# Patient Record
Sex: MALE | Race: WHITE | NOT HISPANIC OR LATINO | URBAN - METROPOLITAN AREA
[De-identification: names, ages, dates, MRNs, and addresses within clinical notes are randomized per-mention and may not be internally consistent; named-entity substitution may affect disease eponyms.]

---

## 2019-02-21 ENCOUNTER — APPOINTMENT (RX ONLY)
Dept: URBAN - METROPOLITAN AREA CLINIC 22 | Facility: CLINIC | Age: 59
Setting detail: DERMATOLOGY
End: 2019-02-21

## 2019-02-21 DIAGNOSIS — Z71.89 OTHER SPECIFIED COUNSELING: ICD-10-CM

## 2019-02-21 DIAGNOSIS — B35.1 TINEA UNGUIUM: ICD-10-CM

## 2019-02-21 DIAGNOSIS — D18.0 HEMANGIOMA: ICD-10-CM

## 2019-02-21 DIAGNOSIS — L57.0 ACTINIC KERATOSIS: ICD-10-CM

## 2019-02-21 DIAGNOSIS — D22 MELANOCYTIC NEVI: ICD-10-CM

## 2019-02-21 DIAGNOSIS — L82.1 OTHER SEBORRHEIC KERATOSIS: ICD-10-CM

## 2019-02-21 DIAGNOSIS — B35.3 TINEA PEDIS: ICD-10-CM

## 2019-02-21 DIAGNOSIS — L81.4 OTHER MELANIN HYPERPIGMENTATION: ICD-10-CM

## 2019-02-21 PROBLEM — D48.5 NEOPLASM OF UNCERTAIN BEHAVIOR OF SKIN: Status: ACTIVE | Noted: 2019-02-21

## 2019-02-21 PROBLEM — D18.01 HEMANGIOMA OF SKIN AND SUBCUTANEOUS TISSUE: Status: ACTIVE | Noted: 2019-02-21

## 2019-02-21 PROBLEM — D22.5 MELANOCYTIC NEVI OF TRUNK: Status: ACTIVE | Noted: 2019-02-21

## 2019-02-21 PROBLEM — Z85.828 PERSONAL HISTORY OF OTHER MALIGNANT NEOPLASM OF SKIN: Status: ACTIVE | Noted: 2019-02-21

## 2019-02-21 PROCEDURE — 17000 DESTRUCT PREMALG LESION: CPT | Mod: 59

## 2019-02-21 PROCEDURE — 11102 TANGNTL BX SKIN SINGLE LES: CPT

## 2019-02-21 PROCEDURE — ? COUNSELING

## 2019-02-21 PROCEDURE — 11103 TANGNTL BX SKIN EA SEP/ADDL: CPT

## 2019-02-21 PROCEDURE — ? BIOPSY BY SHAVE METHOD

## 2019-02-21 PROCEDURE — 17003 DESTRUCT PREMALG LES 2-14: CPT

## 2019-02-21 PROCEDURE — 99203 OFFICE O/P NEW LOW 30 MIN: CPT | Mod: 25

## 2019-02-21 PROCEDURE — ? LIQUID NITROGEN

## 2019-02-21 ASSESSMENT — LOCATION DETAILED DESCRIPTION DERM
LOCATION DETAILED: RIGHT 2ND TOENAIL
LOCATION DETAILED: LEFT 4TH TOENAIL
LOCATION DETAILED: LEFT SUPERIOR MEDIAL UPPER BACK
LOCATION DETAILED: RIGHT DORSAL FOOT
LOCATION DETAILED: LEFT MEDIAL MALAR CHEEK
LOCATION DETAILED: LEFT 2ND TOENAIL
LOCATION DETAILED: RIGHT SUPERIOR MEDIAL MIDBACK
LOCATION DETAILED: RIGHT MEDIAL UPPER BACK
LOCATION DETAILED: RIGHT 3RD TOENAIL
LOCATION DETAILED: LEFT 5TH TOENAIL
LOCATION DETAILED: RIGHT 4TH TOENAIL
LOCATION DETAILED: RIGHT 5TH TOENAIL
LOCATION DETAILED: RIGHT GREAT TOENAIL
LOCATION DETAILED: LEFT DORSAL GREAT TOE
LOCATION DETAILED: RIGHT INFERIOR LATERAL FOREHEAD
LOCATION DETAILED: NASAL DORSUM
LOCATION DETAILED: LEFT 3RD TOENAIL
LOCATION DETAILED: EPIGASTRIC SKIN
LOCATION DETAILED: LEFT FOREHEAD
LOCATION DETAILED: LEFT DORSAL FOOT
LOCATION DETAILED: NASAL TIP

## 2019-02-21 ASSESSMENT — LOCATION SIMPLE DESCRIPTION DERM
LOCATION SIMPLE: RIGHT 3RD TOE
LOCATION SIMPLE: LEFT FOOT
LOCATION SIMPLE: LEFT UPPER BACK
LOCATION SIMPLE: LEFT GREAT TOE
LOCATION SIMPLE: RIGHT FOOT
LOCATION SIMPLE: RIGHT UPPER BACK
LOCATION SIMPLE: LEFT 5TH TOE
LOCATION SIMPLE: RIGHT GREAT TOE
LOCATION SIMPLE: LEFT CHEEK
LOCATION SIMPLE: RIGHT LOWER BACK
LOCATION SIMPLE: LEFT 2ND TOE
LOCATION SIMPLE: LEFT 3RD TOE
LOCATION SIMPLE: LEFT 4TH TOE
LOCATION SIMPLE: LEFT FOREHEAD
LOCATION SIMPLE: NOSE
LOCATION SIMPLE: RIGHT 5TH TOE
LOCATION SIMPLE: RIGHT 2ND TOE
LOCATION SIMPLE: ABDOMEN
LOCATION SIMPLE: RIGHT FOREHEAD
LOCATION SIMPLE: RIGHT 4TH TOE

## 2019-02-21 ASSESSMENT — LOCATION ZONE DERM
LOCATION ZONE: TOE
LOCATION ZONE: TRUNK
LOCATION ZONE: NOSE
LOCATION ZONE: TOENAIL
LOCATION ZONE: FACE
LOCATION ZONE: FEET

## 2019-02-21 NOTE — HPI: SKIN LESIONS
Is This A New Presentation, Or A Follow-Up?: Skin Lesion
How Severe Is Your Skin Lesion?: moderate
Have Your Skin Lesions Been Treated?: not been treated
Which Family Member (Optional)?: Father

## 2019-02-21 NOTE — PROCEDURE: BIOPSY BY SHAVE METHOD
Curettage Text: The wound bed was treated with curettage after the biopsy was performed.
Notification Instructions: Patient will be notified of biopsy results. However, patient instructed to call the office if not contacted within 2 weeks.
Detail Level: Detailed
Biopsy Method: Personna blade
Bill For Surgical Tray: no
Lab: [id number]
Consent: Written consent was obtained and risks were reviewed including but not limited to scarring, infection, bleeding, scabbing, incomplete removal, nerve damage and allergy to anesthesia.
Lab Facility: 
Cryotherapy Text: The wound bed was treated with cryotherapy after the biopsy was performed.
Additional Anesthesia Volume In Cc (Will Not Render If 0): 0
Anesthesia Type: 1% lidocaine with 1:100,000 epinephrine and a 1:3 solution of 8.4% sodium bicarbonate
Render Post-Care Instructions In Note?: yes
Wound Care: Vaseline
Depth Of Biopsy: dermis
Electrodesiccation Text: The wound bed was treated with electrodesiccation after the biopsy was performed.
Biopsy Type: H and E
Billing Type: Third-Party Bill
Electrodesiccation And Curettage Text: The wound bed was treated with electrodesiccation and curettage after the biopsy was performed.
Type Of Destruction Used: Curettage
Anesthesia Volume In Cc: 1
Dressing: bandage
Silver Nitrate Text: The wound bed was treated with silver nitrate after the biopsy was performed.
Post-Care Instructions: I reviewed with the patient in detail post-care instructions. Patient is to keep the biopsy site dry overnight, and then apply bacitracin twice daily until healed. Patient may apply hydrogen peroxide soaks to remove any crusting.
Hemostasis: Drysol

## 2019-03-14 ENCOUNTER — APPOINTMENT (RX ONLY)
Dept: URBAN - METROPOLITAN AREA CLINIC 36 | Facility: CLINIC | Age: 59
Setting detail: DERMATOLOGY
End: 2019-03-14

## 2019-03-14 PROBLEM — C44.629 SQUAMOUS CELL CARCINOMA OF SKIN OF LEFT UPPER LIMB, INCLUDING SHOULDER: Status: ACTIVE | Noted: 2019-03-14

## 2019-03-14 PROCEDURE — ? DIAGNOSIS COMMENT

## 2019-03-14 PROCEDURE — ? EXCISION

## 2019-03-14 PROCEDURE — 11623 EXC S/N/H/F/G MAL+MRG 2.1-3: CPT

## 2019-03-14 PROCEDURE — 12042 INTMD RPR N-HF/GENIT2.6-7.5: CPT

## 2019-03-14 NOTE — PROCEDURE: EXCISION
Melolabial Interpolation Flap Text: A decision was made to reconstruct the defect utilizing an interpolation axial flap and a staged reconstruction.  A telfa template was made of the defect.  This telfa template was then used to outline the melolabial interpolation flap.  The donor area for the pedicle flap was then injected with anesthesia.  The flap was excised through the skin and subcutaneous tissue down to the layer of the underlying musculature.  The pedicle flap was carefully excised within this deep plane to maintain its blood supply.  The edges of the donor site were undermined.   The donor site was closed in a primary fashion.  The pedicle was then rotated into position and sutured.  Once the tube was sutured into place, adequate blood supply was confirmed with blanching and refill.  The pedicle was then wrapped with xeroform gauze and dressed appropriately with a telfa and gauze bandage to ensure continued blood supply and protect the attached pedicle.
Graft Donor Site Will Heal By Secondary Intention: No
Trilobed Flap Text: The defect edges were debeveled with a #15 scalpel blade.  Given the location of the defect and the proximity to free margins a trilobed flap was deemed most appropriate.  Using a sterile surgical marker, an appropriate trilobed flap drawn around the defect.    The area thus outlined was incised deep to adipose tissue with a #15 scalpel blade.  The skin margins were undermined to an appropriate distance in all directions utilizing iris scissors.
Complex Repair And Dermal Autograft Text: The defect edges were debeveled with a #15 scalpel blade.  The primary defect was closed partially with a complex linear closure.  Given the location of the defect, shape of the defect and the proximity to free margins an dermal autograft was deemed most appropriate to repair the remaining defect.  The graft was trimmed to fit the size of the remaining defect.  The graft was then placed in the primary defect, oriented appropriately, and sutured into place.
Double M-Plasty Complex Repair Preamble Text (Leave Blank If You Do Not Want): Extensive wide undermining was performed.
Complex Repair And Double Advancement Flap Text: The defect edges were debeveled with a #15 scalpel blade.  The primary defect was closed partially with a complex linear closure.  Given the location of the remaining defect, shape of the defect and the proximity to free margins a double advancement flap was deemed most appropriate for complete closure of the defect.  Using a sterile surgical marker, an appropriate advancement flap was drawn incorporating the defect and placing the expected incisions within the relaxed skin tension lines where possible.    The area thus outlined was incised deep to adipose tissue with a #15 scalpel blade.  The skin margins were undermined to an appropriate distance in all directions utilizing iris scissors.
Mercedes Flap Text: The defect edges were debeveled with a #15 scalpel blade.  Given the location of the defect, shape of the defect and the proximity to free margins a Mercedes flap was deemed most appropriate.  Using a sterile surgical marker, an appropriate advancement flap was drawn incorporating the defect and placing the expected incisions within the relaxed skin tension lines where possible. The area thus outlined was incised deep to adipose tissue with a #15 scalpel blade.  The skin margins were undermined to an appropriate distance in all directions utilizing iris scissors.
Crescentic Intermediate Repair Preamble Text (Leave Blank If You Do Not Want): Undermining was performed with blunt dissection.
Anesthesia Type: 1% lidocaine with epinephrine and a 1:10 solution of 8.4% sodium bicarbonate
Information: Selecting Yes will display possible errors in your note based on the variables you have selected. This validation is only offered as a suggestion for you. PLEASE NOTE THAT THE VALIDATION TEXT WILL BE REMOVED WHEN YOU FINALIZE YOUR NOTE. IF YOU WANT TO FAX A PRELIMINARY NOTE YOU WILL NEED TO TOGGLE THIS TO 'NO' IF YOU DO NOT WANT IT IN YOUR FAXED NOTE.
Mastoid Interpolation Flap Text: A decision was made to reconstruct the defect utilizing an interpolation axial flap and a staged reconstruction.  A telfa template was made of the defect.  This telfa template was then used to outline the mastoid interpolation flap.  The donor area for the pedicle flap was then injected with anesthesia.  The flap was excised through the skin and subcutaneous tissue down to the layer of the underlying musculature.  The pedicle flap was carefully excised within this deep plane to maintain its blood supply.  The edges of the donor site were undermined.   The donor site was closed in a primary fashion.  The pedicle was then rotated into position and sutured.  Once the tube was sutured into place, adequate blood supply was confirmed with blanching and refill.  The pedicle was then wrapped with xeroform gauze and dressed appropriately with a telfa and gauze bandage to ensure continued blood supply and protect the attached pedicle.
Complex Repair And Tissue Cultured Epidermal Autograft Text: The defect edges were debeveled with a #15 scalpel blade.  The primary defect was closed partially with a complex linear closure.  Given the location of the defect, shape of the defect and the proximity to free margins an tissue cultured epidermal autograft was deemed most appropriate to repair the remaining defect.  The graft was trimmed to fit the size of the remaining defect.  The graft was then placed in the primary defect, oriented appropriately, and sutured into place.
Dorsal Nasal Flap Text: The defect edges were debeveled with a #15 scalpel blade.  Given the location of the defect and the proximity to free margins a dorsal nasal flap was deemed most appropriate.  Using a sterile surgical marker, an appropriate dorsal nasal flap was drawn around the defect.    The area thus outlined was incised deep to adipose tissue with a #15 scalpel blade.  The skin margins were undermined to an appropriate distance in all directions utilizing iris scissors.
Anesthesia Volume In Cc: 9
Complex Repair And Modified Advancement Flap Text: The defect edges were debeveled with a #15 scalpel blade.  The primary defect was closed partially with a complex linear closure.  Given the location of the remaining defect, shape of the defect and the proximity to free margins a modified advancement flap was deemed most appropriate for complete closure of the defect.  Using a sterile surgical marker, an appropriate advancement flap was drawn incorporating the defect and placing the expected incisions within the relaxed skin tension lines where possible.    The area thus outlined was incised deep to adipose tissue with a #15 scalpel blade.  The skin margins were undermined to an appropriate distance in all directions utilizing iris scissors.
Modified Advancement Flap Text: The defect edges were debeveled with a #15 scalpel blade.  Given the location of the defect, shape of the defect and the proximity to free margins a modified advancement flap was deemed most appropriate.  Using a sterile surgical marker, an appropriate advancement flap was drawn incorporating the defect and placing the expected incisions within the relaxed skin tension lines where possible.    The area thus outlined was incised deep to adipose tissue with a #15 scalpel blade.  The skin margins were undermined to an appropriate distance in all directions utilizing iris scissors.
Island Pedicle Flap Text: The defect edges were debeveled with a #15 scalpel blade.  Given the location of the defect, shape of the defect and the proximity to free margins an island pedicle advancement flap was deemed most appropriate.  Using a sterile surgical marker, an appropriate advancement flap was drawn incorporating the defect, outlining the appropriate donor tissue and placing the expected incisions within the relaxed skin tension lines where possible.    The area thus outlined was incised deep to adipose tissue with a #15 scalpel blade.  The skin margins were undermined to an appropriate distance in all directions around the primary defect and laterally outward around the island pedicle utilizing iris scissors.  There was minimal undermining beneath the pedicle flap.
Complex Repair And Xenograft Text: The defect edges were debeveled with a #15 scalpel blade.  The primary defect was closed partially with a complex linear closure.  Given the location of the defect, shape of the defect and the proximity to free margins a xenograft was deemed most appropriate to repair the remaining defect.  The graft was trimmed to fit the size of the remaining defect.  The graft was then placed in the primary defect, oriented appropriately, and sutured into place.
Show Primary And Secondary Defect Sizes Variable (Do Not Hide If You Perform Flaps Or Graft Closures): Yes
Epidermal Closure Graft Donor Site (Optional): simple interrupted
Complex Repair And A-T Advancement Flap Text: The defect edges were debeveled with a #15 scalpel blade.  The primary defect was closed partially with a complex linear closure.  Given the location of the remaining defect, shape of the defect and the proximity to free margins an A-T advancement flap was deemed most appropriate for complete closure of the defect.  Using a sterile surgical marker, an appropriate advancement flap was drawn incorporating the defect and placing the expected incisions within the relaxed skin tension lines where possible.    The area thus outlined was incised deep to adipose tissue with a #15 scalpel blade.  The skin margins were undermined to an appropriate distance in all directions utilizing iris scissors.
Repair Type: Purse String Closure (Intermediate)
Mucosal Advancement Flap Text: Given the location of the defect, shape of the defect and the proximity to free margins a mucosal advancement flap was deemed most appropriate. Incisions were made with a 15 blade scalpel in the appropriate fashion along the cutaneous vermilion border and the mucosal lip. The remaining actinically damaged mucosal tissue was excised.  The mucosal advancement flap was then elevated to the gingival sulcus with care taken to preserve the neurovascular structures and advanced into the primary defect. Care was taken to ensure that precise realignment of the vermilion border was achieved.
Posterior Auricular Interpolation Flap Text: A decision was made to reconstruct the defect utilizing an interpolation axial flap and a staged reconstruction.  A telfa template was made of the defect.  This telfa template was then used to outline the posterior auricular interpolation flap.  The donor area for the pedicle flap was then injected with anesthesia.  The flap was excised through the skin and subcutaneous tissue down to the layer of the underlying musculature.  The pedicle flap was carefully excised within this deep plane to maintain its blood supply.  The edges of the donor site were undermined.   The donor site was closed in a primary fashion.  The pedicle was then rotated into position and sutured.  Once the tube was sutured into place, adequate blood supply was confirmed with blanching and refill.  The pedicle was then wrapped with xeroform gauze and dressed appropriately with a telfa and gauze bandage to ensure continued blood supply and protect the attached pedicle.
Complex Repair And Skin Substitute Graft Text: The defect edges were debeveled with a #15 scalpel blade.  The primary defect was closed partially with a complex linear closure.  Given the location of the remaining defect, shape of the defect and the proximity to free margins a skin substitute graft was deemed most appropriate to repair the remaining defect.  The graft was trimmed to fit the size of the remaining defect.  The graft was then placed in the primary defect, oriented appropriately, and sutured into place.
Paramedian Forehead Flap Text: A decision was made to reconstruct the defect utilizing an interpolation axial flap and a staged reconstruction.  A telfa template was made of the defect.  This telfa template was then used to outline the paramedian forehead pedicle flap.  The donor area for the pedicle flap was then injected with anesthesia.  The flap was excised through the skin and subcutaneous tissue down to the layer of the underlying musculature.  The pedicle flap was carefully excised within this deep plane to maintain its blood supply.  The edges of the donor site were undermined.   The donor site was closed in a primary fashion.  The pedicle was then rotated into position and sutured.  Once the tube was sutured into place, adequate blood supply was confirmed with blanching and refill.  The pedicle was then wrapped with xeroform gauze and dressed appropriately with a telfa and gauze bandage to ensure continued blood supply and protect the attached pedicle.
Island Pedicle Flap With Canthal Suspension Text: The defect edges were debeveled with a #15 scalpel blade.  Given the location of the defect, shape of the defect and the proximity to free margins an island pedicle advancement flap was deemed most appropriate.  Using a sterile surgical marker, an appropriate advancement flap was drawn incorporating the defect, outlining the appropriate donor tissue and placing the expected incisions within the relaxed skin tension lines where possible. The area thus outlined was incised deep to adipose tissue with a #15 scalpel blade.  The skin margins were undermined to an appropriate distance in all directions around the primary defect and laterally outward around the island pedicle utilizing iris scissors.  There was minimal undermining beneath the pedicle flap. A suspension suture was placed in the canthal tendon to prevent tension and prevent ectropion.
Hatchet Flap Text: The defect edges were debeveled with a #15 scalpel blade.  Given the location of the defect, shape of the defect and the proximity to free margins a hatchet flap was deemed most appropriate.  Using a sterile surgical marker, an appropriate hatchet flap was drawn incorporating the defect and placing the expected incisions within the relaxed skin tension lines where possible.    The area thus outlined was incised deep to adipose tissue with a #15 scalpel blade.  The skin margins were undermined to an appropriate distance in all directions utilizing iris scissors.
Anesthesia Volume In Cc: 1.5
Complex Repair And O-T Advancement Flap Text: The defect edges were debeveled with a #15 scalpel blade.  The primary defect was closed partially with a complex linear closure.  Given the location of the remaining defect, shape of the defect and the proximity to free margins an O-T advancement flap was deemed most appropriate for complete closure of the defect.  Using a sterile surgical marker, an appropriate advancement flap was drawn incorporating the defect and placing the expected incisions within the relaxed skin tension lines where possible.    The area thus outlined was incised deep to adipose tissue with a #15 scalpel blade.  The skin margins were undermined to an appropriate distance in all directions utilizing iris scissors.
Undermining Location (Optional): in the fascial plane
Path Notes (To The Dermatopathologist): Please check margins. Suture marking the 12 o'clock margin.
Lip Wedge Excision Repair Text: Given the location of the defect and the proximity to free margins a full thickness wedge repair was deemed most appropriate.  Using a sterile surgical marker, the appropriate repair was drawn incorporating the defect and placing the expected incisions perpendicular to the vermilion border.  The vermilion border was also meticulously outlined to ensure appropriate reapproximation during the repair.  The area thus outlined was incised through and through with a #15 scalpel blade.  The muscularis and dermis were reaproximated with deep sutures following hemostasis. Care was taken to realign the vermilion border before proceeding with the superficial closure.  Once the vermilion was realigned the superfical and mucosal closure was finished.
Alar Island Pedicle Flap Text: The defect edges were debeveled with a #15 scalpel blade.  Given the location of the defect, shape of the defect and the proximity to the alar rim an island pedicle advancement flap was deemed most appropriate.  Using a sterile surgical marker, an appropriate advancement flap was drawn incorporating the defect, outlining the appropriate donor tissue and placing the expected incisions within the nasal ala running parallel to the alar rim. The area thus outlined was incised with a #15 scalpel blade.  The skin margins were undermined minimally to an appropriate distance in all directions around the primary defect and laterally outward around the island pedicle utilizing iris scissors.  There was minimal undermining beneath the pedicle flap.
Intermediate / Complex Repair - Final Wound Length In Cm: 3.1
Complex Repair And O-L Flap Text: The defect edges were debeveled with a #15 scalpel blade.  The primary defect was closed partially with a complex linear closure.  Given the location of the remaining defect, shape of the defect and the proximity to free margins an O-L flap was deemed most appropriate for complete closure of the defect.  Using a sterile surgical marker, an appropriate flap was drawn incorporating the defect and placing the expected incisions within the relaxed skin tension lines where possible.    The area thus outlined was incised deep to adipose tissue with a #15 scalpel blade.  The skin margins were undermined to an appropriate distance in all directions utilizing iris scissors.
Rotation Flap Text: The defect edges were debeveled with a #15 scalpel blade.  Given the location of the defect, shape of the defect and the proximity to free margins a rotation flap was deemed most appropriate.  Using a sterile surgical marker, an appropriate rotation flap was drawn incorporating the defect and placing the expected incisions within the relaxed skin tension lines where possible.    The area thus outlined was incised deep to adipose tissue with a #15 scalpel blade.  The skin margins were undermined to an appropriate distance in all directions utilizing iris scissors.
Body Location Override (Optional - Billing Will Still Be Based On Selected Body Map Location If Applicable): left radial dorsal hand
Wound Care: Petrolatum
Complex Repair And Bilobe Flap Text: The defect edges were debeveled with a #15 scalpel blade.  The primary defect was closed partially with a complex linear closure.  Given the location of the remaining defect, shape of the defect and the proximity to free margins a bilobe flap was deemed most appropriate for complete closure of the defect.  Using a sterile surgical marker, an appropriate advancement flap was drawn incorporating the defect and placing the expected incisions within the relaxed skin tension lines where possible.    The area thus outlined was incised deep to adipose tissue with a #15 scalpel blade.  The skin margins were undermined to an appropriate distance in all directions utilizing iris scissors.
Spiral Flap Text: The defect edges were debeveled with a #15 scalpel blade.  Given the location of the defect, shape of the defect and the proximity to free margins a spiral flap was deemed most appropriate.  Using a sterile surgical marker, an appropriate rotation flap was drawn incorporating the defect and placing the expected incisions within the relaxed skin tension lines where possible. The area thus outlined was incised deep to adipose tissue with a #15 scalpel blade.  The skin margins were undermined to an appropriate distance in all directions utilizing iris scissors.
Ftsg Text: The defect edges were debeveled with a #15 scalpel blade.  Given the location of the defect, shape of the defect and the proximity to free margins a full thickness skin graft was deemed most appropriate.  Using a sterile surgical marker, the primary defect shape was transferred to the donor site. The area thus outlined was incised deep to adipose tissue with a #15 scalpel blade.  The harvested graft was then trimmed of adipose tissue until only dermis and epidermis was left.  The skin margins of the secondary defect were undermined to an appropriate distance in all directions utilizing iris scissors.  The secondary defect was closed with interrupted buried subcutaneous sutures.  The skin edges were then re-apposed with running  sutures.  The skin graft was then placed in the primary defect and oriented appropriately.
Additional Anesthesia Volume In Cc: 6
Curvilinear Excision Additional Text (Leave Blank If You Do Not Want): The margin was drawn around the clinically apparent lesion.  A curvilinear shape was then drawn on the skin incorporating the lesion and margins.  Incisions were then made along these lines to the appropriate tissue plane and the lesion was extirpated.
Referring Physician (Optional): Demetrius Camacho, PAC
Double Island Pedicle Flap Text: The defect edges were debeveled with a #15 scalpel blade.  Given the location of the defect, shape of the defect and the proximity to free margins a double island pedicle advancement flap was deemed most appropriate.  Using a sterile surgical marker, an appropriate advancement flap was drawn incorporating the defect, outlining the appropriate donor tissue and placing the expected incisions within the relaxed skin tension lines where possible.    The area thus outlined was incised deep to adipose tissue with a #15 scalpel blade.  The skin margins were undermined to an appropriate distance in all directions around the primary defect and laterally outward around the island pedicle utilizing iris scissors.  There was minimal undermining beneath the pedicle flap.
Skin Substitute Units (Will Override Primary Defect Units If Greater Than 0): 0
Fusiform Excision Additional Text (Leave Blank If You Do Not Want): The margin was drawn around the clinically apparent lesion.  A fusiform shape was then drawn on the skin incorporating the lesion and margins.  Incisions were then made along these lines to the appropriate tissue plane and the lesion was extirpated.
Billing Type: Third-Party Bill
Complex Repair And Melolabial Flap Text: The defect edges were debeveled with a #15 scalpel blade.  The primary defect was closed partially with a complex linear closure.  Given the location of the remaining defect, shape of the defect and the proximity to free margins a melolabial flap was deemed most appropriate for complete closure of the defect.  Using a sterile surgical marker, an appropriate advancement flap was drawn incorporating the defect and placing the expected incisions within the relaxed skin tension lines where possible.    The area thus outlined was incised deep to adipose tissue with a #15 scalpel blade.  The skin margins were undermined to an appropriate distance in all directions utilizing iris scissors.
Split-Thickness Skin Graft Text: The defect edges were debeveled with a #15 scalpel blade.  Given the location of the defect, shape of the defect and the proximity to free margins a split thickness skin graft was deemed most appropriate.  Using a sterile surgical marker, the primary defect shape was transferred to the donor site. The split thickness graft was then harvested.  The skin graft was then placed in the primary defect and oriented appropriately.
Island Pedicle Flap-Requiring Vessel Identification Text: The defect edges were debeveled with a #15 scalpel blade.  Given the location of the defect, shape of the defect and the proximity to free margins an island pedicle advancement flap was deemed most appropriate.  Using a sterile surgical marker, an appropriate advancement flap was drawn, based on the axial vessel mentioned above, incorporating the defect, outlining the appropriate donor tissue and placing the expected incisions within the relaxed skin tension lines where possible.    The area thus outlined was incised deep to adipose tissue with a #15 scalpel blade.  The skin margins were undermined to an appropriate distance in all directions around the primary defect and laterally outward around the island pedicle utilizing iris scissors.  There was minimal undermining beneath the pedicle flap.
Star Wedge Flap Text: The defect edges were debeveled with a #15 scalpel blade.  Given the location of the defect, shape of the defect and the proximity to free margins a star wedge flap was deemed most appropriate.  Using a sterile surgical marker, an appropriate rotation flap was drawn incorporating the defect and placing the expected incisions within the relaxed skin tension lines where possible. The area thus outlined was incised deep to adipose tissue with a #15 scalpel blade.  The skin margins were undermined to an appropriate distance in all directions utilizing iris scissors.
Deep Sutures: 3-0 Maxon
Elliptical Excision Additional Text (Leave Blank If You Do Not Want): The margin was drawn around the clinically apparent lesion.  An elliptical shape was then drawn on the skin incorporating the lesion and margins.  Incisions were then made along these lines to the appropriate tissue plane and the lesion was extirpated.
Surgeon (Optional): Bentley Diehl M.D.
Consent was obtained from the patient. The risks and benefits to therapy were discussed in detail. Specifically, the risks of infection, scarring, bleeding, prolonged wound healing, incomplete removal, allergy to anesthesia, nerve injury and recurrence were addressed. Prior to the procedure, the treatment site was clearly identified and confirmed by the patient. All components of Universal Protocol/PAUSE Rule completed.
Cartilage Graft Text: The defect edges were debeveled with a #15 scalpel blade.  Given the location of the defect, shape of the defect, the fact the defect involved a full thickness cartilage defect a cartilage graft was deemed most appropriate.  An appropriate donor site was identified, cleansed, and anesthetized. The cartilage graft was then harvested and transferred to the recipient site, oriented appropriately and then sutured into place.  The secondary defect was then repaired using a primary closure.
Keystone Flap Text: The defect edges were debeveled with a #15 scalpel blade.  Given the location of the defect, shape of the defect a keystone flap was deemed most appropriate.  Using a sterile surgical marker, an appropriate keystone flap was drawn incorporating the defect, outlining the appropriate donor tissue and placing the expected incisions within the relaxed skin tension lines where possible. The area thus outlined was incised deep to adipose tissue with a #15 scalpel blade.  The skin margins were undermined to an appropriate distance in all directions around the primary defect and laterally outward around the flap utilizing iris scissors.
Complex Repair And Rotation Flap Text: The defect edges were debeveled with a #15 scalpel blade.  The primary defect was closed partially with a complex linear closure.  Given the location of the remaining defect, shape of the defect and the proximity to free margins a rotation flap was deemed most appropriate for complete closure of the defect.  Using a sterile surgical marker, an appropriate advancement flap was drawn incorporating the defect and placing the expected incisions within the relaxed skin tension lines where possible.    The area thus outlined was incised deep to adipose tissue with a #15 scalpel blade.  The skin margins were undermined to an appropriate distance in all directions utilizing iris scissors.
Transposition Flap Text: The defect edges were debeveled with a #15 scalpel blade.  Given the location of the defect and the proximity to free margins a transposition flap was deemed most appropriate.  Using a sterile surgical marker, an appropriate transposition flap was drawn incorporating the defect.    The area thus outlined was incised deep to adipose tissue with a #15 scalpel blade.  The skin margins were undermined to an appropriate distance in all directions utilizing iris scissors.
Dressing: pressure dressing with telfa
Muscle Hinge Flap Text: The defect edges were debeveled with a #15 scalpel blade.  Given the size, depth and location of the defect and the proximity to free margins a muscle hinge flap was deemed most appropriate.  Using a sterile surgical marker, an appropriate hinge flap was drawn incorporating the defect. The area thus outlined was incised with a #15 scalpel blade.  The skin margins were undermined to an appropriate distance in all directions utilizing iris scissors.
Complex Repair And Rhombic Flap Text: The defect edges were debeveled with a #15 scalpel blade.  The primary defect was closed partially with a complex linear closure.  Given the location of the remaining defect, shape of the defect and the proximity to free margins a rhombic flap was deemed most appropriate for complete closure of the defect.  Using a sterile surgical marker, an appropriate advancement flap was drawn incorporating the defect and placing the expected incisions within the relaxed skin tension lines where possible.    The area thus outlined was incised deep to adipose tissue with a #15 scalpel blade.  The skin margins were undermined to an appropriate distance in all directions utilizing iris scissors.
Composite Graft Text: The defect edges were debeveled with a #15 scalpel blade.  Given the location of the defect, shape of the defect, the proximity to free margins and the fact the defect was full thickness a composite graft was deemed most appropriate.  The defect was outline and then transferred to the donor site.  A full thickness graft was then excised from the donor site. The graft was then placed in the primary defect, oriented appropriately and then sutured into place.  The secondary defect was then repaired using a primary closure.
Hemostasis: Electricator
Saucerization Excision Additional Text (Leave Blank If You Do Not Want): The margin was drawn around the clinically apparent lesion.  Incisions were then made along these lines, in a tangential fashion, to the appropriate tissue plane and the lesion was extirpated.
O-T Plasty Text: The defect edges were debeveled with a #15 scalpel blade.  Given the location of the defect, shape of the defect and the proximity to free margins an O-T plasty was deemed most appropriate.  Using a sterile surgical marker, an appropriate O-T plasty was drawn incorporating the defect and placing the expected incisions within the relaxed skin tension lines where possible.    The area thus outlined was incised deep to adipose tissue with a #15 scalpel blade.  The skin margins were undermined to an appropriate distance in all directions utilizing iris scissors.
Slit Excision Additional Text (Leave Blank If You Do Not Want): A linear line was drawn on the skin overlying the lesion. An incision was made slowly until the lesion was visualized.  Once visualized, the lesion was removed with blunt dissection.
Complex Repair And Transposition Flap Text: The defect edges were debeveled with a #15 scalpel blade.  The primary defect was closed partially with a complex linear closure.  Given the location of the remaining defect, shape of the defect and the proximity to free margins a transposition flap was deemed most appropriate for complete closure of the defect.  Using a sterile surgical marker, an appropriate advancement flap was drawn incorporating the defect and placing the expected incisions within the relaxed skin tension lines where possible.    The area thus outlined was incised deep to adipose tissue with a #15 scalpel blade.  The skin margins were undermined to an appropriate distance in all directions utilizing iris scissors.
Melolabial Transposition Flap Text: The defect edges were debeveled with a #15 scalpel blade.  Given the location of the defect and the proximity to free margins a melolabial flap was deemed most appropriate.  Using a sterile surgical marker, an appropriate melolabial transposition flap was drawn incorporating the defect.    The area thus outlined was incised deep to adipose tissue with a #15 scalpel blade.  The skin margins were undermined to an appropriate distance in all directions utilizing iris scissors.
Epidermal Autograft Text: The defect edges were debeveled with a #15 scalpel blade.  Given the location of the defect, shape of the defect and the proximity to free margins an epidermal autograft was deemed most appropriate.  Using a sterile surgical marker, the primary defect shape was transferred to the donor site. The epidermal graft was then harvested.  The skin graft was then placed in the primary defect and oriented appropriately.
Wound Check: 28 days
O-Z Plasty Text: The defect edges were debeveled with a #15 scalpel blade.  Given the location of the defect, shape of the defect and the proximity to free margins an O-Z plasty (double transposition flap) was deemed most appropriate.  Using a sterile surgical marker, the appropriate transposition flaps were drawn incorporating the defect and placing the expected incisions within the relaxed skin tension lines where possible.    The area thus outlined was incised deep to adipose tissue with a #15 scalpel blade.  The skin margins were undermined to an appropriate distance in all directions utilizing iris scissors.  Hemostasis was achieved with electrocautery.  The flaps were then transposed into place, one clockwise and the other counterclockwise, and anchored with interrupted buried subcutaneous sutures.
Excisional Biopsy Additional Text (Leave Blank If You Do Not Want): The margin was drawn around the clinically apparent lesion. An elliptical shape was then drawn on the skin incorporating the lesion and margins.  Incisions were then made along these lines to the appropriate tissue plane and the lesion was extirpated.
Lab: [id number]
Dermal Autograft Text: The defect edges were debeveled with a #15 scalpel blade.  Given the location of the defect, shape of the defect and the proximity to free margins a dermal autograft was deemed most appropriate.  Using a sterile surgical marker, the primary defect shape was transferred to the donor site. The area thus outlined was incised deep to adipose tissue with a #15 scalpel blade.  The harvested graft was then trimmed of adipose and epidermal tissue until only dermis was left.  The skin graft was then placed in the primary defect and oriented appropriately.
Post-Care Instructions: I reviewed with the patient in detail post-care instructions. Patient is not to engage in any heavy lifting, exercise, or swimming for the next 14 days. Should the patient develop any fevers, chills, bleeding, severe pain patient will contact the office immediately.
Double O-Z Plasty Text: The defect edges were debeveled with a #15 scalpel blade.  Given the location of the defect, shape of the defect and the proximity to free margins a Double O-Z plasty (double transposition flap) was deemed most appropriate.  Using a sterile surgical marker, the appropriate transposition flaps were drawn incorporating the defect and placing the expected incisions within the relaxed skin tension lines where possible. The area thus outlined was incised deep to adipose tissue with a #15 scalpel blade.  The skin margins were undermined to an appropriate distance in all directions utilizing iris scissors.  Hemostasis was achieved with electrocautery.  The flaps were then transposed into place, one clockwise and the other counterclockwise, and anchored with interrupted buried subcutaneous sutures.
Rhombic Flap Text: The defect edges were debeveled with a #15 scalpel blade.  Given the location of the defect and the proximity to free margins a rhombic flap was deemed most appropriate.  Using a sterile surgical marker, an appropriate rhombic flap was drawn incorporating the defect.    The area thus outlined was incised deep to adipose tissue with a #15 scalpel blade.  The skin margins were undermined to an appropriate distance in all directions utilizing iris scissors.
Complex Repair And V-Y Plasty Text: The defect edges were debeveled with a #15 scalpel blade.  The primary defect was closed partially with a complex linear closure.  Given the location of the remaining defect, shape of the defect and the proximity to free margins a V-Y plasty was deemed most appropriate for complete closure of the defect.  Using a sterile surgical marker, an appropriate advancement flap was drawn incorporating the defect and placing the expected incisions within the relaxed skin tension lines where possible.    The area thus outlined was incised deep to adipose tissue with a #15 scalpel blade.  The skin margins were undermined to an appropriate distance in all directions utilizing iris scissors.
Estimated Blood Loss (Cc): minimal
Home Suture Removal Text: Patient was provided a home suture removal kit and will remove their sutures at home.  If they have any questions or difficulties they will call the office.
Lab Facility: 
Rhomboid Transposition Flap Text: The defect edges were debeveled with a #15 scalpel blade.  Given the location of the defect and the proximity to free margins a rhomboid transposition flap was deemed most appropriate.  Using a sterile surgical marker, an appropriate rhomboid flap was drawn incorporating the defect.    The area thus outlined was incised deep to adipose tissue with a #15 scalpel blade.  The skin margins were undermined to an appropriate distance in all directions utilizing iris scissors.
Complex Repair And M Plasty Text: The defect edges were debeveled with a #15 scalpel blade.  The primary defect was closed partially with a complex linear closure.  Given the location of the remaining defect, shape of the defect and the proximity to free margins an M plasty was deemed most appropriate for complete closure of the defect.  Using a sterile surgical marker, an appropriate advancement flap was drawn incorporating the defect and placing the expected incisions within the relaxed skin tension lines where possible.    The area thus outlined was incised deep to adipose tissue with a #15 scalpel blade.  The skin margins were undermined to an appropriate distance in all directions utilizing iris scissors.
Skin Substitute Text: The defect edges were debeveled with a #15 scalpel blade.  Given the location of the defect, shape of the defect and the proximity to free margins a skin substitute graft was deemed most appropriate.  The graft material was trimmed to fit the size of the defect. The graft was then placed in the primary defect and oriented appropriately.
Perilesional Excision Additional Text (Leave Blank If You Do Not Want): The margin was drawn around the clinically apparent lesion. Incisions were then made along these lines to the appropriate tissue plane and the lesion was extirpated.
Previous Accession (Optional): [id number]
S Plasty Text: Given the location and shape of the defect, and the orientation of relaxed skin tension lines, an S-plasty was deemed most appropriate for repair.  Using a sterile surgical marker, the appropriate outline of the S-plasty was drawn, incorporating the defect and placing the expected incisions within the relaxed skin tension lines where possible.  The area thus outlined was incised deep to adipose tissue with a #15 scalpel blade.  The skin margins were undermined to an appropriate distance in all directions utilizing iris scissors. The skin flaps were advanced over the defect.  The opposing margins were then approximated with interrupted buried subcutaneous sutures.
Complex Repair And Double M Plasty Text: The defect edges were debeveled with a #15 scalpel blade.  The primary defect was closed partially with a complex linear closure.  Given the location of the remaining defect, shape of the defect and the proximity to free margins a double M plasty was deemed most appropriate for complete closure of the defect.  Using a sterile surgical marker, an appropriate advancement flap was drawn incorporating the defect and placing the expected incisions within the relaxed skin tension lines where possible.    The area thus outlined was incised deep to adipose tissue with a #15 scalpel blade.  The skin margins were undermined to an appropriate distance in all directions utilizing iris scissors.
Bi-Rhombic Flap Text: The defect edges were debeveled with a #15 scalpel blade.  Given the location of the defect and the proximity to free margins a bi-rhombic flap was deemed most appropriate.  Using a sterile surgical marker, an appropriate rhombic flap was drawn incorporating the defect. The area thus outlined was incised deep to adipose tissue with a #15 scalpel blade.  The skin margins were undermined to an appropriate distance in all directions utilizing iris scissors.
Tissue Cultured Epidermal Autograft Text: The defect edges were debeveled with a #15 scalpel blade.  Given the location of the defect, shape of the defect and the proximity to free margins a tissue cultured epidermal autograft was deemed most appropriate.  The graft was then trimmed to fit the size of the defect.  The graft was then placed in the primary defect and oriented appropriately.
Dermal Closure: running subcuticular
Graft Donor Site Bandage (Optional-Leave Blank If You Don't Want In Note): Steri-strips and a pressure bandage were applied to the donor site.
V-Y Plasty Text: The defect edges were debeveled with a #15 scalpel blade.  Given the location of the defect, shape of the defect and the proximity to free margins an V-Y advancement flap was deemed most appropriate.  Using a sterile surgical marker, an appropriate advancement flap was drawn incorporating the defect and placing the expected incisions within the relaxed skin tension lines where possible.    The area thus outlined was incised deep to adipose tissue with a #15 scalpel blade.  The skin margins were undermined to an appropriate distance in all directions utilizing iris scissors.
Positioning (Leave Blank If You Do Not Want): The patient was placed in a comfortable position exposing the surgical site.
Excision Depth: adipose tissue
Repair Performed By Another Provider Text (Leave Blank If You Do Not Want): After the tissue was excised the defect was repaired by another provider.
Xenograft Text: The defect edges were debeveled with a #15 scalpel blade.  Given the location of the defect, shape of the defect and the proximity to free margins a xenograft was deemed most appropriate.  The graft was then trimmed to fit the size of the defect.  The graft was then placed in the primary defect and oriented appropriately.
Crescentic Advancement Flap Text: The defect edges were debeveled with a #15 scalpel blade.  Given the location of the defect and the proximity to free margins a crescentic advancement flap was deemed most appropriate.  Using a sterile surgical marker, the appropriate advancement flap was drawn incorporating the defect and placing the expected incisions within the relaxed skin tension lines where possible.    The area thus outlined was incised deep to adipose tissue with a #15 scalpel blade.  The skin margins were undermined to an appropriate distance in all directions utilizing iris scissors.
H Plasty Text: Given the location of the defect, shape of the defect and the proximity to free margins a H-plasty was deemed most appropriate for repair.  Using a sterile surgical marker, the appropriate advancement arms of the H-plasty were drawn incorporating the defect and placing the expected incisions within the relaxed skin tension lines where possible. The area thus outlined was incised deep to adipose tissue with a #15 scalpel blade. The skin margins were undermined to an appropriate distance in all directions utilizing iris scissors.  The opposing advancement arms were then advanced into place in opposite direction and anchored with interrupted buried subcutaneous sutures.
Helical Rim Advancement Flap Text: The defect edges were debeveled with a #15 blade scalpel.  Given the location of the defect and the proximity to free margins (helical rim) a double helical rim advancement flap was deemed most appropriate.  Using a sterile surgical marker, the appropriate advancement flaps were drawn incorporating the defect and placing the expected incisions between the helical rim and antihelix where possible.  The area thus outlined was incised through and through with a #15 scalpel blade.  With a skin hook and iris scissors, the flaps were gently and sharply undermined and freed up.
Complex Repair And W Plasty Text: The defect edges were debeveled with a #15 scalpel blade.  The primary defect was closed partially with a complex linear closure.  Given the location of the remaining defect, shape of the defect and the proximity to free margins a W plasty was deemed most appropriate for complete closure of the defect.  Using a sterile surgical marker, an appropriate advancement flap was drawn incorporating the defect and placing the expected incisions within the relaxed skin tension lines where possible.    The area thus outlined was incised deep to adipose tissue with a #15 scalpel blade.  The skin margins were undermined to an appropriate distance in all directions utilizing iris scissors.
Pre-Excision Curettage Text (Leave Blank If You Do Not Want): Prior to drawing the surgical margin the visible lesion was removed with electrodesiccation and curettage to clearly define the lesion size.
Purse String (Intermediate) Text: Given the location of the defect and the characteristics of the surrounding skin a purse string intermediate closure was deemed most appropriate.  Undermining was performed circumfirentially around the surgical defect.  A purse string suture was then placed and tightened.
Scalpel Size: 15 blade
Bilateral Helical Rim Advancement Flap Text: The defect edges were debeveled with a #15 blade scalpel.  Given the location of the defect and the proximity to free margins (helical rim) a bilateral helical rim advancement flap was deemed most appropriate.  Using a sterile surgical marker, the appropriate advancement flaps were drawn incorporating the defect and placing the expected incisions between the helical rim and antihelix where possible.  The area thus outlined was incised through and through with a #15 scalpel blade.  With a skin hook and iris scissors, the flaps were gently and sharply undermined and freed up.
No Repair - Repaired With Adjacent Surgical Defect Text (Leave Blank If You Do Not Want): After the excision the defect was repaired concurrently with another surgical defect which was in close approximation.
A-T Advancement Flap Text: The defect edges were debeveled with a #15 scalpel blade.  Given the location of the defect, shape of the defect and the proximity to free margins an A-T advancement flap was deemed most appropriate.  Using a sterile surgical marker, an appropriate advancement flap was drawn incorporating the defect and placing the expected incisions within the relaxed skin tension lines where possible.    The area thus outlined was incised deep to adipose tissue with a #15 scalpel blade.  The skin margins were undermined to an appropriate distance in all directions utilizing iris scissors.
Complex Repair And Z Plasty Text: The defect edges were debeveled with a #15 scalpel blade.  The primary defect was closed partially with a complex linear closure.  Given the location of the remaining defect, shape of the defect and the proximity to free margins a Z plasty was deemed most appropriate for complete closure of the defect.  Using a sterile surgical marker, an appropriate advancement flap was drawn incorporating the defect and placing the expected incisions within the relaxed skin tension lines where possible.    The area thus outlined was incised deep to adipose tissue with a #15 scalpel blade.  The skin margins were undermined to an appropriate distance in all directions utilizing iris scissors.
Advancement Flap (Single) Text: The defect edges were debeveled with a #15 scalpel blade.  Given the location of the defect and the proximity to free margins a single advancement flap was deemed most appropriate.  Using a sterile surgical marker, an appropriate advancement flap was drawn incorporating the defect and placing the expected incisions within the relaxed skin tension lines where possible.    The area thus outlined was incised deep to adipose tissue with a #15 scalpel blade.  The skin margins were undermined to an appropriate distance in all directions utilizing iris scissors.
W Plasty Text: The lesion was extirpated to the level of the fat with a #15 scalpel blade.  Given the location of the defect, shape of the defect and the proximity to free margins a W-plasty was deemed most appropriate for repair.  Using a sterile surgical marker, the appropriate transposition arms of the W-plasty were drawn incorporating the defect and placing the expected incisions within the relaxed skin tension lines where possible.    The area thus outlined was incised deep to adipose tissue with a #15 scalpel blade.  The skin margins were undermined to an appropriate distance in all directions utilizing iris scissors.  The opposing transposition arms were then transposed into place in opposite direction and anchored with interrupted buried subcutaneous sutures.
Detail Level: Detailed
Complex Repair And Dorsal Nasal Flap Text: The defect edges were debeveled with a #15 scalpel blade.  The primary defect was closed partially with a complex linear closure.  Given the location of the remaining defect, shape of the defect and the proximity to free margins a dorsal nasal flap was deemed most appropriate for complete closure of the defect.  Using a sterile surgical marker, an appropriate flap was drawn incorporating the defect and placing the expected incisions within the relaxed skin tension lines where possible.    The area thus outlined was incised deep to adipose tissue with a #15 scalpel blade.  The skin margins were undermined to an appropriate distance in all directions utilizing iris scissors.
Ear Star Wedge Flap Text: The defect edges were debeveled with a #15 blade scalpel.  Given the location of the defect and the proximity to free margins (helical rim) an ear star wedge flap was deemed most appropriate.  Using a sterile surgical marker, the appropriate flap was drawn incorporating the defect and placing the expected incisions between the helical rim and antihelix where possible.  The area thus outlined was incised through and through with a #15 scalpel blade.
Purse String (Simple) Text: Given the location of the defect and the characteristics of the surrounding skin a purse string simple closure was deemed most appropriate.  Undermining was performed circumferentially around the surgical defect.  A purse string suture was then placed and tightened.
O-T Advancement Flap Text: The defect edges were debeveled with a #15 scalpel blade.  Given the location of the defect, shape of the defect and the proximity to free margins an O-T advancement flap was deemed most appropriate.  Using a sterile surgical marker, an appropriate advancement flap was drawn incorporating the defect and placing the expected incisions within the relaxed skin tension lines where possible.    The area thus outlined was incised deep to adipose tissue with a #15 scalpel blade.  The skin margins were undermined to an appropriate distance in all directions utilizing iris scissors.
Z Plasty Text: The lesion was extirpated to the level of the fat with a #15 scalpel blade.  Given the location of the defect, shape of the defect and the proximity to free margins a Z-plasty was deemed most appropriate for repair.  Using a sterile surgical marker, the appropriate transposition arms of the Z-plasty were drawn incorporating the defect and placing the expected incisions within the relaxed skin tension lines where possible.    The area thus outlined was incised deep to adipose tissue with a #15 scalpel blade.  The skin margins were undermined to an appropriate distance in all directions utilizing iris scissors.  The opposing transposition arms were then transposed into place in opposite direction and anchored with interrupted buried subcutaneous sutures.
Advancement Flap (Double) Text: The defect edges were debeveled with a #15 scalpel blade.  Given the location of the defect and the proximity to free margins a double advancement flap was deemed most appropriate.  Using a sterile surgical marker, the appropriate advancement flaps were drawn incorporating the defect and placing the expected incisions within the relaxed skin tension lines where possible.    The area thus outlined was incised deep to adipose tissue with a #15 scalpel blade.  The skin margins were undermined to an appropriate distance in all directions utilizing iris scissors.
Size Of Lesion In Cm: 1.3
Cheek Interpolation Flap Text: A decision was made to reconstruct the defect utilizing an interpolation axial flap and a staged reconstruction.  A telfa template was made of the defect.  This telfa template was then used to outline the Cheek Interpolation flap.  The donor area for the pedicle flap was then injected with anesthesia.  The flap was excised through the skin and subcutaneous tissue down to the layer of the underlying musculature.  The interpolation flap was carefully excised within this deep plane to maintain its blood supply.  The edges of the donor site were undermined.   The donor site was closed in a primary fashion.  The pedicle was then rotated into position and sutured.  Once the tube was sutured into place, adequate blood supply was confirmed with blanching and refill.  The pedicle was then wrapped with xeroform gauze and dressed appropriately with a telfa and gauze bandage to ensure continued blood supply and protect the attached pedicle.
Banner Transposition Flap Text: The defect edges were debeveled with a #15 scalpel blade.  Given the location of the defect and the proximity to free margins a Banner transposition flap was deemed most appropriate.  Using a sterile surgical marker, an appropriate flap drawn around the defect. The area thus outlined was incised deep to adipose tissue with a #15 scalpel blade.  The skin margins were undermined to an appropriate distance in all directions utilizing iris scissors.
Complex Repair And Ftsg Text: The defect edges were debeveled with a #15 scalpel blade.  The primary defect was closed partially with a complex linear closure.  Given the location of the defect, shape of the defect and the proximity to free margins a full thickness skin graft was deemed most appropriate to repair the remaining defect.  The graft was trimmed to fit the size of the remaining defect.  The graft was then placed in the primary defect, oriented appropriately, and sutured into place.
X Size Of Lesion In Cm (Optional): 1.2
Partial Purse String (Intermediate) Text: Given the location of the defect and the characteristics of the surrounding skin an intermediate purse string closure was deemed most appropriate.  Undermining was performed circumferentially around the surgical defect.  A purse string suture was then placed and tightened. Wound tension of the circular defect prevented complete closure of the wound.
O-L Flap Text: The defect edges were debeveled with a #15 scalpel blade.  Given the location of the defect, shape of the defect and the proximity to free margins an O-L flap was deemed most appropriate.  Using a sterile surgical marker, an appropriate advancement flap was drawn incorporating the defect and placing the expected incisions within the relaxed skin tension lines where possible.    The area thus outlined was incised deep to adipose tissue with a #15 scalpel blade.  The skin margins were undermined to an appropriate distance in all directions utilizing iris scissors.
Burow's Advancement Flap Text: The defect edges were debeveled with a #15 scalpel blade.  Given the location of the defect and the proximity to free margins a Burow's advancement flap was deemed most appropriate.  Using a sterile surgical marker, the appropriate advancement flap was drawn incorporating the defect and placing the expected incisions within the relaxed skin tension lines where possible.    The area thus outlined was incised deep to adipose tissue with a #15 scalpel blade.  The skin margins were undermined to an appropriate distance in all directions utilizing iris scissors.
Cheek-To-Nose Interpolation Flap Text: A decision was made to reconstruct the defect utilizing an interpolation axial flap and a staged reconstruction.  A telfa template was made of the defect.  This telfa template was then used to outline the Cheek-To-Nose Interpolation flap.  The donor area for the pedicle flap was then injected with anesthesia.  The flap was excised through the skin and subcutaneous tissue down to the layer of the underlying musculature.  The interpolation flap was carefully excised within this deep plane to maintain its blood supply.  The edges of the donor site were undermined.   The donor site was closed in a primary fashion.  The pedicle was then rotated into position and sutured.  Once the tube was sutured into place, adequate blood supply was confirmed with blanching and refill.  The pedicle was then wrapped with xeroform gauze and dressed appropriately with a telfa and gauze bandage to ensure continued blood supply and protect the attached pedicle.
Complex Repair And Split-Thickness Skin Graft Text: The defect edges were debeveled with a #15 scalpel blade.  The primary defect was closed partially with a complex linear closure.  Given the location of the defect, shape of the defect and the proximity to free margins a split thickness skin graft was deemed most appropriate to repair the remaining defect.  The graft was trimmed to fit the size of the remaining defect.  The graft was then placed in the primary defect, oriented appropriately, and sutured into place.
Bilobed Flap Text: The defect edges were debeveled with a #15 scalpel blade.  Given the location of the defect and the proximity to free margins a bilobe flap was deemed most appropriate.  Using a sterile surgical marker, an appropriate bilobe flap drawn around the defect.    The area thus outlined was incised deep to adipose tissue with a #15 scalpel blade.  The skin margins were undermined to an appropriate distance in all directions utilizing iris scissors.
V-Y Flap Text: The defect edges were debeveled with a #15 scalpel blade.  Given the location of the defect, shape of the defect and the proximity to free margins a V-Y flap was deemed most appropriate.  Using a sterile surgical marker, an appropriate advancement flap was drawn incorporating the defect and placing the expected incisions within the relaxed skin tension lines where possible.    The area thus outlined was incised deep to adipose tissue with a #15 scalpel blade.  The skin margins were undermined to an appropriate distance in all directions utilizing iris scissors.
Size Of Margin In Cm: 0.4
Partial Purse String (Simple) Text: Given the location of the defect and the characteristics of the surrounding skin a simple purse string closure was deemed most appropriate.  Undermining was performed circumferentially around the surgical defect.  A purse string suture was then placed and tightened. Wound tension of the circular defect prevented complete closure of the wound.
Bilobed Transposition Flap Text: The defect edges were debeveled with a #15 scalpel blade.  Given the location of the defect and the proximity to free margins a bilobed transposition flap was deemed most appropriate.  Using a sterile surgical marker, an appropriate bilobe flap drawn around the defect.    The area thus outlined was incised deep to adipose tissue with a #15 scalpel blade.  The skin margins were undermined to an appropriate distance in all directions utilizing iris scissors.
Complex Repair And Epidermal Autograft Text: The defect edges were debeveled with a #15 scalpel blade.  The primary defect was closed partially with a complex linear closure.  Given the location of the defect, shape of the defect and the proximity to free margins an epidermal autograft was deemed most appropriate to repair the remaining defect.  The graft was trimmed to fit the size of the remaining defect.  The graft was then placed in the primary defect, oriented appropriately, and sutured into place.
Excision Method: Excisional Biopsy
Advancement-Rotation Flap Text: The defect edges were debeveled with a #15 scalpel blade.  Given the location of the defect, shape of the defect and the proximity to free margins an advancement-rotation flap was deemed most appropriate.  Using a sterile surgical marker, an appropriate flap was drawn incorporating the defect and placing the expected incisions within the relaxed skin tension lines where possible. The area thus outlined was incised deep to adipose tissue with a #15 scalpel blade.  The skin margins were undermined to an appropriate distance in all directions utilizing iris scissors.
Complex Repair And Single Advancement Flap Text: The defect edges were debeveled with a #15 scalpel blade.  The primary defect was closed partially with a complex linear closure.  Given the location of the remaining defect, shape of the defect and the proximity to free margins a single advancement flap was deemed most appropriate for complete closure of the defect.  Using a sterile surgical marker, an appropriate advancement flap was drawn incorporating the defect and placing the expected incisions within the relaxed skin tension lines where possible.    The area thus outlined was incised deep to adipose tissue with a #15 scalpel blade.  The skin margins were undermined to an appropriate distance in all directions utilizing iris scissors.
Interpolation Flap Text: A decision was made to reconstruct the defect utilizing an interpolation axial flap and a staged reconstruction.  A telfa template was made of the defect.  This telfa template was then used to outline the interpolation flap.  The donor area for the pedicle flap was then injected with anesthesia.  The flap was excised through the skin and subcutaneous tissue down to the layer of the underlying musculature.  The interpolation flap was carefully excised within this deep plane to maintain its blood supply.  The edges of the donor site were undermined.   The donor site was closed in a primary fashion.  The pedicle was then rotated into position and sutured.  Once the tube was sutured into place, adequate blood supply was confirmed with blanching and refill.  The pedicle was then wrapped with xeroform gauze and dressed appropriately with a telfa and gauze bandage to ensure continued blood supply and protect the attached pedicle.

## 2019-03-28 ENCOUNTER — APPOINTMENT (RX ONLY)
Dept: URBAN - METROPOLITAN AREA CLINIC 22 | Facility: CLINIC | Age: 59
Setting detail: DERMATOLOGY
End: 2019-03-28

## 2019-03-28 DIAGNOSIS — D22 MELANOCYTIC NEVI: ICD-10-CM

## 2019-03-28 PROBLEM — D22.5 MELANOCYTIC NEVI OF TRUNK: Status: ACTIVE | Noted: 2019-03-28

## 2019-03-28 PROCEDURE — ? EXCISION

## 2019-03-28 PROCEDURE — 11401 EXC TR-EXT B9+MARG 0.6-1 CM: CPT

## 2019-03-28 PROCEDURE — 12031 INTMD RPR S/A/T/EXT 2.5 CM/<: CPT

## 2019-03-28 ASSESSMENT — LOCATION ZONE DERM: LOCATION ZONE: TRUNK

## 2019-03-28 ASSESSMENT — LOCATION DETAILED DESCRIPTION DERM: LOCATION DETAILED: RIGHT MEDIAL UPPER BACK

## 2019-03-28 ASSESSMENT — LOCATION SIMPLE DESCRIPTION DERM: LOCATION SIMPLE: RIGHT UPPER BACK

## 2019-03-28 NOTE — PROCEDURE: EXCISION
Complex Repair And Epidermal Autograft Text: The defect edges were debeveled with a #15 scalpel blade.  The primary defect was closed partially with a complex linear closure.  Given the location of the defect, shape of the defect and the proximity to free margins an epidermal autograft was deemed most appropriate to repair the remaining defect.  The graft was trimmed to fit the size of the remaining defect.  The graft was then placed in the primary defect, oriented appropriately, and sutured into place.
Validate Note Data (See Information Below): Yes
Cheek Interpolation Flap Text: A decision was made to reconstruct the defect utilizing an interpolation axial flap and a staged reconstruction.  A telfa template was made of the defect.  This telfa template was then used to outline the Cheek Interpolation flap.  The donor area for the pedicle flap was then injected with anesthesia.  The flap was excised through the skin and subcutaneous tissue down to the layer of the underlying musculature.  The interpolation flap was carefully excised within this deep plane to maintain its blood supply.  The edges of the donor site were undermined.   The donor site was closed in a primary fashion.  The pedicle was then rotated into position and sutured.  Once the tube was sutured into place, adequate blood supply was confirmed with blanching and refill.  The pedicle was then wrapped with xeroform gauze and dressed appropriately with a telfa and gauze bandage to ensure continued blood supply and protect the attached pedicle.
Dermal Closure: simple interrupted
Complex Repair And Single Advancement Flap Text: The defect edges were debeveled with a #15 scalpel blade.  The primary defect was closed partially with a complex linear closure.  Given the location of the remaining defect, shape of the defect and the proximity to free margins a single advancement flap was deemed most appropriate for complete closure of the defect.  Using a sterile surgical marker, an appropriate advancement flap was drawn incorporating the defect and placing the expected incisions within the relaxed skin tension lines where possible.    The area thus outlined was incised deep to adipose tissue with a #15 scalpel blade.  The skin margins were undermined to an appropriate distance in all directions utilizing iris scissors.
Advancement Flap (Single) Text: The defect edges were debeveled with a #15 scalpel blade.  Given the location of the defect and the proximity to free margins a single advancement flap was deemed most appropriate.  Using a sterile surgical marker, an appropriate advancement flap was drawn incorporating the defect and placing the expected incisions within the relaxed skin tension lines where possible.    The area thus outlined was incised deep to adipose tissue with a #15 scalpel blade.  The skin margins were undermined to an appropriate distance in all directions utilizing iris scissors.
Banner Transposition Flap Text: The defect edges were debeveled with a #15 scalpel blade.  Given the location of the defect and the proximity to free margins a Banner transposition flap was deemed most appropriate.  Using a sterile surgical marker, an appropriate flap drawn around the defect. The area thus outlined was incised deep to adipose tissue with a #15 scalpel blade.  The skin margins were undermined to an appropriate distance in all directions utilizing iris scissors.
Complex Repair Preamble Text (Leave Blank If You Do Not Want): Extensive wide undermining was performed.
Bilobed Flap Text: The defect edges were debeveled with a #15 scalpel blade.  Given the location of the defect and the proximity to free margins a bilobe flap was deemed most appropriate.  Using a sterile surgical marker, an appropriate bilobe flap drawn around the defect.    The area thus outlined was incised deep to adipose tissue with a #15 scalpel blade.  The skin margins were undermined to an appropriate distance in all directions utilizing iris scissors.
Complex Repair And Dermal Autograft Text: The defect edges were debeveled with a #15 scalpel blade.  The primary defect was closed partially with a complex linear closure.  Given the location of the defect, shape of the defect and the proximity to free margins an dermal autograft was deemed most appropriate to repair the remaining defect.  The graft was trimmed to fit the size of the remaining defect.  The graft was then placed in the primary defect, oriented appropriately, and sutured into place.
Cheek-To-Nose Interpolation Flap Text: A decision was made to reconstruct the defect utilizing an interpolation axial flap and a staged reconstruction.  A telfa template was made of the defect.  This telfa template was then used to outline the Cheek-To-Nose Interpolation flap.  The donor area for the pedicle flap was then injected with anesthesia.  The flap was excised through the skin and subcutaneous tissue down to the layer of the underlying musculature.  The interpolation flap was carefully excised within this deep plane to maintain its blood supply.  The edges of the donor site were undermined.   The donor site was closed in a primary fashion.  The pedicle was then rotated into position and sutured.  Once the tube was sutured into place, adequate blood supply was confirmed with blanching and refill.  The pedicle was then wrapped with xeroform gauze and dressed appropriately with a telfa and gauze bandage to ensure continued blood supply and protect the attached pedicle.
Advancement Flap (Double) Text: The defect edges were debeveled with a #15 scalpel blade.  Given the location of the defect and the proximity to free margins a double advancement flap was deemed most appropriate.  Using a sterile surgical marker, the appropriate advancement flaps were drawn incorporating the defect and placing the expected incisions within the relaxed skin tension lines where possible.    The area thus outlined was incised deep to adipose tissue with a #15 scalpel blade.  The skin margins were undermined to an appropriate distance in all directions utilizing iris scissors.
Complex Repair And Double Advancement Flap Text: The defect edges were debeveled with a #15 scalpel blade.  The primary defect was closed partially with a complex linear closure.  Given the location of the remaining defect, shape of the defect and the proximity to free margins a double advancement flap was deemed most appropriate for complete closure of the defect.  Using a sterile surgical marker, an appropriate advancement flap was drawn incorporating the defect and placing the expected incisions within the relaxed skin tension lines where possible.    The area thus outlined was incised deep to adipose tissue with a #15 scalpel blade.  The skin margins were undermined to an appropriate distance in all directions utilizing iris scissors.
Intermediate Repair Preamble Text (Leave Blank If You Do Not Want): Undermining was performed with blunt dissection.
Excision Depth: adipose tissue
Scalpel Size: 15C blade
Validate Referring Provider (Can Hide Referring Provider In Settings Tab): No
Complex Repair And Modified Advancement Flap Text: The defect edges were debeveled with a #15 scalpel blade.  The primary defect was closed partially with a complex linear closure.  Given the location of the remaining defect, shape of the defect and the proximity to free margins a modified advancement flap was deemed most appropriate for complete closure of the defect.  Using a sterile surgical marker, an appropriate advancement flap was drawn incorporating the defect and placing the expected incisions within the relaxed skin tension lines where possible.    The area thus outlined was incised deep to adipose tissue with a #15 scalpel blade.  The skin margins were undermined to an appropriate distance in all directions utilizing iris scissors.
Complex Repair And Tissue Cultured Epidermal Autograft Text: The defect edges were debeveled with a #15 scalpel blade.  The primary defect was closed partially with a complex linear closure.  Given the location of the defect, shape of the defect and the proximity to free margins an tissue cultured epidermal autograft was deemed most appropriate to repair the remaining defect.  The graft was trimmed to fit the size of the remaining defect.  The graft was then placed in the primary defect, oriented appropriately, and sutured into place.
Epidermal Sutures: 4-0 Nylon
Burow's Advancement Flap Text: The defect edges were debeveled with a #15 scalpel blade.  Given the location of the defect and the proximity to free margins a Burow's advancement flap was deemed most appropriate.  Using a sterile surgical marker, the appropriate advancement flap was drawn incorporating the defect and placing the expected incisions within the relaxed skin tension lines where possible.    The area thus outlined was incised deep to adipose tissue with a #15 scalpel blade.  The skin margins were undermined to an appropriate distance in all directions utilizing iris scissors.
Bilobed Transposition Flap Text: The defect edges were debeveled with a #15 scalpel blade.  Given the location of the defect and the proximity to free margins a bilobed transposition flap was deemed most appropriate.  Using a sterile surgical marker, an appropriate bilobe flap drawn around the defect.    The area thus outlined was incised deep to adipose tissue with a #15 scalpel blade.  The skin margins were undermined to an appropriate distance in all directions utilizing iris scissors.
Interpolation Flap Text: A decision was made to reconstruct the defect utilizing an interpolation axial flap and a staged reconstruction.  A telfa template was made of the defect.  This telfa template was then used to outline the interpolation flap.  The donor area for the pedicle flap was then injected with anesthesia.  The flap was excised through the skin and subcutaneous tissue down to the layer of the underlying musculature.  The interpolation flap was carefully excised within this deep plane to maintain its blood supply.  The edges of the donor site were undermined.   The donor site was closed in a primary fashion.  The pedicle was then rotated into position and sutured.  Once the tube was sutured into place, adequate blood supply was confirmed with blanching and refill.  The pedicle was then wrapped with xeroform gauze and dressed appropriately with a telfa and gauze bandage to ensure continued blood supply and protect the attached pedicle.
Primary Defect Width (In Cm): 0
Complex Repair And O-T Advancement Flap Text: The defect edges were debeveled with a #15 scalpel blade.  The primary defect was closed partially with a complex linear closure.  Given the location of the remaining defect, shape of the defect and the proximity to free margins an O-T advancement flap was deemed most appropriate for complete closure of the defect.  Using a sterile surgical marker, an appropriate advancement flap was drawn incorporating the defect and placing the expected incisions within the relaxed skin tension lines where possible.    The area thus outlined was incised deep to adipose tissue with a #15 scalpel blade.  The skin margins were undermined to an appropriate distance in all directions utilizing iris scissors.
A-T Advancement Flap Text: The defect edges were debeveled with a #15 scalpel blade.  Given the location of the defect, shape of the defect and the proximity to free margins an A-T advancement flap was deemed most appropriate.  Using a sterile surgical marker, an appropriate advancement flap was drawn incorporating the defect and placing the expected incisions within the relaxed skin tension lines where possible.    The area thus outlined was incised deep to adipose tissue with a #15 scalpel blade.  The skin margins were undermined to an appropriate distance in all directions utilizing iris scissors.
Modified Advancement Flap Text: The defect edges were debeveled with a #15 scalpel blade.  Given the location of the defect, shape of the defect and the proximity to free margins a modified advancement flap was deemed most appropriate.  Using a sterile surgical marker, an appropriate advancement flap was drawn incorporating the defect and placing the expected incisions within the relaxed skin tension lines where possible.    The area thus outlined was incised deep to adipose tissue with a #15 scalpel blade.  The skin margins were undermined to an appropriate distance in all directions utilizing iris scissors.
Surgeon Performing Repair (Optional): Demetrius Camacho PA-C
Complex Repair And Xenograft Text: The defect edges were debeveled with a #15 scalpel blade.  The primary defect was closed partially with a complex linear closure.  Given the location of the defect, shape of the defect and the proximity to free margins a xenograft was deemed most appropriate to repair the remaining defect.  The graft was trimmed to fit the size of the remaining defect.  The graft was then placed in the primary defect, oriented appropriately, and sutured into place.
Mercedes Flap Text: The defect edges were debeveled with a #15 scalpel blade.  Given the location of the defect, shape of the defect and the proximity to free margins a Mercedes flap was deemed most appropriate.  Using a sterile surgical marker, an appropriate advancement flap was drawn incorporating the defect and placing the expected incisions within the relaxed skin tension lines where possible. The area thus outlined was incised deep to adipose tissue with a #15 scalpel blade.  The skin margins were undermined to an appropriate distance in all directions utilizing iris scissors.
Melolabial Interpolation Flap Text: A decision was made to reconstruct the defect utilizing an interpolation axial flap and a staged reconstruction.  A telfa template was made of the defect.  This telfa template was then used to outline the melolabial interpolation flap.  The donor area for the pedicle flap was then injected with anesthesia.  The flap was excised through the skin and subcutaneous tissue down to the layer of the underlying musculature.  The pedicle flap was carefully excised within this deep plane to maintain its blood supply.  The edges of the donor site were undermined.   The donor site was closed in a primary fashion.  The pedicle was then rotated into position and sutured.  Once the tube was sutured into place, adequate blood supply was confirmed with blanching and refill.  The pedicle was then wrapped with xeroform gauze and dressed appropriately with a telfa and gauze bandage to ensure continued blood supply and protect the attached pedicle.
Complex Repair And A-T Advancement Flap Text: The defect edges were debeveled with a #15 scalpel blade.  The primary defect was closed partially with a complex linear closure.  Given the location of the remaining defect, shape of the defect and the proximity to free margins an A-T advancement flap was deemed most appropriate for complete closure of the defect.  Using a sterile surgical marker, an appropriate advancement flap was drawn incorporating the defect and placing the expected incisions within the relaxed skin tension lines where possible.    The area thus outlined was incised deep to adipose tissue with a #15 scalpel blade.  The skin margins were undermined to an appropriate distance in all directions utilizing iris scissors.
Crescentic Advancement Flap Text: The defect edges were debeveled with a #15 scalpel blade.  Given the location of the defect and the proximity to free margins a crescentic advancement flap was deemed most appropriate.  Using a sterile surgical marker, the appropriate advancement flap was drawn incorporating the defect and placing the expected incisions within the relaxed skin tension lines where possible.    The area thus outlined was incised deep to adipose tissue with a #15 scalpel blade.  The skin margins were undermined to an appropriate distance in all directions utilizing iris scissors.
Medical Necessity Information: It is in your best interest to select a reason for this procedure from the list below. All of these items fulfill various CMS LCD requirements except lesion extends to a margin.
Trilobed Flap Text: The defect edges were debeveled with a #15 scalpel blade.  Given the location of the defect and the proximity to free margins a trilobed flap was deemed most appropriate.  Using a sterile surgical marker, an appropriate trilobed flap drawn around the defect.    The area thus outlined was incised deep to adipose tissue with a #15 scalpel blade.  The skin margins were undermined to an appropriate distance in all directions utilizing iris scissors.
Complex Repair And O-L Flap Text: The defect edges were debeveled with a #15 scalpel blade.  The primary defect was closed partially with a complex linear closure.  Given the location of the remaining defect, shape of the defect and the proximity to free margins an O-L flap was deemed most appropriate for complete closure of the defect.  Using a sterile surgical marker, an appropriate flap was drawn incorporating the defect and placing the expected incisions within the relaxed skin tension lines where possible.    The area thus outlined was incised deep to adipose tissue with a #15 scalpel blade.  The skin margins were undermined to an appropriate distance in all directions utilizing iris scissors.
O-T Advancement Flap Text: The defect edges were debeveled with a #15 scalpel blade.  Given the location of the defect, shape of the defect and the proximity to free margins an O-T advancement flap was deemed most appropriate.  Using a sterile surgical marker, an appropriate advancement flap was drawn incorporating the defect and placing the expected incisions within the relaxed skin tension lines where possible.    The area thus outlined was incised deep to adipose tissue with a #15 scalpel blade.  The skin margins were undermined to an appropriate distance in all directions utilizing iris scissors.
Island Pedicle Flap Text: The defect edges were debeveled with a #15 scalpel blade.  Given the location of the defect, shape of the defect and the proximity to free margins an island pedicle advancement flap was deemed most appropriate.  Using a sterile surgical marker, an appropriate advancement flap was drawn incorporating the defect, outlining the appropriate donor tissue and placing the expected incisions within the relaxed skin tension lines where possible.    The area thus outlined was incised deep to adipose tissue with a #15 scalpel blade.  The skin margins were undermined to an appropriate distance in all directions around the primary defect and laterally outward around the island pedicle utilizing iris scissors.  There was minimal undermining beneath the pedicle flap.
Mucosal Advancement Flap Text: Given the location of the defect, shape of the defect and the proximity to free margins a mucosal advancement flap was deemed most appropriate. Incisions were made with a 15 blade scalpel in the appropriate fashion along the cutaneous vermillion border and the mucosal lip. The remaining actinically damaged mucosal tissue was excised.  The mucosal advancement flap was then elevated to the gingival sulcus with care taken to preserve the neurovascular structures and advanced into the primary defect. Care was taken to ensure that precise realignment of the vermilion border was achieved.
Dorsal Nasal Flap Text: The defect edges were debeveled with a #15 scalpel blade.  Given the location of the defect and the proximity to free margins a dorsal nasal flap was deemed most appropriate.  Using a sterile surgical marker, an appropriate dorsal nasal flap was drawn around the defect.    The area thus outlined was incised deep to adipose tissue with a #15 scalpel blade.  The skin margins were undermined to an appropriate distance in all directions utilizing iris scissors.
Complex Repair And Skin Substitute Graft Text: The defect edges were debeveled with a #15 scalpel blade.  The primary defect was closed partially with a complex linear closure.  Given the location of the remaining defect, shape of the defect and the proximity to free margins a skin substitute graft was deemed most appropriate to repair the remaining defect.  The graft was trimmed to fit the size of the remaining defect.  The graft was then placed in the primary defect, oriented appropriately, and sutured into place.
Mastoid Interpolation Flap Text: A decision was made to reconstruct the defect utilizing an interpolation axial flap and a staged reconstruction.  A telfa template was made of the defect.  This telfa template was then used to outline the mastoid interpolation flap.  The donor area for the pedicle flap was then injected with anesthesia.  The flap was excised through the skin and subcutaneous tissue down to the layer of the underlying musculature.  The pedicle flap was carefully excised within this deep plane to maintain its blood supply.  The edges of the donor site were undermined.   The donor site was closed in a primary fashion.  The pedicle was then rotated into position and sutured.  Once the tube was sutured into place, adequate blood supply was confirmed with blanching and refill.  The pedicle was then wrapped with xeroform gauze and dressed appropriately with a telfa and gauze bandage to ensure continued blood supply and protect the attached pedicle.
Hatchet Flap Text: The defect edges were debeveled with a #15 scalpel blade.  Given the location of the defect, shape of the defect and the proximity to free margins a hatchet flap was deemed most appropriate.  Using a sterile surgical marker, an appropriate hatchet flap was drawn incorporating the defect and placing the expected incisions within the relaxed skin tension lines where possible.    The area thus outlined was incised deep to adipose tissue with a #15 scalpel blade.  The skin margins were undermined to an appropriate distance in all directions utilizing iris scissors.
Paramedian Forehead Flap Text: A decision was made to reconstruct the defect utilizing an interpolation axial flap and a staged reconstruction.  A telfa template was made of the defect.  This telfa template was then used to outline the paramedian forehead pedicle flap.  The donor area for the pedicle flap was then injected with anesthesia.  The flap was excised through the skin and subcutaneous tissue down to the layer of the underlying musculature.  The pedicle flap was carefully excised within this deep plane to maintain its blood supply.  The edges of the donor site were undermined.   The donor site was closed in a primary fashion.  The pedicle was then rotated into position and sutured.  Once the tube was sutured into place, adequate blood supply was confirmed with blanching and refill.  The pedicle was then wrapped with xeroform gauze and dressed appropriately with a telfa and gauze bandage to ensure continued blood supply and protect the attached pedicle.
Complex Repair And Bilobe Flap Text: The defect edges were debeveled with a #15 scalpel blade.  The primary defect was closed partially with a complex linear closure.  Given the location of the remaining defect, shape of the defect and the proximity to free margins a bilobe flap was deemed most appropriate for complete closure of the defect.  Using a sterile surgical marker, an appropriate advancement flap was drawn incorporating the defect and placing the expected incisions within the relaxed skin tension lines where possible.    The area thus outlined was incised deep to adipose tissue with a #15 scalpel blade.  The skin margins were undermined to an appropriate distance in all directions utilizing iris scissors.
Island Pedicle Flap With Canthal Suspension Text: The defect edges were debeveled with a #15 scalpel blade.  Given the location of the defect, shape of the defect and the proximity to free margins an island pedicle advancement flap was deemed most appropriate.  Using a sterile surgical marker, an appropriate advancement flap was drawn incorporating the defect, outlining the appropriate donor tissue and placing the expected incisions within the relaxed skin tension lines where possible. The area thus outlined was incised deep to adipose tissue with a #15 scalpel blade.  The skin margins were undermined to an appropriate distance in all directions around the primary defect and laterally outward around the island pedicle utilizing iris scissors.  There was minimal undermining beneath the pedicle flap. A suspension suture was placed in the canthal tendon to prevent tension and prevent ectropion.
O-L Flap Text: The defect edges were debeveled with a #15 scalpel blade.  Given the location of the defect, shape of the defect and the proximity to free margins an O-L flap was deemed most appropriate.  Using a sterile surgical marker, an appropriate advancement flap was drawn incorporating the defect and placing the expected incisions within the relaxed skin tension lines where possible.    The area thus outlined was incised deep to adipose tissue with a #15 scalpel blade.  The skin margins were undermined to an appropriate distance in all directions utilizing iris scissors.
Path Notes (To The Dermatopathologist): Please check margins.
Posterior Auricular Interpolation Flap Text: A decision was made to reconstruct the defect utilizing an interpolation axial flap and a staged reconstruction.  A telfa template was made of the defect.  This telfa template was then used to outline the posterior auricular interpolation flap.  The donor area for the pedicle flap was then injected with anesthesia.  The flap was excised through the skin and subcutaneous tissue down to the layer of the underlying musculature.  The pedicle flap was carefully excised within this deep plane to maintain its blood supply.  The edges of the donor site were undermined.   The donor site was closed in a primary fashion.  The pedicle was then rotated into position and sutured.  Once the tube was sutured into place, adequate blood supply was confirmed with blanching and refill.  The pedicle was then wrapped with xeroform gauze and dressed appropriately with a telfa and gauze bandage to ensure continued blood supply and protect the attached pedicle.
Alar Island Pedicle Flap Text: The defect edges were debeveled with a #15 scalpel blade.  Given the location of the defect, shape of the defect and the proximity to the alar rim an island pedicle advancement flap was deemed most appropriate.  Using a sterile surgical marker, an appropriate advancement flap was drawn incorporating the defect, outlining the appropriate donor tissue and placing the expected incisions within the nasal ala running parallel to the alar rim. The area thus outlined was incised with a #15 scalpel blade.  The skin margins were undermined minimally to an appropriate distance in all directions around the primary defect and laterally outward around the island pedicle utilizing iris scissors.  There was minimal undermining beneath the pedicle flap.
Lip Wedge Excision Repair Text: Given the location of the defect and the proximity to free margins a full thickness wedge repair was deemed most appropriate.  Using a sterile surgical marker, the appropriate repair was drawn incorporating the defect and placing the expected incisions perpendicular to the vermilion border.  The vermilion border was also meticulously outlined to ensure appropriate reapproximation during the repair.  The area thus outlined was incised through and through with a #15 scalpel blade.  The muscularis and dermis were reaproximated with deep sutures following hemostasis. Care was taken to realign the vermilion border before proceeding with the superficial closure.  Once the vermilion was realigned the superfical and mucosal closure was finished.
Complex Repair And Melolabial Flap Text: The defect edges were debeveled with a #15 scalpel blade.  The primary defect was closed partially with a complex linear closure.  Given the location of the remaining defect, shape of the defect and the proximity to free margins a melolabial flap was deemed most appropriate for complete closure of the defect.  Using a sterile surgical marker, an appropriate advancement flap was drawn incorporating the defect and placing the expected incisions within the relaxed skin tension lines where possible.    The area thus outlined was incised deep to adipose tissue with a #15 scalpel blade.  The skin margins were undermined to an appropriate distance in all directions utilizing iris scissors.
V-Y Flap Text: The defect edges were debeveled with a #15 scalpel blade.  Given the location of the defect, shape of the defect and the proximity to free margins a V-Y flap was deemed most appropriate.  Using a sterile surgical marker, an appropriate advancement flap was drawn incorporating the defect and placing the expected incisions within the relaxed skin tension lines where possible.    The area thus outlined was incised deep to adipose tissue with a #15 scalpel blade.  The skin margins were undermined to an appropriate distance in all directions utilizing iris scissors.
Rotation Flap Text: The defect edges were debeveled with a #15 scalpel blade.  Given the location of the defect, shape of the defect and the proximity to free margins a rotation flap was deemed most appropriate.  Using a sterile surgical marker, an appropriate rotation flap was drawn incorporating the defect and placing the expected incisions within the relaxed skin tension lines where possible.    The area thus outlined was incised deep to adipose tissue with a #15 scalpel blade.  The skin margins were undermined to an appropriate distance in all directions utilizing iris scissors.
Size Of Lesion In Cm: 0.4
Size Of Margin In Cm: 0.2
Ftsg Text: The defect edges were debeveled with a #15 scalpel blade.  Given the location of the defect, shape of the defect and the proximity to free margins a full thickness skin graft was deemed most appropriate.  Using a sterile surgical marker, the primary defect shape was transferred to the donor site. The area thus outlined was incised deep to adipose tissue with a #15 scalpel blade.  The harvested graft was then trimmed of adipose tissue until only dermis and epidermis was left.  The skin margins of the secondary defect were undermined to an appropriate distance in all directions utilizing iris scissors.  The secondary defect was closed with interrupted buried subcutaneous sutures.  The skin edges were then re-apposed with running  sutures.  The skin graft was then placed in the primary defect and oriented appropriately.
Consent was obtained from the patient. The risks and benefits to therapy were discussed in detail. Specifically, the risks of infection, scarring, bleeding, prolonged wound healing, incomplete removal, allergy to anesthesia, nerve injury and recurrence were addressed. Prior to the procedure, the treatment site was clearly identified and confirmed by the patient. All components of Universal Protocol/PAUSE Rule completed.
Complex Repair And Rotation Flap Text: The defect edges were debeveled with a #15 scalpel blade.  The primary defect was closed partially with a complex linear closure.  Given the location of the remaining defect, shape of the defect and the proximity to free margins a rotation flap was deemed most appropriate for complete closure of the defect.  Using a sterile surgical marker, an appropriate advancement flap was drawn incorporating the defect and placing the expected incisions within the relaxed skin tension lines where possible.    The area thus outlined was incised deep to adipose tissue with a #15 scalpel blade.  The skin margins were undermined to an appropriate distance in all directions utilizing iris scissors.
Double Island Pedicle Flap Text: The defect edges were debeveled with a #15 scalpel blade.  Given the location of the defect, shape of the defect and the proximity to free margins a double island pedicle advancement flap was deemed most appropriate.  Using a sterile surgical marker, an appropriate advancement flap was drawn incorporating the defect, outlining the appropriate donor tissue and placing the expected incisions within the relaxed skin tension lines where possible.    The area thus outlined was incised deep to adipose tissue with a #15 scalpel blade.  The skin margins were undermined to an appropriate distance in all directions around the primary defect and laterally outward around the island pedicle utilizing iris scissors.  There was minimal undermining beneath the pedicle flap.
Epidermal Closure: running and interrupted
Anesthesia Type: 1% lidocaine with epinephrine and a 1:10 solution of 8.4% sodium bicarbonate
Spiral Flap Text: The defect edges were debeveled with a #15 scalpel blade.  Given the location of the defect, shape of the defect and the proximity to free margins a spiral flap was deemed most appropriate.  Using a sterile surgical marker, an appropriate rotation flap was drawn incorporating the defect and placing the expected incisions within the relaxed skin tension lines where possible. The area thus outlined was incised deep to adipose tissue with a #15 scalpel blade.  The skin margins were undermined to an appropriate distance in all directions utilizing iris scissors.
Complex Repair And Transposition Flap Text: The defect edges were debeveled with a #15 scalpel blade.  The primary defect was closed partially with a complex linear closure.  Given the location of the remaining defect, shape of the defect and the proximity to free margins a transposition flap was deemed most appropriate for complete closure of the defect.  Using a sterile surgical marker, an appropriate advancement flap was drawn incorporating the defect and placing the expected incisions within the relaxed skin tension lines where possible.    The area thus outlined was incised deep to adipose tissue with a #15 scalpel blade.  The skin margins were undermined to an appropriate distance in all directions utilizing iris scissors.
Anesthesia Volume In Cc: 7.5
Transposition Flap Text: The defect edges were debeveled with a #15 scalpel blade.  Given the location of the defect and the proximity to free margins a transposition flap was deemed most appropriate.  Using a sterile surgical marker, an appropriate transposition flap was drawn incorporating the defect.    The area thus outlined was incised deep to adipose tissue with a #15 scalpel blade.  The skin margins were undermined to an appropriate distance in all directions utilizing iris scissors.
Split-Thickness Skin Graft Text: The defect edges were debeveled with a #15 scalpel blade.  Given the location of the defect, shape of the defect and the proximity to free margins a split thickness skin graft was deemed most appropriate.  Using a sterile surgical marker, the primary defect shape was transferred to the donor site. The split thickness graft was then harvested.  The skin graft was then placed in the primary defect and oriented appropriately.
Anesthesia Type: 1% lidocaine with 1:200,000 epinephrine and a 1:10 solution of 8.4% sodium bicarbonate
Star Wedge Flap Text: The defect edges were debeveled with a #15 scalpel blade.  Given the location of the defect, shape of the defect and the proximity to free margins a star wedge flap was deemed most appropriate.  Using a sterile surgical marker, an appropriate rotation flap was drawn incorporating the defect and placing the expected incisions within the relaxed skin tension lines where possible. The area thus outlined was incised deep to adipose tissue with a #15 scalpel blade.  The skin margins were undermined to an appropriate distance in all directions utilizing iris scissors.
Complex Repair And Rhombic Flap Text: The defect edges were debeveled with a #15 scalpel blade.  The primary defect was closed partially with a complex linear closure.  Given the location of the remaining defect, shape of the defect and the proximity to free margins a rhombic flap was deemed most appropriate for complete closure of the defect.  Using a sterile surgical marker, an appropriate advancement flap was drawn incorporating the defect and placing the expected incisions within the relaxed skin tension lines where possible.    The area thus outlined was incised deep to adipose tissue with a #15 scalpel blade.  The skin margins were undermined to an appropriate distance in all directions utilizing iris scissors.
Island Pedicle Flap-Requiring Vessel Identification Text: The defect edges were debeveled with a #15 scalpel blade.  Given the location of the defect, shape of the defect and the proximity to free margins an island pedicle advancement flap was deemed most appropriate.  Using a sterile surgical marker, an appropriate advancement flap was drawn, based on the axial vessel mentioned above, incorporating the defect, outlining the appropriate donor tissue and placing the expected incisions within the relaxed skin tension lines where possible.    The area thus outlined was incised deep to adipose tissue with a #15 scalpel blade.  The skin margins were undermined to an appropriate distance in all directions around the primary defect and laterally outward around the island pedicle utilizing iris scissors.  There was minimal undermining beneath the pedicle flap.
Complex Repair And V-Y Plasty Text: The defect edges were debeveled with a #15 scalpel blade.  The primary defect was closed partially with a complex linear closure.  Given the location of the remaining defect, shape of the defect and the proximity to free margins a V-Y plasty was deemed most appropriate for complete closure of the defect.  Using a sterile surgical marker, an appropriate advancement flap was drawn incorporating the defect and placing the expected incisions within the relaxed skin tension lines where possible.    The area thus outlined was incised deep to adipose tissue with a #15 scalpel blade.  The skin margins were undermined to an appropriate distance in all directions utilizing iris scissors.
Billing Type: Third-Party Bill
Eliptical Excision Additional Text (Leave Blank If You Do Not Want): The margin was drawn around the clinically apparent lesion.  An elliptical shape was then drawn on the skin incorporating the lesion and margins.  Incisions were then made along these lines to the appropriate tissue plane and the lesion was extirpated.
O-T Plasty Text: The defect edges were debeveled with a #15 scalpel blade.  Given the location of the defect, shape of the defect and the proximity to free margins an O-T plasty was deemed most appropriate.  Using a sterile surgical marker, an appropriate O-T plasty was drawn incorporating the defect and placing the expected incisions within the relaxed skin tension lines where possible.    The area thus outlined was incised deep to adipose tissue with a #15 scalpel blade.  The skin margins were undermined to an appropriate distance in all directions utilizing iris scissors.
Excision Method: Elliptical
Undermining Location (Optional): in the superficial subcutaneous fat
Keystone Flap Text: The defect edges were debeveled with a #15 scalpel blade.  Given the location of the defect, shape of the defect a keystone flap was deemed most appropriate.  Using a sterile surgical marker, an appropriate keystone flap was drawn incorporating the defect, outlining the appropriate donor tissue and placing the expected incisions within the relaxed skin tension lines where possible. The area thus outlined was incised deep to adipose tissue with a #15 scalpel blade.  The skin margins were undermined to an appropriate distance in all directions around the primary defect and laterally outward around the flap utilizing iris scissors.
Information: Selecting Yes will display possible errors in your note based on the variables you have selected. This validation is only offered as a suggestion for you. PLEASE NOTE THAT THE VALIDATION TEXT WILL BE REMOVED WHEN YOU FINALIZE YOUR NOTE. IF YOU WANT TO FAX A PRELIMINARY NOTE YOU WILL NEED TO TOGGLE THIS TO 'NO' IF YOU DO NOT WANT IT IN YOUR FAXED NOTE.
Cartilage Graft Text: The defect edges were debeveled with a #15 scalpel blade.  Given the location of the defect, shape of the defect, the fact the defect involved a full thickness cartilage defect a cartilage graft was deemed most appropriate.  An appropriate donor site was identified, cleansed, and anesthetized. The cartilage graft was then harvested and transferred to the recipient site, oriented appropriately and then sutured into place.  The secondary defect was then repaired using a primary closure.
Fusiform Excision Additional Text (Leave Blank If You Do Not Want): The margin was drawn around the clinically apparent lesion.  A fusiform shape was then drawn on the skin incorporating the lesion and margins.  Incisions were then made along these lines to the appropriate tissue plane and the lesion was extirpated.
Home Suture Removal Text: Patient was provided a home suture removal kit and will remove their sutures at home.  If they have any questions or difficulties they will call the office.
Epidermal Autograft Text: The defect edges were debeveled with a #15 scalpel blade.  Given the location of the defect, shape of the defect and the proximity to free margins an epidermal autograft was deemed most appropriate.  Using a sterile surgical marker, the primary defect shape was transferred to the donor site. The epidermal graft was then harvested.  The skin graft was then placed in the primary defect and oriented appropriately.
Melolabial Transposition Flap Text: The defect edges were debeveled with a #15 scalpel blade.  Given the location of the defect and the proximity to free margins a melolabial flap was deemed most appropriate.  Using a sterile surgical marker, an appropriate melolabial transposition flap was drawn incorporating the defect.    The area thus outlined was incised deep to adipose tissue with a #15 scalpel blade.  The skin margins were undermined to an appropriate distance in all directions utilizing iris scissors.
Saucerization Excision Additional Text (Leave Blank If You Do Not Want): The margin was drawn around the clinically apparent lesion.  Incisions were then made along these lines, in a tangential fashion, to the appropriate tissue plane and the lesion was extirpated.
Complex Repair And M Plasty Text: The defect edges were debeveled with a #15 scalpel blade.  The primary defect was closed partially with a complex linear closure.  Given the location of the remaining defect, shape of the defect and the proximity to free margins an M plasty was deemed most appropriate for complete closure of the defect.  Using a sterile surgical marker, an appropriate advancement flap was drawn incorporating the defect and placing the expected incisions within the relaxed skin tension lines where possible.    The area thus outlined was incised deep to adipose tissue with a #15 scalpel blade.  The skin margins were undermined to an appropriate distance in all directions utilizing iris scissors.
O-Z Plasty Text: The defect edges were debeveled with a #15 scalpel blade.  Given the location of the defect, shape of the defect and the proximity to free margins an O-Z plasty (double transposition flap) was deemed most appropriate.  Using a sterile surgical marker, the appropriate transposition flaps were drawn incorporating the defect and placing the expected incisions within the relaxed skin tension lines where possible.    The area thus outlined was incised deep to adipose tissue with a #15 scalpel blade.  The skin margins were undermined to an appropriate distance in all directions utilizing iris scissors.  Hemostasis was achieved with electrocautery.  The flaps were then transposed into place, one clockwise and the other counterclockwise, and anchored with interrupted buried subcutaneous sutures.
Wound Care: Vaseline
Muscle Hinge Flap Text: The defect edges were debeveled with a #15 scalpel blade.  Given the size, depth and location of the defect and the proximity to free margins a muscle hinge flap was deemed most appropriate.  Using a sterile surgical marker, an appropriate hinge flap was drawn incorporating the defect. The area thus outlined was incised with a #15 scalpel blade.  The skin margins were undermined to an appropriate distance in all directions utilizing iris scissors.
Composite Graft Text: The defect edges were debeveled with a #15 scalpel blade.  Given the location of the defect, shape of the defect, the proximity to free margins and the fact the defect was full thickness a composite graft was deemed most appropriate.  The defect was outline and then transferred to the donor site.  A full thickness graft was then excised from the donor site. The graft was then placed in the primary defect, oriented appropriately and then sutured into place.  The secondary defect was then repaired using a primary closure.
Post-Care Instructions: I reviewed with the patient in detail post-care instructions. Patient is not to engage in any heavy lifting, exercise, or swimming for the next 14 days. Should the patient develop any fevers, chills, bleeding, severe pain patient will contact the office immediately.
Double O-Z Plasty Text: The defect edges were debeveled with a #15 scalpel blade.  Given the location of the defect, shape of the defect and the proximity to free margins a Double O-Z plasty (double transposition flap) was deemed most appropriate.  Using a sterile surgical marker, the appropriate transposition flaps were drawn incorporating the defect and placing the expected incisions within the relaxed skin tension lines where possible. The area thus outlined was incised deep to adipose tissue with a #15 scalpel blade.  The skin margins were undermined to an appropriate distance in all directions utilizing iris scissors.  Hemostasis was achieved with electrocautery.  The flaps were then transposed into place, one clockwise and the other counterclockwise, and anchored with interrupted buried subcutaneous sutures.
Repair Type: Intermediate
Slit Excision Additional Text (Leave Blank If You Do Not Want): A linear line was drawn on the skin overlying the lesion. An incision was made slowly until the lesion was visualized.  Once visualized, the lesion was removed with blunt dissection.
Dermal Autograft Text: The defect edges were debeveled with a #15 scalpel blade.  Given the location of the defect, shape of the defect and the proximity to free margins a dermal autograft was deemed most appropriate.  Using a sterile surgical marker, the primary defect shape was transferred to the donor site. The area thus outlined was incised deep to adipose tissue with a #15 scalpel blade.  The harvested graft was then trimmed of adipose and epidermal tissue until only dermis was left.  The skin graft was then placed in the primary defect and oriented appropriately.
Rhombic Flap Text: The defect edges were debeveled with a #15 scalpel blade.  Given the location of the defect and the proximity to free margins a rhombic flap was deemed most appropriate.  Using a sterile surgical marker, an appropriate rhombic flap was drawn incorporating the defect.    The area thus outlined was incised deep to adipose tissue with a #15 scalpel blade.  The skin margins were undermined to an appropriate distance in all directions utilizing iris scissors.
Medical Necessity Clause: This procedure was medically necessary because the lesion that was treated was:
Complex Repair And Double M Plasty Text: The defect edges were debeveled with a #15 scalpel blade.  The primary defect was closed partially with a complex linear closure.  Given the location of the remaining defect, shape of the defect and the proximity to free margins a double M plasty was deemed most appropriate for complete closure of the defect.  Using a sterile surgical marker, an appropriate advancement flap was drawn incorporating the defect and placing the expected incisions within the relaxed skin tension lines where possible.    The area thus outlined was incised deep to adipose tissue with a #15 scalpel blade.  The skin margins were undermined to an appropriate distance in all directions utilizing iris scissors.
Deep Sutures: 3-0 Maxon
Rhomboid Transposition Flap Text: The defect edges were debeveled with a #15 scalpel blade.  Given the location of the defect and the proximity to free margins a rhomboid transposition flap was deemed most appropriate.  Using a sterile surgical marker, an appropriate rhomboid flap was drawn incorporating the defect.    The area thus outlined was incised deep to adipose tissue with a #15 scalpel blade.  The skin margins were undermined to an appropriate distance in all directions utilizing iris scissors.
Skin Substitute Text: The defect edges were debeveled with a #15 scalpel blade.  Given the location of the defect, shape of the defect and the proximity to free margins a skin substitute graft was deemed most appropriate.  The graft material was trimmed to fit the size of the defect. The graft was then placed in the primary defect and oriented appropriately.
Complex Repair And W Plasty Text: The defect edges were debeveled with a #15 scalpel blade.  The primary defect was closed partially with a complex linear closure.  Given the location of the remaining defect, shape of the defect and the proximity to free margins a W plasty was deemed most appropriate for complete closure of the defect.  Using a sterile surgical marker, an appropriate advancement flap was drawn incorporating the defect and placing the expected incisions within the relaxed skin tension lines where possible.    The area thus outlined was incised deep to adipose tissue with a #15 scalpel blade.  The skin margins were undermined to an appropriate distance in all directions utilizing iris scissors.
Excisional Biopsy Additional Text (Leave Blank If You Do Not Want): The margin was drawn around the clinically apparent lesion. An elliptical shape was then drawn on the skin incorporating the lesion and margins.  Incisions were then made along these lines to the appropriate tissue plane and the lesion was extirpated.
Lab: [id number]
S Plasty Text: Given the location and shape of the defect, and the orientation of relaxed skin tension lines, an S-plasty was deemed most appropriate for repair.  Using a sterile surgical marker, the appropriate outline of the S-plasty was drawn, incorporating the defect and placing the expected incisions within the relaxed skin tension lines where possible.  The area thus outlined was incised deep to adipose tissue with a #15 scalpel blade.  The skin margins were undermined to an appropriate distance in all directions utilizing iris scissors. The skin flaps were advanced over the defect.  The opposing margins were then approximated with interrupted buried subcutaneous sutures.
Dressing: pressure dressing
Wound Check: 14 days
Tissue Cultured Epidermal Autograft Text: The defect edges were debeveled with a #15 scalpel blade.  Given the location of the defect, shape of the defect and the proximity to free margins a tissue cultured epidermal autograft was deemed most appropriate.  The graft was then trimmed to fit the size of the defect.  The graft was then placed in the primary defect and oriented appropriately.
Complex Repair And Z Plasty Text: The defect edges were debeveled with a #15 scalpel blade.  The primary defect was closed partially with a complex linear closure.  Given the location of the remaining defect, shape of the defect and the proximity to free margins a Z plasty was deemed most appropriate for complete closure of the defect.  Using a sterile surgical marker, an appropriate advancement flap was drawn incorporating the defect and placing the expected incisions within the relaxed skin tension lines where possible.    The area thus outlined was incised deep to adipose tissue with a #15 scalpel blade.  The skin margins were undermined to an appropriate distance in all directions utilizing iris scissors.
Bi-Rhombic Flap Text: The defect edges were debeveled with a #15 scalpel blade.  Given the location of the defect and the proximity to free margins a bi-rhombic flap was deemed most appropriate.  Using a sterile surgical marker, an appropriate rhombic flap was drawn incorporating the defect. The area thus outlined was incised deep to adipose tissue with a #15 scalpel blade.  The skin margins were undermined to an appropriate distance in all directions utilizing iris scissors.
Lab Facility: 
Perilesional Excision Additional Text (Leave Blank If You Do Not Want): The margin was drawn around the clinically apparent lesion. Incisions were then made along these lines to the appropriate tissue plane and the lesion was extirpated.
Intermediate / Complex Repair - Final Wound Length In Cm: 2.5
V-Y Plasty Text: The defect edges were debeveled with a #15 scalpel blade.  Given the location of the defect, shape of the defect and the proximity to free margins an V-Y advancement flap was deemed most appropriate.  Using a sterile surgical marker, an appropriate advancement flap was drawn incorporating the defect and placing the expected incisions within the relaxed skin tension lines where possible.    The area thus outlined was incised deep to adipose tissue with a #15 scalpel blade.  The skin margins were undermined to an appropriate distance in all directions utilizing iris scissors.
Graft Donor Site Bandage (Optional-Leave Blank If You Don't Want In Note): Steri-strips and a pressure bandage were applied to the donor site.
W Plasty Text: The lesion was extirpated to the level of the fat with a #15 scalpel blade.  Given the location of the defect, shape of the defect and the proximity to free margins a W-plasty was deemed most appropriate for repair.  Using a sterile surgical marker, the appropriate transposition arms of the W-plasty were drawn incorporating the defect and placing the expected incisions within the relaxed skin tension lines where possible.    The area thus outlined was incised deep to adipose tissue with a #15 scalpel blade.  The skin margins were undermined to an appropriate distance in all directions utilizing iris scissors.  The opposing transposition arms were then transposed into place in opposite direction and anchored with interrupted buried subcutaneous sutures.
Repair Performed By Another Provider Text (Leave Blank If You Do Not Want): After the tissue was excised the defect was repaired by another provider.
Purse String (Intermediate) Text: Given the location of the defect and the characteristics of the surrounding skin a purse string intermediate closure was deemed most appropriate.  Undermining was performed circumferentially around the surgical defect.  A purse string suture was then placed and tightened.
Number Of Deep Sutures (Optional): 3
Complex Repair And Dorsal Nasal Flap Text: The defect edges were debeveled with a #15 scalpel blade.  The primary defect was closed partially with a complex linear closure.  Given the location of the remaining defect, shape of the defect and the proximity to free margins a dorsal nasal flap was deemed most appropriate for complete closure of the defect.  Using a sterile surgical marker, an appropriate flap was drawn incorporating the defect and placing the expected incisions within the relaxed skin tension lines where possible.    The area thus outlined was incised deep to adipose tissue with a #15 scalpel blade.  The skin margins were undermined to an appropriate distance in all directions utilizing iris scissors.
H Plasty Text: Given the location of the defect, shape of the defect and the proximity to free margins a H-plasty was deemed most appropriate for repair.  Using a sterile surgical marker, the appropriate advancement arms of the H-plasty were drawn incorporating the defect and placing the expected incisions within the relaxed skin tension lines where possible. The area thus outlined was incised deep to adipose tissue with a #15 scalpel blade. The skin margins were undermined to an appropriate distance in all directions utilizing iris scissors.  The opposing advancement arms were then advanced into place in opposite direction and anchored with interrupted buried subcutaneous sutures.
Hemostasis: Electrocautery
Xenograft Text: The defect edges were debeveled with a #15 scalpel blade.  Given the location of the defect, shape of the defect and the proximity to free margins a xenograft was deemed most appropriate.  The graft was then trimmed to fit the size of the defect.  The graft was then placed in the primary defect and oriented appropriately.
Helical Rim Advancement Flap Text: The defect edges were debeveled with a #15 blade scalpel.  Given the location of the defect and the proximity to free margins (helical rim) a double helical rim advancement flap was deemed most appropriate.  Using a sterile surgical marker, the appropriate advancement flaps were drawn incorporating the defect and placing the expected incisions between the helical rim and antihelix where possible.  The area thus outlined was incised through and through with a #15 scalpel blade.  With a skin hook and iris scissors, the flaps were gently and sharply undermined and freed up.
Purse String (Simple) Text: Given the location of the defect and the characteristics of the surrounding skin a purse string simple closure was deemed most appropriate.  Undermining was performed circumferentially around the surgical defect.  A purse string suture was then placed and tightened.
Complex Repair And Split-Thickness Skin Graft Text: The defect edges were debeveled with a #15 scalpel blade.  The primary defect was closed partially with a complex linear closure.  Given the location of the defect, shape of the defect and the proximity to free margins a split thickness skin graft was deemed most appropriate to repair the remaining defect.  The graft was trimmed to fit the size of the remaining defect.  The graft was then placed in the primary defect, oriented appropriately, and sutured into place.
Detail Level: Detailed
Ear Star Wedge Flap Text: The defect edges were debeveled with a #15 blade scalpel.  Given the location of the defect and the proximity to free margins (helical rim) an ear star wedge flap was deemed most appropriate.  Using a sterile surgical marker, the appropriate flap was drawn incorporating the defect and placing the expected incisions between the helical rim and antihelix where possible.  The area thus outlined was incised through and through with a #15 scalpel blade.
No Repair - Repaired With Adjacent Surgical Defect Text (Leave Blank If You Do Not Want): After the excision the defect was repaired concurrently with another surgical defect which was in close approximation.
Estimated Blood Loss (Cc): minimal
Z Plasty Text: The lesion was extirpated to the level of the fat with a #15 scalpel blade.  Given the location of the defect, shape of the defect and the proximity to free margins a Z-plasty was deemed most appropriate for repair.  Using a sterile surgical marker, the appropriate transposition arms of the Z-plasty were drawn incorporating the defect and placing the expected incisions within the relaxed skin tension lines where possible.    The area thus outlined was incised deep to adipose tissue with a #15 scalpel blade.  The skin margins were undermined to an appropriate distance in all directions utilizing iris scissors.  The opposing transposition arms were then transposed into place in opposite direction and anchored with interrupted buried subcutaneous sutures.
Bilateral Helical Rim Advancement Flap Text: The defect edges were debeveled with a #15 blade scalpel.  Given the location of the defect and the proximity to free margins (helical rim) a bilateral helical rim advancement flap was deemed most appropriate.  Using a sterile surgical marker, the appropriate advancement flaps were drawn incorporating the defect and placing the expected incisions between the helical rim and antihelix where possible.  The area thus outlined was incised through and through with a #15 scalpel blade.  With a skin hook and iris scissors, the flaps were gently and sharply undermined and freed up.
Complex Repair And Ftsg Text: The defect edges were debeveled with a #15 scalpel blade.  The primary defect was closed partially with a complex linear closure.  Given the location of the defect, shape of the defect and the proximity to free margins a full thickness skin graft was deemed most appropriate to repair the remaining defect.  The graft was trimmed to fit the size of the remaining defect.  The graft was then placed in the primary defect, oriented appropriately, and sutured into place.

## 2019-04-11 ENCOUNTER — APPOINTMENT (RX ONLY)
Dept: URBAN - METROPOLITAN AREA CLINIC 36 | Facility: CLINIC | Age: 59
Setting detail: DERMATOLOGY
End: 2019-04-11

## 2019-04-11 DIAGNOSIS — Z48.817 ENCOUNTER FOR SURGICAL AFTERCARE FOLLOWING SURGERY ON THE SKIN AND SUBCUTANEOUS TISSUE: ICD-10-CM

## 2019-04-11 PROCEDURE — ? POST-OP WOUND CHECK (NO GLOBAL PERIOD)

## 2019-04-11 ASSESSMENT — LOCATION DETAILED DESCRIPTION DERM: LOCATION DETAILED: RIGHT SUPERIOR MEDIAL UPPER BACK

## 2019-04-11 ASSESSMENT — LOCATION SIMPLE DESCRIPTION DERM: LOCATION SIMPLE: RIGHT UPPER BACK

## 2019-04-11 ASSESSMENT — LOCATION ZONE DERM: LOCATION ZONE: TRUNK

## 2022-01-07 ENCOUNTER — OFFICE VISIT (OUTPATIENT)
Dept: URGENT CARE | Facility: PHYSICIAN GROUP | Age: 62
End: 2022-01-07
Payer: COMMERCIAL

## 2022-01-07 VITALS
OXYGEN SATURATION: 97 % | HEART RATE: 119 BPM | DIASTOLIC BLOOD PRESSURE: 80 MMHG | TEMPERATURE: 97.9 F | SYSTOLIC BLOOD PRESSURE: 130 MMHG | HEIGHT: 72 IN | WEIGHT: 310 LBS | RESPIRATION RATE: 18 BRPM | BODY MASS INDEX: 41.99 KG/M2

## 2022-01-07 DIAGNOSIS — S61.412A LACERATION OF LEFT HAND WITHOUT FOREIGN BODY, INITIAL ENCOUNTER: ICD-10-CM

## 2022-01-07 DIAGNOSIS — Z23 NEED FOR TETANUS BOOSTER: ICD-10-CM

## 2022-01-07 PROBLEM — D50.9 IRON DEFICIENCY ANEMIA: Status: ACTIVE | Noted: 2018-09-21

## 2022-01-07 PROBLEM — R13.19 ESOPHAGEAL DYSPHAGIA: Status: ACTIVE | Noted: 2017-02-10

## 2022-01-07 PROBLEM — E11.9 UNCOMPLICATED TYPE 2 DIABETES MELLITUS (HCC): Status: ACTIVE | Noted: 2022-01-07

## 2022-01-07 PROBLEM — Z98.84 S/P LAPAROSCOPIC SLEEVE GASTRECTOMY: Status: ACTIVE | Noted: 2017-02-10

## 2022-01-07 PROBLEM — J45.20 MILD INTERMITTENT ASTHMA, UNCOMPLICATED: Status: ACTIVE | Noted: 2022-01-07

## 2022-01-07 PROBLEM — I10 BENIGN ESSENTIAL HYPERTENSION: Status: ACTIVE | Noted: 2022-01-07

## 2022-01-07 PROBLEM — G47.33 OBSTRUCTIVE SLEEP APNEA: Status: ACTIVE | Noted: 2022-01-07

## 2022-01-07 PROBLEM — I34.1 MITRAL VALVE PROLAPSE: Status: ACTIVE | Noted: 2022-01-07

## 2022-01-07 PROBLEM — K21.9 GERD WITHOUT ESOPHAGITIS: Status: ACTIVE | Noted: 2017-06-09

## 2022-01-07 PROBLEM — M17.0 DEGENERATIVE ARTHRITIS OF KNEE, BILATERAL: Status: ACTIVE | Noted: 2022-01-07

## 2022-01-07 PROBLEM — K91.2 POSTOPERATIVE INTESTINAL MALABSORPTION: Status: ACTIVE | Noted: 2017-02-10

## 2022-01-07 PROBLEM — M54.50 CHRONIC MIDLINE LOW BACK PAIN WITHOUT SCIATICA: Status: ACTIVE | Noted: 2022-01-07

## 2022-01-07 PROBLEM — G89.29 CHRONIC MIDLINE LOW BACK PAIN WITHOUT SCIATICA: Status: ACTIVE | Noted: 2022-01-07

## 2022-01-07 PROBLEM — K80.20 GALLSTONES: Status: ACTIVE | Noted: 2022-01-07

## 2022-01-07 PROBLEM — E66.01 MORBID OBESITY DUE TO EXCESS CALORIES (HCC): Status: ACTIVE | Noted: 2022-01-07

## 2022-01-07 PROCEDURE — 12002 RPR S/N/AX/GEN/TRNK2.6-7.5CM: CPT | Performed by: PHYSICIAN ASSISTANT

## 2022-01-07 PROCEDURE — 90471 IMMUNIZATION ADMIN: CPT | Performed by: PHYSICIAN ASSISTANT

## 2022-01-07 PROCEDURE — 90715 TDAP VACCINE 7 YRS/> IM: CPT | Performed by: PHYSICIAN ASSISTANT

## 2022-01-07 RX ORDER — PREGABALIN 50 MG/1
50 CAPSULE ORAL 2 TIMES DAILY
COMMUNITY
End: 2023-01-03

## 2022-01-07 RX ORDER — TRAMADOL HYDROCHLORIDE 50 MG/1
50 TABLET ORAL
Status: ON HOLD | COMMUNITY
End: 2022-12-04

## 2022-01-07 RX ORDER — LOSARTAN POTASSIUM 100 MG/1
100 TABLET ORAL DAILY
Status: ON HOLD | COMMUNITY
End: 2022-12-15

## 2022-01-07 RX ORDER — TAMSULOSIN HYDROCHLORIDE 0.4 MG/1
0.4 CAPSULE ORAL DAILY
COMMUNITY
End: 2023-03-15

## 2022-01-07 RX ORDER — TRAZODONE HYDROCHLORIDE 50 MG/1
100 TABLET ORAL
COMMUNITY
End: 2023-01-03

## 2022-01-07 RX ORDER — LEVOTHYROXINE SODIUM 175 UG/1
TABLET ORAL
Status: ON HOLD | COMMUNITY
End: 2022-12-04

## 2022-01-07 RX ORDER — METHYLPHENIDATE HYDROCHLORIDE 36 MG/1
36 TABLET, EXTENDED RELEASE ORAL
Status: ON HOLD | COMMUNITY
End: 2022-12-04

## 2022-01-07 RX ORDER — AMLODIPINE BESYLATE 10 MG/1
10 TABLET ORAL DAILY
COMMUNITY
End: 2023-03-15

## 2022-01-07 RX ORDER — OMEPRAZOLE 40 MG/1
40 CAPSULE, DELAYED RELEASE ORAL DAILY
COMMUNITY
End: 2023-03-15

## 2022-01-07 RX ORDER — LEVOTHYROXINE SODIUM 0.12 MG/1
112 TABLET ORAL
Status: ON HOLD | COMMUNITY
End: 2022-12-04

## 2022-01-08 NOTE — PROGRESS NOTES
This is a 61 y.o. male who presents for repair of skin laceration(s).    HPI: Shortly prior to arrival he had a crush injury which cut his left hand over the web spacing between the first and second digit on the dorsal surface.  He is not up-to-date on his tetanus shot.    History reviewed. No pertinent past medical history.    History reviewed. No pertinent surgical history.    Social History     Socioeconomic History   • Marital status:      Spouse name: Not on file   • Number of children: Not on file   • Years of education: Not on file   • Highest education level: Not on file   Occupational History   • Not on file   Tobacco Use   • Smoking status: Former Smoker   • Smokeless tobacco: Never Used   Vaping Use   • Vaping Use: Never used   Substance and Sexual Activity   • Alcohol use: Not Currently   • Drug use: Never   • Sexual activity: Not on file   Other Topics Concern   • Not on file   Social History Narrative   • Not on file     Social Determinants of Health     Financial Resource Strain:    • Difficulty of Paying Living Expenses: Not on file   Food Insecurity:    • Worried About Running Out of Food in the Last Year: Not on file   • Ran Out of Food in the Last Year: Not on file   Transportation Needs:    • Lack of Transportation (Medical): Not on file   • Lack of Transportation (Non-Medical): Not on file   Physical Activity:    • Days of Exercise per Week: Not on file   • Minutes of Exercise per Session: Not on file   Stress:    • Feeling of Stress : Not on file   Social Connections:    • Frequency of Communication with Friends and Family: Not on file   • Frequency of Social Gatherings with Friends and Family: Not on file   • Attends [employer] Services: Not on file   • Active Member of Clubs or Organizations: Not on file   • Attends Club or Organization Meetings: Not on file   • Marital Status: Not on file   Intimate Partner Violence:    • Fear of Current or Ex-Partner: Not on file   • Emotionally  Abused: Not on file   • Physically Abused: Not on file   • Sexually Abused: Not on file   Housing Stability:    • Unable to Pay for Housing in the Last Year: Not on file   • Number of Places Lived in the Last Year: Not on file   • Unstable Housing in the Last Year: Not on file       History reviewed. No pertinent family history.    Current Outpatient Medications   Medication Sig Dispense Refill   • traZODone (DESYREL) 50 MG Tab Take 100 mg by mouth.     • traMADol (ULTRAM) 50 MG Tab Take 50 mg by mouth.     • tamsulosin (FLOMAX) 0.4 MG capsule Take 0.4 mg by mouth every day.     • pregabalin (LYRICA) 50 MG capsule Take 50 mg by mouth.     • Methylphenidate HCl ER 36 MG TABLET SR 24 HR Take 36 mg by mouth.     • levothyroxine (SYNTHROID) 125 MCG Tab Take 112 mcg by mouth every morning on an empty stomach.     • ROSUVASTATIN CALCIUM PO Take 20 mg by mouth every day.     • amLODIPine (NORVASC) 10 MG Tab Take 10 mg by mouth every day.     • losartan (COZAAR) 100 MG Tab Take 100 mg by mouth every day.     • omeprazole (PRILOSEC) 40 MG delayed-release capsule Take 40 mg by mouth every day.     • MELOXICAM PO Take  by mouth.     • GuaiFENesin (MUCINEX MAXIMUM STRENGTH) 1200 MG TB12 Take 1 Tab by mouth 2 times a day. 15 Tab 1   • levothyroxine (SYNTHROID) 175 MCG Tab Take  by mouth. (Patient not taking: Reported on 1/7/2022)     • PRAVASTATIN SODIUM PO Take  by mouth. (Patient not taking: Reported on 1/7/2022)     • levothyroxine (SYNTHROID) 150 MCG TABS Take 150 mcg by mouth every day. (Patient not taking: Reported on 1/7/2022)     • Telmisartan (MICARDIS PO) Take  by mouth. (Patient not taking: Reported on 1/7/2022)     • Amphetamine-Dextroamphetamine (ADDERALL PO) Take  by mouth. (Patient not taking: Reported on 1/7/2022)     • METFORMIN HCL PO Take  by mouth. (Patient not taking: Reported on 1/7/2022)     • chlorpheniramine-hydrocodone (TUSSIONEX) 8-10 MG/5ML suspension Take 5 mL by mouth every 12 hours as needed.  (Patient not taking: Reported on 1/7/2022) 60 mL 0   • amoxicillin-clavulanate (AUGMENTIN) 875-125 MG TABS Take 1 Tab by mouth every 12 hours. Take with food. (Patient not taking: Reported on 1/7/2022) 20 Each 0   • VENTOLIN  (90 BASE) MCG/ACT AERS Inhale 2 Puffs by mouth every four hours as needed for Shortness of Breath. Please include metered dose inhaler spacer device and illustrate useage (Patient not taking: Reported on 1/7/2022) 1 Inhaler 0   • mometasone (NASONEX) 50 MCG/ACT nasal spray Spray  in nose every day. Two sprays each nostril once daily (Patient not taking: Reported on 1/7/2022) 1 Inhaler 0     No current facility-administered medications for this visit.       Apap-fd&c red #40 [place]-oxycodone, Codeine, and Erythromycin    ROS:    General: Denies fever, chills, acute unexpected weight changes  Skin:  As in HPI  Cardiac:  No recent CP, palpitations, or dyspnea on exertion  Respiratory:  No acute shortness of breath, hemoptysis    PHYSICAL EXAM:  Vital signs reviewed  General:  Well developed, well nourished, NAD  Skin: 4 cm L-shaped laceration over the web spacing between the first and second digit of the left hand on the dorsal surface.  Good distal range of motion, strength, circulation and sensation.    1. Laceration of left hand without foreign body, initial encounter     2. Need for tetanus booster  Tdap =>8yo IM       PROCEDURE:    Permit: Procedure, benefits, risks (including those of bleeding, infection, injury, anesthesia, and allergic reaction), and alternatives explained to the patient who voiced understanding of the information. Their questions were sought and answered. Patient agreed to proceed with the laceration repair.    Indication: Laceration    Provider: Raf Ball PA-C    Anesthesia: 2 cc 2% lidocaine without epinephrine local    Description: Area prepped and draped in a sterile fashion. The area was thoroughly irrigated and cleaned and inspected for foreign bodies.  Finding no foreign bodies, laceration repair commenced. Wound edges were easily approximated.  Patient tolerated well    Hemostasis / closure: #4, four-0 simple interrupted sutures    Complications: none    Estimated blood loss:  Less than 3 cc.     Disposition: Patient alert, and oriented.  Breathing nonlabored.  Procedure site has been cleaned, dressed, and wound care instructions have been given. Patient is to followup if there is any significant erythema, tenderness, or drainage from the procedure site.  Follow-up in 7 to 10 days for suture removal.

## 2022-12-04 ENCOUNTER — APPOINTMENT (OUTPATIENT)
Dept: RADIOLOGY | Facility: MEDICAL CENTER | Age: 62
DRG: 673 | End: 2022-12-04
Attending: HOSPITALIST
Payer: COMMERCIAL

## 2022-12-04 ENCOUNTER — HOSPITAL ENCOUNTER (INPATIENT)
Facility: MEDICAL CENTER | Age: 62
LOS: 11 days | DRG: 673 | End: 2022-12-15
Attending: HOSPITALIST | Admitting: HOSPITALIST
Payer: COMMERCIAL

## 2022-12-04 DIAGNOSIS — L03.116 CELLULITIS OF LEFT LOWER EXTREMITY: ICD-10-CM

## 2022-12-04 PROBLEM — A41.9 SEPSIS (HCC): Status: ACTIVE | Noted: 2022-12-04

## 2022-12-04 PROBLEM — N17.9 AKI (ACUTE KIDNEY INJURY) (HCC): Status: ACTIVE | Noted: 2022-12-04

## 2022-12-04 PROBLEM — E87.1 HYPONATREMIA: Status: ACTIVE | Noted: 2022-12-04

## 2022-12-04 PROBLEM — F10.10 ALCOHOL ABUSE: Status: ACTIVE | Noted: 2022-12-04

## 2022-12-04 LAB
ANION GAP SERPL CALC-SCNC: 16 MMOL/L (ref 7–16)
APPEARANCE UR: ABNORMAL
BACTERIA #/AREA URNS HPF: ABNORMAL /HPF
BILIRUB UR QL STRIP.AUTO: ABNORMAL
BUN SERPL-MCNC: 71 MG/DL (ref 8–22)
CALCIUM SERPL-MCNC: 7.6 MG/DL (ref 8.5–10.5)
CHLORIDE SERPL-SCNC: 95 MMOL/L (ref 96–112)
CO2 SERPL-SCNC: 13 MMOL/L (ref 20–33)
COLOR UR: YELLOW
CREAT SERPL-MCNC: 6.3 MG/DL (ref 0.5–1.4)
EPI CELLS #/AREA URNS HPF: ABNORMAL /HPF
EST. AVERAGE GLUCOSE BLD GHB EST-MCNC: 197 MG/DL
GFR SERPLBLD CREATININE-BSD FMLA CKD-EPI: 9 ML/MIN/1.73 M 2
GLUCOSE BLD STRIP.AUTO-MCNC: 154 MG/DL (ref 65–99)
GLUCOSE SERPL-MCNC: 151 MG/DL (ref 65–99)
GLUCOSE UR STRIP.AUTO-MCNC: 100 MG/DL
HAV IGM SERPL QL IA: NORMAL
HBA1C MFR BLD: 8.5 % (ref 4–5.6)
HBV CORE IGM SER QL: NORMAL
HBV SURFACE AG SER QL: NORMAL
HCV AB SER QL: NORMAL
INR PPP: 1.33 (ref 0.87–1.13)
KETONES UR STRIP.AUTO-MCNC: ABNORMAL MG/DL
LACTATE SERPL-SCNC: 2.1 MMOL/L (ref 0.5–2)
LACTATE SERPL-SCNC: 2.7 MMOL/L (ref 0.5–2)
LEUKOCYTE ESTERASE UR QL STRIP.AUTO: ABNORMAL
MICRO URNS: ABNORMAL
NITRITE UR QL STRIP.AUTO: NEGATIVE
PH UR STRIP.AUTO: 5.5 [PH] (ref 5–8)
POTASSIUM SERPL-SCNC: 3.8 MMOL/L (ref 3.6–5.5)
PROT UR QL STRIP: 100 MG/DL
PROTHROMBIN TIME: 16.3 SEC (ref 12–14.6)
RBC # URNS HPF: ABNORMAL /HPF
RBC UR QL AUTO: ABNORMAL
SODIUM SERPL-SCNC: 124 MMOL/L (ref 135–145)
SP GR UR STRIP.AUTO: 1.02
TRANS CELLS #/AREA URNS HPF: ABNORMAL /HPF
UROBILINOGEN UR STRIP.AUTO-MCNC: 0.2 MG/DL
WBC #/AREA URNS HPF: ABNORMAL /HPF

## 2022-12-04 PROCEDURE — 700111 HCHG RX REV CODE 636 W/ 250 OVERRIDE (IP): Performed by: HOSPITALIST

## 2022-12-04 PROCEDURE — 80074 ACUTE HEPATITIS PANEL: CPT

## 2022-12-04 PROCEDURE — 700105 HCHG RX REV CODE 258: Performed by: HOSPITALIST

## 2022-12-04 PROCEDURE — 770020 HCHG ROOM/CARE - TELE (206)

## 2022-12-04 PROCEDURE — 81001 URINALYSIS AUTO W/SCOPE: CPT

## 2022-12-04 PROCEDURE — 82962 GLUCOSE BLOOD TEST: CPT

## 2022-12-04 PROCEDURE — 99223 1ST HOSP IP/OBS HIGH 75: CPT | Performed by: HOSPITALIST

## 2022-12-04 PROCEDURE — 87040 BLOOD CULTURE FOR BACTERIA: CPT | Mod: 91

## 2022-12-04 PROCEDURE — 83605 ASSAY OF LACTIC ACID: CPT

## 2022-12-04 PROCEDURE — 83036 HEMOGLOBIN GLYCOSYLATED A1C: CPT

## 2022-12-04 PROCEDURE — 76775 US EXAM ABDO BACK WALL LIM: CPT

## 2022-12-04 PROCEDURE — 85610 PROTHROMBIN TIME: CPT

## 2022-12-04 PROCEDURE — A9270 NON-COVERED ITEM OR SERVICE: HCPCS | Performed by: HOSPITALIST

## 2022-12-04 PROCEDURE — 36415 COLL VENOUS BLD VENIPUNCTURE: CPT

## 2022-12-04 PROCEDURE — 80048 BASIC METABOLIC PNL TOTAL CA: CPT

## 2022-12-04 PROCEDURE — 700102 HCHG RX REV CODE 250 W/ 637 OVERRIDE(OP): Performed by: HOSPITALIST

## 2022-12-04 RX ORDER — ONDANSETRON 4 MG/1
4 TABLET, ORALLY DISINTEGRATING ORAL EVERY 4 HOURS PRN
Status: DISCONTINUED | OUTPATIENT
Start: 2022-12-04 | End: 2022-12-15 | Stop reason: HOSPADM

## 2022-12-04 RX ORDER — TRAZODONE HYDROCHLORIDE 100 MG/1
50-100 TABLET ORAL
Status: DISCONTINUED | OUTPATIENT
Start: 2022-12-04 | End: 2022-12-15 | Stop reason: HOSPADM

## 2022-12-04 RX ORDER — OXYCODONE HYDROCHLORIDE 5 MG/1
5-10 TABLET ORAL EVERY 4 HOURS PRN
Status: DISCONTINUED | OUTPATIENT
Start: 2022-12-04 | End: 2022-12-04

## 2022-12-04 RX ORDER — ROSUVASTATIN CALCIUM 20 MG/1
10 TABLET, COATED ORAL
Status: DISCONTINUED | OUTPATIENT
Start: 2022-12-04 | End: 2022-12-15 | Stop reason: HOSPADM

## 2022-12-04 RX ORDER — MOMETASONE FUROATE 50 UG/1
2 SPRAY, METERED NASAL DAILY
Status: DISCONTINUED | OUTPATIENT
Start: 2022-12-04 | End: 2022-12-04

## 2022-12-04 RX ORDER — PROMETHAZINE HYDROCHLORIDE 25 MG/1
12.5-25 SUPPOSITORY RECTAL EVERY 4 HOURS PRN
Status: DISCONTINUED | OUTPATIENT
Start: 2022-12-04 | End: 2022-12-15 | Stop reason: HOSPADM

## 2022-12-04 RX ORDER — OMEPRAZOLE 20 MG/1
40 CAPSULE, DELAYED RELEASE ORAL DAILY
Status: DISCONTINUED | OUTPATIENT
Start: 2022-12-05 | End: 2022-12-15 | Stop reason: HOSPADM

## 2022-12-04 RX ORDER — LABETALOL HYDROCHLORIDE 5 MG/ML
10 INJECTION, SOLUTION INTRAVENOUS EVERY 4 HOURS PRN
Status: DISCONTINUED | OUTPATIENT
Start: 2022-12-04 | End: 2022-12-15 | Stop reason: HOSPADM

## 2022-12-04 RX ORDER — PROCHLORPERAZINE EDISYLATE 5 MG/ML
5-10 INJECTION INTRAMUSCULAR; INTRAVENOUS EVERY 4 HOURS PRN
Status: DISCONTINUED | OUTPATIENT
Start: 2022-12-04 | End: 2022-12-15 | Stop reason: HOSPADM

## 2022-12-04 RX ORDER — ONDANSETRON 2 MG/ML
4 INJECTION INTRAMUSCULAR; INTRAVENOUS EVERY 4 HOURS PRN
Status: DISCONTINUED | OUTPATIENT
Start: 2022-12-04 | End: 2022-12-15 | Stop reason: HOSPADM

## 2022-12-04 RX ORDER — FINASTERIDE 5 MG/1
5 TABLET, FILM COATED ORAL DAILY
COMMUNITY
End: 2023-03-15

## 2022-12-04 RX ORDER — LINEZOLID 600 MG/1
600 TABLET, FILM COATED ORAL EVERY 12 HOURS
Status: DISCONTINUED | OUTPATIENT
Start: 2022-12-04 | End: 2022-12-06

## 2022-12-04 RX ORDER — PROMETHAZINE HYDROCHLORIDE 25 MG/1
12.5-25 TABLET ORAL EVERY 4 HOURS PRN
Status: DISCONTINUED | OUTPATIENT
Start: 2022-12-04 | End: 2022-12-15 | Stop reason: HOSPADM

## 2022-12-04 RX ORDER — TAMSULOSIN HYDROCHLORIDE 0.4 MG/1
0.4 CAPSULE ORAL
Status: DISCONTINUED | OUTPATIENT
Start: 2022-12-05 | End: 2022-12-15 | Stop reason: HOSPADM

## 2022-12-04 RX ORDER — HEPARIN SODIUM 5000 [USP'U]/ML
5000 INJECTION, SOLUTION INTRAVENOUS; SUBCUTANEOUS EVERY 8 HOURS
Status: DISCONTINUED | OUTPATIENT
Start: 2022-12-04 | End: 2022-12-15 | Stop reason: HOSPADM

## 2022-12-04 RX ORDER — SODIUM BICARBONATE IN D5W 150/1000ML
PLASTIC BAG, INJECTION (ML) INTRAVENOUS CONTINUOUS
Status: DISCONTINUED | OUTPATIENT
Start: 2022-12-04 | End: 2022-12-06

## 2022-12-04 RX ORDER — HYDROCODONE BITARTRATE AND ACETAMINOPHEN 5; 325 MG/1; MG/1
1 TABLET ORAL EVERY 6 HOURS PRN
Status: DISCONTINUED | OUTPATIENT
Start: 2022-12-04 | End: 2022-12-15 | Stop reason: HOSPADM

## 2022-12-04 RX ORDER — AMLODIPINE BESYLATE 10 MG/1
10 TABLET ORAL DAILY
Status: DISCONTINUED | OUTPATIENT
Start: 2022-12-04 | End: 2022-12-15 | Stop reason: HOSPADM

## 2022-12-04 RX ADMIN — PIPERACILLIN AND TAZOBACTAM 4.5 G: 4; .5 INJECTION, POWDER, LYOPHILIZED, FOR SOLUTION INTRAVENOUS; PARENTERAL at 21:24

## 2022-12-04 RX ADMIN — HYDROCODONE BITARTRATE AND ACETAMINOPHEN 1 TABLET: 5; 325 TABLET ORAL at 18:40

## 2022-12-04 RX ADMIN — ROSUVASTATIN CALCIUM 10 MG: 20 TABLET, FILM COATED ORAL at 21:26

## 2022-12-04 RX ADMIN — HEPARIN SODIUM 5000 UNITS: 5000 INJECTION, SOLUTION INTRAVENOUS; SUBCUTANEOUS at 21:26

## 2022-12-04 RX ADMIN — AMLODIPINE BESYLATE 10 MG: 10 TABLET ORAL at 18:40

## 2022-12-04 RX ADMIN — PROMETHAZINE HYDROCHLORIDE 12.5 MG: 25 TABLET ORAL at 18:40

## 2022-12-04 RX ADMIN — LINEZOLID 600 MG: 600 TABLET, FILM COATED ORAL at 18:40

## 2022-12-04 RX ADMIN — SODIUM BICARBONATE: 84 INJECTION, SOLUTION INTRAVENOUS at 18:41

## 2022-12-04 RX ADMIN — PIPERACILLIN AND TAZOBACTAM 4.5 G: 4; .5 INJECTION, POWDER, LYOPHILIZED, FOR SOLUTION INTRAVENOUS; PARENTERAL at 18:59

## 2022-12-04 ASSESSMENT — PAIN DESCRIPTION - PAIN TYPE
TYPE: ACUTE PAIN
TYPE: ACUTE PAIN

## 2022-12-04 ASSESSMENT — ENCOUNTER SYMPTOMS
ABDOMINAL PAIN: 0
VOMITING: 0
DIZZINESS: 0
HEADACHES: 0
LOSS OF CONSCIOUSNESS: 0
SHORTNESS OF BREATH: 0
DOUBLE VISION: 0
NAUSEA: 0
COUGH: 0
DIARRHEA: 1
BLURRED VISION: 0
CHILLS: 1
PALPITATIONS: 0
BACK PAIN: 0
FEVER: 1
SORE THROAT: 0

## 2022-12-04 ASSESSMENT — COGNITIVE AND FUNCTIONAL STATUS - GENERAL
DRESSING REGULAR LOWER BODY CLOTHING: A LITTLE
SUGGESTED CMS G CODE MODIFIER DAILY ACTIVITY: CJ
STANDING UP FROM CHAIR USING ARMS: A LITTLE
MOBILITY SCORE: 21
TOILETING: A LITTLE
SUGGESTED CMS G CODE MODIFIER MOBILITY: CJ
WALKING IN HOSPITAL ROOM: A LITTLE
HELP NEEDED FOR BATHING: A LITTLE
DRESSING REGULAR UPPER BODY CLOTHING: A LITTLE
CLIMB 3 TO 5 STEPS WITH RAILING: A LITTLE
DAILY ACTIVITIY SCORE: 20

## 2022-12-04 ASSESSMENT — LIFESTYLE VARIABLES
TOTAL SCORE: 0
HOW MANY TIMES IN THE PAST YEAR HAVE YOU HAD 5 OR MORE DRINKS IN A DAY: 0
ALCOHOL_USE: YES
TOTAL SCORE: 0
EVER FELT BAD OR GUILTY ABOUT YOUR DRINKING: NO
DOES PATIENT WANT TO STOP DRINKING: NO
HAVE YOU EVER FELT YOU SHOULD CUT DOWN ON YOUR DRINKING: NO
HAVE PEOPLE ANNOYED YOU BY CRITICIZING YOUR DRINKING: NO
EVER HAD A DRINK FIRST THING IN THE MORNING TO STEADY YOUR NERVES TO GET RID OF A HANGOVER: NO
AVERAGE NUMBER OF DAYS PER WEEK YOU HAVE A DRINK CONTAINING ALCOHOL: 3
ON A TYPICAL DAY WHEN YOU DRINK ALCOHOL HOW MANY DRINKS DO YOU HAVE: 2
TOTAL SCORE: 0
CONSUMPTION TOTAL: NEGATIVE
ALCOHOL_USE: YES

## 2022-12-04 ASSESSMENT — PATIENT HEALTH QUESTIONNAIRE - PHQ9
SUM OF ALL RESPONSES TO PHQ9 QUESTIONS 1 AND 2: 0
1. LITTLE INTEREST OR PLEASURE IN DOING THINGS: NOT AT ALL
1. LITTLE INTEREST OR PLEASURE IN DOING THINGS: NOT AT ALL
2. FEELING DOWN, DEPRESSED, IRRITABLE, OR HOPELESS: NOT AT ALL
SUM OF ALL RESPONSES TO PHQ9 QUESTIONS 1 AND 2: 0
2. FEELING DOWN, DEPRESSED, IRRITABLE, OR HOPELESS: NOT AT ALL

## 2022-12-04 NOTE — PROGRESS NOTES
Triage Officer note    Pt admitted to [facility] in [place] with sepsis from cellulitis.  -KENRICK: creat 1 in Oct, 4.3 on admission and now 5.17 despite 7 litres of crystaloid.  UOP 150ml/24hrs  -HypoNa 121  -Hx ETOH    Being sent for Nephrology consultation

## 2022-12-05 ENCOUNTER — APPOINTMENT (OUTPATIENT)
Dept: RADIOLOGY | Facility: MEDICAL CENTER | Age: 62
DRG: 673 | End: 2022-12-05
Attending: HOSPITALIST
Payer: COMMERCIAL

## 2022-12-05 ENCOUNTER — APPOINTMENT (OUTPATIENT)
Dept: CARDIOLOGY | Facility: MEDICAL CENTER | Age: 62
DRG: 673 | End: 2022-12-05
Attending: HOSPITALIST
Payer: COMMERCIAL

## 2022-12-05 LAB
ALBUMIN SERPL BCP-MCNC: 2 G/DL (ref 3.2–4.9)
ALBUMIN/GLOB SERPL: 0.6 G/DL
ALP SERPL-CCNC: 182 U/L (ref 30–99)
ALT SERPL-CCNC: 56 U/L (ref 2–50)
ANION GAP SERPL CALC-SCNC: 15 MMOL/L (ref 7–16)
ANION GAP SERPL CALC-SCNC: 16 MMOL/L (ref 7–16)
AST SERPL-CCNC: 63 U/L (ref 12–45)
BILIRUB SERPL-MCNC: 3 MG/DL (ref 0.1–1.5)
BUN SERPL-MCNC: 73 MG/DL (ref 8–22)
BUN SERPL-MCNC: 81 MG/DL (ref 8–22)
C DIFF DNA SPEC QL NAA+PROBE: NEGATIVE
C DIFF TOX GENS STL QL NAA+PROBE: NEGATIVE
CALCIUM SERPL-MCNC: 7.5 MG/DL (ref 8.5–10.5)
CALCIUM SERPL-MCNC: 7.6 MG/DL (ref 8.5–10.5)
CHLORIDE SERPL-SCNC: 88 MMOL/L (ref 96–112)
CHLORIDE SERPL-SCNC: 93 MMOL/L (ref 96–112)
CHLORIDE UR-SCNC: 26 MMOL/L
CO2 SERPL-SCNC: 16 MMOL/L (ref 20–33)
CO2 SERPL-SCNC: 16 MMOL/L (ref 20–33)
CREAT SERPL-MCNC: 6.83 MG/DL (ref 0.5–1.4)
CREAT SERPL-MCNC: 7.42 MG/DL (ref 0.5–1.4)
CREAT UR-MCNC: 131.74 MG/DL
CREAT UR-MCNC: 131.78 MG/DL
CREAT UR-MCNC: 135.79 MG/DL
CRP SERPL HS-MCNC: 22.47 MG/DL (ref 0–0.75)
ERYTHROCYTE [DISTWIDTH] IN BLOOD BY AUTOMATED COUNT: 44.5 FL (ref 35.9–50)
GFR SERPLBLD CREATININE-BSD FMLA CKD-EPI: 8 ML/MIN/1.73 M 2
GFR SERPLBLD CREATININE-BSD FMLA CKD-EPI: 8 ML/MIN/1.73 M 2
GLOBULIN SER CALC-MCNC: 3.1 G/DL (ref 1.9–3.5)
GLUCOSE BLD STRIP.AUTO-MCNC: 201 MG/DL (ref 65–99)
GLUCOSE BLD STRIP.AUTO-MCNC: 234 MG/DL (ref 65–99)
GLUCOSE BLD STRIP.AUTO-MCNC: 264 MG/DL (ref 65–99)
GLUCOSE SERPL-MCNC: 184 MG/DL (ref 65–99)
GLUCOSE SERPL-MCNC: 269 MG/DL (ref 65–99)
GRAM STN SPEC: NORMAL
HCT VFR BLD AUTO: 39 % (ref 42–52)
HGB BLD-MCNC: 14.1 G/DL (ref 14–18)
LACTATE SERPL-SCNC: 2.5 MMOL/L (ref 0.5–2)
LACTATE SERPL-SCNC: 2.9 MMOL/L (ref 0.5–2)
LV EJECT FRACT  99904: 65
MCH RBC QN AUTO: 33.4 PG (ref 27–33)
MCHC RBC AUTO-ENTMCNC: 36.2 G/DL (ref 33.7–35.3)
MCV RBC AUTO: 92.4 FL (ref 81.4–97.8)
MICROALBUMIN UR-MCNC: 40.6 MG/DL
MICROALBUMIN/CREAT UR: 299 MG/G (ref 0–30)
OSMOLALITY SERPL: 295 MOSM/KG H2O (ref 278–298)
OSMOLALITY UR: 296 MOSM/KG H2O (ref 300–900)
PLATELET # BLD AUTO: 89 K/UL (ref 164–446)
PMV BLD AUTO: 10.1 FL (ref 9–12.9)
POTASSIUM SERPL-SCNC: 3.6 MMOL/L (ref 3.6–5.5)
POTASSIUM SERPL-SCNC: 4 MMOL/L (ref 3.6–5.5)
POTASSIUM UR-SCNC: 54 MMOL/L
PROT SERPL-MCNC: 5.1 G/DL (ref 6–8.2)
PROT UR-MCNC: 144 MG/DL (ref 0–15)
PROT UR-MCNC: 145 MG/DL (ref 0–15)
PROT/CREAT UR: 1093 MG/G (ref 15–68)
PROT/CREAT UR: 1100 MG/G (ref 15–68)
RBC # BLD AUTO: 4.22 M/UL (ref 4.7–6.1)
SCCMEC + MECA PNL NOSE NAA+PROBE: NEGATIVE
SIGNIFICANT IND 70042: NORMAL
SITE SITE: NORMAL
SODIUM SERPL-SCNC: 120 MMOL/L (ref 135–145)
SODIUM SERPL-SCNC: 124 MMOL/L (ref 135–145)
SODIUM UR-SCNC: 30 MMOL/L
SOURCE SOURCE: NORMAL
WBC # BLD AUTO: 13.7 K/UL (ref 4.8–10.8)

## 2022-12-05 PROCEDURE — 84300 ASSAY OF URINE SODIUM: CPT

## 2022-12-05 PROCEDURE — 93306 TTE W/DOPPLER COMPLETE: CPT | Mod: 26 | Performed by: INTERNAL MEDICINE

## 2022-12-05 PROCEDURE — 93306 TTE W/DOPPLER COMPLETE: CPT

## 2022-12-05 PROCEDURE — 82436 ASSAY OF URINE CHLORIDE: CPT

## 2022-12-05 PROCEDURE — 85027 COMPLETE CBC AUTOMATED: CPT

## 2022-12-05 PROCEDURE — 36415 COLL VENOUS BLD VENIPUNCTURE: CPT

## 2022-12-05 PROCEDURE — 87077 CULTURE AEROBIC IDENTIFY: CPT | Mod: 91

## 2022-12-05 PROCEDURE — 700105 HCHG RX REV CODE 258: Performed by: HOSPITALIST

## 2022-12-05 PROCEDURE — 86140 C-REACTIVE PROTEIN: CPT

## 2022-12-05 PROCEDURE — 87186 SC STD MICRODIL/AGAR DIL: CPT

## 2022-12-05 PROCEDURE — 73590 X-RAY EXAM OF LOWER LEG: CPT | Mod: LT

## 2022-12-05 PROCEDURE — 80053 COMPREHEN METABOLIC PANEL: CPT

## 2022-12-05 PROCEDURE — 99223 1ST HOSP IP/OBS HIGH 75: CPT | Performed by: INTERNAL MEDICINE

## 2022-12-05 PROCEDURE — 99233 SBSQ HOSP IP/OBS HIGH 50: CPT | Performed by: HOSPITALIST

## 2022-12-05 PROCEDURE — 700101 HCHG RX REV CODE 250: Performed by: HOSPITALIST

## 2022-12-05 PROCEDURE — 87045 FECES CULTURE AEROBIC BACT: CPT

## 2022-12-05 PROCEDURE — 82043 UR ALBUMIN QUANTITATIVE: CPT

## 2022-12-05 PROCEDURE — 83930 ASSAY OF BLOOD OSMOLALITY: CPT

## 2022-12-05 PROCEDURE — 87899 AGENT NOS ASSAY W/OPTIC: CPT

## 2022-12-05 PROCEDURE — 87493 C DIFF AMPLIFIED PROBE: CPT

## 2022-12-05 PROCEDURE — 82962 GLUCOSE BLOOD TEST: CPT

## 2022-12-05 PROCEDURE — 700102 HCHG RX REV CODE 250 W/ 637 OVERRIDE(OP): Performed by: HOSPITALIST

## 2022-12-05 PROCEDURE — 84133 ASSAY OF URINE POTASSIUM: CPT

## 2022-12-05 PROCEDURE — 84156 ASSAY OF PROTEIN URINE: CPT

## 2022-12-05 PROCEDURE — 87070 CULTURE OTHR SPECIMN AEROBIC: CPT

## 2022-12-05 PROCEDURE — 80048 BASIC METABOLIC PNL TOTAL CA: CPT

## 2022-12-05 PROCEDURE — 700111 HCHG RX REV CODE 636 W/ 250 OVERRIDE (IP): Performed by: HOSPITALIST

## 2022-12-05 PROCEDURE — A9270 NON-COVERED ITEM OR SERVICE: HCPCS | Performed by: HOSPITALIST

## 2022-12-05 PROCEDURE — 82570 ASSAY OF URINE CREATININE: CPT | Mod: 91

## 2022-12-05 PROCEDURE — 83605 ASSAY OF LACTIC ACID: CPT

## 2022-12-05 PROCEDURE — 83935 ASSAY OF URINE OSMOLALITY: CPT

## 2022-12-05 PROCEDURE — 770020 HCHG ROOM/CARE - TELE (206)

## 2022-12-05 PROCEDURE — 87641 MR-STAPH DNA AMP PROBE: CPT

## 2022-12-05 PROCEDURE — 87205 SMEAR GRAM STAIN: CPT

## 2022-12-05 RX ADMIN — OMEPRAZOLE 40 MG: 20 CAPSULE, DELAYED RELEASE ORAL at 05:12

## 2022-12-05 RX ADMIN — INSULIN HUMAN 5 UNITS: 100 INJECTION, SOLUTION PARENTERAL at 21:50

## 2022-12-05 RX ADMIN — SODIUM BICARBONATE: 84 INJECTION, SOLUTION INTRAVENOUS at 11:51

## 2022-12-05 RX ADMIN — TAMSULOSIN HYDROCHLORIDE 0.4 MG: 0.4 CAPSULE ORAL at 09:13

## 2022-12-05 RX ADMIN — ROSUVASTATIN CALCIUM 10 MG: 20 TABLET, FILM COATED ORAL at 21:47

## 2022-12-05 RX ADMIN — HYDROCODONE BITARTRATE AND ACETAMINOPHEN 1 TABLET: 5; 325 TABLET ORAL at 21:46

## 2022-12-05 RX ADMIN — PROMETHAZINE HYDROCHLORIDE 25 MG: 25 TABLET ORAL at 06:16

## 2022-12-05 RX ADMIN — LINEZOLID 600 MG: 600 TABLET, FILM COATED ORAL at 05:12

## 2022-12-05 RX ADMIN — PIPERACILLIN AND TAZOBACTAM 4.5 G: 4; .5 INJECTION, POWDER, LYOPHILIZED, FOR SOLUTION INTRAVENOUS; PARENTERAL at 09:11

## 2022-12-05 RX ADMIN — PIPERACILLIN AND TAZOBACTAM 4.5 G: 4; .5 INJECTION, POWDER, LYOPHILIZED, FOR SOLUTION INTRAVENOUS; PARENTERAL at 22:00

## 2022-12-05 RX ADMIN — HEPARIN SODIUM 5000 UNITS: 5000 INJECTION, SOLUTION INTRAVENOUS; SUBCUTANEOUS at 21:46

## 2022-12-05 RX ADMIN — LINEZOLID 600 MG: 600 TABLET, FILM COATED ORAL at 18:00

## 2022-12-05 RX ADMIN — AMLODIPINE BESYLATE 10 MG: 10 TABLET ORAL at 18:00

## 2022-12-05 RX ADMIN — PROMETHAZINE HYDROCHLORIDE 25 MG: 25 TABLET ORAL at 21:47

## 2022-12-05 RX ADMIN — INSULIN HUMAN 3 UNITS: 100 INJECTION, SOLUTION PARENTERAL at 05:17

## 2022-12-05 RX ADMIN — INSULIN HUMAN 3 UNITS: 100 INJECTION, SOLUTION PARENTERAL at 13:12

## 2022-12-05 RX ADMIN — INSULIN HUMAN 5 UNITS: 100 INJECTION, SOLUTION PARENTERAL at 18:00

## 2022-12-05 RX ADMIN — HEPARIN SODIUM 5000 UNITS: 5000 INJECTION, SOLUTION INTRAVENOUS; SUBCUTANEOUS at 13:14

## 2022-12-05 RX ADMIN — HYDROCODONE BITARTRATE AND ACETAMINOPHEN 1 TABLET: 5; 325 TABLET ORAL at 06:16

## 2022-12-05 RX ADMIN — HEPARIN SODIUM 5000 UNITS: 5000 INJECTION, SOLUTION INTRAVENOUS; SUBCUTANEOUS at 05:12

## 2022-12-05 ASSESSMENT — ENCOUNTER SYMPTOMS
TINGLING: 0
EYE PAIN: 0
NAUSEA: 1
NECK PAIN: 0
DIAPHORESIS: 0
STRIDOR: 0
FLANK PAIN: 0
HEADACHES: 0
HEARTBURN: 0
ABDOMINAL PAIN: 0
PND: 0
CHILLS: 0
BACK PAIN: 0
SHORTNESS OF BREATH: 0
WEAKNESS: 0
TREMORS: 0
MYALGIAS: 0
PHOTOPHOBIA: 0
WHEEZING: 0
DEPRESSION: 0
SORE THROAT: 0
BLOOD IN STOOL: 0
PALPITATIONS: 0
HALLUCINATIONS: 0
SPUTUM PRODUCTION: 0
CLAUDICATION: 0
BRUISES/BLEEDS EASILY: 0
HEMOPTYSIS: 0
FALLS: 0
DIZZINESS: 0
VOMITING: 0
POLYDIPSIA: 0
DIARRHEA: 0
CONSTIPATION: 0
DOUBLE VISION: 0
ORTHOPNEA: 0
SINUS PAIN: 0
BLURRED VISION: 0
COUGH: 0
FEVER: 0

## 2022-12-05 ASSESSMENT — LIFESTYLE VARIABLES: SUBSTANCE_ABUSE: 0

## 2022-12-05 ASSESSMENT — PAIN DESCRIPTION - PAIN TYPE: TYPE: ACUTE PAIN

## 2022-12-05 NOTE — ASSESSMENT & PLAN NOTE
Start on insulin sliding scale with serial Accu-Checks  hemoglobin A1c 8.5  Hypoglycemic protocol in place

## 2022-12-05 NOTE — PROGRESS NOTES
Patient arrived as a direct admit at approx 1600. Patient ambulatory, AO x4, c/o pain in LLE, medicated per MAR. Vargas in place. LLE red and swollen. Sinus tach on the monitor. IVF and abx initiated per order. Urine collected and sent. Report given to night shift RN.

## 2022-12-05 NOTE — ASSESSMENT & PLAN NOTE
This is Sepsis Present on admission  SIRS criteria identified on my evaluation include: Fever, with temperature greater than 101 deg F and Tachycardia, with heart rate greater than 90 BPM  Source is skin  Sepsis protocol initiated  Fluid resuscitation ordered per protocol  Crystalloid Fluid Administration: Patient already received adequate fluid resuscitation at the transferring facility and given that he is an uric further aggressive IV fluids are not indicated  IV antibiotics as appropriate for source of sepsis  Reassessment: I have reassessed the patient's hemodynamic status

## 2022-12-05 NOTE — PROGRESS NOTES
4 Eyes Skin Assessment Completed by FLORIN Jauregui and FLORIN Villanueva.    Head WDL  Ears WDL  Nose WDL  Mouth WDL  Neck WDL  Breast/Chest WDL  Shoulder Blades WDL  Spine WDL  (R) Arm/Elbow/Hand WDL  (L) Arm/Elbow/Hand WDL  Abdomen Bruising  Groin Redness and Excoriation  Scrotum/Coccyx/Buttocks WDL  (R) Leg Redness  (L) Leg Redness, Swelling, Weeping, and Edema  (R) Heel/Foot/Toe WDL  (L) Heel/Foot/Toe Redness, Discoloration, Scab, and Edema          Devices In Places Tele Box, Pulse Ox, and Vargas      Interventions In Place Pillows, Barrier Cream, and Pressure Redistribution Mattress    Possible Skin Injury No    Pictures Uploaded Into Epic No, needs to be completed  Wound Consult Placed Yes  RN Wound Prevention Protocol Ordered Yes

## 2022-12-05 NOTE — ASSESSMENT & PLAN NOTE
Patient has stage IIIa chronic kidney disease with GFR normally around 40.  Acuity of onset of the patient's KENRICK is uncertain but would appear to have been in the last week or 2 based on clinical history.  He is effectively anuretic based on reports from the transferring facility where he was resuscitated with 7 L of crystalloid and only urinated 150 mL in 24 hours  Vargas catheter  Follow urine output and BMP  UA  Nephrology consultation  Renally dose medications as appropriate  Stat BMP to confirm no emergent electrolyte abnormalities  Renal function is recovering, expect to remove dialysis cath soon

## 2022-12-05 NOTE — ASSESSMENT & PLAN NOTE
In setting of diabetes  Ultrasound the transferring facility was negative for DVT or deep tissue infection  Wound culture grew strep pyogenes group A and MSSA  10 days of antibiotics, cefazolin ends 12/13  Wound care consult

## 2022-12-05 NOTE — DIETARY
NUTRITION SERVICES: BMI - Pt with BMI >40 (=Body mass index is 45.18 kg/m².), Class III obesity. Weight loss counseling not appropriate in acute care setting. RECOMMEND - If appropriate at DC please refer to outpatient nutrition services for weight management.

## 2022-12-05 NOTE — DISCHARGE PLANNING
Case Management Discharge Planning    Admission Date: 12/4/2022  GMLOS: 3.5  ALOS: 1    6-Clicks ADL Score: 20  6-Clicks Mobility Score: 21      Anticipated Discharge Dispo: Discharge Disposition: Discharged to home/self care (01)    Medically Clear: No    Next Steps: f/u with pt and medical team to discuss dc needs and barriers.    Barriers to Discharge: Medical clearance and Dialysis?

## 2022-12-05 NOTE — CARE PLAN
The patient is Watcher - Medium risk of patient condition declining or worsening    Shift Goals  Clinical Goals: improve infection  Patient Goals: rest  Family Goals: SANJUANA    Progress made toward(s) clinical / shift goals:    Problem: Fluid Volume  Goal: Fluid volume balance will be maintained  Outcome: Not Met     Problem: Knowledge Deficit - Standard  Goal: Patient and family/care givers will demonstrate understanding of plan of care, disease process/condition, diagnostic tests and medications  Outcome: Progressing     Problem: Pain - Standard  Goal: Alleviation of pain or a reduction in pain to the patient’s comfort goal  Outcome: Progressing     Problem: Hemodynamics  Goal: Patient's hemodynamics, fluid balance and neurologic status will be stable or improve  Outcome: Progressing       Patient is not progressing towards the following goals:      Problem: Fluid Volume  Goal: Fluid volume balance will be maintained  Outcome: Not Met

## 2022-12-05 NOTE — PROGRESS NOTES
[facility] Medicine Daily Progress Note    Date of Service  12/5/2022    Chief Complaint  Thomas Borja is a 62 y.o. male admitted 12/4/2022 with a previous history of type 2 diabetes, stage IIIa chronic kidney disease with a GFR that lives around 40, hypertension, alcohol use, recurrent cellulitis, hypothyroidism, mitral valve prolapse he presents to the hospital for left leg cellulitis and acute renal failure.  Patient was taking metformin and NSAIDs at home.    Hospital Course  Patient arrived to the hospital tachycardic, febrile and tachypneic.  He was found to have sodium of 124, chloride 95, bicarb 13, creatinine 6.3, calcium 7.6, lactic acid 2.7.  Patient was started on IV bicarbonate.    Interval Problem Update  12/5: There is no significant improvement in patient's creatinine.  Waiting for further recommendations from nephrology.  Wound cultures have been ordered on the left leg.  There is improvement in cellulitis.    I have discussed this patient's plan of care and discharge plan at IDT rounds today with Case Management, Nursing, Nursing leadership, and other members of the IDT team.    Consultants/Specialty  nephrology    Code Status  Full Code    Disposition  Patient is not medically cleared for discharge.   Anticipate discharge to to home with close outpatient follow-up.  I have placed the appropriate orders for post-discharge needs.    Review of Systems  Review of Systems   Constitutional:  Positive for malaise/fatigue. Negative for chills, diaphoresis and fever.   HENT:  Negative for congestion, ear discharge, ear pain, hearing loss, nosebleeds, sinus pain, sore throat and tinnitus.    Eyes:  Negative for blurred vision, double vision, photophobia and pain.   Respiratory:  Negative for cough, hemoptysis, sputum production, shortness of breath, wheezing and stridor.    Cardiovascular:  Positive for leg swelling. Negative for chest pain, palpitations, orthopnea, claudication and PND.   Gastrointestinal:   Positive for nausea. Negative for abdominal pain, blood in stool, constipation, diarrhea, heartburn, melena and vomiting.   Genitourinary:  Negative for dysuria, flank pain, frequency, hematuria and urgency.   Musculoskeletal:  Negative for back pain, falls, joint pain, myalgias and neck pain.   Skin:  Negative for itching and rash.   Neurological:  Negative for dizziness, tingling, tremors, weakness and headaches.   Endo/Heme/Allergies:  Negative for environmental allergies and polydipsia. Does not bruise/bleed easily.   Psychiatric/Behavioral:  Negative for depression, hallucinations, substance abuse and suicidal ideas.       Physical Exam  Temp:  [36.7 °C (98.1 °F)-38 °C (100.4 °F)] 36.7 °C (98.1 °F)  Pulse:  [] 96  Resp:  [15-20] 15  BP: (122-144)/(70-81) 122/76  SpO2:  [93 %-97 %] 96 %    Physical Exam  Vitals and nursing note reviewed.   Constitutional:       General: He is not in acute distress.     Appearance: Normal appearance. He is not ill-appearing, toxic-appearing or diaphoretic.   HENT:      Head: Normocephalic and atraumatic.      Nose: No congestion or rhinorrhea.      Mouth/Throat:      Pharynx: No posterior oropharyngeal erythema.   Eyes:      General: No scleral icterus.        Right eye: No discharge.   Neck:      Vascular: Hepatojugular reflux and JVD present.   Cardiovascular:      Rate and Rhythm: Normal rate and regular rhythm.      Pulses: Normal pulses.      Heart sounds: No murmur heard.    No friction rub. No gallop.   Pulmonary:      Effort: No respiratory distress.      Breath sounds: No stridor. Rales present. No wheezing or rhonchi.   Abdominal:      General: There is distension.      Tenderness: There is no abdominal tenderness.   Musculoskeletal:         General: No swelling, tenderness, deformity or signs of injury.      Cervical back: Normal range of motion.      Right lower leg: Edema present.      Left lower leg: Edema present.   Skin:     Capillary Refill: Capillary  refill takes more than 3 seconds.      Coloration: Skin is not jaundiced or pale.      Findings: No bruising, erythema, lesion or rash.   Neurological:      General: No focal deficit present.      Mental Status: He is alert and oriented to person, place, and time.       Fluids    Intake/Output Summary (Last 24 hours) at 12/5/2022 1321  Last data filed at 12/5/2022 0530  Gross per 24 hour   Intake 2161.02 ml   Output --   Net 2161.02 ml       Laboratory  Recent Labs     12/05/22  0614   WBC 13.7*   RBC 4.22*   HEMOGLOBIN 14.1   HEMATOCRIT 39.0*   MCV 92.4   MCH 33.4*   MCHC 36.2*   RDW 44.5   PLATELETCT 89*   MPV 10.1     Recent Labs     12/04/22  1649 12/05/22  0212   SODIUM 124* 124*   POTASSIUM 3.8 4.0   CHLORIDE 95* 93*   CO2 13* 16*   GLUCOSE 151* 184*   BUN 71* 73*   CREATININE 6.30* 6.83*   CALCIUM 7.6* 7.5*     Recent Labs     12/04/22 2127   INR 1.33*               Imaging  US-RENAL   Final Result      No evidence solid renal mass or hydronephrosis.      EC-ECHOCARDIOGRAM COMPLETE W/O CONT    (Results Pending)        Assessment/Plan  * KENRICK (acute kidney injury) (HCC)- (present on admission)  Assessment & Plan  Patient has stage IIIa chronic kidney disease with GFR normally around 40.  Acuity of onset of the patient's KENRICK is uncertain but would appear to have been in the last week or 2 based on clinical history.  He is effectively anuretic based on reports from the transferring facility where he was resuscitated with 7 L of crystalloid and only urinated 150 mL in 24 hours  Vargas catheter  Follow urine output and BMP  UA  Nephrology consultation  Renally dose medications as appropriate  Stat BMP to confirm no emergent electrolyte abnormalities  IV bicarb drip    Cellulitis of left lower extremity  Assessment & Plan  In setting of diabetes  Patient was treated with clindamycin and cefepime however remains afebrile  Repeating blood cultures  Changed to piperacillin tazobactam and linezolid  Check MRSA nares if  this is negative we can stop his linezolid  Deyvi with sharpie and monitor closely  Ultrasound the transferring facility was negative for DVT or deep tissue infection  Patient's distal neurovascular status is intact    Sepsis (HCC)  Assessment & Plan  This is Sepsis Present on admission  SIRS criteria identified on my evaluation include: Fever, with temperature greater than 101 deg F and Tachycardia, with heart rate greater than 90 BPM  Source is skin  Sepsis protocol initiated  Fluid resuscitation ordered per protocol  Crystalloid Fluid Administration: Patient already received adequate fluid resuscitation at the transferring facility and given that he is an uric further aggressive IV fluids are not indicated  IV antibiotics as appropriate for source of sepsis  Reassessment: I have reassessed the patient's hemodynamic status    Follow culture results both here and from the transferring facility  Empiric piperacillin tazobactam          Alcohol abuse  Assessment & Plan  Monitor for signs of withdrawal    Hyponatremia  Assessment & Plan  Likely hypovolemic in setting of chronic kidney disease  Monitor every 6  122 is the last reading from the transferring facility    Uncomplicated type 2 diabetes mellitus (HCC)- (present on admission)  Assessment & Plan  Start on insulin sliding scale with serial Accu-Checks  hemoglobin A1c 8.5  Hypoglycemic protocol in place        Benign essential hypertension- (present on admission)  Assessment & Plan  Holding home losartan    Mitral valve prolapse- (present on admission)  Assessment & Plan  Ordered cardiac echo       VTE prophylaxis: SCDs/TEDs    I have performed a physical exam and reviewed and updated ROS and Plan today (12/5/2022). In review of yesterday's note (12/4/2022), there are no changes except as documented above.

## 2022-12-05 NOTE — H&P
Hospital Medicine History & Physical Note    Date of Service  12/4/2022    Primary Care Physician  Pcp Pt States None    Consultants  nephrology    Specialist Names: Safde    Code Status  Full Code    Chief Complaint  No chief complaint on file.      History of Presenting Illness  Thomas Borja is a 62 y.o. male who presented 12/4/2022 with a previous history of type 2 diabetes, stage IIIa chronic kidney disease with a GFR that lives around 40, hypertension, alcohol use, recurrent cellulitis, hypothyroidism, mitral valve prolapse.    Patient sent to us from [facility] [place] where he presented for evaluation of congestion fever, and cellulitis in his left leg.  He reports symptoms began about a week ago primarily respiratory in nature to begin with with congestion, fever chills, cough and generally feeling poorly.  During this time he also developed diarrhea which he reports is about 3-4 times daily and was just plain water.  He denies any blood or melena in his stool.  He was trying to drink fluids to keep hydrated and states he was drinking about 64 ounces of mostly Sprite during this time.  Beginning 2 days ago he noticed redness in his left leg, and that is what ultimately caused him to go to the hospital.    And found the patient was admitted yesterday.  His initial creatinine was 4.3, and he was felt to be prerenal in the setting of diarrhea and sepsis.  He was given 7 L of crystalloid overnight.  His urine output by report from their facility was around 250 mL in the subsequent 24 hours.  His creatinine this morning was 5.17.  Potassium is in the low fours, bicarb 16.  As regards the cellulitis, an ultrasound was done which was negative for DVT or abscess.  He was treated with clindamycin and cefepime for his infection.  His sodium was also noted to be low around 120.  Patient was transferred to our facility due to concern that he will be needing dialysis and needs further  "evaluation by nephrology.    On my examination the patient states that he feels \"sick\".  He states his leg is still painful, and he feels that the redness is no better perhaps even a little bit worse.  The diarrhea has continued again about 3 times daily.  He has remained with fever, and indeed on my exam his temp of 100.4.  He denies any shortness of breath, no cough, no pain in the abdomen, no unusual back neck jaw or shoulder pain, no chest pain.  No hematuria.  He does note that he has been peeing less than usual, and this goes back at least 3 to 4 days.    DW family at the bedside    BEL Nephrology    I discussed the plan of care with patient, family, and transferring physician .    Review of Systems  Review of Systems   Constitutional:  Positive for chills, fever and malaise/fatigue.   HENT:  Negative for nosebleeds and sore throat.    Eyes:  Negative for blurred vision and double vision.   Respiratory:  Negative for cough and shortness of breath.    Cardiovascular:  Negative for chest pain, palpitations and leg swelling.   Gastrointestinal:  Positive for diarrhea. Negative for abdominal pain, nausea and vomiting.   Genitourinary:  Negative for dysuria and urgency.   Musculoskeletal:  Negative for back pain.   Skin:  Positive for rash.   Neurological:  Negative for dizziness, loss of consciousness and headaches.     Past Medical History   has no past medical history on file.    Surgical History   has no past surgical history on file.     Family History  family history is not on file.   Family history reviewed with patient. There is no family history that is pertinent to the chief complaint.     Social History   reports that he has quit smoking. He has never used smokeless tobacco. He reports that he does not currently use alcohol. He reports that he does not use drugs.    Allergies  Allergies   Allergen Reactions    Amoxicillin Hives    Apap-Fd&C Red #40 [place] Rosa-Oxycodone      Other reaction(s): Rash, " urticarial    Codeine Nausea    Erythromycin Nausea       Medications  Prior to Admission Medications   Prescriptions Last Dose Informant Patient Reported? Taking?   MELOXICAM PO   Yes No   Sig: Take  by mouth.   METFORMIN HCL PO   Yes No   Sig: Take  by mouth.   Patient not taking: Reported on 1/7/2022   Methylphenidate HCl ER 36 MG TABLET SR 24 HR   Yes No   Sig: Take 36 mg by mouth.   ROSUVASTATIN CALCIUM PO   Yes No   Sig: Take 20 mg by mouth every day.   VENTOLIN  (90 BASE) MCG/ACT AERS   No No   Sig: Inhale 2 Puffs by mouth every four hours as needed for Shortness of Breath. Please include metered dose inhaler spacer device and illustrate useage   Patient not taking: Reported on 1/7/2022   amLODIPine (NORVASC) 10 MG Tab   Yes No   Sig: Take 10 mg by mouth every day.   losartan (COZAAR) 100 MG Tab   Yes No   Sig: Take 100 mg by mouth every day.   mometasone (NASONEX) 50 MCG/ACT nasal spray   No No   Sig: Spray  in nose every day. Two sprays each nostril once daily   Patient not taking: Reported on 1/7/2022   omeprazole (PRILOSEC) 40 MG delayed-release capsule   Yes No   Sig: Take 40 mg by mouth every day.   pregabalin (LYRICA) 50 MG capsule   Yes No   Sig: Take 50 mg by mouth.   tamsulosin (FLOMAX) 0.4 MG capsule   Yes No   Sig: Take 0.4 mg by mouth every day.   traZODone (DESYREL) 50 MG Tab   Yes No   Sig: Take 100 mg by mouth.      Facility-Administered Medications: None       Physical Exam  Temp:  [38 °C (100.4 °F)] 38 °C (100.4 °F)  Pulse:  [103] 103  Resp:  [20] 20  BP: (135)/(70) 135/70  SpO2:  [95 %] 95 %  Blood Pressure: 135/70   Temperature: 38 °C (100.4 °F)   Pulse: (!) 103   Respiration: 20   Pulse Oximetry: 95 %       Physical Exam  Constitutional:       General: He is not in acute distress.     Appearance: He is well-developed. He is obese. He is not diaphoretic.   HENT:      Head: Normocephalic and atraumatic.   Eyes:      Conjunctiva/sclera: Conjunctivae normal.   Neck:      Vascular: No  JVD.   Cardiovascular:      Rate and Rhythm: Tachycardia present.      Heart sounds: Murmur heard.     No gallop.   Pulmonary:      Effort: Pulmonary effort is normal. No respiratory distress.      Breath sounds: No stridor. No wheezing or rales.   Abdominal:      Palpations: Abdomen is soft.      Tenderness: There is no abdominal tenderness. There is no guarding or rebound.   Musculoskeletal:         General: No tenderness.      Right lower leg: Edema present.      Left lower leg: Edema present.      Comments: Erythematous macular rash extending from the ankle to the proximal calf on the left leg.  This is blanching in nature.  No subcutaneous emphysema, no bullae, no palpable areas of fluctuance.  Distal neurovascular exam intact   Skin:     General: Skin is warm and dry.      Findings: No rash.   Neurological:      Mental Status: He is alert and oriented to person, place, and time.   Psychiatric:         Mood and Affect: Mood normal.         Behavior: Behavior normal.         Thought Content: Thought content normal.       Laboratory:          No results for input(s): ALTSGPT, ASTSGOT, ALKPHOSPHAT, TBILIRUBIN, DBILIRUBIN, GAMMAGT, AMYLASE, LIPASE, ALB, PREALBUMIN, GLUCOSE in the last 72 hours.      No results for input(s): NTPROBNP in the last 72 hours.      No results for input(s): TROPONINT in the last 72 hours.    Imaging:  No orders to display       Records reviewed from transferring facility.  Imaging reports reviewed however actual imaging not available to me.    Assessment/Plan:  Justification for Admission Status  I anticipate this patient will require at least two midnights for appropriate medical management, necessitating inpatient admission because patient presents with sepsis, and an uric acute kidney injury on chronic kidney disease.  He has metabolic acidosis and is quite ill.  He will require greater than 48 hours in the hospital.    Patient will need a Telemetry bed on MEDICAL service .  The need  is secondary to acute on chronic kidney injury, sepsis.    * KENRICK (acute kidney injury) (HCC)- (present on admission)  Assessment & Plan  Patient has stage IIIa chronic kidney disease with GFR normally around 40.  Acuity of onset of the patient's KENRICK is uncertain but would appear to have been in the last week or 2 based on clinical history.  He is effectively anuretic based on reports from the transferring facility where he was resuscitated with 7 L of crystalloid and only urinated 150 mL in 24 hours  Vargas catheter  Follow urine output and BMP  UA  Nephrology consultation  Renally dose medications as appropriate  Stat BMP to confirm no emergent electrolyte abnormalities  IV bicarb drip    Cellulitis of left lower extremity  Assessment & Plan  In setting of diabetes  Patient was treated with clindamycin and cefepime however remains afebrile  Repeating blood cultures  Changed to piperacillin tazobactam and linezolid  Check MRSA nares if this is negative we can stop his linezolid  Deyvi with sharpie and monitor closely  Ultrasound the transferring facility was negative for DVT or deep tissue infection  Patient's distal neurovascular status is intact    Sepsis (HCC)  Assessment & Plan  This is Sepsis Present on admission  SIRS criteria identified on my evaluation include: Fever, with temperature greater than 101 deg F and Tachycardia, with heart rate greater than 90 BPM  Source is skin  Sepsis protocol initiated  Fluid resuscitation ordered per protocol  Crystalloid Fluid Administration: Patient already received adequate fluid resuscitation at the transferring facility and given that he is an uric further aggressive IV fluids are not indicated  IV antibiotics as appropriate for source of sepsis  Reassessment: I have reassessed the patient's hemodynamic status    Follow culture results both here and from the transferring facility  Empiric piperacillin tazobactam          Alcohol abuse  Assessment & Plan  Monitor for signs  of withdrawal    Hyponatremia  Assessment & Plan  Likely hypovolemic in setting of chronic kidney disease  Monitor every 6  122 is the last reading from the transferring facility      VTE prophylaxis: heparin ppx

## 2022-12-06 LAB
ALBUMIN SERPL BCP-MCNC: 2 G/DL (ref 3.2–4.9)
ALBUMIN SERPL BCP-MCNC: 2.1 G/DL (ref 3.2–4.9)
ALBUMIN SERPL BCP-MCNC: 2.2 G/DL (ref 3.2–4.9)
ALBUMIN/GLOB SERPL: 0.6 G/DL
ALBUMIN/GLOB SERPL: 0.6 G/DL
ALBUMIN/GLOB SERPL: 0.7 G/DL
ALP SERPL-CCNC: 224 U/L (ref 30–99)
ALP SERPL-CCNC: 245 U/L (ref 30–99)
ALP SERPL-CCNC: 306 U/L (ref 30–99)
ALT SERPL-CCNC: 60 U/L (ref 2–50)
ALT SERPL-CCNC: 66 U/L (ref 2–50)
ALT SERPL-CCNC: 68 U/L (ref 2–50)
ANION GAP SERPL CALC-SCNC: 17 MMOL/L (ref 7–16)
ANION GAP SERPL CALC-SCNC: 18 MMOL/L (ref 7–16)
ANION GAP SERPL CALC-SCNC: 18 MMOL/L (ref 7–16)
ANION GAP SERPL CALC-SCNC: 21 MMOL/L (ref 7–16)
AST SERPL-CCNC: 71 U/L (ref 12–45)
AST SERPL-CCNC: 78 U/L (ref 12–45)
AST SERPL-CCNC: 92 U/L (ref 12–45)
BILIRUB SERPL-MCNC: 2.7 MG/DL (ref 0.1–1.5)
BILIRUB SERPL-MCNC: 2.8 MG/DL (ref 0.1–1.5)
BILIRUB SERPL-MCNC: 3.1 MG/DL (ref 0.1–1.5)
BUN SERPL-MCNC: 100 MG/DL (ref 8–22)
BUN SERPL-MCNC: 89 MG/DL (ref 8–22)
BUN SERPL-MCNC: 90 MG/DL (ref 8–22)
BUN SERPL-MCNC: 92 MG/DL (ref 8–22)
CALCIUM SERPL-MCNC: 7.7 MG/DL (ref 8.5–10.5)
CALCIUM SERPL-MCNC: 7.7 MG/DL (ref 8.5–10.5)
CALCIUM SERPL-MCNC: 7.9 MG/DL (ref 8.5–10.5)
CALCIUM SERPL-MCNC: 8 MG/DL (ref 8.5–10.5)
CHLORIDE SERPL-SCNC: 86 MMOL/L (ref 96–112)
CHLORIDE SERPL-SCNC: 87 MMOL/L (ref 96–112)
CHLORIDE SERPL-SCNC: 88 MMOL/L (ref 96–112)
CHLORIDE SERPL-SCNC: 88 MMOL/L (ref 96–112)
CO2 SERPL-SCNC: 15 MMOL/L (ref 20–33)
CO2 SERPL-SCNC: 17 MMOL/L (ref 20–33)
CO2 SERPL-SCNC: 18 MMOL/L (ref 20–33)
CO2 SERPL-SCNC: 18 MMOL/L (ref 20–33)
CREAT SERPL-MCNC: 7.37 MG/DL (ref 0.5–1.4)
CREAT SERPL-MCNC: 7.58 MG/DL (ref 0.5–1.4)
CREAT SERPL-MCNC: 7.8 MG/DL (ref 0.5–1.4)
CREAT SERPL-MCNC: 8.06 MG/DL (ref 0.5–1.4)
E COLI SXT1+2 STL IA: NORMAL
GFR SERPLBLD CREATININE-BSD FMLA CKD-EPI: 7 ML/MIN/1.73 M 2
GFR SERPLBLD CREATININE-BSD FMLA CKD-EPI: 8 ML/MIN/1.73 M 2
GLOBULIN SER CALC-MCNC: 3.3 G/DL (ref 1.9–3.5)
GLOBULIN SER CALC-MCNC: 3.4 G/DL (ref 1.9–3.5)
GLOBULIN SER CALC-MCNC: 3.4 G/DL (ref 1.9–3.5)
GLUCOSE BLD STRIP.AUTO-MCNC: 218 MG/DL (ref 65–99)
GLUCOSE BLD STRIP.AUTO-MCNC: 223 MG/DL (ref 65–99)
GLUCOSE BLD STRIP.AUTO-MCNC: 248 MG/DL (ref 65–99)
GLUCOSE BLD STRIP.AUTO-MCNC: 258 MG/DL (ref 65–99)
GLUCOSE BLD STRIP.AUTO-MCNC: 259 MG/DL (ref 65–99)
GLUCOSE SERPL-MCNC: 228 MG/DL (ref 65–99)
GLUCOSE SERPL-MCNC: 230 MG/DL (ref 65–99)
GLUCOSE SERPL-MCNC: 230 MG/DL (ref 65–99)
GLUCOSE SERPL-MCNC: 252 MG/DL (ref 65–99)
NT-PROBNP SERPL IA-MCNC: 1069 PG/ML (ref 0–125)
POTASSIUM SERPL-SCNC: 3.3 MMOL/L (ref 3.6–5.5)
POTASSIUM SERPL-SCNC: 3.9 MMOL/L (ref 3.6–5.5)
PROT SERPL-MCNC: 5.4 G/DL (ref 6–8.2)
PROT SERPL-MCNC: 5.5 G/DL (ref 6–8.2)
PROT SERPL-MCNC: 5.5 G/DL (ref 6–8.2)
SIGNIFICANT IND 70042: NORMAL
SITE SITE: NORMAL
SODIUM SERPL-SCNC: 121 MMOL/L (ref 135–145)
SODIUM SERPL-SCNC: 123 MMOL/L (ref 135–145)
SODIUM SERPL-SCNC: 123 MMOL/L (ref 135–145)
SODIUM SERPL-SCNC: 124 MMOL/L (ref 135–145)
SOURCE SOURCE: NORMAL

## 2022-12-06 PROCEDURE — 99233 SBSQ HOSP IP/OBS HIGH 50: CPT | Performed by: STUDENT IN AN ORGANIZED HEALTH CARE EDUCATION/TRAINING PROGRAM

## 2022-12-06 PROCEDURE — 700102 HCHG RX REV CODE 250 W/ 637 OVERRIDE(OP): Performed by: HOSPITALIST

## 2022-12-06 PROCEDURE — 99233 SBSQ HOSP IP/OBS HIGH 50: CPT | Performed by: INTERNAL MEDICINE

## 2022-12-06 PROCEDURE — 83880 ASSAY OF NATRIURETIC PEPTIDE: CPT

## 2022-12-06 PROCEDURE — 36415 COLL VENOUS BLD VENIPUNCTURE: CPT

## 2022-12-06 PROCEDURE — 700117 HCHG RX CONTRAST REV CODE 255: Performed by: HOSPITALIST

## 2022-12-06 PROCEDURE — 700111 HCHG RX REV CODE 636 W/ 250 OVERRIDE (IP): Performed by: HOSPITALIST

## 2022-12-06 PROCEDURE — 700101 HCHG RX REV CODE 250: Performed by: HOSPITALIST

## 2022-12-06 PROCEDURE — A9270 NON-COVERED ITEM OR SERVICE: HCPCS | Performed by: STUDENT IN AN ORGANIZED HEALTH CARE EDUCATION/TRAINING PROGRAM

## 2022-12-06 PROCEDURE — 80048 BASIC METABOLIC PNL TOTAL CA: CPT

## 2022-12-06 PROCEDURE — 700111 HCHG RX REV CODE 636 W/ 250 OVERRIDE (IP): Performed by: STUDENT IN AN ORGANIZED HEALTH CARE EDUCATION/TRAINING PROGRAM

## 2022-12-06 PROCEDURE — A9270 NON-COVERED ITEM OR SERVICE: HCPCS | Performed by: HOSPITALIST

## 2022-12-06 PROCEDURE — 700111 HCHG RX REV CODE 636 W/ 250 OVERRIDE (IP): Performed by: INTERNAL MEDICINE

## 2022-12-06 PROCEDURE — 700102 HCHG RX REV CODE 250 W/ 637 OVERRIDE(OP): Performed by: STUDENT IN AN ORGANIZED HEALTH CARE EDUCATION/TRAINING PROGRAM

## 2022-12-06 PROCEDURE — 82962 GLUCOSE BLOOD TEST: CPT | Mod: 91

## 2022-12-06 PROCEDURE — 770020 HCHG ROOM/CARE - TELE (206)

## 2022-12-06 PROCEDURE — 80053 COMPREHEN METABOLIC PANEL: CPT | Mod: 91

## 2022-12-06 PROCEDURE — 700105 HCHG RX REV CODE 258: Performed by: HOSPITALIST

## 2022-12-06 RX ORDER — FUROSEMIDE 10 MG/ML
40 INJECTION INTRAMUSCULAR; INTRAVENOUS ONCE
Status: COMPLETED | OUTPATIENT
Start: 2022-12-06 | End: 2022-12-06

## 2022-12-06 RX ORDER — ECHINACEA PURPUREA EXTRACT 125 MG
2 TABLET ORAL
Status: DISCONTINUED | OUTPATIENT
Start: 2022-12-06 | End: 2022-12-15 | Stop reason: HOSPADM

## 2022-12-06 RX ORDER — FUROSEMIDE 10 MG/ML
40 INJECTION INTRAMUSCULAR; INTRAVENOUS
Status: DISCONTINUED | OUTPATIENT
Start: 2022-12-06 | End: 2022-12-10

## 2022-12-06 RX ORDER — SODIUM BICARBONATE 650 MG/1
650 TABLET ORAL 2 TIMES DAILY
Status: DISCONTINUED | OUTPATIENT
Start: 2022-12-06 | End: 2022-12-10

## 2022-12-06 RX ORDER — CEFAZOLIN SODIUM 1 G/50ML
1 INJECTION, SOLUTION INTRAVENOUS EVERY 12 HOURS
Status: DISCONTINUED | OUTPATIENT
Start: 2022-12-06 | End: 2022-12-08

## 2022-12-06 RX ADMIN — HUMAN ALBUMIN MICROSPHERES AND PERFLUTREN 3 ML: 10; .22 INJECTION, SOLUTION INTRAVENOUS at 08:15

## 2022-12-06 RX ADMIN — SODIUM BICARBONATE: 84 INJECTION, SOLUTION INTRAVENOUS at 06:32

## 2022-12-06 RX ADMIN — SODIUM BICARBONATE 650 MG: 650 TABLET ORAL at 15:36

## 2022-12-06 RX ADMIN — INSULIN HUMAN 3 UNITS: 100 INJECTION, SOLUTION PARENTERAL at 17:57

## 2022-12-06 RX ADMIN — HEPARIN SODIUM 5000 UNITS: 5000 INJECTION, SOLUTION INTRAVENOUS; SUBCUTANEOUS at 05:26

## 2022-12-06 RX ADMIN — HEPARIN SODIUM 5000 UNITS: 5000 INJECTION, SOLUTION INTRAVENOUS; SUBCUTANEOUS at 21:16

## 2022-12-06 RX ADMIN — CEFAZOLIN SODIUM 1 G: 1 INJECTION, SOLUTION INTRAVENOUS at 18:07

## 2022-12-06 RX ADMIN — PROMETHAZINE HYDROCHLORIDE 25 MG: 25 TABLET ORAL at 17:56

## 2022-12-06 RX ADMIN — HEPARIN SODIUM 5000 UNITS: 5000 INJECTION, SOLUTION INTRAVENOUS; SUBCUTANEOUS at 13:46

## 2022-12-06 RX ADMIN — INSULIN HUMAN 5 UNITS: 100 INJECTION, SOLUTION PARENTERAL at 21:20

## 2022-12-06 RX ADMIN — PIPERACILLIN AND TAZOBACTAM 4.5 G: 4; .5 INJECTION, POWDER, LYOPHILIZED, FOR SOLUTION INTRAVENOUS; PARENTERAL at 10:01

## 2022-12-06 RX ADMIN — HYDROCODONE BITARTRATE AND ACETAMINOPHEN 1 TABLET: 5; 325 TABLET ORAL at 17:53

## 2022-12-06 RX ADMIN — OMEPRAZOLE 40 MG: 20 CAPSULE, DELAYED RELEASE ORAL at 05:26

## 2022-12-06 RX ADMIN — INSULIN HUMAN 3 UNITS: 100 INJECTION, SOLUTION PARENTERAL at 13:45

## 2022-12-06 RX ADMIN — FUROSEMIDE 40 MG: 10 INJECTION, SOLUTION INTRAMUSCULAR; INTRAVENOUS at 17:52

## 2022-12-06 RX ADMIN — AMLODIPINE BESYLATE 10 MG: 10 TABLET ORAL at 17:53

## 2022-12-06 RX ADMIN — ROSUVASTATIN CALCIUM 10 MG: 20 TABLET, FILM COATED ORAL at 21:15

## 2022-12-06 RX ADMIN — FUROSEMIDE 40 MG: 10 INJECTION, SOLUTION INTRAMUSCULAR; INTRAVENOUS at 07:33

## 2022-12-06 RX ADMIN — Medication 2 SPRAY: at 17:52

## 2022-12-06 RX ADMIN — LINEZOLID 600 MG: 600 TABLET, FILM COATED ORAL at 05:26

## 2022-12-06 ASSESSMENT — ENCOUNTER SYMPTOMS
SORE THROAT: 0
DOUBLE VISION: 0
CONSTIPATION: 0
DIAPHORESIS: 0
DIARRHEA: 0
BLURRED VISION: 0
NAUSEA: 1
CHILLS: 0
FALLS: 0
WEAKNESS: 0
BRUISES/BLEEDS EASILY: 0
FLANK PAIN: 0
PHOTOPHOBIA: 0
TINGLING: 0
NAUSEA: 0
COUGH: 0
EYES NEGATIVE: 1
HEADACHES: 0
SINUS PAIN: 0
MYALGIAS: 0
WHEEZING: 0
PALPITATIONS: 0
FEVER: 0
HEMOPTYSIS: 0
BACK PAIN: 0
HALLUCINATIONS: 0
ABDOMINAL PAIN: 0
ORTHOPNEA: 0
NECK PAIN: 0
CLAUDICATION: 0
PND: 0
VOMITING: 0
WEIGHT LOSS: 0
HEARTBURN: 0
STRIDOR: 0
DEPRESSION: 0
TREMORS: 0
BLOOD IN STOOL: 0
SHORTNESS OF BREATH: 0
DIZZINESS: 0
POLYDIPSIA: 0
EYE PAIN: 0
SPUTUM PRODUCTION: 0
ROS SKIN COMMENTS: LEFT LEG CELLULITIS

## 2022-12-06 ASSESSMENT — FIBROSIS 4 INDEX: FIB4 SCORE: 8.02

## 2022-12-06 ASSESSMENT — LIFESTYLE VARIABLES: SUBSTANCE_ABUSE: 0

## 2022-12-06 NOTE — PROGRESS NOTES
Monitor summary: SR/ST , MN 0.17, QRS 0.08, QT 0.39, with rare PVCs per strip from monitor room.

## 2022-12-06 NOTE — PROGRESS NOTES
Nephrology Daily Progress Note    Date of Service  12/6/2022    Chief Complaint  62 y.o. male with CKD IIIb, DM II, admitted 12/4/2022 with URTI, diarrhea, left leg cellulitis, in KENRICK    Interval Problem Update  12/06 -no acute events  Feels better  Given iv lasix with good response  Hyponatremia improving  Acidosis improving  Creat stable    Review of Systems  Review of Systems   Constitutional:  Positive for malaise/fatigue. Negative for chills, fever and weight loss.   HENT:  Negative for congestion, hearing loss and sinus pain.    Eyes: Negative.    Respiratory:  Negative for cough, hemoptysis, shortness of breath and wheezing.    Cardiovascular:  Positive for leg swelling. Negative for chest pain, palpitations and orthopnea.   Gastrointestinal:  Negative for abdominal pain, diarrhea, nausea and vomiting.   Genitourinary:  Negative for flank pain.        Vargas catheter   Skin: Negative.         Left leg cellulitis   All other systems reviewed and are negative.     Physical Exam  Temp:  [36.3 °C (97.3 °F)-36.7 °C (98.1 °F)] 36.3 °C (97.3 °F)  Pulse:  [69-98] 98  Resp:  [18-20] 18  BP: (133-150)/(66-75) 150/75  SpO2:  [92 %-94 %] 94 %    Physical Exam  Vitals reviewed.   Constitutional:       General: He is not in acute distress.     Appearance: Normal appearance. He is well-developed. He is obese. He is not diaphoretic.   HENT:      Head: Normocephalic and atraumatic.      Nose: Nose normal.      Mouth/Throat:      Mouth: Mucous membranes are moist.      Pharynx: Oropharynx is clear.   Eyes:      Extraocular Movements: Extraocular movements intact.      Conjunctiva/sclera: Conjunctivae normal.      Pupils: Pupils are equal, round, and reactive to light.   Cardiovascular:      Rate and Rhythm: Normal rate and regular rhythm.      Pulses: Normal pulses.      Heart sounds: Normal heart sounds.   Pulmonary:      Effort: Pulmonary effort is normal. No respiratory distress.      Breath sounds: Normal breath sounds. No  wheezing or rales.   Abdominal:      General: Bowel sounds are normal. There is no distension.      Palpations: Abdomen is soft. There is no mass.      Tenderness: There is no abdominal tenderness. There is no right CVA tenderness or left CVA tenderness.   Musculoskeletal:      Cervical back: Normal range of motion and neck supple.      Right lower leg: No edema.      Left lower leg: Edema present.   Skin:     General: Skin is warm.      Coloration: Skin is not pale.      Findings: Erythema present. No rash.   Neurological:      General: No focal deficit present.      Mental Status: He is alert and oriented to person, place, and time.      Cranial Nerves: No cranial nerve deficit.      Coordination: Coordination normal.   Psychiatric:         Mood and Affect: Mood normal.         Behavior: Behavior normal.         Thought Content: Thought content normal.         Judgment: Judgment normal.       Fluids    Intake/Output Summary (Last 24 hours) at 12/6/2022 1129  Last data filed at 12/6/2022 0337  Gross per 24 hour   Intake 120 ml   Output 600 ml   Net -480 ml       Laboratory  Recent Labs     12/05/22  0614   WBC 13.7*   RBC 4.22*   HEMOGLOBIN 14.1   HEMATOCRIT 39.0*   MCV 92.4   MCH 33.4*   MCHC 36.2*   RDW 44.5   PLATELETCT 89*   MPV 10.1     Recent Labs     12/05/22  2117 12/06/22  0136 12/06/22  0941   SODIUM 120* 121* 123*   POTASSIUM 3.6 3.9 3.3*   CHLORIDE 88* 88* 87*   CO2 16* 15* 18*   GLUCOSE 269* 230* 228*   BUN 81* 89* 92*   CREATININE 7.42* 7.37* 7.58*   CALCIUM 7.6* 7.7* 7.9*     Recent Labs     12/04/22 2127   INR 1.33*     Recent Labs     12/06/22  0941   NTPROBNP 1069*           Imaging  DX-TIBIA AND FIBULA LEFT   Final Result      No radiographic evidence of acute bony abnormality      Skin thickening and edema with some soft tissue calcifications most likely indicates venous stasis. Cellulitis is a possibility and there is no evidence of soft tissue gas. Dermatomyositis can be seen with soft tissue  calcifications but these are not as    extensive as that often entails      EC-ECHOCARDIOGRAM COMPLETE W/ CONT   Final Result      US-RENAL   Final Result      No evidence solid renal mass or hydronephrosis.           Assessment/Plan  1.KENRICK/CKD IIIb -non oliguric, creat stable -to monitor  2.HTN: low BP improved  3.Electrolytes: hyponatremia improving                          Hypokalemia -may replace gently with KCl 20 mEq  4.Anemia: Hb WNL  5.Metabolic acidosis improving  6.Volume: continue lasix  7.DM II:  8.Secondary hyperparathyroidism:  Recs:  No need for emergent dialysis  Will reevaluate for dialysis needs AM  Lasix 40 mg iv BID  KCl 20 mEq po x1  Na HCO3 650 mg po BID  Daily labs  Avoid nephrotoxins  Will follow  D/w

## 2022-12-06 NOTE — WOUND TEAM
[facility]  Inpatient Services  Wound and Skin Care Brief Evaluation    Admission Date: 12/4/2022     Last order of IP CONSULT TO WOUND CARE was found on 12/4/2022 from Hospital Encounter on 12/4/2022     HPI, PMH, SH: Reviewed    No chief complaint on file.    Diagnosis: KENRICK (acute kidney injury) (HCC) [N17.9]    Unit where seen by Wound Team: [id number]/02     Wound consult placed regarding LLE and plantar foot wounds. Chart and images reviewed. This discussed with bedside RN. This RN in to assess patient. Pt pleasant and agreeable.  LLE with apparent discoloration and cellulitis expanding outside of where sharpie was previously marked.  Dr. Davila updated per RN, Xray ordered, pending blood result.  L plantar foot with dry cracked fissure and left lateral heel with partial thickness wound related to friction and dry skin.  Both left SHALONDA.  No debridement preformed at this time as leg still has uncontrolled infection.  New photos taken of left leg, no advanced wound care needs at this time.  Medical management.  Wound consult completed. No further follow up unless indicated and consulted.                 RSKIN:   CURRENTLY IN PLACE (X), APPLIED THIS VISIT (A), ORDERED (O):   Q shift Glynn:  X  Q shift pressure point assessments:  X    Surface/Positioning   Pressure redistribution mattress          X  Low Airloss          Bariatric foam      Bariatric PANCHITO     Waffle cushion        Waffle Overlay          Reposition q 2 hours    ensure patient is turning   TAPs Turning system     Z Albin Pillow     Offloading/Redistribution   Sacral Mepilex (Silicone dressing)   X  Heel Mepilex (Silicone dressing)         Heel float boots (Prevalon boot)           A  Float Heels off Bed with Pillows           Respiratory   Silicone O2 tubing       X  Gray Foam Ear protectors     Cannula fixation Device (Tender )          High flow offloading Clip    Elastic head band offloading device      Anchorfast                                                          Trach with Optifoam split foam             Containment/Moisture Prevention     Rectal tube or BMS    Purwick/Condom Cath        Vargas Catheter    Barrier wipes           Barrier paste       Antifungal tx      Interdry        Mobilization       Up to chair        Ambulate      PT/OT      Nutrition       Dietician        Diabetes Education      PO   X  TF     TPN     NPO   # days     Other

## 2022-12-06 NOTE — DIETARY
Nutrition Services:    Per nursing, patient with decreased appetite and wanting supplements.  Patient is currently NPO for dialysis catheter placement.    Day 2 of admit.  Thomas Borja is a 62 y.o. male with admitting DX of KENRICK (acute kidney injury) (HCC) [N17.9]    Current Diet: NPO    Problem: Nutritional:  Goal: Achieve adequate nutritional intake  Description: Patient will consume 50% of meals once PO diet resumes  Outcome: Not met    Recommend/Plan:  Supplement order placed for when PO diet resumes  Boost glucose control appropriate for renal/consistent carbohydrate diet  Nutrition Representative will continue to see him for ongoing meal and snack preferences.      RD following per department policy.

## 2022-12-06 NOTE — CONSULTS
DATE OF SERVICE:  12/05/2022     NEPHROLOGY CONSULTATION     REQUESTING PHYSICIAN:  Mushtaq Mora DO     REASON FOR CONSULTATION:  Evaluate patient with acute kidney injury.     HISTORY OF PRESENT ILLNESS:  The patient is a 62-year-old male with past   medical history significant for diabetes mellitus type 2, hypertension,   chronic kidney disease stage III B with GFR of around 40, who presented   initially to the emergency room in [facility] with complaints   of several days of upper respiratory tract infection, fevers, and shortness of   breath.  Also, cellulitis of his left lower extremity.  Also, the patient   developed diarrhea over past 5-6 days.  Upon admission, found to be in acute   kidney injury with creatinine level of 4, worsened up to 6.3. At this point,   the patient was transferred to [facility] for further evaluation   and treatment.  Currently, creatinine level of 6.8, also hyponatremia with   sodium of 124, metabolic acidosis, which is improving with bicarbonate drip   from CO2 of 13 to 16 currently. The patient states doing better.  No fever or   chills, still mild shortness of breath, left lower extremity pain and   discomfort, swelling, placed Vargas catheter.     REVIEW OF SYSTEMS:  GENERAL:  Positive for fatigue, poor appetite.  Currently, no fever or chills.  HENT:  No sign of sore throat, no sinus pain, no nosebleeds.  EYES:  No double or blurry vision, no eye pain.  NECK:  No pain, no stiffness.  RESPIRATORY:  Positive for shortness of breath, no cough, no wheezes.  CARDIOVASCULAR:  No chest pain or palpitation.  Positive left lower extremity   edema.  GASTROINTESTINAL:  Positive for diarrhea.  No abdominal pain. No nausea or   vomiting.  GENITOURINARY:  Currently with Vargas catheter, no flank pain.  SKIN:  Positive for redness, erythema of the left leg.  NEUROLOGICAL:  No dizziness, no loss of consciousness.  No headache. No focal   weakness.     PAST  MEDICAL HISTORY:  Positive for hypertension, diabetes mellitus type 2,   chronic kidney disease stage III B, hypothyroidism, and mitral valve prolapse.     PAST SURGICAL HISTORY:  None.     FAMILY HISTORY:  No history of kidney disease.     SOCIAL HISTORY:  Used to smoke, quit.  No drugs. No alcohol.     ALLERGIES:  ALLERGIC TO AMOXICILLIN, OXYCODONE, CODEINE, AND ERYTHROMYCIN.     OUTPATIENT MEDICATIONS:  Reviewed.     PHYSICAL EXAMINATION:  VITAL SIGNS:  Blood pressure 122/76, pulse 96, and temperature 36.7 Celsius.  GENERAL:  Well-developed, well-nourished male in no acute distress.  HEENT:  Normocephalic, atraumatic.  Pupils equal, round, and reactive to   light.  Extraocular movement intact.  Nares patent.  Oropharynx clear, moist   mucosa, no erythema or exudate.  NECK:  Supple.  No lymphadenopathy. No thyromegaly appreciated.  LUNGS:  Clear to auscultation bilaterally.  No rales or wheezes. No rhonchi.  HEART:  Regular rhythm. No rub or gallop.  ABDOMEN:  Soft, slightly distended.  No tenderness to palpation.  No rebound   tenderness. No palpable mass.  No costovertebral area tenderness to palpation.  EXTREMITIES:  Right lower extremity, trace pedal edema.  Left lower extremity,   2+ edema.  SKIN:  Erythema over left lower extremity, pretibial area cellulitis.  NEUROLOGICAL:  Alert, oriented x3, no focal deficit.  Cranial nerves II-XII   grossly intact.     LABORATORY DATA:  Reviewed, revealed hemoglobin level 14.1, white blood count   13.7.  Sodium 124, potassium 4.0, CO2 of 16, BUN 73 and creatinine 6.8.     Renal ultrasound revealed no evidence of solid mass or hydronephrosis.     ASSESSMENT AND PLAN:  The patient is a very pleasant 62-year-old male with   multiple medical problems, admitted in acute kidney injury on chronic kidney   disease stage III B, metabolic acidosis, left lower extremity cellulitis,   urinary tract infection.  1.  Acute kidney injury, to monitor urine output closely.  If no  improvement   in next 24 hours, we will initiate renal replacement therapy, discussed with   the patient.  The patient is agreeable to this plan.  2.  Electrolytes.  Hyponatremia. Avoid hypotonic solutions to monitor.  3.  Metabolic acidosis.  Continue bicarbonate drip.  4.  Cellulitis.  Adjust antibiotics to renal doses.  5.  Hypertension.  Blood pressure remains well controlled.  6.  Anemia.  Hemoglobin level stable, within normal limits.     RECOMMENDATIONS:  To continue current treatment.  Continue bicarbonate drip,   n.p.o. after midnight for possible dialysis catheter placement.     If no improvement in renal function and urine output, we will initiate renal   replacement therapy.  Discussed with the patient.     Avoid hypotonic solutions.     Avoid nephrotoxins.     We will follow the patient closely.     Thank you for the consult.        ______________________________  MD NABOR WHITLEY/[facility] [place] [facility]    DD:  12/05/2022 14:45  DT:  12/05/2022 18:51    Job#:  [id number]

## 2022-12-06 NOTE — CARE PLAN
The patient is Watcher - Medium risk of patient condition declining or worsening    Shift Goals  Clinical Goals: pain management  Patient Goals: rest and eat  Family Goals: SANJUANA    Progress made toward(s) clinical / shift goals:    Problem: Knowledge Deficit - Standard  Goal: Patient and family/care givers will demonstrate understanding of plan of care, disease process/condition, diagnostic tests and medications  Outcome: Progressing     Problem: Pain - Standard  Goal: Alleviation of pain or a reduction in pain to the patient’s comfort goal  Outcome: Progressing     Problem: Fluid Volume  Goal: Fluid volume balance will be maintained  Outcome: Progressing     Problem: Urinary - Renal Perfusion  Goal: Ability to achieve and maintain adequate renal perfusion and functioning will improve  Outcome: Progressing     Problem: Fall Risk  Goal: Patient will remain free from falls  Outcome: Progressing       Patient is not progressing towards the following goals:

## 2022-12-07 ENCOUNTER — APPOINTMENT (OUTPATIENT)
Dept: RADIOLOGY | Facility: MEDICAL CENTER | Age: 62
DRG: 673 | End: 2022-12-07
Attending: INTERNAL MEDICINE
Payer: COMMERCIAL

## 2022-12-07 PROBLEM — Z99.2 ACUTE HEMODIALYSIS PATIENT (HCC): Status: ACTIVE | Noted: 2022-12-07

## 2022-12-07 PROBLEM — Z99.2 ESRD (END STAGE RENAL DISEASE) ON DIALYSIS (HCC): Status: ACTIVE | Noted: 2022-12-07

## 2022-12-07 PROBLEM — N18.6 ESRD (END STAGE RENAL DISEASE) ON DIALYSIS (HCC): Status: ACTIVE | Noted: 2022-12-07

## 2022-12-07 LAB
ALBUMIN SERPL BCP-MCNC: 2 G/DL (ref 3.2–4.9)
ALBUMIN SERPL BCP-MCNC: 2.2 G/DL (ref 3.2–4.9)
ALBUMIN/GLOB SERPL: 0.6 G/DL
ALBUMIN/GLOB SERPL: 0.6 G/DL
ALP SERPL-CCNC: 356 U/L (ref 30–99)
ALP SERPL-CCNC: 362 U/L (ref 30–99)
ALT SERPL-CCNC: 80 U/L (ref 2–50)
ALT SERPL-CCNC: 82 U/L (ref 2–50)
ANION GAP SERPL CALC-SCNC: 16 MMOL/L (ref 7–16)
ANION GAP SERPL CALC-SCNC: 18 MMOL/L (ref 7–16)
AST SERPL-CCNC: 126 U/L (ref 12–45)
AST SERPL-CCNC: 98 U/L (ref 12–45)
BACTERIA STL CULT: ABNORMAL
BACTERIA WND AEROBE CULT: ABNORMAL
BILIRUB SERPL-MCNC: 2 MG/DL (ref 0.1–1.5)
BILIRUB SERPL-MCNC: 2.3 MG/DL (ref 0.1–1.5)
BUN SERPL-MCNC: 102 MG/DL (ref 8–22)
BUN SERPL-MCNC: 102 MG/DL (ref 8–22)
C JEJUNI+C COLI AG STL QL: ABNORMAL
CALCIUM SERPL-MCNC: 7.8 MG/DL (ref 8.5–10.5)
CALCIUM SERPL-MCNC: 7.9 MG/DL (ref 8.5–10.5)
CHLORIDE SERPL-SCNC: 89 MMOL/L (ref 96–112)
CHLORIDE SERPL-SCNC: 89 MMOL/L (ref 96–112)
CO2 SERPL-SCNC: 18 MMOL/L (ref 20–33)
CO2 SERPL-SCNC: 19 MMOL/L (ref 20–33)
CREAT SERPL-MCNC: 7.99 MG/DL (ref 0.5–1.4)
CREAT SERPL-MCNC: 8.36 MG/DL (ref 0.5–1.4)
CREAT UR-MCNC: 45.28 MG/DL
CREAT UR-MCNC: 46.18 MG/DL
E COLI SXT1+2 STL IA: ABNORMAL
GFR SERPLBLD CREATININE-BSD FMLA CKD-EPI: 7 ML/MIN/1.73 M 2
GFR SERPLBLD CREATININE-BSD FMLA CKD-EPI: 7 ML/MIN/1.73 M 2
GLOBULIN SER CALC-MCNC: 3.4 G/DL (ref 1.9–3.5)
GLOBULIN SER CALC-MCNC: 3.4 G/DL (ref 1.9–3.5)
GLUCOSE BLD STRIP.AUTO-MCNC: 173 MG/DL (ref 65–99)
GLUCOSE BLD STRIP.AUTO-MCNC: 180 MG/DL (ref 65–99)
GLUCOSE BLD STRIP.AUTO-MCNC: 211 MG/DL (ref 65–99)
GLUCOSE BLD STRIP.AUTO-MCNC: 211 MG/DL (ref 65–99)
GLUCOSE SERPL-MCNC: 199 MG/DL (ref 65–99)
GLUCOSE SERPL-MCNC: 239 MG/DL (ref 65–99)
GRAM STN SPEC: ABNORMAL
HBV SURFACE AB SERPL IA-ACNC: <3.5 MIU/ML (ref 0–10)
MICROALBUMIN UR-MCNC: 3.3 MG/DL
MICROALBUMIN/CREAT UR: 73 MG/G (ref 0–30)
POTASSIUM SERPL-SCNC: 3.1 MMOL/L (ref 3.6–5.5)
POTASSIUM SERPL-SCNC: 3.2 MMOL/L (ref 3.6–5.5)
PROT SERPL-MCNC: 5.4 G/DL (ref 6–8.2)
PROT SERPL-MCNC: 5.6 G/DL (ref 6–8.2)
PROT UR-MCNC: 26 MG/DL (ref 0–15)
PROT/CREAT UR: 563 MG/G (ref 15–68)
SIGNIFICANT IND 70042: ABNORMAL
SIGNIFICANT IND 70042: ABNORMAL
SITE SITE: ABNORMAL
SITE SITE: ABNORMAL
SODIUM SERPL-SCNC: 124 MMOL/L (ref 135–145)
SODIUM SERPL-SCNC: 125 MMOL/L (ref 135–145)
SOURCE SOURCE: ABNORMAL
SOURCE SOURCE: ABNORMAL

## 2022-12-07 PROCEDURE — 02HV33Z INSERTION OF INFUSION DEVICE INTO SUPERIOR VENA CAVA, PERCUTANEOUS APPROACH: ICD-10-PCS | Performed by: RADIOLOGY

## 2022-12-07 PROCEDURE — 99233 SBSQ HOSP IP/OBS HIGH 50: CPT | Performed by: INTERNAL MEDICINE

## 2022-12-07 PROCEDURE — 84156 ASSAY OF PROTEIN URINE: CPT

## 2022-12-07 PROCEDURE — 700111 HCHG RX REV CODE 636 W/ 250 OVERRIDE (IP): Performed by: INTERNAL MEDICINE

## 2022-12-07 PROCEDURE — 82962 GLUCOSE BLOOD TEST: CPT

## 2022-12-07 PROCEDURE — A9270 NON-COVERED ITEM OR SERVICE: HCPCS | Performed by: STUDENT IN AN ORGANIZED HEALTH CARE EDUCATION/TRAINING PROGRAM

## 2022-12-07 PROCEDURE — 770020 HCHG ROOM/CARE - TELE (206)

## 2022-12-07 PROCEDURE — 99233 SBSQ HOSP IP/OBS HIGH 50: CPT | Performed by: STUDENT IN AN ORGANIZED HEALTH CARE EDUCATION/TRAINING PROGRAM

## 2022-12-07 PROCEDURE — 90935 HEMODIALYSIS ONE EVALUATION: CPT

## 2022-12-07 PROCEDURE — 80053 COMPREHEN METABOLIC PANEL: CPT

## 2022-12-07 PROCEDURE — 700101 HCHG RX REV CODE 250

## 2022-12-07 PROCEDURE — 700111 HCHG RX REV CODE 636 W/ 250 OVERRIDE (IP): Performed by: RADIOLOGY

## 2022-12-07 PROCEDURE — 700111 HCHG RX REV CODE 636 W/ 250 OVERRIDE (IP): Performed by: HOSPITALIST

## 2022-12-07 PROCEDURE — 82570 ASSAY OF URINE CREATININE: CPT

## 2022-12-07 PROCEDURE — 86706 HEP B SURFACE ANTIBODY: CPT

## 2022-12-07 PROCEDURE — 82043 UR ALBUMIN QUANTITATIVE: CPT

## 2022-12-07 PROCEDURE — 700111 HCHG RX REV CODE 636 W/ 250 OVERRIDE (IP)

## 2022-12-07 PROCEDURE — 700102 HCHG RX REV CODE 250 W/ 637 OVERRIDE(OP): Performed by: STUDENT IN AN ORGANIZED HEALTH CARE EDUCATION/TRAINING PROGRAM

## 2022-12-07 PROCEDURE — 700102 HCHG RX REV CODE 250 W/ 637 OVERRIDE(OP): Performed by: INTERNAL MEDICINE

## 2022-12-07 PROCEDURE — A9270 NON-COVERED ITEM OR SERVICE: HCPCS | Performed by: HOSPITALIST

## 2022-12-07 PROCEDURE — 36415 COLL VENOUS BLD VENIPUNCTURE: CPT

## 2022-12-07 PROCEDURE — 36558 INSERT TUNNELED CV CATH: CPT

## 2022-12-07 PROCEDURE — 0JH60XZ INSERTION OF TUNNELED VASCULAR ACCESS DEVICE INTO CHEST SUBCUTANEOUS TISSUE AND FASCIA, OPEN APPROACH: ICD-10-PCS | Performed by: RADIOLOGY

## 2022-12-07 PROCEDURE — A9270 NON-COVERED ITEM OR SERVICE: HCPCS | Performed by: INTERNAL MEDICINE

## 2022-12-07 PROCEDURE — 700102 HCHG RX REV CODE 250 W/ 637 OVERRIDE(OP): Performed by: HOSPITALIST

## 2022-12-07 PROCEDURE — 700111 HCHG RX REV CODE 636 W/ 250 OVERRIDE (IP): Performed by: STUDENT IN AN ORGANIZED HEALTH CARE EDUCATION/TRAINING PROGRAM

## 2022-12-07 RX ORDER — HEPARIN SODIUM 1000 [USP'U]/ML
INJECTION, SOLUTION INTRAVENOUS; SUBCUTANEOUS
Status: COMPLETED
Start: 2022-12-07 | End: 2022-12-07

## 2022-12-07 RX ORDER — POTASSIUM CHLORIDE 20 MEQ/1
40 TABLET, EXTENDED RELEASE ORAL ONCE
Status: COMPLETED | OUTPATIENT
Start: 2022-12-07 | End: 2022-12-07

## 2022-12-07 RX ORDER — ONDANSETRON 2 MG/ML
4 INJECTION INTRAMUSCULAR; INTRAVENOUS EVERY 6 HOURS PRN
Status: DISCONTINUED | OUTPATIENT
Start: 2022-12-07 | End: 2022-12-07 | Stop reason: HOSPADM

## 2022-12-07 RX ORDER — HEPARIN SODIUM (PORCINE) LOCK FLUSH IV SOLN 100 UNIT/ML 100 UNIT/ML
SOLUTION INTRAVENOUS
Status: COMPLETED
Start: 2022-12-07 | End: 2022-12-07

## 2022-12-07 RX ORDER — LIDOCAINE HYDROCHLORIDE AND EPINEPHRINE 10; 10 MG/ML; UG/ML
INJECTION, SOLUTION INFILTRATION; PERINEURAL
Status: COMPLETED
Start: 2022-12-07 | End: 2022-12-07

## 2022-12-07 RX ORDER — SODIUM CHLORIDE 9 MG/ML
500 INJECTION, SOLUTION INTRAVENOUS
Status: DISCONTINUED | OUTPATIENT
Start: 2022-12-07 | End: 2022-12-07 | Stop reason: HOSPADM

## 2022-12-07 RX ORDER — MIDAZOLAM HYDROCHLORIDE 1 MG/ML
INJECTION INTRAMUSCULAR; INTRAVENOUS
Status: COMPLETED
Start: 2022-12-07 | End: 2022-12-07

## 2022-12-07 RX ORDER — HEPARIN SODIUM 1000 [USP'U]/ML
3700 INJECTION, SOLUTION INTRAVENOUS; SUBCUTANEOUS
Status: DISCONTINUED | OUTPATIENT
Start: 2022-12-07 | End: 2022-12-15 | Stop reason: HOSPADM

## 2022-12-07 RX ORDER — MIDAZOLAM HYDROCHLORIDE 1 MG/ML
.5-2 INJECTION INTRAMUSCULAR; INTRAVENOUS PRN
Status: DISCONTINUED | OUTPATIENT
Start: 2022-12-07 | End: 2022-12-07 | Stop reason: HOSPADM

## 2022-12-07 RX ADMIN — FUROSEMIDE 40 MG: 10 INJECTION, SOLUTION INTRAMUSCULAR; INTRAVENOUS at 05:22

## 2022-12-07 RX ADMIN — CEFAZOLIN SODIUM 1 G: 1 INJECTION, SOLUTION INTRAVENOUS at 20:12

## 2022-12-07 RX ADMIN — HYDROCODONE BITARTRATE AND ACETAMINOPHEN 1 TABLET: 5; 325 TABLET ORAL at 21:59

## 2022-12-07 RX ADMIN — LIDOCAINE HYDROCHLORIDE,EPINEPHRINE BITARTRATE: 10; .01 INJECTION, SOLUTION INFILTRATION; PERINEURAL at 15:15

## 2022-12-07 RX ADMIN — INSULIN HUMAN 3 UNITS: 100 INJECTION, SOLUTION PARENTERAL at 22:06

## 2022-12-07 RX ADMIN — FENTANYL CITRATE 25 MCG: 50 INJECTION, SOLUTION INTRAMUSCULAR; INTRAVENOUS at 15:33

## 2022-12-07 RX ADMIN — PROMETHAZINE HYDROCHLORIDE 25 MG: 25 TABLET ORAL at 21:59

## 2022-12-07 RX ADMIN — MIDAZOLAM HYDROCHLORIDE 1 MG: 1 INJECTION, SOLUTION INTRAMUSCULAR; INTRAVENOUS at 15:33

## 2022-12-07 RX ADMIN — MIDAZOLAM HYDROCHLORIDE 1 MG: 1 INJECTION, SOLUTION INTRAMUSCULAR; INTRAVENOUS at 15:36

## 2022-12-07 RX ADMIN — CEFAZOLIN SODIUM 1 G: 1 INJECTION, SOLUTION INTRAVENOUS at 05:29

## 2022-12-07 RX ADMIN — SODIUM BICARBONATE 650 MG: 650 TABLET ORAL at 20:07

## 2022-12-07 RX ADMIN — HEPARIN SODIUM 5000 UNITS: 5000 INJECTION, SOLUTION INTRAVENOUS; SUBCUTANEOUS at 05:22

## 2022-12-07 RX ADMIN — INSULIN HUMAN 3 UNITS: 100 INJECTION, SOLUTION PARENTERAL at 11:34

## 2022-12-07 RX ADMIN — HEPARIN 5 ML: 100 SYRINGE at 14:00

## 2022-12-07 RX ADMIN — TAMSULOSIN HYDROCHLORIDE 0.4 MG: 0.4 CAPSULE ORAL at 08:38

## 2022-12-07 RX ADMIN — FENTANYL CITRATE 25 MCG: 50 INJECTION, SOLUTION INTRAMUSCULAR; INTRAVENOUS at 15:43

## 2022-12-07 RX ADMIN — HEPARIN SODIUM 3700 UNITS: 1000 INJECTION, SOLUTION INTRAVENOUS; SUBCUTANEOUS at 19:05

## 2022-12-07 RX ADMIN — SODIUM BICARBONATE 650 MG: 650 TABLET ORAL at 05:22

## 2022-12-07 RX ADMIN — POTASSIUM CHLORIDE 40 MEQ: 1500 TABLET, EXTENDED RELEASE ORAL at 20:06

## 2022-12-07 RX ADMIN — HEPARIN SODIUM 5000 UNITS: 5000 INJECTION, SOLUTION INTRAVENOUS; SUBCUTANEOUS at 21:59

## 2022-12-07 RX ADMIN — ROSUVASTATIN CALCIUM 10 MG: 20 TABLET, FILM COATED ORAL at 21:59

## 2022-12-07 RX ADMIN — AMLODIPINE BESYLATE 10 MG: 10 TABLET ORAL at 20:06

## 2022-12-07 RX ADMIN — Medication 2 SPRAY: at 21:59

## 2022-12-07 RX ADMIN — OMEPRAZOLE 40 MG: 20 CAPSULE, DELAYED RELEASE ORAL at 05:22

## 2022-12-07 ASSESSMENT — ENCOUNTER SYMPTOMS
PALPITATIONS: 0
CLAUDICATION: 0
DOUBLE VISION: 0
DIAPHORESIS: 0
SINUS PAIN: 0
BRUISES/BLEEDS EASILY: 0
FEVER: 0
STRIDOR: 0
HEMOPTYSIS: 0
PHOTOPHOBIA: 0
FLANK PAIN: 0
POLYDIPSIA: 0
EYE PAIN: 0
HALLUCINATIONS: 0
BLURRED VISION: 0
BACK PAIN: 0
COUGH: 0
EYES NEGATIVE: 1
ROS SKIN COMMENTS: LLE CELLULITIS
TINGLING: 0
DEPRESSION: 0
SORE THROAT: 0
NAUSEA: 0
VOMITING: 0
BLOOD IN STOOL: 0
PND: 0
CONSTIPATION: 0
ABDOMINAL PAIN: 0
HEARTBURN: 0
FALLS: 0
SHORTNESS OF BREATH: 0
HEADACHES: 0
NAUSEA: 1
WHEEZING: 0
WEIGHT LOSS: 0
WEAKNESS: 0
MYALGIAS: 0
NECK PAIN: 0
SPUTUM PRODUCTION: 0
TREMORS: 0
CHILLS: 0
DIARRHEA: 0
ORTHOPNEA: 0
DIZZINESS: 0

## 2022-12-07 ASSESSMENT — PAIN DESCRIPTION - PAIN TYPE
TYPE: ACUTE PAIN

## 2022-12-07 ASSESSMENT — LIFESTYLE VARIABLES: SUBSTANCE_ABUSE: 0

## 2022-12-07 NOTE — PROGRESS NOTES
Monitor summary: SR 83-90, NV 0.17, QRS 0.06, QT 0.38, with rare PVCs and one episode of PSVT (up to 180) per strip from monitor room.

## 2022-12-07 NOTE — CARE PLAN
The patient is Stable - Low risk of patient condition declining or worsening    Shift Goals  Clinical Goals: Pain control, monitor LLE, dialysis cath placement  Patient Goals: Dialysis cath placement  Family Goals: SANJUANA    Progress made toward(s) clinical / shift goals:      Problem: Knowledge Deficit - Standard  Goal: Patient and family/care givers will demonstrate understanding of plan of care, disease process/condition, diagnostic tests and medications  Description: Target End Date:  1-3 days or as soon as patient condition allows    Document in Patient Education    1.  Patient and family/caregiver oriented to unit, equipment, visitation policy and means for communicating concern  2.  Complete/review Learning Assessment  3.  Assess knowledge level of disease process/condition, treatment plan, diagnostic tests and medications  4.  Explain disease process/condition, treatment plan, diagnostic tests and medications  Outcome: Progressing     Problem: Pain - Standard  Goal: Alleviation of pain or a reduction in pain to the patient’s comfort goal  Description: Target End Date:  Prior to discharge or change in level of care    Document on Vitals flowsheet    1.  Document pain using the appropriate pain scale per order or unit policy  2.  Educate and implement non-pharmacologic comfort measures (i.e. relaxation, distraction, massage, cold/heat therapy, etc.)  3.  Pain management medications as ordered  4.  Reassess pain after pain med administration per policy  5.  If opiods administered assess patient's response to pain medication is appropriate per POSS sedation scale  6.  Follow pain management plan developed in collaboration with patient and interdisciplinary team (including palliative care or pain specialists if applicable)  Outcome: Progressing     Problem: Hemodynamics  Goal: Patient's hemodynamics, fluid balance and neurologic status will be stable or improve  Description: Target End Date:  Prior to discharge or  change in level of care    Document on Assessment and I/O flowsheet templates    1.  Monitor vital signs, pulse oximetry and cardiac monitor per provider order and/or policy  2.  Maintain blood pressure per provider order  3.  Hemodynamic monitoring per provider order  4.  Manage IV fluids and IV infusions  5.  Monitor intake and output  6.  Daily weights per unit policy or provider order  7.  Assess peripheral pulses and capillary refill  8.  Assess color and body temperature  9.  Position patient for maximum circulation/cardiac output  10. Monitor for signs/symptoms of excessive bleeding  11. Assess mental status, restlessness and changes in level of consciousness  12. Monitor temperature and report fever or hypothermia to provider immediately. Consideration of targeted temperature management.  Outcome: Progressing     Problem: Fluid Volume  Goal: Fluid volume balance will be maintained  Description: Target End Date:  Prior to discharge or change in level of care    Document on I/O flowsheet    1.  Monitor intake and output as ordered  2.  Promote oral intake as appropriate  3.  Report inadequate intake or output to physician  4.  Administer IV therapy as ordered  5.  Weights per provider order  6.  Assess for signs and symptoms of bleeding  7.  Monitor for signs of fluid overload (respiratory changes, edema, weight gain, increased abdominal girth)  8.  Monitor of signs for inadequate fluid volume (poor skin turgor, dry mucous membranes)  9.  Instruct patient on adherence to fluid restrictions  Outcome: Progressing     Problem: Urinary - Renal Perfusion  Goal: Ability to achieve and maintain adequate renal perfusion and functioning will improve  Description: Target End Date:  Prior to discharge or change in level of care    Document on I/O and Assessment flowsheet    1.  Urine output will remain greater than 0.5ml/Kg/HR  2.  Monitor amount and/or characteristics of urine per order/policy. Specific gravity per  order/policy  3.  Assess signs and symptoms of renal dysfunction  Outcome: Progressing     Problem: Respiratory  Goal: Patient will achieve/maintain optimum respiratory ventilation and gas exchange  Description: Target End Date:  Prior to discharge or change in level of care    Document on Assessment flowsheet    1.  Assess and monitor rate, rhythm, depth and effort of respiration  2.  Breath sounds assessed qshift and/or as needed  3.  Assess O2 saturation, administer/titrate oxygen as ordered  4.  Position patient for maximum ventilatory efficiency  5.  Turn, cough, and deep breath with splinting to improve effectiveness  6.  Collaborate with RT to administer medication/treatments per order  7.  Encourage use of incentive spirometer and encourage patient to cough after use and utilize splinting techniques if applicable  8.  Airway suctioning  9.  Monitor sputum production for changes in color, consistency and frequency  10. Perform frequent oral hygiene  11. Alternate physical activity with rest periods  Outcome: Progressing     Problem: Mechanical Ventilation  Goal: Safe management of artificial airway and ventilation  Description: Target End Date:  when vent discontinued    Document on VAP flowsheet and Airway LDA    1.  Daily awakening trials per provider order/policy  2.  Suctioning and care of ET/Trach tube (document on LDA)  3.  Collaborate with RT to administer medications/treatments  4.  Ambu bag at bedside and available for transport  5.  Trach patient - replacement trach at bedside  6.  Provide communication tools if applicable  Outcome: Progressing  Goal: Successful weaning off mechanical ventilator, spontaneously maintains adequate gas exchange  Description: Target End Date:  when vent discontinued    1.  Follow universal weaning protocol for patients on mechanical ventilation per order  2.  Review contraindication list.  Obtain provider order to wean in presence of contraindication.  3.  Obtain Nicole  Sedation-Agitation Score  4.  Nicole Score 1-2:  sedation vacation  5.  Nicole Score 3-4:  Collaborate with provider and/or RT to determine readiness for trial  6.  Begin 2 hour trial of weaning following protocol  7.  Evaluate for fatigue parameters per protocol  8.  Fatigue parameters triggered:  Stop wean and return to previous ASV% setting or increase % minute volume to offset work or breathing  Outcome: Progressing  Goal: Patient will be able to express needs and understand communication  Description: Target End Date:  when vent discontinued    1.  Assess ability to communicate and understand  2.  Provide communication tools  3.  Collaborate with Speech Therapy for [facility]  Outcome: Progressing     Problem: Physical Regulation  Goal: Diagnostic test results will improve  Description: Target End Date:  Prior to discharge or change in level of care    1.  Monitor lactic acid levels  2.  Monitor ABG's  3.  Monitor diagnostic test results  Outcome: Progressing  Goal: Signs and symptoms of infection will decrease  Description: Target End Date:  Prior to discharge or change in level of care    1.  Remove potential routes of infection, such as central lines and urinary catheter  2.  Follow facility protocol for changing IV tubing and sites  3.  Collaborate with Infectious Disease  4.  Antibiotic therapy per provider order  5.  Note drug effects and monitor for antibiotic toxicity  Outcome: Progressing     Problem: Fall Risk  Goal: Patient will remain free from falls  Description: Target End Date:  Prior to discharge or change in level of care    Document interventions on the Johnson Avi Fall Risk Assessment    1.  Assess for fall risk factors  2.  Implement fall precautions  Outcome: Progressing     Problem: Skin Integrity  Goal: Skin integrity is maintained or improved  Description: Target End Date:  Prior to discharge or change in level of care    Document interventions on Skin Risk/Glynn flowsheet groups and  corresponding LDA    1.  Assess and monitor skin integrity, appearance and/or temperature  2.  Assess risk factors for impaired skin integrity and/or pressures ulcers  3.  Implement precautions to protect skin integrity in collaboration with interdisciplinary team  4.  Implement pressure ulcer prevention protocol if at risk for skin breakdown  5.  Confirm wound care consult if at risk for skin breakdown  6.  Ensure patient use of pressure relieving devices  (Low air loss bed, waffle overlay, heel protectors, ROHO cushion, etc)  Outcome: Progressing

## 2022-12-07 NOTE — PROGRESS NOTES
Hospital Medicine Daily Progress Note    Date of Service  12/6/2022    Chief Complaint  Thomas Borja is a 62 y.o. male admitted 12/4/2022 with a previous history of type 2 diabetes, stage IIIa chronic kidney disease with a GFR that lives around 40, hypertension, alcohol use, recurrent cellulitis, hypothyroidism, mitral valve prolapse he presents to the hospital for left leg cellulitis and acute renal failure.  Patient was taking metformin and NSAIDs at home.    Hospital Course  Patient arrived to the hospital tachycardic, febrile and tachypneic.  He was found to have sodium of 124, chloride 95, bicarb 13, creatinine 6.3, calcium 7.6, lactic acid 2.7.  Patient was started on IV bicarbonate.    Interval Problem Update  12/5: There is no significant improvement in patient's creatinine.  Waiting for further recommendations from nephrology.  Wound cultures have been ordered on the left leg.  There is improvement in cellulitis.  12/6/2022:  Continue present medical management appreciate nephrology recommendations with regards to hyponatremia.  Continue with daily labs.  No acute events overnight.  Additionally, patient also had positive culture growth of strep pyogenes group A.  Antimicrobial spectrum has been narrowed.  Patient also had growth of staph aureus non-MRSA.  Patient has been transitioned broad-spectrum antimicrobials to cefazolin at present.  Pharmacy recommendations appreciated.  I have discussed this patient's plan of care and discharge plan at IDT rounds today with Case Management, Nursing, Nursing leadership, and other members of the IDT team.    Consultants/Specialty  nephrology    Code Status  Full Code    Disposition  Patient is not medically cleared for discharge.   Anticipate discharge to to home with close outpatient follow-up.  I have placed the appropriate orders for post-discharge needs.    Review of Systems  Review of Systems   Constitutional:  Positive for malaise/fatigue. Negative for  chills, diaphoresis and fever.   HENT:  Negative for congestion, ear discharge, ear pain, hearing loss, nosebleeds, sinus pain, sore throat and tinnitus.    Eyes:  Negative for blurred vision, double vision, photophobia and pain.   Respiratory:  Negative for cough, hemoptysis, sputum production, shortness of breath, wheezing and stridor.    Cardiovascular:  Positive for leg swelling. Negative for chest pain, palpitations, orthopnea, claudication and PND.   Gastrointestinal:  Positive for nausea. Negative for abdominal pain, blood in stool, constipation, diarrhea, heartburn, melena and vomiting.   Genitourinary:  Negative for dysuria, flank pain, frequency, hematuria and urgency.   Musculoskeletal:  Negative for back pain, falls, joint pain, myalgias and neck pain.   Skin:  Negative for itching and rash.   Neurological:  Negative for dizziness, tingling, tremors, weakness and headaches.   Endo/Heme/Allergies:  Negative for environmental allergies and polydipsia. Does not bruise/bleed easily.   Psychiatric/Behavioral:  Negative for depression, hallucinations, substance abuse and suicidal ideas.       Physical Exam  Temp:  [36.3 °C (97.3 °F)-36.6 °C (97.9 °F)] 36.5 °C (97.7 °F)  Pulse:  [69-98] 83  Resp:  [18-20] 18  BP: (120-150)/(66-77) 120/70  SpO2:  [92 %-95 %] 94 %    Physical Exam  Vitals and nursing note reviewed.   Constitutional:       General: He is not in acute distress.     Appearance: Normal appearance. He is not ill-appearing, toxic-appearing or diaphoretic.   HENT:      Head: Normocephalic and atraumatic.      Nose: No congestion or rhinorrhea.      Mouth/Throat:      Pharynx: No posterior oropharyngeal erythema.   Eyes:      General: No scleral icterus.        Right eye: No discharge.   Neck:      Vascular: Hepatojugular reflux and JVD present.   Cardiovascular:      Rate and Rhythm: Normal rate and regular rhythm.      Pulses: Normal pulses.      Heart sounds: No murmur heard.    No friction rub. No  gallop.   Pulmonary:      Effort: No respiratory distress.      Breath sounds: No stridor. Rales present. No wheezing or rhonchi.   Abdominal:      General: There is distension.      Tenderness: There is no abdominal tenderness.   Musculoskeletal:         General: No swelling, tenderness, deformity or signs of injury.      Cervical back: Normal range of motion.      Right lower leg: Edema present.      Left lower leg: Edema present.   Skin:     Capillary Refill: Capillary refill takes more than 3 seconds.      Coloration: Skin is not jaundiced or pale.      Findings: No bruising, erythema, lesion or rash.   Neurological:      General: No focal deficit present.      Mental Status: He is alert and oriented to person, place, and time.       Fluids    Intake/Output Summary (Last 24 hours) at 12/6/2022 1833  Last data filed at 12/6/2022 1500  Gross per 24 hour   Intake 240 ml   Output 1000 ml   Net -760 ml         Laboratory  Recent Labs     12/05/22  0614   WBC 13.7*   RBC 4.22*   HEMOGLOBIN 14.1   HEMATOCRIT 39.0*   MCV 92.4   MCH 33.4*   MCHC 36.2*   RDW 44.5   PLATELETCT 89*   MPV 10.1       Recent Labs     12/06/22  0941 12/06/22  1126 12/06/22  1717   SODIUM 123* 123* 124*   POTASSIUM 3.3* 3.3* 3.3*   CHLORIDE 87* 88* 86*   CO2 18* 18* 17*   GLUCOSE 228* 230* 252*   BUN 92* 90* 100*   CREATININE 7.58* 7.80* 8.06*   CALCIUM 7.9* 7.7* 8.0*       Recent Labs     12/04/22  2127   INR 1.33*                 Imaging  DX-TIBIA AND FIBULA LEFT   Final Result      No radiographic evidence of acute bony abnormality      Skin thickening and edema with some soft tissue calcifications most likely indicates venous stasis. Cellulitis is a possibility and there is no evidence of soft tissue gas. Dermatomyositis can be seen with soft tissue calcifications but these are not as    extensive as that often entails      EC-ECHOCARDIOGRAM COMPLETE W/ CONT   Final Result      US-RENAL   Final Result      No evidence solid renal mass or  hydronephrosis.             Assessment/Plan  * KENRICK (acute kidney injury) (HCC)- (present on admission)  Assessment & Plan  Patient has stage IIIa chronic kidney disease with GFR normally around 40.  Acuity of onset of the patient's KENRICK is uncertain but would appear to have been in the last week or 2 based on clinical history.  He is effectively anuretic based on reports from the transferring facility where he was resuscitated with 7 L of crystalloid and only urinated 150 mL in 24 hours  Vargas catheter  Follow urine output and BMP  UA  Nephrology consultation  Renally dose medications as appropriate  Stat BMP to confirm no emergent electrolyte abnormalities  IV bicarb drip    Cellulitis of left lower extremity  Assessment & Plan  In setting of diabetes  Patient was treated with clindamycin and cefepime however remains afebrile  Repeating blood cultures  Changed to piperacillin tazobactam and linezolid  Check MRSA nares if this is negative we can stop his linezolid  Deyvi with sharpie and monitor closely  Ultrasound the transferring facility was negative for DVT or deep tissue infection  Patient's distal neurovascular status is intact    Sepsis (HCC)  Assessment & Plan  This is Sepsis Present on admission  SIRS criteria identified on my evaluation include: Fever, with temperature greater than 101 deg F and Tachycardia, with heart rate greater than 90 BPM  Source is skin  Sepsis protocol initiated  Fluid resuscitation ordered per protocol  Crystalloid Fluid Administration: Patient already received adequate fluid resuscitation at the transferring facility and given that he is an uric further aggressive IV fluids are not indicated  IV antibiotics as appropriate for source of sepsis  Reassessment: I have reassessed the patient's hemodynamic status    Follow culture results both here and from the transferring facility  Empiric piperacillin tazobactam          Alcohol abuse  Assessment & Plan  Monitor for signs of  withdrawal    Hyponatremia  Assessment & Plan  Likely hypovolemic in setting of chronic kidney disease  Monitor every 6  122 is the last reading from the transferring facility    Uncomplicated type 2 diabetes mellitus (HCC)- (present on admission)  Assessment & Plan  Start on insulin sliding scale with serial Accu-Checks  hemoglobin A1c 8.5  Hypoglycemic protocol in place        Benign essential hypertension- (present on admission)  Assessment & Plan  Holding home losartan    Mitral valve prolapse- (present on admission)  Assessment & Plan  Ordered cardiac echo         VTE prophylaxis: SCDs/TEDs    I have performed a physical exam and reviewed and updated ROS and Plan today (12/6/2022). In review of yesterday's note (12/5/2022), there are no changes except as documented above.

## 2022-12-07 NOTE — CARE PLAN
The patient is Stable - Low risk of patient condition declining or worsening    Shift Goals  Clinical Goals: pain control, monitor leg infection  Patient Goals: rest  Family Goals: SANJUANA    Progress made toward(s) clinical / shift goals:    Problem: Knowledge Deficit - Standard  Goal: Patient and family/care givers will demonstrate understanding of plan of care, disease process/condition, diagnostic tests and medications  Outcome: Progressing     Problem: Pain - Standard  Goal: Alleviation of pain or a reduction in pain to the patient’s comfort goal  Outcome: Progressing     Problem: Hemodynamics  Goal: Patient's hemodynamics, fluid balance and neurologic status will be stable or improve  Outcome: Progressing     Problem: Fluid Volume  Goal: Fluid volume balance will be maintained  Outcome: Progressing     Problem: Urinary - Renal Perfusion  Goal: Ability to achieve and maintain adequate renal perfusion and functioning will improve  Outcome: Progressing     Problem: Fall Risk  Goal: Patient will remain free from falls  Outcome: Progressing       Patient is not progressing towards the following goals:

## 2022-12-07 NOTE — OR SURGEON
Immediate Post- Operative Note        Findings: KENRICK      Procedure(s): TDC      Estimated Blood Loss: Less than 5 ml        Complications: None            12/7/2022     3:52 PM     Scott Kasper M.D.

## 2022-12-07 NOTE — PROGRESS NOTES
IR Nursing Note    HD Catheter Placement by MD Kasper assisted by RT Murillo, right IJ access site.  Patient was consented by MD and confirmed by this RN. Procedure site was marked by MD and verified using imaging guidance.  Patient was placed in a sipune position.  Patient was prepped and draped in a sterile fashion. Vitals were taken every 5 minutes and remained stable during procedure (see doc flow sheet for results).  CO2 waveform capnography was monitored and remained 34-38 throughout procedure.  Cathter sutured secured and heparin locked. A biopatch, guaze and tegaderm dressing was placed over surgical site.    Report given to FLORIN Alvarado.  Patient transported to [facility] via IR RN monitored then transferred care to report RN.    ChunRaeedna, Long Term Hemodialysis Catheter, 14.5F/23cm:  REF: [id number]  LOT: [id number]  EXP: 05/31/2024

## 2022-12-07 NOTE — DOCUMENTATION QUERY
[facility]                                                                       Query Response Note      PATIENT:               STEPHANE JAMISON  ACCT #:                  [id number]  MRN:                     [id number]  :                      1960  ADMIT DATE:       2022 3:42 PM  DISCH DATE:          RESPONDING  PROVIDER #:        [id number]           QUERY TEXT:    Based on the clinical indicators documented below, please clarify the status of sepsis after study:    NOTE:  If an appropriate response is not listed below, please respond with a new note.      Sepsis - real or suspected infection plus 2 or more SIRS criteria  Temp <96.8 or >101  HR >90  RR >20  WBC <4,000 or > 12,000  >10% bandemia         The patient's Clinical Indicators include:  Admit vitals: , T 100.4  Admit labs: WBC 13.7  Wound culture positive for Streptococcus pyogenes (Group A), MSSA    H&P - Dx Sepsis - source skin; not diaphoretic, no respiratory distress; onset of KENRICK is uncertain but would appear to have been in the last week or 2 based on clinical history.   PN - He is not ill-appearing, toxic-appearing or diaphoretic    Treatment - IV Zosyn, Cefazolin; PO Linzolid    Risk factors - L leg cellulitis, CKD, KENRICK, DMT2    Thank you,  Concetta Cosby BSN  Clinical   Connect via [email or web address]  Options provided:   -- Patient with SIRS and acute organ dysfunction (KENRICK), Sepsis ruled out   -- Patient with SIRS and Cellulitis, Sepsis ruled out   -- Patient with SIRS and Cellulitis, Sepsis confirmed   -- Other explanation, (please specify the other explanation)   -- Unable to Determine      Query created by: Concetta Cosby on 2022 12:25 PM    RESPONSE TEXT:    Patient with SIRS and acute organ dysfunction (KENRICK), Sepsis ruled out       QUERY TEXT:    BMI >40 is documented in the  Dietary PN.  Can a diagnosis be provided  to support this finding?    NOTE:  If an appropriate response is not listed below, please respond with a new note.    The patient's Clinical Indicators include:  12/5 Dietary PN - Body mass index is 45.18 kg/m².  Class III obesity    Treatment - Dietary consult; Weight loss counseling not appropriate in acute care setting    Risk factors - BMI 45.18    Thank you,  Concetta YEN  Clinical   Connect via [employer]  Options provided:   -- Class III obesity (morbidly obese)   -- Findings of no clinical significance   -- Other explanation, (please specify the other explanation)   -- Unable to determine      Query created by: Concetta Cosby on 12/7/2022 12:26 PM    RESPONSE TEXT:    Class III obesity (morbidly obese)          Electronically signed by:  MILTON IGNACIO MD 12/7/2022 1:52 PM

## 2022-12-08 LAB
ALBUMIN SERPL BCP-MCNC: 2.3 G/DL (ref 3.2–4.9)
ALBUMIN/GLOB SERPL: 0.7 G/DL
ALP SERPL-CCNC: 357 U/L (ref 30–99)
ALT SERPL-CCNC: 54 U/L (ref 2–50)
ANION GAP SERPL CALC-SCNC: 16 MMOL/L (ref 7–16)
AST SERPL-CCNC: 55 U/L (ref 12–45)
BASOPHILS # BLD AUTO: 0 % (ref 0–1.8)
BASOPHILS # BLD: 0 K/UL (ref 0–0.12)
BILIRUB SERPL-MCNC: 1.4 MG/DL (ref 0.1–1.5)
BUN SERPL-MCNC: 87 MG/DL (ref 8–22)
CALCIUM SERPL-MCNC: 7.9 MG/DL (ref 8.5–10.5)
CHLORIDE SERPL-SCNC: 92 MMOL/L (ref 96–112)
CO2 SERPL-SCNC: 22 MMOL/L (ref 20–33)
CREAT SERPL-MCNC: 7.79 MG/DL (ref 0.5–1.4)
EOSINOPHIL # BLD AUTO: 0.58 K/UL (ref 0–0.51)
EOSINOPHIL NFR BLD: 3.4 % (ref 0–6.9)
ERYTHROCYTE [DISTWIDTH] IN BLOOD BY AUTOMATED COUNT: 43.3 FL (ref 35.9–50)
GFR SERPLBLD CREATININE-BSD FMLA CKD-EPI: 7 ML/MIN/1.73 M 2
GLOBULIN SER CALC-MCNC: 3.5 G/DL (ref 1.9–3.5)
GLUCOSE BLD STRIP.AUTO-MCNC: 170 MG/DL (ref 65–99)
GLUCOSE BLD STRIP.AUTO-MCNC: 207 MG/DL (ref 65–99)
GLUCOSE BLD STRIP.AUTO-MCNC: 263 MG/DL (ref 65–99)
GLUCOSE SERPL-MCNC: 209 MG/DL (ref 65–99)
HBV SURFACE AB SERPL IA-ACNC: <3.5 MIU/ML (ref 0–10)
HCT VFR BLD AUTO: 37 % (ref 42–52)
HGB BLD-MCNC: 13.2 G/DL (ref 14–18)
LYMPHOCYTES # BLD AUTO: 0.87 K/UL (ref 1–4.8)
LYMPHOCYTES NFR BLD: 5.1 % (ref 22–41)
MANUAL DIFF BLD: NORMAL
MCH RBC QN AUTO: 32.6 PG (ref 27–33)
MCHC RBC AUTO-ENTMCNC: 35.7 G/DL (ref 33.7–35.3)
MCV RBC AUTO: 91.4 FL (ref 81.4–97.8)
MONOCYTES # BLD AUTO: 1.16 K/UL (ref 0–0.85)
MONOCYTES NFR BLD AUTO: 6.8 % (ref 0–13.4)
MORPHOLOGY BLD-IMP: NORMAL
NEUTROPHILS # BLD AUTO: 14.48 K/UL (ref 1.82–7.42)
NEUTROPHILS NFR BLD: 83.8 % (ref 44–72)
NEUTS BAND NFR BLD MANUAL: 0.9 % (ref 0–10)
NRBC # BLD AUTO: 0 K/UL
NRBC BLD-RTO: 0 /100 WBC
PHOSPHATE SERPL-MCNC: 4.1 MG/DL (ref 2.5–4.5)
PLATELET # BLD AUTO: 170 K/UL (ref 164–446)
PLATELET BLD QL SMEAR: NORMAL
PMV BLD AUTO: 10.2 FL (ref 9–12.9)
POTASSIUM SERPL-SCNC: 3.3 MMOL/L (ref 3.6–5.5)
PROT SERPL-MCNC: 5.8 G/DL (ref 6–8.2)
RBC # BLD AUTO: 4.05 M/UL (ref 4.7–6.1)
RBC BLD AUTO: NORMAL
SODIUM SERPL-SCNC: 130 MMOL/L (ref 135–145)
WBC # BLD AUTO: 17.1 K/UL (ref 4.8–10.8)

## 2022-12-08 PROCEDURE — 90935 HEMODIALYSIS ONE EVALUATION: CPT

## 2022-12-08 PROCEDURE — 700111 HCHG RX REV CODE 636 W/ 250 OVERRIDE (IP): Performed by: STUDENT IN AN ORGANIZED HEALTH CARE EDUCATION/TRAINING PROGRAM

## 2022-12-08 PROCEDURE — A9270 NON-COVERED ITEM OR SERVICE: HCPCS | Performed by: STUDENT IN AN ORGANIZED HEALTH CARE EDUCATION/TRAINING PROGRAM

## 2022-12-08 PROCEDURE — 36415 COLL VENOUS BLD VENIPUNCTURE: CPT

## 2022-12-08 PROCEDURE — 82962 GLUCOSE BLOOD TEST: CPT

## 2022-12-08 PROCEDURE — 700102 HCHG RX REV CODE 250 W/ 637 OVERRIDE(OP): Performed by: INTERNAL MEDICINE

## 2022-12-08 PROCEDURE — 770020 HCHG ROOM/CARE - TELE (206)

## 2022-12-08 PROCEDURE — 80053 COMPREHEN METABOLIC PANEL: CPT

## 2022-12-08 PROCEDURE — 700111 HCHG RX REV CODE 636 W/ 250 OVERRIDE (IP): Performed by: HOSPITALIST

## 2022-12-08 PROCEDURE — 86480 TB TEST CELL IMMUN MEASURE: CPT

## 2022-12-08 PROCEDURE — 5A1D70Z PERFORMANCE OF URINARY FILTRATION, INTERMITTENT, LESS THAN 6 HOURS PER DAY: ICD-10-PCS | Performed by: INTERNAL MEDICINE

## 2022-12-08 PROCEDURE — 84100 ASSAY OF PHOSPHORUS: CPT

## 2022-12-08 PROCEDURE — 90935 HEMODIALYSIS ONE EVALUATION: CPT | Performed by: INTERNAL MEDICINE

## 2022-12-08 PROCEDURE — A9270 NON-COVERED ITEM OR SERVICE: HCPCS | Performed by: HOSPITALIST

## 2022-12-08 PROCEDURE — 86706 HEP B SURFACE ANTIBODY: CPT

## 2022-12-08 PROCEDURE — 85007 BL SMEAR W/DIFF WBC COUNT: CPT

## 2022-12-08 PROCEDURE — 700111 HCHG RX REV CODE 636 W/ 250 OVERRIDE (IP)

## 2022-12-08 PROCEDURE — 700111 HCHG RX REV CODE 636 W/ 250 OVERRIDE (IP): Performed by: INTERNAL MEDICINE

## 2022-12-08 PROCEDURE — 700102 HCHG RX REV CODE 250 W/ 637 OVERRIDE(OP): Performed by: STUDENT IN AN ORGANIZED HEALTH CARE EDUCATION/TRAINING PROGRAM

## 2022-12-08 PROCEDURE — 99233 SBSQ HOSP IP/OBS HIGH 50: CPT | Performed by: STUDENT IN AN ORGANIZED HEALTH CARE EDUCATION/TRAINING PROGRAM

## 2022-12-08 PROCEDURE — 94660 CPAP INITIATION&MGMT: CPT

## 2022-12-08 PROCEDURE — A9270 NON-COVERED ITEM OR SERVICE: HCPCS | Performed by: INTERNAL MEDICINE

## 2022-12-08 PROCEDURE — 85025 COMPLETE CBC W/AUTO DIFF WBC: CPT

## 2022-12-08 PROCEDURE — 700102 HCHG RX REV CODE 250 W/ 637 OVERRIDE(OP): Performed by: HOSPITALIST

## 2022-12-08 RX ORDER — CEFAZOLIN SODIUM 1 G/50ML
1 INJECTION, SOLUTION INTRAVENOUS EVERY 24 HOURS
Status: COMPLETED | OUTPATIENT
Start: 2022-12-09 | End: 2022-12-13

## 2022-12-08 RX ORDER — POTASSIUM CHLORIDE 20 MEQ/1
40 TABLET, EXTENDED RELEASE ORAL ONCE
Status: COMPLETED | OUTPATIENT
Start: 2022-12-08 | End: 2022-12-08

## 2022-12-08 RX ORDER — HEPARIN SODIUM 1000 [USP'U]/ML
INJECTION, SOLUTION INTRAVENOUS; SUBCUTANEOUS
Status: COMPLETED
Start: 2022-12-08 | End: 2022-12-08

## 2022-12-08 RX ADMIN — HYDROCODONE BITARTRATE AND ACETAMINOPHEN 1 TABLET: 5; 325 TABLET ORAL at 20:59

## 2022-12-08 RX ADMIN — ROSUVASTATIN CALCIUM 10 MG: 20 TABLET, FILM COATED ORAL at 21:00

## 2022-12-08 RX ADMIN — AMLODIPINE BESYLATE 10 MG: 10 TABLET ORAL at 17:48

## 2022-12-08 RX ADMIN — OMEPRAZOLE 40 MG: 20 CAPSULE, DELAYED RELEASE ORAL at 05:58

## 2022-12-08 RX ADMIN — POTASSIUM CHLORIDE 40 MEQ: 1500 TABLET, EXTENDED RELEASE ORAL at 14:19

## 2022-12-08 RX ADMIN — INSULIN HUMAN 2 UNITS: 100 INJECTION, SOLUTION PARENTERAL at 17:51

## 2022-12-08 RX ADMIN — INSULIN HUMAN 5 UNITS: 100 INJECTION, SOLUTION PARENTERAL at 20:54

## 2022-12-08 RX ADMIN — HEPARIN SODIUM 5000 UNITS: 5000 INJECTION, SOLUTION INTRAVENOUS; SUBCUTANEOUS at 21:01

## 2022-12-08 RX ADMIN — PROMETHAZINE HYDROCHLORIDE 25 MG: 25 TABLET ORAL at 20:59

## 2022-12-08 RX ADMIN — SODIUM BICARBONATE 650 MG: 650 TABLET ORAL at 17:56

## 2022-12-08 RX ADMIN — HEPARIN SODIUM 5000 UNITS: 5000 INJECTION, SOLUTION INTRAVENOUS; SUBCUTANEOUS at 14:19

## 2022-12-08 RX ADMIN — HYDROCODONE BITARTRATE AND ACETAMINOPHEN 1 TABLET: 5; 325 TABLET ORAL at 14:19

## 2022-12-08 RX ADMIN — SODIUM BICARBONATE 650 MG: 650 TABLET ORAL at 05:58

## 2022-12-08 RX ADMIN — HEPARIN SODIUM 3700 UNITS: 1000 INJECTION, SOLUTION INTRAVENOUS; SUBCUTANEOUS at 13:46

## 2022-12-08 RX ADMIN — FUROSEMIDE 40 MG: 10 INJECTION, SOLUTION INTRAMUSCULAR; INTRAVENOUS at 05:57

## 2022-12-08 RX ADMIN — HEPARIN SODIUM 5000 UNITS: 5000 INJECTION, SOLUTION INTRAVENOUS; SUBCUTANEOUS at 05:57

## 2022-12-08 RX ADMIN — PROMETHAZINE HYDROCHLORIDE 25 MG: 25 TABLET ORAL at 14:21

## 2022-12-08 RX ADMIN — INSULIN HUMAN 3 UNITS: 100 INJECTION, SOLUTION PARENTERAL at 08:23

## 2022-12-08 RX ADMIN — CEFAZOLIN SODIUM 1 G: 1 INJECTION, SOLUTION INTRAVENOUS at 06:08

## 2022-12-08 RX ADMIN — TAMSULOSIN HYDROCHLORIDE 0.4 MG: 0.4 CAPSULE ORAL at 08:20

## 2022-12-08 ASSESSMENT — ENCOUNTER SYMPTOMS
SHORTNESS OF BREATH: 0
HEARTBURN: 0
SINUS PAIN: 0
ORTHOPNEA: 0
TREMORS: 0
DIAPHORESIS: 0
SPUTUM PRODUCTION: 0
MYALGIAS: 0
NAUSEA: 1
STRIDOR: 0
FALLS: 0
TINGLING: 0
PND: 0
HALLUCINATIONS: 0
DIZZINESS: 0
VOMITING: 0
HEADACHES: 0
DOUBLE VISION: 0
BACK PAIN: 0
BRUISES/BLEEDS EASILY: 0
BLOOD IN STOOL: 0
CLAUDICATION: 0
SORE THROAT: 0
PHOTOPHOBIA: 0
BLURRED VISION: 0
COUGH: 0
ABDOMINAL PAIN: 0
FLANK PAIN: 0
DIARRHEA: 0
DEPRESSION: 0
CHILLS: 0
POLYDIPSIA: 0
WEAKNESS: 0
NECK PAIN: 0
FEVER: 0
WHEEZING: 0
PALPITATIONS: 0
HEMOPTYSIS: 0
EYE PAIN: 0
CONSTIPATION: 0

## 2022-12-08 ASSESSMENT — PAIN DESCRIPTION - PAIN TYPE
TYPE: ACUTE PAIN

## 2022-12-08 ASSESSMENT — LIFESTYLE VARIABLES: SUBSTANCE_ABUSE: 0

## 2022-12-08 ASSESSMENT — FIBROSIS 4 INDEX: FIB4 SCORE: 7.88

## 2022-12-08 NOTE — PROCEDURES
Nephrology/Hemodialysis note    Patient admitted with KENRICK, CKD IIIb, cellulitis, sepsis, started RRT  Seen and examined during dialysis treatment  Doing better, no SOB, edema improving  Tolerates dialysis well  VS stable  UF 2-3 L  Lab results reviewed  Please see dialysis flow sheet for details

## 2022-12-08 NOTE — CARE PLAN
The patient is Stable - Low risk of patient condition declining or worsening    Shift Goals  Clinical Goals: pain control, IV antibiotics  Patient Goals: comfort, rest  Family Goals: SANJUANA    Progress made toward(s) clinical / shift goals:       Problem: Knowledge Deficit - Standard  Goal: Patient and family/care givers will demonstrate understanding of plan of care, disease process/condition, diagnostic tests and medications  Outcome: Progressing     Problem: Pain - Standard  Goal: Alleviation of pain or a reduction in pain to the patient’s comfort goal  Outcome: Progressing     Problem: Skin Integrity  Goal: Skin integrity is maintained or improved  Outcome: Progressing       Patient is not progressing towards the following goals:n/a

## 2022-12-08 NOTE — PROGRESS NOTES
Nephrology Daily Progress Note    Date of Service  12/7/2022    Chief Complaint  62 y.o. male with CKD IIIb, DM II, admitted 12/4/2022 with URTI, diarrhea, left leg cellulitis, in KENRICK    Interval Problem Update  12/06 -no acute events  Feels better  Given iv lasix with good response  Hyponatremia improving  Acidosis improving  Creat stable  12/7 -no acute events, no new complaints  With worsening renal function with schedule TDC placement to start RRT  Review of Systems  Review of Systems   Constitutional:  Positive for malaise/fatigue. Negative for chills, fever and weight loss.   HENT:  Negative for congestion, hearing loss and sinus pain.    Eyes: Negative.    Respiratory:  Negative for cough, hemoptysis, shortness of breath and wheezing.    Cardiovascular:  Positive for leg swelling. Negative for chest pain, palpitations and orthopnea.   Gastrointestinal:  Negative for abdominal pain, nausea and vomiting.   Genitourinary:         Vargas catheter   Skin: Negative.         LLE cellulitis   All other systems reviewed and are negative.     Physical Exam  Temp:  [36.4 °C (97.5 °F)-36.8 °C (98.2 °F)] 36.6 °C (97.8 °F)  Pulse:  [] 100  Resp:  [14-31] 20  BP: (114-141)/(66-79) 114/66  SpO2:  [92 %-98 %] 92 %    Physical Exam  Vitals reviewed.   Constitutional:       General: He is not in acute distress.     Appearance: Normal appearance. He is well-developed. He is not diaphoretic.   HENT:      Head: Normocephalic and atraumatic.      Nose: Nose normal.      Mouth/Throat:      Mouth: Mucous membranes are moist.      Pharynx: Oropharynx is clear.   Eyes:      Extraocular Movements: Extraocular movements intact.      Conjunctiva/sclera: Conjunctivae normal.      Pupils: Pupils are equal, round, and reactive to light.   Cardiovascular:      Rate and Rhythm: Normal rate and regular rhythm.      Pulses: Normal pulses.      Heart sounds: Normal heart sounds.   Pulmonary:      Effort: Pulmonary effort is normal. No  respiratory distress.      Breath sounds: Normal breath sounds. No wheezing or rales.   Abdominal:      General: Bowel sounds are normal. There is no distension.      Palpations: Abdomen is soft. There is no mass.      Tenderness: There is no abdominal tenderness. There is no right CVA tenderness or left CVA tenderness.   Musculoskeletal:      Cervical back: Normal range of motion and neck supple.      Right lower leg: No edema.      Left lower leg: Edema present.   Skin:     General: Skin is warm.      Coloration: Skin is not pale.      Findings: Erythema present. No rash.   Neurological:      General: No focal deficit present.      Mental Status: He is alert and oriented to person, place, and time.      Cranial Nerves: No cranial nerve deficit.      Coordination: Coordination normal.   Psychiatric:         Mood and Affect: Mood normal.         Behavior: Behavior normal.         Thought Content: Thought content normal.         Judgment: Judgment normal.       Fluids    Intake/Output Summary (Last 24 hours) at 12/7/2022 1616  Last data filed at 12/7/2022 1140  Gross per 24 hour   Intake 480 ml   Output 1000 ml   Net -520 ml         Laboratory  Recent Labs     12/05/22  0614   WBC 13.7*   RBC 4.22*   HEMOGLOBIN 14.1   HEMATOCRIT 39.0*   MCV 92.4   MCH 33.4*   MCHC 36.2*   RDW 44.5   PLATELETCT 89*   MPV 10.1       Recent Labs     12/06/22  1717 12/07/22  0058 12/07/22  0901   SODIUM 124* 124* 125*   POTASSIUM 3.3* 3.1* 3.2*   CHLORIDE 86* 89* 89*   CO2 17* 19* 18*   GLUCOSE 252* 239* 199*   * 102* 102*   CREATININE 8.06* 8.36* 7.99*   CALCIUM 8.0* 7.8* 7.9*       Recent Labs     12/04/22  2127   INR 1.33*       Recent Labs     12/06/22  0941   NTPROBNP 1069*             Imaging  IR-YOVANY MANTILLA PLACEMENT >5   Final Result      Successful image guided tunneled dialysis catheter placement.      Plan: The catheter is available for immediate use.            DX-TIBIA AND FIBULA LEFT   Final Result      No  radiographic evidence of acute bony abnormality      Skin thickening and edema with some soft tissue calcifications most likely indicates venous stasis. Cellulitis is a possibility and there is no evidence of soft tissue gas. Dermatomyositis can be seen with soft tissue calcifications but these are not as    extensive as that often entails      EC-ECHOCARDIOGRAM COMPLETE W/ CONT   Final Result      US-RENAL   Final Result      No evidence solid renal mass or hydronephrosis.           Assessment/Plan  1.KENRICK/CKD IIIb -non oliguric, creat worse -to start RRT  2.HTN: BP controlled  3.Electrolytes: hyponatremia improving                          Hypokalemia - will increase K bath with dialysis  4.Anemia: Hb WNL  5.Metabolic acidosis: will be correcting with dialysis  6.Volume: UF with HD as BP tolerates    Recs:  TDC placement,   Daily dialysis  Daily labs  Avoid nephrotoxins  Will follow

## 2022-12-08 NOTE — CARE PLAN
The patient is Stable - Low risk of patient condition declining or worsening    Shift Goals  Clinical Goals: pain control, IV ABX  Patient Goals: comfort, rest  Family Goals: SANJUANA    Progress made toward(s) clinical / shift goals:  Pt started HD today. Continuing IV ABX for treatment of cellulitis. Pain managed with PRN medications.    Problem: Knowledge Deficit - Standard  Goal: Patient and family/care givers will demonstrate understanding of plan of care, disease process/condition, diagnostic tests and medications  Outcome: Progressing     Problem: Pain - Standard  Goal: Alleviation of pain or a reduction in pain to the patient’s comfort goal  Outcome: Progressing     Problem: Respiratory  Goal: Patient will achieve/maintain optimum respiratory ventilation and gas exchange  Outcome: Progressing     Problem: Skin Integrity  Goal: Skin integrity is maintained or improved  Outcome: Progressing       Patient is not progressing towards the following goals:

## 2022-12-08 NOTE — PROGRESS NOTES
Report received from Rn. Assumed pt care. Pt is resting in bed on RA, no complaints of pain. Pt A&O x 4. Fall precautions in place, call light and belongings within reach, bed in lowest position. No signs of distress.

## 2022-12-08 NOTE — ASSESSMENT & PLAN NOTE
Patient with KENRICK on CKD with rapid deterioration needing dialysis. Being held by nephrology, expect renal recovery

## 2022-12-08 NOTE — DISCHARGE PLANNING
Case Management Discharge Planning    Admission Date: 12/4/2022  GMLOS: 4.3  ALOS: 4    6-Clicks ADL Score: 20  6-Clicks Mobility Score: 21      Anticipated Discharge Dispo: Discharge Disposition: Discharged to home/self care (01)     Medically Clear: No     Next Steps: f/u with pt and medical team to discuss dc needs and barriers.     Barriers to Discharge: Medical clearance and Dialysis

## 2022-12-08 NOTE — PROGRESS NOTES
Hospital Medicine Daily Progress Note    Date of Service  12/7/2022    Chief Complaint  Thomas Borja is a 62 y.o. male admitted 12/4/2022 with a previous history of type 2 diabetes, stage IIIa chronic kidney disease with a GFR that lives around 40, hypertension, alcohol use, recurrent cellulitis, hypothyroidism, mitral valve prolapse he presents to the hospital for left leg cellulitis and acute renal failure.  Patient was taking metformin and NSAIDs at home.    Hospital Course  Patient arrived to the hospital tachycardic, febrile and tachypneic.  He was found to have sodium of 124, chloride 95, bicarb 13, creatinine 6.3, calcium 7.6, lactic acid 2.7.  Patient was started on IV bicarbonate.    Interval Problem Update  12/5: There is no significant improvement in patient's creatinine.  Waiting for further recommendations from nephrology.  Wound cultures have been ordered on the left leg.  There is improvement in cellulitis.  12/6/2022:  Continue present medical management appreciate nephrology recommendations with regards to hyponatremia.  Continue with daily labs.  No acute events overnight.  Additionally, patient also had positive culture growth of strep pyogenes group A.  Antimicrobial spectrum has been narrowed.  Patient also had growth of staph aureus non-MRSA.  Patient has been transitioned broad-spectrum antimicrobials to cefazolin at present.  Pharmacy recommendations appreciated.  12/7/2022:  Patient underwent tunneled dialysis catheter placement today with interventional radiology tolerated procedure well without issue.  Patient subsequently went hemodialysis without issue.  Continue to follow closely with nephrology recommendations are appreciated.  Patient will require coordination for outpatient hemodialysis chair.  Anticipate 2 to 3 days prior to discharge.  I have discussed this patient's plan of care and discharge plan at IDT rounds today with Case Management, Nursing, Nursing leadership, and other  members of the IDT team.    Consultants/Specialty  nephrology    Code Status  Full Code    Disposition  Patient is not medically cleared for discharge.   Anticipate discharge to to home with close outpatient follow-up.  I have placed the appropriate orders for post-discharge needs.    Review of Systems  Review of Systems   Constitutional:  Positive for malaise/fatigue. Negative for chills, diaphoresis and fever.   HENT:  Negative for congestion, ear discharge, ear pain, hearing loss, nosebleeds, sinus pain, sore throat and tinnitus.    Eyes:  Negative for blurred vision, double vision, photophobia and pain.   Respiratory:  Negative for cough, hemoptysis, sputum production, shortness of breath, wheezing and stridor.    Cardiovascular:  Positive for leg swelling. Negative for chest pain, palpitations, orthopnea, claudication and PND.   Gastrointestinal:  Positive for nausea. Negative for abdominal pain, blood in stool, constipation, diarrhea, heartburn, melena and vomiting.   Genitourinary:  Negative for dysuria, flank pain, frequency, hematuria and urgency.   Musculoskeletal:  Negative for back pain, falls, joint pain, myalgias and neck pain.   Skin:  Negative for itching and rash.   Neurological:  Negative for dizziness, tingling, tremors, weakness and headaches.   Endo/Heme/Allergies:  Negative for environmental allergies and polydipsia. Does not bruise/bleed easily.   Psychiatric/Behavioral:  Negative for depression, hallucinations, substance abuse and suicidal ideas.       Physical Exam  Temp:  [36.4 °C (97.5 °F)-36.8 °C (98.2 °F)] 36.4 °C (97.5 °F)  Pulse:  [] 84  Resp:  [14-31] 20  BP: (114-141)/(66-79) 114/66  SpO2:  [92 %-98 %] 94 %    Physical Exam  Vitals and nursing note reviewed.   Constitutional:       General: He is not in acute distress.     Appearance: Normal appearance. He is not ill-appearing, toxic-appearing or diaphoretic.   HENT:      Head: Normocephalic and atraumatic.      Nose: No  congestion or rhinorrhea.      Mouth/Throat:      Pharynx: No posterior oropharyngeal erythema.   Eyes:      General: No scleral icterus.        Right eye: No discharge.   Neck:      Vascular: Hepatojugular reflux and JVD present.   Cardiovascular:      Rate and Rhythm: Normal rate and regular rhythm.      Pulses: Normal pulses.      Heart sounds: No murmur heard.    No friction rub. No gallop.   Pulmonary:      Effort: No respiratory distress.      Breath sounds: No stridor. Rales present. No wheezing or rhonchi.   Abdominal:      General: There is distension.      Tenderness: There is no abdominal tenderness.   Musculoskeletal:         General: No swelling, tenderness, deformity or signs of injury.      Cervical back: Normal range of motion.      Right lower leg: Edema present.      Left lower leg: Edema present.   Skin:     Capillary Refill: Capillary refill takes more than 3 seconds.      Coloration: Skin is not jaundiced or pale.      Findings: No bruising, erythema, lesion or rash.   Neurological:      General: No focal deficit present.      Mental Status: He is alert and oriented to person, place, and time.       Fluids    Intake/Output Summary (Last 24 hours) at 12/7/2022 1744  Last data filed at 12/7/2022 1140  Gross per 24 hour   Intake 480 ml   Output 1000 ml   Net -520 ml       Laboratory  Recent Labs     12/05/22  0614   WBC 13.7*   RBC 4.22*   HEMOGLOBIN 14.1   HEMATOCRIT 39.0*   MCV 92.4   MCH 33.4*   MCHC 36.2*   RDW 44.5   PLATELETCT 89*   MPV 10.1     Recent Labs     12/06/22  1717 12/07/22  0058 12/07/22  0901   SODIUM 124* 124* 125*   POTASSIUM 3.3* 3.1* 3.2*   CHLORIDE 86* 89* 89*   CO2 17* 19* 18*   GLUCOSE 252* 239* 199*   * 102* 102*   CREATININE 8.06* 8.36* 7.99*   CALCIUM 8.0* 7.8* 7.9*     Recent Labs     12/04/22  2127   INR 1.33*               Imaging  IR-YOVANY MANTILLA PLACEMENT >5   Final Result      Successful image guided tunneled dialysis catheter placement.      Plan: The  catheter is available for immediate use.            DX-TIBIA AND FIBULA LEFT   Final Result      No radiographic evidence of acute bony abnormality      Skin thickening and edema with some soft tissue calcifications most likely indicates venous stasis. Cellulitis is a possibility and there is no evidence of soft tissue gas. Dermatomyositis can be seen with soft tissue calcifications but these are not as    extensive as that often entails      EC-ECHOCARDIOGRAM COMPLETE W/ CONT   Final Result      US-RENAL   Final Result      No evidence solid renal mass or hydronephrosis.             Assessment/Plan  * KENRICK (acute kidney injury) ([facility])- (present on admission)  Assessment & Plan  Patient has stage IIIa chronic kidney disease with GFR normally around 40.  Acuity of onset of the patient's KENRICK is uncertain but would appear to have been in the last week or 2 based on clinical history.  He is effectively anuretic based on reports from the transferring facility where he was resuscitated with 7 L of crystalloid and only urinated 150 mL in 24 hours  Vargas catheter  Follow urine output and BMP  UA  Nephrology consultation  Renally dose medications as appropriate  Stat BMP to confirm no emergent electrolyte abnormalities  12/7/2022:  Patient has not had renal recovery appreciate nephrology recommendations.  Patient had tunneled dialysis catheter placed underwent prescribed hemodialysis today.    Acute hemodialysis patient ([facility])  Assessment & Plan  Patient with KENRICK on CKD with rapid deterioration without renal recovery requiring acute hemodialysis during hospitalization.  Appreciate nephrology recommendations patient had tunneled dialysis catheter placed.  Patient will require outpatient hemodialysis chair to be scheduled and set up.  Patient tolerated first round hemodialysis without issue.    ESRD (end stage renal disease) on dialysis ([facility])  Assessment & Plan  Patient received tunneled dialysis catheter today tolerated  procedure well without issue.  Patient is subsequently undergone hemodialysis.  Nephrology recommendations appreciated.  We will continue to follow along closely.  Patient will require coordination of outpatient hemodialysis chair.  Elects to continue to stay in hospital and undergo several rounds of hemodialysis.  Continue with present antimicrobial dosing.  Appreciate pharmacy recommendations regards to antimicrobial doses.    Cellulitis of left lower extremity  Assessment & Plan  In setting of diabetes  Patient was treated with clindamycin and cefepime however remains afebrile  Repeating blood cultures  Changed to piperacillin tazobactam and linezolid  Check MRSA nares if this is negative we can stop his linezolid  Deyvi with sharpie and monitor closely  Ultrasound the transferring facility was negative for DVT or deep tissue infection  Patient's distal neurovascular status is intact    Sepsis (HCC)  Assessment & Plan  This is Sepsis Present on admission  SIRS criteria identified on my evaluation include: Fever, with temperature greater than 101 deg F and Tachycardia, with heart rate greater than 90 BPM  Source is skin  Sepsis protocol initiated  Fluid resuscitation ordered per protocol  Crystalloid Fluid Administration: Patient already received adequate fluid resuscitation at the transferring facility and given that he is an uric further aggressive IV fluids are not indicated  IV antibiotics as appropriate for source of sepsis  Reassessment: I have reassessed the patient's hemodynamic status    Follow culture results both here and from the transferring facility  Empiric piperacillin tazobactam          Alcohol abuse  Assessment & Plan  Monitor for signs of withdrawal    Hyponatremia  Assessment & Plan  Likely hypovolemic in setting of chronic kidney disease  Monitor every 6  122 is the last reading from the transferring facility    Uncomplicated type 2 diabetes mellitus (HCC)- (present on admission)  Assessment &  Plan  Start on insulin sliding scale with serial Accu-Checks  hemoglobin A1c 8.5  Hypoglycemic protocol in place        Benign essential hypertension- (present on admission)  Assessment & Plan  Holding home losartan    Mitral valve prolapse- (present on admission)  Assessment & Plan  Ordered cardiac echo     Please note that this dictation was created using voice recognition software. I have made every reasonable attempt to correct obvious errors, but I expect that there are errors of grammar and possibly context that I did not discover before finalizing the note.    VTE prophylaxis: SCDs/TEDs    I have performed a physical exam and reviewed and updated ROS and Plan today (12/7/2022). In review of yesterday's note (12/6/2022), there are no changes except as documented above.

## 2022-12-08 NOTE — PROGRESS NOTES
Report received and assumed patient care. Pt A&O x 4 and on 1L, saturating 96 %. No signs of distress noted at this time. Pt has tele box in place. Pt updated on plan of care for the day and has call light within reach. Fall precautions are in place.

## 2022-12-08 NOTE — PROGRESS NOTES
[facility] Progress Note      HD #1 today x 2 hours per Dr. Gutierrez.   Initiated at 1703 and ended at 1903.     Assessment:    Patient alert and oriented x4. Not in distress, no SOB, no chest pain. Education provided regarding dialysis. Pt understood. R chest catheter newly placed in IR. Placement verified by Dr. Kasper. CDI.     Access used: Right Permacath      Net Fluid Removed: 1,000 mL      Procedure Events/ Complications:   Tolerated treatment. -120's systolic.       Post Access Care:   Blood returned. Flushed with NS.  CVC locked with Heparin 1000 units/ mL   Arterial port:  1.8 mL   Venous port: 1.9 mL     Dressing change: initial dressing         Report given to Primary ELIDIA Vivar RN.

## 2022-12-09 LAB
ALBUMIN SERPL BCP-MCNC: 2.4 G/DL (ref 3.2–4.9)
ALBUMIN/GLOB SERPL: 0.6 G/DL
ALP SERPL-CCNC: 422 U/L (ref 30–99)
ALT SERPL-CCNC: 33 U/L (ref 2–50)
ANION GAP SERPL CALC-SCNC: 14 MMOL/L (ref 7–16)
AST SERPL-CCNC: 40 U/L (ref 12–45)
BACTERIA BLD CULT: NORMAL
BACTERIA BLD CULT: NORMAL
BILIRUB SERPL-MCNC: 1.2 MG/DL (ref 0.1–1.5)
BUN SERPL-MCNC: 63 MG/DL (ref 8–22)
CALCIUM SERPL-MCNC: 8.1 MG/DL (ref 8.5–10.5)
CHLORIDE SERPL-SCNC: 93 MMOL/L (ref 96–112)
CO2 SERPL-SCNC: 22 MMOL/L (ref 20–33)
CREAT SERPL-MCNC: 6.27 MG/DL (ref 0.5–1.4)
GAMMA INTERFERON BACKGROUND BLD IA-ACNC: 0.09 IU/ML
GFR SERPLBLD CREATININE-BSD FMLA CKD-EPI: 9 ML/MIN/1.73 M 2
GLOBULIN SER CALC-MCNC: 3.9 G/DL (ref 1.9–3.5)
GLUCOSE BLD STRIP.AUTO-MCNC: 209 MG/DL (ref 65–99)
GLUCOSE BLD STRIP.AUTO-MCNC: 252 MG/DL (ref 65–99)
GLUCOSE BLD STRIP.AUTO-MCNC: 256 MG/DL (ref 65–99)
GLUCOSE BLD STRIP.AUTO-MCNC: 262 MG/DL (ref 65–99)
GLUCOSE SERPL-MCNC: 224 MG/DL (ref 65–99)
M TB IFN-G BLD-IMP: NEGATIVE
M TB IFN-G CD4+ BCKGRND COR BLD-ACNC: -0.01 IU/ML
MITOGEN IGNF BCKGRD COR BLD-ACNC: 0.5 IU/ML
POTASSIUM SERPL-SCNC: 3.4 MMOL/L (ref 3.6–5.5)
PROT SERPL-MCNC: 6.3 G/DL (ref 6–8.2)
QFT TB2 - NIL TBQ2: 0.01 IU/ML
SIGNIFICANT IND 70042: NORMAL
SIGNIFICANT IND 70042: NORMAL
SITE SITE: NORMAL
SITE SITE: NORMAL
SODIUM SERPL-SCNC: 129 MMOL/L (ref 135–145)
SOURCE SOURCE: NORMAL
SOURCE SOURCE: NORMAL

## 2022-12-09 PROCEDURE — 94664 DEMO&/EVAL PT USE INHALER: CPT

## 2022-12-09 PROCEDURE — 700111 HCHG RX REV CODE 636 W/ 250 OVERRIDE (IP): Performed by: HOSPITALIST

## 2022-12-09 PROCEDURE — 770020 HCHG ROOM/CARE - TELE (206)

## 2022-12-09 PROCEDURE — 99233 SBSQ HOSP IP/OBS HIGH 50: CPT | Performed by: STUDENT IN AN ORGANIZED HEALTH CARE EDUCATION/TRAINING PROGRAM

## 2022-12-09 PROCEDURE — 700102 HCHG RX REV CODE 250 W/ 637 OVERRIDE(OP): Performed by: HOSPITALIST

## 2022-12-09 PROCEDURE — 36415 COLL VENOUS BLD VENIPUNCTURE: CPT

## 2022-12-09 PROCEDURE — 700102 HCHG RX REV CODE 250 W/ 637 OVERRIDE(OP): Performed by: STUDENT IN AN ORGANIZED HEALTH CARE EDUCATION/TRAINING PROGRAM

## 2022-12-09 PROCEDURE — 700111 HCHG RX REV CODE 636 W/ 250 OVERRIDE (IP): Performed by: INTERNAL MEDICINE

## 2022-12-09 PROCEDURE — 99233 SBSQ HOSP IP/OBS HIGH 50: CPT | Performed by: INTERNAL MEDICINE

## 2022-12-09 PROCEDURE — A9270 NON-COVERED ITEM OR SERVICE: HCPCS | Performed by: STUDENT IN AN ORGANIZED HEALTH CARE EDUCATION/TRAINING PROGRAM

## 2022-12-09 PROCEDURE — 80053 COMPREHEN METABOLIC PANEL: CPT

## 2022-12-09 PROCEDURE — A9270 NON-COVERED ITEM OR SERVICE: HCPCS | Performed by: HOSPITALIST

## 2022-12-09 PROCEDURE — 700111 HCHG RX REV CODE 636 W/ 250 OVERRIDE (IP): Performed by: STUDENT IN AN ORGANIZED HEALTH CARE EDUCATION/TRAINING PROGRAM

## 2022-12-09 PROCEDURE — 82962 GLUCOSE BLOOD TEST: CPT

## 2022-12-09 PROCEDURE — 86480 TB TEST CELL IMMUN MEASURE: CPT

## 2022-12-09 RX ORDER — POTASSIUM CHLORIDE 20 MEQ/1
20 TABLET, EXTENDED RELEASE ORAL ONCE
Status: COMPLETED | OUTPATIENT
Start: 2022-12-09 | End: 2022-12-09

## 2022-12-09 RX ADMIN — POTASSIUM CHLORIDE 20 MEQ: 1500 TABLET, EXTENDED RELEASE ORAL at 09:56

## 2022-12-09 RX ADMIN — HEPARIN SODIUM 5000 UNITS: 5000 INJECTION, SOLUTION INTRAVENOUS; SUBCUTANEOUS at 22:38

## 2022-12-09 RX ADMIN — INSULIN HUMAN 5 UNITS: 100 INJECTION, SOLUTION PARENTERAL at 22:12

## 2022-12-09 RX ADMIN — HYDROCODONE BITARTRATE AND ACETAMINOPHEN 1 TABLET: 5; 325 TABLET ORAL at 20:39

## 2022-12-09 RX ADMIN — PROMETHAZINE HYDROCHLORIDE 25 MG: 25 TABLET ORAL at 20:39

## 2022-12-09 RX ADMIN — HEPARIN SODIUM 5000 UNITS: 5000 INJECTION, SOLUTION INTRAVENOUS; SUBCUTANEOUS at 13:43

## 2022-12-09 RX ADMIN — SODIUM BICARBONATE 650 MG: 650 TABLET ORAL at 06:22

## 2022-12-09 RX ADMIN — FUROSEMIDE 40 MG: 10 INJECTION, SOLUTION INTRAMUSCULAR; INTRAVENOUS at 06:22

## 2022-12-09 RX ADMIN — INSULIN HUMAN 5 UNITS: 100 INJECTION, SOLUTION PARENTERAL at 13:43

## 2022-12-09 RX ADMIN — AMLODIPINE BESYLATE 10 MG: 10 TABLET ORAL at 17:30

## 2022-12-09 RX ADMIN — INSULIN HUMAN 3 UNITS: 100 INJECTION, SOLUTION PARENTERAL at 08:16

## 2022-12-09 RX ADMIN — TAMSULOSIN HYDROCHLORIDE 0.4 MG: 0.4 CAPSULE ORAL at 08:12

## 2022-12-09 RX ADMIN — INSULIN HUMAN 5 UNITS: 100 INJECTION, SOLUTION PARENTERAL at 17:30

## 2022-12-09 RX ADMIN — SODIUM BICARBONATE 650 MG: 650 TABLET ORAL at 17:30

## 2022-12-09 RX ADMIN — OMEPRAZOLE 40 MG: 20 CAPSULE, DELAYED RELEASE ORAL at 06:22

## 2022-12-09 RX ADMIN — HEPARIN SODIUM 5000 UNITS: 5000 INJECTION, SOLUTION INTRAVENOUS; SUBCUTANEOUS at 06:22

## 2022-12-09 RX ADMIN — ROSUVASTATIN CALCIUM 10 MG: 20 TABLET, FILM COATED ORAL at 23:10

## 2022-12-09 RX ADMIN — CEFAZOLIN SODIUM 1 G: 1 INJECTION, SOLUTION INTRAVENOUS at 08:14

## 2022-12-09 ASSESSMENT — ENCOUNTER SYMPTOMS
MYALGIAS: 0
ABDOMINAL PAIN: 0
EYE PAIN: 0
NECK PAIN: 0
PND: 0
PHOTOPHOBIA: 0
WHEEZING: 0
CLAUDICATION: 0
DOUBLE VISION: 0
HEARTBURN: 0
PALPITATIONS: 0
FLANK PAIN: 0
ROS SKIN COMMENTS: LLE CELLULITIS
NAUSEA: 1
DEPRESSION: 0
HALLUCINATIONS: 0
EYES NEGATIVE: 1
BRUISES/BLEEDS EASILY: 0
VOMITING: 0
CHILLS: 0
TREMORS: 0
DIZZINESS: 0
CONSTIPATION: 0
DIARRHEA: 0
SHORTNESS OF BREATH: 0
WEIGHT LOSS: 0
SPUTUM PRODUCTION: 0
WEAKNESS: 0
HEMOPTYSIS: 0
NAUSEA: 0
COUGH: 0
BACK PAIN: 0
POLYDIPSIA: 0
BLOOD IN STOOL: 0
TINGLING: 0
ORTHOPNEA: 0
FEVER: 0
STRIDOR: 0
BLURRED VISION: 0
SORE THROAT: 0
DIAPHORESIS: 0
SINUS PAIN: 0
FALLS: 0
HEADACHES: 0

## 2022-12-09 ASSESSMENT — PAIN DESCRIPTION - PAIN TYPE
TYPE: ACUTE PAIN
TYPE: ACUTE PAIN

## 2022-12-09 ASSESSMENT — LIFESTYLE VARIABLES: SUBSTANCE_ABUSE: 0

## 2022-12-09 ASSESSMENT — FIBROSIS 4 INDEX: FIB4 SCORE: 2.62

## 2022-12-09 NOTE — CARE PLAN
The patient is Stable - Low risk of patient condition declining or worsening    Shift Goals  Clinical Goals: Pain control, ABX  Patient Goals: pain control and shower  Family Goals: SANJUANA    Progress made toward(s) clinical / shift goals:    Problem: Knowledge Deficit - Standard  Goal: Patient and family/care givers will demonstrate understanding of plan of care, disease process/condition, diagnostic tests and medications  Outcome: Progressing     Problem: Pain - Standard  Goal: Alleviation of pain or a reduction in pain to the patient’s comfort goal  Outcome: Progressing     Problem: Hemodynamics  Goal: Patient's hemodynamics, fluid balance and neurologic status will be stable or improve  Outcome: Progressing     Problem: Fluid Volume  Goal: Fluid volume balance will be maintained  Outcome: Progressing     Problem: Urinary - Renal Perfusion  Goal: Ability to achieve and maintain adequate renal perfusion and functioning will improve  Outcome: Progressing     Problem: Respiratory  Goal: Patient will achieve/maintain optimum respiratory ventilation and gas exchange  Outcome: Progressing     Problem: Mechanical Ventilation  Goal: Safe management of artificial airway and ventilation  Outcome: Progressing  Goal: Successful weaning off mechanical ventilator, spontaneously maintains adequate gas exchange  Outcome: Progressing  Goal: Patient will be able to express needs and understand communication  Outcome: Progressing     Problem: Physical Regulation  Goal: Diagnostic test results will improve  Outcome: Progressing  Goal: Signs and symptoms of infection will decrease  Outcome: Progressing     Problem: Fall Risk  Goal: Patient will remain free from falls  Outcome: Progressing     Problem: Skin Integrity  Goal: Skin integrity is maintained or improved  Outcome: Progressing       Patient is not progressing towards the following goals:

## 2022-12-09 NOTE — PROGRESS NOTES
Hospital Medicine Daily Progress Note    Date of Service  12/8/2022    Chief Complaint  Thomas Borja is a 62 y.o. male admitted 12/4/2022 with a previous history of type 2 diabetes, stage IIIa chronic kidney disease with a GFR that lives around 40, hypertension, alcohol use, recurrent cellulitis, hypothyroidism, mitral valve prolapse he presents to the hospital for left leg cellulitis and acute renal failure.  Patient was taking metformin and NSAIDs at home.    Hospital Course  Patient arrived to the hospital tachycardic, febrile and tachypneic.  He was found to have sodium of 124, chloride 95, bicarb 13, creatinine 6.3, calcium 7.6, lactic acid 2.7.  Patient was started on IV bicarbonate.    Interval Problem Update  12/5: There is no significant improvement in patient's creatinine.  Waiting for further recommendations from nephrology.  Wound cultures have been ordered on the left leg.  There is improvement in cellulitis.  12/6/2022:  Continue present medical management appreciate nephrology recommendations with regards to hyponatremia.  Continue with daily labs.  No acute events overnight.  Additionally, patient also had positive culture growth of strep pyogenes group A.  Antimicrobial spectrum has been narrowed.  Patient also had growth of staph aureus non-MRSA.  Patient has been transitioned broad-spectrum antimicrobials to cefazolin at present.  Pharmacy recommendations appreciated.  12/7/2022:  Patient underwent tunneled dialysis catheter placement today with interventional radiology tolerated procedure well without issue.  Patient subsequently went hemodialysis without issue.  Continue to follow closely with nephrology recommendations are appreciated.  Patient will require coordination for outpatient hemodialysis chair.  Anticipate 2 to 3 days prior to discharge.  12/8/2022:  Continue present medical management patient underwent hemodialysis tolerated procedure well without issue.  Continue present  antimicrobials.  Patient would benefit from further volume removal.  I have discussed this patient's plan of care and discharge plan at IDT rounds today with Case Management, Nursing, Nursing leadership, and other members of the IDT team.    Consultants/Specialty  nephrology    Code Status  Full Code    Disposition  Patient is not medically cleared for discharge.   Anticipate discharge to to home with close outpatient follow-up.  I have placed the appropriate orders for post-discharge needs.    Review of Systems  Review of Systems   Constitutional:  Positive for malaise/fatigue. Negative for chills, diaphoresis and fever.   HENT:  Negative for congestion, ear discharge, ear pain, hearing loss, nosebleeds, sinus pain, sore throat and tinnitus.    Eyes:  Negative for blurred vision, double vision, photophobia and pain.   Respiratory:  Negative for cough, hemoptysis, sputum production, shortness of breath, wheezing and stridor.    Cardiovascular:  Positive for leg swelling. Negative for chest pain, palpitations, orthopnea, claudication and PND.   Gastrointestinal:  Positive for nausea. Negative for abdominal pain, blood in stool, constipation, diarrhea, heartburn, melena and vomiting.   Genitourinary:  Negative for dysuria, flank pain, frequency, hematuria and urgency.   Musculoskeletal:  Negative for back pain, falls, joint pain, myalgias and neck pain.   Skin:  Negative for itching and rash.   Neurological:  Negative for dizziness, tingling, tremors, weakness and headaches.   Endo/Heme/Allergies:  Negative for environmental allergies and polydipsia. Does not bruise/bleed easily.   Psychiatric/Behavioral:  Negative for depression, hallucinations, substance abuse and suicidal ideas.       Physical Exam  Temp:  [36.3 °C (97.3 °F)-36.7 °C (98.1 °F)] 36.7 °C (98.1 °F)  Pulse:  [75-89] 83  Resp:  [16-19] 18  BP: (112-147)/(56-72) 120/62  SpO2:  [94 %-97 %] 94 %    Physical Exam  Vitals and nursing note reviewed.    Constitutional:       General: He is not in acute distress.     Appearance: Normal appearance. He is not ill-appearing, toxic-appearing or diaphoretic.   HENT:      Head: Normocephalic and atraumatic.      Nose: No congestion or rhinorrhea.      Mouth/Throat:      Pharynx: No posterior oropharyngeal erythema.   Eyes:      General: No scleral icterus.        Right eye: No discharge.   Neck:      Vascular: Hepatojugular reflux and JVD present.   Cardiovascular:      Rate and Rhythm: Normal rate and regular rhythm.      Pulses: Normal pulses.      Heart sounds: No murmur heard.    No friction rub. No gallop.   Pulmonary:      Effort: No respiratory distress.      Breath sounds: No stridor. Rales present. No wheezing or rhonchi.   Abdominal:      General: There is distension.      Tenderness: There is no abdominal tenderness.   Musculoskeletal:         General: No swelling, tenderness, deformity or signs of injury.      Cervical back: Normal range of motion.      Right lower leg: Edema present.      Left lower leg: Edema present.   Skin:     Capillary Refill: Capillary refill takes more than 3 seconds.      Coloration: Skin is not jaundiced or pale.      Findings: No bruising, erythema, lesion or rash.   Neurological:      General: No focal deficit present.      Mental Status: He is alert and oriented to person, place, and time.       Fluids    Intake/Output Summary (Last 24 hours) at 12/8/2022 1718  Last data filed at 12/8/2022 1400  Gross per 24 hour   Intake 3273.65 ml   Output 6600 ml   Net -3326.35 ml         Laboratory  Recent Labs     12/08/22  0722   WBC 17.1*   RBC 4.05*   HEMOGLOBIN 13.2*   HEMATOCRIT 37.0*   MCV 91.4   MCH 32.6   MCHC 35.7*   RDW 43.3   PLATELETCT 170   MPV 10.2       Recent Labs     12/07/22  0058 12/07/22  0901 12/08/22  0722   SODIUM 124* 125* 130*   POTASSIUM 3.1* 3.2* 3.3*   CHLORIDE 89* 89* 92*   CO2 19* 18* 22   GLUCOSE 239* 199* 209*   * 102* 87*   CREATININE 8.36* 7.99*  7.79*   CALCIUM 7.8* 7.9* 7.9*                       Imaging  IR-MANTILLA,ODALYSSHONG PLACEMENT >5   Final Result      Successful image guided tunneled dialysis catheter placement.      Plan: The catheter is available for immediate use.            DX-TIBIA AND FIBULA LEFT   Final Result      No radiographic evidence of acute bony abnormality      Skin thickening and edema with some soft tissue calcifications most likely indicates venous stasis. Cellulitis is a possibility and there is no evidence of soft tissue gas. Dermatomyositis can be seen with soft tissue calcifications but these are not as    extensive as that often entails      EC-ECHOCARDIOGRAM COMPLETE W/ CONT   Final Result      US-RENAL   Final Result      No evidence solid renal mass or hydronephrosis.             Assessment/Plan  * KENRICK (acute kidney injury) (HCC)- (present on admission)  Assessment & Plan  Patient has stage IIIa chronic kidney disease with GFR normally around 40.  Acuity of onset of the patient's KENRICK is uncertain but would appear to have been in the last week or 2 based on clinical history.  He is effectively anuretic based on reports from the transferring facility where he was resuscitated with 7 L of crystalloid and only urinated 150 mL in 24 hours  Vargas catheter  Follow urine output and BMP  UA  Nephrology consultation  Renally dose medications as appropriate  Stat BMP to confirm no emergent electrolyte abnormalities  12/7/2022:  Patient has not had renal recovery appreciate nephrology recommendations.  Patient had tunneled dialysis catheter placed underwent prescribed hemodialysis today.    Acute hemodialysis patient (HCC)  Assessment & Plan  Patient with KENRICK on CKD with rapid deterioration without renal recovery requiring acute hemodialysis during hospitalization.  Appreciate nephrology recommendations patient had tunneled dialysis catheter placed.  Patient will require outpatient hemodialysis chair to be scheduled and set up.   Patient tolerated first round hemodialysis without issue.    ESRD (end stage renal disease) on dialysis (HCC)  Assessment & Plan  Patient received tunneled dialysis catheter today tolerated procedure well without issue.  Patient is subsequently undergone hemodialysis.  Nephrology recommendations appreciated.  We will continue to follow along closely.  Patient will require coordination of outpatient hemodialysis chair.  Elects to continue to stay in hospital and undergo several rounds of hemodialysis.  Continue with present antimicrobial dosing.  Appreciate pharmacy recommendations regards to antimicrobial doses.    Cellulitis of left lower extremity  Assessment & Plan  In setting of diabetes  Patient was treated with clindamycin and cefepime however remains afebrile  Repeating blood cultures  Changed to piperacillin tazobactam and linezolid  Check MRSA nares if this is negative we can stop his linezolid  Deyvi with sharpie and monitor closely  Ultrasound the transferring facility was negative for DVT or deep tissue infection  Patient's distal neurovascular status is intact    Sepsis ([facility])  Assessment & Plan  This is Sepsis Present on admission  SIRS criteria identified on my evaluation include: Fever, with temperature greater than 101 deg F and Tachycardia, with heart rate greater than 90 BPM  Source is skin  Sepsis protocol initiated  Fluid resuscitation ordered per protocol  Crystalloid Fluid Administration: Patient already received adequate fluid resuscitation at the transferring facility and given that he is an uric further aggressive IV fluids are not indicated  IV antibiotics as appropriate for source of sepsis  Reassessment: I have reassessed the patient's hemodynamic status    Follow culture results both here and from the transferring facility  Empiric piperacillin tazobactam          Alcohol abuse  Assessment & Plan  Monitor for signs of withdrawal    Hyponatremia  Assessment & Plan  Likely hypovolemic in  setting of chronic kidney disease  Monitor every 6  122 is the last reading from the transferring facility    Uncomplicated type 2 diabetes mellitus (HCC)- (present on admission)  Assessment & Plan  Start on insulin sliding scale with serial Accu-Checks  hemoglobin A1c 8.5  Hypoglycemic protocol in place        Benign essential hypertension- (present on admission)  Assessment & Plan  Holding home losartan    Mitral valve prolapse- (present on admission)  Assessment & Plan  Ordered cardiac echo       Please note that this dictation was created using voice recognition software. I have made every reasonable attempt to correct obvious errors, but I expect that there are errors of grammar and possibly context that I did not discover before finalizing the note.    VTE prophylaxis: SCDs/TEDs    I have performed a physical exam and reviewed and updated ROS and Plan today (12/8/2022). In review of yesterday's note (12/7/2022), there are no changes except as documented above.

## 2022-12-09 NOTE — RESPIRATORY CARE
COPD EDUCATION by COPD CLINICAL EDUCATOR  12/9/2022 at 10:41 AM by Sulema Denis RRT     Patient reviewed and interviewed by COPD education team. Patient states he has a history of ZHANNA and Exercise Induced Asthma. Patient does not currently see a Pulmonary Sleep Medicine doctor, provided list of local Pulmonary clinics. Patient also states he does not have a current prescription for Albuterol inhaler, so we will request from attending MD. Patient provided with spacer with instruction. Patient does not have a formal diagnosis history of COPD, therefore the patient does not qualify for the COPD program.

## 2022-12-09 NOTE — DIETARY
Nutrition Services Brief Update:    Day 5 of admit.  Thomas Borja is a 62 y.o. male with admitting DX of KENRICK.     Current Diet: Renal, Consistent CHO. Per ADL, PO intake variable and some meal records missing but took % of last meal (breakfast yesterday)     Problem: Nutritional:  Goal: Achieve adequate nutritional intake  Description: Patient will consume >50% of meals  Outcome: Progressing, will monitor for consistently adequate intake. Please document all PO intake in ADL to properly assess intake.      Pt aware of referral and pt was giving the Dr number

## 2022-12-10 LAB
ALBUMIN SERPL BCP-MCNC: 2.5 G/DL (ref 3.2–4.9)
ALBUMIN/GLOB SERPL: 0.6 G/DL
ALP SERPL-CCNC: 478 U/L (ref 30–99)
ALT SERPL-CCNC: 22 U/L (ref 2–50)
ANION GAP SERPL CALC-SCNC: 12 MMOL/L (ref 7–16)
AST SERPL-CCNC: 30 U/L (ref 12–45)
BILIRUB SERPL-MCNC: 1 MG/DL (ref 0.1–1.5)
BUN SERPL-MCNC: 68 MG/DL (ref 8–22)
CALCIUM SERPL-MCNC: 8 MG/DL (ref 8.5–10.5)
CHLORIDE SERPL-SCNC: 93 MMOL/L (ref 96–112)
CO2 SERPL-SCNC: 24 MMOL/L (ref 20–33)
CREAT SERPL-MCNC: 6.61 MG/DL (ref 0.5–1.4)
GFR SERPLBLD CREATININE-BSD FMLA CKD-EPI: 9 ML/MIN/1.73 M 2
GLOBULIN SER CALC-MCNC: 3.9 G/DL (ref 1.9–3.5)
GLUCOSE BLD STRIP.AUTO-MCNC: 197 MG/DL (ref 65–99)
GLUCOSE BLD STRIP.AUTO-MCNC: 198 MG/DL (ref 65–99)
GLUCOSE BLD STRIP.AUTO-MCNC: 222 MG/DL (ref 65–99)
GLUCOSE BLD STRIP.AUTO-MCNC: 310 MG/DL (ref 65–99)
GLUCOSE SERPL-MCNC: 161 MG/DL (ref 65–99)
POTASSIUM SERPL-SCNC: 3.4 MMOL/L (ref 3.6–5.5)
PROT SERPL-MCNC: 6.4 G/DL (ref 6–8.2)
SODIUM SERPL-SCNC: 129 MMOL/L (ref 135–145)

## 2022-12-10 PROCEDURE — 99233 SBSQ HOSP IP/OBS HIGH 50: CPT | Performed by: INTERNAL MEDICINE

## 2022-12-10 PROCEDURE — 700111 HCHG RX REV CODE 636 W/ 250 OVERRIDE (IP): Performed by: STUDENT IN AN ORGANIZED HEALTH CARE EDUCATION/TRAINING PROGRAM

## 2022-12-10 PROCEDURE — 700111 HCHG RX REV CODE 636 W/ 250 OVERRIDE (IP): Performed by: HOSPITALIST

## 2022-12-10 PROCEDURE — 770020 HCHG ROOM/CARE - TELE (206)

## 2022-12-10 PROCEDURE — 99233 SBSQ HOSP IP/OBS HIGH 50: CPT | Performed by: STUDENT IN AN ORGANIZED HEALTH CARE EDUCATION/TRAINING PROGRAM

## 2022-12-10 PROCEDURE — 700102 HCHG RX REV CODE 250 W/ 637 OVERRIDE(OP): Performed by: HOSPITALIST

## 2022-12-10 PROCEDURE — 36415 COLL VENOUS BLD VENIPUNCTURE: CPT

## 2022-12-10 PROCEDURE — A9270 NON-COVERED ITEM OR SERVICE: HCPCS | Performed by: HOSPITALIST

## 2022-12-10 PROCEDURE — 700102 HCHG RX REV CODE 250 W/ 637 OVERRIDE(OP): Performed by: STUDENT IN AN ORGANIZED HEALTH CARE EDUCATION/TRAINING PROGRAM

## 2022-12-10 PROCEDURE — 82962 GLUCOSE BLOOD TEST: CPT

## 2022-12-10 PROCEDURE — A9270 NON-COVERED ITEM OR SERVICE: HCPCS | Performed by: STUDENT IN AN ORGANIZED HEALTH CARE EDUCATION/TRAINING PROGRAM

## 2022-12-10 PROCEDURE — 80053 COMPREHEN METABOLIC PANEL: CPT

## 2022-12-10 PROCEDURE — 700111 HCHG RX REV CODE 636 W/ 250 OVERRIDE (IP): Performed by: INTERNAL MEDICINE

## 2022-12-10 RX ORDER — POTASSIUM CHLORIDE 20 MEQ/1
40 TABLET, EXTENDED RELEASE ORAL ONCE
Status: COMPLETED | OUTPATIENT
Start: 2022-12-10 | End: 2022-12-10

## 2022-12-10 RX ADMIN — HYDROCODONE BITARTRATE AND ACETAMINOPHEN 1 TABLET: 5; 325 TABLET ORAL at 20:18

## 2022-12-10 RX ADMIN — PROMETHAZINE HYDROCHLORIDE 12.5 MG: 25 TABLET ORAL at 05:41

## 2022-12-10 RX ADMIN — INSULIN HUMAN 6 UNITS: 100 INJECTION, SOLUTION PARENTERAL at 21:23

## 2022-12-10 RX ADMIN — PROMETHAZINE HYDROCHLORIDE 12.5 MG: 25 TABLET ORAL at 20:18

## 2022-12-10 RX ADMIN — INSULIN HUMAN 3 UNITS: 100 INJECTION, SOLUTION PARENTERAL at 17:25

## 2022-12-10 RX ADMIN — OMEPRAZOLE 40 MG: 20 CAPSULE, DELAYED RELEASE ORAL at 05:32

## 2022-12-10 RX ADMIN — HEPARIN SODIUM 5000 UNITS: 5000 INJECTION, SOLUTION INTRAVENOUS; SUBCUTANEOUS at 21:23

## 2022-12-10 RX ADMIN — SODIUM BICARBONATE 650 MG: 650 TABLET ORAL at 05:32

## 2022-12-10 RX ADMIN — HEPARIN SODIUM 5000 UNITS: 5000 INJECTION, SOLUTION INTRAVENOUS; SUBCUTANEOUS at 05:30

## 2022-12-10 RX ADMIN — CEFAZOLIN SODIUM 1 G: 1 INJECTION, SOLUTION INTRAVENOUS at 05:42

## 2022-12-10 RX ADMIN — POTASSIUM CHLORIDE 40 MEQ: 1500 TABLET, EXTENDED RELEASE ORAL at 12:17

## 2022-12-10 RX ADMIN — INSULIN HUMAN 2 UNITS: 100 INJECTION, SOLUTION PARENTERAL at 08:29

## 2022-12-10 RX ADMIN — TAMSULOSIN HYDROCHLORIDE 0.4 MG: 0.4 CAPSULE ORAL at 08:49

## 2022-12-10 RX ADMIN — ROSUVASTATIN CALCIUM 10 MG: 20 TABLET, FILM COATED ORAL at 21:22

## 2022-12-10 RX ADMIN — INSULIN HUMAN 2 UNITS: 100 INJECTION, SOLUTION PARENTERAL at 13:22

## 2022-12-10 RX ADMIN — FUROSEMIDE 40 MG: 10 INJECTION, SOLUTION INTRAMUSCULAR; INTRAVENOUS at 05:26

## 2022-12-10 RX ADMIN — HEPARIN SODIUM 5000 UNITS: 5000 INJECTION, SOLUTION INTRAVENOUS; SUBCUTANEOUS at 13:22

## 2022-12-10 RX ADMIN — AMLODIPINE BESYLATE 10 MG: 10 TABLET ORAL at 17:25

## 2022-12-10 RX ADMIN — HYDROCODONE BITARTRATE AND ACETAMINOPHEN 1 TABLET: 5; 325 TABLET ORAL at 05:40

## 2022-12-10 ASSESSMENT — ENCOUNTER SYMPTOMS
SHORTNESS OF BREATH: 0
DEPRESSION: 0
POLYDIPSIA: 0
WHEEZING: 0
HEADACHES: 0
HALLUCINATIONS: 0
SPUTUM PRODUCTION: 0
ORTHOPNEA: 0
MYALGIAS: 0
NECK PAIN: 0
DIZZINESS: 0
VOMITING: 0
SINUS PAIN: 0
BRUISES/BLEEDS EASILY: 0
CHILLS: 0
PHOTOPHOBIA: 0
FEVER: 0
EYES NEGATIVE: 1
STRIDOR: 0
FLANK PAIN: 0
FALLS: 0
BLURRED VISION: 0
SORE THROAT: 0
COUGH: 0
ROS SKIN COMMENTS: LLE CELLULITIS
HEARTBURN: 0
PALPITATIONS: 0
DIAPHORESIS: 0
ABDOMINAL PAIN: 0
BLOOD IN STOOL: 0
NAUSEA: 1
HEMOPTYSIS: 0
WEAKNESS: 0
TINGLING: 0
DIARRHEA: 0
DOUBLE VISION: 0
NAUSEA: 0
PND: 0
CLAUDICATION: 0
EYE PAIN: 0
WEIGHT LOSS: 0
TREMORS: 0
CONSTIPATION: 0
BACK PAIN: 0

## 2022-12-10 ASSESSMENT — FIBROSIS 4 INDEX: FIB4 SCORE: 2.41

## 2022-12-10 ASSESSMENT — PAIN DESCRIPTION - PAIN TYPE
TYPE: ACUTE PAIN

## 2022-12-10 ASSESSMENT — LIFESTYLE VARIABLES: SUBSTANCE_ABUSE: 0

## 2022-12-10 NOTE — PROGRESS NOTES
Hospital Medicine Daily Progress Note    Date of Service  12/10/2022    Chief Complaint  Thomas Borja is a 62 y.o. male admitted 12/4/2022 with a previous history of type 2 diabetes, stage IIIa chronic kidney disease with a GFR that lives around 40, hypertension, alcohol use, recurrent cellulitis, hypothyroidism, mitral valve prolapse he presents to the hospital for left leg cellulitis and acute renal failure.  Patient was taking metformin and NSAIDs at home.    Hospital Course  Patient arrived to the hospital tachycardic, febrile and tachypneic.  He was found to have sodium of 124, chloride 95, bicarb 13, creatinine 6.3, calcium 7.6, lactic acid 2.7.  Patient was started on IV bicarbonate.    Interval Problem Update  12/5: There is no significant improvement in patient's creatinine.  Waiting for further recommendations from nephrology.  Wound cultures have been ordered on the left leg.  There is improvement in cellulitis.  12/6/2022:  Continue present medical management appreciate nephrology recommendations with regards to hyponatremia.  Continue with daily labs.  No acute events overnight.  Additionally, patient also had positive culture growth of strep pyogenes group A.  Antimicrobial spectrum has been narrowed.  Patient also had growth of staph aureus non-MRSA.  Patient has been transitioned broad-spectrum antimicrobials to cefazolin at present.  Pharmacy recommendations appreciated.  12/7/2022:  Patient underwent tunneled dialysis catheter placement today with interventional radiology tolerated procedure well without issue.  Patient subsequently went hemodialysis without issue.  Continue to follow closely with nephrology recommendations are appreciated.  Patient will require coordination for outpatient hemodialysis chair.  Anticipate 2 to 3 days prior to discharge.  12/8/2022:  Continue present medical management patient underwent hemodialysis tolerated procedure well without issue.  Continue present  antimicrobials.  Patient would benefit from further volume removal.  12/9/2022:  Continue present medical management continue with dialysis schedule by nephrology recommendations are appreciated.  No acute events overnight continue with present antimicrobials.  12/10/2022:  Continue present medical management nephrology recommendations noted and appreciated.  Cautious optimism with regards to her renal recovery.  Patient did have good urinary output 2700 and last 24 hours.  Vargas catheter has been removed.  Patient will continue on Ancef for lower extremity cellulitis of left leg.  Is improving.  I have discussed this patient's plan of care and discharge plan at IDT rounds today with Case Management, Nursing, Nursing leadership, and other members of the IDT team.    Consultants/Specialty  nephrology    Code Status  Full Code    Disposition  Patient is not medically cleared for discharge.   Anticipate discharge to to home with close outpatient follow-up.  I have placed the appropriate orders for post-discharge needs.    Review of Systems  Review of Systems   Constitutional:  Positive for malaise/fatigue. Negative for chills, diaphoresis and fever.   HENT:  Negative for congestion, ear discharge, ear pain, hearing loss, nosebleeds, sinus pain, sore throat and tinnitus.    Eyes:  Negative for blurred vision, double vision, photophobia and pain.   Respiratory:  Negative for cough, hemoptysis, sputum production, shortness of breath, wheezing and stridor.    Cardiovascular:  Positive for leg swelling. Negative for chest pain, palpitations, orthopnea, claudication and PND.   Gastrointestinal:  Positive for nausea. Negative for abdominal pain, blood in stool, constipation, diarrhea, heartburn, melena and vomiting.   Genitourinary:  Negative for dysuria, flank pain, frequency, hematuria and urgency.   Musculoskeletal:  Negative for back pain, falls, joint pain, myalgias and neck pain.   Skin:  Negative for itching and rash.    Neurological:  Negative for dizziness, tingling, tremors, weakness and headaches.   Endo/Heme/Allergies:  Negative for environmental allergies and polydipsia. Does not bruise/bleed easily.   Psychiatric/Behavioral:  Negative for depression, hallucinations, substance abuse and suicidal ideas.       Physical Exam  Temp:  [36.6 °C (97.9 °F)-36.7 °C (98.1 °F)] 36.6 °C (97.9 °F)  Pulse:  [78-93] 83  Resp:  [16-18] 16  BP: (131-148)/(71-76) 148/72  SpO2:  [91 %-95 %] 94 %    Physical Exam  Vitals and nursing note reviewed.   Constitutional:       General: He is not in acute distress.     Appearance: Normal appearance. He is not ill-appearing, toxic-appearing or diaphoretic.   HENT:      Head: Normocephalic and atraumatic.      Nose: No congestion or rhinorrhea.      Mouth/Throat:      Pharynx: No posterior oropharyngeal erythema.   Eyes:      General: No scleral icterus.        Right eye: No discharge.   Neck:      Vascular: Hepatojugular reflux and JVD present.   Cardiovascular:      Rate and Rhythm: Normal rate and regular rhythm.      Pulses: Normal pulses.      Heart sounds: No murmur heard.    No friction rub. No gallop.   Pulmonary:      Effort: No respiratory distress.      Breath sounds: No stridor. Rales present. No wheezing or rhonchi.   Abdominal:      General: There is distension.      Tenderness: There is no abdominal tenderness.   Musculoskeletal:         General: No swelling, tenderness, deformity or signs of injury.      Cervical back: Normal range of motion.      Right lower leg: Edema present.      Left lower leg: Edema present.   Skin:     Capillary Refill: Capillary refill takes more than 3 seconds.      Coloration: Skin is not jaundiced or pale.      Findings: No bruising, erythema, lesion or rash.   Neurological:      General: No focal deficit present.      Mental Status: He is alert and oriented to person, place, and time.       Fluids    Intake/Output Summary (Last 24 hours) at 12/10/2022  1257  Last data filed at 12/10/2022 0800  Gross per 24 hour   Intake --   Output 3200 ml   Net -3200 ml         Laboratory  Recent Labs     12/08/22  0722   WBC 17.1*   RBC 4.05*   HEMOGLOBIN 13.2*   HEMATOCRIT 37.0*   MCV 91.4   MCH 32.6   MCHC 35.7*   RDW 43.3   PLATELETCT 170   MPV 10.2       Recent Labs     12/08/22  0722 12/09/22  0710 12/10/22  0246   SODIUM 130* 129* 129*   POTASSIUM 3.3* 3.4* 3.4*   CHLORIDE 92* 93* 93*   CO2 22 22 24   GLUCOSE 209* 224* 161*   BUN 87* 63* 68*   CREATININE 7.79* 6.27* 6.61*   CALCIUM 7.9* 8.1* 8.0*                       Imaging  IR-YOVANY MANTILLA PLACEMENT >5   Final Result      Successful image guided tunneled dialysis catheter placement.      Plan: The catheter is available for immediate use.            DX-TIBIA AND FIBULA LEFT   Final Result      No radiographic evidence of acute bony abnormality      Skin thickening and edema with some soft tissue calcifications most likely indicates venous stasis. Cellulitis is a possibility and there is no evidence of soft tissue gas. Dermatomyositis can be seen with soft tissue calcifications but these are not as    extensive as that often entails      EC-ECHOCARDIOGRAM COMPLETE W/ CONT   Final Result      US-RENAL   Final Result      No evidence solid renal mass or hydronephrosis.             Assessment/Plan  * KENRICK (acute kidney injury) (HCC)- (present on admission)  Assessment & Plan  Patient has stage IIIa chronic kidney disease with GFR normally around 40.  Acuity of onset of the patient's KENRICK is uncertain but would appear to have been in the last week or 2 based on clinical history.  He is effectively anuretic based on reports from the transferring facility where he was resuscitated with 7 L of crystalloid and only urinated 150 mL in 24 hours  Vargas catheter  Follow urine output and BMP  UA  Nephrology consultation  Renally dose medications as appropriate  Stat BMP to confirm no emergent electrolyte  abnormalities  12/7/2022:  Patient has not had renal recovery appreciate nephrology recommendations.  Patient had tunneled dialysis catheter placed underwent prescribed hemodialysis today.    Acute hemodialysis patient ([facility])  Assessment & Plan  Patient with KENRICK on CKD with rapid deterioration without renal recovery requiring acute hemodialysis during hospitalization.  Appreciate nephrology recommendations patient had tunneled dialysis catheter placed.  Patient will require outpatient hemodialysis chair to be scheduled and set up.  Patient tolerated first round hemodialysis without issue.    ESRD (end stage renal disease) on dialysis ([facility])  Assessment & Plan  Patient received tunneled dialysis catheter today tolerated procedure well without issue.  Patient is subsequently undergone hemodialysis.  Nephrology recommendations appreciated.  We will continue to follow along closely.  Patient will require coordination of outpatient hemodialysis chair.  Elects to continue to stay in hospital and undergo several rounds of hemodialysis.  Continue with present antimicrobial dosing.  Appreciate pharmacy recommendations regards to antimicrobial doses.    Cellulitis of left lower extremity  Assessment & Plan  In setting of diabetes  Patient was treated with clindamycin and cefepime however remains afebrile  Repeating blood cultures  Changed to piperacillin tazobactam and linezolid  Check MRSA nares if this is negative we can stop his linezolid  Deyvi with giovanny and monitor closely  Ultrasound the transferring facility was negative for DVT or deep tissue infection  Patient's distal neurovascular status is intact    Sepsis ([facility])  Assessment & Plan  This is Sepsis Present on admission  SIRS criteria identified on my evaluation include: Fever, with temperature greater than 101 deg F and Tachycardia, with heart rate greater than 90 BPM  Source is skin  Sepsis protocol initiated  Fluid resuscitation ordered per protocol  Crystalloid  Fluid Administration: Patient already received adequate fluid resuscitation at the transferring facility and given that he is an uric further aggressive IV fluids are not indicated  IV antibiotics as appropriate for source of sepsis  Reassessment: I have reassessed the patient's hemodynamic status    Follow culture results both here and from the transferring facility  Empiric piperacillin tazobactam          Alcohol abuse  Assessment & Plan  Monitor for signs of withdrawal    Hyponatremia  Assessment & Plan  Likely hypovolemic in setting of chronic kidney disease  Monitor every 6  122 is the last reading from the transferring facility    Uncomplicated type 2 diabetes mellitus (HCC)- (present on admission)  Assessment & Plan  Start on insulin sliding scale with serial Accu-Checks  hemoglobin A1c 8.5  Hypoglycemic protocol in place        Benign essential hypertension- (present on admission)  Assessment & Plan  Holding home losartan    Mitral valve prolapse- (present on admission)  Assessment & Plan  Ordered cardiac echo       Please note that this dictation was created using voice recognition software. I have made every reasonable attempt to correct obvious errors, but I expect that there are errors of grammar and possibly context that I did not discover before finalizing the note.    VTE prophylaxis: SCDs/TEDs    I have performed a physical exam and reviewed and updated ROS and Plan today (12/10/2022). In review of yesterday's note (12/9/2022), there are no changes except as documented above.

## 2022-12-10 NOTE — CARE PLAN
The patient is Stable - Low risk of patient condition declining or worsening    Shift Goals  Clinical Goals: Rest, pain control  Patient Goals: comfort , decreased pain  Family Goals: SANJUANA    Progress made toward(s) clinical / shift goals:  Resting quietly with eyes closed    Patient is not progressing towards the following goals:

## 2022-12-10 NOTE — PROGRESS NOTES
Nephrology Daily Progress Note    Date of Service  12/9/2022    Chief Complaint  62 y.o. male with CKD IIIb, DM II, admitted 12/4/2022 with URTI, diarrhea, left leg cellulitis, in KENRICK    Interval Problem Update  12/06 -no acute events  Feels better  Given iv lasix with good response  Hyponatremia improving  Acidosis improving  Creat stable  12/7 -no acute events, no new complaints  With worsening renal function with schedule TDC placement to start RRT  12/9 -doing better after started RRT  Tolerates dialysis well  Noticed improvement in UOP  Holding dialysis today -hoping for recovery    Review of Systems  Review of Systems   Constitutional:  Negative for chills, fever, malaise/fatigue and weight loss.   HENT:  Negative for congestion, hearing loss and sinus pain.    Eyes: Negative.    Respiratory:  Negative for cough, hemoptysis, shortness of breath and wheezing.    Cardiovascular:  Positive for leg swelling. Negative for chest pain, palpitations and orthopnea.   Gastrointestinal:  Negative for abdominal pain, nausea and vomiting.   Genitourinary:  Negative for dysuria and flank pain.        Vargsa catheter   Skin: Negative.         LLE cellulitis   All other systems reviewed and are negative.     Physical Exam  Temp:  [36.6 °C (97.9 °F)-36.8 °C (98.2 °F)] 36.7 °C (98.1 °F)  Pulse:  [80-92] 87  Resp:  [16-18] 18  BP: (118-147)/(62-71) 138/71  SpO2:  [90 %-97 %] 93 %    Physical Exam  Vitals reviewed.   Constitutional:       General: He is not in acute distress.     Appearance: Normal appearance. He is well-developed. He is not diaphoretic.   HENT:      Head: Normocephalic and atraumatic.      Nose: Nose normal.      Mouth/Throat:      Mouth: Mucous membranes are moist.      Pharynx: Oropharynx is clear.   Eyes:      Extraocular Movements: Extraocular movements intact.      Conjunctiva/sclera: Conjunctivae normal.      Pupils: Pupils are equal, round, and reactive to light.   Cardiovascular:      Rate and Rhythm:  Normal rate and regular rhythm.      Pulses: Normal pulses.      Heart sounds: Normal heart sounds.   Pulmonary:      Effort: Pulmonary effort is normal. No respiratory distress.      Breath sounds: Normal breath sounds. No wheezing or rales.   Abdominal:      General: Bowel sounds are normal. There is no distension.      Palpations: Abdomen is soft. There is no mass.      Tenderness: There is no abdominal tenderness. There is no right CVA tenderness or left CVA tenderness.   Musculoskeletal:      Cervical back: Normal range of motion and neck supple.      Right lower leg: Edema present.      Left lower leg: Edema present.   Skin:     General: Skin is warm and dry.      Coloration: Skin is not pale.      Findings: Erythema present. No rash.   Neurological:      General: No focal deficit present.      Mental Status: He is alert and oriented to person, place, and time.      Cranial Nerves: No cranial nerve deficit.      Coordination: Coordination normal.   Psychiatric:         Mood and Affect: Mood normal.         Behavior: Behavior normal.         Thought Content: Thought content normal.         Judgment: Judgment normal.       Fluids    Intake/Output Summary (Last 24 hours) at 12/9/2022 1717  Last data filed at 12/9/2022 1553  Gross per 24 hour   Intake --   Output 2000 ml   Net -2000 ml         Laboratory  Recent Labs     12/08/22  0722   WBC 17.1*   RBC 4.05*   HEMOGLOBIN 13.2*   HEMATOCRIT 37.0*   MCV 91.4   MCH 32.6   MCHC 35.7*   RDW 43.3   PLATELETCT 170   MPV 10.2       Recent Labs     12/07/22  0901 12/08/22  0722 12/09/22  0710   SODIUM 125* 130* 129*   POTASSIUM 3.2* 3.3* 3.4*   CHLORIDE 89* 92* 93*   CO2 18* 22 22   GLUCOSE 199* 209* 224*   * 87* 63*   CREATININE 7.99* 7.79* 6.27*   CALCIUM 7.9* 7.9* 8.1*             No results for input(s): NTPROBNP in the last 72 hours.          Imaging  AISHA-YOVANY MANTILLA PLACEMENT >5   Final Result      Successful image guided tunneled dialysis catheter  placement.      Plan: The catheter is available for immediate use.            DX-TIBIA AND FIBULA LEFT   Final Result      No radiographic evidence of acute bony abnormality      Skin thickening and edema with some soft tissue calcifications most likely indicates venous stasis. Cellulitis is a possibility and there is no evidence of soft tissue gas. Dermatomyositis can be seen with soft tissue calcifications but these are not as    extensive as that often entails      EC-ECHOCARDIOGRAM COMPLETE W/ CONT   Final Result      US-RENAL   Final Result      No evidence solid renal mass or hydronephrosis.           Assessment/Plan  1.KENRICK/CKD IIIb - on RRT -holding dialysis today as improving UOP     Possible recovery  2.HTN: BP controlled  3.Electrolytes: hyponatremia mild                          Hypokalemia - to replace with KCl  4.Anemia: Hb to monitor  5.Metabolic acidosis: will be corrected with dialysis  6.Volume: continue lasix    Recs:  Hold dialysis  Low salt diet  Daily labs  Avoid nephrotoxins  Will follow

## 2022-12-10 NOTE — PROGRESS NOTES
Nephrology Daily Progress Note    Date of Service  12/10/2022    Chief Complaint  62 y.o. male with CKD IIIb, DM II, admitted 12/4/2022 with URTI, diarrhea, left leg cellulitis, in KENRICK    Interval Problem Update  12/06 -no acute events  Feels better  Given iv lasix with good response  Hyponatremia improving  Acidosis improving  Creat stable  12/7 -no acute events, no new complaints  With worsening renal function with schedule TDC placement to start RRT  12/9 -doing better after started RRT  Tolerates dialysis well  Noticed improvement in UOP  Holding dialysis today -hoping for recovery  12/10  Doing well, no complaints  Removed george catheter  UOP 2700  Holding dialysis for possible recovery  Review of Systems  Review of Systems   Constitutional:  Negative for chills, fever, malaise/fatigue and weight loss.   HENT:  Negative for congestion, hearing loss and sinus pain.    Eyes: Negative.    Respiratory:  Negative for cough, hemoptysis, shortness of breath and wheezing.    Cardiovascular:  Positive for leg swelling. Negative for chest pain, palpitations and orthopnea.   Gastrointestinal:  Negative for abdominal pain, nausea and vomiting.   Genitourinary:  Negative for dysuria, flank pain, frequency, hematuria and urgency.   Skin:         LLE cellulitis   All other systems reviewed and are negative.     Physical Exam  Temp:  [36.6 °C (97.9 °F)-36.7 °C (98.1 °F)] 36.7 °C (98.1 °F)  Pulse:  [78-93] 78  Resp:  [16-18] 16  BP: (131-147)/(66-76) 132/73  SpO2:  [91 %-95 %] 94 %    Physical Exam  Vitals reviewed.   Constitutional:       General: He is not in acute distress.     Appearance: Normal appearance. He is well-developed. He is not diaphoretic.   HENT:      Head: Normocephalic and atraumatic.      Nose: Nose normal.      Mouth/Throat:      Mouth: Mucous membranes are moist.      Pharynx: Oropharynx is clear.   Eyes:      Extraocular Movements: Extraocular movements intact.      Conjunctiva/sclera: Conjunctivae  normal.      Pupils: Pupils are equal, round, and reactive to light.   Cardiovascular:      Rate and Rhythm: Normal rate and regular rhythm.      Pulses: Normal pulses.      Heart sounds: Normal heart sounds.   Pulmonary:      Effort: Pulmonary effort is normal. No respiratory distress.      Breath sounds: Normal breath sounds. No wheezing or rales.   Abdominal:      General: Bowel sounds are normal. There is no distension.      Palpations: Abdomen is soft. There is no mass.      Tenderness: There is no abdominal tenderness. There is no right CVA tenderness or left CVA tenderness.   Musculoskeletal:      Cervical back: Normal range of motion and neck supple.      Right lower leg: Edema present.      Left lower leg: No edema.   Skin:     General: Skin is warm.      Coloration: Skin is not pale.      Findings: Erythema present. No rash.   Neurological:      General: No focal deficit present.      Mental Status: He is alert and oriented to person, place, and time.      Cranial Nerves: No cranial nerve deficit.      Coordination: Coordination normal.   Psychiatric:         Mood and Affect: Mood normal.         Behavior: Behavior normal.         Thought Content: Thought content normal.         Judgment: Judgment normal.       Fluids    Intake/Output Summary (Last 24 hours) at 12/10/2022 1022  Last data filed at 12/10/2022 0800  Gross per 24 hour   Intake --   Output 3200 ml   Net -3200 ml         Laboratory  Recent Labs     12/08/22  0722   WBC 17.1*   RBC 4.05*   HEMOGLOBIN 13.2*   HEMATOCRIT 37.0*   MCV 91.4   MCH 32.6   MCHC 35.7*   RDW 43.3   PLATELETCT 170   MPV 10.2       Recent Labs     12/08/22  0722 12/09/22  0710 12/10/22  0246   SODIUM 130* 129* 129*   POTASSIUM 3.3* 3.4* 3.4*   CHLORIDE 92* 93* 93*   CO2 22 22 24   GLUCOSE 209* 224* 161*   BUN 87* 63* 68*   CREATININE 7.79* 6.27* 6.61*   CALCIUM 7.9* 8.1* 8.0*             No results for input(s): NTPROBNP in the last 72 hours.           Imaging  IR-MANTILLA,GROSHONG PLACEMENT >5   Final Result      Successful image guided tunneled dialysis catheter placement.      Plan: The catheter is available for immediate use.            DX-TIBIA AND FIBULA LEFT   Final Result      No radiographic evidence of acute bony abnormality      Skin thickening and edema with some soft tissue calcifications most likely indicates venous stasis. Cellulitis is a possibility and there is no evidence of soft tissue gas. Dermatomyositis can be seen with soft tissue calcifications but these are not as    extensive as that often entails      EC-ECHOCARDIOGRAM COMPLETE W/ CONT   Final Result      US-RENAL   Final Result      No evidence solid renal mass or hydronephrosis.           Assessment/Plan  1.KENRICK/CKD IIIb - on RRT -holding dialysis today as improving UOP     Possible recovery  2.HTN: BP controlled  3.Electrolytes: hyponatremia mild                          Hypokalemia - to replace with KCl  4.Anemia: Hb to monitor  5.Metabolic acidosis: corrected with dialysis  6.Volume: well controlled    Recs:  Hold dialysis  D/c Na HCO3  Hold lasix  Low salt diet  Daily labs  Avoid nephrotoxins  Will follow

## 2022-12-10 NOTE — PROGRESS NOTES
Hospital Medicine Daily Progress Note    Date of Service  12/9/2022    Chief Complaint  Thomas Borja is a 62 y.o. male admitted 12/4/2022 with a previous history of type 2 diabetes, stage IIIa chronic kidney disease with a GFR that lives around 40, hypertension, alcohol use, recurrent cellulitis, hypothyroidism, mitral valve prolapse he presents to the hospital for left leg cellulitis and acute renal failure.  Patient was taking metformin and NSAIDs at home.    Hospital Course  Patient arrived to the hospital tachycardic, febrile and tachypneic.  He was found to have sodium of 124, chloride 95, bicarb 13, creatinine 6.3, calcium 7.6, lactic acid 2.7.  Patient was started on IV bicarbonate.    Interval Problem Update  12/5: There is no significant improvement in patient's creatinine.  Waiting for further recommendations from nephrology.  Wound cultures have been ordered on the left leg.  There is improvement in cellulitis.  12/6/2022:  Continue present medical management appreciate nephrology recommendations with regards to hyponatremia.  Continue with daily labs.  No acute events overnight.  Additionally, patient also had positive culture growth of strep pyogenes group A.  Antimicrobial spectrum has been narrowed.  Patient also had growth of staph aureus non-MRSA.  Patient has been transitioned broad-spectrum antimicrobials to cefazolin at present.  Pharmacy recommendations appreciated.  12/7/2022:  Patient underwent tunneled dialysis catheter placement today with interventional radiology tolerated procedure well without issue.  Patient subsequently went hemodialysis without issue.  Continue to follow closely with nephrology recommendations are appreciated.  Patient will require coordination for outpatient hemodialysis chair.  Anticipate 2 to 3 days prior to discharge.  12/8/2022:  Continue present medical management patient underwent hemodialysis tolerated procedure well without issue.  Continue present  antimicrobials.  Patient would benefit from further volume removal.  12/9/2022:  Continue present medical management continue with dialysis schedule by nephrology recommendations are appreciated.  No acute events overnight continue with present antimicrobials.  I have discussed this patient's plan of care and discharge plan at IDT rounds today with Case Management, Nursing, Nursing leadership, and other members of the IDT team.    Consultants/Specialty  nephrology    Code Status  Full Code    Disposition  Patient is not medically cleared for discharge.   Anticipate discharge to to home with close outpatient follow-up.  I have placed the appropriate orders for post-discharge needs.    Review of Systems  Review of Systems   Constitutional:  Positive for malaise/fatigue. Negative for chills, diaphoresis and fever.   HENT:  Negative for congestion, ear discharge, ear pain, hearing loss, nosebleeds, sinus pain, sore throat and tinnitus.    Eyes:  Negative for blurred vision, double vision, photophobia and pain.   Respiratory:  Negative for cough, hemoptysis, sputum production, shortness of breath, wheezing and stridor.    Cardiovascular:  Positive for leg swelling. Negative for chest pain, palpitations, orthopnea, claudication and PND.   Gastrointestinal:  Positive for nausea. Negative for abdominal pain, blood in stool, constipation, diarrhea, heartburn, melena and vomiting.   Genitourinary:  Negative for dysuria, flank pain, frequency, hematuria and urgency.   Musculoskeletal:  Negative for back pain, falls, joint pain, myalgias and neck pain.   Skin:  Negative for itching and rash.   Neurological:  Negative for dizziness, tingling, tremors, weakness and headaches.   Endo/Heme/Allergies:  Negative for environmental allergies and polydipsia. Does not bruise/bleed easily.   Psychiatric/Behavioral:  Negative for depression, hallucinations, substance abuse and suicidal ideas.       Physical Exam  Temp:  [36.6 °C (97.9  °F)-36.8 °C (98.2 °F)] 36.7 °C (98.1 °F)  Pulse:  [80-92] 87  Resp:  [16-18] 18  BP: (118-147)/(62-71) 138/71  SpO2:  [90 %-97 %] 93 %    Physical Exam  Vitals and nursing note reviewed.   Constitutional:       General: He is not in acute distress.     Appearance: Normal appearance. He is not ill-appearing, toxic-appearing or diaphoretic.   HENT:      Head: Normocephalic and atraumatic.      Nose: No congestion or rhinorrhea.      Mouth/Throat:      Pharynx: No posterior oropharyngeal erythema.   Eyes:      General: No scleral icterus.        Right eye: No discharge.   Neck:      Vascular: Hepatojugular reflux and JVD present.   Cardiovascular:      Rate and Rhythm: Normal rate and regular rhythm.      Pulses: Normal pulses.      Heart sounds: No murmur heard.    No friction rub. No gallop.   Pulmonary:      Effort: No respiratory distress.      Breath sounds: No stridor. Rales present. No wheezing or rhonchi.   Abdominal:      General: There is distension.      Tenderness: There is no abdominal tenderness.   Musculoskeletal:         General: No swelling, tenderness, deformity or signs of injury.      Cervical back: Normal range of motion.      Right lower leg: Edema present.      Left lower leg: Edema present.   Skin:     Capillary Refill: Capillary refill takes more than 3 seconds.      Coloration: Skin is not jaundiced or pale.      Findings: No bruising, erythema, lesion or rash.   Neurological:      General: No focal deficit present.      Mental Status: He is alert and oriented to person, place, and time.       Fluids    Intake/Output Summary (Last 24 hours) at 12/9/2022 1713  Last data filed at 12/9/2022 1553  Gross per 24 hour   Intake --   Output 2000 ml   Net -2000 ml         Laboratory  Recent Labs     12/08/22 0722   WBC 17.1*   RBC 4.05*   HEMOGLOBIN 13.2*   HEMATOCRIT 37.0*   MCV 91.4   MCH 32.6   MCHC 35.7*   RDW 43.3   PLATELETCT 170   MPV 10.2       Recent Labs     12/07/22  0901 12/08/22 0722  12/09/22  0710   SODIUM 125* 130* 129*   POTASSIUM 3.2* 3.3* 3.4*   CHLORIDE 89* 92* 93*   CO2 18* 22 22   GLUCOSE 199* 209* 224*   * 87* 63*   CREATININE 7.99* 7.79* 6.27*   CALCIUM 7.9* 7.9* 8.1*                       Imaging  IR-MANTILLA,GROSHONG PLACEMENT >5   Final Result      Successful image guided tunneled dialysis catheter placement.      Plan: The catheter is available for immediate use.            DX-TIBIA AND FIBULA LEFT   Final Result      No radiographic evidence of acute bony abnormality      Skin thickening and edema with some soft tissue calcifications most likely indicates venous stasis. Cellulitis is a possibility and there is no evidence of soft tissue gas. Dermatomyositis can be seen with soft tissue calcifications but these are not as    extensive as that often entails      EC-ECHOCARDIOGRAM COMPLETE W/ CONT   Final Result      US-RENAL   Final Result      No evidence solid renal mass or hydronephrosis.             Assessment/Plan  * KENRICK (acute kidney injury) (HCC)- (present on admission)  Assessment & Plan  Patient has stage IIIa chronic kidney disease with GFR normally around 40.  Acuity of onset of the patient's KENRICK is uncertain but would appear to have been in the last week or 2 based on clinical history.  He is effectively anuretic based on reports from the transferring facility where he was resuscitated with 7 L of crystalloid and only urinated 150 mL in 24 hours  Vargas catheter  Follow urine output and BMP  UA  Nephrology consultation  Renally dose medications as appropriate  Stat BMP to confirm no emergent electrolyte abnormalities  12/7/2022:  Patient has not had renal recovery appreciate nephrology recommendations.  Patient had tunneled dialysis catheter placed underwent prescribed hemodialysis today.    Acute hemodialysis patient (HCC)  Assessment & Plan  Patient with KENRICK on CKD with rapid deterioration without renal recovery requiring acute hemodialysis during hospitalization.   Appreciate nephrology recommendations patient had tunneled dialysis catheter placed.  Patient will require outpatient hemodialysis chair to be scheduled and set up.  Patient tolerated first round hemodialysis without issue.    ESRD (end stage renal disease) on dialysis ([facility])  Assessment & Plan  Patient received tunneled dialysis catheter today tolerated procedure well without issue.  Patient is subsequently undergone hemodialysis.  Nephrology recommendations appreciated.  We will continue to follow along closely.  Patient will require coordination of outpatient hemodialysis chair.  Elects to continue to stay in hospital and undergo several rounds of hemodialysis.  Continue with present antimicrobial dosing.  Appreciate pharmacy recommendations regards to antimicrobial doses.    Cellulitis of left lower extremity  Assessment & Plan  In setting of diabetes  Patient was treated with clindamycin and cefepime however remains afebrile  Repeating blood cultures  Changed to piperacillin tazobactam and linezolid  Check MRSA nares if this is negative we can stop his linezolid  Deyvi with sharpie and monitor closely  Ultrasound the transferring facility was negative for DVT or deep tissue infection  Patient's distal neurovascular status is intact    Sepsis ([facility])  Assessment & Plan  This is Sepsis Present on admission  SIRS criteria identified on my evaluation include: Fever, with temperature greater than 101 deg F and Tachycardia, with heart rate greater than 90 BPM  Source is skin  Sepsis protocol initiated  Fluid resuscitation ordered per protocol  Crystalloid Fluid Administration: Patient already received adequate fluid resuscitation at the transferring facility and given that he is an uric further aggressive IV fluids are not indicated  IV antibiotics as appropriate for source of sepsis  Reassessment: I have reassessed the patient's hemodynamic status    Follow culture results both here and from the transferring  facility  Empiric piperacillin tazobactam          Alcohol abuse  Assessment & Plan  Monitor for signs of withdrawal    Hyponatremia  Assessment & Plan  Likely hypovolemic in setting of chronic kidney disease  Monitor every 6  122 is the last reading from the transferring facility    Uncomplicated type 2 diabetes mellitus (HCC)- (present on admission)  Assessment & Plan  Start on insulin sliding scale with serial Accu-Checks  hemoglobin A1c 8.5  Hypoglycemic protocol in place        Benign essential hypertension- (present on admission)  Assessment & Plan  Holding home losartan    Mitral valve prolapse- (present on admission)  Assessment & Plan  Ordered cardiac echo       Please note that this dictation was created using voice recognition software. I have made every reasonable attempt to correct obvious errors, but I expect that there are errors of grammar and possibly context that I did not discover before finalizing the note.    VTE prophylaxis: SCDs/TEDs    I have performed a physical exam and reviewed and updated ROS and Plan today (12/9/2022). In review of yesterday's note (12/8/2022), there are no changes except as documented above.

## 2022-12-11 LAB
ANION GAP SERPL CALC-SCNC: 14 MMOL/L (ref 7–16)
BUN SERPL-MCNC: 69 MG/DL (ref 8–22)
CALCIUM SERPL-MCNC: 8.1 MG/DL (ref 8.5–10.5)
CHLORIDE SERPL-SCNC: 94 MMOL/L (ref 96–112)
CO2 SERPL-SCNC: 22 MMOL/L (ref 20–33)
CREAT SERPL-MCNC: 6.28 MG/DL (ref 0.5–1.4)
GFR SERPLBLD CREATININE-BSD FMLA CKD-EPI: 9 ML/MIN/1.73 M 2
GLUCOSE BLD STRIP.AUTO-MCNC: 184 MG/DL (ref 65–99)
GLUCOSE BLD STRIP.AUTO-MCNC: 199 MG/DL (ref 65–99)
GLUCOSE BLD STRIP.AUTO-MCNC: 204 MG/DL (ref 65–99)
GLUCOSE SERPL-MCNC: 184 MG/DL (ref 65–99)
NT-PROBNP SERPL IA-MCNC: 678 PG/ML (ref 0–125)
PHOSPHATE SERPL-MCNC: 5.1 MG/DL (ref 2.5–4.5)
POTASSIUM SERPL-SCNC: 3.7 MMOL/L (ref 3.6–5.5)
SODIUM SERPL-SCNC: 130 MMOL/L (ref 135–145)

## 2022-12-11 PROCEDURE — 770020 HCHG ROOM/CARE - TELE (206)

## 2022-12-11 PROCEDURE — 83880 ASSAY OF NATRIURETIC PEPTIDE: CPT

## 2022-12-11 PROCEDURE — 99232 SBSQ HOSP IP/OBS MODERATE 35: CPT | Performed by: INTERNAL MEDICINE

## 2022-12-11 PROCEDURE — 700102 HCHG RX REV CODE 250 W/ 637 OVERRIDE(OP): Performed by: HOSPITALIST

## 2022-12-11 PROCEDURE — A9270 NON-COVERED ITEM OR SERVICE: HCPCS | Performed by: HOSPITALIST

## 2022-12-11 PROCEDURE — 700111 HCHG RX REV CODE 636 W/ 250 OVERRIDE (IP): Performed by: HOSPITALIST

## 2022-12-11 PROCEDURE — 84100 ASSAY OF PHOSPHORUS: CPT

## 2022-12-11 PROCEDURE — 99233 SBSQ HOSP IP/OBS HIGH 50: CPT | Performed by: STUDENT IN AN ORGANIZED HEALTH CARE EDUCATION/TRAINING PROGRAM

## 2022-12-11 PROCEDURE — 700111 HCHG RX REV CODE 636 W/ 250 OVERRIDE (IP): Performed by: STUDENT IN AN ORGANIZED HEALTH CARE EDUCATION/TRAINING PROGRAM

## 2022-12-11 PROCEDURE — 80048 BASIC METABOLIC PNL TOTAL CA: CPT

## 2022-12-11 PROCEDURE — 82962 GLUCOSE BLOOD TEST: CPT

## 2022-12-11 RX ADMIN — INSULIN HUMAN 3 UNITS: 100 INJECTION, SOLUTION PARENTERAL at 13:27

## 2022-12-11 RX ADMIN — INSULIN HUMAN 3 UNITS: 100 INJECTION, SOLUTION PARENTERAL at 21:39

## 2022-12-11 RX ADMIN — HYDROCODONE BITARTRATE AND ACETAMINOPHEN 1 TABLET: 5; 325 TABLET ORAL at 12:37

## 2022-12-11 RX ADMIN — INSULIN HUMAN 2 UNITS: 100 INJECTION, SOLUTION PARENTERAL at 08:16

## 2022-12-11 RX ADMIN — HEPARIN SODIUM 5000 UNITS: 5000 INJECTION, SOLUTION INTRAVENOUS; SUBCUTANEOUS at 13:27

## 2022-12-11 RX ADMIN — PROMETHAZINE HYDROCHLORIDE 25 MG: 25 TABLET ORAL at 21:39

## 2022-12-11 RX ADMIN — INSULIN HUMAN 2 UNITS: 100 INJECTION, SOLUTION PARENTERAL at 17:43

## 2022-12-11 RX ADMIN — ROSUVASTATIN CALCIUM 10 MG: 20 TABLET, FILM COATED ORAL at 21:39

## 2022-12-11 RX ADMIN — TAMSULOSIN HYDROCHLORIDE 0.4 MG: 0.4 CAPSULE ORAL at 08:17

## 2022-12-11 RX ADMIN — OMEPRAZOLE 40 MG: 20 CAPSULE, DELAYED RELEASE ORAL at 05:29

## 2022-12-11 RX ADMIN — CEFAZOLIN SODIUM 1 G: 1 INJECTION, SOLUTION INTRAVENOUS at 05:30

## 2022-12-11 RX ADMIN — HYDROCODONE BITARTRATE AND ACETAMINOPHEN 1 TABLET: 5; 325 TABLET ORAL at 21:39

## 2022-12-11 RX ADMIN — HEPARIN SODIUM 5000 UNITS: 5000 INJECTION, SOLUTION INTRAVENOUS; SUBCUTANEOUS at 21:39

## 2022-12-11 RX ADMIN — HEPARIN SODIUM 5000 UNITS: 5000 INJECTION, SOLUTION INTRAVENOUS; SUBCUTANEOUS at 05:29

## 2022-12-11 RX ADMIN — AMLODIPINE BESYLATE 10 MG: 10 TABLET ORAL at 17:45

## 2022-12-11 RX ADMIN — PROMETHAZINE HYDROCHLORIDE 25 MG: 25 TABLET ORAL at 12:37

## 2022-12-11 ASSESSMENT — ENCOUNTER SYMPTOMS
CHILLS: 0
SORE THROAT: 0
ABDOMINAL PAIN: 0
CONSTIPATION: 0
WEIGHT LOSS: 0
COUGH: 0
PALPITATIONS: 0
SHORTNESS OF BREATH: 0
NAUSEA: 0
FEVER: 0
VOMITING: 0
STRIDOR: 0
TINGLING: 0
WHEEZING: 0
HEMOPTYSIS: 0
NECK PAIN: 0
EYE PAIN: 0
DOUBLE VISION: 0
DIZZINESS: 0
SPUTUM PRODUCTION: 0
POLYDIPSIA: 0
SINUS PAIN: 0
MYALGIAS: 0
DIAPHORESIS: 0
BLOOD IN STOOL: 0
FLANK PAIN: 0
PND: 0
HALLUCINATIONS: 0
NAUSEA: 1
WEAKNESS: 0
DEPRESSION: 0
PHOTOPHOBIA: 0
ROS SKIN COMMENTS: LEFT LEG CELLULITIS
HEADACHES: 0
TREMORS: 0
ORTHOPNEA: 0
HEARTBURN: 0
EYES NEGATIVE: 1
BACK PAIN: 0
BLURRED VISION: 0
FALLS: 0
CLAUDICATION: 0
DIARRHEA: 0
BRUISES/BLEEDS EASILY: 0

## 2022-12-11 ASSESSMENT — PAIN SCALES - PAIN ASSESSMENT IN ADVANCED DEMENTIA (PAINAD): BREATHING: NORMAL

## 2022-12-11 ASSESSMENT — PATIENT HEALTH QUESTIONNAIRE - PHQ9
2. FEELING DOWN, DEPRESSED, IRRITABLE, OR HOPELESS: NOT AT ALL
1. LITTLE INTEREST OR PLEASURE IN DOING THINGS: NOT AT ALL
SUM OF ALL RESPONSES TO PHQ9 QUESTIONS 1 AND 2: 0

## 2022-12-11 ASSESSMENT — PAIN SCALES - WONG BAKER: WONGBAKER_NUMERICALRESPONSE: HURTS JUST A LITTLE BIT

## 2022-12-11 ASSESSMENT — PAIN DESCRIPTION - PAIN TYPE: TYPE: ACUTE PAIN

## 2022-12-11 ASSESSMENT — LIFESTYLE VARIABLES: SUBSTANCE_ABUSE: 0

## 2022-12-11 ASSESSMENT — FIBROSIS 4 INDEX: FIB4 SCORE: 2.41

## 2022-12-11 NOTE — CARE PLAN
The patient is Stable - Low risk of patient condition declining or worsening    Shift Goals  Clinical Goals: Pain control, ABX  Patient Goals: Home  Family Goals: SANJUANA    Progress made toward(s) clinical / shift goals:    Problem: Knowledge Deficit - Standard  Goal: Patient and family/care givers will demonstrate understanding of plan of care, disease process/condition, diagnostic tests and medications  Outcome: Progressing     Problem: Pain - Standard  Goal: Alleviation of pain or a reduction in pain to the patient’s comfort goal  Outcome: Progressing     Problem: Hemodynamics  Goal: Patient's hemodynamics, fluid balance and neurologic status will be stable or improve  Outcome: Progressing     Problem: Fluid Volume  Goal: Fluid volume balance will be maintained  Outcome: Progressing     Problem: Urinary - Renal Perfusion  Goal: Ability to achieve and maintain adequate renal perfusion and functioning will improve  Outcome: Progressing     Problem: Respiratory  Goal: Patient will achieve/maintain optimum respiratory ventilation and gas exchange  Outcome: Progressing     Problem: Physical Regulation  Goal: Diagnostic test results will improve  Outcome: Progressing     Problem: Fall Risk  Goal: Patient will remain free from falls  Outcome: Progressing     Problem: Skin Integrity  Goal: Skin integrity is maintained or improved  Outcome: Progressing       Patient is not progressing towards the following goals:

## 2022-12-11 NOTE — PROGRESS NOTES
Hospital Medicine Daily Progress Note    Date of Service  12/11/2022    Chief Complaint  Thomas Borja is a 62 y.o. male admitted 12/4/2022 with a previous history of type 2 diabetes, stage IIIa chronic kidney disease with a GFR that lives around 40, hypertension, alcohol use, recurrent cellulitis, hypothyroidism, mitral valve prolapse he presents to the hospital for left leg cellulitis and acute renal failure.  Patient was taking metformin and NSAIDs at home.    Hospital Course  Patient arrived to the hospital tachycardic, febrile and tachypneic.  He was found to have sodium of 124, chloride 95, bicarb 13, creatinine 6.3, calcium 7.6, lactic acid 2.7.  Patient was started on IV bicarbonate.    Interval Problem Update  12/5: There is no significant improvement in patient's creatinine.  Waiting for further recommendations from nephrology.  Wound cultures have been ordered on the left leg.  There is improvement in cellulitis.  12/6/2022:  Continue present medical management appreciate nephrology recommendations with regards to hyponatremia.  Continue with daily labs.  No acute events overnight.  Additionally, patient also had positive culture growth of strep pyogenes group A.  Antimicrobial spectrum has been narrowed.  Patient also had growth of staph aureus non-MRSA.  Patient has been transitioned broad-spectrum antimicrobials to cefazolin at present.  Pharmacy recommendations appreciated.  12/7/2022:  Patient underwent tunneled dialysis catheter placement today with interventional radiology tolerated procedure well without issue.  Patient subsequently went hemodialysis without issue.  Continue to follow closely with nephrology recommendations are appreciated.  Patient will require coordination for outpatient hemodialysis chair.  Anticipate 2 to 3 days prior to discharge.  12/8/2022:  Continue present medical management patient underwent hemodialysis tolerated procedure well without issue.  Continue present  antimicrobials.  Patient would benefit from further volume removal.  12/9/2022:  Continue present medical management continue with dialysis schedule by nephrology recommendations are appreciated.  No acute events overnight continue with present antimicrobials.  12/10/2022:  Continue present medical management nephrology recommendations noted and appreciated.  Cautious optimism with regards to her renal recovery.  Patient did have good urinary output 2700 and last 24 hours.  Vargas catheter has been removed.  Patient will continue on Ancef for lower extremity cellulitis of left leg.  Is improving.  12/11/2022:  Continue present medical management appreciate nephrology recommendations.  Wound care recommendations appreciated with regards to the leg.  Otherwise no acute events overnight continue present medical management.  Continue to carefully monitor urinary output.  Patient had approximately 2.7 L out previous 24 hours.  Cautiously optimistic there may be some renal recovery.      I have discussed this patient's plan of care and discharge plan at IDT rounds today with Case Management, Nursing, Nursing leadership, and other members of the IDT team.    Consultants/Specialty  nephrology    Code Status  Full Code    Disposition  Patient is not medically cleared for discharge.   Anticipate discharge to to home with close outpatient follow-up.  I have placed the appropriate orders for post-discharge needs.    Review of Systems  Review of Systems   Constitutional:  Positive for malaise/fatigue. Negative for chills, diaphoresis and fever.   HENT:  Negative for congestion, ear discharge, ear pain, hearing loss, nosebleeds, sinus pain, sore throat and tinnitus.    Eyes:  Negative for blurred vision, double vision, photophobia and pain.   Respiratory:  Negative for cough, hemoptysis, sputum production, shortness of breath, wheezing and stridor.    Cardiovascular:  Positive for leg swelling. Negative for chest pain,  palpitations, orthopnea, claudication and PND.   Gastrointestinal:  Positive for nausea. Negative for abdominal pain, blood in stool, constipation, diarrhea, heartburn, melena and vomiting.   Genitourinary:  Negative for dysuria, flank pain, frequency, hematuria and urgency.   Musculoskeletal:  Negative for back pain, falls, joint pain, myalgias and neck pain.   Skin:  Negative for itching and rash.   Neurological:  Negative for dizziness, tingling, tremors, weakness and headaches.   Endo/Heme/Allergies:  Negative for environmental allergies and polydipsia. Does not bruise/bleed easily.   Psychiatric/Behavioral:  Negative for depression, hallucinations, substance abuse and suicidal ideas.       Physical Exam  Temp:  [36.5 °C (97.7 °F)-37.1 °C (98.8 °F)] 36.6 °C (97.9 °F)  Pulse:  [] 86  Resp:  [15-20] 15  BP: (130-154)/(70-92) 154/77  SpO2:  [91 %-97 %] 92 %    Physical Exam  Vitals and nursing note reviewed.   Constitutional:       General: He is not in acute distress.     Appearance: Normal appearance. He is not ill-appearing, toxic-appearing or diaphoretic.   HENT:      Head: Normocephalic and atraumatic.      Nose: No congestion or rhinorrhea.      Mouth/Throat:      Pharynx: No posterior oropharyngeal erythema.   Eyes:      General: No scleral icterus.        Right eye: No discharge.   Neck:      Vascular: Hepatojugular reflux and JVD present.   Cardiovascular:      Rate and Rhythm: Normal rate and regular rhythm.      Pulses: Normal pulses.      Heart sounds: No murmur heard.    No friction rub. No gallop.   Pulmonary:      Effort: No respiratory distress.      Breath sounds: No stridor. Rales present. No wheezing or rhonchi.   Abdominal:      General: There is distension.      Tenderness: There is no abdominal tenderness.   Musculoskeletal:         General: No swelling, tenderness, deformity or signs of injury.      Cervical back: Normal range of motion.      Right lower leg: Edema present.      Left  lower leg: Edema present.   Skin:     Capillary Refill: Capillary refill takes more than 3 seconds.      Coloration: Skin is not jaundiced or pale.      Findings: No bruising, erythema, lesion or rash.   Neurological:      General: No focal deficit present.      Mental Status: He is alert and oriented to person, place, and time.       Fluids    Intake/Output Summary (Last 24 hours) at 12/11/2022 1554  Last data filed at 12/11/2022 1503  Gross per 24 hour   Intake --   Output 1975 ml   Net -1975 ml         Laboratory        Recent Labs     12/09/22  0710 12/10/22  0246 12/11/22  0411   SODIUM 129* 129* 130*   POTASSIUM 3.4* 3.4* 3.7   CHLORIDE 93* 93* 94*   CO2 22 24 22   GLUCOSE 224* 161* 184*   BUN 63* 68* 69*   CREATININE 6.27* 6.61* 6.28*   CALCIUM 8.1* 8.0* 8.1*                       Imaging  IR-MANTILLA,GROSHONG PLACEMENT >5   Final Result      Successful image guided tunneled dialysis catheter placement.      Plan: The catheter is available for immediate use.            DX-TIBIA AND FIBULA LEFT   Final Result      No radiographic evidence of acute bony abnormality      Skin thickening and edema with some soft tissue calcifications most likely indicates venous stasis. Cellulitis is a possibility and there is no evidence of soft tissue gas. Dermatomyositis can be seen with soft tissue calcifications but these are not as    extensive as that often entails      EC-ECHOCARDIOGRAM COMPLETE W/ CONT   Final Result      US-RENAL   Final Result      No evidence solid renal mass or hydronephrosis.             Assessment/Plan  * KENRICK (acute kidney injury) (HCC)- (present on admission)  Assessment & Plan  Patient has stage IIIa chronic kidney disease with GFR normally around 40.  Acuity of onset of the patient's KENRICK is uncertain but would appear to have been in the last week or 2 based on clinical history.  He is effectively anuretic based on reports from the transferring facility where he was resuscitated with 7 L of  crystalloid and only urinated 150 mL in 24 hours  Vargas catheter  Follow urine output and BMP  UA  Nephrology consultation  Renally dose medications as appropriate  Stat BMP to confirm no emergent electrolyte abnormalities  12/7/2022:  Patient has not had renal recovery appreciate nephrology recommendations.  Patient had tunneled dialysis catheter placed underwent prescribed hemodialysis today.    Acute hemodialysis patient ([facility] [place])  Assessment & Plan  Patient with KENRICK on CKD with rapid deterioration without renal recovery requiring acute hemodialysis during hospitalization.  Appreciate nephrology recommendations patient had tunneled dialysis catheter placed.  Patient will require outpatient hemodialysis chair to be scheduled and set up.  Patient tolerated first round hemodialysis without issue.    ESRD (end stage renal disease) on dialysis ([facility] [place])  Assessment & Plan  Patient received tunneled dialysis catheter today tolerated procedure well without issue.  Patient is subsequently undergone hemodialysis.  Nephrology recommendations appreciated.  We will continue to follow along closely.  Patient will require coordination of outpatient hemodialysis chair.  Elects to continue to stay in hospital and undergo several rounds of hemodialysis.  Continue with present antimicrobial dosing.  Appreciate pharmacy recommendations regards to antimicrobial doses.    Cellulitis of left lower extremity  Assessment & Plan  In setting of diabetes  Patient was treated with clindamycin and cefepime however remains afebrile  Repeating blood cultures  Changed to piperacillin tazobactam and linezolid  Check MRSA nares if this is negative we can stop his linezolid  Deyvi with giovanny and monitor closely  Ultrasound the transferring facility was negative for DVT or deep tissue infection  Patient's distal neurovascular status is intact    Sepsis ([facility])  Assessment & Plan  This is Sepsis Present on admission  SIRS criteria identified on my evaluation  include: Fever, with temperature greater than 101 deg F and Tachycardia, with heart rate greater than 90 BPM  Source is skin  Sepsis protocol initiated  Fluid resuscitation ordered per protocol  Crystalloid Fluid Administration: Patient already received adequate fluid resuscitation at the transferring facility and given that he is an uric further aggressive IV fluids are not indicated  IV antibiotics as appropriate for source of sepsis  Reassessment: I have reassessed the patient's hemodynamic status    Follow culture results both here and from the transferring facility  Empiric piperacillin tazobactam          Alcohol abuse  Assessment & Plan  Monitor for signs of withdrawal    Hyponatremia  Assessment & Plan  Likely hypovolemic in setting of chronic kidney disease  Monitor every 6  122 is the last reading from the transferring facility    Uncomplicated type 2 diabetes mellitus (HCC)- (present on admission)  Assessment & Plan  Start on insulin sliding scale with serial Accu-Checks  hemoglobin A1c 8.5  Hypoglycemic protocol in place        Benign essential hypertension- (present on admission)  Assessment & Plan  Holding home losartan    Mitral valve prolapse- (present on admission)  Assessment & Plan  Ordered cardiac echo       Please note that this dictation was created using voice recognition software. I have made every reasonable attempt to correct obvious errors, but I expect that there are errors of grammar and possibly context that I did not discover before finalizing the note.    VTE prophylaxis: SCDs/TEDs    I have performed a physical exam and reviewed and updated ROS and Plan today (12/11/2022). In review of yesterday's note (12/10/2022), there are no changes except as documented above.

## 2022-12-11 NOTE — PROGRESS NOTES
Nephrology Daily Progress Note    Date of Service  12/11/2022    Chief Complaint  62 y.o. male with CKD IIIb, DM II, admitted 12/4/2022 with URTI, diarrhea, left leg cellulitis, in KENRICK    Interval Problem Update  12/06 -no acute events  Feels better  Given iv lasix with good response  Hyponatremia improving  Acidosis improving  Creat stable  12/7 -no acute events, no new complaints  With worsening renal function with schedule TDC placement to start RRT  12/9 -doing better after started RRT  Tolerates dialysis well  Noticed improvement in UOP  Holding dialysis today -hoping for recovery  12/10  Doing well, no complaints  Removed george catheter  UOP 2700  Holding dialysis for possible recovery  Review of Systems  Review of Systems   Constitutional:  Negative for chills, fever, malaise/fatigue and weight loss.   HENT:  Negative for congestion, hearing loss and sinus pain.    Eyes: Negative.    Respiratory:  Negative for cough, hemoptysis, shortness of breath and wheezing.    Cardiovascular:  Positive for leg swelling. Negative for chest pain, palpitations and orthopnea.   Gastrointestinal:  Negative for abdominal pain, nausea and vomiting.   Genitourinary:  Negative for dysuria, flank pain, frequency, hematuria and urgency.   Skin:         Left leg cellulitis   All other systems reviewed and are negative.     Physical Exam  Temp:  [36.5 °C (97.7 °F)-37.1 °C (98.8 °F)] 37.1 °C (98.8 °F)  Pulse:  [] 93  Resp:  [16-20] 16  BP: (130-149)/(70-92) 141/70  SpO2:  [91 %-97 %] 91 %    Physical Exam  Vitals reviewed.   Constitutional:       General: He is not in acute distress.     Appearance: Normal appearance. He is well-developed. He is not diaphoretic.   HENT:      Head: Normocephalic and atraumatic.      Nose: Nose normal.      Mouth/Throat:      Mouth: Mucous membranes are moist.      Pharynx: Oropharynx is clear.   Eyes:      Extraocular Movements: Extraocular movements intact.      Conjunctiva/sclera: Conjunctivae  normal.      Pupils: Pupils are equal, round, and reactive to light.   Cardiovascular:      Rate and Rhythm: Normal rate and regular rhythm.      Pulses: Normal pulses.      Heart sounds: Normal heart sounds.   Pulmonary:      Effort: Pulmonary effort is normal. No respiratory distress.      Breath sounds: Normal breath sounds. No wheezing or rales.   Abdominal:      General: Bowel sounds are normal. There is distension.      Palpations: Abdomen is soft. There is no mass.      Tenderness: There is no abdominal tenderness. There is no right CVA tenderness or left CVA tenderness.   Musculoskeletal:      Cervical back: Normal range of motion and neck supple.      Right lower leg: No edema.      Left lower leg: Edema present.   Skin:     General: Skin is warm and dry.      Coloration: Skin is not pale.      Findings: Erythema present. No rash.   Neurological:      General: No focal deficit present.      Mental Status: He is alert and oriented to person, place, and time.      Cranial Nerves: No cranial nerve deficit.      Coordination: Coordination normal.   Psychiatric:         Mood and Affect: Mood normal.         Behavior: Behavior normal.         Thought Content: Thought content normal.         Judgment: Judgment normal.       Fluids    Intake/Output Summary (Last 24 hours) at 12/11/2022 1500  Last data filed at 12/11/2022 1233  Gross per 24 hour   Intake --   Output 1975 ml   Net -1975 ml         Laboratory        Recent Labs     12/09/22  0710 12/10/22  0246 12/11/22  0411   SODIUM 129* 129* 130*   POTASSIUM 3.4* 3.4* 3.7   CHLORIDE 93* 93* 94*   CO2 22 24 22   GLUCOSE 224* 161* 184*   BUN 63* 68* 69*   CREATININE 6.27* 6.61* 6.28*   CALCIUM 8.1* 8.0* 8.1*             Recent Labs     12/11/22  0411   NTPROBNP 678*             Imaging  AISHA-YOVANY MANTILLA PLACEMENT >5   Final Result      Successful image guided tunneled dialysis catheter placement.      Plan: The catheter is available for immediate use.             DX-TIBIA AND FIBULA LEFT   Final Result      No radiographic evidence of acute bony abnormality      Skin thickening and edema with some soft tissue calcifications most likely indicates venous stasis. Cellulitis is a possibility and there is no evidence of soft tissue gas. Dermatomyositis can be seen with soft tissue calcifications but these are not as    extensive as that often entails      EC-ECHOCARDIOGRAM COMPLETE W/ CONT   Final Result      US-RENAL   Final Result      No evidence solid renal mass or hydronephrosis.           Assessment/Plan  1.KENRICK/CKD IIIb - on RRT -holding dialysis today as with good UOP, creat slightly better     Possible recovery  2.HTN: BP well controlled  3.Electrolytes: hyponatremia mild -improving                          Hypokalemia - corrected  4.Anemia: Hb to monitor  5.Metabolic acidosis: corrected with dialysis  6.Volume: well controlled    Recs:  Hold dialysis  Hold lasix  Low salt diet  Daily labs  Avoid nephrotoxins  Will follow

## 2022-12-11 NOTE — CARE PLAN
The patient is Stable - Low risk of patient condition declining or worsening    Shift Goals  Clinical Goals: dc george , abx , pain control , home ??  Patient Goals: home , void on his own , decrease pain  Family Goals: SANJUANA    Progress made toward(s) clinical / shift goals:  voiding , dc george , pain control , abx    Patient is not progressing towards the following goals:

## 2022-12-12 LAB
ANION GAP SERPL CALC-SCNC: 11 MMOL/L (ref 7–16)
BUN SERPL-MCNC: 62 MG/DL (ref 8–22)
CALCIUM SERPL-MCNC: 8.1 MG/DL (ref 8.5–10.5)
CHLORIDE SERPL-SCNC: 96 MMOL/L (ref 96–112)
CO2 SERPL-SCNC: 24 MMOL/L (ref 20–33)
CREAT SERPL-MCNC: 4.69 MG/DL (ref 0.5–1.4)
GAMMA INTERFERON BACKGROUND BLD IA-ACNC: 0.07 IU/ML
GFR SERPLBLD CREATININE-BSD FMLA CKD-EPI: 13 ML/MIN/1.73 M 2
GLUCOSE BLD STRIP.AUTO-MCNC: 156 MG/DL (ref 65–99)
GLUCOSE BLD STRIP.AUTO-MCNC: 179 MG/DL (ref 65–99)
GLUCOSE BLD STRIP.AUTO-MCNC: 188 MG/DL (ref 65–99)
GLUCOSE BLD STRIP.AUTO-MCNC: 207 MG/DL (ref 65–99)
GLUCOSE BLD STRIP.AUTO-MCNC: 211 MG/DL (ref 65–99)
GLUCOSE SERPL-MCNC: 203 MG/DL (ref 65–99)
M TB IFN-G BLD-IMP: POSITIVE
M TB IFN-G CD4+ BCKGRND COR BLD-ACNC: 0 IU/ML
MITOGEN IGNF BCKGRD COR BLD-ACNC: 0.08 IU/ML
POTASSIUM SERPL-SCNC: 3.7 MMOL/L (ref 3.6–5.5)
QFT TB2 - NIL TBQ2: 1.37 IU/ML
SODIUM SERPL-SCNC: 131 MMOL/L (ref 135–145)

## 2022-12-12 PROCEDURE — 99232 SBSQ HOSP IP/OBS MODERATE 35: CPT | Performed by: INTERNAL MEDICINE

## 2022-12-12 PROCEDURE — 770020 HCHG ROOM/CARE - TELE (206)

## 2022-12-12 PROCEDURE — 36415 COLL VENOUS BLD VENIPUNCTURE: CPT

## 2022-12-12 PROCEDURE — 82962 GLUCOSE BLOOD TEST: CPT | Mod: 91

## 2022-12-12 PROCEDURE — 700102 HCHG RX REV CODE 250 W/ 637 OVERRIDE(OP): Performed by: HOSPITALIST

## 2022-12-12 PROCEDURE — 700111 HCHG RX REV CODE 636 W/ 250 OVERRIDE (IP): Performed by: HOSPITALIST

## 2022-12-12 PROCEDURE — 80048 BASIC METABOLIC PNL TOTAL CA: CPT

## 2022-12-12 PROCEDURE — 99233 SBSQ HOSP IP/OBS HIGH 50: CPT | Performed by: STUDENT IN AN ORGANIZED HEALTH CARE EDUCATION/TRAINING PROGRAM

## 2022-12-12 PROCEDURE — A9270 NON-COVERED ITEM OR SERVICE: HCPCS | Performed by: HOSPITALIST

## 2022-12-12 PROCEDURE — 700111 HCHG RX REV CODE 636 W/ 250 OVERRIDE (IP): Performed by: STUDENT IN AN ORGANIZED HEALTH CARE EDUCATION/TRAINING PROGRAM

## 2022-12-12 RX ADMIN — TAMSULOSIN HYDROCHLORIDE 0.4 MG: 0.4 CAPSULE ORAL at 08:07

## 2022-12-12 RX ADMIN — HYDROCODONE BITARTRATE AND ACETAMINOPHEN 1 TABLET: 5; 325 TABLET ORAL at 21:33

## 2022-12-12 RX ADMIN — HEPARIN SODIUM 5000 UNITS: 5000 INJECTION, SOLUTION INTRAVENOUS; SUBCUTANEOUS at 13:20

## 2022-12-12 RX ADMIN — INSULIN HUMAN 2 UNITS: 100 INJECTION, SOLUTION PARENTERAL at 14:00

## 2022-12-12 RX ADMIN — HEPARIN SODIUM 5000 UNITS: 5000 INJECTION, SOLUTION INTRAVENOUS; SUBCUTANEOUS at 21:32

## 2022-12-12 RX ADMIN — CEFAZOLIN SODIUM 1 G: 1 INJECTION, SOLUTION INTRAVENOUS at 06:00

## 2022-12-12 RX ADMIN — HEPARIN SODIUM 5000 UNITS: 5000 INJECTION, SOLUTION INTRAVENOUS; SUBCUTANEOUS at 05:57

## 2022-12-12 RX ADMIN — AMLODIPINE BESYLATE 10 MG: 10 TABLET ORAL at 17:25

## 2022-12-12 RX ADMIN — INSULIN HUMAN 3 UNITS: 100 INJECTION, SOLUTION PARENTERAL at 20:31

## 2022-12-12 RX ADMIN — PROMETHAZINE HYDROCHLORIDE 25 MG: 25 TABLET ORAL at 21:33

## 2022-12-12 RX ADMIN — OMEPRAZOLE 40 MG: 20 CAPSULE, DELAYED RELEASE ORAL at 05:57

## 2022-12-12 RX ADMIN — INSULIN HUMAN 2 UNITS: 100 INJECTION, SOLUTION PARENTERAL at 07:00

## 2022-12-12 RX ADMIN — INSULIN HUMAN 2 UNITS: 100 INJECTION, SOLUTION PARENTERAL at 17:00

## 2022-12-12 RX ADMIN — ROSUVASTATIN CALCIUM 10 MG: 20 TABLET, FILM COATED ORAL at 21:33

## 2022-12-12 RX ADMIN — PROMETHAZINE HYDROCHLORIDE 25 MG: 25 TABLET ORAL at 15:23

## 2022-12-12 RX ADMIN — HYDROCODONE BITARTRATE AND ACETAMINOPHEN 1 TABLET: 5; 325 TABLET ORAL at 15:23

## 2022-12-12 ASSESSMENT — ENCOUNTER SYMPTOMS
SORE THROAT: 0
FEVER: 0
DIZZINESS: 0
TREMORS: 0
ORTHOPNEA: 0
DIARRHEA: 0
DEPRESSION: 0
BLOOD IN STOOL: 0
PALPITATIONS: 0
PND: 0
VOMITING: 0
MYALGIAS: 1
NECK PAIN: 0
HALLUCINATIONS: 0
SINUS PAIN: 0
HEARTBURN: 0
HEADACHES: 0
NAUSEA: 1
SPUTUM PRODUCTION: 0
SHORTNESS OF BREATH: 0
BRUISES/BLEEDS EASILY: 0
CONSTIPATION: 0
POLYDIPSIA: 0
CHILLS: 0
STRIDOR: 0
FALLS: 0
COUGH: 0
WHEEZING: 0
MYALGIAS: 0
CLAUDICATION: 0
BLURRED VISION: 0
ABDOMINAL PAIN: 0
EYE PAIN: 0
PHOTOPHOBIA: 0
TINGLING: 0
BACK PAIN: 0
HEMOPTYSIS: 0
DIAPHORESIS: 0
WEAKNESS: 0
DOUBLE VISION: 0
FLANK PAIN: 0
NAUSEA: 0

## 2022-12-12 ASSESSMENT — FIBROSIS 4 INDEX: FIB4 SCORE: 2.41

## 2022-12-12 ASSESSMENT — LIFESTYLE VARIABLES: SUBSTANCE_ABUSE: 0

## 2022-12-12 NOTE — PROGRESS NOTES
Nephrology Daily Progress Note    Date of Service  12/12/2022    Chief Complaint  62 y.o. male with CKD IIIb, DM II, admitted 12/4/2022 with URTI, diarrhea, left leg cellulitis, in KENRICK    Interval Problem Update  12/06 -no acute events  Feels better  Given iv lasix with good response  Hyponatremia improving  Acidosis improving  Creat stable  12/7 -no acute events, no new complaints  With worsening renal function with schedule TDC placement to start RRT  12/9 -doing better after started RRT  Tolerates dialysis well  Noticed improvement in UOP  Holding dialysis today -hoping for recovery  12/10  Doing well, no complaints  Removed george catheter  UOP 2700  Holding dialysis for possible recovery  12/12 patient still complaining of leg pain  Good urine output , no labs from today  Review of Systems  Review of Systems   Constitutional:  Negative for chills, fever and malaise/fatigue.   Respiratory:  Negative for cough and shortness of breath.    Cardiovascular:  Negative for chest pain and leg swelling.   Gastrointestinal:  Negative for nausea and vomiting.   Genitourinary:  Negative for dysuria, frequency and urgency.   Musculoskeletal:  Positive for myalgias.   All other systems reviewed and are negative.     Physical Exam  Temp:  [36.6 °C (97.9 °F)-36.7 °C (98.1 °F)] 36.7 °C (98.1 °F)  Pulse:  [] 101  Resp:  [12-20] 16  BP: (127-154)/(69-83) 149/83  SpO2:  [92 %-96 %] 96 %    Physical Exam  Vitals and nursing note reviewed.   Constitutional:       General: He is awake.      Appearance: He is not ill-appearing.   HENT:      Head: Normocephalic and atraumatic.      Right Ear: External ear normal.      Left Ear: External ear normal.      Nose: Nose normal.      Mouth/Throat:      Pharynx: No oropharyngeal exudate or posterior oropharyngeal erythema.   Eyes:      General:         Right eye: No discharge.         Left eye: No discharge.      Conjunctiva/sclera: Conjunctivae normal.   Cardiovascular:      Rate and  Rhythm: Normal rate and regular rhythm.   Pulmonary:      Effort: Pulmonary effort is normal. No respiratory distress.      Breath sounds: Normal breath sounds. No wheezing.   Abdominal:      General: Abdomen is flat. Bowel sounds are normal.   Musculoskeletal:         General: No tenderness.      Cervical back: No rigidity. No muscular tenderness.      Right lower leg: No edema.      Left lower leg: No edema.   Skin:     General: Skin is warm and dry.      Coloration: Skin is not jaundiced.      Findings: Erythema, lesion and rash present.   Neurological:      General: No focal deficit present.      Mental Status: He is alert and oriented to person, place, and time. Mental status is at baseline.   Psychiatric:         Mood and Affect: Mood normal.         Behavior: Behavior normal.         Thought Content: Thought content normal.       Fluids    Intake/Output Summary (Last 24 hours) at 12/12/2022 1214  Last data filed at 12/12/2022 0920  Gross per 24 hour   Intake --   Output 3050 ml   Net -3050 ml         Laboratory        Recent Labs     12/10/22  0246 12/11/22  0411   SODIUM 129* 130*   POTASSIUM 3.4* 3.7   CHLORIDE 93* 94*   CO2 24 22   GLUCOSE 161* 184*   BUN 68* 69*   CREATININE 6.61* 6.28*   CALCIUM 8.0* 8.1*             Recent Labs     12/11/22  0411   NTPROBNP 678*             Imaging  IR-YOVANY MANTILLA PLACEMENT >5   Final Result      Successful image guided tunneled dialysis catheter placement.      Plan: The catheter is available for immediate use.            DX-TIBIA AND FIBULA LEFT   Final Result      No radiographic evidence of acute bony abnormality      Skin thickening and edema with some soft tissue calcifications most likely indicates venous stasis. Cellulitis is a possibility and there is no evidence of soft tissue gas. Dermatomyositis can be seen with soft tissue calcifications but these are not as    extensive as that often entails      EC-ECHOCARDIOGRAM COMPLETE W/ CONT   Final Result       US-RENAL   Final Result      No evidence solid renal mass or hydronephrosis.           Assessment/Plan  1.KENRICK/CKD IIIb : Nonoliguric, no labs from today, no uremic symptoms  2.HTN: BP well controlled  3.hyponatremia  4.Anemia: Hb to monitor  5.Metabolic acidosis: corrected with dialysis  6.Volume: well controlled    Recs:  Continue to evaluate daily for the need of HD, awaiting labs from today  Renal diet  Daily labs  Renal dose all meds  Avoid nephrotoxins  Prognosis guarded.  Plan discussed in detail with Dr. Ross

## 2022-12-12 NOTE — DIETARY
Nutrition Services Brief Update:    Day 8 of admit.  Thomas Borja is a 62 y.o. male with admitting DX of KENRICK.     Current Diet: Renal, Consistent CHO. Per ADL, PO intake variable but at two recent meals, he ate <50%.  Patient reported he likes the Boost glucose control.      Increased frequency of Boost to TID and added other preferred items.      Problem: Nutritional:  Goal: Achieve adequate nutritional intake  Description: Patient will consume >50% of meals  Outcome: Progressing slower than expected.    Nutrition Representative will continue to see him. for ongoing meal and snack preferences.  RD following.

## 2022-12-12 NOTE — CARE PLAN
The patient is Watcher - Medium risk of patient condition declining or worsening    Shift Goals  Clinical Goals: pain control , abx , HD ???  Patient Goals: comfort , rest , pain control  Family Goals: SANJUANA    Progress made toward(s) clinical / shift goals:    Problem: Knowledge Deficit - Standard  Goal: Patient and family/care givers will demonstrate understanding of plan of care, disease process/condition, diagnostic tests and medications  Outcome: Progressing     Problem: Pain - Standard  Goal: Alleviation of pain or a reduction in pain to the patient’s comfort goal  Outcome: Progressing     Problem: Hemodynamics  Goal: Patient's hemodynamics, fluid balance and neurologic status will be stable or improve  Outcome: Progressing     Problem: Fluid Volume  Goal: Fluid volume balance will be maintained  Outcome: Progressing     Problem: Urinary - Renal Perfusion  Goal: Ability to achieve and maintain adequate renal perfusion and functioning will improve  Outcome: Progressing     Problem: Respiratory  Goal: Patient will achieve/maintain optimum respiratory ventilation and gas exchange  Outcome: Progressing     Problem: Mechanical Ventilation  Goal: Safe management of artificial airway and ventilation  Outcome: Progressing  Goal: Successful weaning off mechanical ventilator, spontaneously maintains adequate gas exchange  Outcome: Progressing  Goal: Patient will be able to express needs and understand communication  Outcome: Progressing     Problem: Physical Regulation  Goal: Diagnostic test results will improve  Outcome: Progressing  Goal: Signs and symptoms of infection will decrease  Outcome: Progressing     Problem: Fall Risk  Goal: Patient will remain free from falls  Outcome: Progressing     Problem: Skin Integrity  Goal: Skin integrity is maintained or improved  Outcome: Progressing       Patient is not progressing towards the following goals:

## 2022-12-12 NOTE — CARE PLAN
The patient is Stable - Low risk of patient condition declining or worsening    Shift Goals  Clinical Goals: pain control , abx , HD ???  Patient Goals: comfort , rest , pain control  Family Goals: SANJUANA    Progress made toward(s) clinical / shift goals:  pain managed , increased UO , abx , afebrile , wound care     Patient is not progressing towards the following goals:

## 2022-12-13 LAB
ANION GAP SERPL CALC-SCNC: 10 MMOL/L (ref 7–16)
BASOPHILS # BLD AUTO: 0.6 % (ref 0–1.8)
BASOPHILS # BLD: 0.07 K/UL (ref 0–0.12)
BUN SERPL-MCNC: 61 MG/DL (ref 8–22)
CALCIUM SERPL-MCNC: 8.1 MG/DL (ref 8.5–10.5)
CHLORIDE SERPL-SCNC: 98 MMOL/L (ref 96–112)
CO2 SERPL-SCNC: 24 MMOL/L (ref 20–33)
CREAT SERPL-MCNC: 4.43 MG/DL (ref 0.5–1.4)
EOSINOPHIL # BLD AUTO: 0.16 K/UL (ref 0–0.51)
EOSINOPHIL NFR BLD: 1.3 % (ref 0–6.9)
ERYTHROCYTE [DISTWIDTH] IN BLOOD BY AUTOMATED COUNT: 45.3 FL (ref 35.9–50)
GFR SERPLBLD CREATININE-BSD FMLA CKD-EPI: 14 ML/MIN/1.73 M 2
GLUCOSE BLD STRIP.AUTO-MCNC: 140 MG/DL (ref 65–99)
GLUCOSE BLD STRIP.AUTO-MCNC: 167 MG/DL (ref 65–99)
GLUCOSE BLD STRIP.AUTO-MCNC: 204 MG/DL (ref 65–99)
GLUCOSE BLD STRIP.AUTO-MCNC: 207 MG/DL (ref 65–99)
GLUCOSE SERPL-MCNC: 157 MG/DL (ref 65–99)
HCT VFR BLD AUTO: 38.4 % (ref 42–52)
HGB BLD-MCNC: 13.2 G/DL (ref 14–18)
IMM GRANULOCYTES # BLD AUTO: 0.6 K/UL (ref 0–0.11)
IMM GRANULOCYTES NFR BLD AUTO: 4.7 % (ref 0–0.9)
LYMPHOCYTES # BLD AUTO: 1.51 K/UL (ref 1–4.8)
LYMPHOCYTES NFR BLD: 11.9 % (ref 22–41)
MCH RBC QN AUTO: 32.8 PG (ref 27–33)
MCHC RBC AUTO-ENTMCNC: 34.4 G/DL (ref 33.7–35.3)
MCV RBC AUTO: 95.5 FL (ref 81.4–97.8)
MONOCYTES # BLD AUTO: 1.22 K/UL (ref 0–0.85)
MONOCYTES NFR BLD AUTO: 9.6 % (ref 0–13.4)
NEUTROPHILS # BLD AUTO: 9.13 K/UL (ref 1.82–7.42)
NEUTROPHILS NFR BLD: 71.9 % (ref 44–72)
NRBC # BLD AUTO: 0 K/UL
NRBC BLD-RTO: 0 /100 WBC
PLATELET # BLD AUTO: 350 K/UL (ref 164–446)
PMV BLD AUTO: 9.2 FL (ref 9–12.9)
POTASSIUM SERPL-SCNC: 3.6 MMOL/L (ref 3.6–5.5)
RBC # BLD AUTO: 4.02 M/UL (ref 4.7–6.1)
SODIUM SERPL-SCNC: 132 MMOL/L (ref 135–145)
WBC # BLD AUTO: 12.7 K/UL (ref 4.8–10.8)

## 2022-12-13 PROCEDURE — 700102 HCHG RX REV CODE 250 W/ 637 OVERRIDE(OP): Performed by: HOSPITALIST

## 2022-12-13 PROCEDURE — 99232 SBSQ HOSP IP/OBS MODERATE 35: CPT | Performed by: INTERNAL MEDICINE

## 2022-12-13 PROCEDURE — A9270 NON-COVERED ITEM OR SERVICE: HCPCS | Performed by: HOSPITALIST

## 2022-12-13 PROCEDURE — 80048 BASIC METABOLIC PNL TOTAL CA: CPT

## 2022-12-13 PROCEDURE — 700111 HCHG RX REV CODE 636 W/ 250 OVERRIDE (IP): Performed by: HOSPITALIST

## 2022-12-13 PROCEDURE — 700102 HCHG RX REV CODE 250 W/ 637 OVERRIDE(OP): Performed by: INTERNAL MEDICINE

## 2022-12-13 PROCEDURE — 11056 PARNG/CUTG B9 HYPRKR LES 2-4: CPT

## 2022-12-13 PROCEDURE — 770001 HCHG ROOM/CARE - MED/SURG/GYN PRIV*

## 2022-12-13 PROCEDURE — 36415 COLL VENOUS BLD VENIPUNCTURE: CPT

## 2022-12-13 PROCEDURE — 700111 HCHG RX REV CODE 636 W/ 250 OVERRIDE (IP): Performed by: STUDENT IN AN ORGANIZED HEALTH CARE EDUCATION/TRAINING PROGRAM

## 2022-12-13 PROCEDURE — 82962 GLUCOSE BLOOD TEST: CPT

## 2022-12-13 PROCEDURE — 97602 WOUND(S) CARE NON-SELECTIVE: CPT

## 2022-12-13 PROCEDURE — 85025 COMPLETE CBC W/AUTO DIFF WBC: CPT

## 2022-12-13 PROCEDURE — A9270 NON-COVERED ITEM OR SERVICE: HCPCS | Performed by: INTERNAL MEDICINE

## 2022-12-13 RX ADMIN — CEFAZOLIN SODIUM 1 G: 1 INJECTION, SOLUTION INTRAVENOUS at 05:21

## 2022-12-13 RX ADMIN — HYDROCODONE BITARTRATE AND ACETAMINOPHEN 1 TABLET: 5; 325 TABLET ORAL at 04:12

## 2022-12-13 RX ADMIN — PROMETHAZINE HYDROCHLORIDE 25 MG: 25 TABLET ORAL at 04:14

## 2022-12-13 RX ADMIN — INSULIN HUMAN 3 UNITS: 100 INJECTION, SOLUTION PARENTERAL at 16:31

## 2022-12-13 RX ADMIN — SILVER SULFADIAZINE 1 G: 10 CREAM TOPICAL at 16:28

## 2022-12-13 RX ADMIN — TAMSULOSIN HYDROCHLORIDE 0.4 MG: 0.4 CAPSULE ORAL at 08:38

## 2022-12-13 RX ADMIN — HEPARIN SODIUM 5000 UNITS: 5000 INJECTION, SOLUTION INTRAVENOUS; SUBCUTANEOUS at 13:12

## 2022-12-13 RX ADMIN — HYDROCODONE BITARTRATE AND ACETAMINOPHEN 1 TABLET: 5; 325 TABLET ORAL at 20:36

## 2022-12-13 RX ADMIN — SILVER SULFADIAZINE 3 G: 10 CREAM TOPICAL at 13:12

## 2022-12-13 RX ADMIN — INSULIN HUMAN 2 UNITS: 100 INJECTION, SOLUTION PARENTERAL at 20:42

## 2022-12-13 RX ADMIN — HEPARIN SODIUM 5000 UNITS: 5000 INJECTION, SOLUTION INTRAVENOUS; SUBCUTANEOUS at 20:36

## 2022-12-13 RX ADMIN — OMEPRAZOLE 40 MG: 20 CAPSULE, DELAYED RELEASE ORAL at 05:19

## 2022-12-13 RX ADMIN — INSULIN HUMAN 3 UNITS: 100 INJECTION, SOLUTION PARENTERAL at 13:10

## 2022-12-13 RX ADMIN — AMLODIPINE BESYLATE 10 MG: 10 TABLET ORAL at 16:28

## 2022-12-13 RX ADMIN — PROMETHAZINE HYDROCHLORIDE 25 MG: 25 TABLET ORAL at 20:36

## 2022-12-13 RX ADMIN — PROMETHAZINE HYDROCHLORIDE 25 MG: 25 TABLET ORAL at 10:11

## 2022-12-13 RX ADMIN — HEPARIN SODIUM 5000 UNITS: 5000 INJECTION, SOLUTION INTRAVENOUS; SUBCUTANEOUS at 05:19

## 2022-12-13 RX ADMIN — HYDROCODONE BITARTRATE AND ACETAMINOPHEN 1 TABLET: 5; 325 TABLET ORAL at 10:11

## 2022-12-13 RX ADMIN — ROSUVASTATIN CALCIUM 10 MG: 20 TABLET, FILM COATED ORAL at 20:36

## 2022-12-13 ASSESSMENT — ENCOUNTER SYMPTOMS
SHORTNESS OF BREATH: 0
DIARRHEA: 1
NAUSEA: 0
MYALGIAS: 1
COUGH: 0
VOMITING: 0
ABDOMINAL PAIN: 0
CHILLS: 0
FEVER: 0
WEAKNESS: 1

## 2022-12-13 ASSESSMENT — PAIN DESCRIPTION - PAIN TYPE
TYPE: ACUTE PAIN

## 2022-12-13 ASSESSMENT — FIBROSIS 4 INDEX: FIB4 SCORE: 1.17

## 2022-12-13 NOTE — PROGRESS NOTES
Nephrology Daily Progress Note    Date of Service  12/13/2022    Chief Complaint  62 y.o. male with CKD IIIb, DM II, admitted 12/4/2022 with URTI, diarrhea, left leg cellulitis, in KENRICK    Interval Problem Update  12/06 -no acute events  Feels better  Given iv lasix with good response  Hyponatremia improving  Acidosis improving  Creat stable  12/7 -no acute events, no new complaints  With worsening renal function with schedule TDC placement to start RRT  12/9 -doing better after started RRT  Tolerates dialysis well  Noticed improvement in UOP  Holding dialysis today -hoping for recovery  12/10  Doing well, no complaints  Removed george catheter  UOP 2700  Holding dialysis for possible recovery  12/12 patient still complaining of leg pain  Good urine output , no labs from today  12/13 patient is doing better,good urine output   Review of Systems  Review of Systems   Constitutional:  Negative for chills, fever and malaise/fatigue.   Respiratory:  Negative for cough and shortness of breath.    Cardiovascular:  Negative for chest pain and leg swelling.   Gastrointestinal:  Negative for nausea and vomiting.   Genitourinary:  Negative for dysuria, frequency and urgency.   Musculoskeletal:  Positive for myalgias.   All other systems reviewed and are negative.     Physical Exam  Temp:  [36.6 °C (97.9 °F)-36.8 °C (98.2 °F)] 36.8 °C (98.2 °F)  Pulse:  [82-91] 84  Resp:  [15-18] 16  BP: (135-150)/(73-79) 135/73  SpO2:  [91 %-94 %] 91 %    Physical Exam  Vitals and nursing note reviewed.   Constitutional:       General: He is awake.      Appearance: He is ill-appearing.   HENT:      Head: Normocephalic and atraumatic.      Right Ear: External ear normal.      Left Ear: External ear normal.      Nose: Nose normal.      Mouth/Throat:      Pharynx: No oropharyngeal exudate or posterior oropharyngeal erythema.   Eyes:      General:         Right eye: No discharge.         Left eye: No discharge.      Conjunctiva/sclera: Conjunctivae  normal.   Cardiovascular:      Rate and Rhythm: Normal rate and regular rhythm.   Pulmonary:      Effort: Pulmonary effort is normal. No respiratory distress.      Breath sounds: Normal breath sounds. No wheezing.   Abdominal:      General: Abdomen is flat. Bowel sounds are normal.   Musculoskeletal:         General: No tenderness.      Cervical back: No rigidity. No muscular tenderness.      Right lower leg: No edema.      Left lower leg: No edema.      Comments: Surgical dressing   Skin:     General: Skin is warm and dry.      Coloration: Skin is not jaundiced.      Findings: No lesion or rash.   Neurological:      General: No focal deficit present.      Mental Status: He is alert and oriented to person, place, and time. Mental status is at baseline.   Psychiatric:         Mood and Affect: Mood normal.         Behavior: Behavior normal.         Thought Content: Thought content normal.       Fluids    Intake/Output Summary (Last 24 hours) at 12/13/2022 1407  Last data filed at 12/13/2022 0600  Gross per 24 hour   Intake 250 ml   Output 2100 ml   Net -1850 ml         Laboratory  Recent Labs     12/13/22  0045   WBC 12.7*   RBC 4.02*   HEMOGLOBIN 13.2*   HEMATOCRIT 38.4*   MCV 95.5   MCH 32.8   MCHC 34.4   RDW 45.3   PLATELETCT 350   MPV 9.2       Recent Labs     12/11/22  0411 12/12/22 2059 12/13/22  0045   SODIUM 130* 131* 132*   POTASSIUM 3.7 3.7 3.6   CHLORIDE 94* 96 98   CO2 22 24 24   GLUCOSE 184* 203* 157*   BUN 69* 62* 61*   CREATININE 6.28* 4.69* 4.43*   CALCIUM 8.1* 8.1* 8.1*             Recent Labs     12/11/22  0411   NTPROBNP 678*             Imaging  IR-YOVANY MANTILLA PLACEMENT >5   Final Result      Successful image guided tunneled dialysis catheter placement.      Plan: The catheter is available for immediate use.            DX-TIBIA AND FIBULA LEFT   Final Result      No radiographic evidence of acute bony abnormality      Skin thickening and edema with some soft tissue calcifications most likely  indicates venous stasis. Cellulitis is a possibility and there is no evidence of soft tissue gas. Dermatomyositis can be seen with soft tissue calcifications but these are not as    extensive as that often entails      EC-ECHOCARDIOGRAM COMPLETE W/ CONT   Final Result      US-RENAL   Final Result      No evidence solid renal mass or hydronephrosis.           Assessment/Plan  1.KENRICK/CKD IIIb : Secondary to ATN , good urine output   2.HTN: BP well controlled  3.hyponatremia  4.Anemia: Hb to monitor  5.Metabolic acidosis: corrected with dialysis  6.Volume: well controlled    Recs:  no acute need for HD today, continue to evaluate daily, if kidney function is better tomorrow we will remove hemodialysis catheter  Renal diet  Daily labs  Renal dose all meds  Avoid nephrotoxins  Prognosis guarded.

## 2022-12-13 NOTE — WOUND TEAM
Assisted FLORIN Whitmore with wound care, non-selective debridement performed using wound cleanser/NS and gauze. Please see Haim's wound note for further wound care details.

## 2022-12-13 NOTE — PROGRESS NOTES
Hospital Medicine Daily Progress Note    Date of Service  12/13/2022    Chief Complaint  Thomas Borja is a 62 y.o. male admitted 12/4/2022 with cellulitis.    Hospital Course  61 yo man with DM, CKD3a, HTN, MVR, recurrent cellulitis who presented with 1 week of fever and malaise with left leg redness. He was admitted to [facility] with KENRICK and sepsis but had worsening renal function and transferred. Wound culture grew strep pyogenes group A and MSSA, he was treated with cefazolin. Nephrology was consulted and he was started on dialysis.     Interval Problem Update  12/13 - UOP 2800cc. Renal function seems to be improving. Extensive left leg wound, I consulted LPS, he will need close follow up as well. He has occasional leg swelling and pain when getting up    I have discussed this patient's plan of care and discharge plan at IDT rounds today with Case Management, Nursing, Nursing leadership, and other members of the IDT team.    Consultants/Specialty  nephrology    Code Status  Full Code    Disposition  Patient is not medically cleared for discharge.   Anticipate discharge to to home with close outpatient follow-up.  I have placed the appropriate orders for post-discharge needs.    Review of Systems  Review of Systems   Constitutional:  Positive for malaise/fatigue.   Respiratory:  Negative for shortness of breath.    Cardiovascular:  Positive for leg swelling.   Gastrointestinal:  Positive for diarrhea. Negative for abdominal pain.   Genitourinary:  Negative for dysuria.   Musculoskeletal:  Positive for myalgias.   Skin:  Positive for rash.   Neurological:  Positive for weakness.      Physical Exam  Temp:  [36.6 °C (97.9 °F)-36.8 °C (98.2 °F)] 36.8 °C (98.2 °F)  Pulse:  [82-91] 84  Resp:  [15-18] 16  BP: (135-150)/(73-79) 135/73  SpO2:  [91 %-94 %] 91 %    Physical Exam  Vitals and nursing note reviewed.   Constitutional:       General: He is not in acute distress.     Appearance: He is not toxic-appearing  or diaphoretic.   HENT:      Head: Normocephalic.      Mouth/Throat:      Mouth: Mucous membranes are moist.   Eyes:      General:         Right eye: No discharge.         Left eye: No discharge.   Cardiovascular:      Rate and Rhythm: Normal rate and regular rhythm.   Pulmonary:      Effort: Pulmonary effort is normal. No respiratory distress.      Breath sounds: No wheezing or rales.   Abdominal:      General: There is distension.      Palpations: Abdomen is soft.      Tenderness: There is no abdominal tenderness. There is no guarding or rebound.   Musculoskeletal:         General: No swelling.      Cervical back: Neck supple.      Right lower leg: Edema present.      Left lower leg: Edema present.   Skin:     Comments: Left leg with extensive edema, erythema and blistering to upper and lower leg, erythema extends towards groin without scotal involvement   Neurological:      Mental Status: He is alert and oriented to person, place, and time.       Fluids    Intake/Output Summary (Last 24 hours) at 12/13/2022 1436  Last data filed at 12/13/2022 0600  Gross per 24 hour   Intake 250 ml   Output 2100 ml   Net -1850 ml       Laboratory  Recent Labs     12/13/22  0045   WBC 12.7*   RBC 4.02*   HEMOGLOBIN 13.2*   HEMATOCRIT 38.4*   MCV 95.5   MCH 32.8   MCHC 34.4   RDW 45.3   PLATELETCT 350   MPV 9.2     Recent Labs     12/11/22  0411 12/12/22  2059 12/13/22  0045   SODIUM 130* 131* 132*   POTASSIUM 3.7 3.7 3.6   CHLORIDE 94* 96 98   CO2 22 24 24   GLUCOSE 184* 203* 157*   BUN 69* 62* 61*   CREATININE 6.28* 4.69* 4.43*   CALCIUM 8.1* 8.1* 8.1*                   Imaging  IR-YOVANY MANTILLA PLACEMENT >5   Final Result      Successful image guided tunneled dialysis catheter placement.      Plan: The catheter is available for immediate use.            DX-TIBIA AND FIBULA LEFT   Final Result      No radiographic evidence of acute bony abnormality      Skin thickening and edema with some soft tissue calcifications most likely  indicates venous stasis. Cellulitis is a possibility and there is no evidence of soft tissue gas. Dermatomyositis can be seen with soft tissue calcifications but these are not as    extensive as that often entails      EC-ECHOCARDIOGRAM COMPLETE W/ CONT   Final Result      US-RENAL   Final Result      No evidence solid renal mass or hydronephrosis.           Assessment/Plan  * KENRICK (acute kidney injury) ([facility])- (present on admission)  Assessment & Plan  Patient has stage IIIa chronic kidney disease with GFR normally around 40.  Acuity of onset of the patient's KENRICK is uncertain but would appear to have been in the last week or 2 based on clinical history.  He is effectively anuretic based on reports from the transferring facility where he was resuscitated with 7 L of crystalloid and only urinated 150 mL in 24 hours  Vargas catheter  Follow urine output and BMP  UA  Nephrology consultation  Renally dose medications as appropriate  Stat BMP to confirm no emergent electrolyte abnormalities  Renal function is recovering, expect to remove dialysis cath soon    Acute hemodialysis patient ([facility])  Assessment & Plan  Patient with KENRICK on CKD with rapid deterioration needing dialysis. Being held by nephrology, expect renal recovery    ESRD (end stage renal disease) on dialysis ([facility])  Assessment & Plan  Nephrology consulted, patient is having renal recovery    Cellulitis of left lower extremity  Assessment & Plan  In setting of diabetes  Ultrasound the transferring facility was negative for DVT or deep tissue infection  Wound culture grew strep pyogenes group A and MSSA  10 days of antibiotics, cefazolin ends 12/13  Wound care consult    Sepsis ([facility])  Assessment & Plan  This is Sepsis Present on admission  SIRS criteria identified on my evaluation include: Fever, with temperature greater than 101 deg F and Tachycardia, with heart rate greater than 90 BPM  Source is skin  Sepsis protocol initiated  Fluid resuscitation ordered per  protocol  Crystalloid Fluid Administration: Patient already received adequate fluid resuscitation at the transferring facility and given that he is an uric further aggressive IV fluids are not indicated  IV antibiotics as appropriate for source of sepsis  Reassessment: I have reassessed the patient's hemodynamic status          Alcohol abuse  Assessment & Plan  Monitor for signs of withdrawal    Hyponatremia  Assessment & Plan  Likely hypovolemic in setting of chronic kidney disease  Improving    Uncomplicated type 2 diabetes mellitus (HCC)- (present on admission)  Assessment & Plan  Start on insulin sliding scale with serial Accu-Checks  hemoglobin A1c 8.5  Hypoglycemic protocol in place        Benign essential hypertension- (present on admission)  Assessment & Plan  Holding home losartan  Mild HTN  Cont norvasc    Iron deficiency anemia- (present on admission)  Assessment & Plan  mild    Mitral valve prolapse- (present on admission)  Assessment & Plan  Intact function on TTE       VTE prophylaxis: heparin ppx    I have performed a physical exam and reviewed and updated ROS and Plan today (12/13/2022). In review of yesterday's note (12/12/2022), there are no changes except as documented above.

## 2022-12-13 NOTE — CARE PLAN
The patient is Stable - Low risk of patient condition declining or worsening    Shift Goals  Clinical Goals: Pain management  Patient Goals: Pain management  Family Goals: SANJUANA    Progress made toward(s) clinical / shift goals:  yes    Patient is not progressing towards the following goals:      Problem: Knowledge Deficit - Standard  Goal: Patient and family/care givers will demonstrate understanding of plan of care, disease process/condition, diagnostic tests and medications  Outcome: Progressing     Problem: Pain - Standard  Goal: Alleviation of pain or a reduction in pain to the patient’s comfort goal  Outcome: Progressing     Problem: Hemodynamics  Goal: Patient's hemodynamics, fluid balance and neurologic status will be stable or improve  Outcome: Progressing     Problem: Fluid Volume  Goal: Fluid volume balance will be maintained  Outcome: Progressing     Problem: Urinary - Renal Perfusion  Goal: Ability to achieve and maintain adequate renal perfusion and functioning will improve  Outcome: Progressing     Problem: Respiratory  Goal: Patient will achieve/maintain optimum respiratory ventilation and gas exchange  Outcome: Progressing     Problem: Mechanical Ventilation  Goal: Safe management of artificial airway and ventilation  Outcome: Progressing  Goal: Successful weaning off mechanical ventilator, spontaneously maintains adequate gas exchange  Outcome: Progressing  Goal: Patient will be able to express needs and understand communication  Outcome: Progressing     Problem: Physical Regulation  Goal: Diagnostic test results will improve  Outcome: Progressing  Goal: Signs and symptoms of infection will decrease  Outcome: Progressing     Problem: Fall Risk  Goal: Patient will remain free from falls  Outcome: Progressing     Problem: Skin Integrity  Goal: Skin integrity is maintained or improved  Outcome: Progressing

## 2022-12-13 NOTE — PROGRESS NOTES
Hospital Medicine Daily Progress Note    Date of Service  12/12/2022    Chief Complaint  Thomas Borja is a 62 y.o. male admitted 12/4/2022 with a previous history of type 2 diabetes, stage IIIa chronic kidney disease with a GFR that lives around 40, hypertension, alcohol use, recurrent cellulitis, hypothyroidism, mitral valve prolapse he presents to the hospital for left leg cellulitis and acute renal failure.  Patient was taking metformin and NSAIDs at home.    Hospital Course  Patient arrived to the hospital tachycardic, febrile and tachypneic.  He was found to have sodium of 124, chloride 95, bicarb 13, creatinine 6.3, calcium 7.6, lactic acid 2.7.  Patient was started on IV bicarbonate.    Interval Problem Update  12/5: There is no significant improvement in patient's creatinine.  Waiting for further recommendations from nephrology.  Wound cultures have been ordered on the left leg.  There is improvement in cellulitis.  12/6/2022:  Continue present medical management appreciate nephrology recommendations with regards to hyponatremia.  Continue with daily labs.  No acute events overnight.  Additionally, patient also had positive culture growth of strep pyogenes group A.  Antimicrobial spectrum has been narrowed.  Patient also had growth of staph aureus non-MRSA.  Patient has been transitioned broad-spectrum antimicrobials to cefazolin at present.  Pharmacy recommendations appreciated.  12/7/2022:  Patient underwent tunneled dialysis catheter placement today with interventional radiology tolerated procedure well without issue.  Patient subsequently went hemodialysis without issue.  Continue to follow closely with nephrology recommendations are appreciated.  Patient will require coordination for outpatient hemodialysis chair.  Anticipate 2 to 3 days prior to discharge.  12/8/2022:  Continue present medical management patient underwent hemodialysis tolerated procedure well without issue.  Continue present  antimicrobials.  Patient would benefit from further volume removal.  12/9/2022:  Continue present medical management continue with dialysis schedule by nephrology recommendations are appreciated.  No acute events overnight continue with present antimicrobials.  12/10/2022:  Continue present medical management nephrology recommendations noted and appreciated.  Cautious optimism with regards to her renal recovery.  Patient did have good urinary output 2700 and last 24 hours.  Vargas catheter has been removed.  Patient will continue on Ancef for lower extremity cellulitis of left leg.  Is improving.  12/11/2022:  Continue present medical management appreciate nephrology recommendations.  Wound care recommendations appreciated with regards to the leg.  Otherwise no acute events overnight continue present medical management.  Continue to carefully monitor urinary output.  Patient had approximately 2.7 L out previous 24 hours.  Cautiously optimistic there may be some renal recovery.  12/12/2022:  Continue present medical management patient showing evidence of renal recovery may not require dialysis in the future.  Continue to follow along closely with nephrology.  Continue to monitor urinary output closely.  Acute events overnight.    I have discussed this patient's plan of care and discharge plan at IDT rounds today with Case Management, Nursing, Nursing leadership, and other members of the IDT team.    Consultants/Specialty  nephrology    Code Status  Full Code    Disposition  Patient is not medically cleared for discharge.   Anticipate discharge to to home with close outpatient follow-up.  I have placed the appropriate orders for post-discharge needs.    Review of Systems  Review of Systems   Constitutional:  Positive for malaise/fatigue. Negative for chills, diaphoresis and fever.   HENT:  Negative for congestion, ear discharge, ear pain, hearing loss, nosebleeds, sinus pain, sore throat and tinnitus.    Eyes:   Negative for blurred vision, double vision, photophobia and pain.   Respiratory:  Negative for cough, hemoptysis, sputum production, shortness of breath, wheezing and stridor.    Cardiovascular:  Positive for leg swelling. Negative for chest pain, palpitations, orthopnea, claudication and PND.   Gastrointestinal:  Positive for nausea. Negative for abdominal pain, blood in stool, constipation, diarrhea, heartburn, melena and vomiting.   Genitourinary:  Negative for dysuria, flank pain, frequency, hematuria and urgency.   Musculoskeletal:  Negative for back pain, falls, joint pain, myalgias and neck pain.   Skin:  Negative for itching and rash.   Neurological:  Negative for dizziness, tingling, tremors, weakness and headaches.   Endo/Heme/Allergies:  Negative for environmental allergies and polydipsia. Does not bruise/bleed easily.   Psychiatric/Behavioral:  Negative for depression, hallucinations, substance abuse and suicidal ideas.       Physical Exam  Temp:  [36.6 °C (97.9 °F)-36.7 °C (98.1 °F)] 36.6 °C (97.9 °F)  Pulse:  [] 86  Resp:  [12-20] 17  BP: (127-149)/(69-83) 148/74  SpO2:  [93 %-96 %] 94 %    Physical Exam  Vitals and nursing note reviewed.   Constitutional:       General: He is not in acute distress.     Appearance: Normal appearance. He is not ill-appearing, toxic-appearing or diaphoretic.   HENT:      Head: Normocephalic and atraumatic.      Nose: No congestion or rhinorrhea.      Mouth/Throat:      Pharynx: No posterior oropharyngeal erythema.   Eyes:      General: No scleral icterus.        Right eye: No discharge.   Neck:      Vascular: Hepatojugular reflux and JVD present.   Cardiovascular:      Rate and Rhythm: Normal rate and regular rhythm.      Pulses: Normal pulses.      Heart sounds: No murmur heard.    No friction rub. No gallop.   Pulmonary:      Effort: No respiratory distress.      Breath sounds: No stridor. Rales present. No wheezing or rhonchi.   Abdominal:      General: There is  distension.      Tenderness: There is no abdominal tenderness.   Musculoskeletal:         General: No swelling, tenderness, deformity or signs of injury.      Cervical back: Normal range of motion.      Right lower leg: Edema present.      Left lower leg: Edema present.   Skin:     Capillary Refill: Capillary refill takes more than 3 seconds.      Coloration: Skin is not jaundiced or pale.      Findings: No bruising, erythema, lesion or rash.   Neurological:      General: No focal deficit present.      Mental Status: He is alert and oriented to person, place, and time.       Fluids    Intake/Output Summary (Last 24 hours) at 12/12/2022 1800  Last data filed at 12/12/2022 0920  Gross per 24 hour   Intake --   Output 2250 ml   Net -2250 ml         Laboratory        Recent Labs     12/10/22  0246 12/11/22  0411   SODIUM 129* 130*   POTASSIUM 3.4* 3.7   CHLORIDE 93* 94*   CO2 24 22   GLUCOSE 161* 184*   BUN 68* 69*   CREATININE 6.61* 6.28*   CALCIUM 8.0* 8.1*                       Imaging  IR-MANTILLAYOVANY [facility] PLACEMENT >5   Final Result      Successful image guided tunneled dialysis catheter placement.      Plan: The catheter is available for immediate use.            DX-TIBIA AND FIBULA LEFT   Final Result      No radiographic evidence of acute bony abnormality      Skin thickening and edema with some soft tissue calcifications most likely indicates venous stasis. Cellulitis is a possibility and there is no evidence of soft tissue gas. Dermatomyositis can be seen with soft tissue calcifications but these are not as    extensive as that often entails      EC-ECHOCARDIOGRAM COMPLETE W/ CONT   Final Result      US-RENAL   Final Result      No evidence solid renal mass or hydronephrosis.             Assessment/Plan  * KENRICK (acute kidney injury) (HCC)- (present on admission)  Assessment & Plan  Patient has stage IIIa chronic kidney disease with GFR normally around 40.  Acuity of onset of the patient's KENRICK is uncertain but  would appear to have been in the last week or 2 based on clinical history.  He is effectively anuretic based on reports from the transferring facility where he was resuscitated with 7 L of crystalloid and only urinated 150 mL in 24 hours  Vargas catheter  Follow urine output and BMP  UA  Nephrology consultation  Renally dose medications as appropriate  Stat BMP to confirm no emergent electrolyte abnormalities  12/7/2022:  Patient has not had renal recovery appreciate nephrology recommendations.  Patient had tunneled dialysis catheter placed underwent prescribed hemodialysis today.    Acute hemodialysis patient ([facility])  Assessment & Plan  Patient with KENRICK on CKD with rapid deterioration without renal recovery requiring acute hemodialysis during hospitalization.  Appreciate nephrology recommendations patient had tunneled dialysis catheter placed.  Patient will require outpatient hemodialysis chair to be scheduled and set up.  Patient tolerated first round hemodialysis without issue.    ESRD (end stage renal disease) on dialysis ([facility])  Assessment & Plan  Patient received tunneled dialysis catheter today tolerated procedure well without issue.  Patient is subsequently undergone hemodialysis.  Nephrology recommendations appreciated.  We will continue to follow along closely.  Patient will require coordination of outpatient hemodialysis chair.  Elects to continue to stay in hospital and undergo several rounds of hemodialysis.  Continue with present antimicrobial dosing.  Appreciate pharmacy recommendations regards to antimicrobial doses.    Cellulitis of left lower extremity  Assessment & Plan  In setting of diabetes  Patient was treated with clindamycin and cefepime however remains afebrile  Repeating blood cultures  Changed to piperacillin tazobactam and linezolid  Check MRSA nares if this is negative we can stop his linezolid  Deyvi with giovanny and monitor closely  Ultrasound the transferring facility was negative for DVT  or deep tissue infection  Patient's distal neurovascular status is intact    Sepsis (HCC)  Assessment & Plan  This is Sepsis Present on admission  SIRS criteria identified on my evaluation include: Fever, with temperature greater than 101 deg F and Tachycardia, with heart rate greater than 90 BPM  Source is skin  Sepsis protocol initiated  Fluid resuscitation ordered per protocol  Crystalloid Fluid Administration: Patient already received adequate fluid resuscitation at the transferring facility and given that he is an uric further aggressive IV fluids are not indicated  IV antibiotics as appropriate for source of sepsis  Reassessment: I have reassessed the patient's hemodynamic status    Follow culture results both here and from the transferring facility  Empiric piperacillin tazobactam          Alcohol abuse  Assessment & Plan  Monitor for signs of withdrawal    Hyponatremia  Assessment & Plan  Likely hypovolemic in setting of chronic kidney disease  Monitor every 6  122 is the last reading from the transferring facility    Uncomplicated type 2 diabetes mellitus (HCC)- (present on admission)  Assessment & Plan  Start on insulin sliding scale with serial Accu-Checks  hemoglobin A1c 8.5  Hypoglycemic protocol in place        Benign essential hypertension- (present on admission)  Assessment & Plan  Holding home losartan    Mitral valve prolapse- (present on admission)  Assessment & Plan  Ordered cardiac echo       Please note that this dictation was created using voice recognition software. I have made every reasonable attempt to correct obvious errors, but I expect that there are errors of grammar and possibly context that I did not discover before finalizing the note.    VTE prophylaxis: SCDs/TEDs    I have performed a physical exam and reviewed and updated ROS and Plan today (12/12/2022). In review of yesterday's note (12/11/2022), there are no changes except as documented above.

## 2022-12-13 NOTE — WOUND TEAM
[facility]  Inpatient Services  Initial Wound and Skin Care Evaluation    Admission Date: 12/4/2022     Last order of IP CONSULT TO WOUND CARE was found on 12/13/2022 from Hospital Encounter on 12/4/2022     HPI, PMH, SH: Reviewed    No past surgical history on file.  Social History     Tobacco Use    Smoking status: Former    Smokeless tobacco: Never   Substance Use Topics    Alcohol use: Not Currently     No chief complaint on file.    Diagnosis: KENRICK (acute kidney injury) (HCC) [N17.9]    Unit where seen by Wound Team: [id number]/02     WOUND CONSULT/FOLLOW UP RELATED TO:  LLE and L Foot     WOUND HISTORY:  Pt is a 62yr old male admitted with history of DM type 2, Stage 3 Kidney disease, HTN, ETOH abuse, recurrent cellulitis and mitral valve prolapse. Pt was transferred from [facility] related to fever and cellulitis of the left leg. Pt has been inpatient since early November. Unfortunately pt leg continued to deteriorate until recently. Pt now with scattered blistering to the leg.    WOUND ASSESSMENT/LDA     Wound 12/04/22 Partial Thickness Wound Leg Circumferential Left (Active)   Wound Image      12/13/22 1100   Site Assessment Edema;Fragile;Other (Comment);Painful    Periwound Assessment Warm    Margins Attached edges;Defined edges    Closure Adhesive bandage    Drainage Amount Small    Drainage Description Serosanguineous;Yellow    Treatments Cleansed;Site care;Offloading;CSWD - Conservative Sharp Wound Debridement;Compression    Wound Cleansing Approved Wound Cleanser    Dressing Cleansing/Solutions Not Applicable    Dressing Options Silver Sulfadiazine (Silvadene) ;Telfa;Absorbent Abdominal Pad;Dry Roll Gauze;Tape;Tubigrip    Dressing Changed Changed    Dressing Status Clean;Dry;Intact    Dressing Change/Treatment Frequency Every Shift, and As Needed    NEXT Dressing Change/Treatment Date 12/13/22    NEXT Weekly Photo (Inpatient Only) 12/20/22    Non-staged Wound Description Partial  thickness    Shape Large Cicumferential    Wound Odor None    Pulses Left;2+;DP    Exposed Structures None        Wound 12/04/22 Partial Thickness Wound Foot;Heel Plantar Left dry cracked fissures (Active)   Wound Image     12/13/22 0900   Site Assessment Red;Bleeding;Clean;Dry;Intact    Periwound Assessment Clean;Dry;Intact;Callused    Margins Attached edges;Defined edges    Closure Adhesive bandage    Drainage Amount Scant    Drainage Description Serosanguineous    Treatments Cleansed;Site care;Offloading;Autolytic Debridement    Wound Cleansing Approved Wound Cleanser    Periwound Protectant Skin Protectant Wipes to Periwound    Dressing Cleansing/Solutions Not Applicable    Dressing Options Hydrocolloid Thin;Mepilex Heel    Dressing Changed New    Dressing Status Dry;Clean;Intact    Dressing Change/Treatment Frequency Every 72 hrs, and As Needed    NEXT Dressing Change/Treatment Date 12/16/22    NEXT Weekly Photo (Inpatient Only) 12/20/22    Non-staged Wound Description Partial thickness    Shape Irregular x2    Wound Odor None    Pulses Left;2+;DP    Exposed Structures None    WOUND NURSE ONLY - Time Spent with Patient (mins) 60      Vascular:    SHEELA:   No results found.    Lab Values:    Lab Results   Component Value Date/Time    WBC 17.1 (H) 12/08/2022 07:22 AM    RBC 4.05 (L) 12/08/2022 07:22 AM    HEMOGLOBIN 13.2 (L) 12/08/2022 07:22 AM    HEMATOCRIT 37.0 (L) 12/08/2022 07:22 AM    CREACTPROT 22.47 (H) 12/05/2022 07:35 PM    HBA1C 8.5 (H) 12/04/2022 09:27 PM      Culture Results show:  Recent Results (from the past 720 hour(s))   CULTURE WOUND W/ GRAM STAIN    Collection Time: 12/05/22  9:00 AM    Specimen: Left Leg; Wound   Result Value Ref Range    Significant Indicator POS (POS)     Source WND     Site LEFT LEG     Culture Result - (A)     Gram Stain Result No organisms seen.     Culture Result Streptococcus pyogenes (Group A)  Light growth   (A)     Culture Result Staphylococcus aureus  Light growth    (A)        Susceptibility    Staphylococcus aureus - PEGGY     Azithromycin <=2 Sensitive mcg/mL     Clindamycin <=0.25 Sensitive mcg/mL     Cefazolin <=8 Sensitive mcg/mL     Cefepime <=4 Sensitive mcg/mL     Ceftaroline <=0.5 Sensitive mcg/mL     Daptomycin <=0.5 Sensitive mcg/mL     Ampicillin/sulbactam <=8/4 Sensitive mcg/mL     Erythromycin <=0.25 Sensitive mcg/mL     Vancomycin 1 Sensitive mcg/mL     Oxacillin <=0.25 Sensitive mcg/mL     Pip/Tazobactam <=8 Sensitive mcg/mL     Trimeth/Sulfa <=0.5/9.5 Sensitive mcg/mL     Tetracycline <=4 Sensitive mcg/mL       Pain Level/Medicated:  Pt states he is neuropathic       INTERVENTIONS BY WOUND TEAM:  Chart and images reviewed. Discussed with bedside RN. All areas of concern (based on picture review, LDA review and discussion with bedside RN) have been thoroughly assessed. Documentation of areas based on significant findings. This RN in to assess patient. Performed standard wound care which includes appropriate positioning, dressing removal and non-selective debridement. Pictures and measurements obtained weekly if/when required.       LLE:  Preparation for Dressing removal:  SHALONDA  Non-selectively Debrided with:  Wound cleanser and gauze.  Sharp debridement(Performed by ASHLEY Bowen.: non-viable superficial epidermal layer (ie top layer of bullae) and non viable tissue debrided away using forceps and scissors. <20 cm2 debrided. no bleeding noted.  Inez wound: Cleansed with Wound cleanser and gauze, Prepped with no sting  Primary Dressing: Silvadene secured with telfa.  Secondary (Outer) Dressing: Followed by ABD pads, roll gauze and tape with tubigrip F applied         LEFT HEEL & PLANTAR FOOT:  Preparation for Dressing removal: SHALONDA  Non-selectively Debrided with:  Wound cleanser and gauze.  Sharp debridement: Thick callus and non viable tissue debrided away using Myron forceps and scissors. <20 cm2 debrided. scant bleeding noted, controlled with manual pressure.  Inez  wound: Cleansed with wound cleanser and gauze, Prepped with no sting  Primary Dressing: hydrocolloid thin  Secondary (Outer) Dressing: mepilex and tubigrip F    Interdisciplinary consultation: Patient, Bedside RN (Wes), Jessi BRENNER (Wound RN)    EVALUATION / RATIONALE FOR TREATMENT:  Most Recent Date:  12/13/22: Pt with LLE cellulitis. Leg appears to be progressing and decreasing in redness. Removed roof of bullae to reveal red raw tissue. Silvadene applied for its antimicrobial properties. Heel and plantar foot callused wounds were debrided to reveal red tissue. Hydrocolloid thin applied to autolytically debride and soften surrounding callus .     Goals: Steady decrease in wound area and depth weekly.    WOUND TEAM PLAN OF CARE ([X] for frequency of wound follow up,):   Nursing to follow dressing orders written for wound care. Contact wound team if area fails to progress, deteriorates or with any questions/concerns if something comes up before next scheduled follow up (See below as to whether wound is following and frequency of wound follow up)  Dressing changes by wound team:                   Follow up 3 times weekly:                NPWT change 3 times weekly:     Follow up 1-2 times weekly:    X  Follow up Bi-Monthly:           Follow up Monthly (High Risk):                        Follow up as needed:     Other (explain):     NURSING PLAN OF CARE ORDERS (X):  Dressing changes: See Dressing Care orders: X  Skin care: See Skin Care orders:   RN Prevention Protocol: X  Rectal tube care: See Rectal Tube Care orders:   Other orders:    RSKIN:   CURRENTLY IN PLACE (X), APPLIED THIS VISIT (A), ORDERED (O):   Q shift Glynn:  X  Q shift pressure point assessments:  X    Surface/Positioning   Pressure redistribution mattress      X      Low Airloss          ICU Low Airloss   Bariatric PANCHITO     Waffle cushion        Waffle Overlay          Reposition q 2 hours      TAPs Turning system     Z Albin Pillow      Offloading/Redistribution   Sacral Mepilex (Silicone dressing)     Heel Mepilex (Silicone dressing)     A    Heel float boots (Prevalon boot)             Float Heels off Bed with Pillows   A        Respiratory   Silicone O2 tubing         Gray Foam Ear protectors     Cannula fixation Device (Tender )          High flow offloading Clip    Elastic head band offloading device      Anchorfast                                                         Trach with Optifoam split foam             Containment/Moisture Prevention Continent    Rectal tube or BMS    Purwick/Condom Cath        Vargas Catheter    Barrier wipes           Barrier paste       Antifungal tx      Interdry        Mobilization       Up to chair      X  Ambulate    X  PT/OT      Nutrition       Dietician        Diabetes Education      PO  X   TF     TPN     NPO   # days     Other        Anticipated discharge plans:   LTACH:        SNF/Rehab:                  Home Health Care:       X    Outpatient Wound Center:   X         Self/Family Care:        Other:                  Vac Discharge Needs:   Not Applicable Pt not on a wound vac:     X  Regular Vac while inpatient, alternative dressing at DC:        Regular Vac in use and continued at DC:            Reg. Vac w/ Skin Sub/Biologic in use. Will need to be changed 2x wkly:      Veraflo Vac while inpatient, ok to transition to Regular Vac on Discharge:           Veraflo Vac while inpatient, will need to remain on Veraflo Vac upon discharge:

## 2022-12-13 NOTE — CARE PLAN
The patient is Stable - Low risk of patient condition declining or worsening    Shift Goals  Clinical Goals: Pain management, wound care  Patient Goals: Pain management  Family Goals: SANJUANA        Problem: Pain - Standard  Goal: Alleviation of pain or a reduction in pain to the patient’s comfort goal  Outcome: Progressing     Problem: Hemodynamics  Goal: Patient's hemodynamics, fluid balance and neurologic status will be stable or improve  Outcome: Progressing     Problem: Urinary - Renal Perfusion  Goal: Ability to achieve and maintain adequate renal perfusion and functioning will improve  Outcome: Progressing     Problem: Skin Integrity  Goal: Skin integrity is maintained or improved  Outcome: Progressing     Progress made toward(s) clinical / shift goals:  Pain managed with prns, currently asleep in bed. Output >1000ml for 12 hrs. Wound care completed, CL dressing changed.    Patient is not progressing towards the following goals: N/A

## 2022-12-14 LAB
ANION GAP SERPL CALC-SCNC: 11 MMOL/L (ref 7–16)
BASOPHILS # BLD AUTO: 0.5 % (ref 0–1.8)
BASOPHILS # BLD: 0.06 K/UL (ref 0–0.12)
BUN SERPL-MCNC: 56 MG/DL (ref 8–22)
CALCIUM SERPL-MCNC: 8 MG/DL (ref 8.5–10.5)
CHLORIDE SERPL-SCNC: 98 MMOL/L (ref 96–112)
CO2 SERPL-SCNC: 21 MMOL/L (ref 20–33)
CREAT SERPL-MCNC: 3.42 MG/DL (ref 0.5–1.4)
EOSINOPHIL # BLD AUTO: 0.12 K/UL (ref 0–0.51)
EOSINOPHIL NFR BLD: 1 % (ref 0–6.9)
ERYTHROCYTE [DISTWIDTH] IN BLOOD BY AUTOMATED COUNT: 45.3 FL (ref 35.9–50)
GFR SERPLBLD CREATININE-BSD FMLA CKD-EPI: 19 ML/MIN/1.73 M 2
GLUCOSE BLD STRIP.AUTO-MCNC: 123 MG/DL (ref 65–99)
GLUCOSE BLD STRIP.AUTO-MCNC: 138 MG/DL (ref 65–99)
GLUCOSE BLD STRIP.AUTO-MCNC: 181 MG/DL (ref 65–99)
GLUCOSE BLD STRIP.AUTO-MCNC: 186 MG/DL (ref 65–99)
GLUCOSE SERPL-MCNC: 169 MG/DL (ref 65–99)
HCT VFR BLD AUTO: 37.7 % (ref 42–52)
HGB BLD-MCNC: 12.8 G/DL (ref 14–18)
IMM GRANULOCYTES # BLD AUTO: 0.29 K/UL (ref 0–0.11)
IMM GRANULOCYTES NFR BLD AUTO: 2.5 % (ref 0–0.9)
LYMPHOCYTES # BLD AUTO: 1.31 K/UL (ref 1–4.8)
LYMPHOCYTES NFR BLD: 11.2 % (ref 22–41)
MCH RBC QN AUTO: 32.6 PG (ref 27–33)
MCHC RBC AUTO-ENTMCNC: 34 G/DL (ref 33.7–35.3)
MCV RBC AUTO: 95.9 FL (ref 81.4–97.8)
MONOCYTES # BLD AUTO: 1.21 K/UL (ref 0–0.85)
MONOCYTES NFR BLD AUTO: 10.3 % (ref 0–13.4)
NEUTROPHILS # BLD AUTO: 8.71 K/UL (ref 1.82–7.42)
NEUTROPHILS NFR BLD: 74.5 % (ref 44–72)
NRBC # BLD AUTO: 0 K/UL
NRBC BLD-RTO: 0 /100 WBC
PLATELET # BLD AUTO: 303 K/UL (ref 164–446)
PMV BLD AUTO: 8.6 FL (ref 9–12.9)
POTASSIUM SERPL-SCNC: 3.7 MMOL/L (ref 3.6–5.5)
RBC # BLD AUTO: 3.93 M/UL (ref 4.7–6.1)
SODIUM SERPL-SCNC: 130 MMOL/L (ref 135–145)
WBC # BLD AUTO: 11.7 K/UL (ref 4.8–10.8)

## 2022-12-14 PROCEDURE — 80048 BASIC METABOLIC PNL TOTAL CA: CPT

## 2022-12-14 PROCEDURE — 99232 SBSQ HOSP IP/OBS MODERATE 35: CPT | Performed by: INTERNAL MEDICINE

## 2022-12-14 PROCEDURE — 82962 GLUCOSE BLOOD TEST: CPT | Mod: 91

## 2022-12-14 PROCEDURE — A9270 NON-COVERED ITEM OR SERVICE: HCPCS | Performed by: HOSPITALIST

## 2022-12-14 PROCEDURE — 700102 HCHG RX REV CODE 250 W/ 637 OVERRIDE(OP): Performed by: HOSPITALIST

## 2022-12-14 PROCEDURE — 700111 HCHG RX REV CODE 636 W/ 250 OVERRIDE (IP): Performed by: HOSPITALIST

## 2022-12-14 PROCEDURE — 97602 WOUND(S) CARE NON-SELECTIVE: CPT

## 2022-12-14 PROCEDURE — 770001 HCHG ROOM/CARE - MED/SURG/GYN PRIV*

## 2022-12-14 PROCEDURE — 700102 HCHG RX REV CODE 250 W/ 637 OVERRIDE(OP): Performed by: INTERNAL MEDICINE

## 2022-12-14 PROCEDURE — 85025 COMPLETE CBC W/AUTO DIFF WBC: CPT

## 2022-12-14 PROCEDURE — A9270 NON-COVERED ITEM OR SERVICE: HCPCS | Performed by: INTERNAL MEDICINE

## 2022-12-14 RX ORDER — AMMONIUM LACTATE 12 G/100G
LOTION TOPICAL 2 TIMES DAILY
Status: DISCONTINUED | OUTPATIENT
Start: 2022-12-14 | End: 2022-12-15 | Stop reason: HOSPADM

## 2022-12-14 RX ADMIN — ROSUVASTATIN CALCIUM 10 MG: 20 TABLET, FILM COATED ORAL at 21:05

## 2022-12-14 RX ADMIN — INSULIN HUMAN 2 UNITS: 100 INJECTION, SOLUTION PARENTERAL at 14:05

## 2022-12-14 RX ADMIN — SILVER SULFADIAZINE 1 G: 10 CREAM TOPICAL at 10:58

## 2022-12-14 RX ADMIN — INSULIN HUMAN 2 UNITS: 100 INJECTION, SOLUTION PARENTERAL at 21:00

## 2022-12-14 RX ADMIN — AMLODIPINE BESYLATE 10 MG: 10 TABLET ORAL at 17:42

## 2022-12-14 RX ADMIN — PROMETHAZINE HYDROCHLORIDE 25 MG: 25 TABLET ORAL at 11:00

## 2022-12-14 RX ADMIN — HEPARIN SODIUM 5000 UNITS: 5000 INJECTION, SOLUTION INTRAVENOUS; SUBCUTANEOUS at 21:06

## 2022-12-14 RX ADMIN — TAMSULOSIN HYDROCHLORIDE 0.4 MG: 0.4 CAPSULE ORAL at 09:05

## 2022-12-14 RX ADMIN — HYDROCODONE BITARTRATE AND ACETAMINOPHEN 1 TABLET: 5; 325 TABLET ORAL at 21:05

## 2022-12-14 RX ADMIN — HYDROCODONE BITARTRATE AND ACETAMINOPHEN 1 TABLET: 5; 325 TABLET ORAL at 10:56

## 2022-12-14 RX ADMIN — PROMETHAZINE HYDROCHLORIDE 25 MG: 25 TABLET ORAL at 04:37

## 2022-12-14 RX ADMIN — HEPARIN SODIUM 5000 UNITS: 5000 INJECTION, SOLUTION INTRAVENOUS; SUBCUTANEOUS at 04:37

## 2022-12-14 RX ADMIN — Medication: at 17:49

## 2022-12-14 RX ADMIN — HEPARIN SODIUM 5000 UNITS: 5000 INJECTION, SOLUTION INTRAVENOUS; SUBCUTANEOUS at 14:05

## 2022-12-14 RX ADMIN — SILVER SULFADIAZINE 1 G: 10 CREAM TOPICAL at 22:34

## 2022-12-14 RX ADMIN — PROMETHAZINE HYDROCHLORIDE 25 MG: 25 TABLET ORAL at 21:05

## 2022-12-14 RX ADMIN — HYDROCODONE BITARTRATE AND ACETAMINOPHEN 1 TABLET: 5; 325 TABLET ORAL at 04:36

## 2022-12-14 RX ADMIN — OMEPRAZOLE 40 MG: 20 CAPSULE, DELAYED RELEASE ORAL at 04:36

## 2022-12-14 ASSESSMENT — ENCOUNTER SYMPTOMS
DIARRHEA: 0
CHILLS: 0
FEVER: 0
SHORTNESS OF BREATH: 0
VOMITING: 0
COUGH: 0
NAUSEA: 0
WEAKNESS: 1
ABDOMINAL PAIN: 0
MYALGIAS: 1

## 2022-12-14 ASSESSMENT — FIBROSIS 4 INDEX
FIB4 SCORE: 1.35
FIB4 SCORE: 1.35

## 2022-12-14 ASSESSMENT — PAIN DESCRIPTION - PAIN TYPE
TYPE: ACUTE PAIN
TYPE: ACUTE PAIN

## 2022-12-14 NOTE — CARE PLAN
The patient is Stable - Low risk of patient condition declining or worsening    Shift Goals  Clinical Goals: Pain management, wound care  Patient Goals: Pain management  Family Goals: SANJUANA      Problem: Pain - Standard  Goal: Alleviation of pain or a reduction in pain to the patient’s comfort goal  Outcome: Progressing     Problem: Skin Integrity  Goal: Skin integrity is maintained or improved  Outcome: Progressing       Progress made toward(s) clinical / shift goals:  Wound care completed, pain addressed with prns    Patient is not progressing towards the following goals:

## 2022-12-14 NOTE — CONSULTS
[facility]  Inpatient Services  Initial Wound and LPS RN Evaluation    Admission Date: 12/4/2022     Last order of IP CONSULT TO WOUND CARE was found on 12/13/2022 from Hospital Encounter on 12/4/2022     HPI, PMH, SH: Reviewed    No past surgical history on file.  Social History     Tobacco Use    Smoking status: Former    Smokeless tobacco: Never   Substance Use Topics    Alcohol use: Not Currently     No chief complaint on file.    Diagnosis: KENRICK (acute kidney injury) (HCC) [N17.9]    Unit where seen by Wound Team: T728/02     WOUND CONSULT/FOLLOW UP RELATED TO:  left LE and left foot plantar     WOUND HISTORY:  Thomas Borja is a 62 y.o.  with a past medical history that includes type 2 diabetes, , admitted 12/4/2022 for KENRICK (acute kidney injury) ([facility]) [N17.9].   LPS has been consulted for left LE and left plantar foot.  The plantar wound started likely around thanksgiving, patient does not check his feet routinely.  Patient has light hemosiderin staining to his distal leg.  Redness and cellulitis occurred 11/30 after thanksgiving.        IV antibiotics were started on this admission.  Infectious diseases has not been consulted.    Ortho has not been consulted yet.    Diagnosed with diabetes 10 years ago, patient had gastric sleeve surgery 5 years ago and his A1c normalized, patient then proceed to contract COVID and spiraled his blood sugars,  Patient is only on metaformin an ozempic.  Checks blood sugars randomly and reports that his blood sugars ranged from 90-200s.  Has had previous diabetes education.  Does have numbness to feet.  Does not check his feet routinely, educated patient.  Patient does not have diabetic shoes and inserts.        WOUND ASSESSMENT/LDA  Wound 12/04/22 Partial Thickness Wound Leg Circumferential Left (Active)   Wound Image      12/13/22 1100   Site Assessment Edema;Fragile;Drainage 12/14/22 0030   Periwound Assessment Hot 12/14/22 0030   Margins  Undefined edges 12/14/22 0030   Closure Adhesive bandage 12/13/22 1100   Drainage Amount Moderate 12/14/22 0030   Drainage Description Yellow 12/14/22 0030   Treatments Cleansed;Site care 12/14/22 0030   Wound Cleansing Approved Wound Cleanser 12/14/22 0030   Dressing Cleansing/Solutions Not Applicable 12/14/22 0030   Dressing Options Silver Sulfadiazine (Silvadene) ;Absorbent Abdominal Pad;Dry Roll Gauze 12/14/22 0030   Dressing Changed Changed 12/14/22 0030   Dressing Status Clean;Dry;Intact 12/14/22 0030   Dressing Change/Treatment Frequency Every Shift, and As Needed 12/14/22 0030   NEXT Dressing Change/Treatment Date 12/13/22 12/13/22 0900   NEXT Weekly Photo (Inpatient Only) 12/20/22 12/13/22 0900   Non-staged Wound Description Partial thickness 12/13/22 0900   Shape Large Cicumferential 12/13/22 0900   Wound Odor None 12/13/22 0900   Pulses Left;2+;DP 12/13/22 0900   Exposed Structures None 12/13/22 0900   Number of days: 10       Wound 12/04/22 Partial Thickness Wound Foot;Heel Plantar Left dry cracked fissures (Active)   Wound Image     12/13/22 0900   Site Assessment SANJUANA 12/14/22 0030   Periwound Assessment Clean;Dry;Intact;Callused 12/13/22 0900   Margins Attached edges;Defined edges 12/13/22 0900   Closure Adhesive bandage 12/13/22 0900   Drainage Amount Scant 12/13/22 0900   Drainage Description Serosanguineous 12/13/22 0900   Treatments Cleansed;Site care;Offloading;Autolytic Debridement 12/13/22 0900   Wound Cleansing Approved Wound Cleanser 12/13/22 0900   Periwound Protectant Skin Protectant Wipes to Periwound 12/13/22 0900   Dressing Cleansing/Solutions Not Applicable 12/13/22 0900   Dressing Options Hydrocolloid Thin;Mepilex Heel 12/13/22 0900   Dressing Changed New 12/13/22 0900   Dressing Status Dry;Clean;Intact 12/13/22 0900   Dressing Change/Treatment Frequency Every 72 hrs, and As Needed 12/13/22 0900   NEXT Dressing Change/Treatment Date 12/16/22 12/13/22 0900   NEXT Weekly Photo (Inpatient  Only) 12/20/22 12/13/22 0900   Non-staged Wound Description Partial thickness 12/13/22 0900   Shape Irregular x2 12/13/22 0900   Wound Odor None 12/13/22 0900   Pulses Left;2+;DP 12/13/22 0900   Exposed Structures None 12/13/22 0900   WOUND NURSE ONLY - Time Spent with Patient (mins) 60 12/13/22 0900   Number of days: 10        Vascular:    SHEELA:   No results found.    Pulses: Present via palpation    Lab Values:    Lab Results   Component Value Date/Time    WBC 11.7 (H) 12/14/2022 02:19 AM    RBC 3.93 (L) 12/14/2022 02:19 AM    HEMOGLOBIN 12.8 (L) 12/14/2022 02:19 AM    HEMATOCRIT 37.7 (L) 12/14/2022 02:19 AM    CREACTPROT 22.47 (H) 12/05/2022 07:35 PM    HBA1C 8.5 (H) 12/04/2022 09:27 PM        Culture Results show:  Recent Results (from the past 720 hour(s))   CULTURE WOUND W/ GRAM STAIN    Collection Time: 12/05/22  9:00 AM    Specimen: Left Leg; Wound   Result Value Ref Range    Significant Indicator POS (POS)     Source WND     Site LEFT LEG     Culture Result - (A)     Gram Stain Result No organisms seen.     Culture Result Streptococcus pyogenes (Group A)  Light growth   (A)     Culture Result Staphylococcus aureus  Light growth   (A)        Susceptibility    Staphylococcus aureus - PEGGY     Azithromycin <=2 Sensitive mcg/mL     Clindamycin <=0.25 Sensitive mcg/mL     Cefazolin <=8 Sensitive mcg/mL     Cefepime <=4 Sensitive mcg/mL     Ceftaroline <=0.5 Sensitive mcg/mL     Daptomycin <=0.5 Sensitive mcg/mL     Ampicillin/sulbactam <=8/4 Sensitive mcg/mL     Erythromycin <=0.25 Sensitive mcg/mL     Vancomycin 1 Sensitive mcg/mL     Oxacillin <=0.25 Sensitive mcg/mL     Pip/Tazobactam <=8 Sensitive mcg/mL     Trimeth/Sulfa <=0.5/9.5 Sensitive mcg/mL     Tetracycline <=4 Sensitive mcg/mL       LPS Labs and Studies  Sedimentation Rate Resulted, See above  C-Reactive Protein Resulted, See above  X-ray foot 3 views N/A  SHEELA N/A  HgA1C Resulted, See above    Pain Level/Medicated:  0/10 severe neuropathy        INTERVENTIONS BY WOUND TEAM:  Chart and images reviewed. Discussed with bedside RN. All areas of concern (based on picture review, LDA review and discussion with bedside RN) have been thoroughly assessed. Documentation of areas based on significant findings. This RN in to assess patient. Performed standard wound care which includes appropriate positioning, dressing removal and non-selective debridement. Pictures and measurements obtained weekly if/when required.       LLE:  Preparation for Dressing removal:  SHALONDA  Non-selectively Debrided with:  Wound cleanser and gauze.  Sharp debridement: NA  Inez wound: Cleansed with Wound cleanser and gauze, Prepped with no sting  Primary Dressing: Silvadene secured with telfa.  Secondary (Outer) Dressing: Followed by ABD pads, roll gauze and tape with tubigrip F applied          LEFT HEEL & PLANTAR FOOT:  Preparation for Dressing removal: SHALONDA  Non-selectively Debrided with:  Wound cleanser and gauze.  Sharp debridement: NA  Inez wound: Cleansed with wound cleanser and gauze, Prepped with no sting  Primary Dressing: hydrocolloid thin  Secondary (Outer) Dressing: mepilex and tubigrip F    Interdisciplinary consultation: Patient, Bedside RN, Dr. Paige    EVALUATION / RATIONALE FOR TREATMENT: LPS consult has been acknowledged, Dressing orders placed for nursing, hospitalist, notified/updated  Most Recent Date:  12/14/22: Venous and cellulitis to the left LE and plantar foot fissure from dryness.  Amlactin ordered for bilateral feet and Rx for orthotics when patient wound heals.      FOOTWEAR:   CURRENTLY IN PLACE (X), APPLIED THIS VISIT (A), ORDERED (O): NA  Diabetic boot   Off Loading Shoe with hexagon insert  PRAFO    CONSULTS: NA  Diabetic Education (A1c > 7 OR Newly diabetic OR Missing supplies)           Vascular Consult (Absent pulses or SHEELA/TBI insufficient)                             LPS surgical consult (Foot/ankle specialist)                                            Ortho consult                                                                                           Goals: Steady decrease in wound area and depth weekly.    LIMB PRESERVATION PLAN OF CARE ([X] for frequency of wound follow up,):   Nursing to follow dressing orders written for wound care. Contact wound team if area fails to progress, deteriorates or with any questions/concerns if something comes up before next scheduled follow up (See below as to whether wound is following and frequency of wound follow up)  Dressing changes by wound team:                   Follow up 3 times weekly:                NPWT change 3 times weekly:     Follow up 1-2 times weekly:    X, weekly by wound team   Follow up Bi-Monthly:                   Follow up as needed:     Other (explain):     NURSING PLAN OF CARE ORDERS (X):  Dressing changes: See Dressing Care orders: x  Skin care: See Skin Care orders:   RN Prevention Protocol:   Rectal tube care: See Rectal Tube Care orders:   Other orders:    RSKIN:   CURRENTLY IN PLACE (X), APPLIED THIS VISIT (A), ORDERED (O):   Q shift Glynn:  X  Q shift pressure point assessments:  X    Surface/Positioning   Pressure redistribution mattress x           Low Airloss          Bariatric foam      Bariatric PANCHITO     Waffle cushion        Waffle Overlay          Reposition q 2 hours      TAPs Turning system     Z Albin Pillow     Offloading/Redistribution   Sacral Mepilex (Silicone dressing)     Heel Mepilex (Silicone dressing)         Heel float boots (Prevalon boot)             Float Heels off Bed with Pillows    x       Respiratory NA  Silicone O2 tubing         Gray Foam Ear protectors     Cannula fixation Device (Tender )          High flow offloading Clip    Elastic head band offloading device      Anchorfast                                                         Trach with Optifoam split foam             Containment/Moisture Prevention NA    Rectal tube or BMS    Purwick/Condom Cath         Vargas Catheter    Barrier wipes           Barrier paste       Antifungal tx      Interdry        Mobilization NA      Up to chair    x    Ambulate   x   PT/OT      Nutrition       Dietician        Diabetes Education  (See Above)    PO   x  TF     TPN     NPO   # days     Other        Anticipated discharge plans:   LTACH:        SNF/Rehab:                  Home Health Care:           Outpatient Wound Center: x, referral placed for outpatient wound care  LPS rounds at wound clinic:             Self/Family Care:        Other:                Vac Discharge Needs: NA  Not Applicable Pt not on a wound vac:    x   Regular Vac while inpatient, alternative dressing at DC:        Regular Vac in use and continued at DC:            Reg. Vac w/ Skin Sub/Biologic in use. Will need to be changed 2x wkly:      Veraflo Vac while inpatient, ok to transition to Regular Vac on Discharge:           Veraflo Vac while inpatient, will need to remain on Veraflo Vac upon discharge:

## 2022-12-14 NOTE — FACE TO FACE
Face to Face Supporting Documentation - Home Health    The encounter with this patient was in whole or in part the primary reason for home health admission.    Date of encounter:   Patient:                    MRN:                       YOB: 2024  Thomas Borja  [id number]  1960     Home health to see patient for:  Skilled Nursing care for assessment, interventions & education, Wound Care, and Physical Therapy evaluation and treatment    Skilled need for:  Comment: left leg cellulitis    Skilled nursing interventions to include:  Wound Care and Comment: physical therapy    Homebound status evidenced by:  Needs the assistance of another person in order to leave the home. Leaving home requires a considerable and taxing effort. There is a normal inability to leave the home.    Community Physician to provide follow up care: Pcp Pt States None     Optional Interventions? No      I certify the face to face encounter for this home health care referral meets the CMS requirements and the encounter/clinical assessment with the patient was, in whole, or in part, for the medical condition(s) listed above, which is the primary reason for home health care. Based on my clinical findings: the service(s) are medically necessary, support the need for home health care, and the homebound criteria are met.  I certify that this patient has had a face to face encounter by myself.  Charissa Paige M.D. - NPI: [id number]

## 2022-12-14 NOTE — PROCEDURE: DIAGNOSIS COMMENT
Detail Level: Detailed
Comment: [id number] A, SCC well differentiated Invasive
cranial nerves intact
negative

## 2022-12-14 NOTE — DISCHARGE PLANNING
Case Management Discharge Planning    Admission Date: 12/4/2022  GMLOS: 4.3  ALOS: 10    6-Clicks ADL Score: 20  6-Clicks Mobility Score: 21      Anticipated Discharge Dispo: Discharge Disposition: D/T to home under HHA care in anticipation of covered skilled care (06)  Discharge Address: [address] [address]  Discharge Contact Phone Number: [phone]    DME Needed: No    Action(s) Taken: Case discussed with Dr. Paige.  A consult to [facility] has been ordered.  Outpatient wound care appointments would likely be difficult for this patient to manage since he lives in [place].  Patient would benefit from HH with wound care.    RN CM met with the patient at bedside to discuss.  Patient confirmed that the above home address and phone number are correct.  Patient's PCP is Benji Grey M.D., patient last saw his PCP approximately one month ago.  RN CM went over HH choice with the patient, he provided choice for [facility].  HH choice form faxed to ALEXANDRA Pan.    Escalations Completed: HH referral    Medically Clear: No    Next Steps: Care coordination to follow up with [facility].    Barriers to Discharge: Medical clearance, HH arrangements    Is the patient up for discharge tomorrow: Potentially

## 2022-12-14 NOTE — PROGRESS NOTES
Hospital Medicine Daily Progress Note    Date of Service  12/14/2022    Chief Complaint  Thomas Borja is a 62 y.o. male admitted 12/4/2022 with cellulitis.    Hospital Course  63 yo man with DM, CKD3a, HTN, MVR, recurrent cellulitis who presented with 1 week of fever and malaise with left leg redness. He was admitted to [facility] with KENRICK and sepsis but had worsening renal function and transferred. Wound culture grew strep pyogenes group A and MSSA, he was treated with cefazolin. Nephrology was consulted and he was started on dialysis.     Interval Problem Update  12/13 - UOP 2800cc. Renal function seems to be improving. Extensive left leg wound, I consulted LPS, he will need close follow up as well. He has occasional leg swelling and pain when getting up    12/14 - Seen by LPS and continue wound care. Patient feels like home health would be better, wife has poor eyesight and transportation into [place] would be difficult. CM to try to arrange HH vs outpatient wound care. Nephro recs regarding dialysis pending.    I have discussed this patient's plan of care and discharge plan at IDT rounds today with Case Management, Nursing, Nursing leadership, and other members of the IDT team.    Consultants/Specialty  nephrology    Code Status  Full Code    Disposition  Patient is not medically cleared for discharge.   Anticipate discharge to to home with close outpatient follow-up.  I have placed the appropriate orders for post-discharge needs.    Review of Systems  Review of Systems   Constitutional:  Positive for malaise/fatigue.   Respiratory:  Negative for shortness of breath.    Cardiovascular:  Positive for leg swelling.   Gastrointestinal:  Negative for abdominal pain and diarrhea.   Genitourinary:  Negative for dysuria.   Musculoskeletal:  Positive for myalgias.   Skin:  Positive for rash.   Neurological:  Positive for weakness.      Physical Exam  Temp:  [36.5 °C (97.7 °F)-36.8 °C (98.2 °F)] 36.8 °C (98.2  °F)  Pulse:  [80-92] 80  Resp:  [16-18] 16  BP: (107-160)/(67-75) 107/67  SpO2:  [92 %-93 %] 92 %    Physical Exam  Vitals and nursing note reviewed.   Constitutional:       General: He is not in acute distress.     Appearance: He is not toxic-appearing or diaphoretic.   HENT:      Head: Normocephalic.      Mouth/Throat:      Mouth: Mucous membranes are moist.   Eyes:      General:         Right eye: No discharge.         Left eye: No discharge.   Cardiovascular:      Rate and Rhythm: Normal rate and regular rhythm.   Pulmonary:      Effort: Pulmonary effort is normal. No respiratory distress.      Breath sounds: No wheezing or rales.   Abdominal:      General: There is distension.      Palpations: Abdomen is soft.      Tenderness: There is no abdominal tenderness. There is no guarding or rebound.   Musculoskeletal:         General: No swelling.      Cervical back: Neck supple.      Right lower leg: Edema present.      Left lower leg: Edema present.   Skin:     Comments: Left leg with wound dressings in place, photos reviewed   Neurological:      Mental Status: He is alert and oriented to person, place, and time.       Fluids    Intake/Output Summary (Last 24 hours) at 12/14/2022 1439  Last data filed at 12/14/2022 0200  Gross per 24 hour   Intake 450 ml   Output 800 ml   Net -350 ml       Laboratory  Recent Labs     12/13/22  0045 12/14/22  0219   WBC 12.7* 11.7*   RBC 4.02* 3.93*   HEMOGLOBIN 13.2* 12.8*   HEMATOCRIT 38.4* 37.7*   MCV 95.5 95.9   MCH 32.8 32.6   MCHC 34.4 34.0   RDW 45.3 45.3   PLATELETCT 350 303   MPV 9.2 8.6*     Recent Labs     12/12/22 2059 12/13/22  0045 12/14/22  0219   SODIUM 131* 132* 130*   POTASSIUM 3.7 3.6 3.7   CHLORIDE 96 98 98   CO2 24 24 21   GLUCOSE 203* 157* 169*   BUN 62* 61* 56*   CREATININE 4.69* 4.43* 3.42*   CALCIUM 8.1* 8.1* 8.0*                   Imaging  IR-YOVANY MANTILLA PLACEMENT >5   Final Result      Successful image guided tunneled dialysis catheter placement.       Plan: The catheter is available for immediate use.            DX-TIBIA AND FIBULA LEFT   Final Result      No radiographic evidence of acute bony abnormality      Skin thickening and edema with some soft tissue calcifications most likely indicates venous stasis. Cellulitis is a possibility and there is no evidence of soft tissue gas. Dermatomyositis can be seen with soft tissue calcifications but these are not as    extensive as that often entails      EC-ECHOCARDIOGRAM COMPLETE W/ CONT   Final Result      US-RENAL   Final Result      No evidence solid renal mass or hydronephrosis.           Assessment/Plan  * KENRICK (acute kidney injury) ([facility])- (present on admission)  Assessment & Plan  Patient has stage IIIa chronic kidney disease with GFR normally around 40.  Acuity of onset of the patient's KENRICK is uncertain but would appear to have been in the last week or 2 based on clinical history.  He is effectively anuretic based on reports from the transferring facility where he was resuscitated with 7 L of crystalloid and only urinated 150 mL in 24 hours  Vargas catheter  Follow urine output and BMP  UA  Nephrology consultation  Renally dose medications as appropriate  Stat BMP to confirm no emergent electrolyte abnormalities  Renal function is recovering, expect to remove dialysis cath soon    Acute hemodialysis patient ([facility])  Assessment & Plan  Patient with KENRICK on CKD with rapid deterioration needing dialysis. Being held by nephrology, expect renal recovery    ESRD (end stage renal disease) on dialysis ([facility])  Assessment & Plan  Nephrology consulted, patient is having renal recovery    Cellulitis of left lower extremity  Assessment & Plan  In setting of diabetes  Ultrasound the transferring facility was negative for DVT or deep tissue infection  Wound culture grew strep pyogenes group A and MSSA  10 days of antibiotics, cefazolin ends 12/13  Wound care consult    Sepsis ([facility])  Assessment & Plan  This is Sepsis Present on  admission  SIRS criteria identified on my evaluation include: Fever, with temperature greater than 101 deg F and Tachycardia, with heart rate greater than 90 BPM  Source is skin  Sepsis protocol initiated  Fluid resuscitation ordered per protocol  Crystalloid Fluid Administration: Patient already received adequate fluid resuscitation at the transferring facility and given that he is an uric further aggressive IV fluids are not indicated  IV antibiotics as appropriate for source of sepsis  Reassessment: I have reassessed the patient's hemodynamic status          Alcohol abuse  Assessment & Plan  Monitor for signs of withdrawal    Hyponatremia  Assessment & Plan  Likely hypovolemic in setting of chronic kidney disease  Improving    Uncomplicated type 2 diabetes mellitus (HCC)- (present on admission)  Assessment & Plan  Start on insulin sliding scale with serial Accu-Checks  hemoglobin A1c 8.5  Hypoglycemic protocol in place        Benign essential hypertension- (present on admission)  Assessment & Plan  Holding home losartan  HTN improved  Cont norvasc    Iron deficiency anemia- (present on admission)  Assessment & Plan  mild    Mitral valve prolapse- (present on admission)  Assessment & Plan  Intact function on TTE       VTE prophylaxis: heparin ppx    I have performed a physical exam and reviewed and updated ROS and Plan today (12/14/2022). In review of yesterday's note (12/13/2022), there are no changes except as documented above.

## 2022-12-14 NOTE — DISCHARGE PLANNING
Received Choice form at 1205  Agency/Facility Name: [facility]  RN CM sent referral per Choice form @ [phone]  Agency/Facility Name: [facility]  Spoke To: Carri  Outcome: Referral is under review, because of Pts insurance it will need to be reviewed further before decision can be made. DPA requested call back with updates

## 2022-12-15 ENCOUNTER — APPOINTMENT (OUTPATIENT)
Dept: RADIOLOGY | Facility: MEDICAL CENTER | Age: 62
DRG: 673 | End: 2022-12-15
Attending: INTERNAL MEDICINE
Payer: COMMERCIAL

## 2022-12-15 VITALS
HEART RATE: 83 BPM | HEIGHT: 72 IN | DIASTOLIC BLOOD PRESSURE: 76 MMHG | SYSTOLIC BLOOD PRESSURE: 130 MMHG | TEMPERATURE: 97.9 F | BODY MASS INDEX: 41.51 KG/M2 | OXYGEN SATURATION: 92 % | RESPIRATION RATE: 18 BRPM | WEIGHT: 306.44 LBS

## 2022-12-15 LAB
ANION GAP SERPL CALC-SCNC: 11 MMOL/L (ref 7–16)
BASOPHILS # BLD AUTO: 0.5 % (ref 0–1.8)
BASOPHILS # BLD: 0.06 K/UL (ref 0–0.12)
BUN SERPL-MCNC: 43 MG/DL (ref 8–22)
CALCIUM SERPL-MCNC: 8.4 MG/DL (ref 8.5–10.5)
CHLORIDE SERPL-SCNC: 100 MMOL/L (ref 96–112)
CO2 SERPL-SCNC: 22 MMOL/L (ref 20–33)
CREAT SERPL-MCNC: 2.56 MG/DL (ref 0.5–1.4)
EOSINOPHIL # BLD AUTO: 0.09 K/UL (ref 0–0.51)
EOSINOPHIL NFR BLD: 0.8 % (ref 0–6.9)
ERYTHROCYTE [DISTWIDTH] IN BLOOD BY AUTOMATED COUNT: 44.4 FL (ref 35.9–50)
GFR SERPLBLD CREATININE-BSD FMLA CKD-EPI: 27 ML/MIN/1.73 M 2
GLUCOSE BLD STRIP.AUTO-MCNC: 140 MG/DL (ref 65–99)
GLUCOSE BLD STRIP.AUTO-MCNC: 158 MG/DL (ref 65–99)
GLUCOSE SERPL-MCNC: 134 MG/DL (ref 65–99)
HCT VFR BLD AUTO: 36.7 % (ref 42–52)
HGB BLD-MCNC: 12.5 G/DL (ref 14–18)
IMM GRANULOCYTES # BLD AUTO: 0.17 K/UL (ref 0–0.11)
IMM GRANULOCYTES NFR BLD AUTO: 1.5 % (ref 0–0.9)
LYMPHOCYTES # BLD AUTO: 1.34 K/UL (ref 1–4.8)
LYMPHOCYTES NFR BLD: 11.5 % (ref 22–41)
MCH RBC QN AUTO: 32.6 PG (ref 27–33)
MCHC RBC AUTO-ENTMCNC: 34.1 G/DL (ref 33.7–35.3)
MCV RBC AUTO: 95.6 FL (ref 81.4–97.8)
MONOCYTES # BLD AUTO: 0.97 K/UL (ref 0–0.85)
MONOCYTES NFR BLD AUTO: 8.3 % (ref 0–13.4)
NEUTROPHILS # BLD AUTO: 9.04 K/UL (ref 1.82–7.42)
NEUTROPHILS NFR BLD: 77.4 % (ref 44–72)
NRBC # BLD AUTO: 0 K/UL
NRBC BLD-RTO: 0 /100 WBC
PLATELET # BLD AUTO: 304 K/UL (ref 164–446)
PMV BLD AUTO: 8.8 FL (ref 9–12.9)
POTASSIUM SERPL-SCNC: 4 MMOL/L (ref 3.6–5.5)
RBC # BLD AUTO: 3.84 M/UL (ref 4.7–6.1)
SODIUM SERPL-SCNC: 133 MMOL/L (ref 135–145)
WBC # BLD AUTO: 11.7 K/UL (ref 4.8–10.8)

## 2022-12-15 PROCEDURE — 82962 GLUCOSE BLOOD TEST: CPT

## 2022-12-15 PROCEDURE — 3E02340 INTRODUCTION OF INFLUENZA VACCINE INTO MUSCLE, PERCUTANEOUS APPROACH: ICD-10-PCS | Performed by: STUDENT IN AN ORGANIZED HEALTH CARE EDUCATION/TRAINING PROGRAM

## 2022-12-15 PROCEDURE — 700102 HCHG RX REV CODE 250 W/ 637 OVERRIDE(OP): Performed by: INTERNAL MEDICINE

## 2022-12-15 PROCEDURE — 700111 HCHG RX REV CODE 636 W/ 250 OVERRIDE (IP): Performed by: INTERNAL MEDICINE

## 2022-12-15 PROCEDURE — 80048 BASIC METABOLIC PNL TOTAL CA: CPT

## 2022-12-15 PROCEDURE — 90471 IMMUNIZATION ADMIN: CPT

## 2022-12-15 PROCEDURE — A9270 NON-COVERED ITEM OR SERVICE: HCPCS | Performed by: INTERNAL MEDICINE

## 2022-12-15 PROCEDURE — 36589 REMOVAL TUNNELED CV CATH: CPT

## 2022-12-15 PROCEDURE — 99232 SBSQ HOSP IP/OBS MODERATE 35: CPT | Performed by: INTERNAL MEDICINE

## 2022-12-15 PROCEDURE — 700102 HCHG RX REV CODE 250 W/ 637 OVERRIDE(OP): Performed by: HOSPITALIST

## 2022-12-15 PROCEDURE — 85025 COMPLETE CBC W/AUTO DIFF WBC: CPT

## 2022-12-15 PROCEDURE — 700111 HCHG RX REV CODE 636 W/ 250 OVERRIDE (IP): Performed by: HOSPITALIST

## 2022-12-15 PROCEDURE — 0JPT0XZ REMOVAL OF TUNNELED VASCULAR ACCESS DEVICE FROM TRUNK SUBCUTANEOUS TISSUE AND FASCIA, OPEN APPROACH: ICD-10-PCS | Performed by: RADIOLOGY

## 2022-12-15 PROCEDURE — A9270 NON-COVERED ITEM OR SERVICE: HCPCS | Performed by: HOSPITALIST

## 2022-12-15 PROCEDURE — 02PY33Z REMOVAL OF INFUSION DEVICE FROM GREAT VESSEL, PERCUTANEOUS APPROACH: ICD-10-PCS | Performed by: RADIOLOGY

## 2022-12-15 PROCEDURE — 90686 IIV4 VACC NO PRSV 0.5 ML IM: CPT | Performed by: INTERNAL MEDICINE

## 2022-12-15 PROCEDURE — 99239 HOSP IP/OBS DSCHRG MGMT >30: CPT | Performed by: INTERNAL MEDICINE

## 2022-12-15 PROCEDURE — 306000 HYDROGEL 1.0 OZ: Performed by: INTERNAL MEDICINE

## 2022-12-15 RX ORDER — GLUCOSAMINE HCL/CHONDROITIN SU 500-400 MG
CAPSULE ORAL
Qty: 100 EACH | Refills: 0 | Status: SHIPPED | OUTPATIENT
Start: 2022-12-15 | End: 2023-01-03

## 2022-12-15 RX ORDER — GLIPIZIDE 5 MG/1
2.5 TABLET ORAL DAILY
Qty: 30 TABLET | Refills: 0 | Status: SHIPPED | OUTPATIENT
Start: 2022-12-15 | End: 2023-02-06

## 2022-12-15 RX ORDER — LANCETS 30 GAUGE
EACH MISCELLANEOUS
Qty: 100 EACH | Refills: 0 | Status: SHIPPED | OUTPATIENT
Start: 2022-12-15 | End: 2023-01-03

## 2022-12-15 RX ORDER — PROMETHAZINE HYDROCHLORIDE 25 MG/1
25 TABLET ORAL EVERY 6 HOURS PRN
Qty: 20 TABLET | Refills: 0 | Status: SHIPPED | OUTPATIENT
Start: 2022-12-15 | End: 2023-01-03

## 2022-12-15 RX ORDER — HYDROCODONE BITARTRATE AND ACETAMINOPHEN 5; 325 MG/1; MG/1
1 TABLET ORAL EVERY 6 HOURS PRN
Qty: 20 TABLET | Refills: 0 | Status: SHIPPED | OUTPATIENT
Start: 2022-12-15 | End: 2022-12-22 | Stop reason: SDUPTHER

## 2022-12-15 RX ADMIN — SILVER SULFADIAZINE 25 G: 10 CREAM TOPICAL at 10:00

## 2022-12-15 RX ADMIN — HEPARIN SODIUM 5000 UNITS: 5000 INJECTION, SOLUTION INTRAVENOUS; SUBCUTANEOUS at 06:04

## 2022-12-15 RX ADMIN — HYDROCODONE BITARTRATE AND ACETAMINOPHEN 1 TABLET: 5; 325 TABLET ORAL at 04:40

## 2022-12-15 RX ADMIN — INFLUENZA A VIRUS A/VICTORIA/2570/2019 IVR-215 (H1N1) ANTIGEN (FORMALDEHYDE INACTIVATED), INFLUENZA A VIRUS A/DARWIN/9/2021 SAN-010 (H3N2) ANTIGEN (FORMALDEHYDE INACTIVATED), INFLUENZA B VIRUS B/PHUKET/3073/2013 ANTIGEN (FORMALDEHYDE INACTIVATED), AND INFLUENZA B VIRUS B/MICHIGAN/01/2021 ANTIGEN (FORMALDEHYDE INACTIVATED) 0.5 ML: 15; 15; 15; 15 INJECTION, SUSPENSION INTRAMUSCULAR at 15:40

## 2022-12-15 RX ADMIN — INSULIN HUMAN 2 UNITS: 100 INJECTION, SOLUTION PARENTERAL at 11:35

## 2022-12-15 RX ADMIN — HYDROCODONE BITARTRATE AND ACETAMINOPHEN 1 TABLET: 5; 325 TABLET ORAL at 11:34

## 2022-12-15 RX ADMIN — Medication: at 06:07

## 2022-12-15 RX ADMIN — PROMETHAZINE HYDROCHLORIDE 25 MG: 25 TABLET ORAL at 04:39

## 2022-12-15 RX ADMIN — OMEPRAZOLE 40 MG: 20 CAPSULE, DELAYED RELEASE ORAL at 06:04

## 2022-12-15 RX ADMIN — TAMSULOSIN HYDROCHLORIDE 0.4 MG: 0.4 CAPSULE ORAL at 09:10

## 2022-12-15 RX ADMIN — PROMETHAZINE HYDROCHLORIDE 25 MG: 25 TABLET ORAL at 11:34

## 2022-12-15 ASSESSMENT — ENCOUNTER SYMPTOMS
FEVER: 0
NAUSEA: 0
SHORTNESS OF BREATH: 0
COUGH: 0
VOMITING: 0
CHILLS: 0

## 2022-12-15 NOTE — DISCHARGE PLANNING
Agency/[facility]ility Name: [facility]  Spoke To: Carri  Outcome: Referral is under review pending executive direction decision. DPA requested call back with confirmation       0904  Agency/Facility Name: [facility]  Spoke To: Carri  Outcome: Pt is accepted with agency. No further follow up needs       0959  Agency/Facility Name: [facility]   Spoke To: Carri  Outcome: Upon further research Pt cannot be accepted due to insurance being out of network.    RN DIALLO notified

## 2022-12-15 NOTE — CARE PLAN
The patient is Stable - Low risk of patient condition declining or worsening    Shift Goals  Clinical Goals: Wound care, pain management  Patient Goals: get sleep  Family Goals: n/a    Progress made toward(s) clinical / shift goals:  Wound care completed, monitoring urine output    Patient is not progressing towards the following goals:      Problem: Knowledge Deficit - Standard  Goal: Patient and family/care givers will demonstrate understanding of plan of care, disease process/condition, diagnostic tests and medications  Outcome: Progressing     Problem: Pain - Standard  Goal: Alleviation of pain or a reduction in pain to the patient’s comfort goal  Outcome: Progressing     Problem: Hemodynamics  Goal: Patient's hemodynamics, fluid balance and neurologic status will be stable or improve  Outcome: Progressing     Problem: Fluid Volume  Goal: Fluid volume balance will be maintained  Outcome: Progressing     Problem: Urinary - Renal Perfusion  Goal: Ability to achieve and maintain adequate renal perfusion and functioning will improve  Outcome: Progressing

## 2022-12-15 NOTE — CARE PLAN
The patient is Stable - Low risk of patient condition declining or worsening    Shift Goals  Clinical Goals: Wound care & pain management  Patient Goals: No pain  Family Goals: n/a    Progress made toward(s) clinical / shift goals:    Problem: Pain - Standard  Goal: Alleviation of pain or a reduction in pain to the patient’s comfort goal  Outcome: Progressing     Patient reports pain is tolerable with current regimen    Problem: Hemodynamics  Goal: Patient's hemodynamics, fluid balance and neurologic status will be stable or improve  Outcome: Progressing     Patient is hemodynamically stable and has a stable neurological status    Patient is not progressing towards the following goals:

## 2022-12-15 NOTE — DIETARY
Brief Nutrition Services Update: DM diet education consult received. RN educator left message with RD office that pt requesting Renal diet education. Clarified with Nephrologist, Renal diet no longer needed. Reviewed with pt that Renal diet restrictions no longer requested per nephrology. Per pt request, provided written and verbal information on CKD stage 3-5 medical nutrition therapy. Pt declined DM diet education stating he has already had education on this. Pt currently getting ready to discharge. RD available PRN.

## 2022-12-15 NOTE — PROGRESS NOTES
Nephrology Daily Progress Note    Date of Service  12/14/2022    Chief Complaint  62 y.o. male with CKD IIIb, DM II, admitted 12/4/2022 with URTI, diarrhea, left leg cellulitis, in [place]    Interval Problem Update  12/06 -no acute events  Feels better  Given iv lasix with good response  Hyponatremia improving  Acidosis improving  Creat stable  12/7 -no acute events, no new complaints  With worsening renal function with schedule TDC placement to start RRT  12/9 -doing better after started RRT  Tolerates dialysis well  Noticed improvement in UOP  Holding dialysis today -hoping for recovery  12/10  Doing well, no complaints  Removed george catheter  UOP 2700  Holding dialysis for possible recovery  12/12 patient still complaining of leg pain  Good urine output , no labs from today  12/13 patient is doing better,good urine output   12/14 patient is doing better still, no new complaints  Review of Systems  Review of Systems   Constitutional:  Negative for chills, fever and malaise/fatigue.   Respiratory:  Negative for cough and shortness of breath.    Cardiovascular:  Negative for chest pain and leg swelling.   Gastrointestinal:  Negative for nausea and vomiting.   Genitourinary:  Negative for dysuria, frequency and urgency.   Musculoskeletal:  Positive for myalgias.      Physical Exam  Temp:  [36.5 °C (97.7 °F)-36.8 °C (98.2 °F)] 36.8 °C (98.2 °F)  Pulse:  [80-91] 87  Resp:  [16-18] 16  BP: (107-160)/(67-74) 145/71  SpO2:  [92 %] 92 %    Physical Exam  Vitals and nursing note reviewed.   Constitutional:       General: He is not in acute distress.     Appearance: He is not ill-appearing.   HENT:      Head: Normocephalic and atraumatic.      Right Ear: External ear normal.      Left Ear: External ear normal.      Nose: Nose normal.   Eyes:      General:         Right eye: No discharge.         Left eye: No discharge.      Conjunctiva/sclera: Conjunctivae normal.   Cardiovascular:      Rate and Rhythm: Normal rate and regular  rhythm.      Heart sounds: No murmur heard.  Pulmonary:      Effort: Pulmonary effort is normal. No respiratory distress.      Breath sounds: Normal breath sounds. No wheezing.   Musculoskeletal:         General: No tenderness or deformity.      Right lower leg: No edema.      Left lower leg: No edema.      Comments: Surgical dressing   Skin:     General: Skin is warm.   Neurological:      General: No focal deficit present.      Mental Status: He is alert and oriented to person, place, and time.   Psychiatric:         Mood and Affect: Mood normal.         Behavior: Behavior normal.       Fluids    Intake/Output Summary (Last 24 hours) at 12/14/2022 1729  Last data filed at 12/14/2022 0200  Gross per 24 hour   Intake 450 ml   Output 800 ml   Net -350 ml         Laboratory  Recent Labs     12/13/22  0045 12/14/22  0219   WBC 12.7* 11.7*   RBC 4.02* 3.93*   HEMOGLOBIN 13.2* 12.8*   HEMATOCRIT 38.4* 37.7*   MCV 95.5 95.9   MCH 32.8 32.6   MCHC 34.4 34.0   RDW 45.3 45.3   PLATELETCT 350 303   MPV 9.2 8.6*       Recent Labs     12/12/22 2059 12/13/22  0045 12/14/22  0219   SODIUM 131* 132* 130*   POTASSIUM 3.7 3.6 3.7   CHLORIDE 96 98 98   CO2 24 24 21   GLUCOSE 203* 157* 169*   BUN 62* 61* 56*   CREATININE 4.69* 4.43* 3.42*   CALCIUM 8.1* 8.1* 8.0*             No results for input(s): NTPROBNP in the last 72 hours.          Imaging  IR-YOVANY MANTILLA PLACEMENT >5   Final Result      Successful image guided tunneled dialysis catheter placement.      Plan: The catheter is available for immediate use.            DX-TIBIA AND FIBULA LEFT   Final Result      No radiographic evidence of acute bony abnormality      Skin thickening and edema with some soft tissue calcifications most likely indicates venous stasis. Cellulitis is a possibility and there is no evidence of soft tissue gas. Dermatomyositis can be seen with soft tissue calcifications but these are not as    extensive as that often entails      EC-ECHOCARDIOGRAM  COMPLETE W/ CONT   Final Result      US-RENAL   Final Result      No evidence solid renal mass or hydronephrosis.           Assessment/Plan  1.KENRICK/CKD IIIb : Improving   2.HTN.  3.hyponatremia  4.Anemia: Hb to monitor  5.Metabolic acidosis: corrected with dialysis  6.Volume: well controlled    Recs:  no acute need for HD  Renal diet  Daily labs  Renal dose all meds  Avoid nephrotoxins  Prognosis guarded.

## 2022-12-15 NOTE — DISCHARGE PLANNING
Case Management Discharge Planning    Admission Date: 12/4/2022  GMLOS: 4.3  ALOS: 11    6-Clicks ADL Score: 20  6-Clicks Mobility Score: 21      Anticipated Discharge Dispo: Discharge Disposition: Discharged to home/self care (01)  Discharge Address: [facility] [address] [place] [address]  Discharge Contact Phone Number: [phone]    DME Needed: No    Action(s) Taken: Patient was declined by [facility] , they are not contracted with the patient's specific [facility] plan.  Only other HH agency that goes to [place] is [facility], they are not contracted with commercial [facility] plans.      RN CM called the [facility] at [phone], patient was scheduled for Monday 12/19 at 1330.  RN CM updated the patient at bedside, patient verbalized understanding.    Escalations Completed: Wound clinic referral    Medically Clear: Yes    Next Steps: Care coordination available to discuss any further discharge barriers.    Barriers to Discharge: Removal of tunneled HD catheter    Is the patient up for discharge tomorrow: Discharging later today

## 2022-12-15 NOTE — PROGRESS NOTES
Diabetes education: Met with pt this pm. Please see consult note.  Plan If going home on insulin please order One touch verio flex meter, strips , lancets, insulin pens, if sliding scale make ac and write out, pen needles as well, all to [facility]. Pt would like to meet with RD to review kidney/diabetes diet/meal choices. CDE left message for RD.

## 2022-12-15 NOTE — CONSULTS
"Diabetes education: Pt has a hx of diabetes, lost weight ( bariatric surgery?), then \"developed diabetes again\", and was started on Metformin. Pt had used insulin via pens before as well as testing his  blood sugars.   Pt was admitted with blood sugar of 151, and hga1c of 8.5%. Pt is currently on Regular insulin per sliding scale coverage ac and hs with blood sugars of 123,186 ( 2 units), and 138.  Met with pt who states he is comfortable giving/using insulin via pens, was using Freestyle alfredo 2 sensors ( CDE to leave sample tomorrow), but will need a new one touch verio meter and supplies at discharge.   Plan If going home on insulin please order One touch verio flex meter, strips , lancets, insulin pens, if sliding scale make ac and write out, pen needles as well, all to [facility]. Pt would like to meet with RD to review kidney/diabetes diet/meal choices. CDE left message for RD.  "

## 2022-12-15 NOTE — DISCHARGE INSTRUCTIONS
Discharge Instructions per Charissa Paige M.D.    DIAGNOSIS:   Left cellulitis - Your wound culture grew staph and strep. You received 10 days of antibiotic for this. Please continue with wound care as outpatient.  Kidney injury - Your kidney function improved and dialysis is stopped. Do not take losartan for now until your kidneys have fully recovered and avoid NSAID medications (such as ibuprofen, aspirin, meloxicam) which can further injure your kidneys.  Diabetes - Hold off on metformin until you follow up to ensure your kidney function has improved. For now I have prescribed Glipizide for glucose control as well as a glucometer. Please follow up with your primary care doctor

## 2022-12-15 NOTE — PROGRESS NOTES
Nephrology Daily Progress Note    Date of Service  12/15/2022    Chief Complaint  62 y.o. male with CKD IIIb, DM II, admitted 12/4/2022 with URTI, diarrhea, left leg cellulitis, in [place]    Interval Problem Update  12/06 -no acute events  Feels better  Given iv lasix with good response  Hyponatremia improving  Acidosis improving  Creat stable  12/7 -no acute events, no new complaints  With worsening renal function with schedule TDC placement to start RRT  12/9 -doing better after started RRT  Tolerates dialysis well  Noticed improvement in UOP  Holding dialysis today -hoping for recovery  12/10  Doing well, no complaints  Removed george catheter  UOP 2700  Holding dialysis for possible recovery  12/12 patient still complaining of leg pain  Good urine output , no labs from today  12/13 patient is doing better,good urine output   12/14 patient is doing better still, no new complaints  12/15 patient has no new complaints  Review of Systems  Review of Systems   Constitutional:  Negative for chills, fever and malaise/fatigue.   Respiratory:  Negative for cough and shortness of breath.    Cardiovascular:  Negative for chest pain and leg swelling.   Gastrointestinal:  Negative for nausea and vomiting.   Genitourinary:  Negative for dysuria, frequency and urgency.      Physical Exam  Temp:  [36.6 °C (97.9 °F)-36.9 °C (98.4 °F)] 36.6 °C (97.9 °F)  Pulse:  [83-93] 83  Resp:  [16-18] 18  BP: (130-157)/(70-82) 130/76  SpO2:  [92 %-94 %] 92 %    Physical Exam  Vitals and nursing note reviewed.   Constitutional:       General: He is not in acute distress.     Appearance: He is not ill-appearing.   HENT:      Head: Normocephalic and atraumatic.      Right Ear: External ear normal.      Left Ear: External ear normal.      Nose: Nose normal.   Eyes:      General:         Right eye: No discharge.         Left eye: No discharge.      Conjunctiva/sclera: Conjunctivae normal.   Cardiovascular:      Rate and Rhythm: Normal rate and regular  rhythm.      Heart sounds: No murmur heard.  Pulmonary:      Effort: Pulmonary effort is normal. No respiratory distress.      Breath sounds: Normal breath sounds. No wheezing.   Musculoskeletal:         General: No tenderness or deformity.      Right lower leg: No edema.      Left lower leg: No edema.   Skin:     General: Skin is warm.   Neurological:      General: No focal deficit present.      Mental Status: He is alert and oriented to person, place, and time.   Psychiatric:         Mood and Affect: Mood normal.         Behavior: Behavior normal.       Fluids    Intake/Output Summary (Last 24 hours) at 12/15/2022 1223  Last data filed at 12/15/2022 0340  Gross per 24 hour   Intake 2500 ml   Output 3000 ml   Net -500 ml         Laboratory  Recent Labs     12/13/22  0045 12/14/22  0219 12/15/22  0557   WBC 12.7* 11.7* 11.7*   RBC 4.02* 3.93* 3.84*   HEMOGLOBIN 13.2* 12.8* 12.5*   HEMATOCRIT 38.4* 37.7* 36.7*   MCV 95.5 95.9 95.6   MCH 32.8 32.6 32.6   MCHC 34.4 34.0 34.1   RDW 45.3 45.3 44.4   PLATELETCT 350 303 304   MPV 9.2 8.6* 8.8*       Recent Labs     12/13/22  0045 12/14/22  0219 12/15/22  0557   SODIUM 132* 130* 133*   POTASSIUM 3.6 3.7 4.0   CHLORIDE 98 98 100   CO2 24 21 22   GLUCOSE 157* 169* 134*   BUN 61* 56* 43*   CREATININE 4.43* 3.42* 2.56*   CALCIUM 8.1* 8.0* 8.4*             No results for input(s): NTPROBNP in the last 72 hours.          Imaging  IR-YOVANY MANTILLA PLACEMENT >5   Final Result      Successful image guided tunneled dialysis catheter placement.      Plan: The catheter is available for immediate use.            DX-TIBIA AND FIBULA LEFT   Final Result      No radiographic evidence of acute bony abnormality      Skin thickening and edema with some soft tissue calcifications most likely indicates venous stasis. Cellulitis is a possibility and there is no evidence of soft tissue gas. Dermatomyositis can be seen with soft tissue calcifications but these are not as    extensive as that  often entails      EC-ECHOCARDIOGRAM COMPLETE W/ CONT   Final Result      US-RENAL   Final Result      No evidence solid renal mass or hydronephrosis.      IR-CONSULT AND TREAT    (Results Pending)        Assessment/Plan  1.KENRICK/CKD IIIb : Improving  2.HTN.  3.hyponatremia  4.Anemia: Hb to monitor  5.Metabolic acidosis: corrected with dialysis  6.Volume: well controlled    Recs:  no acute need for HD  Daily labs  Renal dose all meds  Avoid nephrotoxins  Remove hemodialysis catheter  Nephrology will sign off please call if needed  Plan discussed with Dr. Paige

## 2022-12-16 NOTE — DISCHARGE SUMMARY
Discharge Summary    CHIEF COMPLAINT ON ADMISSION  No chief complaint on file.      Reason for Admission  sepsis, KENRICK     Admission Date  12/4/2022    CODE STATUS  Full Code    HPI & HOSPITAL COURSE  63 yo man with DM, CKD3a, HTN, MVR, recurrent cellulitis who presented with 1 week of fever and malaise with left leg redness. He was admitted to [facility] with KENRICK and sepsis but had worsening renal function and transferred. Wound culture grew strep pyogenes group A and MSSA, he was treated with cefazolin. He had 10 days of antibiotics. He was seen by our wound nurses who made recommendations for wound care and wound care clinic follow up was scheduled for him. Home health was not available.  Nephrology was consulted for KENRICK and he was started on dialysis. His renal function did improve and dialysis was stopped. As his creatinine continues to recover to his baseline, I discussed with him to continue holding metformin and losartan until his renal function has stabilized. In the mean time I started him on glipizide for his diabetes.    Therefore, he is discharged in good and stable condition to home with close outpatient follow-up.    The patient met 2-midnight criteria for an inpatient stay at the time of discharge.    Discharge Date  12/15/2022    FOLLOW UP ITEMS POST DISCHARGE  F/u renal function, restart losartan and metformin when appropriate  Follow wound clinic for leg wound, monitor for reinfection as he is high risk     DISCHARGE DIAGNOSES  Principal Problem:    KENRICK (acute kidney injury) (HCC) POA: Yes  Active Problems:    S/P laparoscopic sleeve gastrectomy POA: Yes    Obstructive sleep apnea POA: Yes    Mitral valve prolapse POA: Yes    Iron deficiency anemia POA: Yes    Benign essential hypertension POA: Yes    Uncomplicated type 2 diabetes mellitus (HCC) POA: Yes    Hyponatremia POA: Unknown    Alcohol abuse POA: Unknown    Sepsis (HCC) POA: Unknown    Cellulitis of left lower extremity POA: Unknown     ESRD (end stage renal disease) on dialysis (HCC) POA: Unknown    Acute hemodialysis patient (HCC) POA: Unknown  Resolved Problems:    * No resolved hospital problems. *      FOLLOW UP  Future Appointments   Date Time Provider Department Center   12/19/2022  1:30 PM [facility]  [address]  [place] [address]  [phone]            MEDICATIONS ON DISCHARGE     Medication List        START taking these medications        Instructions   Alcohol Swabs   Doctor's comments: Per formulary preference. ICD-10 code: E11.65 Uncontrolled type 2 Diabetes Mellitus  Wipe site with prep pad prior to injection.     * Blood Glucose Meter Kit   Doctor's comments: Or per formulary preference. ICD-10 code: E11.65 Uncontrolled type 2 Diabetes Mellitus  Test blood sugar as recommended by provider. One Touch Verio blood glucose monitoring kit.     * Blood Glucose Test Strips   Doctor's comments: Or per formulary preference. ICD-10 code: E11.65 Uncontrolled type 2 Diabetes Mellitus  Use one One Touch Verio strip to test blood sugar once daily early morning before first meal.     glipiZIDE 5 MG Tabs  Commonly known as: GLUCOTROL   Take 0.5 Tablets by mouth every day.  Dose: 2.5 mg     HYDROcodone-acetaminophen 5-325 MG Tabs per tablet  Commonly known as: NORCO   Take 1 Tablet by mouth every 6 hours as needed (severe pain) for up to 7 days.  Dose: 1 Tablet     Lancets   Doctor's comments: Or per formulary preference. ICD-10 code: E11.65 Uncontrolled type 2 Diabetes Mellitus  Use one One Touch Verio lancet to test blood sugar once daily early morning before first meal.     promethazine 25 MG Tabs  Commonly known as: PHENERGAN   Take 1 Tablet by mouth every 6 hours as needed for Nausea/Vomiting.  Dose: 25 mg           * This list has 2 medication(s) that are the same as other medications prescribed for you. Read the directions carefully, and ask your doctor or other care provider to  review them with you.                CONTINUE taking these medications        Instructions   amLODIPine 10 MG Tabs  Commonly known as: NORVASC   Take 10 mg by mouth every day.  Dose: 10 mg     finasteride 5 MG Tabs  Commonly known as: PROSCAR   Take 5 mg by mouth every day.  Dose: 5 mg     omeprazole 40 MG delayed-release capsule  Commonly known as: PRILOSEC   Take 40 mg by mouth every day.  Dose: 40 mg     pregabalin 50 MG capsule  Commonly known as: LYRICA   Take 50 mg by mouth.  Dose: 50 mg     ROSUVASTATIN CALCIUM PO   Take 20 mg by mouth every day.  Dose: 20 mg     tamsulosin 0.4 MG capsule  Commonly known as: FLOMAX   Take 0.4 mg by mouth every day.  Dose: 0.4 mg     traZODone 50 MG Tabs  Commonly known as: DESYREL   Take 100 mg by mouth.  Dose: 100 mg            STOP taking these medications      losartan 100 MG Tabs  Commonly known as: COZAAR     MELOXICAM PO     METFORMIN HCL PO     mometasone 50 MCG/ACT nasal spray  Commonly known as: Nasonex     Ventolin  (90 Base) MCG/ACT Aers inhalation aerosol  Generic drug: albuterol              Allergies  Allergies   Allergen Reactions    Amoxicillin Hives    Apap-Fd&C Red #40 Al Rosa-Oxycodone      Other reaction(s): Rash, urticarial    Codeine Nausea    Erythromycin Nausea       DIET  Orders Placed This Encounter   Procedures    Diet Order Diet: Consistent CHO (Diabetic)     Standing Status:   Standing     Number of Occurrences:   1     Order Specific Question:   Diet:     Answer:   Consistent CHO (Diabetic) [4]       ACTIVITY  As tolerated.    CONSULTATIONS  Dr. Najjar    PROCEDURES  AISHA-YVOANY MANTILLA PLACEMENT >5   Final Result      Successful image guided tunneled dialysis catheter placement.      Plan: The catheter is available for immediate use.            DX-TIBIA AND FIBULA LEFT   Final Result      No radiographic evidence of acute bony abnormality      Skin thickening and edema with some soft tissue calcifications most likely indicates venous  stasis. Cellulitis is a possibility and there is no evidence of soft tissue gas. Dermatomyositis can be seen with soft tissue calcifications but these are not as    extensive as that often entails      EC-ECHOCARDIOGRAM COMPLETE W/ CONT   Final Result      US-RENAL   Final Result      No evidence solid renal mass or hydronephrosis.      IR-CVC TUNNEL W/O PORT REMOVAL    (Results Pending)         LABORATORY  Lab Results   Component Value Date    SODIUM 133 (L) 12/15/2022    POTASSIUM 4.0 12/15/2022    CHLORIDE 100 12/15/2022    CO2 22 12/15/2022    GLUCOSE 134 (H) 12/15/2022    BUN 43 (H) 12/15/2022    CREATININE 2.56 (H) 12/15/2022        Lab Results   Component Value Date    WBC 11.7 (H) 12/15/2022    HEMOGLOBIN 12.5 (L) 12/15/2022    HEMATOCRIT 36.7 (L) 12/15/2022    PLATELETCT 304 12/15/2022        Total time of the discharge process exceeds 35 minutes.

## 2022-12-16 NOTE — PROGRESS NOTES
Diabetes education: Met with pt this am before discharge. 2 Freestyle alfredo sensors left for pt. CDE called [employer] to confirm meter and supplies. Only issue was Hydrocodone as pt has oxycodone listed as allergy causing nausea. CDE states pt using in hospital.  CDE questions pt who said he used it here with phenergan ( not ordered at dc). CDE text Dr. Paige asking her to add to discharge medication. This was added to discharge medications. Questions answered.

## 2022-12-19 ENCOUNTER — NON-PROVIDER VISIT (OUTPATIENT)
Dept: WOUND CARE | Facility: MEDICAL CENTER | Age: 62
End: 2022-12-19
Attending: INTERNAL MEDICINE
Payer: COMMERCIAL

## 2022-12-19 DIAGNOSIS — S81.802D OPEN WOUND OF LEFT LOWER EXTREMITY, SUBSEQUENT ENCOUNTER: ICD-10-CM

## 2022-12-19 PROCEDURE — 99212 OFFICE O/P EST SF 10 MIN: CPT | Performed by: STUDENT IN AN ORGANIZED HEALTH CARE EDUCATION/TRAINING PROGRAM

## 2022-12-19 PROCEDURE — 99211 OFF/OP EST MAY X REQ PHY/QHP: CPT

## 2022-12-19 PROCEDURE — 97597 DBRDMT OPN WND 1ST 20 CM/<: CPT

## 2022-12-19 RX ORDER — ROSUVASTATIN CALCIUM 20 MG/1
20 TABLET, COATED ORAL DAILY
COMMUNITY
End: 2023-03-15

## 2022-12-19 RX ORDER — LEVOTHYROXINE SODIUM 112 UG/1
112 TABLET ORAL
COMMUNITY
End: 2023-03-15

## 2022-12-19 NOTE — PROGRESS NOTES
Brief Wound Care Provider Note    Patient being seen by nursing for initial evaluation. He was recently 12/4-12/15/2022 for lower extremity cellulitis and KENRICK treated with IV Abx. Patient had completed all Abx prior to discharge.    Nursing evaluated patient today and concerned about continued erythema. He has erythema, reports has improved since hospitalization. His wounds are covered with crusting. Will need to loosen crusting with money product. Few small areas of serous fluid collection anterior tibia / fibula do not appear grossly fluctuant or concerning for abscess. His erythema was demarcated in clinic today and was non tender to palpation, less concerning for acute infection.    My total time spent caring for the patient on the day of the encounter was 10 minutes, reviewing history, assessment, counseling and education, and coordination of care with nursing staff.  This does not include time spent on separately billable procedures/tests.

## 2022-12-19 NOTE — CERTIFICATION
Non Provider Encounter- Diabetic Foot Ulcer      HISTORY OF PRESENT ILLNESS  Wound History:    START OF CARE IN CLINIC: 12/19/22    REFERRING PROVIDER: Charissa Paige M.D.   WOUND- Diabetic foot ulcer   LOCATION: Medial and Lateral Left Lower Extremities   HISTORY: The patient is a 62 year old male with wounds to his LLE and right anterior Knee. The patient states that the cellulitis started back in late November and that he received the bilateral knee wounds when he fell out of bed. He then states that he finally went to the ED on 12/03/22 and was admitted for sepsis and KENRICK. Per the patient his Left lower leg wounds weren't open at admission, but opened during his stay at [facility]. The patient was placed on dialysis during his admission, his renal function improved enough and he was taken of dialysis by the time he was discharged. During his admission inpatient wound care was using silvadene, ABD pads and dry roll gauze. The patient is referred to the [facility] clinic for further wound care evaluation.      Pertinent Medical History: DM, CKD3a, HTN, MVR, recurrent cellulitis      History reviewed. No pertinent surgical history.    Current Outpatient Medications on File Prior to Visit   Medication Sig Dispense Refill    levothyroxine (SYNTHROID) 112 MCG Tab Take 112 mcg by mouth every morning on an empty stomach.      rosuvastatin (CRESTOR) 20 MG Tab Take 20 mg by mouth at bedtime.      HYDROcodone-acetaminophen (NORCO) 5-325 MG Tab per tablet Take 1 Tablet by mouth every 6 hours as needed (severe pain) for up to 7 days. 20 Tablet 0    glipiZIDE (GLUCOTROL) 5 MG Tab Take 0.5 Tablets by mouth every day. 30 Tablet 0    Blood Glucose Meter Kit Test blood sugar as recommended by provider. One Touch Verio blood glucose monitoring kit. 1 Kit 0    Blood Glucose Test Strips Use one One Touch Verio strip to test blood sugar once daily early morning before first meal. 100 Strip 0    Lancets Use one One Touch Verio lancet to test blood  sugar once daily early morning before first meal. 100 Each 0    Alcohol Swabs Wipe site with prep pad prior to injection. 100 Each 0    promethazine (PHENERGAN) 25 MG Tab Take 1 Tablet by mouth every 6 hours as needed for Nausea/Vomiting. 20 Tablet 0    finasteride (PROSCAR) 5 MG Tab Take 5 mg by mouth every day.      traZODone (DESYREL) 50 MG Tab Take 100 mg by mouth.      tamsulosin (FLOMAX) 0.4 MG capsule Take 0.4 mg by mouth every day.      pregabalin (LYRICA) 50 MG capsule Take 50 mg by mouth.      ROSUVASTATIN CALCIUM PO Take 20 mg by mouth every day.      amLODIPine (NORVASC) 10 MG Tab Take 10 mg by mouth every day.      omeprazole (PRILOSEC) 40 MG delayed-release capsule Take 40 mg by mouth every day.       No current facility-administered medications on file prior to visit.     DIABETES HX: Diagnosed with type 2 diabetes 10 years ago, and was managing with metformin (not currently taking because of kidneys) and is now using glipizide. Checks blood sugars regularly and reports that these typically run around 120-170's. Has had previous diabetes education. Does have numbness in feet. Usually wears non-diabetic shoes. Does not check feet routinely. Has not had previous foot ulcers or foot surgery.  Current occupation full time computer programer. Offloading none.      TOBACCO USE: Patient quit smoking 5 years ago.     OFFLOADING: None    Pertinent Labs and Diagnostics:    Labs:     A1c:   Lab Results   Component Value Date/Time    HBA1C 8.5 (H) 12/04/2022 09:27 PM          IMAGING:   DX-TIBIA AND FIBULA LEFT  Order: [id number]  Status: Final result     Visible to patient: Yes (not seen)     Next appt: 12/22/2022 at 10:00 AM in Wound Care (MADDISON Samuel.P.R.N.)     0 Result Notes  Details    Reading Physician Reading Date Result Priority   Alex Charles M.D.  [phone] 12/5/2022 Routine     Narrative & Impression     12/5/2022 5:57 PM     HISTORY/REASON FOR EXAM:  Atraumatic  Pain/Swelling/Deformity.     TECHNIQUE/EXAM DESCRIPTION AND NUMBER OF VIEWS:  2 views of the LEFT tibia and fibula.     COMPARISON: None     FINDINGS:  No acute fracture or subluxation is seen.     No bony destruction or worrisome periosteal reaction     There are some soft tissue calcifications which are not severe. There is diffuse subcutaneous fat edema, skin thickening.     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 Cellulitis is a possibility and there is no evidence of soft tissue gas. Dermatomyositis can be seen with soft tissue calcifications but these are not as   extensive as that often entails           Exam Ended: 12/05/22  7:08 PM Last Resulted: 12/05/22  7:22 PM           VASCULAR STUDIES: None in Epic.     LAST  WOUND CULTURE:  DATE :   CULTURE WOUND W/ GRAM STAIN  Order: [id number]  Status: Final result     Visible to patient: Yes (not seen)     Next appt: 12/22/2022 at 10:00 AM in Wound Care (Charles Camilo A.P.R.N.)     Specimen Information: Left Leg; Wound   0 Result Notes  Component 2 wk ago    Significant Indicator POS Positive (POS)    Source WND    Site LEFT LEG    Culture Result - Abnormal     Gram Stain Result No organisms seen.    Culture Result  Abnormal   Streptococcus pyogenes (Group A)   Light growth     Culture Result  Abnormal   Staphylococcus aureus   Light growth     Resulting Agency M        Susceptibility     Staphylococcus aureus     PEGGY     Ampicillin/sulbactam <=8/4 mcg/mL Sensitive     Azithromycin <=2 mcg/mL Sensitive     Cefazolin <=8 mcg/mL Sensitive     Cefepime <=4 mcg/mL Sensitive     Ceftaroline <=0.5 mcg/mL Sensitive     Clindamycin <=0.25 mcg/mL Sensitive     Daptomycin <=0.5 mcg/mL Sensitive     Erythromycin <=0.25 mcg/mL Sensitive     Oxacillin <=0.25 mcg/mL Sensitive      Pip/Tazobactam <=8 mcg/mL Sensitive     Tetracycline <=4 mcg/mL Sensitive     Trimeth/Sulfa <=0.5/9.5 m... Sensitive     Vancomycin 1 mcg/mL Sensitive               Narrative  Performed by: XIOMARA  CALL  Sharma  171 tel. [phone],   CALLED  171 tel. [phone] 12/07/2022, 08:27, RB PERF. RESULTS CALLED   TO:Jenny [id number] Ann   Special Contact Isolation   Collected By: [id number] JESS WASHBURN   Special Contact Isolation      Specimen Collected: 12/05/22  9:00 AM Last Resulted: 12/07/22  8:28 AM                 FALL RISK ASSESSMENT-    65 years or older     X Fall within the last 2 years   Uses ambulatory devices  X Loss of protective sensation in feet   Use of prostethic/orthotic    Presence of lower extremity/foot/toe amputation   X Taking medication that increases risk (per facility policy)    Interventions Recommended (if any of the above are selected):   Use of Assistive Device: None   Supervision with ambulation: Caregiver   Assistance with ambulation: Caregiver   Home safety education: Educational material provided    Patient allergies and medications reviewed via Epic.     WOUND ASSESSMENT-     Wound 12/19/22 Knee Right Anterior Knee (Active)   Wound Image   12/19/22 1600   Site Assessment Dry;Brown 12/19/22 1600   Periwound Assessment Pink;Dry;Scar tissue 12/19/22 1600   Margins SANJUANA 12/19/22 1600   Drainage Amount None 12/19/22 1600   Treatments Cleansed;Site care 12/19/22 1600   Wound Cleansing [employer] 12/19/22 1600   Periwound Protectant Skin Protectant Wipes to Periwound;Barrier Paste 12/19/22 1600   Dressing Cleansing/Solutions Not Applicable 12/19/22 1600   Dressing Options Silicone Adhesive Foam 12/19/22 1600   Dressing Changed New 12/19/22 1600   Dressing Status Clean;Dry;Intact 12/19/22 1600   Wound Odor None 12/19/22 1600   Pulses DP;PT;Doppler 12/19/22 1600   Exposed Structures SANJUANA 12/19/22 1600       Wound 12/19/22 Knee Left Anterior Knee (Active)   Wound Image   12/19/22 1600   Site Assessment  Dry;Brown 12/19/22 1600   Periwound Assessment Pink;Dry;Scar tissue 12/19/22 1600   Margins SANJUANA 12/19/22 1600   Drainage Amount None 12/19/22 1600   Treatments Cleansed;Site care 12/19/22 1600   Wound Cleansing Puracyn [employer] 12/19/22 1600   Periwound Protectant Skin Protectant Wipes to Periwound;Barrier Paste 12/19/22 1600   Dressing Cleansing/Solutions Not Applicable 12/19/22 1600   Dressing Options Hydrofiber Silver 12/19/22 1600   Dressing Changed New 12/19/22 1600   Dressing Status Clean;Dry;Intact 12/19/22 1600   Non-staged Wound Description Full thickness 12/19/22 1600   Wound Odor None 12/19/22 1600   Exposed Structures SANJUANA 12/19/22 1600       Wound 12/19/22 Pretibial Left Medial LE (Active)   Wound Image   12/19/22 1600   Site Assessment Brown;Pink;Red;Yellow;Eschar 12/19/22 1600   Periwound Assessment Red;Blanchable erythema;Edema;Fragile 12/19/22 1600   Margins Attached edges 12/19/22 1600   Drainage Amount Large 12/19/22 1600   Drainage Description Serous 12/19/22 1600   Treatments Cleansed;Topical Lidocaine;CSWD - Conservative Sharp Wound Debridement;Site care 12/19/22 1600   Wound Cleansing Foam Cleanser/Washcloth 12/19/22 1600   Periwound Protectant Barrier Paste 12/19/22 1600   Dressing Cleansing/Solutions Not Applicable 12/19/22 1600   Dressing Options Hydrofiber Silver;Hydrofiber;Absorbent Abdominal Pad;Dry Roll Gauze;Hypafix Tape;Tubigrip;Honey Colloid 12/19/22 1600   Dressing Changed New 12/19/22 1600   Dressing Status Clean;Dry;Intact 12/19/22 1600   Non-staged Wound Description Full thickness 12/19/22 1600   Wound Length (cm) 31 cm 12/19/22 1600   Wound Width (cm) 14.5 cm 12/19/22 1600   Wound Surface Area (cm^2) 449.5 cm^2 12/19/22 1600   Post-Procedure Length (cm) 31 cm 12/19/22 1600   Post-Procedure Width (cm) 14.5 cm 12/19/22 1600   Post-Procedure Depth (cm) 0.1 cm 12/19/22 1600   Post-Procedure Surface Area (cm^2) 449.5 cm^2 12/19/22 1600   Post-Procedure Volume (cm^3) 44.95 cm^3 12/19/22  1600   Tunneling (cm) 0 cm 12/19/22 1600   Undermining (cm) 0 cm 12/19/22 1600   Wound Odor None 12/19/22 1600   Pulses DP;PT;Doppler 12/19/22 1600   Exposed Structures SANJUANA 12/19/22 1600       Wound 12/19/22 Pretibial left lateral LE (Active)   Wound Image   12/19/22 1600   Site Assessment Dry;Brown;Eschar 12/19/22 1600   Periwound Assessment Red;Blanchable erythema;Edema;Fragile 12/19/22 1600   Margins Attached edges 12/19/22 1600   Drainage Amount Moderate 12/19/22 1600   Drainage Description Serous 12/19/22 1600   Treatments Cleansed;Topical Lidocaine;CSWD - Conservative Sharp Wound Debridement;Site care 12/19/22 1600   Wound Cleansing Puracyn [place] 12/19/22 1600   Periwound Protectant Barrier Paste 12/19/22 1600   Dressing Cleansing/Solutions Not Applicable 12/19/22 1600   Dressing Options Honey Colloid;Absorbent Abdominal Pad;Dry Roll Gauze;Hypafix Tape;Tubigrip 12/19/22 1600   Dressing Changed New 12/19/22 1600   Dressing Status Clean;Dry;Intact 12/19/22 1600   Non-staged Wound Description Full thickness 12/19/22 1600   Wound Length (cm) 21 cm 12/19/22 1600   Wound Width (cm) 9.6 cm 12/19/22 1600   Wound Surface Area (cm^2) 201.6 cm^2 12/19/22 1600   Post-Procedure Length (cm) 21 cm 12/19/22 1600   Post-Procedure Width (cm) 9.6 cm 12/19/22 1600   Post-Procedure Surface Area (cm^2) 201.6 cm^2 12/19/22 1600   Wound Odor None 12/19/22 1600   Pulses DP;PT;Doppler 12/19/22 1600   Exposed Structures SANJUANA 12/19/22 1600     Procedures:       2% viscous lidocaine applied topically to wound bed for approximately 5-10 minutes prior to debridement. CSWD with curette to debride wound bed and periwound of non-viable tissue. Entire surface of wound, > 20 cm2 debrided. Refer to flow sheet for wound care details. Patient tolerated the procedure well.                    PATIENT EDUCATION  - Importance of tight glucose control for wound healing   - Implications of loss of protective sensation (LOPS) discussed with patient-  including increased risk for amputation.  - Advised to check feet at least daily, moisturize feet, and to always wear protective foot wear.   -  Importance of offloading foot to assist with wound healing  - Advised pt not to trim nails or calluses, seek foot/nail care from podiatrist or certified foot/nail nurse  - Importance of adequate nutrition for wound healing

## 2022-12-20 NOTE — PATIENT INSTRUCTIONS
-Keep your wound dressing clean, dry, and intact.    -Change your dressing if it becomes soiled, soaked, or falls off.    -Should you experience any significant changes in your wound(s), such as infection (redness, swelling, localized heat, increased pain, fever > 101 F, chills) or have any questions regarding your home care instructions, please contact the wound center at [phone]. If after hours, contact your primary care physician or go to the hospital emergency room.

## 2022-12-22 ENCOUNTER — OFFICE VISIT (OUTPATIENT)
Dept: WOUND CARE | Facility: MEDICAL CENTER | Age: 62
End: 2022-12-22
Attending: INTERNAL MEDICINE
Payer: COMMERCIAL

## 2022-12-22 VITALS
OXYGEN SATURATION: 95 % | RESPIRATION RATE: 18 BRPM | SYSTOLIC BLOOD PRESSURE: 130 MMHG | HEART RATE: 74 BPM | TEMPERATURE: 97.2 F | DIASTOLIC BLOOD PRESSURE: 85 MMHG

## 2022-12-22 DIAGNOSIS — L08.9 WOUND INFECTION: ICD-10-CM

## 2022-12-22 DIAGNOSIS — E11.9 TYPE 2 DIABETES MELLITUS WITHOUT COMPLICATION, WITHOUT LONG-TERM CURRENT USE OF INSULIN (HCC): ICD-10-CM

## 2022-12-22 DIAGNOSIS — S81.802D OPEN WOUND OF LEFT LOWER EXTREMITY, SUBSEQUENT ENCOUNTER: Primary | ICD-10-CM

## 2022-12-22 DIAGNOSIS — T14.8XXA WOUND INFECTION: ICD-10-CM

## 2022-12-22 LAB — PATHOLOGY CONSULT NOTE: NORMAL

## 2022-12-22 PROCEDURE — 99214 OFFICE O/P EST MOD 30 MIN: CPT

## 2022-12-22 PROCEDURE — 17000 DESTRUCT PREMALG LESION: CPT | Performed by: NURSE PRACTITIONER

## 2022-12-22 PROCEDURE — 11104 PUNCH BX SKIN SINGLE LESION: CPT

## 2022-12-22 PROCEDURE — 88305 TISSUE EXAM BY PATHOLOGIST: CPT

## 2022-12-22 PROCEDURE — 99214 OFFICE O/P EST MOD 30 MIN: CPT | Mod: 25 | Performed by: NURSE PRACTITIONER

## 2022-12-22 RX ORDER — LINEZOLID 600 MG/1
600 TABLET, FILM COATED ORAL 2 TIMES DAILY
Qty: 14 TABLET | Refills: 0 | Status: SHIPPED | OUTPATIENT
Start: 2022-12-22 | End: 2022-12-29

## 2022-12-22 RX ORDER — NALOXONE HYDROCHLORIDE 4 MG/.1ML
SPRAY NASAL
Qty: 1 EACH | Refills: 0 | Status: SHIPPED | OUTPATIENT
Start: 2022-12-22 | End: 2023-01-03

## 2022-12-22 RX ORDER — HYDROCODONE BITARTRATE AND ACETAMINOPHEN 5; 325 MG/1; MG/1
1 TABLET ORAL EVERY 6 HOURS PRN
Qty: 12 TABLET | Refills: 0 | Status: SHIPPED | OUTPATIENT
Start: 2022-12-22 | End: 2022-12-25

## 2022-12-22 ASSESSMENT — ENCOUNTER SYMPTOMS
PALPITATIONS: 0
CHILLS: 0
FEVER: 0
NAUSEA: 0
VOMITING: 0
SHORTNESS OF BREATH: 0
HEADACHES: 0
COUGH: 0
DIZZINESS: 0

## 2022-12-22 NOTE — PATIENT INSTRUCTIONS
Should you experience any significant changes in your wound(s) such as infection (redness, swelling, localized heat, increased pain, fever >101 F, chills) or have any questions regarding your home care instructions, please contact the wound center [phone]. If after hours, contact your primary care physician or go the hospital emergency room.  Keep dressing clean and dry and cover while bathing. Only change dressing if over saturated, soiled or its falling off.

## 2022-12-22 NOTE — LETTER
December 22, 2022    To Whom It May Concern:         This is confirmation that Thomas Borja attended his scheduled appointment with GIOVANNY Samuel on 12/22/22 and he may return to work on 12/22/22.         If you have any questions please do not hesitate to call me at the phone number listed below.    Sincerely,      Charles Camilo APRN  [phone]

## 2022-12-22 NOTE — PROGRESS NOTES
Provider Encounter- Full Thickness wound    HISTORY OF PRESENT ILLNESS  Wound History:   START OF CARE IN CLINIC: 12/19/2022   REFERRING PROVIDER:   Dr. Paige  WOUND- Full Thickness Wound   LOCATION: Left lower extremity, right anterior knee, left anterior knee   HISTORY: Patient presented to [facility] on 12/4/2022 with left lower extremity, fever, malaise, erythema x1 week.  He was originally admitted to [facility] for acute kidney injury and sepsis but with worsening renal function was transferred to [facility].  Wound cultures grew Streptococcus pyogenes group A and MSSA.  Patient was initially treated with cefazolin.  He is seen by the wound care team who recommended follow-up as an outpatient in wound care clinic.  While inpatient nephrology saw the patient and started him on dialysis for acute kidney injury.  His renal function improved and dialysis was stopped.  Patient was discharged with follow-up in the wound care clinic.    Pertinent Medical History: Hypertension, alcohol abuse, kidney disease, GERD, mitral valve prolapse, morbid obesity, obstructive sleep apnea, type 2 diabetes    TOBACCO USE: Former smoker denies use of smokeless tobacco    Patient's problem list, allergies, and current medications reviewed and updated in Epic    Interval History:  12/22/2022: Clinic visit with PANCHO Teixeira.  Patient reports he is doing okay states that he is going to run out of his pain medication today.  Patient reports that his PCP has gone on vacation and he has no way of contacting him.  Patient reports that his PCP has his own practice and does not have additional providers in the office to see the patient.        REVIEW OF SYSTEMS:   Review of Systems   Constitutional:  Negative for chills and fever.   Respiratory:  Negative for cough and shortness of breath.    Cardiovascular:  Positive for leg swelling. Negative for chest pain and palpitations.   Gastrointestinal:  Negative for  nausea and vomiting.   Skin:         Red swollen left lower extremity with growth of tissue above the skin surface to the left lower extremity.  Areas of crusting.   Neurological:  Negative for dizziness and headaches.     PHYSICAL EXAMINATION:   /85   Pulse 74   Temp 36.2 °C (97.2 °F) (Temporal)   Resp 18   SpO2 95%     Physical Exam  Constitutional:       Appearance: He is obese.   Cardiovascular:      Rate and Rhythm: Normal rate.      Pulses: Normal pulses.   Pulmonary:      Effort: Pulmonary effort is normal. No respiratory distress.      Breath sounds: No wheezing.   Skin:     Comments: Full-thickness wounds multiple areas of crusting one area of suspicious raised tissue.  Overall quality left lower extremity tissue is poor.   Neurological:      Mental Status: He is alert.       WOUND ASSESSMENT  Wound 12/04/22 Partial Thickness Wound Leg Circumferential Left (Active)   Number of days: 18       Wound 12/04/22 Partial Thickness Wound Foot;Heel Plantar Left dry cracked fissures (Active)   Number of days: 18       Wound 12/19/22 Pretibial Left Medial LE (Active)   Wound Image   12/22/22 1000   Site Assessment Pink;Red;Yellow 12/22/22 1000   Periwound Assessment Red;Blanchable erythema;Edema;Fragile 12/22/22 1000   Margins Attached edges 12/19/22 1600   Drainage Amount Moderate 12/22/22 1000   Drainage Description Serosanguineous 12/22/22 1000   Treatments Cleansed;Topical Lidocaine;Injectible Lidocaine;Other (Comment);Silver nitrate;Site care 12/22/22 1000   Wound Cleansing Normal Saline Irrigation 12/22/22 1000   Periwound Protectant Skin Protectant Wipes to Periwound 12/22/22 1000   Dressing Cleansing/Solutions Not Applicable 12/22/22 1000   Dressing Options Hydrofiber Silver;Absorbent Abdominal Pad;Dry Roll Gauze;Hypafix Tape;Tubigrip 12/22/22 1000   Dressing Changed Changed 12/22/22 1000   Dressing Status Clean;Dry;Intact 12/19/22 1600   Dressing Change/Treatment Frequency Every 72 hrs, and As  Needed 12/22/22 1000   Non-staged Wound Description Full thickness 12/22/22 1000   Wound Length (cm) 31 cm 12/19/22 1600   Wound Width (cm) 14.5 cm 12/19/22 1600   Wound Surface Area (cm^2) 449.5 cm^2 12/19/22 1600   Post-Procedure Length (cm) 11 cm 12/22/22 1000   Post-Procedure Width (cm) 13 cm 12/22/22 1000   Post-Procedure Depth (cm) 0.3 cm 12/22/22 1000   Post-Procedure Surface Area (cm^2) 143 cm^2 12/22/22 1000   Post-Procedure Volume (cm^3) 42.9 cm^3 12/22/22 1000   Tunneling (cm) 0 cm 12/22/22 1000   Undermining (cm) 0 cm 12/22/22 1000   Wound Odor None 12/22/22 1000   Pulses DP;PT;Doppler 12/19/22 1600   Exposed Structures None 12/22/22 1000   Number of days: 3       Wound 12/19/22 Pretibial left lateral LE (Active)   Wound Image   12/22/22 1000   Site Assessment Other (Comment) 12/22/22 1000   Periwound Assessment Red;Blanchable erythema;Edema;Fragile 12/22/22 1000   Margins Attached edges 12/22/22 1000   Drainage Amount None 12/22/22 1000   Drainage Description Serous 12/19/22 1600   Treatments Cleansed;Site care 12/22/22 1000   Wound Cleansing Foam Cleanser/Washcloth 12/22/22 1000   Periwound Protectant Not Applicable 12/22/22 1000   Dressing Cleansing/Solutions Not Applicable 12/22/22 1000   Dressing Options Dry Roll Gauze;Hypafix Tape;Tubigrip 12/22/22 1000   Dressing Changed New 12/19/22 1600   Dressing Status Clean;Dry;Intact 12/19/22 1600   Non-staged Wound Description Full thickness 12/22/22 1000   Wound Length (cm) 21 cm 12/19/22 1600   Wound Width (cm) 9.6 cm 12/19/22 1600   Wound Surface Area (cm^2) 201.6 cm^2 12/19/22 1600   Post-Procedure Length (cm) 21 cm 12/19/22 1600   Post-Procedure Width (cm) 9.6 cm 12/19/22 1600   Post-Procedure Surface Area (cm^2) 201.6 cm^2 12/19/22 1600   Wound Odor None 12/22/22 1000   Pulses DP;PT;Doppler 12/19/22 1600   Exposed Structures None 12/22/22 1000   Number of days: 3       PROCEDURE:   - Patient has a history significant for squamous cell carcinoma.   There was some concern regarding causality to his left lower extremity wounds due to the irregular odd appearance of the tissue and growth of raised tissue to the left anterior lower extremity.  I discussed with the patient taking a punch biopsy and sending for pathology to help identify any potential cancerous origin contributing to nonhealing wounds.  Patient was in agreement with this plan.  Following signed consent I injected approximately 3 mL of subcutaneous lidocaine with epinephrine to the left anterior biopsy site.  Once the tissue was anesthetized I proceeded to take a 3 mm punch biopsy and obtain a core sample.  The core sample was placed in formaldehyde and sent for histology pathology.  Homeostasis was obtained using a silver nitrate stick.      Pertinent Labs and Diagnostics:    Labs:     A1c:   Lab Results   Component Value Date/Time    HBA1C 8.5 (H) 12/04/2022 09:27 PM          IMAGING: Dx to tibia and fibula dated 12/5/2022  IMPRESSION:     No radiographic evidence of acute bony abnormality     Skin thickening and edema with some soft tissue calcifications most likely indicates venous stasis. Cellulitis is a possibility and there is no evidence of soft tissue gas. Dermatomyositis can be seen with soft tissue calcifications but these are not as   extensive as that often entails      LAST  WOUND CULTURE:  DATE :   Lab Results   Component Value Date/Time    CULTRSULT  12/05/2022 02:07 PM     See Campy antigen results  NOTE:  Stool cultures are screened for Shiga Toxins 1 and 2,  Salmonella, Shigella, Campylobacter, Aeromonas,  Plesiomonas, and Vibrio.           ASSESSMENT AND PLAN:   1. Open wound of left lower extremity, subsequent encounter  -A punch biopsy was obtained in clinic today due to suspicious looking lesion to the left anterior lower extremity patient does have a significant history for squamous cell carcinoma.  -Lifting of dried crust with forceps.  Patient has large areas of crusting  this I suspect will lift over time.  Under the majority of the crusting that I removed today there is no open wounds underneath.  Patient does have an area to the anterior aspect of the lower extremity that is somewhat fluctuant however, I will wait until the pathology report returns.  If the pathology report is negative for cancer will debride this area.  -Patient to return to clinic weekly for assessment and debridement as needed  -Patient to change dressing 1-2 times per week or as needed for saturation.  -Given the 3-day course of Norco 5/3/2025 as needed every 6 hours for moderate to severe pain due to biopsy procedure.  Patient will have to follow-up with his PCP or urgent care if he needs further pain medication.  A told the patient I would not provide any additional pain medication however I would cover for the pain he may have from the biopsy procedure.  Patient states that he has been taking Norco's 5/3/2025's as previously prescribed with no allergic reaction no complications.     Wound care: Hydrofiber silver, abdominal pad, dry roll gauze, Hypafix tape, Tubigrip F x2    2. Type 2 diabetes mellitus without complication, without long-term current use of insulin (HCC)  Comments: Patient is currently on glipizide  Patient instructed to keep tight control over FBS, <140 for optimal wound healing; Implications of loss of protective sensation (LOPS) discussed with patient, including increased risk for amputation.  Advised to check feet at least daily, moisturize feet, and to always wear protective foot wear, arrange meticulous regular foot care by podiatrist or [facility]. Pt with good understanding.      3. Wound infection  Comment: Patient does have some erythema and swelling still present to the left lower extremity.  Patient was positive for strep group A will cover with an additional week of Zyvox as the patient has an allergy to amoxicillin and his kidney function is poor for use of Bactrim.        PATIENT  EDUCATION  - Importance of adequate nutrition for wound healing  -Advised to go to ER for any increased redness, swelling, drainage, or odor, or if patient develops fever, chills, nausea or vomiting.     My total time spent caring for the patient on the day of the encounter was 30 minutes.   This does not include time spent on separately billable procedures/tests.       Please note that this note may have been created using voice recognition software. I have worked with technical experts from [facility] to optimize the interface.  I have made every reasonable attempt to correct obvious errors, but there may be errors of grammar and possibly content that I did not discover before finalizing the note.    N

## 2022-12-27 ENCOUNTER — NON-PROVIDER VISIT (OUTPATIENT)
Dept: WOUND CARE | Facility: MEDICAL CENTER | Age: 62
End: 2022-12-27
Attending: INTERNAL MEDICINE
Payer: COMMERCIAL

## 2022-12-27 PROCEDURE — 97602 WOUND(S) CARE NON-SELECTIVE: CPT

## 2022-12-27 NOTE — PROCEDURES
Non-selective debridement to LLE wound and nithin-wound using tweezers and then foam cleanser and wash cloth to remove biofilm and non-viable tissue.

## 2022-12-27 NOTE — PATIENT INSTRUCTIONS
-Keep your wound dressing clean, dry, and intact.    -Change your dressing if it becomes soiled, soaked, or falls off.    -Remove your compression stocking if you have severe pain, severe swelling, numbness, color change, or temperature change in your toes. If you need to remove your compression wrap, do so by unrolling it. Do not cut the compression wrap off to prevent cutting yourself on accident.    -Should you experience any significant changes in your wound(s), such as infection (redness, swelling, localized heat, increased pain, fever > 101 F, chills) or have any questions regarding your home care instructions, please contact the wound center at [phone]. If after hours, contact your primary care physician or go to the hospital emergency room.

## 2022-12-29 ENCOUNTER — NON-PROVIDER VISIT (OUTPATIENT)
Dept: WOUND CARE | Facility: MEDICAL CENTER | Age: 62
End: 2022-12-29
Attending: INTERNAL MEDICINE
Payer: COMMERCIAL

## 2023-01-03 ENCOUNTER — APPOINTMENT (OUTPATIENT)
Dept: RADIOLOGY | Facility: MEDICAL CENTER | Age: 63
DRG: 854 | End: 2023-01-03
Attending: EMERGENCY MEDICINE
Payer: COMMERCIAL

## 2023-01-03 ENCOUNTER — NON-PROVIDER VISIT (OUTPATIENT)
Dept: WOUND CARE | Facility: MEDICAL CENTER | Age: 63
End: 2023-01-03
Attending: INTERNAL MEDICINE
Payer: COMMERCIAL

## 2023-01-03 ENCOUNTER — APPOINTMENT (OUTPATIENT)
Dept: RADIOLOGY | Facility: MEDICAL CENTER | Age: 63
DRG: 854 | End: 2023-01-03
Payer: COMMERCIAL

## 2023-01-03 ENCOUNTER — HOSPITAL ENCOUNTER (INPATIENT)
Facility: MEDICAL CENTER | Age: 63
LOS: 10 days | DRG: 854 | End: 2023-01-13
Attending: EMERGENCY MEDICINE | Admitting: STUDENT IN AN ORGANIZED HEALTH CARE EDUCATION/TRAINING PROGRAM
Payer: COMMERCIAL

## 2023-01-03 VITALS
RESPIRATION RATE: 18 BRPM | TEMPERATURE: 98 F | HEART RATE: 110 BPM | SYSTOLIC BLOOD PRESSURE: 138 MMHG | OXYGEN SATURATION: 97 % | DIASTOLIC BLOOD PRESSURE: 81 MMHG

## 2023-01-03 DIAGNOSIS — E11.69 TYPE 2 DIABETES MELLITUS WITH OTHER SPECIFIED COMPLICATION, WITHOUT LONG-TERM CURRENT USE OF INSULIN (HCC): ICD-10-CM

## 2023-01-03 DIAGNOSIS — L03.116 CELLULITIS OF LEFT LOWER EXTREMITY: Primary | ICD-10-CM

## 2023-01-03 DIAGNOSIS — L02.419 LEG ABSCESS: ICD-10-CM

## 2023-01-03 PROBLEM — L03.90 SEPSIS DUE TO CELLULITIS (HCC): Status: ACTIVE | Noted: 2023-01-03

## 2023-01-03 PROBLEM — L03.90 CELLULITIS: Status: ACTIVE | Noted: 2023-01-03

## 2023-01-03 PROBLEM — A41.9 SEPSIS DUE TO CELLULITIS (HCC): Status: ACTIVE | Noted: 2023-01-03

## 2023-01-03 LAB
ALBUMIN SERPL BCP-MCNC: 3.4 G/DL (ref 3.2–4.9)
ALBUMIN/GLOB SERPL: 0.8 G/DL
ALP SERPL-CCNC: 116 U/L (ref 30–99)
ALT SERPL-CCNC: 14 U/L (ref 2–50)
ANION GAP SERPL CALC-SCNC: 12 MMOL/L (ref 7–16)
APPEARANCE UR: CLEAR
AST SERPL-CCNC: 12 U/L (ref 12–45)
BACTERIA #/AREA URNS HPF: NEGATIVE /HPF
BASOPHILS # BLD AUTO: 0.8 % (ref 0–1.8)
BASOPHILS # BLD: 0.09 K/UL (ref 0–0.12)
BILIRUB SERPL-MCNC: 0.5 MG/DL (ref 0.1–1.5)
BILIRUB UR QL STRIP.AUTO: NEGATIVE
BUN SERPL-MCNC: 19 MG/DL (ref 8–22)
CALCIUM ALBUM COR SERPL-MCNC: 9.6 MG/DL (ref 8.5–10.5)
CALCIUM SERPL-MCNC: 9.1 MG/DL (ref 8.5–10.5)
CHLORIDE SERPL-SCNC: 99 MMOL/L (ref 96–112)
CO2 SERPL-SCNC: 21 MMOL/L (ref 20–33)
COLOR UR: ABNORMAL
CREAT SERPL-MCNC: 1.1 MG/DL (ref 0.5–1.4)
CRP SERPL HS-MCNC: 0.95 MG/DL (ref 0–0.75)
EOSINOPHIL # BLD AUTO: 0.1 K/UL (ref 0–0.51)
EOSINOPHIL NFR BLD: 0.9 % (ref 0–6.9)
EPI CELLS #/AREA URNS HPF: ABNORMAL /HPF
ERYTHROCYTE [DISTWIDTH] IN BLOOD BY AUTOMATED COUNT: 42.9 FL (ref 35.9–50)
ERYTHROCYTE [SEDIMENTATION RATE] IN BLOOD BY WESTERGREN METHOD: 51 MM/HOUR (ref 0–20)
GFR SERPLBLD CREATININE-BSD FMLA CKD-EPI: 75 ML/MIN/1.73 M 2
GLOBULIN SER CALC-MCNC: 4.4 G/DL (ref 1.9–3.5)
GLUCOSE SERPL-MCNC: 230 MG/DL (ref 65–99)
GLUCOSE UR STRIP.AUTO-MCNC: 100 MG/DL
HCT VFR BLD AUTO: 37.6 % (ref 42–52)
HGB BLD-MCNC: 13.3 G/DL (ref 14–18)
HYALINE CASTS #/AREA URNS LPF: ABNORMAL /LPF
IMM GRANULOCYTES # BLD AUTO: 0.11 K/UL (ref 0–0.11)
IMM GRANULOCYTES NFR BLD AUTO: 1 % (ref 0–0.9)
INR PPP: 1.03 (ref 0.87–1.13)
KETONES UR STRIP.AUTO-MCNC: ABNORMAL MG/DL
LACTATE SERPL-SCNC: 1.8 MMOL/L (ref 0.5–2)
LACTATE SERPL-SCNC: 2.7 MMOL/L (ref 0.5–2)
LEUKOCYTE ESTERASE UR QL STRIP.AUTO: NEGATIVE
LYMPHOCYTES # BLD AUTO: 1.51 K/UL (ref 1–4.8)
LYMPHOCYTES NFR BLD: 13.4 % (ref 22–41)
MCH RBC QN AUTO: 33 PG (ref 27–33)
MCHC RBC AUTO-ENTMCNC: 35.4 G/DL (ref 33.7–35.3)
MCV RBC AUTO: 93.3 FL (ref 81.4–97.8)
MICRO URNS: ABNORMAL
MONOCYTES # BLD AUTO: 0.81 K/UL (ref 0–0.85)
MONOCYTES NFR BLD AUTO: 7.2 % (ref 0–13.4)
NEUTROPHILS # BLD AUTO: 8.66 K/UL (ref 1.82–7.42)
NEUTROPHILS NFR BLD: 76.7 % (ref 44–72)
NITRITE UR QL STRIP.AUTO: NEGATIVE
NRBC # BLD AUTO: 0 K/UL
NRBC BLD-RTO: 0 /100 WBC
PH UR STRIP.AUTO: 5 [PH] (ref 5–8)
PLATELET # BLD AUTO: 436 K/UL (ref 164–446)
PMV BLD AUTO: 8.1 FL (ref 9–12.9)
POTASSIUM SERPL-SCNC: 3.9 MMOL/L (ref 3.6–5.5)
PROCALCITONIN SERPL-MCNC: 0.08 NG/ML
PROT SERPL-MCNC: 7.8 G/DL (ref 6–8.2)
PROT UR QL STRIP: 30 MG/DL
PROTHROMBIN TIME: 13.4 SEC (ref 12–14.6)
RBC # BLD AUTO: 4.03 M/UL (ref 4.7–6.1)
RBC # URNS HPF: ABNORMAL /HPF
RBC UR QL AUTO: NEGATIVE
SODIUM SERPL-SCNC: 132 MMOL/L (ref 135–145)
SP GR UR STRIP.AUTO: 1.03
UROBILINOGEN UR STRIP.AUTO-MCNC: 0.2 MG/DL
WBC # BLD AUTO: 11.3 K/UL (ref 4.8–10.8)
WBC #/AREA URNS HPF: ABNORMAL /HPF

## 2023-01-03 PROCEDURE — 85652 RBC SED RATE AUTOMATED: CPT

## 2023-01-03 PROCEDURE — 36415 COLL VENOUS BLD VENIPUNCTURE: CPT

## 2023-01-03 PROCEDURE — 87040 BLOOD CULTURE FOR BACTERIA: CPT | Mod: 91

## 2023-01-03 PROCEDURE — 97602 WOUND(S) CARE NON-SELECTIVE: CPT

## 2023-01-03 PROCEDURE — 99211 OFF/OP EST MAY X REQ PHY/QHP: CPT

## 2023-01-03 PROCEDURE — A9270 NON-COVERED ITEM OR SERVICE: HCPCS | Performed by: EMERGENCY MEDICINE

## 2023-01-03 PROCEDURE — 85610 PROTHROMBIN TIME: CPT

## 2023-01-03 PROCEDURE — 99285 EMERGENCY DEPT VISIT HI MDM: CPT

## 2023-01-03 PROCEDURE — 99223 1ST HOSP IP/OBS HIGH 75: CPT | Mod: AI,GC | Performed by: STUDENT IN AN ORGANIZED HEALTH CARE EDUCATION/TRAINING PROGRAM

## 2023-01-03 PROCEDURE — 81001 URINALYSIS AUTO W/SCOPE: CPT

## 2023-01-03 PROCEDURE — 80053 COMPREHEN METABOLIC PANEL: CPT

## 2023-01-03 PROCEDURE — 83605 ASSAY OF LACTIC ACID: CPT

## 2023-01-03 PROCEDURE — 93971 EXTREMITY STUDY: CPT | Mod: LT

## 2023-01-03 PROCEDURE — 96375 TX/PRO/DX INJ NEW DRUG ADDON: CPT

## 2023-01-03 PROCEDURE — 84145 PROCALCITONIN (PCT): CPT

## 2023-01-03 PROCEDURE — 86140 C-REACTIVE PROTEIN: CPT

## 2023-01-03 PROCEDURE — 87086 URINE CULTURE/COLONY COUNT: CPT

## 2023-01-03 PROCEDURE — 700105 HCHG RX REV CODE 258: Performed by: EMERGENCY MEDICINE

## 2023-01-03 PROCEDURE — 770006 HCHG ROOM/CARE - MED/SURG/GYN SEMI*

## 2023-01-03 PROCEDURE — 71045 X-RAY EXAM CHEST 1 VIEW: CPT

## 2023-01-03 PROCEDURE — 700111 HCHG RX REV CODE 636 W/ 250 OVERRIDE (IP): Performed by: EMERGENCY MEDICINE

## 2023-01-03 PROCEDURE — 96365 THER/PROPH/DIAG IV INF INIT: CPT

## 2023-01-03 PROCEDURE — 700102 HCHG RX REV CODE 250 W/ 637 OVERRIDE(OP): Performed by: EMERGENCY MEDICINE

## 2023-01-03 PROCEDURE — 87641 MR-STAPH DNA AMP PROBE: CPT

## 2023-01-03 PROCEDURE — 85025 COMPLETE CBC W/AUTO DIFF WBC: CPT

## 2023-01-03 RX ORDER — TRAZODONE HYDROCHLORIDE 50 MG/1
50-100 TABLET ORAL
COMMUNITY
End: 2023-03-15

## 2023-01-03 RX ORDER — AMOXICILLIN 250 MG
2 CAPSULE ORAL 2 TIMES DAILY
Status: DISCONTINUED | OUTPATIENT
Start: 2023-01-03 | End: 2023-01-13 | Stop reason: HOSPADM

## 2023-01-03 RX ORDER — TAMSULOSIN HYDROCHLORIDE 0.4 MG/1
0.4 CAPSULE ORAL DAILY
Status: DISCONTINUED | OUTPATIENT
Start: 2023-01-04 | End: 2023-01-13 | Stop reason: HOSPADM

## 2023-01-03 RX ORDER — HYDROCODONE BITARTRATE AND ACETAMINOPHEN 5; 325 MG/1; MG/1
1 TABLET ORAL EVERY 6 HOURS PRN
Status: DISCONTINUED | OUTPATIENT
Start: 2023-01-03 | End: 2023-01-10

## 2023-01-03 RX ORDER — BISACODYL 10 MG
10 SUPPOSITORY, RECTAL RECTAL
Status: DISCONTINUED | OUTPATIENT
Start: 2023-01-03 | End: 2023-01-13 | Stop reason: HOSPADM

## 2023-01-03 RX ORDER — HYDROCODONE BITARTRATE AND ACETAMINOPHEN 5; 325 MG/1; MG/1
1 TABLET ORAL ONCE
Status: COMPLETED | OUTPATIENT
Start: 2023-01-03 | End: 2023-01-03

## 2023-01-03 RX ORDER — AMLODIPINE BESYLATE 10 MG/1
10 TABLET ORAL DAILY
Status: DISCONTINUED | OUTPATIENT
Start: 2023-01-04 | End: 2023-01-13 | Stop reason: HOSPADM

## 2023-01-03 RX ORDER — LINEZOLID 2 MG/ML
600 INJECTION, SOLUTION INTRAVENOUS ONCE
Status: COMPLETED | OUTPATIENT
Start: 2023-01-03 | End: 2023-01-03

## 2023-01-03 RX ORDER — HEPARIN SODIUM 5000 [USP'U]/ML
5000 INJECTION, SOLUTION INTRAVENOUS; SUBCUTANEOUS EVERY 8 HOURS
Status: DISCONTINUED | OUTPATIENT
Start: 2023-01-03 | End: 2023-01-03

## 2023-01-03 RX ORDER — LINEZOLID 600 MG/1
600 TABLET, FILM COATED ORAL 2 TIMES DAILY
Status: ON HOLD | COMMUNITY
Start: 2022-12-23 | End: 2023-01-10

## 2023-01-03 RX ORDER — SODIUM CHLORIDE 9 MG/ML
1000 INJECTION, SOLUTION INTRAVENOUS ONCE
Status: COMPLETED | OUTPATIENT
Start: 2023-01-03 | End: 2023-01-03

## 2023-01-03 RX ORDER — ROSUVASTATIN CALCIUM 20 MG/1
20 TABLET, COATED ORAL DAILY
Status: DISCONTINUED | OUTPATIENT
Start: 2023-01-04 | End: 2023-01-13 | Stop reason: HOSPADM

## 2023-01-03 RX ORDER — LINEZOLID 2 MG/ML
600 INJECTION, SOLUTION INTRAVENOUS EVERY 12 HOURS
Status: DISCONTINUED | OUTPATIENT
Start: 2023-01-04 | End: 2023-01-05

## 2023-01-03 RX ORDER — POLYETHYLENE GLYCOL 3350 17 G/17G
1 POWDER, FOR SOLUTION ORAL
Status: DISCONTINUED | OUTPATIENT
Start: 2023-01-03 | End: 2023-01-13 | Stop reason: HOSPADM

## 2023-01-03 RX ORDER — FINASTERIDE 5 MG/1
5 TABLET, FILM COATED ORAL DAILY
Status: DISCONTINUED | OUTPATIENT
Start: 2023-01-04 | End: 2023-01-13 | Stop reason: HOSPADM

## 2023-01-03 RX ORDER — ACETAMINOPHEN 500 MG
500-1000 TABLET ORAL 2 TIMES DAILY PRN
COMMUNITY
End: 2023-06-22

## 2023-01-03 RX ORDER — LEVOTHYROXINE SODIUM 112 UG/1
112 TABLET ORAL
Status: DISCONTINUED | OUTPATIENT
Start: 2023-01-04 | End: 2023-01-13 | Stop reason: HOSPADM

## 2023-01-03 RX ORDER — ACETAMINOPHEN 325 MG/1
650 TABLET ORAL EVERY 6 HOURS PRN
Status: DISCONTINUED | OUTPATIENT
Start: 2023-01-03 | End: 2023-01-13 | Stop reason: HOSPADM

## 2023-01-03 RX ORDER — ONDANSETRON 2 MG/ML
4 INJECTION INTRAMUSCULAR; INTRAVENOUS ONCE
Status: COMPLETED | OUTPATIENT
Start: 2023-01-03 | End: 2023-01-03

## 2023-01-03 RX ORDER — PREGABALIN 25 MG/1
50 CAPSULE ORAL 2 TIMES DAILY
Status: DISCONTINUED | OUTPATIENT
Start: 2023-01-03 | End: 2023-01-13 | Stop reason: HOSPADM

## 2023-01-03 RX ORDER — OMEPRAZOLE 20 MG/1
40 CAPSULE, DELAYED RELEASE ORAL DAILY
Status: DISCONTINUED | OUTPATIENT
Start: 2023-01-04 | End: 2023-01-13 | Stop reason: HOSPADM

## 2023-01-03 RX ORDER — PREGABALIN 50 MG/1
50 CAPSULE ORAL 2 TIMES DAILY
COMMUNITY
End: 2023-06-22 | Stop reason: SDUPTHER

## 2023-01-03 RX ADMIN — ONDANSETRON 4 MG: 2 INJECTION INTRAMUSCULAR; INTRAVENOUS at 19:27

## 2023-01-03 RX ADMIN — HYDROCODONE BITARTRATE AND ACETAMINOPHEN 1 TABLET: 5; 325 TABLET ORAL at 19:36

## 2023-01-03 RX ADMIN — LINEZOLID 600 MG: 600 INJECTION, SOLUTION INTRAVENOUS at 19:26

## 2023-01-03 RX ADMIN — SODIUM CHLORIDE 1000 ML: 9 INJECTION, SOLUTION INTRAVENOUS at 19:27

## 2023-01-03 ASSESSMENT — ENCOUNTER SYMPTOMS
VOMITING: 0
CHILLS: 0
NAUSEA: 0
FOCAL WEAKNESS: 0
EYES NEGATIVE: 1
ABDOMINAL PAIN: 0
SENSORY CHANGE: 0
DIARRHEA: 0
CONSTIPATION: 0
COUGH: 0
FEVER: 0
PALPITATIONS: 0
MYALGIAS: 0
WEAKNESS: 0
SHORTNESS OF BREATH: 0

## 2023-01-03 ASSESSMENT — FIBROSIS 4 INDEX: FIB4 SCORE: 1.35

## 2023-01-03 NOTE — PROCEDURES
Non-selective debridement to LLE wounds and periwounds using puracyn spray and gauze to remove nonviable tissue and biofilm.

## 2023-01-03 NOTE — ED TRIAGE NOTES
Chief Complaint   Patient presents with    Wound Check     Hx of cellulitis in the LLE, sent by wound care for IV ABX. Hx of diabetes.      BP (!) 141/78   Pulse 77   Temp 36.1 °C (97 °F) (Temporal)   Resp 16   Ht 1.829 m (6')   Wt (!) 134 kg (295 lb 6.7 oz)   SpO2 90%   BMI 40.07 kg/m²     Pt ambulatory to triage for above.

## 2023-01-03 NOTE — PATIENT INSTRUCTIONS
-Keep your wound dressing clean, dry, and intact.    -Change your dressing if it becomes soiled, soaked, or falls off.    -Should you experience any significant changes in your wound(s), such as infection (redness, swelling, localized heat, increased pain, fever > 101 F, chills) or have any questions regarding your home care instructions, please contact the wound center at [phone]. If after hours, contact your primary care physician or go to the hospital emergency room.

## 2023-01-03 NOTE — PROGRESS NOTES
Patient presented today with new wounds to LLE that are painful and draining purulent drainage and fluctuant to palpation. Patient stated he completed his zyvox a couple days ago and did not see much improvement. This RN asked PANCHO Teixeira to evaluate patient. Patient instructed to go to ER for failed outpatient abx therapy.

## 2023-01-04 LAB
ALBUMIN SERPL BCP-MCNC: 3.4 G/DL (ref 3.2–4.9)
ALBUMIN/GLOB SERPL: 0.8 G/DL
ALP SERPL-CCNC: 108 U/L (ref 30–99)
ALT SERPL-CCNC: 14 U/L (ref 2–50)
ANION GAP SERPL CALC-SCNC: 9 MMOL/L (ref 7–16)
AST SERPL-CCNC: 12 U/L (ref 12–45)
BASOPHILS # BLD AUTO: 0.8 % (ref 0–1.8)
BASOPHILS # BLD: 0.07 K/UL (ref 0–0.12)
BILIRUB SERPL-MCNC: 0.5 MG/DL (ref 0.1–1.5)
BUN SERPL-MCNC: 18 MG/DL (ref 8–22)
CALCIUM ALBUM COR SERPL-MCNC: 9.2 MG/DL (ref 8.5–10.5)
CALCIUM SERPL-MCNC: 8.7 MG/DL (ref 8.5–10.5)
CHLORIDE SERPL-SCNC: 101 MMOL/L (ref 96–112)
CO2 SERPL-SCNC: 26 MMOL/L (ref 20–33)
CREAT SERPL-MCNC: 1.27 MG/DL (ref 0.5–1.4)
EOSINOPHIL # BLD AUTO: 0.17 K/UL (ref 0–0.51)
EOSINOPHIL NFR BLD: 1.9 % (ref 0–6.9)
ERYTHROCYTE [DISTWIDTH] IN BLOOD BY AUTOMATED COUNT: 44 FL (ref 35.9–50)
GFR SERPLBLD CREATININE-BSD FMLA CKD-EPI: 63 ML/MIN/1.73 M 2
GLOBULIN SER CALC-MCNC: 4.1 G/DL (ref 1.9–3.5)
GLUCOSE BLD STRIP.AUTO-MCNC: 149 MG/DL (ref 65–99)
GLUCOSE BLD STRIP.AUTO-MCNC: 157 MG/DL (ref 65–99)
GLUCOSE BLD STRIP.AUTO-MCNC: 159 MG/DL (ref 65–99)
GLUCOSE BLD STRIP.AUTO-MCNC: 163 MG/DL (ref 65–99)
GLUCOSE SERPL-MCNC: 144 MG/DL (ref 65–99)
GRAM STN SPEC: NORMAL
HCT VFR BLD AUTO: 37.1 % (ref 42–52)
HGB BLD-MCNC: 12.7 G/DL (ref 14–18)
IMM GRANULOCYTES # BLD AUTO: 0.06 K/UL (ref 0–0.11)
IMM GRANULOCYTES NFR BLD AUTO: 0.7 % (ref 0–0.9)
LYMPHOCYTES # BLD AUTO: 2.21 K/UL (ref 1–4.8)
LYMPHOCYTES NFR BLD: 25.1 % (ref 22–41)
MAGNESIUM SERPL-MCNC: 1.7 MG/DL (ref 1.5–2.5)
MCH RBC QN AUTO: 32.6 PG (ref 27–33)
MCHC RBC AUTO-ENTMCNC: 34.2 G/DL (ref 33.7–35.3)
MCV RBC AUTO: 95.1 FL (ref 81.4–97.8)
MONOCYTES # BLD AUTO: 0.81 K/UL (ref 0–0.85)
MONOCYTES NFR BLD AUTO: 9.2 % (ref 0–13.4)
NEUTROPHILS # BLD AUTO: 5.5 K/UL (ref 1.82–7.42)
NEUTROPHILS NFR BLD: 62.3 % (ref 44–72)
NRBC # BLD AUTO: 0 K/UL
NRBC BLD-RTO: 0 /100 WBC
PLATELET # BLD AUTO: 400 K/UL (ref 164–446)
PMV BLD AUTO: 8.1 FL (ref 9–12.9)
POTASSIUM SERPL-SCNC: 3.9 MMOL/L (ref 3.6–5.5)
PROT SERPL-MCNC: 7.5 G/DL (ref 6–8.2)
RBC # BLD AUTO: 3.9 M/UL (ref 4.7–6.1)
SCCMEC + MECA PNL NOSE NAA+PROBE: NEGATIVE
SIGNIFICANT IND 70042: NORMAL
SITE SITE: NORMAL
SODIUM SERPL-SCNC: 136 MMOL/L (ref 135–145)
SOURCE SOURCE: NORMAL
WBC # BLD AUTO: 8.8 K/UL (ref 4.8–10.8)

## 2023-01-04 PROCEDURE — 96366 THER/PROPH/DIAG IV INF ADDON: CPT

## 2023-01-04 PROCEDURE — 700102 HCHG RX REV CODE 250 W/ 637 OVERRIDE(OP): Performed by: STUDENT IN AN ORGANIZED HEALTH CARE EDUCATION/TRAINING PROGRAM

## 2023-01-04 PROCEDURE — 87186 SC STD MICRODIL/AGAR DIL: CPT

## 2023-01-04 PROCEDURE — 99233 SBSQ HOSP IP/OBS HIGH 50: CPT | Performed by: STUDENT IN AN ORGANIZED HEALTH CARE EDUCATION/TRAINING PROGRAM

## 2023-01-04 PROCEDURE — 36415 COLL VENOUS BLD VENIPUNCTURE: CPT

## 2023-01-04 PROCEDURE — 700111 HCHG RX REV CODE 636 W/ 250 OVERRIDE (IP)

## 2023-01-04 PROCEDURE — 97602 WOUND(S) CARE NON-SELECTIVE: CPT

## 2023-01-04 PROCEDURE — 700111 HCHG RX REV CODE 636 W/ 250 OVERRIDE (IP): Performed by: STUDENT IN AN ORGANIZED HEALTH CARE EDUCATION/TRAINING PROGRAM

## 2023-01-04 PROCEDURE — 87205 SMEAR GRAM STAIN: CPT

## 2023-01-04 PROCEDURE — 770006 HCHG ROOM/CARE - MED/SURG/GYN SEMI*

## 2023-01-04 PROCEDURE — 700105 HCHG RX REV CODE 258: Performed by: STUDENT IN AN ORGANIZED HEALTH CARE EDUCATION/TRAINING PROGRAM

## 2023-01-04 PROCEDURE — 82962 GLUCOSE BLOOD TEST: CPT

## 2023-01-04 PROCEDURE — 700102 HCHG RX REV CODE 250 W/ 637 OVERRIDE(OP)

## 2023-01-04 PROCEDURE — 85025 COMPLETE CBC W/AUTO DIFF WBC: CPT

## 2023-01-04 PROCEDURE — 99222 1ST HOSP IP/OBS MODERATE 55: CPT | Mod: 25 | Performed by: SURGERY

## 2023-01-04 PROCEDURE — 87070 CULTURE OTHR SPECIMN AEROBIC: CPT

## 2023-01-04 PROCEDURE — 96372 THER/PROPH/DIAG INJ SC/IM: CPT

## 2023-01-04 PROCEDURE — A9270 NON-COVERED ITEM OR SERVICE: HCPCS | Performed by: STUDENT IN AN ORGANIZED HEALTH CARE EDUCATION/TRAINING PROGRAM

## 2023-01-04 PROCEDURE — 87077 CULTURE AEROBIC IDENTIFY: CPT

## 2023-01-04 PROCEDURE — A9270 NON-COVERED ITEM OR SERVICE: HCPCS

## 2023-01-04 PROCEDURE — 83735 ASSAY OF MAGNESIUM: CPT

## 2023-01-04 PROCEDURE — 80053 COMPREHEN METABOLIC PANEL: CPT

## 2023-01-04 RX ORDER — PROMETHAZINE HYDROCHLORIDE 25 MG/1
25 TABLET ORAL EVERY 6 HOURS PRN
Status: DISCONTINUED | OUTPATIENT
Start: 2023-01-04 | End: 2023-01-13 | Stop reason: HOSPADM

## 2023-01-04 RX ADMIN — HYDROCODONE BITARTRATE AND ACETAMINOPHEN 1 TABLET: 5; 325 TABLET ORAL at 22:02

## 2023-01-04 RX ADMIN — HYDROCODONE BITARTRATE AND ACETAMINOPHEN 1 TABLET: 5; 325 TABLET ORAL at 15:08

## 2023-01-04 RX ADMIN — PREGABALIN 50 MG: 25 CAPSULE ORAL at 17:15

## 2023-01-04 RX ADMIN — INSULIN HUMAN 1 UNITS: 100 INJECTION, SOLUTION PARENTERAL at 13:51

## 2023-01-04 RX ADMIN — INSULIN HUMAN 1 UNITS: 100 INJECTION, SOLUTION PARENTERAL at 16:54

## 2023-01-04 RX ADMIN — PREGABALIN 50 MG: 25 CAPSULE ORAL at 09:53

## 2023-01-04 RX ADMIN — PROMETHAZINE HYDROCHLORIDE 25 MG: 25 TABLET ORAL at 15:24

## 2023-01-04 RX ADMIN — ROSUVASTATIN CALCIUM 20 MG: 20 TABLET, FILM COATED ORAL at 06:55

## 2023-01-04 RX ADMIN — CEFAZOLIN 2 G: 2 INJECTION, POWDER, FOR SOLUTION INTRAMUSCULAR; INTRAVENOUS at 15:06

## 2023-01-04 RX ADMIN — OMEPRAZOLE 40 MG: 20 CAPSULE, DELAYED RELEASE ORAL at 06:54

## 2023-01-04 RX ADMIN — FINASTERIDE 5 MG: 5 TABLET, FILM COATED ORAL at 06:55

## 2023-01-04 RX ADMIN — LEVOTHYROXINE SODIUM 112 MCG: 0.11 TABLET ORAL at 06:55

## 2023-01-04 RX ADMIN — AMLODIPINE BESYLATE 10 MG: 10 TABLET ORAL at 06:54

## 2023-01-04 RX ADMIN — INSULIN HUMAN 1 UNITS: 100 INJECTION, SOLUTION PARENTERAL at 19:48

## 2023-01-04 RX ADMIN — CEFAZOLIN 2 G: 2 INJECTION, POWDER, FOR SOLUTION INTRAMUSCULAR; INTRAVENOUS at 22:04

## 2023-01-04 RX ADMIN — HYDROCODONE BITARTRATE AND ACETAMINOPHEN 1 TABLET: 5; 325 TABLET ORAL at 06:53

## 2023-01-04 RX ADMIN — TAMSULOSIN HYDROCHLORIDE 0.4 MG: 0.4 CAPSULE ORAL at 06:54

## 2023-01-04 RX ADMIN — LINEZOLID 600 MG: 600 INJECTION, SOLUTION INTRAVENOUS at 06:57

## 2023-01-04 RX ADMIN — LINEZOLID 600 MG: 600 INJECTION, SOLUTION INTRAVENOUS at 17:17

## 2023-01-04 RX ADMIN — PROMETHAZINE HYDROCHLORIDE 25 MG: 25 TABLET ORAL at 22:02

## 2023-01-04 ASSESSMENT — LIFESTYLE VARIABLES
DO YOU DRINK ALCOHOL: YES
CONSUMPTION TOTAL: NEGATIVE
DOES PATIENT WANT TO STOP DRINKING: NO
EVER HAD A DRINK FIRST THING IN THE MORNING TO STEADY YOUR NERVES TO GET RID OF A HANGOVER: NO
AVERAGE NUMBER OF DAYS PER WEEK YOU HAVE A DRINK CONTAINING ALCOHOL: 2
TOTAL SCORE: 0
HOW MANY TIMES IN THE PAST YEAR HAVE YOU HAD 5 OR MORE DRINKS IN A DAY: 0
HAVE PEOPLE ANNOYED YOU BY CRITICIZING YOUR DRINKING: NO
TOTAL SCORE: 0
SUBSTANCE_ABUSE: 0
EVER FELT BAD OR GUILTY ABOUT YOUR DRINKING: NO
TOTAL SCORE: 0
ON A TYPICAL DAY WHEN YOU DRINK ALCOHOL HOW MANY DRINKS DO YOU HAVE: 2
HAVE YOU EVER FELT YOU SHOULD CUT DOWN ON YOUR DRINKING: NO

## 2023-01-04 ASSESSMENT — COGNITIVE AND FUNCTIONAL STATUS - GENERAL
SUGGESTED CMS G CODE MODIFIER DAILY ACTIVITY: CI
SUGGESTED CMS G CODE MODIFIER MOBILITY: CH
DAILY ACTIVITIY SCORE: 23
MOBILITY SCORE: 24
DRESSING REGULAR LOWER BODY CLOTHING: A LITTLE

## 2023-01-04 ASSESSMENT — PATIENT HEALTH QUESTIONNAIRE - PHQ9
SUM OF ALL RESPONSES TO PHQ9 QUESTIONS 1 AND 2: 0
2. FEELING DOWN, DEPRESSED, IRRITABLE, OR HOPELESS: NOT AT ALL
1. LITTLE INTEREST OR PLEASURE IN DOING THINGS: NOT AT ALL

## 2023-01-04 ASSESSMENT — ENCOUNTER SYMPTOMS
DIZZINESS: 0
INSOMNIA: 0
FOCAL WEAKNESS: 0
CHILLS: 0
ABDOMINAL PAIN: 0
VOMITING: 0
SENSORY CHANGE: 0
NAUSEA: 0
COUGH: 0
EYE PAIN: 0
LOSS OF CONSCIOUSNESS: 0
FEVER: 0
BLURRED VISION: 0
FALLS: 0
SHORTNESS OF BREATH: 0
BACK PAIN: 0
HEADACHES: 0
PALPITATIONS: 0

## 2023-01-04 ASSESSMENT — FIBROSIS 4 INDEX
FIB4 SCORE: 0.51
FIB4 SCORE: 0.51

## 2023-01-04 ASSESSMENT — PAIN DESCRIPTION - PAIN TYPE
TYPE: ACUTE PAIN

## 2023-01-04 NOTE — H&P
[facility] History & Physical Note    Date of Service  1/3/2023    [facility] Team: [facility]   Attending: Ebenezer Gibson M.d.  Senior Resident: Dr. Myrtle Trinidad  Contact Number: [phone]    Primary Care Physician  Benji Grey M.D.    Code Status  Full Code    Chief Complaint  Chief Complaint   Patient presents with    Wound Check     Hx of cellulitis in the LLE, sent by wound care for IV ABX. Hx of diabetes.        History of Presenting Illness (HPI):   Thomas Borja is a 62 y.o. male with past medical history of hypertension, diabetes, MVR, recurrent LLE cellulitis  who presented 1/3/2023 with LLE cellulitis failing outpatient antibiotic therapy.     Patient was recently discharged on 12/4/2022 for sepsis secondary to left lower extremity cellulitis, during hospital stay, wound cultures grew strep pyogenes group A and MSSA and he was treated with cefazolin, completing a 10-day course of antibiotics, discharged to follow-up with wound care.  Hospital course was complicated for KENRICK, requiring need for dialysis with later improvement of renal function.  Patient states since discharge, patient followed up with wound care and because of residual erythema and swelling and purulent lesion to anterior shin, patient was prescribed another 7-day course of linezolid that he finished on 12/30/2022.  Patient states over the course of last week, he noticed new development of draining lesions to his left lower extremity localized to the posterior calf and anterior shin, outpatient antibiotics did not help.  Cellulitis associated with pain and norco helping, denies any sensory/motor deficits and does have neuropathy at baseline.  Denies any fever, chills, chest pain, shortness of breath, abdominal pain, nausea, vomiting.    I discussed the plan of care with patient.    Review of Systems  Review of Systems   Constitutional:  Negative for chills and fever.   HENT: Negative.     Eyes: Negative.    Respiratory:   Negative for cough and shortness of breath.    Cardiovascular:  Positive for leg swelling. Negative for chest pain and palpitations.   Gastrointestinal:  Negative for abdominal pain, constipation, diarrhea, nausea and vomiting.   Genitourinary:  Negative for dysuria.   Musculoskeletal:  Negative for myalgias.   Skin:  Negative for itching and rash.   Neurological:  Negative for sensory change, focal weakness and weakness.     Past Medical History   has no past medical history on file.    Surgical History   has no past surgical history on file.     Family History  family history is not on file.   Family history reviewed with patient.     Social History  Tobacco: former smoker  Alcohol: 2 drinks/sitting, 2-3 times a week  Recreational drugs (illegal or prescription): denies  Primary Care Provider: Reviewed      Allergies  Allergies   Allergen Reactions    Amoxicillin Hives    Codeine Nausea    Erythromycin Nausea    Oxycodone Rash             Medications  Prior to Admission Medications   Prescriptions Last Dose Informant Patient Reported? Taking?   acetaminophen (TYLENOL) 325 MG Tab 1/3/2023 at 1100 Patient Yes Yes   Sig: Take 650 mg by mouth every four hours as needed.   amLODIPine (NORVASC) 10 MG Tab 1/3/2023 at 0800 Patient Yes No   Sig: Take 10 mg by mouth every day.   finasteride (PROSCAR) 5 MG Tab 1/3/2023 at 0800 Patient Yes No   Sig: Take 5 mg by mouth every day.   glipiZIDE (GLUCOTROL) 5 MG Tab 1/3/2023 at 0800 Patient No No   Sig: Take 0.5 Tablets by mouth every day.   levothyroxine (SYNTHROID) 112 MCG Tab 1/3/2023 at 0700 Patient Yes No   Sig: Take 112 mcg by mouth every morning on an empty stomach.   linezolid (ZYVOX) 600 MG Tab 1/1/2023 at finished Patient Yes Yes   Sig: Take 600 mg by mouth 2 times a day.   omeprazole (PRILOSEC) 40 MG delayed-release capsule 1/3/2023 at 0700 Patient Yes No   Sig: Take 40 mg by mouth every day.   pregabalin (LYRICA) 50 MG capsule 1/3/2023 at 0800 Patient Yes Yes   Sig:  Take 50 mg by mouth 2 times a day.   rosuvastatin (CRESTOR) 20 MG Tab 1/3/2023 at 0800 Patient Yes No   Sig: Take 20 mg by mouth every day.   tamsulosin (FLOMAX) 0.4 MG capsule 1/3/2023 at 0800 Patient Yes No   Sig: Take 0.4 mg by mouth every day.   traZODone (DESYREL) 50 MG Tab 1/2/2023 at 2200 Patient Yes Yes   Sig: Take 100 mg by mouth every evening.      Facility-Administered Medications: None       Physical Exam  Temp:  [36.1 °C (97 °F)-36.7 °C (98 °F)] 36.1 °C (97 °F)  Pulse:  [] 102  Resp:  [16-22] 22  BP: (133-148)/(78-87) 148/79  SpO2:  [90 %-97 %] 96 %  Blood Pressure: (!) 148/79   Temperature: 36.1 °C (97 °F)   Pulse: (!) 102   Respiration: (!) 22   Pulse Oximetry: 96 %       Physical Exam  Constitutional:       General: He is not in acute distress.     Appearance: Normal appearance.   HENT:      Head: Normocephalic and atraumatic.      Right Ear: External ear normal.      Left Ear: External ear normal.      Nose: Nose normal.   Eyes:      General: No scleral icterus.     Extraocular Movements: Extraocular movements intact.      Conjunctiva/sclera: Conjunctivae normal.   Cardiovascular:      Rate and Rhythm: Normal rate. Rhythm irregular.      Heart sounds: Normal heart sounds. No murmur heard.     Comments: Sinus arrhythmia  Pulmonary:      Effort: No respiratory distress.      Breath sounds: Normal breath sounds. No wheezing, rhonchi or rales.   Abdominal:      Palpations: Abdomen is soft.      Tenderness: There is no abdominal tenderness. There is no guarding or rebound.   Musculoskeletal:         General: Swelling and tenderness present.      Left lower leg: Edema (1+) present.   Skin:     General: Skin is warm.      Findings: Erythema and lesion present.      Comments: Warm to touch and Erythema to LLE calf/shin, with purulent draining lesion to mid anterior shin, three other lesion to anterior shin with area of fluctuance, area of fluctuance also noted to posterior calf. Distal pulses present    Neurological:      General: No focal deficit present.      Mental Status: He is alert and oriented to person, place, and time.      Sensory: No sensory deficit.      Motor: No weakness.   Psychiatric:         Mood and Affect: Mood normal.         Behavior: Behavior normal.       Laboratory:  Recent Labs     01/03/23  1549   WBC 11.3*   RBC 4.03*   HEMOGLOBIN 13.3*   HEMATOCRIT 37.6*   MCV 93.3   MCH 33.0   MCHC 35.4*   RDW 42.9   PLATELETCT 436   MPV 8.1*     Recent Labs     01/03/23  1549   SODIUM 132*   POTASSIUM 3.9   CHLORIDE 99   CO2 21   GLUCOSE 230*   BUN 19   CREATININE 1.10   CALCIUM 9.1     Recent Labs     01/03/23  1549   ALTSGPT 14   ASTSGOT 12   ALKPHOSPHAT 116*   TBILIRUBIN 0.5   GLUCOSE 230*         No results for input(s): NTPROBNP in the last 72 hours.      No results for input(s): TROPONINT in the last 72 hours.    Imaging:  US-EXTREMITY VENOUS LOWER UNILAT LEFT   Final Result      DX-CHEST-PORTABLE (1 VIEW)   Final Result      No acute cardiac or pulmonary abnormalities are identified.          X-Ray:  I have personally reviewed the images and compared with prior images.    Assessment/Plan:  62 year old male presenting with LLE cellulitis with recent admission for sepsis secondary to LLE cellulitis, failing outpatient antibiotic therapy after discharge, now admitted for IV antibiotics.  I anticipate this patient will require at least two midnights for appropriate medical management, necessitating inpatient admission because LLE cellulitis with recent admission and failed outpatient antibiotic therapy, now requiring IV antibiotics and wound care.     Patient will need a Med/Surg bed on MEDICAL service. The need is secondary to IV antibiotics.    * Sepsis due to cellulitis (HCC)- (present on admission)  Assessment & Plan  This is Sepsis Present on admission  SIRS criteria identified on my evaluation include: Tachycardia, with heart rate greater than 90 BPM, Tachypnea, with respirations greater  than 20 per minute and Leukocytosis, with WBC greater than 12,000  Source is LLE cellulitis  Sepsis protocol initiated  Fluid resuscitation ordered per protocol  Crystalloid Fluid Administration: given fluid in ED  IV antibiotics as appropriate for source of sepsis  Reassessment: I have reassessed the patient's hemodynamic status    Lactic acidosis present on admission, downtrended.    LLE Cellulitis with purulent drainage  Assessment & Plan  Recently discharged on 12/4/2022 for sepsis secondary to left lower extremity cellulitis, during hospital stay, wound cultures grew strep pyogenes group A and MSSA and he was treated with cefazolin, completing a 10-day course of antibiotics, discharged to follow-up with wound care. Was prescribed outpt linezolid for persistent erythema, no response, and pt noted development of new draining lesions and areas of fluctuance.   - US LLE neg for DVT  - IV linezolid  - blood cultures and wound cultures pending.   - MRSA nares pending.   - Norco PRN for pain control  - wound care consult  - Gen surg consult in AM for possible I/D    Uncomplicated type 2 diabetes mellitus (HCC)- (present on admission)  Assessment & Plan  Hold home glipizide. Metformin was held on recent hospital discharge due to need for short term dialysis need for KENRICK with subsequent improvement of renal function.   - sliding scale insulin   - hypoglycemia protocol    Benign essential hypertension- (present on admission)  Assessment & Plan  Continue home amlodipine      VTE prophylaxis: SCDs/TEDs chemical dvt ppx held in setting of potential I/D

## 2023-01-04 NOTE — ASSESSMENT & PLAN NOTE
This is Sepsis Present on admission  SIRS criteria identified on my evaluation include: Tachycardia, with heart rate greater than 90 BPM, Tachypnea, with respirations greater than 20 per minute and Leukocytosis, with WBC greater than 12,000  Source is LLE cellulitis  Sepsis protocol initiated  Fluid resuscitation ordered per protocol  Crystalloid Fluid Administration: given fluid in ED  IV antibiotics as appropriate for source of sepsis  Reassessment: I have reassessed the patient's hemodynamic status

## 2023-01-04 NOTE — ED NOTES
Med rec updated and complete. Allergies reviewed. Confirmed name and date of birth. Pt   Recently finished a 7 day course of LINEZOLID   On 01/01/2023.      [facility] [phone]

## 2023-01-04 NOTE — ASSESSMENT & PLAN NOTE
Recently discharged on 12/4/2022 for sepsis secondary to left lower extremity cellulitis, during hospital stay, wound cultures grew strep pyogenes group A and MSSA and he was treated with cefazolin, completing a 10-day course of antibiotics, discharged to follow-up with wound care. Was prescribed outpt linezolid for persistent erythema, no response, and pt noted development of new draining lesions and areas of fluctuance.   On this admission, surgery was consulted, patient underwent I&D of left leg abscess with wound VAC placement on 01/5/2023.  Cultures are growing Enterobacter cloacae which is sensitive to ciprofloxacin.  Patient was switched to a ciprofloxacin oral antibiotic regimen which he is to conclude on 1/21/2023.

## 2023-01-04 NOTE — ASSESSMENT & PLAN NOTE
Hold home glipizide. Metformin was held on recent hospital discharge due to need for short term dialysis need for KENRICK with subsequent improvement of renal function.   - sliding scale insulin   - hypoglycemia protocol  A1C 8.5

## 2023-01-04 NOTE — WOUND TEAM
[facility]  Inpatient Services  Initial Wound and Skin Care Evaluation    Admission Date: 1/3/2023     Last order of IP CONSULT TO WOUND CARE was found on 1/3/2023 from Hospital Encounter on 1/3/2023     HPI, PMH, SH: Reviewed    History reviewed. No pertinent surgical history.  Social History     Tobacco Use    Smoking status: Former    Smokeless tobacco: Never   Substance Use Topics    Alcohol use: Yes     Comment: occ     Chief Complaint   Patient presents with    Wound Check     Hx of cellulitis in the LLE, sent by wound care for IV ABX. Hx of diabetes.      Diagnosis: Sepsis due to cellulitis (HCC) [L03.90, A41.9]    Unit where seen by Wound Team: [facility]     WOUND CONSULT/FOLLOW UP RELATED TO:  LLE wounds     WOUND HISTORY:  pt was at outpt wound clinic and wounds worsened. Sent to ED.     WOUND ASSESSMENT/LDA  Wound 12/19/22 Pretibial Left Medial LE (Active)            Close up 01/04/23 1200     Site Assessment Red;Yellow;Drainage    Periwound Assessment Red;Edema;Warm    Margins Defined edges    Closure Secondary intention    Drainage Amount Moderate    Drainage Description Purulent;Serous    Treatments Cleansed    Wound Cleansing Normal Saline Irrigation    Periwound Protectant Skin Protectant Wipes to Periwound    Dressing Cleansing/Solutions Not Applicable    Dressing Options Hydrofiber Silver;Absorbent Abdominal Pad;Dry Gauze    Dressing Changed Changed    Dressing Status Intact    Dressing Change/Treatment Frequency Every 48 hrs, and As Needed    NEXT Dressing Change/Treatment Date 01/06/23    NEXT Weekly Photo (Inpatient Only) 01/11/23    Non-staged Wound Description Full thickness 01/04/23 1200   Wound Length (cm) 5 cm 01/04/23 1200   Wound Width (cm) 4.5 cm 01/04/23 1200   Wound Surface Area (cm^2) 22.5 cm^2 01/04/23 1200   Shape cluster of round wounds    Wound Odor None    Pulses 1+;DP    Exposed Structures SANJUANA    Number of days: 16       Wound 12/19/22 Pretibial left lateral  LE (Active)        Close up 01/04/23 1200   Site Assessment Yellow;Red;Drainage    Periwound Assessment Red;Warm;Edema    Margins Defined edges    Closure Secondary intention    Drainage Amount Moderate    Drainage Description Purulent;Serous    Treatments Cleansed    Wound Cleansing Normal Saline Irrigation    Periwound Protectant Skin Protectant Wipes to Periwound    Dressing Cleansing/Solutions Not Applicable    Dressing Options Hydrofiber Silver;Absorbent Abdominal Pad;Dry Roll Gauze;Tubigrip    Dressing Changed Changed    Dressing Status Intact    Dressing Change/Treatment Frequency Every 48 hrs, and As Needed    NEXT Dressing Change/Treatment Date 01/06/23    Non-staged Wound Description Full thickness 01/04/23 1200   Wound Length (cm) 1 cm 01/04/23 1200   Wound Width (cm) 1 cm 01/04/23 1200   Wound Depth (cm) 1.2 cm 01/04/23 1200   Wound Surface Area (cm^2) 1 cm^2 01/04/23 1200   Wound Volume (cm^3) 1.2 cm^3 01/04/23 1200   Wound Healing % 93 01/04/23 1200   Shape circular    Wound Odor None    Pulses 1+;DP    Exposed Structures SANJUANA    Number of days: 16        Vascular:    SHEELA:   No results found.    Lab Values:    Lab Results   Component Value Date/Time    WBC 8.8 01/04/2023 03:05 AM    RBC 3.90 (L) 01/04/2023 03:05 AM    HEMOGLOBIN 12.7 (L) 01/04/2023 03:05 AM    HEMATOCRIT 37.1 (L) 01/04/2023 03:05 AM    CREACTPROT 0.95 (H) 01/03/2023 07:06 PM    SEDRATEWES 51 (H) 01/03/2023 03:49 PM    HBA1C 8.5 (H) 12/04/2022 09:27 PM        Culture Results show:  No results found for this or any previous visit (from the past 720 hour(s)).    Pain Level/Medicated:  pain when trying to probe RLE wounds with swab.        INTERVENTIONS BY WOUND TEAM:  Chart and images reviewed. Discussed with bedside RN. All areas of concern (based on picture review, LDA review and discussion with bedside RN) have been thoroughly assessed. Documentation of areas based on significant findings. This RN in to assess patient. Performed  standard wound care which includes appropriate positioning, dressing removal and non-selective debridement. Pictures and measurements obtained weekly if/when required.  Preparation for Dressing removal: Dressing soaked with Open to air  Non-selectively Debrided with:  Normal Saline and Gauze   Sharp debridement: NA  Nithin wound: Cleansed with Normal Saline and Gauze, Prepped with No Sting  Primary Dressing: Aquacel ag (Hydrofiber Silver)  Secondary (Outer) Dressing: ABD Pad and roll gauze    Interdisciplinary consultation: Patient, Bedside RN ,     EVALUATION / RATIONALE FOR TREATMENT:  Most Recent Date:  1/4: Pt has cluster of pustules to medial LLE proximal to ankle. He has two full thickness wounds to lateral LLE. Anterior shin rough texture, indurated and raised above surrounding skin. Culture taken from lateral wounds since able to probe depth. Medial wounds unable to probe. Aquacel Ag Hydrofiber applied to manage bioburden, absorb exudate, and maintain a moist wound environment without laterally wicking exudate therefore reducing nithin-wound maceration. ABD pads for drainage and Tubi  F for light compression.      Goals: Steady decrease in wound area and depth weekly.    WOUND TEAM PLAN OF CARE ([X] for frequency of wound follow up,):   Nursing to follow dressing orders written for wound care. Contact wound team if area fails to progress, deteriorates or with any questions/concerns if something comes up before next scheduled follow up (See below as to whether wound is following and frequency of wound follow up)  Dressing changes by wound team:                   Follow up 3 times weekly:                NPWT change 3 times weekly:     Follow up 1-2 times weekly:    x  Follow up Bi-Monthly:           Follow up Monthly (High Risk):                        Follow up as needed:     Other (explain):     NURSING PLAN OF CARE ORDERS (X):  Dressing changes: See Dressing Care orders: x  Skin care: See Skin Care orders:    RN Prevention Protocol: seen in ED  Rectal tube care: See Rectal Tube Care orders:   Other orders:    RSKIN:   CURRENTLY IN PLACE (X), APPLIED THIS VISIT (A), ORDERED (O):   Q shift Glynn:  X  Q shift pressure point assessments:  X    Surface/Positioning ED mattress  Pressure redistribution mattress            Low Airloss          ICU Low Airloss   Bariatric PANCHITO     Waffle cushion        Waffle Overlay          Reposition q 2 hours      TAPs Turning system     Z Albin Pillow     Offloading/Redistribution not assessed  Sacral Mepilex (Silicone dressing)     Heel Mepilex (Silicone dressing)         Heel float boots (Prevalon boot)             Float Heels off Bed with Pillows           Respiratory RA  Silicone O2 tubing         Gray Foam Ear protectors     Cannula fixation Device (Tender )          High flow offloading Clip    Elastic head band offloading device      Anchorfast                                                         Trach with Optifoam split foam             Containment/Moisture Prevention using BSC    Rectal tube or BMS    Purwick/Condom Cath        Vargas Catheter    Barrier wipes           Barrier paste       Antifungal tx      Interdry        Mobilization       Up to chair        Ambulate   reports with pain to LLE   PT/OT      Nutrition       Dietician        Diabetes Education      PO   x  TF     TPN     NPO   # days     Other        Anticipated discharge plans:   LTACH:        SNF/Rehab:                  Home Health Care:           Outpatient Wound Center:   needs to resume, needs new referral         Self/Family Care:        Other:                  Vac Discharge Needs:   Not Applicable Pt not on a wound vac:   x    Regular Vac while inpatient, alternative dressing at DC:        Regular Vac in use and continued at DC:            Reg. Vac w/ Skin Sub/Biologic in use. Will need to be changed 2x wkly:      Veraflo Vac while inpatient, ok to transition to Regular Vac on Discharge:            Veraflo Vac while inpatient, will need to remain on Veraflo Vac upon discharge:

## 2023-01-04 NOTE — ED PROVIDER NOTES
ED Provider Note    CHIEF COMPLAINT  Chief Complaint   Patient presents with    Wound Check     Hx of cellulitis in the LLE, sent by wound care for IV ABX. Hx of diabetes.        LIMITATION TO HISTORY   Select: None sent in by wound care for escalation of care for cellulitis.  The patient's had redness.    HPI    Thomas Borja is a 62 y.o. male who presents to the Emergency Department and swelling with pustules on the left leg since 1 December.  He was admitted here in transfer on the fourth and treated with IV and oral antibiotics.  He was discharged and followed up with wound care.  At wound care he was placed on another 10-day course of linezolid which he is finished.  He has developed new pustules on the posterior calf.  Pain is mild to moderate.  He does have neuropathy of the feet.    OUTSIDE HISTORIAN(S):  Select: None    EXTERNAL RECORDS REVIEWED  Select: Other discharge summary from admission here December 24 reviewed.  The patient was initially admitted with sepsis and renal failure at [facility] and transferred here.  He did receive dialysis twice and then nephrology consultation but his renal function then improved.  He was treated first with cefazolin.  Then he was given Augmentin.  Later as an outpatient he was given linezolid.  He grew group A strep and MSSA from his wounds.    REVIEW OF SYSTEMS  Pertinent positives include: Left leg redness, pain, new pustule development, edema.  Pertinent negatives include: Fever, cough, flulike symptoms, nausea, body aches, other rash.  Glycemic control has been in the 150 range.      PAST MEDICAL HISTORY  Diabetes, hypertension, stage III renal disease,  recurrent cellulitis    SOCIAL HISTORY  Social History     Tobacco Use    Smoking status: Former    Smokeless tobacco: Never   Vaping Use    Vaping Use: Never used   Substance Use Topics    Alcohol use: Yes     Comment: occ    Drug use: Never     Social History     Substance and Sexual Activity   Drug  Use Never       CURRENT MEDICATIONS    Current Tachycardic, afebrile, tachypneicOutpatient Medications:     Naloxone (NARCAN) 4 MG/0.1ML Liquid, One spray in one nostril for overdose and call 911., Disp: 1 Each, Rfl: 0    rosuvastatin (CRESTOR) 20 MG Tab, Take 20 mg by mouth at bedtime., Disp: , Rfl:     levothyroxine (SYNTHROID) 112 MCG Tab, Take 112 mcg by mouth every morning on an empty stomach., Disp: , Rfl:     glipiZIDE (GLUCOTROL) 5 MG Tab, Take 0.5 Tablets by mouth every day., Disp: 30 Tablet, Rfl: 0    Blood Glucose Meter Kit, Test blood sugar as recommended by provider. One Touch Verio blood glucose monitoring kit., Disp: 1 Kit, Rfl: 0    Blood Glucose Test Strips, Use one One Touch Verio strip to test blood sugar once daily early morning before first meal., Disp: 100 Strip, Rfl: 0    Lancets, Use one One Touch Verio lancet to test blood sugar once daily early morning before first meal., Disp: 100 Each, Rfl: 0    Alcohol Swabs, Wipe site with prep pad prior to injection., Disp: 100 Each, Rfl: 0    promethazine (PHENERGAN) 25 MG Tab, Take 1 Tablet by mouth every 6 hours as needed for Nausea/Vomiting., Disp: 20 Tablet, Rfl: 0    finasteride (PROSCAR) 5 MG Tab, Take 5 mg by mouth every day., Disp: , Rfl:     traZODone (DESYREL) 50 MG Tab, Take 100 mg by mouth., Disp: , Rfl:     tamsulosin (FLOMAX) 0.4 MG capsule, Take 0.4 mg by mouth every day., Disp: , Rfl:     pregabalin (LYRICA) 50 MG capsule, Take 50 mg by mouth., Disp: , Rfl:     ROSUVASTATIN CALCIUM PO, Take 20 mg by mouth every day., Disp: , Rfl:     amLODIPine (NORVASC) 10 MG Tab, Take 10 mg by mouth every day., Disp: , Rfl:     omeprazole (PRILOSEC) 40 MG delayed-release capsule, Take 40 mg by mouth every day., Disp: , Rfl:     ALLERGIES  Allergies   Allergen Reactions    Amoxicillin Hives    Apap-Fd&C Red #40 Al Rosa-Oxycodone      Other reaction(s): Rash, urticarial    Codeine Nausea    Erythromycin Nausea       PHYSICAL EXAM  VITAL SIGNS: BP  133/87   Pulse (!) 102   Temp 36.1 °C (97 °F) (Temporal)   Resp 20   Ht 1.829 m (6')   Wt (!) 134 kg (295 lb 6.7 oz)   SpO2 97%   BMI 40.07 kg/m²   Reviewed and tachycardic, tachypneic, afebrile  Constitutional: Well developed, Well nourished, well-appearing, elevated BMI.  HENT: Normocephalic, atraumatic, bilateral external ears normal, No intraoral erythema, edema, exudate  Eyes: PERRLA, conjunctiva pink, no scleral icterus.   Cardiovascular: Tachycardic regular rate and rhythm. No murmurs, rubs or gallops.  2+ dependent edema but no calf tenderness  Respiratory: Lungs clear to auscultation bilaterally. No wheezes, rales, or rhonchi.  Abdominal:  Abdomen soft, non-tender, non distended. No rebound, or guarding.    Skin: Erythema and edema to the left leg between the knee and the ankle nearly circumferential with slight sparing over the calf.  Small pustules anterior and a cluster of pustules on the medial distal calf.  No significant tenderness.    Genitourinary: No costovertebral angle tenderness.   Musculoskeletal: no deformities.   Neurologic: Alert & oriented x 3, cranial nerves 2-12 intact by passive exam.  Toe flexion and extension preserved.  Sensation intact to light touch in the foot although he reports neuropathy.  No focal deficit noted.  Psychiatric: Affect normal, Judgment normal, Mood normal.     LABS Ordered and Reviewed by Me:  Results for orders placed or performed during the hospital encounter of 01/03/23   Lactic acid (lactate)   Result Value Ref Range    Lactic Acid 2.7 (H) 0.5 - 2.0 mmol/L   CBC With Differential   Result Value Ref Range    WBC 11.3 (H) 4.8 - 10.8 K/uL    RBC 4.03 (L) 4.70 - 6.10 M/uL    Hemoglobin 13.3 (L) 14.0 - 18.0 g/dL    Hematocrit 37.6 (L) 42.0 - 52.0 %    MCV 93.3 81.4 - 97.8 fL    MCH 33.0 27.0 - 33.0 pg    MCHC 35.4 (H) 33.7 - 35.3 g/dL    RDW 42.9 35.9 - 50.0 fL    Platelet Count 436 164 - 446 K/uL    MPV 8.1 (L) 9.0 - 12.9 fL    Neutrophils-Polys 76.70 (H)  44.00 - 72.00 %    Lymphocytes 13.40 (L) 22.00 - 41.00 %    Monocytes 7.20 0.00 - 13.40 %    Eosinophils 0.90 0.00 - 6.90 %    Basophils 0.80 0.00 - 1.80 %    Immature Granulocytes 1.00 (H) 0.00 - 0.90 %    Nucleated RBC 0.00 /100 WBC    Neutrophils (Absolute) 8.66 (H) 1.82 - 7.42 K/uL    Lymphs (Absolute) 1.51 1.00 - 4.80 K/uL    Monos (Absolute) 0.81 0.00 - 0.85 K/uL    Eos (Absolute) 0.10 0.00 - 0.51 K/uL    Baso (Absolute) 0.09 0.00 - 0.12 K/uL    Immature Granulocytes (abs) 0.11 0.00 - 0.11 K/uL    NRBC (Absolute) 0.00 K/uL   Comp Metabolic Panel   Result Value Ref Range    Sodium 132 (L) 135 - 145 mmol/L    Potassium 3.9 3.6 - 5.5 mmol/L    Chloride 99 96 - 112 mmol/L    Co2 21 20 - 33 mmol/L    Anion Gap 12.0 7.0 - 16.0    Glucose 230 (H) 65 - 99 mg/dL    Bun 19 8 - 22 mg/dL    Creatinine 1.10 0.50 - 1.40 mg/dL    Calcium 9.1 8.5 - 10.5 mg/dL    AST(SGOT) 12 12 - 45 U/L    ALT(SGPT) 14 2 - 50 U/L    Alkaline Phosphatase 116 (H) 30 - 99 U/L    Total Bilirubin 0.5 0.1 - 1.5 mg/dL    Albumin 3.4 3.2 - 4.9 g/dL    Total Protein 7.8 6.0 - 8.2 g/dL    Globulin 4.4 (H) 1.9 - 3.5 g/dL    A-G Ratio 0.8 g/dL   Urinalysis    Specimen: Urine, Clean Catch   Result Value Ref Range    Color DK Yellow     Character Clear     Specific Gravity 1.026 <1.035    Ph 5.0 5.0 - 8.0    Glucose 100 (A) Negative mg/dL    Ketones Trace (A) Negative mg/dL    Protein 30 (A) Negative mg/dL    Bilirubin Negative Negative    Urobilinogen, Urine 0.2 Negative    Nitrite Negative Negative    Leukocyte Esterase Negative Negative    Occult Blood Negative Negative    Micro Urine Req Microscopic    ESTIMATED GFR   Result Value Ref Range    GFR (CKD-EPI) 75 >60 mL/min/1.73 m 2   CORRECTED CALCIUM   Result Value Ref Range    Correct Calcium 9.6 8.5 - 10.5 mg/dL   URINE MICROSCOPIC (W/UA)   Result Value Ref Range    WBC 2-5 (A) /hpf    RBC 2-5 (A) /hpf    Bacteria Negative None /hpf    Epithelial Cells Few /hpf    Hyaline Cast 11-20 (A) /lpf      :.  Rhythm Strip: Interpretation by me normal sinus rhythm      RADIOLOGY    Final Radiology Report  US-EXTREMITY VENOUS LOWER UNILAT LEFT   Final Result      DX-CHEST-PORTABLE (1 VIEW)   Final Result      No acute cardiac or pulmonary abnormalities are identified.        Radiologist interpretation have been reviewed by me.     ED COURSE:    INTERVENTIONS BY ME:  Medications   HYDROcodone-acetaminophen (NORCO) 5-325 MG per tablet 1 Tablet (has no administration in time range)   NS infusion 1,000 mL (has no administration in time range)   Linezolid (ZYVOX) premix 600 mg (has no administration in time range)   ondansetron (ZOFRAN) syringe/vial injection 4 mg (has no administration in time range)     Response on recheck: Improved pain and nausea.    I have discussed management of the patient with the following physicians and sources:    Dr. Ortiz she [facility] who will admit the patient for IV antibiotics and wound care and glycemic control.    MEDICAL DECISION MAKING:  PROBLEMS EVALUATED THIS VISIT:    This patient presents with pain edema and new crops of pustules on his left leg cellulitis.  He initially met criteria for sepsis and had sepsis last visit.  He is high risk given prior cellulitis, likely vein damage in the left leg, dependent edema and his diabetes.  There is no evidence of necrotizing fasciitis.  DVT will be ruled out.    His second problem is his diabetes that demonstrates fair control at this time.  He will be continued on his regular medications and as needed insulin may be necessary.    3.  He also has a history of renal insufficiency and acute renal failure last presentation.  That is much better this admission.      RISK:  High as stated above given edema vein damage diabetes and renal insufficiency       PLAN:  Admission for IV fluids, IV antibiotics, leg elevation, wound care, possible ID consultation    CONDITION: Guarded.     FINAL IMPRESSION  1. Cellulitis of left lower  extremity    2. Type 2 diabetes mellitus with other specified complication, without long-term current use of insulin (HCC)         Electronically signed by: Deyvi Armijo M.D., 1/3/2023 7:10 PM

## 2023-01-04 NOTE — ED NOTES
Pt ambulated back to [address] from triage. Pt able to transfer self to bed, call light in reach. Chart up for ERP.

## 2023-01-04 NOTE — PROGRESS NOTES
[facility] Medicine Daily Progress Note    Date of Service  1/4/2023    Chief Complaint  Thomas Borja is a 62 y.o. male admitted 1/3/2023 with leg pain and swelling.    Hospital Course  Thomas Borja is a 62 y.o. male with past medical history of hypertension, diabetes, MVR, recurrent LLE cellulitis  who presented 1/3/2023 with LLE cellulitis failing outpatient antibiotic therapy.      Patient was recently discharged on 12/4/2022 for sepsis secondary to left lower extremity cellulitis, during hospital stay, wound cultures grew strep pyogenes group A and MSSA and he was treated with cefazolin, completing a 10-day course of antibiotics, discharged to follow-up with wound care.  Hospital course was complicated for KENRICK, requiring need for dialysis with later improvement of renal function.  Patient states since discharge, patient followed up with wound care and because of residual erythema and swelling and purulent lesion to anterior shin, patient was prescribed another 7-day course of linezolid that he finished on 12/30/2022.  Patient states over the course of last week, he noticed new development of draining lesions to his left lower extremity localized to the posterior calf and anterior shin, outpatient antibiotics did not help.  Cellulitis associated with pain and norco helping, denies any sensory/motor deficits and does have neuropathy at baseline.  Denies any fever, chills, chest pain, shortness of breath, abdominal pain, nausea, vomiting.    Interval Problem Update  Patient admitted overnight for worsening left leg cellulitis. He denies fever, chills, but reports pustular drainage from left leg now. He completed his course of outpatient antibiotics, most recently linezolid.  Patient likely with underlying abscesses given level of Q tip penetration at bedside by wound nurse and swelling of cellulitis area.  Start ancef, continue linezolid, although prior cultures grew group A strep and MSSA. Repeat  wound and blood cultures.  Surgery consulted for possible I+D.    I have discussed this patient's plan of care and discharge plan at IDT rounds today with Case Management, Nursing, Nursing leadership, and other members of the IDT team.    Consultants/Specialty  general surgery    Code Status  Full Code    Disposition  Patient is not medically cleared for discharge.   Anticipate discharge to to home with close outpatient follow-up.  I have placed the appropriate orders for post-discharge needs.    Review of Systems  Review of Systems   Constitutional:  Negative for chills, fever and malaise/fatigue.   Eyes:  Negative for blurred vision and pain.   Respiratory:  Negative for cough and shortness of breath.    Cardiovascular:  Negative for chest pain, palpitations and leg swelling.   Gastrointestinal:  Negative for abdominal pain, nausea and vomiting.   Genitourinary:  Negative for dysuria and urgency.   Musculoskeletal:  Negative for back pain and falls.   Skin:  Negative for itching and rash.   Neurological:  Negative for dizziness, sensory change, focal weakness, loss of consciousness and headaches.   Psychiatric/Behavioral:  Negative for substance abuse. The patient does not have insomnia.       Physical Exam  Temp:  [36.1 °C (97 °F)-36.7 °C (98 °F)] 36.1 °C (97 °F)  Pulse:  [] 96  Resp:  [16-35] 16  BP: (115-148)/(66-91) 127/87  SpO2:  [90 %-97 %] 96 %    Physical Exam  Constitutional:       General: He is not in acute distress.     Appearance: He is not ill-appearing.   HENT:      Head: Normocephalic and atraumatic.      Right Ear: External ear normal.      Left Ear: External ear normal.      Mouth/Throat:      Pharynx: No oropharyngeal exudate or posterior oropharyngeal erythema.   Eyes:      Extraocular Movements: Extraocular movements intact.      Pupils: Pupils are equal, round, and reactive to light.   Cardiovascular:      Rate and Rhythm: Normal rate and regular rhythm.      Pulses: Normal pulses.       Heart sounds: Normal heart sounds.   Pulmonary:      Effort: Pulmonary effort is normal. No respiratory distress.      Breath sounds: Normal breath sounds. No wheezing.   Abdominal:      General: Bowel sounds are normal. There is no distension.      Palpations: Abdomen is soft.      Tenderness: There is no abdominal tenderness. There is no guarding.   Musculoskeletal:         General: Swelling present. No tenderness.      Cervical back: Normal range of motion and neck supple.      Right lower leg: No edema.      Left lower leg: Edema present.   Skin:     General: Skin is warm and dry.      Findings: Erythema, lesion and rash present.      Comments: Left anterior and medial shin with erythema, pustular drainage, inflammation   Neurological:      General: No focal deficit present.      Mental Status: He is oriented to person, place, and time.      Cranial Nerves: No cranial nerve deficit.      Sensory: No sensory deficit.      Motor: No weakness.   Psychiatric:         Mood and Affect: Mood normal.         Behavior: Behavior normal.       Fluids    Intake/Output Summary (Last 24 hours) at 1/4/2023 1416  Last data filed at 1/4/2023 0757  Gross per 24 hour   Intake 1294.19 ml   Output --   Net 1294.19 ml       Laboratory  Recent Labs     01/03/23  1549 01/04/23  0305   WBC 11.3* 8.8   RBC 4.03* 3.90*   HEMOGLOBIN 13.3* 12.7*   HEMATOCRIT 37.6* 37.1*   MCV 93.3 95.1   MCH 33.0 32.6   MCHC 35.4* 34.2   RDW 42.9 44.0   PLATELETCT 436 400   MPV 8.1* 8.1*     Recent Labs     01/03/23  1549 01/04/23  0305   SODIUM 132* 136   POTASSIUM 3.9 3.9   CHLORIDE 99 101   CO2 21 26   GLUCOSE 230* 144*   BUN 19 18   CREATININE 1.10 1.27   CALCIUM 9.1 8.7     Recent Labs     01/03/23  1549   INR 1.03               Imaging  US-EXTREMITY VENOUS LOWER UNILAT LEFT   Final Result      DX-CHEST-PORTABLE (1 VIEW)   Final Result      No acute cardiac or pulmonary abnormalities are identified.           Assessment/Plan  * Sepsis due to cellulitis  (HCC)- (present on admission)  Assessment & Plan  This is Sepsis Present on admission  SIRS criteria identified on my evaluation include: Tachycardia, with heart rate greater than 90 BPM, Tachypnea, with respirations greater than 20 per minute and Leukocytosis, with WBC greater than 12,000  Source is LLE cellulitis  Sepsis protocol initiated  Fluid resuscitation ordered per protocol  Crystalloid Fluid Administration: given fluid in ED  IV antibiotics as appropriate for source of sepsis  Reassessment: I have reassessed the patient's hemodynamic status    Lactic acidosis present on admission, downtrended.    LLE Cellulitis with purulent drainage  Assessment & Plan  Recently discharged on 12/4/2022 for sepsis secondary to left lower extremity cellulitis, during hospital stay, wound cultures grew strep pyogenes group A and MSSA and he was treated with cefazolin, completing a 10-day course of antibiotics, discharged to follow-up with wound care. Was prescribed outpt linezolid for persistent erythema, no response, and pt noted development of new draining lesions and areas of fluctuance.   - US LLE neg for DVT  Continue ancef and linezolid; although prior culture grew group A strep and MSSA  Repeat blood and wound cultures  - MRSA nares pending.   - Norco PRN for pain control  - wound care consult  Surgery consulted for possible I+D    Uncomplicated type 2 diabetes mellitus (HCC)- (present on admission)  Assessment & Plan  Hold home glipizide. Metformin was held on recent hospital discharge due to need for short term dialysis need for KENRICK with subsequent improvement of renal function.   - sliding scale insulin   - hypoglycemia protocol    Benign essential hypertension- (present on admission)  Assessment & Plan  Continue home amlodipine         VTE prophylaxis: SCDs/TEDs    I have performed a physical exam and reviewed and updated ROS and Plan today (1/4/2023). In review of yesterday's note (1/3/2023), there are no changes  except as documented above.

## 2023-01-05 ENCOUNTER — APPOINTMENT (OUTPATIENT)
Dept: WOUND CARE | Facility: MEDICAL CENTER | Age: 63
End: 2023-01-05
Attending: FAMILY MEDICINE
Payer: COMMERCIAL

## 2023-01-05 ENCOUNTER — ANESTHESIA EVENT (OUTPATIENT)
Dept: SURGERY | Facility: MEDICAL CENTER | Age: 63
DRG: 854 | End: 2023-01-05
Payer: COMMERCIAL

## 2023-01-05 ENCOUNTER — ANESTHESIA (OUTPATIENT)
Dept: SURGERY | Facility: MEDICAL CENTER | Age: 63
DRG: 854 | End: 2023-01-05
Payer: COMMERCIAL

## 2023-01-05 LAB
BACTERIA UR CULT: NORMAL
EKG IMPRESSION: NORMAL
GLUCOSE BLD STRIP.AUTO-MCNC: 131 MG/DL (ref 65–99)
GLUCOSE BLD STRIP.AUTO-MCNC: 137 MG/DL (ref 65–99)
GLUCOSE BLD STRIP.AUTO-MCNC: 144 MG/DL (ref 65–99)
GLUCOSE BLD STRIP.AUTO-MCNC: 229 MG/DL (ref 65–99)
GLUCOSE BLD STRIP.AUTO-MCNC: 230 MG/DL (ref 65–99)
SIGNIFICANT IND 70042: NORMAL
SITE SITE: NORMAL
SOURCE SOURCE: NORMAL

## 2023-01-05 PROCEDURE — 93010 ELECTROCARDIOGRAM REPORT: CPT | Performed by: INTERNAL MEDICINE

## 2023-01-05 PROCEDURE — 87075 CULTR BACTERIA EXCEPT BLOOD: CPT

## 2023-01-05 PROCEDURE — 700102 HCHG RX REV CODE 250 W/ 637 OVERRIDE(OP): Performed by: STUDENT IN AN ORGANIZED HEALTH CARE EDUCATION/TRAINING PROGRAM

## 2023-01-05 PROCEDURE — 0Y9J0ZZ DRAINAGE OF LEFT LOWER LEG, OPEN APPROACH: ICD-10-PCS | Performed by: SURGERY

## 2023-01-05 PROCEDURE — A9270 NON-COVERED ITEM OR SERVICE: HCPCS | Performed by: STUDENT IN AN ORGANIZED HEALTH CARE EDUCATION/TRAINING PROGRAM

## 2023-01-05 PROCEDURE — A9270 NON-COVERED ITEM OR SERVICE: HCPCS

## 2023-01-05 PROCEDURE — 700111 HCHG RX REV CODE 636 W/ 250 OVERRIDE (IP): Performed by: ANESTHESIOLOGY

## 2023-01-05 PROCEDURE — 700105 HCHG RX REV CODE 258: Performed by: STUDENT IN AN ORGANIZED HEALTH CARE EDUCATION/TRAINING PROGRAM

## 2023-01-05 PROCEDURE — 01470 ANES PX NRV MSC LW L/A/F NOS: CPT | Performed by: ANESTHESIOLOGY

## 2023-01-05 PROCEDURE — 160002 HCHG RECOVERY MINUTES (STAT): Performed by: SURGERY

## 2023-01-05 PROCEDURE — 160027 HCHG SURGERY MINUTES - 1ST 30 MINS LEVEL 2: Performed by: SURGERY

## 2023-01-05 PROCEDURE — 700111 HCHG RX REV CODE 636 W/ 250 OVERRIDE (IP): Performed by: STUDENT IN AN ORGANIZED HEALTH CARE EDUCATION/TRAINING PROGRAM

## 2023-01-05 PROCEDURE — 160009 HCHG ANES TIME/MIN: Performed by: SURGERY

## 2023-01-05 PROCEDURE — 770006 HCHG ROOM/CARE - MED/SURG/GYN SEMI*

## 2023-01-05 PROCEDURE — 0JBP0ZZ EXCISION OF LEFT LOWER LEG SUBCUTANEOUS TISSUE AND FASCIA, OPEN APPROACH: ICD-10-PCS | Performed by: SURGERY

## 2023-01-05 PROCEDURE — 93005 ELECTROCARDIOGRAM TRACING: CPT | Performed by: SURGERY

## 2023-01-05 PROCEDURE — 160048 HCHG OR STATISTICAL LEVEL 1-5: Performed by: SURGERY

## 2023-01-05 PROCEDURE — 82962 GLUCOSE BLOOD TEST: CPT | Mod: 91

## 2023-01-05 PROCEDURE — 160038 HCHG SURGERY MINUTES - EA ADDL 1 MIN LEVEL 2: Performed by: SURGERY

## 2023-01-05 PROCEDURE — 87070 CULTURE OTHR SPECIMN AEROBIC: CPT

## 2023-01-05 PROCEDURE — 87102 FUNGUS ISOLATION CULTURE: CPT

## 2023-01-05 PROCEDURE — 700102 HCHG RX REV CODE 250 W/ 637 OVERRIDE(OP)

## 2023-01-05 PROCEDURE — 700111 HCHG RX REV CODE 636 W/ 250 OVERRIDE (IP)

## 2023-01-05 PROCEDURE — 87205 SMEAR GRAM STAIN: CPT | Mod: 91

## 2023-01-05 PROCEDURE — 700101 HCHG RX REV CODE 250: Performed by: ANESTHESIOLOGY

## 2023-01-05 PROCEDURE — 99233 SBSQ HOSP IP/OBS HIGH 50: CPT | Performed by: STUDENT IN AN ORGANIZED HEALTH CARE EDUCATION/TRAINING PROGRAM

## 2023-01-05 PROCEDURE — 160035 HCHG PACU - 1ST 60 MINS PHASE I: Performed by: SURGERY

## 2023-01-05 PROCEDURE — 10061 I&D ABSCESS COMP/MULTIPLE: CPT | Performed by: SURGERY

## 2023-01-05 PROCEDURE — 700105 HCHG RX REV CODE 258: Performed by: ANESTHESIOLOGY

## 2023-01-05 RX ORDER — MEPERIDINE HYDROCHLORIDE 25 MG/ML
12.5 INJECTION INTRAMUSCULAR; INTRAVENOUS; SUBCUTANEOUS
Status: DISCONTINUED | OUTPATIENT
Start: 2023-01-05 | End: 2023-01-05 | Stop reason: HOSPADM

## 2023-01-05 RX ORDER — CEFAZOLIN SODIUM 1 G/3ML
INJECTION, POWDER, FOR SOLUTION INTRAMUSCULAR; INTRAVENOUS PRN
Status: DISCONTINUED | OUTPATIENT
Start: 2023-01-05 | End: 2023-01-05 | Stop reason: SURG

## 2023-01-05 RX ORDER — DEXAMETHASONE SODIUM PHOSPHATE 4 MG/ML
INJECTION, SOLUTION INTRA-ARTICULAR; INTRALESIONAL; INTRAMUSCULAR; INTRAVENOUS; SOFT TISSUE PRN
Status: DISCONTINUED | OUTPATIENT
Start: 2023-01-05 | End: 2023-01-05 | Stop reason: SURG

## 2023-01-05 RX ORDER — HYDROMORPHONE HYDROCHLORIDE 1 MG/ML
0.4 INJECTION, SOLUTION INTRAMUSCULAR; INTRAVENOUS; SUBCUTANEOUS
Status: DISCONTINUED | OUTPATIENT
Start: 2023-01-05 | End: 2023-01-05 | Stop reason: HOSPADM

## 2023-01-05 RX ORDER — HYDROMORPHONE HYDROCHLORIDE 1 MG/ML
0.1 INJECTION, SOLUTION INTRAMUSCULAR; INTRAVENOUS; SUBCUTANEOUS
Status: DISCONTINUED | OUTPATIENT
Start: 2023-01-05 | End: 2023-01-05 | Stop reason: HOSPADM

## 2023-01-05 RX ORDER — SODIUM CHLORIDE, SODIUM LACTATE, POTASSIUM CHLORIDE, CALCIUM CHLORIDE 600; 310; 30; 20 MG/100ML; MG/100ML; MG/100ML; MG/100ML
INJECTION, SOLUTION INTRAVENOUS CONTINUOUS
Status: DISCONTINUED | OUTPATIENT
Start: 2023-01-05 | End: 2023-01-05 | Stop reason: HOSPADM

## 2023-01-05 RX ORDER — HYDROMORPHONE HYDROCHLORIDE 1 MG/ML
0.2 INJECTION, SOLUTION INTRAMUSCULAR; INTRAVENOUS; SUBCUTANEOUS
Status: DISCONTINUED | OUTPATIENT
Start: 2023-01-05 | End: 2023-01-05 | Stop reason: HOSPADM

## 2023-01-05 RX ORDER — ONDANSETRON 2 MG/ML
4 INJECTION INTRAMUSCULAR; INTRAVENOUS ONCE
Status: DISCONTINUED | OUTPATIENT
Start: 2023-01-05 | End: 2023-01-05 | Stop reason: HOSPADM

## 2023-01-05 RX ORDER — ONDANSETRON 2 MG/ML
INJECTION INTRAMUSCULAR; INTRAVENOUS PRN
Status: DISCONTINUED | OUTPATIENT
Start: 2023-01-05 | End: 2023-01-05 | Stop reason: SURG

## 2023-01-05 RX ORDER — SODIUM CHLORIDE, SODIUM LACTATE, POTASSIUM CHLORIDE, CALCIUM CHLORIDE 600; 310; 30; 20 MG/100ML; MG/100ML; MG/100ML; MG/100ML
INJECTION, SOLUTION INTRAVENOUS
Status: DISCONTINUED | OUTPATIENT
Start: 2023-01-05 | End: 2023-01-05 | Stop reason: SURG

## 2023-01-05 RX ADMIN — LEVOTHYROXINE SODIUM 112 MCG: 0.11 TABLET ORAL at 05:37

## 2023-01-05 RX ADMIN — PROMETHAZINE HYDROCHLORIDE 25 MG: 25 TABLET ORAL at 17:01

## 2023-01-05 RX ADMIN — FENTANYL CITRATE 100 MCG: 50 INJECTION, SOLUTION INTRAMUSCULAR; INTRAVENOUS at 11:03

## 2023-01-05 RX ADMIN — HYDROMORPHONE HYDROCHLORIDE 0.4 MG: 1 INJECTION, SOLUTION INTRAMUSCULAR; INTRAVENOUS; SUBCUTANEOUS at 11:47

## 2023-01-05 RX ADMIN — ROSUVASTATIN CALCIUM 20 MG: 20 TABLET, FILM COATED ORAL at 05:38

## 2023-01-05 RX ADMIN — FENTANYL CITRATE 100 MCG: 50 INJECTION, SOLUTION INTRAMUSCULAR; INTRAVENOUS at 10:55

## 2023-01-05 RX ADMIN — HYDROMORPHONE HYDROCHLORIDE 0.4 MG: 1 INJECTION, SOLUTION INTRAMUSCULAR; INTRAVENOUS; SUBCUTANEOUS at 11:37

## 2023-01-05 RX ADMIN — CEFAZOLIN 2 G: 330 INJECTION, POWDER, FOR SOLUTION INTRAMUSCULAR; INTRAVENOUS at 10:45

## 2023-01-05 RX ADMIN — HYDROCODONE BITARTRATE AND ACETAMINOPHEN 1 TABLET: 5; 325 TABLET ORAL at 05:41

## 2023-01-05 RX ADMIN — ROCURONIUM BROMIDE 30 MG: 10 INJECTION, SOLUTION INTRAVENOUS at 10:44

## 2023-01-05 RX ADMIN — FINASTERIDE 5 MG: 5 TABLET, FILM COATED ORAL at 05:38

## 2023-01-05 RX ADMIN — ONDANSETRON 4 MG: 2 INJECTION INTRAMUSCULAR; INTRAVENOUS at 10:50

## 2023-01-05 RX ADMIN — SUGAMMADEX 200 MG: 100 INJECTION, SOLUTION INTRAVENOUS at 11:05

## 2023-01-05 RX ADMIN — OMEPRAZOLE 40 MG: 20 CAPSULE, DELAYED RELEASE ORAL at 05:37

## 2023-01-05 RX ADMIN — HYDROCODONE BITARTRATE AND ACETAMINOPHEN 1 TABLET: 5; 325 TABLET ORAL at 17:01

## 2023-01-05 RX ADMIN — AMLODIPINE BESYLATE 10 MG: 10 TABLET ORAL at 05:37

## 2023-01-05 RX ADMIN — INSULIN HUMAN 2 UNITS: 100 INJECTION, SOLUTION PARENTERAL at 21:14

## 2023-01-05 RX ADMIN — CEFAZOLIN 2 G: 2 INJECTION, POWDER, FOR SOLUTION INTRAMUSCULAR; INTRAVENOUS at 18:40

## 2023-01-05 RX ADMIN — DEXAMETHASONE SODIUM PHOSPHATE 4 MG: 4 INJECTION, SOLUTION INTRA-ARTICULAR; INTRALESIONAL; INTRAMUSCULAR; INTRAVENOUS; SOFT TISSUE at 10:50

## 2023-01-05 RX ADMIN — CEFAZOLIN 2 G: 2 INJECTION, POWDER, FOR SOLUTION INTRAMUSCULAR; INTRAVENOUS at 05:45

## 2023-01-05 RX ADMIN — TAMSULOSIN HYDROCHLORIDE 0.4 MG: 0.4 CAPSULE ORAL at 05:38

## 2023-01-05 RX ADMIN — PROPOFOL 200 MG: 10 INJECTION, EMULSION INTRAVENOUS at 10:42

## 2023-01-05 RX ADMIN — FENTANYL CITRATE 50 MCG: 50 INJECTION, SOLUTION INTRAMUSCULAR; INTRAVENOUS at 10:48

## 2023-01-05 RX ADMIN — PROMETHAZINE HYDROCHLORIDE 25 MG: 25 TABLET ORAL at 05:41

## 2023-01-05 RX ADMIN — PREGABALIN 50 MG: 25 CAPSULE ORAL at 05:38

## 2023-01-05 RX ADMIN — INSULIN HUMAN 2 UNITS: 100 INJECTION, SOLUTION PARENTERAL at 16:57

## 2023-01-05 RX ADMIN — LINEZOLID 600 MG: 600 INJECTION, SOLUTION INTRAVENOUS at 06:45

## 2023-01-05 RX ADMIN — HYDROMORPHONE HYDROCHLORIDE 0.2 MG: 1 INJECTION, SOLUTION INTRAMUSCULAR; INTRAVENOUS; SUBCUTANEOUS at 11:55

## 2023-01-05 RX ADMIN — PREGABALIN 50 MG: 25 CAPSULE ORAL at 17:01

## 2023-01-05 RX ADMIN — SODIUM CHLORIDE, POTASSIUM CHLORIDE, SODIUM LACTATE AND CALCIUM CHLORIDE: 600; 310; 30; 20 INJECTION, SOLUTION INTRAVENOUS at 10:38

## 2023-01-05 ASSESSMENT — ENCOUNTER SYMPTOMS
PALPITATIONS: 0
EYE PAIN: 0
BACK PAIN: 0
BLURRED VISION: 0
DIZZINESS: 0
NAUSEA: 0
VOMITING: 0
SHORTNESS OF BREATH: 0
SENSORY CHANGE: 0
ABDOMINAL PAIN: 0
FALLS: 0
HEADACHES: 0
LOSS OF CONSCIOUSNESS: 0
FEVER: 0
FOCAL WEAKNESS: 0
CHILLS: 0
INSOMNIA: 0
COUGH: 0

## 2023-01-05 ASSESSMENT — PAIN DESCRIPTION - PAIN TYPE
TYPE: ACUTE PAIN
TYPE: SURGICAL PAIN
TYPE: ACUTE PAIN;CHRONIC PAIN
TYPE: ACUTE PAIN;CHRONIC PAIN;SURGICAL PAIN
TYPE: ACUTE PAIN
TYPE: ACUTE PAIN
TYPE: SURGICAL PAIN

## 2023-01-05 ASSESSMENT — PAIN SCALES - GENERAL: PAIN_LEVEL: 1

## 2023-01-05 ASSESSMENT — LIFESTYLE VARIABLES: SUBSTANCE_ABUSE: 0

## 2023-01-05 NOTE — ANESTHESIA PROCEDURE NOTES
Airway    Date/Time: 1/5/2023 10:43 AM  Performed by: Blaine Black M.D.  Authorized by: Blaine Black M.D.     Location:  OR  Urgency:  Elective  Indications for Airway Management:  Anesthesia      Spontaneous Ventilation: absent    Sedation Level:  Deep  Preoxygenated: Yes    Final Airway Type:  Supraglottic airway  Final Supraglottic Airway:  Standard LMA    SGA Size:  4  Number of Attempts at Approach:  1

## 2023-01-05 NOTE — OP REPORT
Preoperative diagnosis; complex cellulitis left leg  Postoperative diagnosis; same  Operation; incision and drainage of leg abscesses  Surgeon;lindsay  Anesthesia; Dr. Black  Wound classification;  Specimen; culture estimated blood loss  150 cc  Operative note  This patient presents with progressive cellulitis despite antibiotic therapy is developed areas of spontaneous drainage.  He was felt to be a candidate for surgical drainage patient accepted the risk and potential complications of the procedure and agreed to proceed he was taken to the operating room and placed under anesthesia.  His leg was prepped with Betadine solution and sterile drapes were applied.  A linear incision was made down the anterior medial aspect of the leg around an area of draining sinuses the draining sinuses had been probed with a hemostat.  Incision was made about 10 cm in length.  This was carried down to the level of fascia.  There was some necrotic subcutaneous tissues which were debrided with a curette scissors and abrasive gauze.  Cultures were obtained from this wound.  This was made hemostatic using electrocautery then packed with Nu Gauze.  A 7 cm incision was then made down the posterior medial aspect of the leg over similar area of fluctuance.  This wound was made hemostatic with electrocautery and similarly debrided and the subcutaneous tissues.  This was packed with Nu Gauze a sterile dressing was applied patient was awakened extubated taken recovery room in stable satisfactory condition estimated blood loss was 150 cc sponge and needle counts reported correct for all phases of surgery

## 2023-01-05 NOTE — PROGRESS NOTES
4 Eyes Skin Assessment Completed by FLORIN Carvajal and FLORIN Robins.    Head WDL  Ears WDL  Nose WDL  Mouth WDL  Neck WDL  Breast/Chest WDL  Shoulder Blades WDL  Spine WDL  (R) Arm/Elbow/Hand WDL  (L) Arm/Elbow/Hand WDL  Abdomen WDL  Groin Redness and Excoriation  Scrotum/Coccyx/Buttocks WDL  (R) Leg edema, scab  (L) Leg Redness, Scab, Swelling, Shiny, Weeping, and Edema - cellulitis with open wounds to calf  (R) Heel/Foot/Toe Swelling, Scab, and Edema lateral plantar open ulcer near 5th toe- silicone dressing applied  (L) Heel/Foot/Toe Swelling, Scab, and Edema - small scabbed areas to heel and plantar foot.          Devices In Places Compression Dressing      Interventions In Place Pillows and Pressure Redistribution Mattress    Possible Skin Injury Yes    Pictures Uploaded Into Epic Yes  Wound Consult Placed Yes  RN Wound Prevention Protocol Ordered Yes

## 2023-01-05 NOTE — PROGRESS NOTES
[facility] Medicine Daily Progress Note    Date of Service  1/5/2023    Chief Complaint  Thomas Borja is a 62 y.o. male admitted 1/3/2023 with leg pain and swelling.    Hospital Course  Thomas Borja is a 62 y.o. male with past medical history of hypertension, diabetes, MVR, recurrent LLE cellulitis  who presented 1/3/2023 with LLE cellulitis failing outpatient antibiotic therapy.      Patient was recently discharged on 12/4/2022 for sepsis secondary to left lower extremity cellulitis, during hospital stay, wound cultures grew strep pyogenes group A and MSSA and he was treated with cefazolin, completing a 10-day course of antibiotics, discharged to follow-up with wound care.  Hospital course was complicated for KENRICK, requiring need for dialysis with later improvement of renal function.  Patient states since discharge, patient followed up with wound care and because of residual erythema and swelling and purulent lesion to anterior shin, patient was prescribed another 7-day course of linezolid that he finished on 12/30/2022.  Patient states over the course of last week, he noticed new development of draining lesions to his left lower extremity localized to the posterior calf and anterior shin, outpatient antibiotics did not help.  Cellulitis associated with pain and norco helping, denies any sensory/motor deficits and does have neuropathy at baseline.  Denies any fever, chills, chest pain, shortness of breath, abdominal pain, nausea, vomiting.    Patient admitted overnight for worsening left leg cellulitis. He denies fever, chills, but reports pustular drainage from left leg now. He completed his course of outpatient antibiotics, most recently linezolid.  Patient likely with underlying abscesses given level of Q tip penetration at bedside by wound nurse and swelling of cellulitis area.  Start ancef, continue linezolid, although prior cultures grew group A strep and MSSA. Repeat wound and blood  cultures.  Surgery consulted for possible I+D.    Interval Problem Update  Patient was evaluated bedside  Patient no acute distress this morning, sitting in bed working on laptop computer  Stable vitals and labs  1/3 blood cultures with no growth to date  1/3 urine culture no growth to date  1/4 left leg wound cultures with no growth to date  1/3 MRSA nares negative, DC linezolid  Continue Ancef antibiotic regimen  General surgery following, aim for OR on  Labs on AM    I have discussed this patient's plan of care and discharge plan at IDT rounds today with Case Management, Nursing, Nursing leadership, and other members of the IDT team.    Consultants/Specialty  general surgery    Code Status  Full Code    Disposition  Patient is not medically cleared for discharge.   Anticipate discharge to to home with close outpatient follow-up.  I have placed the appropriate orders for post-discharge needs.    Review of Systems  Review of Systems   Constitutional:  Negative for chills, fever and malaise/fatigue.   Eyes:  Negative for blurred vision and pain.   Respiratory:  Negative for cough and shortness of breath.    Cardiovascular:  Negative for chest pain, palpitations and leg swelling.   Gastrointestinal:  Negative for abdominal pain, nausea and vomiting.   Genitourinary:  Negative for dysuria and urgency.   Musculoskeletal:  Negative for back pain and falls.   Skin:  Negative for itching and rash.   Neurological:  Negative for dizziness, sensory change, focal weakness, loss of consciousness and headaches.   Psychiatric/Behavioral:  Negative for substance abuse. The patient does not have insomnia.       Physical Exam  Temp:  [36.2 °C (97.1 °F)-36.9 °C (98.4 °F)] 36.9 °C (98.4 °F)  Pulse:  [89-98] 93  Resp:  [16-20] 18  BP: (127-148)/(41-87) 134/79  SpO2:  [91 %-96 %] 91 %    Physical Exam  Constitutional:       General: He is not in acute distress.     Appearance: He is not ill-appearing.   HENT:      Head: Normocephalic  and atraumatic.      Right Ear: External ear normal.      Left Ear: External ear normal.      Mouth/Throat:      Pharynx: No oropharyngeal exudate or posterior oropharyngeal erythema.   Eyes:      Extraocular Movements: Extraocular movements intact.      Pupils: Pupils are equal, round, and reactive to light.   Cardiovascular:      Rate and Rhythm: Normal rate and regular rhythm.      Pulses: Normal pulses.      Heart sounds: Normal heart sounds.   Pulmonary:      Effort: Pulmonary effort is normal. No respiratory distress.      Breath sounds: Normal breath sounds. No wheezing.   Abdominal:      General: Bowel sounds are normal. There is no distension.      Palpations: Abdomen is soft.      Tenderness: There is no abdominal tenderness. There is no guarding.   Musculoskeletal:         General: Swelling present. No tenderness.      Cervical back: Normal range of motion and neck supple.      Right lower leg: No edema.      Left lower leg: Edema present.   Skin:     General: Skin is warm and dry.      Findings: Erythema, lesion and rash present.      Comments: Left anterior and medial shin with erythema, pustular drainage, inflammation   Neurological:      General: No focal deficit present.      Mental Status: He is oriented to person, place, and time.      Cranial Nerves: No cranial nerve deficit.      Sensory: No sensory deficit.      Motor: No weakness.   Psychiatric:         Mood and Affect: Mood normal.         Behavior: Behavior normal.       Fluids    Intake/Output Summary (Last 24 hours) at 1/5/2023 0927  Last data filed at 1/4/2023 1854  Gross per 24 hour   Intake 700 ml   Output --   Net 700 ml       Laboratory  Recent Labs     01/03/23  1549 01/04/23  0305   WBC 11.3* 8.8   RBC 4.03* 3.90*   HEMOGLOBIN 13.3* 12.7*   HEMATOCRIT 37.6* 37.1*   MCV 93.3 95.1   MCH 33.0 32.6   MCHC 35.4* 34.2   RDW 42.9 44.0   PLATELETCT 436 400   MPV 8.1* 8.1*     Recent Labs     01/03/23  1549 01/04/23  0305   SODIUM 132* 136    POTASSIUM 3.9 3.9   CHLORIDE 99 101   CO2 21 26   GLUCOSE 230* 144*   BUN 19 18   CREATININE 1.10 1.27   CALCIUM 9.1 8.7     Recent Labs     01/03/23  1549   INR 1.03               Imaging  US-EXTREMITY VENOUS LOWER UNILAT LEFT   Final Result      DX-CHEST-PORTABLE (1 VIEW)   Final Result      No acute cardiac or pulmonary abnormalities are identified.           Assessment/Plan  * Sepsis due to cellulitis (HCC)- (present on admission)  Assessment & Plan  This is Sepsis Present on admission  SIRS criteria identified on my evaluation include: Tachycardia, with heart rate greater than 90 BPM, Tachypnea, with respirations greater than 20 per minute and Leukocytosis, with WBC greater than 12,000  Source is LLE cellulitis  Sepsis protocol initiated  Fluid resuscitation ordered per protocol  Crystalloid Fluid Administration: given fluid in ED  IV antibiotics as appropriate for source of sepsis  Reassessment: I have reassessed the patient's hemodynamic status    Lactic acidosis present on admission, downtrended.    LLE Cellulitis with purulent drainage  Assessment & Plan  Recently discharged on 12/4/2022 for sepsis secondary to left lower extremity cellulitis, during hospital stay, wound cultures grew strep pyogenes group A and MSSA and he was treated with cefazolin, completing a 10-day course of antibiotics, discharged to follow-up with wound care. Was prescribed outpt linezolid for persistent erythema, no response, and pt noted development of new draining lesions and areas of fluctuance.   - US LLE neg for DVT  Continue ancef and linezolid; although prior culture grew group A strep and MSSA  Repeat blood and wound cultures  - MRSA nares pending.   - Norco PRN for pain control  - wound care consult  Surgery consulted for possible I+D    Uncomplicated type 2 diabetes mellitus (HCC)- (present on admission)  Assessment & Plan  Hold home glipizide. Metformin was held on recent hospital discharge due to need for short term  dialysis need for KENRICK with subsequent improvement of renal function.   - sliding scale insulin   - hypoglycemia protocol    Benign essential hypertension- (present on admission)  Assessment & Plan  Continue home amlodipine         VTE prophylaxis: SCDs/TEDs    I have performed a physical exam and reviewed and updated ROS and Plan today (1/5/2023). In review of yesterday's note (1/4/2023), there are no changes except as documented above.

## 2023-01-05 NOTE — ANESTHESIA POSTPROCEDURE EVALUATION
Patient: Thomas Borja    Procedure Summary     Date: 01/05/23 Room / Location: [place] [address] / SURGERY [facility]    Anesthesia Start: 1038 Anesthesia Stop: 1119    Procedure: LEFT LEG INCISION AND DRAINAGE ABSCESS (Left: Leg Lower) Diagnosis: (left leg abscess)    Surgeons: Cesar Odom M.D. Responsible Provider: Blaine Black M.D.    Anesthesia Type: general ASA Status: 4          Final Anesthesia Type: general  Last vitals  BP   Blood Pressure: 131/75    Temp   36.3 °C (97.4 °F)    Pulse   (!) 110   Resp   18    SpO2   94 %      Anesthesia Post Evaluation    Patient location during evaluation: PACU  Pain score: 1                No notable events documented.     Nurse Pain Score: 5 (NPRS)

## 2023-01-05 NOTE — CONSULTS
Cesar Odom M.D.    Date & Time note created:    1/4/2023   4:06 PM     Referring MD / APRN:  Benji Grey M.D., Coleman Harden M.D.    Patient ID:  Name:             Thomas Borja   YOB: 1960  Age:                 62 y.o.  male   MRN:               [id number]    Chief Complaint/Reason for Visit:     Wound Check (Hx of cellulitis in the LLE, sent by wound care for IV ABX. Hx of diabetes. )      History of Present Illness:    Kwesi Borja is a 62 y.o. male   HPI  This diabetic male presents with persistent cellulitis of the left leg despite antibiotic therapy.  There is been some progression and now is developed some spontaneous drainage he is in on IV antibiotics pending cultures but has progressed despite antibiotic therapy I was asked to see him regarding the potential benefit for surgical drainage    Review of Systems:  ROS left leg is actually not very painful the patient lacks sensation possibly due to neuropathy    Past Medical History:   History reviewed. No pertinent past medical history.  Diabetes    Past Surgical History:  History reviewed. No pertinent surgical history.    Current Outpatient Medications:  Current Facility-Administered Medications   Medication Dose Route Frequency Provider Last Rate Last Admin    ceFAZolin (Ancef) 2 g in  mL IVPB  2 g Intravenous Q8HRS Coleman Harden M.D. 200 mL/hr at 01/04/23 1506 2 g at 01/04/23 1506    promethazine (PHENERGAN) tablet 25 mg  25 mg Oral Q6HRS PRN Coleman Harden M.D.   25 mg at 01/04/23 1524    senna-docusate (PERICOLACE or SENOKOT S) 8.6-50 MG per tablet 2 Tablet  2 Tablet Oral BID Myrtle Vivi, D.O.        And    polyethylene glycol/lytes (MIRALAX) PACKET 1 Packet  1 Packet Oral QDAY PRN Myrtle Vivi, D.O.        And    magnesium hydroxide (MILK OF MAGNESIA) suspension 30 mL  30 mL Oral QDAY PRN Myrtle Vivi, D.O.        And    bisacodyl (DULCOLAX) suppository 10 mg  10 mg Rectal QDAY PRN Myrtle Vivi, D.O.         acetaminophen (Tylenol) tablet 650 mg  650 mg Oral Q6HRS PRN Myrtle Vivi, D.O.        insulin regular (HumuLIN R,NovoLIN R) injection  1-6 Units Subcutaneous 4X/DAY ACHS Myrtle Vivi, D.O.   1 Units at 01/04/23 1351    And    dextrose 10 % BOLUS 25 g  25 g Intravenous Q15 MIN PRN Myrtle Vivi, D.O.        HYDROcodone-acetaminophen (NORCO) 5-325 MG per tablet 1 Tablet  1 Tablet Oral Q6HRS PRN Myrtle Vivi, D.O.   1 Tablet at 01/04/23 1508    amLODIPine (NORVASC) tablet 10 mg  10 mg Oral DAILY Myrtle Vivi, D.O.   10 mg at 01/04/23 0654    finasteride (PROSCAR) tablet 5 mg  5 mg Oral DAILY Myrtle Vivi, D.O.   5 mg at 01/04/23 0655    levothyroxine (SYNTHROID) tablet 112 mcg  112 mcg Oral AM ES Myrtle Vivi, D.O.   112 mcg at 01/04/23 0655    omeprazole (PRILOSEC) capsule 40 mg  40 mg Oral DAILY Myrtle Vivi, D.O.   40 mg at 01/04/23 0654    pregabalin (LYRICA) capsule 50 mg  50 mg Oral BID Myrtle Vivi, D.O.   50 mg at 01/04/23 0953    rosuvastatin (CRESTOR) tablet 20 mg  20 mg Oral DAILY Myrtle Vivi, D.O.   20 mg at 01/04/23 0655    tamsulosin (FLOMAX) capsule 0.4 mg  0.4 mg Oral DAILY Myrtle Vivi, D.O.   0.4 mg at 01/04/23 0654    Linezolid (ZYVOX) premix 600 mg  600 mg Intravenous Q12HRS Myrtle Vivi, D.O.   Stopped at 01/04/23 0757       Allergies:  Allergies   Allergen Reactions    Amoxicillin Hives    Codeine Nausea    Erythromycin Nausea    Oxycodone Rash             Family History:  History reviewed. No pertinent family history.    Social History:  Social History     Socioeconomic History    Marital status:      Spouse name: Not on file    Number of children: Not on file    Years of education: Not on file    Highest education level: Some college, no degree   Occupational History    Not on file   Tobacco Use    Smoking status: Former    Smokeless tobacco: Never   Vaping Use    Vaping Use: Never used   Substance and Sexual Activity    Alcohol use: Yes     Comment: occ    Drug use: Never    Sexual activity: Not on file   Other Topics  Concern    Not on file   Social History Narrative    Not on file     Social Determinants of Health     Financial Resource Strain: Low Risk     Difficulty of Paying Living Expenses: Not very hard   Food Insecurity: No Food Insecurity    Worried About Running Out of Food in the Last Year: Never true    Ran Out of Food in the Last Year: Never true   Transportation Needs: No Transportation Needs    Lack of Transportation (Medical): No    Lack of Transportation (Non-Medical): No   Physical Activity: Insufficiently Active    Days of Exercise per Week: 1 day    Minutes of Exercise per Session: 120 min   Stress: Stress Concern Present    Feeling of Stress : To some extent   Social Connections: Socially Isolated    Frequency of Communication with Friends and Family: Once a week    Frequency of Social Gatherings with Friends and Family: Once a week    Attends [employer] Services: Never    Active Member of Clubs or Organizations: No    Attends Club or Organization Meetings: Not on file    Marital Status:    Intimate Partner Violence: Not on file   Housing Stability: High Risk    Unable to Pay for Housing in the Last Year: Yes    Number of Places Lived in the Last Year: 1    Unstable Housing in the Last Year: No          Physical Exam:  Physical Exam  Patient does not appear to be in any acute distress HEENT examination is unremarkable neck is supple thyroid is normal lungs are clear card examination is normal abdomen is normal.  Neurologically patient has decreased sensation of the lower extremities.  The right leg looks normal the left leg is cellulitic.  There are several areas 1 medially and 1 anteriorly which have developed some spontaneous drainage 1 of these is purulent.  The whole leg is fairly erythematous from the ankle to the knee.  It is quite indurated.  It is not exactly fluctuant but there may be suprafascial fluid collections.  And cognitively is intact he moves all extremities well  Oriented x  3  Weight/BMI: Body mass index is 40.04 kg/m².  /73   Pulse 89   Temp 36.3 °C (97.3 °F) (Temporal)   Resp 18   Ht 1.829 m (6')   Wt (!) 134 kg (295 lb 3.1 oz)   SpO2 95%     Not recorded         Pertinent Lab/Test/Imaging Review:  Patient has cellulitis  Operatory's were reviewed no imaging    Assessment and Plan:    She has left leg cellulitis.  I think he probably should undergo incision and drainage of the anterior leg and possibly the medial posterior leg.  There could be abscess formation or suprafascial extension of infection but open drainage would probably be beneficial risk possible complications were discussed with the patient he feels things are going the wrong direction and consents to surgical intervention      Cesar Odom M.D.

## 2023-01-05 NOTE — DIETARY
NUTRITION SERVICES: BMI - Pt with BMI >40 (=Body mass index is 40.04 kg/m².), Class III obesity. Weight loss counseling not appropriate in acute care setting. RECOMMEND - If appropriate at DC please refer to outpatient nutrition services for weight management.

## 2023-01-05 NOTE — CARE PLAN
The patient is Stable - Low risk of patient condition declining or worsening    Shift Goals  Clinical Goals: Pain control and preparation for tomorrow procedure  Patient Goals: Pain control and rest  Family Goals: N/A    Progress made toward(s) clinical / shift goals: due med given as ordered and reassessed within 2 hours per protocol.  Problem: Knowledge Deficit - Standard  Goal: Patient and family/care givers will demonstrate understanding of plan of care, disease process/condition, diagnostic tests and medications  Outcome: Progressing     Problem: Hemodynamics  Goal: Patient's hemodynamics, fluid balance and neurologic status will be stable or improve  Outcome: Progressing     Problem: Fluid Volume  Goal: Fluid volume balance will be maintained  Outcome: Progressing     Problem: Urinary - Renal Perfusion  Goal: Ability to achieve and maintain adequate renal perfusion and functioning will improve  Outcome: Progressing     Problem: Respiratory  Goal: Patient will achieve/maintain optimum respiratory ventilation and gas exchange  Outcome: Progressing     Problem: Physical Regulation  Goal: Diagnostic test results will improve  Outcome: Progressing  Goal: Signs and symptoms of infection will decrease  Outcome: Progressing     Problem: Pain - Standard  Goal: Alleviation of pain or a reduction in pain to the patient’s comfort goal  Outcome: Progressing       Patient is not progressing towards the following goals:

## 2023-01-05 NOTE — ANESTHESIA PREPROCEDURE EVALUATION
Case: [id number] Date/Time: 01/05/23 0945    Procedure: LEFT LEG INCISION AND DRAINAGE ABSCESS    Location: [facility] OR [facility] / SURGERY [facility]    Surgeons: Cesar Odom M.D.          Relevant Problems   ANESTHESIA   (positive) Obstructive sleep apnea      PULMONARY   (positive) Mild intermittent asthma, uncomplicated      CARDIAC   (positive) Benign essential hypertension   (positive) Mitral valve prolapse      GI   (positive) GERD without esophagitis         (positive) KENRICK (acute kidney injury) (HCC)   (positive) Acute hemodialysis patient (HCC)   (positive) ESRD (end stage renal disease) on dialysis (HCC)      ENDO   (positive) Uncomplicated type 2 diabetes mellitus (HCC)       Physical Exam    Airway   Mallampati: II  TM distance: >3 FB  Neck ROM: full       Cardiovascular - normal exam  Rhythm: regular  Rate: normal  (-) murmur     Dental - normal exam           Pulmonary - normal exam  Breath sounds clear to auscultation     Abdominal    Neurological - normal exam                 Anesthesia Plan    ASA 4       Plan - general       Airway plan will be LMA          Induction: intravenous    Postoperative Plan: Postoperative administration of opioids is intended.    Pertinent diagnostic labs and testing reviewed    Informed Consent:    Anesthetic plan and risks discussed with patient.    Use of blood products discussed with: patient whom consented to blood products.

## 2023-01-05 NOTE — ANESTHESIA TIME REPORT
Anesthesia Start and Stop Event Times     Date Time Event    1/5/2023 0957 Ready for Procedure     1038 Anesthesia Start     1119 Anesthesia Stop        Responsible Staff  01/05/23    Name Role Begin End    Blaine Black M.D. Anesth [id number]        Overtime Reason:  no overtime (within assigned shift)    Comments:

## 2023-01-05 NOTE — CARE PLAN
The patient is Stable - Low risk of patient condition declining or worsening    Shift Goals  Clinical Goals: wound care, abx  Patient Goals: pain control, plan  Family Goals: N/A    Progress made toward(s) clinical / shift goals:      Problem: Knowledge Deficit - Standard  Goal: Patient and family/care givers will demonstrate understanding of plan of care, disease process/condition, diagnostic tests and medications  Outcome: Progressing     Problem: Hemodynamics  Goal: Patient's hemodynamics, fluid balance and neurologic status will be stable or improve  Outcome: Progressing     Problem: Fluid Volume  Goal: Fluid volume balance will be maintained  Outcome: Progressing       Patient is not progressing towards the following goals:

## 2023-01-05 NOTE — PROGRESS NOTES
Received alert and oriented x 4. Check vitals sign and recorded accordingly and due med given per MAR. Monitor sign and symptoms of respiratory distress and treatment given accordingly per MAR.Medicated per MAR and reassessed every 2 hours per protocol. Call light within reach. Bed alarm refused despite health teaching given. Calls appropriately. Needs attended. Will continue to monitor.BP (!) 141/74   Pulse 98   Temp 36.3 °C (97.4 °F) (Temporal)   Resp 18   Ht 1.829 m (6')   Wt (!) 134 kg (295 lb 3.1 oz)   SpO2 95%   BMI 40.04 kg/m² .

## 2023-01-06 PROBLEM — L02.419 THIGH ABSCESS: Status: ACTIVE | Noted: 2023-01-06

## 2023-01-06 LAB
ANION GAP SERPL CALC-SCNC: 10 MMOL/L (ref 7–16)
BACTERIA WND AEROBE CULT: ABNORMAL
BACTERIA WND AEROBE CULT: ABNORMAL
BASOPHILS # BLD AUTO: 0.4 % (ref 0–1.8)
BASOPHILS # BLD: 0.04 K/UL (ref 0–0.12)
BUN SERPL-MCNC: 16 MG/DL (ref 8–22)
CALCIUM SERPL-MCNC: 9.1 MG/DL (ref 8.5–10.5)
CHLORIDE SERPL-SCNC: 99 MMOL/L (ref 96–112)
CO2 SERPL-SCNC: 26 MMOL/L (ref 20–33)
CREAT SERPL-MCNC: 1.2 MG/DL (ref 0.5–1.4)
EOSINOPHIL # BLD AUTO: 0.02 K/UL (ref 0–0.51)
EOSINOPHIL NFR BLD: 0.2 % (ref 0–6.9)
ERYTHROCYTE [DISTWIDTH] IN BLOOD BY AUTOMATED COUNT: 43.4 FL (ref 35.9–50)
FUNGUS SPEC FUNGUS STN: NORMAL
GFR SERPLBLD CREATININE-BSD FMLA CKD-EPI: 67 ML/MIN/1.73 M 2
GLUCOSE BLD STRIP.AUTO-MCNC: 159 MG/DL (ref 65–99)
GLUCOSE BLD STRIP.AUTO-MCNC: 159 MG/DL (ref 65–99)
GLUCOSE BLD STRIP.AUTO-MCNC: 168 MG/DL (ref 65–99)
GLUCOSE BLD STRIP.AUTO-MCNC: 181 MG/DL (ref 65–99)
GLUCOSE SERPL-MCNC: 168 MG/DL (ref 65–99)
GRAM STN SPEC: ABNORMAL
GRAM STN SPEC: NORMAL
HCT VFR BLD AUTO: 37.1 % (ref 42–52)
HGB BLD-MCNC: 13 G/DL (ref 14–18)
IMM GRANULOCYTES # BLD AUTO: 0.05 K/UL (ref 0–0.11)
IMM GRANULOCYTES NFR BLD AUTO: 0.5 % (ref 0–0.9)
LYMPHOCYTES # BLD AUTO: 1.62 K/UL (ref 1–4.8)
LYMPHOCYTES NFR BLD: 15.5 % (ref 22–41)
MCH RBC QN AUTO: 32.9 PG (ref 27–33)
MCHC RBC AUTO-ENTMCNC: 35 G/DL (ref 33.7–35.3)
MCV RBC AUTO: 93.9 FL (ref 81.4–97.8)
MONOCYTES # BLD AUTO: 0.75 K/UL (ref 0–0.85)
MONOCYTES NFR BLD AUTO: 7.2 % (ref 0–13.4)
NEUTROPHILS # BLD AUTO: 7.98 K/UL (ref 1.82–7.42)
NEUTROPHILS NFR BLD: 76.2 % (ref 44–72)
NRBC # BLD AUTO: 0 K/UL
NRBC BLD-RTO: 0 /100 WBC
PLATELET # BLD AUTO: 389 K/UL (ref 164–446)
PMV BLD AUTO: 8.3 FL (ref 9–12.9)
POTASSIUM SERPL-SCNC: 4.6 MMOL/L (ref 3.6–5.5)
RBC # BLD AUTO: 3.95 M/UL (ref 4.7–6.1)
SIGNIFICANT IND 70042: ABNORMAL
SIGNIFICANT IND 70042: NORMAL
SIGNIFICANT IND 70042: NORMAL
SITE SITE: ABNORMAL
SITE SITE: NORMAL
SITE SITE: NORMAL
SODIUM SERPL-SCNC: 135 MMOL/L (ref 135–145)
SOURCE SOURCE: ABNORMAL
SOURCE SOURCE: NORMAL
SOURCE SOURCE: NORMAL
WBC # BLD AUTO: 10.5 K/UL (ref 4.8–10.8)

## 2023-01-06 PROCEDURE — 302098 PASTE RING (FLAT): Performed by: STUDENT IN AN ORGANIZED HEALTH CARE EDUCATION/TRAINING PROGRAM

## 2023-01-06 PROCEDURE — 36415 COLL VENOUS BLD VENIPUNCTURE: CPT

## 2023-01-06 PROCEDURE — 700111 HCHG RX REV CODE 636 W/ 250 OVERRIDE (IP): Performed by: STUDENT IN AN ORGANIZED HEALTH CARE EDUCATION/TRAINING PROGRAM

## 2023-01-06 PROCEDURE — 99024 POSTOP FOLLOW-UP VISIT: CPT | Performed by: NURSE PRACTITIONER

## 2023-01-06 PROCEDURE — A9270 NON-COVERED ITEM OR SERVICE: HCPCS

## 2023-01-06 PROCEDURE — 700105 HCHG RX REV CODE 258: Performed by: STUDENT IN AN ORGANIZED HEALTH CARE EDUCATION/TRAINING PROGRAM

## 2023-01-06 PROCEDURE — A9270 NON-COVERED ITEM OR SERVICE: HCPCS | Performed by: STUDENT IN AN ORGANIZED HEALTH CARE EDUCATION/TRAINING PROGRAM

## 2023-01-06 PROCEDURE — 306591 TRAY SUTURE REMOVAL DISP: Performed by: STUDENT IN AN ORGANIZED HEALTH CARE EDUCATION/TRAINING PROGRAM

## 2023-01-06 PROCEDURE — 85025 COMPLETE CBC W/AUTO DIFF WBC: CPT

## 2023-01-06 PROCEDURE — 700102 HCHG RX REV CODE 250 W/ 637 OVERRIDE(OP)

## 2023-01-06 PROCEDURE — 700102 HCHG RX REV CODE 250 W/ 637 OVERRIDE(OP): Performed by: STUDENT IN AN ORGANIZED HEALTH CARE EDUCATION/TRAINING PROGRAM

## 2023-01-06 PROCEDURE — 99233 SBSQ HOSP IP/OBS HIGH 50: CPT | Performed by: STUDENT IN AN ORGANIZED HEALTH CARE EDUCATION/TRAINING PROGRAM

## 2023-01-06 PROCEDURE — 80048 BASIC METABOLIC PNL TOTAL CA: CPT

## 2023-01-06 PROCEDURE — 770006 HCHG ROOM/CARE - MED/SURG/GYN SEMI*

## 2023-01-06 PROCEDURE — 97602 WOUND(S) CARE NON-SELECTIVE: CPT

## 2023-01-06 PROCEDURE — 82962 GLUCOSE BLOOD TEST: CPT | Mod: 91

## 2023-01-06 RX ORDER — CIPROFLOXACIN 500 MG/1
500 TABLET, FILM COATED ORAL EVERY 12 HOURS
Status: DISCONTINUED | OUTPATIENT
Start: 2023-01-06 | End: 2023-01-07

## 2023-01-06 RX ORDER — SODIUM HYPOCHLORITE 1.25 MG/ML
SOLUTION TOPICAL PRN
Status: DISCONTINUED | OUTPATIENT
Start: 2023-01-06 | End: 2023-01-13 | Stop reason: HOSPADM

## 2023-01-06 RX ORDER — HYDROMORPHONE HYDROCHLORIDE 1 MG/ML
1 INJECTION, SOLUTION INTRAMUSCULAR; INTRAVENOUS; SUBCUTANEOUS
Status: DISCONTINUED | OUTPATIENT
Start: 2023-01-06 | End: 2023-01-07

## 2023-01-06 RX ADMIN — INSULIN HUMAN 1 UNITS: 100 INJECTION, SOLUTION PARENTERAL at 11:28

## 2023-01-06 RX ADMIN — LEVOTHYROXINE SODIUM 112 MCG: 0.11 TABLET ORAL at 04:29

## 2023-01-06 RX ADMIN — PROMETHAZINE HYDROCHLORIDE 25 MG: 25 TABLET ORAL at 19:00

## 2023-01-06 RX ADMIN — ROSUVASTATIN CALCIUM 20 MG: 20 TABLET, FILM COATED ORAL at 04:28

## 2023-01-06 RX ADMIN — CEFAZOLIN 2 G: 2 INJECTION, POWDER, FOR SOLUTION INTRAMUSCULAR; INTRAVENOUS at 03:26

## 2023-01-06 RX ADMIN — CIPROFLOXACIN 500 MG: 500 TABLET, FILM COATED ORAL at 13:19

## 2023-01-06 RX ADMIN — AMLODIPINE BESYLATE 10 MG: 10 TABLET ORAL at 04:28

## 2023-01-06 RX ADMIN — INSULIN HUMAN 1 UNITS: 100 INJECTION, SOLUTION PARENTERAL at 06:10

## 2023-01-06 RX ADMIN — INSULIN HUMAN 1 UNITS: 100 INJECTION, SOLUTION PARENTERAL at 17:06

## 2023-01-06 RX ADMIN — OMEPRAZOLE 40 MG: 20 CAPSULE, DELAYED RELEASE ORAL at 04:28

## 2023-01-06 RX ADMIN — HYDROMORPHONE HYDROCHLORIDE 1 MG: 1 INJECTION, SOLUTION INTRAMUSCULAR; INTRAVENOUS; SUBCUTANEOUS at 13:19

## 2023-01-06 RX ADMIN — PREGABALIN 50 MG: 25 CAPSULE ORAL at 17:04

## 2023-01-06 RX ADMIN — INSULIN HUMAN 1 UNITS: 100 INJECTION, SOLUTION PARENTERAL at 20:33

## 2023-01-06 RX ADMIN — TAMSULOSIN HYDROCHLORIDE 0.4 MG: 0.4 CAPSULE ORAL at 04:29

## 2023-01-06 RX ADMIN — FINASTERIDE 5 MG: 5 TABLET, FILM COATED ORAL at 04:29

## 2023-01-06 RX ADMIN — PREGABALIN 50 MG: 25 CAPSULE ORAL at 04:28

## 2023-01-06 RX ADMIN — CEFAZOLIN 2 G: 2 INJECTION, POWDER, FOR SOLUTION INTRAMUSCULAR; INTRAVENOUS at 11:32

## 2023-01-06 RX ADMIN — HYDROCODONE BITARTRATE AND ACETAMINOPHEN 1 TABLET: 5; 325 TABLET ORAL at 19:00

## 2023-01-06 ASSESSMENT — COGNITIVE AND FUNCTIONAL STATUS - GENERAL
MOBILITY SCORE: 24
DAILY ACTIVITIY SCORE: 23
SUGGESTED CMS G CODE MODIFIER MOBILITY: CH
DRESSING REGULAR LOWER BODY CLOTHING: A LITTLE
SUGGESTED CMS G CODE MODIFIER DAILY ACTIVITY: CI

## 2023-01-06 ASSESSMENT — ENCOUNTER SYMPTOMS
HEADACHES: 0
BLURRED VISION: 0
EYE PAIN: 0
COUGH: 0
ABDOMINAL PAIN: 0
NAUSEA: 0
BACK PAIN: 0
FALLS: 0
FOCAL WEAKNESS: 0
VOMITING: 0
FEVER: 0
INSOMNIA: 0
SENSORY CHANGE: 0
CHILLS: 0
DIZZINESS: 0
LOSS OF CONSCIOUSNESS: 0
SHORTNESS OF BREATH: 0
PALPITATIONS: 0

## 2023-01-06 ASSESSMENT — PAIN DESCRIPTION - PAIN TYPE
TYPE: ACUTE PAIN;SURGICAL PAIN
TYPE: ACUTE PAIN
TYPE: ACUTE PAIN;SURGICAL PAIN;CHRONIC PAIN
TYPE: ACUTE PAIN

## 2023-01-06 ASSESSMENT — PAIN SCALES - WONG BAKER: WONGBAKER_NUMERICALRESPONSE: HURTS A LITTLE MORE

## 2023-01-06 ASSESSMENT — LIFESTYLE VARIABLES: SUBSTANCE_ABUSE: 0

## 2023-01-06 NOTE — WOUND TEAM
[facility]  Inpatient Services  Wound and Skin Care Evaluation    Admission Date: 1/3/2023     Last order of IP CONSULT TO WOUND CARE was found on 1/3/2023 from Hospital Encounter on 1/3/2023     HPI, PMH, SH: Reviewed    Past Surgical History:   Procedure Laterality Date    SKIN ABSCESS INCISION AND DRAINAGE Left 1/5/2023    Procedure: LEFT LEG INCISION AND DRAINAGE ABSCESS;  Surgeon: Cesar Odom M.D.;  Location: SURGERY [facility];  Service: General     Social History     Tobacco Use    Smoking status: Former    Smokeless tobacco: Never   Substance Use Topics    Alcohol use: Yes     Comment: occ     Chief Complaint   Patient presents with    Wound Check     Hx of cellulitis in the LLE, sent by wound care for IV ABX. Hx of diabetes.      Diagnosis: Sepsis due to cellulitis (HCC) [L03.90, A41.9]    Unit where seen by Wound Team: [facility] 64/64 [facility]     WOUND CONSULT/FOLLOW UP RELATED TO:  LLE VAC placement    WOUND HISTORY:  pt was at outpt wound clinic and wounds worsened. Sent to ED.   Underwent I&D with MD Odom 1/5/23. Vac placement today.     WOUND ASSESSMENT/LDA    Negative Pressure Wound Therapy 01/06/23 Surgical Pretibial Left (Active)   NPWT Pump Mode / Pressure Setting 125 mmHg;Ulta 01/06/23 1300   Dressing Type Medium;Black Foam (Veraflo) 01/06/23 1300   Number of Foam Pieces Used 4 01/06/23 1300   Canister Changed Yes 01/06/23 1300   NEXT Dressing Change/Treatment Date 01/09/23 01/06/23 1300   VAC VeraFlo Irrigant Normal Saline 01/06/23 1300   VAC VeraFlo Soak Time (mins) 10 01/06/23 1300   VAC VeraFlo Instill Volume (ml) 32 01/06/23 1300   VAC VeraFlo - Therapy Time (hrs) 2.5 01/06/23 1300   VAC VeraFlo Pressure (mm/Hg) 125 mmHg 01/06/23 1300   WOUND NURSE ONLY - Time Spent with Patient (mins) 60 01/06/23 1300        Wound 12/19/22 Full Thickness Wound Pretibial Anterior Left Surgical (Active)   Wound Image   01/06/23 1300   Site Assessment Slough;Red;Yellow;Fragile 01/06/23 1300    Periwound Assessment Red;Warm;Edema;Induration 01/06/23 1300   Margins Unattached edges;Defined edges 01/06/23 1300   Closure Secondary intention 01/06/23 1300   Drainage Amount Small 01/06/23 1300   Drainage Description Serosanguineous 01/06/23 1300   Treatments Cleansed;Site care;Offloading;Tape change 01/06/23 1300   Wound Cleansing Approved Wound Cleanser 01/06/23 1300   Periwound Protectant Skin Protectant Wipes to Periwound;Paste Ring;Hydrocolloid 01/06/23 1300   Dressing Cleansing/Solutions Normal Saline 01/06/23 1300   Dressing Options Wound Vac 01/06/23 1300   Dressing Changed New 01/06/23 1300   Dressing Status Clean;Dry;Intact 01/06/23 1300   Dressing Change/Treatment Frequency Monday, Wednesday, Friday, and As Needed 01/06/23 1300   NEXT Dressing Change/Treatment Date 01/09/23 01/06/23 1300   NEXT Weekly Photo (Inpatient Only) 01/13/23 01/06/23 1300   Non-staged Wound Description Full thickness 01/06/23 1300   Wound Length (cm) 11 cm 01/06/23 1300   Wound Width (cm) 2.3 cm 01/06/23 1300   Wound Depth (cm) 2 cm 01/06/23 1300   Wound Surface Area (cm^2) 25.3 cm^2 01/06/23 1300   Wound Volume (cm^3) 50.6 cm^3 01/06/23 1300   Wound Healing % -8 01/06/23 1300   Undermining (cm) 3 cm 01/06/23 1300   Undermining of Wound, 1st Location From 2 o'clock;To 4 o'clock 01/06/23 1300   Shape cluster of round wounds 01/04/23 1200   Wound Odor None 01/04/23 1200   Pulses 1+;DP 01/04/23 1200   Exposed Structures SANJUANA 01/04/23 1200       Wound 12/19/22 Full Thickness Wound Pretibial Medial;Posterior Left Surgical (Active)   Wound Image   01/06/23 1300   Site Assessment Yellow;Red;Fragile;Slough 01/06/23 1300   Periwound Assessment Red;Warm;Edema 01/06/23 1300   Margins Unattached edges;Defined edges 01/06/23 1300   Closure Secondary intention 01/06/23 1300   Drainage Amount Small 01/06/23 1300   Drainage Description Serosanguineous 01/06/23 1300   Treatments Cleansed;Site care;Tape change 01/06/23 1300   Wound Cleansing  Approved Wound Cleanser 01/06/23 1300   Periwound Protectant Skin Protectant Wipes to Periwound;Paste Ring;Drape;Hydrocolloid 01/06/23 1300   Dressing Cleansing/Solutions Normal Saline 01/06/23 1300   Dressing Options Wound Vac 01/06/23 1300   Dressing Changed New 01/06/23 1300   Dressing Status Clean;Dry;Intact 01/06/23 1300   Dressing Change/Treatment Frequency Monday, Wednesday, Friday, and As Needed 01/06/23 1300   NEXT Dressing Change/Treatment Date 01/09/23 01/06/23 1300   NEXT Weekly Photo (Inpatient Only) 01/13/23 01/06/23 1300   Non-staged Wound Description Full thickness 01/06/23 1300   Wound Length (cm) 7.5 cm 01/06/23 1300   Wound Width (cm) 2 cm 01/06/23 1300   Wound Depth (cm) 1.7 cm 01/06/23 1300   Wound Surface Area (cm^2) 15 cm^2 01/06/23 1300   Wound Volume (cm^3) 25.5 cm^3 01/06/23 1300   Wound Healing % -43 01/06/23 1300   Undermining (cm) 2.3 cm 01/06/23 1300   Undermining of Wound, 1st Location From 2 o'clock;To 4 o'clock 01/06/23 1300   Shape circular 01/04/23 1200   Wound Odor None 01/04/23 1200   Pulses 1+;DP 01/04/23 1200   Exposed Structures SANJUANA 01/04/23 1200         Vascular:    SHEELA:   No results found.    Lab Values:    Lab Results   Component Value Date/Time    WBC 10.5 01/06/2023 07:07 AM    RBC 3.95 (L) 01/06/2023 07:07 AM    HEMOGLOBIN 13.0 (L) 01/06/2023 07:07 AM    HEMATOCRIT 37.1 (L) 01/06/2023 07:07 AM    CREACTPROT 0.95 (H) 01/03/2023 07:06 PM    SEDRATEWES 51 (H) 01/03/2023 03:49 PM    HBA1C 8.5 (H) 12/04/2022 09:27 PM        Culture Results show:  Recent Results (from the past 720 hour(s))   Culture Wound w/Gram Stain    Collection Time: 01/04/23 11:26 AM    Specimen: Left Leg; Wound   Result Value Ref Range    Significant Indicator POS (POS)     Source WND     Site LEFT LEG     Culture Result - (A)     Gram Stain Result Many WBCs.  Rare Gram positive cocci.       Culture Result Enterobacter cloacae complex  Rare growth   (A)        Susceptibility    Enterobacter cloacae  complex - PEGGY     Ampicillin >16 Resistant mcg/mL     Ceftriaxone <=1 Sensitive mcg/mL     Cefazolin >16 Resistant mcg/mL     Ciprofloxacin <=0.25 Sensitive mcg/mL     Cefepime <=2 Sensitive mcg/mL     Cefuroxime >16 Resistant mcg/mL     Ertapenem <=0.5 Sensitive mcg/mL     Ampicillin/sulbactam >16/8 Resistant mcg/mL     Gentamicin <=2 Sensitive mcg/mL     Tobramycin <=2 Sensitive mcg/mL     Minocycline <=4 Sensitive mcg/mL     Moxifloxacin <=2 Sensitive mcg/mL     Pip/Tazobactam <=8 Sensitive mcg/mL     Trimeth/Sulfa <=0.5/9.5 Sensitive mcg/mL     Tigecycline <=2 Sensitive mcg/mL       Pain Level/Medicated:  premed with IV pain meds, tolerated well. More pain posterior wound.       INTERVENTIONS BY WOUND TEAM:  Chart and images reviewed. Discussed with bedside RN. All areas of concern (based on picture review, LDA review and discussion with bedside RN) have been thoroughly assessed. Documentation of areas based on significant findings. This RN in to assess patient. Performed standard wound care which includes appropriate positioning, dressing removal and non-selective debridement. Pictures and measurements obtained weekly if/when required.  LLE anterior & posterior  Preparation for Dressing removal: Dressing soaked with wound cleanser, easily removed.   Non-selectively Debrided with:  Wound cleanser and Gauze   Sharp debridement: NA  Inez wound: Cleansed with Wound cleanser and Gauze, Prepped with No Sting, paste ring, and hydrocolloid to wound edges, and drape bridge between the two.   Primary Dressing: Veraflo Black foam spiral, and 2 small wedges to fill space, and button  Secondary (Outer) Dressing: drape to secure, trak pad. Suction obtained at 125mmhg with Veraflo test cycle completed. 32mL, 10min, Q2.5h    Interdisciplinary consultation: Patient, Bedside RN , PHU Adames, wound RN Bailey    EVALUATION / RATIONALE FOR TREATMENT:  Most Recent Date:  1/6/23: Veraflo vac placement to LLE wounds x2 (anterior and  posterior). Wound beds are fragile, 40% necrotic and 60% fragile red tissue, undermining present in both wound beds from 2863-2001, slight odor present. Wound beds need to continue to be cleansed with Dakins Veraflo, will assist in closure by secondary intention, assist in debridement of non-viable tissue, management of bioburden and exudate, encourage granulation tissue production, and increase oxygenation to the wound bed.    1/4: Pt has cluster of pustules to medial LLE proximal to ankle. He has two full thickness wounds to lateral LLE. Anterior shin rough texture, indurated and raised above surrounding skin. Culture taken from lateral wounds since able to probe depth. Medial wounds unable to probe. Aquacel Ag Hydrofiber applied to manage bioburden, absorb exudate, and maintain a moist wound environment without laterally wicking exudate therefore reducing nithin-wound maceration. ABD pads for drainage and Tubi  F for light compression.      Goals: Steady decrease in wound area and depth weekly.    WOUND TEAM PLAN OF CARE ([X] for frequency of wound follow up,):   Nursing to follow dressing orders written for wound care. Contact wound team if area fails to progress, deteriorates or with any questions/concerns if something comes up before next scheduled follow up (See below as to whether wound is following and frequency of wound follow up)  Dressing changes by wound team:                   Follow up 3 times weekly:                NPWT change 3 times weekly:     Follow up 1-2 times weekly:    x  Follow up Bi-Monthly:           Follow up Monthly (High Risk):                        Follow up as needed:     Other (explain):     NURSING PLAN OF CARE ORDERS (X):  Dressing changes: See Dressing Care orders: x  Skin care: See Skin Care orders:   RN Prevention Protocol: seen in ED  Rectal tube care: See Rectal Tube Care orders:   Other orders:    RSKIN:   CURRENTLY IN PLACE (X), APPLIED THIS VISIT (A), ORDERED (O):   Q  shift Glynn:  X  Q shift pressure point assessments:  X    Surface/Positioning   Pressure redistribution mattress   X         Low Airloss          ICU Low Airloss   Bariatric PANCHITO     Waffle cushion        Waffle Overlay          Reposition q 2 hours      TAPs Turning system     Z Albin Pillow     Offloading/Redistribution   Sacral Mepilex (Silicone dressing)     Heel Mepilex (Silicone dressing)     X    Heel float boots (Prevalon boot)             Float Heels off Bed with Pillows           Respiratory RA  Silicone O2 tubing         Gray Foam Ear protectors     Cannula fixation Device (Tender )          High flow offloading Clip    Elastic head band offloading device      Anchorfast                                                         Trach with Optifoam split foam             Containment/Moisture Prevention using BSC    Rectal tube or BMS    Purwick/Condom Cath        Vargas Catheter    Barrier wipes           Barrier paste       Antifungal tx      Interdry        Mobilization       Up to chair        Ambulate   reports with pain to LLE   PT/OT      Nutrition       Dietician        Diabetes Education      PO   x  TF     TPN     NPO   # days     Other        Anticipated discharge plans:   LTACH:        SNF/Rehab:                  Home Health Care:           Outpatient Wound Center:   needs to resume, referral placed  Self/Family Care:        Other:                  Vac Discharge Needs:   Not Applicable Pt not on a wound vac:   x    Regular Vac while inpatient, alternative dressing at DC:        Regular Vac in use and continued at DC:            Reg. Vac w/ Skin Sub/Biologic in use. Will need to be changed 2x wkly:      Veraflo Vac while inpatient, ok to transition to Regular Vac on Discharge:           Veraflo Vac while inpatient, will need to remain on Veraflo Vac upon discharge:

## 2023-01-06 NOTE — CARE PLAN
The patient is Stable - Low risk of patient condition declining or worsening    Shift Goals  Clinical Goals: Pain control  Patient Goals: REst and go home  Family Goals: N/A    Progress made toward(s) clinical / shift goals:    Problem: Knowledge Deficit - Standard  Goal: Patient and family/care givers will demonstrate understanding of plan of care, disease process/condition, diagnostic tests and medications  Outcome: Progressing     Problem: Hemodynamics  Goal: Patient's hemodynamics, fluid balance and neurologic status will be stable or improve  Outcome: Progressing     Problem: Fluid Volume  Goal: Fluid volume balance will be maintained  Outcome: Progressing     Problem: Urinary - Renal Perfusion  Goal: Ability to achieve and maintain adequate renal perfusion and functioning will improve  Outcome: Progressing     Problem: Respiratory  Goal: Patient will achieve/maintain optimum respiratory ventilation and gas exchange  Outcome: Progressing     Problem: Mechanical Ventilation  Goal: Safe management of artificial airway and ventilation  Outcome: Progressing  Goal: Successful weaning off mechanical ventilator, spontaneously maintains adequate gas exchange  Outcome: Progressing  Goal: Patient will be able to express needs and understand communication  Outcome: Progressing     Problem: Physical Regulation  Goal: Diagnostic test results will improve  Outcome: Progressing  Goal: Signs and symptoms of infection will decrease  Outcome: Progressing     Problem: Pain - Standard  Goal: Alleviation of pain or a reduction in pain to the patient’s comfort goal  Outcome: Progressing       Patient is not progressing towards the following goals:

## 2023-01-06 NOTE — PROGRESS NOTES
[facility] Medicine Daily Progress Note    Date of Service  1/7/2023    Chief Complaint  Thomas Borja is a 62 y.o. male admitted 1/3/2023 with leg pain and swelling.    Hospital Course  Thomas Borja is a 62 y.o. male with past medical history of hypertension, diabetes, MVR, recurrent LLE cellulitis  who presented 1/3/2023 with LLE cellulitis failing outpatient antibiotic therapy.      Patient was recently discharged on 12/4/2022 for sepsis secondary to left lower extremity cellulitis, during hospital stay, wound cultures grew strep pyogenes group A and MSSA and he was treated with cefazolin, completing a 10-day course of antibiotics, discharged to follow-up with wound care.  Hospital course was complicated for KENRICK, requiring need for dialysis with later improvement of renal function.  Patient states since discharge, patient followed up with wound care and because of residual erythema and swelling and purulent lesion to anterior shin, patient was prescribed another 7-day course of linezolid that he finished on 12/30/2022.  Patient states over the course of last week, he noticed new development of draining lesions to his left lower extremity localized to the posterior calf and anterior shin, outpatient antibiotics did not help.  Cellulitis associated with pain and norco helping, denies any sensory/motor deficits and does have neuropathy at baseline.  Denies any fever, chills, chest pain, shortness of breath, abdominal pain, nausea, vomiting.    Patient admitted overnight for worsening left leg cellulitis. He denies fever, chills, but reports pustular drainage from left leg now. He completed his course of outpatient antibiotics, most recently linezolid.  Patient likely with underlying abscesses given level of Q tip penetration at bedside by wound nurse and swelling of cellulitis area.  Start ancef, continue linezolid, although prior cultures grew group A strep and MSSA. Repeat wound and blood cultures.  1/3 MRSA nares negative, DC linezolid. Surgery consulted for which patient underwent left leg abscess incision and drain on 1/5/2023.     Interval Problem Update  Status post left leg abscess incision and drainage on 1/5/2023  Patient was evaluated bedside  Marked improvement with left lower extremity pain  Stable vitals and labs  1/3 blood cultures with no growth to date  1/3 urine culture no growth to date  1/4 left leg wound cultures growing Enterobacter cloacae which is resistant to ampicillin/sulbactam, cefazolin, and cefuroxime   1/5 left leg OR cultures with no growth to date  Discontinue Ancef, patient allergic to beta-lactam's  Switch to Ciprofloxacin  General surgery following, appreciate recommendations  Wound care following for wound VAC  Labs on AM    I have discussed this patient's plan of care and discharge plan at IDT rounds today with Case Management, Nursing, Nursing leadership, and other members of the IDT team.    Consultants/Specialty  general surgery    Code Status  Full Code    Disposition  Patient is not medically cleared for discharge.   Anticipate discharge to to home with close outpatient follow-up.  I have placed the appropriate orders for post-discharge needs.    Review of Systems  Review of Systems   Constitutional:  Negative for chills, fever and malaise/fatigue.   Eyes:  Negative for blurred vision and pain.   Respiratory:  Negative for cough and shortness of breath.    Cardiovascular:  Negative for chest pain, palpitations and leg swelling.   Gastrointestinal:  Negative for abdominal pain, nausea and vomiting.   Genitourinary:  Negative for dysuria and urgency.   Musculoskeletal:  Negative for back pain and falls.   Skin:  Negative for itching and rash.   Neurological:  Negative for dizziness, sensory change, focal weakness, loss of consciousness and headaches.   Psychiatric/Behavioral:  Negative for substance abuse. The patient does not have insomnia.       Physical Exam  Temp:   [36.4 °C (97.5 °F)-37.2 °C (98.9 °F)] 36.6 °C (97.9 °F)  Pulse:  [] 90  Resp:  [18-19] 18  BP: (126-147)/(69-80) 147/80  SpO2:  [93 %-94 %] 94 %    Physical Exam  Constitutional:       General: He is not in acute distress.     Appearance: He is not ill-appearing.   HENT:      Head: Normocephalic and atraumatic.      Right Ear: External ear normal.      Left Ear: External ear normal.      Mouth/Throat:      Pharynx: No oropharyngeal exudate or posterior oropharyngeal erythema.   Eyes:      Extraocular Movements: Extraocular movements intact.      Pupils: Pupils are equal, round, and reactive to light.   Cardiovascular:      Rate and Rhythm: Normal rate and regular rhythm.      Pulses: Normal pulses.      Heart sounds: Normal heart sounds.   Pulmonary:      Effort: Pulmonary effort is normal. No respiratory distress.      Breath sounds: Normal breath sounds. No wheezing.   Abdominal:      General: Bowel sounds are normal. There is no distension.      Palpations: Abdomen is soft.      Tenderness: There is no abdominal tenderness. There is no guarding.   Musculoskeletal:         General: Swelling present. No tenderness.      Cervical back: Normal range of motion and neck supple.      Right lower leg: No edema.      Left lower leg: Edema present.   Skin:     General: Skin is warm and dry.      Findings: Erythema, lesion and rash present.      Comments: Left anterior and medial shin with surgical wrapping and wound vac   Neurological:      General: No focal deficit present.      Mental Status: He is oriented to person, place, and time.      Cranial Nerves: No cranial nerve deficit.      Sensory: No sensory deficit.      Motor: No weakness.   Psychiatric:         Mood and Affect: Mood normal.         Behavior: Behavior normal.       Fluids    Intake/Output Summary (Last 24 hours) at 1/7/2023 1153  Last data filed at 1/7/2023 1009  Gross per 24 hour   Intake 480 ml   Output --   Net 480 ml         Laboratory  Recent  Labs     01/06/23  0707   WBC 10.5   RBC 3.95*   HEMOGLOBIN 13.0*   HEMATOCRIT 37.1*   MCV 93.9   MCH 32.9   MCHC 35.0   RDW 43.4   PLATELETCT 389   MPV 8.3*     Recent Labs     01/06/23  0707   SODIUM 135   POTASSIUM 4.6   CHLORIDE 99   CO2 26   GLUCOSE 168*   BUN 16   CREATININE 1.20   CALCIUM 9.1                     Imaging  US-EXTREMITY VENOUS LOWER UNILAT LEFT   Final Result      DX-CHEST-PORTABLE (1 VIEW)   Final Result      No acute cardiac or pulmonary abnormalities are identified.           Assessment/Plan  * Leg abscess- (present on admission)  Assessment & Plan  Status post left leg abscess incision and drainage on 1/5/2023  1/3 blood cultures with no growth to date  1/3 urine culture no growth to date  1/4 left leg wound cultures growing Enterobacter cloacae which is resistant to ampicillin/sulbactam, cefazolin, and cefuroxime   1/5 left leg OR cultures with no growth to date  Discontinue Ancef, patient allergic to beta-lactam's  Switch to Ciprofloxacin  General surgery following, appreciate recommendations  Wound care following for wound VAC    LLE Cellulitis with purulent drainage- (present on admission)  Assessment & Plan  Recently discharged on 12/4/2022 for sepsis secondary to left lower extremity cellulitis, during hospital stay, wound cultures grew strep pyogenes group A and MSSA and he was treated with cefazolin, completing a 10-day course of antibiotics, discharged to follow-up with wound care. Was prescribed outpt linezolid for persistent erythema, no response, and pt noted development of new draining lesions and areas of fluctuance.   - US LLE neg for DVT  Continue ancef and linezolid; although prior culture grew group A strep and MSSA  Repeat blood and wound cultures  - MRSA nares pending.   - Norco PRN for pain control  - wound care consult  Surgery consulted for possible I+D    Sepsis due to cellulitis (HCC)- (present on admission)  Assessment & Plan  This is Sepsis Present on  admission  SIRS criteria identified on my evaluation include: Tachycardia, with heart rate greater than 90 BPM, Tachypnea, with respirations greater than 20 per minute and Leukocytosis, with WBC greater than 12,000  Source is LLE cellulitis  Sepsis protocol initiated  Fluid resuscitation ordered per protocol  Crystalloid Fluid Administration: given fluid in ED  IV antibiotics as appropriate for source of sepsis  Reassessment: I have reassessed the patient's hemodynamic status    Lactic acidosis present on admission, downtrended.    Uncomplicated type 2 diabetes mellitus (HCC)- (present on admission)  Assessment & Plan  Hold home glipizide. Metformin was held on recent hospital discharge due to need for short term dialysis need for KENRICK with subsequent improvement of renal function.   - sliding scale insulin   - hypoglycemia protocol    Benign essential hypertension- (present on admission)  Assessment & Plan  Continue home amlodipine         VTE prophylaxis: SCDs/TEDs    I have performed a physical exam and reviewed and updated ROS and Plan today (1/7/2023). In review of yesterday's note (1/6/2023), there are no changes except as documented above.

## 2023-01-06 NOTE — DOCUMENTATION QUERY
"                                                                         [facility]                                                                       Query Response Note      PATIENT:               STEPHANE JAMISON  ACCT #:                  [id number]  MRN:                     [id number]  :                      1960  ADMIT DATE:       1/3/2023 5:06 PM  DISCH DATE:          RESPONDING  PROVIDER #:        [id number]           QUERY TEXT:    Debridement is documented in the Operative Report on . Can the type of debridement be specified?    NOTE:  If an appropriate response is not listed below, please respond with a new note.    The patient's Clinical Indicators include:  \"Incision was made about 10 cm in length.  This was carried down to the level of fascia. There was some necrotic subcutaneous tissues which were debrided with a curette scissors and abrasive gauze\" is documented in the OP-Report on .     Treatments include: Ancef, Zyvox, and I&D.    Risk factors include: dx Sepsis 2/2 LLE Cellulitis and Abscesses, dx Acidosis, and hx DM II.    Thank you,  Raffi Gerardo RN, BSN  Clinical   Connect via [place]  Options provided:   -- Non-excisional debridement   -- Excisional debridement   -- Other explanation, Please specify   -- Unable to determine      Query created by: Raffi Gerardo on 2023 8:51 AM    RESPONSE TEXT:    Excisional debridement          Electronically signed by:  BREANNA DÍAZ MD 2023 10:53 AM              "

## 2023-01-06 NOTE — ASSESSMENT & PLAN NOTE
1/5 I&D left leg.  Ancef.  Cultures (+) Enterobacter cloacae  1/6 transitioned to Cipro   ACS Blue.

## 2023-01-06 NOTE — DISCHARGE PLANNING
Case Management Discharge Planning    Admission Date: 1/3/2023  GMLOS: 5.1  ALOS: 3    6-Clicks ADL Score: 23  6-Clicks Mobility Score: 24      Anticipated Discharge Dispo:  d/c home w/ wound vac from [facility]/    DME Needed: Yes    DME Ordered: Yes    Action(s) Taken: Updated Provider/Nurse on Discharge Plan,  Lsw spoke to Rosangela w/ [facility]/. SLw acquired pt's signature for referral/order. Lsw provided documents needed to process referral.    Escalations Completed: Provider    Medically Clear: No    Next Steps: Lsw will continue to follow, and assist w/ d/c planning as needed.     Barriers to Discharge: Medical clearance and DME    Is the patient up for discharge tomorrow: No

## 2023-01-06 NOTE — PROGRESS NOTES
DATE: 1/6/2023    Post Operative Day  1 incision and drainage of leg abscesses.    INTERVAL EVENTS:  Cultures prelim (+) - Enterobacter cloacae complex   Transitioned to Cipro   Wound vac functioning     PHYSICAL EXAMINATION:  Vital Signs: /81   Pulse 99   Temp 37 °C (98.6 °F) (Temporal)   Resp 18   Ht 1.829 m (6')   Wt (!) 134 kg (295 lb 3.1 oz)   SpO2 94%   A&O x 4  LLE with wound vac functioning   Leg shiny and jaron   (+) PP  Minimal expected tenderness    LABORATORY VALUES:   Recent Labs     01/03/23  1549 01/04/23  0305 01/06/23  0707   WBC 11.3* 8.8 10.5   RBC 4.03* 3.90* 3.95*   HEMOGLOBIN 13.3* 12.7* 13.0*   HEMATOCRIT 37.6* 37.1* 37.1*   MCV 93.3 95.1 93.9   MCH 33.0 32.6 32.9   MCHC 35.4* 34.2 35.0   RDW 42.9 44.0 43.4   PLATELETCT 436 400 389   MPV 8.1* 8.1* 8.3*     Recent Labs     01/03/23  1549 01/04/23  0305 01/06/23  0707   SODIUM 132* 136 135   POTASSIUM 3.9 3.9 4.6   CHLORIDE 99 101 99   CO2 21 26 26   GLUCOSE 230* 144* 168*   BUN 19 18 16   CREATININE 1.10 1.27 1.20   CALCIUM 9.1 8.7 9.1     Recent Labs     01/03/23  1549 01/04/23  0305   ASTSGOT 12 12   ALTSGPT 14 14   TBILIRUBIN 0.5 0.5   ALKPHOSPHAT 116* 108*   GLOBULIN 4.4* 4.1*   INR 1.03  --      Recent Labs     01/03/23  1549   INR 1.03        IMAGING:   US-EXTREMITY VENOUS LOWER UNILAT LEFT   Final Result      DX-CHEST-PORTABLE (1 VIEW)   Final Result      No acute cardiac or pulmonary abnormalities are identified.          ASSESSMENT AND PLAN:   LLE Cellulitis with purulent drainage- (present on admission)  Assessment & Plan  1/5 I&D left leg.  Ancef.  Cultures pending.  ACS Blue.    Wound care consult  Will need vac for sometime  ABX per medicine  Surgery signing off  Please contact ACS [facility] on service for any questions of issues        ____________________________________     WES Medina    DD: 1/6/2023  3:20 PM

## 2023-01-06 NOTE — WOUND TEAM
Assisted wound RN Alejandra with VAC change. This RN performed non-selective debridement with wound cleanser and gauze. Please see Alejandra's note for details.

## 2023-01-06 NOTE — ASSESSMENT & PLAN NOTE
On this admission, surgery was consulted, patient underwent I&D of left leg abscess with wound VAC placement on 01/5/2023.  Cultures are growing Enterobacter cloacae which is sensitive to ciprofloxacin.  Patient was switched to a ciprofloxacin oral antibiotic regimen which he is to conclude on 1/21/2023.

## 2023-01-07 LAB
GLUCOSE BLD STRIP.AUTO-MCNC: 116 MG/DL (ref 65–99)
GLUCOSE BLD STRIP.AUTO-MCNC: 137 MG/DL (ref 65–99)
GLUCOSE BLD STRIP.AUTO-MCNC: 180 MG/DL (ref 65–99)
GLUCOSE BLD STRIP.AUTO-MCNC: 184 MG/DL (ref 65–99)

## 2023-01-07 PROCEDURE — 99233 SBSQ HOSP IP/OBS HIGH 50: CPT | Performed by: STUDENT IN AN ORGANIZED HEALTH CARE EDUCATION/TRAINING PROGRAM

## 2023-01-07 PROCEDURE — A9270 NON-COVERED ITEM OR SERVICE: HCPCS | Performed by: STUDENT IN AN ORGANIZED HEALTH CARE EDUCATION/TRAINING PROGRAM

## 2023-01-07 PROCEDURE — 700102 HCHG RX REV CODE 250 W/ 637 OVERRIDE(OP): Performed by: STUDENT IN AN ORGANIZED HEALTH CARE EDUCATION/TRAINING PROGRAM

## 2023-01-07 PROCEDURE — 82962 GLUCOSE BLOOD TEST: CPT | Mod: 91

## 2023-01-07 PROCEDURE — 770006 HCHG ROOM/CARE - MED/SURG/GYN SEMI*

## 2023-01-07 PROCEDURE — 700111 HCHG RX REV CODE 636 W/ 250 OVERRIDE (IP): Performed by: STUDENT IN AN ORGANIZED HEALTH CARE EDUCATION/TRAINING PROGRAM

## 2023-01-07 PROCEDURE — 700102 HCHG RX REV CODE 250 W/ 637 OVERRIDE(OP)

## 2023-01-07 PROCEDURE — A9270 NON-COVERED ITEM OR SERVICE: HCPCS

## 2023-01-07 RX ORDER — HYDROMORPHONE HYDROCHLORIDE 1 MG/ML
1 INJECTION, SOLUTION INTRAMUSCULAR; INTRAVENOUS; SUBCUTANEOUS EVERY 4 HOURS PRN
Status: DISCONTINUED | OUTPATIENT
Start: 2023-01-07 | End: 2023-01-10

## 2023-01-07 RX ORDER — CIPROFLOXACIN 500 MG/1
500 TABLET, FILM COATED ORAL EVERY 12 HOURS
Status: DISCONTINUED | OUTPATIENT
Start: 2023-01-07 | End: 2023-01-13 | Stop reason: HOSPADM

## 2023-01-07 RX ADMIN — PREGABALIN 50 MG: 25 CAPSULE ORAL at 17:22

## 2023-01-07 RX ADMIN — AMLODIPINE BESYLATE 10 MG: 10 TABLET ORAL at 06:20

## 2023-01-07 RX ADMIN — LEVOTHYROXINE SODIUM 112 MCG: 0.11 TABLET ORAL at 06:21

## 2023-01-07 RX ADMIN — FINASTERIDE 5 MG: 5 TABLET, FILM COATED ORAL at 06:20

## 2023-01-07 RX ADMIN — PREGABALIN 50 MG: 25 CAPSULE ORAL at 06:20

## 2023-01-07 RX ADMIN — OMEPRAZOLE 40 MG: 20 CAPSULE, DELAYED RELEASE ORAL at 06:20

## 2023-01-07 RX ADMIN — HYDROMORPHONE HYDROCHLORIDE 1 MG: 1 INJECTION, SOLUTION INTRAMUSCULAR; INTRAVENOUS; SUBCUTANEOUS at 10:09

## 2023-01-07 RX ADMIN — PROMETHAZINE HYDROCHLORIDE 25 MG: 25 TABLET ORAL at 02:27

## 2023-01-07 RX ADMIN — HYDROCODONE BITARTRATE AND ACETAMINOPHEN 1 TABLET: 5; 325 TABLET ORAL at 02:27

## 2023-01-07 RX ADMIN — CIPROFLOXACIN 500 MG: 500 TABLET, FILM COATED ORAL at 17:23

## 2023-01-07 RX ADMIN — TAMSULOSIN HYDROCHLORIDE 0.4 MG: 0.4 CAPSULE ORAL at 06:21

## 2023-01-07 RX ADMIN — CIPROFLOXACIN 500 MG: 500 TABLET, FILM COATED ORAL at 06:21

## 2023-01-07 RX ADMIN — HYDROMORPHONE HYDROCHLORIDE 1 MG: 1 INJECTION, SOLUTION INTRAMUSCULAR; INTRAVENOUS; SUBCUTANEOUS at 16:18

## 2023-01-07 RX ADMIN — INSULIN HUMAN 1 UNITS: 100 INJECTION, SOLUTION PARENTERAL at 21:12

## 2023-01-07 RX ADMIN — ROSUVASTATIN CALCIUM 20 MG: 20 TABLET, FILM COATED ORAL at 06:20

## 2023-01-07 RX ADMIN — HYDROMORPHONE HYDROCHLORIDE 1 MG: 1 INJECTION, SOLUTION INTRAMUSCULAR; INTRAVENOUS; SUBCUTANEOUS at 22:18

## 2023-01-07 RX ADMIN — INSULIN HUMAN 1 UNITS: 100 INJECTION, SOLUTION PARENTERAL at 16:58

## 2023-01-07 ASSESSMENT — ENCOUNTER SYMPTOMS
VOMITING: 0
CHILLS: 0
SHORTNESS OF BREATH: 0
BLURRED VISION: 0
NAUSEA: 0
DIZZINESS: 0
INSOMNIA: 0
FALLS: 0
LOSS OF CONSCIOUSNESS: 0
EYE PAIN: 0
HEADACHES: 0
SENSORY CHANGE: 0
COUGH: 0
FOCAL WEAKNESS: 0
PALPITATIONS: 0
BACK PAIN: 0
ABDOMINAL PAIN: 0
FEVER: 0

## 2023-01-07 ASSESSMENT — PAIN DESCRIPTION - PAIN TYPE
TYPE: ACUTE PAIN

## 2023-01-07 ASSESSMENT — PAIN SCALES - WONG BAKER: WONGBAKER_NUMERICALRESPONSE: HURTS JUST A LITTLE BIT

## 2023-01-07 ASSESSMENT — LIFESTYLE VARIABLES: SUBSTANCE_ABUSE: 0

## 2023-01-07 NOTE — PROGRESS NOTES
[facility] Medicine Daily Progress Note    Date of Service  1/7/2023    Chief Complaint  Thomas Borja is a 62 y.o. male admitted 1/3/2023 with leg pain and swelling.    Hospital Course  Thomas Borja is a 62 y.o. male with past medical history of hypertension, diabetes, MVR, recurrent LLE cellulitis  who presented 1/3/2023 with LLE cellulitis failing outpatient antibiotic therapy.      Patient was recently discharged on 12/4/2022 for sepsis secondary to left lower extremity cellulitis, during hospital stay, wound cultures grew strep pyogenes group A and MSSA and he was treated with cefazolin, completing a 10-day course of antibiotics, discharged to follow-up with wound care.  Hospital course was complicated for KENRICK, requiring need for dialysis with later improvement of renal function.  Patient states since discharge, patient followed up with wound care and because of residual erythema and swelling and purulent lesion to anterior shin, patient was prescribed another 7-day course of linezolid that he finished on 12/30/2022.  Patient states over the course of last week, he noticed new development of draining lesions to his left lower extremity localized to the posterior calf and anterior shin, outpatient antibiotics did not help.  Cellulitis associated with pain and norco helping, denies any sensory/motor deficits and does have neuropathy at baseline.  Denies any fever, chills, chest pain, shortness of breath, abdominal pain, nausea, vomiting.    Patient admitted overnight for worsening left leg cellulitis. He denies fever, chills, but reports pustular drainage from left leg now. He completed his course of outpatient antibiotics, most recently linezolid.  Patient likely with underlying abscesses given level of Q tip penetration at bedside by wound nurse and swelling of cellulitis area.  Start ancef, continue linezolid, although prior cultures grew group A strep and MSSA. Repeat wound and blood cultures.  1/3 MRSA nares negative, DC linezolid. Surgery consulted for which patient underwent left leg abscess incision and drain on 1/5/2023.     Interval Problem Update  Status post left leg abscess incision and drainage on 1/5/2023  Patient was evaluated bedside  Marked improvement with left lower extremity pain  Stable vitals and labs  1/3 blood cultures with no growth to date  1/3 urine culture no growth to date  1/4 left leg wound cultures growing Enterobacter cloacae which is resistant to ampicillin/sulbactam, cefazolin, and cefuroxime   1/5 left leg OR cultures with no growth to date  Continue oral Ciprofloxacin, end on 1/21/2023  General surgery following, appreciate recommendations  Wound care following for wound VAC    I have discussed this patient's plan of care and discharge plan at IDT rounds today with Case Management, Nursing, Nursing leadership, and other members of the IDT team.    Consultants/Specialty  general surgery    Code Status  Full Code    Disposition  Patient is not medically cleared for discharge.   Anticipate discharge to to home with close outpatient follow-up.  I have placed the appropriate orders for post-discharge needs.    Review of Systems  Review of Systems   Constitutional:  Negative for chills, fever and malaise/fatigue.   Eyes:  Negative for blurred vision and pain.   Respiratory:  Negative for cough and shortness of breath.    Cardiovascular:  Negative for chest pain, palpitations and leg swelling.   Gastrointestinal:  Negative for abdominal pain, nausea and vomiting.   Genitourinary:  Negative for dysuria and urgency.   Musculoskeletal:  Negative for back pain and falls.   Skin:  Negative for itching and rash.   Neurological:  Negative for dizziness, sensory change, focal weakness, loss of consciousness and headaches.   Psychiatric/Behavioral:  Negative for substance abuse. The patient does not have insomnia.       Physical Exam  Temp:  [36.4 °C (97.5 °F)-37.2 °C (98.9 °F)] 36.6 °C  (97.9 °F)  Pulse:  [] 90  Resp:  [18-19] 18  BP: (126-147)/(69-80) 147/80  SpO2:  [93 %-94 %] 94 %    Physical Exam  Constitutional:       General: He is not in acute distress.     Appearance: He is not ill-appearing.   HENT:      Head: Normocephalic and atraumatic.      Right Ear: External ear normal.      Left Ear: External ear normal.      Mouth/Throat:      Pharynx: No oropharyngeal exudate or posterior oropharyngeal erythema.   Eyes:      Extraocular Movements: Extraocular movements intact.      Pupils: Pupils are equal, round, and reactive to light.   Cardiovascular:      Rate and Rhythm: Normal rate and regular rhythm.      Pulses: Normal pulses.      Heart sounds: Normal heart sounds.   Pulmonary:      Effort: Pulmonary effort is normal. No respiratory distress.      Breath sounds: Normal breath sounds. No wheezing.   Abdominal:      General: Bowel sounds are normal. There is no distension.      Palpations: Abdomen is soft.      Tenderness: There is no abdominal tenderness. There is no guarding.   Musculoskeletal:         General: Swelling present. No tenderness.      Cervical back: Normal range of motion and neck supple.      Right lower leg: No edema.      Left lower leg: Edema present.   Skin:     General: Skin is warm and dry.      Findings: Erythema, lesion and rash present.      Comments: Left anterior and medial shin with surgical wrapping and wound vac   Neurological:      General: No focal deficit present.      Mental Status: He is oriented to person, place, and time.      Cranial Nerves: No cranial nerve deficit.      Sensory: No sensory deficit.      Motor: No weakness.   Psychiatric:         Mood and Affect: Mood normal.         Behavior: Behavior normal.       Fluids    Intake/Output Summary (Last 24 hours) at 1/7/2023 1248  Last data filed at 1/7/2023 1009  Gross per 24 hour   Intake 480 ml   Output --   Net 480 ml         Laboratory  Recent Labs     01/06/23  0707   WBC 10.5   RBC 3.95*    HEMOGLOBIN 13.0*   HEMATOCRIT 37.1*   MCV 93.9   MCH 32.9   MCHC 35.0   RDW 43.4   PLATELETCT 389   MPV 8.3*     Recent Labs     01/06/23  0707   SODIUM 135   POTASSIUM 4.6   CHLORIDE 99   CO2 26   GLUCOSE 168*   BUN 16   CREATININE 1.20   CALCIUM 9.1                     Imaging  US-EXTREMITY VENOUS LOWER UNILAT LEFT   Final Result      DX-CHEST-PORTABLE (1 VIEW)   Final Result      No acute cardiac or pulmonary abnormalities are identified.           Assessment/Plan  * Leg abscess- (present on admission)  Assessment & Plan  Status post left leg abscess incision and drainage on 1/5/2023  1/3 blood cultures with no growth to date  1/3 urine culture no growth to date  1/4 left leg wound cultures growing Enterobacter cloacae which is resistant to ampicillin/sulbactam, cefazolin, and cefuroxime   1/5 left leg OR cultures with no growth to date  Continue oral Ciprofloxacin, end on 1/21/2023  Wound care following for wound VAC    LLE Cellulitis with purulent drainage- (present on admission)  Assessment & Plan  Recently discharged on 12/4/2022 for sepsis secondary to left lower extremity cellulitis, during hospital stay, wound cultures grew strep pyogenes group A and MSSA and he was treated with cefazolin, completing a 10-day course of antibiotics, discharged to follow-up with wound care. Was prescribed outpt linezolid for persistent erythema, no response, and pt noted development of new draining lesions and areas of fluctuance.   - US LLE neg for DVT  Continue ancef and linezolid; although prior culture grew group A strep and MSSA  Repeat blood and wound cultures  - MRSA nares pending.   - Norco PRN for pain control  - wound care consult  Surgery consulted for possible I+D    Sepsis due to cellulitis (HCC)- (present on admission)  Assessment & Plan  This is Sepsis Present on admission  SIRS criteria identified on my evaluation include: Tachycardia, with heart rate greater than 90 BPM, Tachypnea, with respirations  greater than 20 per minute and Leukocytosis, with WBC greater than 12,000  Source is LLE cellulitis  Sepsis protocol initiated  Fluid resuscitation ordered per protocol  Crystalloid Fluid Administration: given fluid in ED  IV antibiotics as appropriate for source of sepsis  Reassessment: I have reassessed the patient's hemodynamic status    Lactic acidosis present on admission, downtrended.    Uncomplicated type 2 diabetes mellitus (HCC)- (present on admission)  Assessment & Plan  Hold home glipizide. Metformin was held on recent hospital discharge due to need for short term dialysis need for KENRICK with subsequent improvement of renal function.   - sliding scale insulin   - hypoglycemia protocol    Benign essential hypertension- (present on admission)  Assessment & Plan  Continue home amlodipine         VTE prophylaxis: SCDs/TEDs    I have performed a physical exam and reviewed and updated ROS and Plan today (1/7/2023). In review of yesterday's note (1/6/2023), there are no changes except as documented above.

## 2023-01-07 NOTE — DIETARY
Nutrition Services: Update   Day 4 of admit.  Thomas Borja is a 62 y.o. male with admitting DX of Sepsis due to cellulitis.     Pt is currently on Consistent CHO diet with overall adequate intake. Per NR pt requesting Boost Glucose Control with meals, pt with noted diabetic ulcers and lower extremity wounds requiring wound VAC, nutritional supplements indicated to bolster nutrition and aid in wound healing while in acute care. RD to add Boost Glucose Control BID.     RD available PRN

## 2023-01-07 NOTE — CARE PLAN
The patient is Stable - Low risk of patient condition declining or worsening    Shift Goals  Clinical Goals: wound vac placement  Patient Goals: pain control  Family Goals: N/A    Progress made toward(s) clinical / shift goals:    Problem: Knowledge Deficit - Standard  Goal: Patient and family/care givers will demonstrate understanding of plan of care, disease process/condition, diagnostic tests and medications  Outcome: Progressing     Problem: Hemodynamics  Goal: Patient's hemodynamics, fluid balance and neurologic status will be stable or improve  Outcome: Progressing     Problem: Fluid Volume  Goal: Fluid volume balance will be maintained  Outcome: Progressing     Problem: Urinary - Renal Perfusion  Goal: Ability to achieve and maintain adequate renal perfusion and functioning will improve  Outcome: Progressing       Patient is not progressing towards the following goals:

## 2023-01-08 LAB
BACTERIA BLD CULT: NORMAL
BACTERIA BLD CULT: NORMAL
BACTERIA WND AEROBE CULT: NORMAL
GLUCOSE BLD STRIP.AUTO-MCNC: 135 MG/DL (ref 65–99)
GLUCOSE BLD STRIP.AUTO-MCNC: 149 MG/DL (ref 65–99)
GLUCOSE BLD STRIP.AUTO-MCNC: 159 MG/DL (ref 65–99)
GLUCOSE BLD STRIP.AUTO-MCNC: 162 MG/DL (ref 65–99)
GRAM STN SPEC: NORMAL
SIGNIFICANT IND 70042: NORMAL
SITE SITE: NORMAL
SOURCE SOURCE: NORMAL

## 2023-01-08 PROCEDURE — A9270 NON-COVERED ITEM OR SERVICE: HCPCS

## 2023-01-08 PROCEDURE — A9270 NON-COVERED ITEM OR SERVICE: HCPCS | Performed by: STUDENT IN AN ORGANIZED HEALTH CARE EDUCATION/TRAINING PROGRAM

## 2023-01-08 PROCEDURE — 302098 PASTE RING (FLAT): Performed by: STUDENT IN AN ORGANIZED HEALTH CARE EDUCATION/TRAINING PROGRAM

## 2023-01-08 PROCEDURE — 700102 HCHG RX REV CODE 250 W/ 637 OVERRIDE(OP): Performed by: STUDENT IN AN ORGANIZED HEALTH CARE EDUCATION/TRAINING PROGRAM

## 2023-01-08 PROCEDURE — 770006 HCHG ROOM/CARE - MED/SURG/GYN SEMI*

## 2023-01-08 PROCEDURE — 99232 SBSQ HOSP IP/OBS MODERATE 35: CPT | Performed by: STUDENT IN AN ORGANIZED HEALTH CARE EDUCATION/TRAINING PROGRAM

## 2023-01-08 PROCEDURE — 82962 GLUCOSE BLOOD TEST: CPT

## 2023-01-08 PROCEDURE — 700102 HCHG RX REV CODE 250 W/ 637 OVERRIDE(OP)

## 2023-01-08 PROCEDURE — 700111 HCHG RX REV CODE 636 W/ 250 OVERRIDE (IP): Performed by: STUDENT IN AN ORGANIZED HEALTH CARE EDUCATION/TRAINING PROGRAM

## 2023-01-08 RX ADMIN — INSULIN HUMAN 1 UNITS: 100 INJECTION, SOLUTION PARENTERAL at 17:12

## 2023-01-08 RX ADMIN — ROSUVASTATIN CALCIUM 20 MG: 20 TABLET, FILM COATED ORAL at 04:35

## 2023-01-08 RX ADMIN — PREGABALIN 50 MG: 25 CAPSULE ORAL at 04:34

## 2023-01-08 RX ADMIN — CIPROFLOXACIN 500 MG: 500 TABLET, FILM COATED ORAL at 04:35

## 2023-01-08 RX ADMIN — PROMETHAZINE HYDROCHLORIDE 25 MG: 25 TABLET ORAL at 21:09

## 2023-01-08 RX ADMIN — TAMSULOSIN HYDROCHLORIDE 0.4 MG: 0.4 CAPSULE ORAL at 04:34

## 2023-01-08 RX ADMIN — LEVOTHYROXINE SODIUM 112 MCG: 0.11 TABLET ORAL at 04:35

## 2023-01-08 RX ADMIN — HYDROCODONE BITARTRATE AND ACETAMINOPHEN 1 TABLET: 5; 325 TABLET ORAL at 21:08

## 2023-01-08 RX ADMIN — FINASTERIDE 5 MG: 5 TABLET, FILM COATED ORAL at 04:35

## 2023-01-08 RX ADMIN — HYDROMORPHONE HYDROCHLORIDE 1 MG: 1 INJECTION, SOLUTION INTRAMUSCULAR; INTRAVENOUS; SUBCUTANEOUS at 15:55

## 2023-01-08 RX ADMIN — DOCUSATE SODIUM 50 MG AND SENNOSIDES 8.6 MG 2 TABLET: 8.6; 5 TABLET, FILM COATED ORAL at 04:34

## 2023-01-08 RX ADMIN — CIPROFLOXACIN 500 MG: 500 TABLET, FILM COATED ORAL at 17:41

## 2023-01-08 RX ADMIN — OMEPRAZOLE 40 MG: 20 CAPSULE, DELAYED RELEASE ORAL at 04:34

## 2023-01-08 RX ADMIN — DOCUSATE SODIUM 50 MG AND SENNOSIDES 8.6 MG 1 TABLET: 8.6; 5 TABLET, FILM COATED ORAL at 17:42

## 2023-01-08 RX ADMIN — AMLODIPINE BESYLATE 10 MG: 10 TABLET ORAL at 04:35

## 2023-01-08 RX ADMIN — PREGABALIN 50 MG: 25 CAPSULE ORAL at 17:41

## 2023-01-08 RX ADMIN — HYDROMORPHONE HYDROCHLORIDE 1 MG: 1 INJECTION, SOLUTION INTRAMUSCULAR; INTRAVENOUS; SUBCUTANEOUS at 04:33

## 2023-01-08 ASSESSMENT — PAIN DESCRIPTION - PAIN TYPE
TYPE: ACUTE PAIN

## 2023-01-08 ASSESSMENT — ENCOUNTER SYMPTOMS
NAUSEA: 0
SHORTNESS OF BREATH: 0
EYE PAIN: 0
BLURRED VISION: 0
SENSORY CHANGE: 0
HEADACHES: 0
CHILLS: 0
FEVER: 0
DIZZINESS: 0
FOCAL WEAKNESS: 0
LOSS OF CONSCIOUSNESS: 0
INSOMNIA: 0
PALPITATIONS: 0
FALLS: 0
VOMITING: 0
ABDOMINAL PAIN: 0
BACK PAIN: 0
COUGH: 0

## 2023-01-08 ASSESSMENT — LIFESTYLE VARIABLES: SUBSTANCE_ABUSE: 0

## 2023-01-08 NOTE — PROGRESS NOTES
Received report from day shift RN. Patient lying in bed comfortable. Assessment complete. Denies pain at this time. Using bedside urinal, same emptied. Bed locked and in low position. Call bell within reach. Will continue to monitor.

## 2023-01-08 NOTE — CARE PLAN
The patient is Stable - Low risk of patient condition declining or worsening    Shift Goals  Clinical Goals: Wound Vac maintenance  Patient Goals: pain control, rest.  Family Goals: n/a    Progress made toward(s) clinical / shift goals:  Patient will display adherence to therapy treatment. Patient will verbalizes pain score 5/10 or lower.    Problem: Hemodynamics  Goal: Patient's hemodynamics, fluid balance and neurologic status will be stable or improve  Outcome: Progressing     Problem: Pain - Standard  Goal: Alleviation of pain or a reduction in pain to the patient’s comfort goal  Outcome: Progressing       Patient is not progressing towards the following goals:

## 2023-01-08 NOTE — PROGRESS NOTES
Assumed care of patient 0700. Received Report from [facility] nurse. Patient A&Ox4, on RA, Reporting a pain level of 0. Call light within reach, belongings within reach, Fall precautions in place, and bed alarm is on and bed in lowest position. Patient does not have any other needs at this time.

## 2023-01-08 NOTE — CARE PLAN
The patient is Stable - Low risk of patient condition declining or worsening    Shift Goals  Clinical Goals: pain control, wound vac maintenance  Patient Goals: pain control  Family Goals: n/a    Progress made toward(s) clinical / shift goals:    Problem: Knowledge Deficit - Standard  Goal: Patient and family/care givers will demonstrate understanding of plan of care, disease process/condition, diagnostic tests and medications  Outcome: Progressing     Problem: Physical Regulation  Goal: Signs and symptoms of infection will decrease  Outcome: Progressing     Problem: Pain - Standard  Goal: Alleviation of pain or a reduction in pain to the patient’s comfort goal  Outcome: Progressing       Patient is not progressing towards the following goals:

## 2023-01-08 NOTE — PROGRESS NOTES
[facility] Medicine Daily Progress Note    Date of Service  1/8/2023    Chief Complaint  Thomas Borja is a 62 y.o. male admitted 1/3/2023 with leg pain and swelling.    Hospital Course  Thomas Borja is a 62 y.o. male with past medical history of hypertension, diabetes, MVR, recurrent LLE cellulitis  who presented 1/3/2023 with LLE cellulitis failing outpatient antibiotic therapy.      Patient was recently discharged on 12/4/2022 for sepsis secondary to left lower extremity cellulitis, during hospital stay, wound cultures grew strep pyogenes group A and MSSA and he was treated with cefazolin, completing a 10-day course of antibiotics, discharged to follow-up with wound care.  Hospital course was complicated for KENRICK, requiring need for dialysis with later improvement of renal function.  Patient states since discharge, patient followed up with wound care and because of residual erythema and swelling and purulent lesion to anterior shin, patient was prescribed another 7-day course of linezolid that he finished on 12/30/2022.  Patient states over the course of last week, he noticed new development of draining lesions to his left lower extremity localized to the posterior calf and anterior shin, outpatient antibiotics did not help.  Cellulitis associated with pain and norco helping, denies any sensory/motor deficits and does have neuropathy at baseline.  Denies any fever, chills, chest pain, shortness of breath, abdominal pain, nausea, vomiting.    Patient admitted overnight for worsening left leg cellulitis. He denies fever, chills, but reports pustular drainage from left leg now. He completed his course of outpatient antibiotics, most recently linezolid.  Patient likely with underlying abscesses given level of Q tip penetration at bedside by wound nurse and swelling of cellulitis area.  Start ancef, continue linezolid, although prior cultures grew group A strep and MSSA. Repeat wound and blood cultures.  1/3 MRSA nares negative, DC linezolid. Surgery consulted for which patient underwent left leg abscess incision and drain on 1/5/2023.     Interval Problem Update  Status post left leg abscess incision and drainage on 1/5/2023  Patient was evaluated bedside  Marked improvement with left lower extremity pain  Stable vitals and labs  1/3 blood cultures with no growth to date  1/3 urine culture no growth to date  1/4 left leg wound cultures growing Enterobacter cloacae which is resistant to ampicillin/sulbactam, cefazolin, and cefuroxime   1/5 left leg OR cultures with no growth to date  Continue oral Ciprofloxacin, end on 1/21/2023  General surgery following, appreciate recommendations  Wound care following for wound VAC    Patient is medically  Pending LTAC/SNF/wound VAC wound care set up    I have discussed this patient's plan of care and discharge plan at IDT rounds today with Case Management, Nursing, Nursing leadership, and other members of the IDT team.    Consultants/Specialty  general surgery    Code Status  Full Code    Disposition  Patient is not medically cleared for discharge.   Anticipate discharge to to home with close outpatient follow-up.  I have placed the appropriate orders for post-discharge needs.    Review of Systems  Review of Systems   Constitutional:  Negative for chills, fever and malaise/fatigue.   Eyes:  Negative for blurred vision and pain.   Respiratory:  Negative for cough and shortness of breath.    Cardiovascular:  Negative for chest pain, palpitations and leg swelling.   Gastrointestinal:  Negative for abdominal pain, nausea and vomiting.   Genitourinary:  Negative for dysuria and urgency.   Musculoskeletal:  Negative for back pain and falls.   Skin:  Negative for itching and rash.   Neurological:  Negative for dizziness, sensory change, focal weakness, loss of consciousness and headaches.   Psychiatric/Behavioral:  Negative for substance abuse. The patient does not have insomnia.        Physical Exam  Temp:  [36.1 °C (97 °F)-36.8 °C (98.3 °F)] 36.5 °C (97.7 °F)  Pulse:  [] 96  Resp:  [17-18] 18  BP: (127-149)/() 134/78  SpO2:  [93 %-97 %] 97 %    Physical Exam  Constitutional:       General: He is not in acute distress.     Appearance: He is not ill-appearing.   HENT:      Head: Normocephalic and atraumatic.      Right Ear: External ear normal.      Left Ear: External ear normal.      Mouth/Throat:      Pharynx: No oropharyngeal exudate or posterior oropharyngeal erythema.   Eyes:      Extraocular Movements: Extraocular movements intact.      Pupils: Pupils are equal, round, and reactive to light.   Cardiovascular:      Rate and Rhythm: Normal rate and regular rhythm.      Pulses: Normal pulses.      Heart sounds: Normal heart sounds.   Pulmonary:      Effort: Pulmonary effort is normal. No respiratory distress.      Breath sounds: Normal breath sounds. No wheezing.   Abdominal:      General: Bowel sounds are normal. There is no distension.      Palpations: Abdomen is soft.      Tenderness: There is no abdominal tenderness. There is no guarding.   Musculoskeletal:         General: Swelling present. No tenderness.      Cervical back: Normal range of motion and neck supple.      Right lower leg: No edema.      Left lower leg: Edema present.   Skin:     General: Skin is warm and dry.      Findings: Erythema, lesion and rash present.      Comments: Left anterior and medial shin with surgical wrapping and wound vac   Neurological:      General: No focal deficit present.      Mental Status: He is oriented to person, place, and time.      Cranial Nerves: No cranial nerve deficit.      Sensory: No sensory deficit.      Motor: No weakness.   Psychiatric:         Mood and Affect: Mood normal.         Behavior: Behavior normal.       Fluids    Intake/Output Summary (Last 24 hours) at 1/8/2023 1254  Last data filed at 1/8/2023 1038  Gross per 24 hour   Intake 240 ml   Output 2825 ml   Net -2581  ml         Laboratory  Recent Labs     01/06/23  0707   WBC 10.5   RBC 3.95*   HEMOGLOBIN 13.0*   HEMATOCRIT 37.1*   MCV 93.9   MCH 32.9   MCHC 35.0   RDW 43.4   PLATELETCT 389   MPV 8.3*     Recent Labs     01/06/23  0707   SODIUM 135   POTASSIUM 4.6   CHLORIDE 99   CO2 26   GLUCOSE 168*   BUN 16   CREATININE 1.20   CALCIUM 9.1                     Imaging  US-EXTREMITY VENOUS LOWER UNILAT LEFT   Final Result      DX-CHEST-PORTABLE (1 VIEW)   Final Result      No acute cardiac or pulmonary abnormalities are identified.           Assessment/Plan  * Leg abscess- (present on admission)  Assessment & Plan  Status post left leg abscess incision and drainage on 1/5/2023  1/3 blood cultures with no growth to date  1/3 urine culture no growth to date  1/4 left leg wound cultures growing Enterobacter cloacae which is resistant to ampicillin/sulbactam, cefazolin, and cefuroxime   1/5 left leg OR cultures with no growth to date  Continue oral Ciprofloxacin, end on 1/21/2023  Wound care following for wound VAC    LLE Cellulitis with purulent drainage- (present on admission)  Assessment & Plan  Recently discharged on 12/4/2022 for sepsis secondary to left lower extremity cellulitis, during hospital stay, wound cultures grew strep pyogenes group A and MSSA and he was treated with cefazolin, completing a 10-day course of antibiotics, discharged to follow-up with wound care. Was prescribed outpt linezolid for persistent erythema, no response, and pt noted development of new draining lesions and areas of fluctuance.   - US LLE neg for DVT  Continue ancef and linezolid; although prior culture grew group A strep and MSSA  Repeat blood and wound cultures  - MRSA nares pending.   - Norco PRN for pain control  - wound care consult  Surgery consulted for possible I+D    Sepsis due to cellulitis (HCC)- (present on admission)  Assessment & Plan  This is Sepsis Present on admission  SIRS criteria identified on my evaluation include:  Tachycardia, with heart rate greater than 90 BPM, Tachypnea, with respirations greater than 20 per minute and Leukocytosis, with WBC greater than 12,000  Source is LLE cellulitis  Sepsis protocol initiated  Fluid resuscitation ordered per protocol  Crystalloid Fluid Administration: given fluid in ED  IV antibiotics as appropriate for source of sepsis  Reassessment: I have reassessed the patient's hemodynamic status    Lactic acidosis present on admission, downtrended.    Uncomplicated type 2 diabetes mellitus (HCC)- (present on admission)  Assessment & Plan  Hold home glipizide. Metformin was held on recent hospital discharge due to need for short term dialysis need for KENRICK with subsequent improvement of renal function.   - sliding scale insulin   - hypoglycemia protocol    Benign essential hypertension- (present on admission)  Assessment & Plan  Continue home amlodipine         VTE prophylaxis: SCDs/TEDs    I have performed a physical exam and reviewed and updated ROS and Plan today (1/8/2023). In review of yesterday's note (1/7/2023), there are no changes except as documented above.

## 2023-01-09 LAB
GLUCOSE BLD STRIP.AUTO-MCNC: 133 MG/DL (ref 65–99)
GLUCOSE BLD STRIP.AUTO-MCNC: 156 MG/DL (ref 65–99)
GLUCOSE BLD STRIP.AUTO-MCNC: 166 MG/DL (ref 65–99)
GLUCOSE BLD STRIP.AUTO-MCNC: 166 MG/DL (ref 65–99)

## 2023-01-09 PROCEDURE — 700102 HCHG RX REV CODE 250 W/ 637 OVERRIDE(OP)

## 2023-01-09 PROCEDURE — 700111 HCHG RX REV CODE 636 W/ 250 OVERRIDE (IP): Performed by: STUDENT IN AN ORGANIZED HEALTH CARE EDUCATION/TRAINING PROGRAM

## 2023-01-09 PROCEDURE — 97605 NEG PRS WND THER DME<=50SQCM: CPT

## 2023-01-09 PROCEDURE — 82962 GLUCOSE BLOOD TEST: CPT | Mod: 91

## 2023-01-09 PROCEDURE — 770006 HCHG ROOM/CARE - MED/SURG/GYN SEMI*

## 2023-01-09 PROCEDURE — 700102 HCHG RX REV CODE 250 W/ 637 OVERRIDE(OP): Performed by: STUDENT IN AN ORGANIZED HEALTH CARE EDUCATION/TRAINING PROGRAM

## 2023-01-09 PROCEDURE — A9270 NON-COVERED ITEM OR SERVICE: HCPCS | Performed by: STUDENT IN AN ORGANIZED HEALTH CARE EDUCATION/TRAINING PROGRAM

## 2023-01-09 PROCEDURE — 306591 TRAY SUTURE REMOVAL DISP: Performed by: STUDENT IN AN ORGANIZED HEALTH CARE EDUCATION/TRAINING PROGRAM

## 2023-01-09 PROCEDURE — A9270 NON-COVERED ITEM OR SERVICE: HCPCS

## 2023-01-09 PROCEDURE — 302103 DOWN DRAIN ADAPTER: Performed by: STUDENT IN AN ORGANIZED HEALTH CARE EDUCATION/TRAINING PROGRAM

## 2023-01-09 PROCEDURE — 99231 SBSQ HOSP IP/OBS SF/LOW 25: CPT | Performed by: STUDENT IN AN ORGANIZED HEALTH CARE EDUCATION/TRAINING PROGRAM

## 2023-01-09 RX ADMIN — OMEPRAZOLE 40 MG: 20 CAPSULE, DELAYED RELEASE ORAL at 04:42

## 2023-01-09 RX ADMIN — AMLODIPINE BESYLATE 10 MG: 10 TABLET ORAL at 04:42

## 2023-01-09 RX ADMIN — HYDROMORPHONE HYDROCHLORIDE 1 MG: 1 INJECTION, SOLUTION INTRAMUSCULAR; INTRAVENOUS; SUBCUTANEOUS at 03:37

## 2023-01-09 RX ADMIN — HYDROMORPHONE HYDROCHLORIDE 1 MG: 1 INJECTION, SOLUTION INTRAMUSCULAR; INTRAVENOUS; SUBCUTANEOUS at 17:25

## 2023-01-09 RX ADMIN — HYDROMORPHONE HYDROCHLORIDE 1 MG: 1 INJECTION, SOLUTION INTRAMUSCULAR; INTRAVENOUS; SUBCUTANEOUS at 11:33

## 2023-01-09 RX ADMIN — CIPROFLOXACIN 500 MG: 500 TABLET, FILM COATED ORAL at 04:42

## 2023-01-09 RX ADMIN — DOCUSATE SODIUM 50 MG AND SENNOSIDES 8.6 MG 1 TABLET: 8.6; 5 TABLET, FILM COATED ORAL at 17:40

## 2023-01-09 RX ADMIN — INSULIN HUMAN 1 UNITS: 100 INJECTION, SOLUTION PARENTERAL at 17:24

## 2023-01-09 RX ADMIN — PREGABALIN 50 MG: 25 CAPSULE ORAL at 17:39

## 2023-01-09 RX ADMIN — ROSUVASTATIN CALCIUM 20 MG: 20 TABLET, FILM COATED ORAL at 04:41

## 2023-01-09 RX ADMIN — PREGABALIN 50 MG: 25 CAPSULE ORAL at 04:41

## 2023-01-09 RX ADMIN — CIPROFLOXACIN 500 MG: 500 TABLET, FILM COATED ORAL at 17:39

## 2023-01-09 RX ADMIN — INSULIN HUMAN 1 UNITS: 100 INJECTION, SOLUTION PARENTERAL at 11:28

## 2023-01-09 RX ADMIN — LEVOTHYROXINE SODIUM 112 MCG: 0.11 TABLET ORAL at 04:41

## 2023-01-09 RX ADMIN — FINASTERIDE 5 MG: 5 TABLET, FILM COATED ORAL at 04:41

## 2023-01-09 RX ADMIN — INSULIN HUMAN 1 UNITS: 100 INJECTION, SOLUTION PARENTERAL at 21:45

## 2023-01-09 RX ADMIN — TAMSULOSIN HYDROCHLORIDE 0.4 MG: 0.4 CAPSULE ORAL at 04:41

## 2023-01-09 ASSESSMENT — ENCOUNTER SYMPTOMS
FEVER: 0
CHILLS: 0
SENSORY CHANGE: 0
INSOMNIA: 0
PALPITATIONS: 0
DIZZINESS: 0
LOSS OF CONSCIOUSNESS: 0
COUGH: 0
ABDOMINAL PAIN: 0
BACK PAIN: 0
BLURRED VISION: 0
FOCAL WEAKNESS: 0
NAUSEA: 0
VOMITING: 0
FALLS: 0
SHORTNESS OF BREATH: 0
HEADACHES: 0
EYE PAIN: 0

## 2023-01-09 ASSESSMENT — PAIN DESCRIPTION - PAIN TYPE
TYPE: ACUTE PAIN

## 2023-01-09 ASSESSMENT — LIFESTYLE VARIABLES: SUBSTANCE_ABUSE: 0

## 2023-01-09 NOTE — DOCUMENTATION QUERY
"                                                                         [facility]                                                                       Query Response Note      PATIENT:               STEPHANE JAMISON  ACCT #:                  [id number]  MRN:                     [id number]  :                      1960  ADMIT DATE:       1/3/2023 5:06 PM  DISCH DATE:          RESPONDING  PROVIDER #:        [id number]           QUERY TEXT:    Can sepsis be linked to an infectious organism?     NOTE:  If an appropriate response is not listed below, please respond with a new note.      The patient's Clinical Indicators include:  LLE Wound Cx on  positive for Enterobacter cloacae complex. Blood and urine cxs negative. \"Prior culture grew group A strep and MSSA\" is documented in the Progress Notes.     Treatments include: I&D of LLE Cellulitis, Cipro, Zyvox, Ancef and LR.    Risk factors include: dx LLE Cellulitis and hx DM II.    Thank you,  Raffi Gerardo RN, BSN  Clinical   Connect via [employer]  Options provided:   -- Sepsis is d/t or associated with Enterobacter cloacae complex   -- Sepsis is d/t or associated with another organism, Please specify the organism(s) causing sepsis   -- Other explanation, Please specify   -- Unable to determine      Query created by: Raffi Gerardo on 2023 8:31 AM    RESPONSE TEXT:    Sepsis is d/t or associated with Enterobacter cloacae complex          Electronically signed by:  GWEN VASQUEZ MD 2023 1:14 PM              "

## 2023-01-09 NOTE — DISCHARGE PLANNING
Received Choice form at [id number]  Agency/Facility Name: [facility]  Referral sent per Choice form [phone]

## 2023-01-09 NOTE — CARE PLAN
The patient is Stable - Low risk of patient condition declining or worsening    Shift Goals  Clinical Goals: wound vac maintenance, discharge goals  Patient Goals: pain control  Family Goals: n/a    Progress made toward(s) clinical / shift goals:    Problem: Knowledge Deficit - Standard  Goal: Patient and family/care givers will demonstrate understanding of plan of care, disease process/condition, diagnostic tests and medications  Outcome: Progressing     Problem: Physical Regulation  Goal: Signs and symptoms of infection will decrease  Outcome: Progressing     Problem: Pain - Standard  Goal: Alleviation of pain or a reduction in pain to the patient’s comfort goal  Outcome: Progressing       Patient is not progressing towards the following goals:

## 2023-01-09 NOTE — DISCHARGE PLANNING
Case Management Discharge Planning    Admission Date: 1/3/2023  GMLOS: 5.1  ALOS: 6    6-Clicks ADL Score: 23  6-Clicks Mobility Score: 24      Anticipated Discharge Dispo:  d/c to home w/ wound vac and HH for dressing changes    DME Needed: Yes    DME Ordered: Yes    Action(s) Taken: Updated Provider/Nurse on Discharge Plan,  LSw spoke to Rosangela w/ [employer] wound vac DME.  She indicates there is something about pt's form of [facility] INS that is the issue. Lsw provided pt's INS information off epic for Rosangela to bring to her finance office to get pt a wound vac.    At am rounds, MD stated he would place an order for HH so pt can d/c once he receives his wound vac at bedside.    Also, pt's car is her, but his wife does not drive. His son was here last night and RN will see if son can take pt home.      [facility] processing second choice for pt as the 1st does not take his INS. Since [place] two [place], the second and last choice was faxed referral today.     Escalations Completed: Provider    Medically Clear: Yes    Next Steps: Lsw will continue to follow for wound vac INS AUTH/delivery, and HH acceptance.     Barriers to Discharge: Pending Placement and DME    Is the patient up for discharge tomorrow: No

## 2023-01-09 NOTE — PROGRESS NOTES
Received report from day shift RN. Patent in bed. A&OX4. Pain medication administered as per patient/orders request. Assessment completed. Bed locked and in low position. Call bell within reach. Will continue to monitor.

## 2023-01-09 NOTE — FACE TO FACE
Face to Face Note  -  Durable Medical Equipment    Braeden White M.D. - NPI: [id number]  I certify that this patient is under my care and that they have had a durable medical equipment(DME)face to face encounter by myself that meets the physician DME face-to-face encounter requirements with this patient on:    Date of encounter:   Patient:                    MRN:                       YOB: 20[birth year]  Thomas Borja  [id number]  1960     The encounter with the patient was in whole, or in part, for the following medical condition, which is the primary reason for durable medical equipment:  Wound Care    I certify that, based on my findings, the following durable medical equipment is medically necessary:  Wound Vac.        ------------------------------------------------------------------------------------------------------------------    Face to Face Supporting Documentation - Home Health    The encounter with this patient was in whole or in part the primary reason for home health admission.    Date of encounter:   Patient:                    MRN:                       YOB: 20[birth year]  Thomas Borja  [id number]  1960     Home health to see patient for:  Skilled Nursing care for assessment, interventions & education and Wound Care    Skilled need for:  New Onset Medical Diagnosis left leg wound care with wound VAC    Skilled nursing interventions to include:  Wound Care    Homebound evidenced status by:  Needs the assistance of another person in order to leave the home. Leaving home must require a considerable and taxing effort. There must exist a normal inability to leave the home.    Community Physician to provide follow up care: Benji Grey M.D.     Optional Interventions    Wound information & treatment:    Home Infusion Therapy orders:    Line/Drain/Airway:    I certify the face to face encounter for this home care referral meets the CMS  requirements and the encounter/clinical assessment with the patient was, in whole, or in part, for the medical condition(s) listed above, which is the primary reason for home health care. Based on my clinical findings: the service(s) are medically necessary, support the need for home health care, and the homebound criteria are met.  I certify that this patient has had a face to face encounter by myself.  Braeden White M.D. - NPI: [id number]    *Debility, frailty and advanced age in the absence of an acute deterioration or exacerbation of a condition do not qualify a patient for home health.

## 2023-01-09 NOTE — PROGRESS NOTES
[facility] Medicine Daily Progress Note    Date of Service  1/9/2023    Chief Complaint  Thomas Borja is a 62 y.o. male admitted 1/3/2023 with leg pain and swelling.    Hospital Course  Thomas Borja is a 62 y.o. male with past medical history of hypertension, diabetes, MVR, recurrent LLE cellulitis  who presented 1/3/2023 with LLE cellulitis failing outpatient antibiotic therapy.      Patient was recently discharged on 12/4/2022 for sepsis secondary to left lower extremity cellulitis, during hospital stay, wound cultures grew strep pyogenes group A and MSSA and he was treated with cefazolin, completing a 10-day course of antibiotics, discharged to follow-up with wound care.  Hospital course was complicated for KENRICK, requiring need for dialysis with later improvement of renal function.  Patient states since discharge, patient followed up with wound care and because of residual erythema and swelling and purulent lesion to anterior shin, patient was prescribed another 7-day course of linezolid that he finished on 12/30/2022.  Patient states over the course of last week, he noticed new development of draining lesions to his left lower extremity localized to the posterior calf and anterior shin, outpatient antibiotics did not help.  Cellulitis associated with pain and norco helping, denies any sensory/motor deficits and does have neuropathy at baseline.  Denies any fever, chills, chest pain, shortness of breath, abdominal pain, nausea, vomiting.    Patient admitted overnight for worsening left leg cellulitis. He denies fever, chills, but reports pustular drainage from left leg now. He completed his course of outpatient antibiotics, most recently linezolid.    Patient was start ancef, continue linezolid, although prior cultures grew group A strep and MSSA. Repeat wound and blood cultures. 1/3/2023 MRSA nares negative, for which linezolid. Surgery consulted for which patient underwent left leg abscess incision  and drain on 1/5/2023 and was started on wound VAC.  Left leg wound cultures growing Enterobacter cloacae which is sensitive to ciprofloxacin.  Patient was switched to a ciprofloxacin oral antibiotic regimen which he is to conclude on 1/21/2023.  OR cultures with no growth today.    Interval Problem Update  Status post left leg abscess incision and drainage on 1/5/2023  Patient was evaluated bedside  Marked improvement with left lower extremity pain  Stable vitals and labs  1/3 blood cultures with no growth to date  1/3 urine culture no growth to date  1/4 left leg wound cultures growing Enterobacter cloacae which is resistant to ampicillin/sulbactam, cefazolin, and cefuroxime   1/5 left leg OR cultures with no growth to date  Continue oral Ciprofloxacin, end on 1/21/2023  General surgery following, appreciate recommendations  Wound care following for wound VAC  Face-to-face and order placed for wound VAC and wound care at home    Patient is medically  Pending wound VAC/wound care set up    I have discussed this patient's plan of care and discharge plan at IDT rounds today with Case Management, Nursing, Nursing leadership, and other members of the IDT team.    Consultants/Specialty  general surgery    Code Status  Full Code    Disposition  Patient is not medically cleared for discharge.   Anticipate discharge to to home with close outpatient follow-up.  I have placed the appropriate orders for post-discharge needs.    Review of Systems  Review of Systems   Constitutional:  Negative for chills, fever and malaise/fatigue.   Eyes:  Negative for blurred vision and pain.   Respiratory:  Negative for cough and shortness of breath.    Cardiovascular:  Negative for chest pain, palpitations and leg swelling.   Gastrointestinal:  Negative for abdominal pain, nausea and vomiting.   Genitourinary:  Negative for dysuria and urgency.   Musculoskeletal:  Negative for back pain and falls.   Skin:  Negative for itching and rash.    Neurological:  Negative for dizziness, sensory change, focal weakness, loss of consciousness and headaches.   Psychiatric/Behavioral:  Negative for substance abuse. The patient does not have insomnia.       Physical Exam  Temp:  [36.1 °C (97 °F)-37 °C (98.6 °F)] 36.8 °C (98.2 °F)  Pulse:  [] 105  Resp:  [18] 18  BP: (130-138)/(82-87) 138/87  SpO2:  [91 %-92 %] 92 %    Physical Exam  Constitutional:       General: He is not in acute distress.     Appearance: He is not ill-appearing.   HENT:      Head: Normocephalic and atraumatic.      Right Ear: External ear normal.      Left Ear: External ear normal.      Mouth/Throat:      Pharynx: No oropharyngeal exudate or posterior oropharyngeal erythema.   Eyes:      Extraocular Movements: Extraocular movements intact.      Pupils: Pupils are equal, round, and reactive to light.   Cardiovascular:      Rate and Rhythm: Normal rate and regular rhythm.      Pulses: Normal pulses.      Heart sounds: Normal heart sounds.   Pulmonary:      Effort: Pulmonary effort is normal. No respiratory distress.      Breath sounds: Normal breath sounds. No wheezing.   Abdominal:      General: Bowel sounds are normal. There is no distension.      Palpations: Abdomen is soft.      Tenderness: There is no abdominal tenderness. There is no guarding.   Musculoskeletal:         General: Swelling present. No tenderness.      Cervical back: Normal range of motion and neck supple.      Right lower leg: No edema.      Left lower leg: Edema present.   Skin:     General: Skin is warm and dry.      Findings: Erythema, lesion and rash present.      Comments: Left anterior and medial shin with surgical wrapping and wound vac   Neurological:      General: No focal deficit present.      Mental Status: He is oriented to person, place, and time.      Cranial Nerves: No cranial nerve deficit.      Sensory: No sensory deficit.      Motor: No weakness.   Psychiatric:         Mood and Affect: Mood normal.          Behavior: Behavior normal.       Fluids    Intake/Output Summary (Last 24 hours) at 1/9/2023 1523  Last data filed at 1/9/2023 0447  Gross per 24 hour   Intake --   Output 900 ml   Net -900 ml         Laboratory                              Imaging  US-EXTREMITY VENOUS LOWER UNILAT LEFT   Final Result      DX-CHEST-PORTABLE (1 VIEW)   Final Result      No acute cardiac or pulmonary abnormalities are identified.           Assessment/Plan  * Leg abscess- (present on admission)  Assessment & Plan  Status post left leg abscess incision and drainage on 1/5/2023  1/3 blood cultures with no growth to date  1/3 urine culture no growth to date  1/4 left leg wound cultures growing Enterobacter cloacae which is resistant to ampicillin/sulbactam, cefazolin, and cefuroxime   1/5 left leg OR cultures with no growth to date  Continue oral Ciprofloxacin, end on 1/21/2023  Wound care following for wound VAC    LLE Cellulitis with purulent drainage- (present on admission)  Assessment & Plan  Recently discharged on 12/4/2022 for sepsis secondary to left lower extremity cellulitis, during hospital stay, wound cultures grew strep pyogenes group A and MSSA and he was treated with cefazolin, completing a 10-day course of antibiotics, discharged to follow-up with wound care. Was prescribed outpt linezolid for persistent erythema, no response, and pt noted development of new draining lesions and areas of fluctuance.   - US LLE neg for DVT  Continue ancef and linezolid; although prior culture grew group A strep and MSSA  Repeat blood and wound cultures  - MRSA nares pending.   - Norco PRN for pain control  - wound care consult  Surgery consulted for possible I+D    Sepsis due to cellulitis (HCC)- (present on admission)  Assessment & Plan  This is Sepsis Present on admission  SIRS criteria identified on my evaluation include: Tachycardia, with heart rate greater than 90 BPM, Tachypnea, with respirations greater than 20 per minute and  Leukocytosis, with WBC greater than 12,000  Source is LLE cellulitis  Sepsis protocol initiated  Fluid resuscitation ordered per protocol  Crystalloid Fluid Administration: given fluid in ED  IV antibiotics as appropriate for source of sepsis  Reassessment: I have reassessed the patient's hemodynamic status    Lactic acidosis present on admission, downtrended.    Uncomplicated type 2 diabetes mellitus (HCC)- (present on admission)  Assessment & Plan  Hold home glipizide. Metformin was held on recent hospital discharge due to need for short term dialysis need for KENRICK with subsequent improvement of renal function.   - sliding scale insulin   - hypoglycemia protocol    Benign essential hypertension- (present on admission)  Assessment & Plan  Continue home amlodipine         VTE prophylaxis: SCDs/TEDs    I have performed a physical exam and reviewed and updated ROS and Plan today (1/9/2023). In review of yesterday's note (1/8/2023), there are no changes except as documented above.

## 2023-01-10 LAB
BACTERIA SPEC ANAEROBE CULT: NORMAL
GLUCOSE BLD STRIP.AUTO-MCNC: 133 MG/DL (ref 65–99)
GLUCOSE BLD STRIP.AUTO-MCNC: 161 MG/DL (ref 65–99)
GLUCOSE BLD STRIP.AUTO-MCNC: 176 MG/DL (ref 65–99)
SIGNIFICANT IND 70042: NORMAL
SITE SITE: NORMAL
SOURCE SOURCE: NORMAL

## 2023-01-10 PROCEDURE — 700111 HCHG RX REV CODE 636 W/ 250 OVERRIDE (IP): Performed by: GENERAL PRACTICE

## 2023-01-10 PROCEDURE — 82962 GLUCOSE BLOOD TEST: CPT | Mod: 91

## 2023-01-10 PROCEDURE — A9270 NON-COVERED ITEM OR SERVICE: HCPCS | Performed by: GENERAL PRACTICE

## 2023-01-10 PROCEDURE — A9270 NON-COVERED ITEM OR SERVICE: HCPCS | Performed by: STUDENT IN AN ORGANIZED HEALTH CARE EDUCATION/TRAINING PROGRAM

## 2023-01-10 PROCEDURE — 700102 HCHG RX REV CODE 250 W/ 637 OVERRIDE(OP): Performed by: STUDENT IN AN ORGANIZED HEALTH CARE EDUCATION/TRAINING PROGRAM

## 2023-01-10 PROCEDURE — 700102 HCHG RX REV CODE 250 W/ 637 OVERRIDE(OP): Performed by: GENERAL PRACTICE

## 2023-01-10 PROCEDURE — A9270 NON-COVERED ITEM OR SERVICE: HCPCS

## 2023-01-10 PROCEDURE — 700111 HCHG RX REV CODE 636 W/ 250 OVERRIDE (IP): Performed by: STUDENT IN AN ORGANIZED HEALTH CARE EDUCATION/TRAINING PROGRAM

## 2023-01-10 PROCEDURE — 700102 HCHG RX REV CODE 250 W/ 637 OVERRIDE(OP)

## 2023-01-10 PROCEDURE — 99233 SBSQ HOSP IP/OBS HIGH 50: CPT | Performed by: GENERAL PRACTICE

## 2023-01-10 PROCEDURE — 770006 HCHG ROOM/CARE - MED/SURG/GYN SEMI*

## 2023-01-10 RX ORDER — HYDROMORPHONE HYDROCHLORIDE 1 MG/ML
1 INJECTION, SOLUTION INTRAMUSCULAR; INTRAVENOUS; SUBCUTANEOUS EVERY 4 HOURS PRN
Status: DISCONTINUED | OUTPATIENT
Start: 2023-01-10 | End: 2023-01-10

## 2023-01-10 RX ORDER — HYDROCODONE BITARTRATE AND ACETAMINOPHEN 5; 325 MG/1; MG/1
1 TABLET ORAL EVERY 6 HOURS PRN
Status: DISCONTINUED | OUTPATIENT
Start: 2023-01-10 | End: 2023-01-13 | Stop reason: HOSPADM

## 2023-01-10 RX ORDER — HYDROCODONE BITARTRATE AND ACETAMINOPHEN 5; 325 MG/1; MG/1
1 TABLET ORAL EVERY 8 HOURS PRN
Qty: 12 TABLET | Refills: 0 | Status: SHIPPED | OUTPATIENT
Start: 2023-01-10 | End: 2023-01-17

## 2023-01-10 RX ORDER — HYDROCODONE BITARTRATE AND ACETAMINOPHEN 10; 325 MG/1; MG/1
1 TABLET ORAL EVERY 6 HOURS PRN
Status: DISCONTINUED | OUTPATIENT
Start: 2023-01-10 | End: 2023-01-13 | Stop reason: HOSPADM

## 2023-01-10 RX ORDER — ENOXAPARIN SODIUM 100 MG/ML
40 INJECTION SUBCUTANEOUS DAILY
Status: DISCONTINUED | OUTPATIENT
Start: 2023-01-10 | End: 2023-01-13 | Stop reason: HOSPADM

## 2023-01-10 RX ORDER — CIPROFLOXACIN 500 MG/1
500 TABLET, FILM COATED ORAL EVERY 12 HOURS
Qty: 20 TABLET | Refills: 0 | Status: ACTIVE | OUTPATIENT
Start: 2023-01-13 | End: 2023-01-23

## 2023-01-10 RX ORDER — HYDROMORPHONE HYDROCHLORIDE 1 MG/ML
1 INJECTION, SOLUTION INTRAMUSCULAR; INTRAVENOUS; SUBCUTANEOUS
Status: DISCONTINUED | OUTPATIENT
Start: 2023-01-10 | End: 2023-01-13 | Stop reason: HOSPADM

## 2023-01-10 RX ADMIN — ROSUVASTATIN CALCIUM 20 MG: 20 TABLET, FILM COATED ORAL at 05:04

## 2023-01-10 RX ADMIN — PREGABALIN 50 MG: 25 CAPSULE ORAL at 17:49

## 2023-01-10 RX ADMIN — CIPROFLOXACIN 500 MG: 500 TABLET, FILM COATED ORAL at 17:49

## 2023-01-10 RX ADMIN — LEVOTHYROXINE SODIUM 112 MCG: 0.11 TABLET ORAL at 05:05

## 2023-01-10 RX ADMIN — FINASTERIDE 5 MG: 5 TABLET, FILM COATED ORAL at 05:04

## 2023-01-10 RX ADMIN — PROMETHAZINE HYDROCHLORIDE 25 MG: 25 TABLET ORAL at 21:23

## 2023-01-10 RX ADMIN — PREGABALIN 50 MG: 25 CAPSULE ORAL at 05:04

## 2023-01-10 RX ADMIN — HYDROMORPHONE HYDROCHLORIDE 1 MG: 1 INJECTION, SOLUTION INTRAMUSCULAR; INTRAVENOUS; SUBCUTANEOUS at 00:56

## 2023-01-10 RX ADMIN — HYDROMORPHONE HYDROCHLORIDE 1 MG: 1 INJECTION, SOLUTION INTRAMUSCULAR; INTRAVENOUS; SUBCUTANEOUS at 16:19

## 2023-01-10 RX ADMIN — ENOXAPARIN SODIUM 40 MG: 40 INJECTION SUBCUTANEOUS at 21:23

## 2023-01-10 RX ADMIN — TAMSULOSIN HYDROCHLORIDE 0.4 MG: 0.4 CAPSULE ORAL at 05:03

## 2023-01-10 RX ADMIN — OMEPRAZOLE 40 MG: 20 CAPSULE, DELAYED RELEASE ORAL at 05:04

## 2023-01-10 RX ADMIN — HYDROCODONE BITARTRATE AND ACETAMINOPHEN 1 TABLET: 5; 325 TABLET ORAL at 21:23

## 2023-01-10 RX ADMIN — DOCUSATE SODIUM 50 MG AND SENNOSIDES 8.6 MG 1 TABLET: 8.6; 5 TABLET, FILM COATED ORAL at 17:49

## 2023-01-10 RX ADMIN — HYDROCODONE BITARTRATE AND ACETAMINOPHEN 1 TABLET: 5; 325 TABLET ORAL at 08:56

## 2023-01-10 RX ADMIN — AMLODIPINE BESYLATE 10 MG: 10 TABLET ORAL at 05:04

## 2023-01-10 RX ADMIN — DOCUSATE SODIUM 50 MG AND SENNOSIDES 8.6 MG 2 TABLET: 8.6; 5 TABLET, FILM COATED ORAL at 05:05

## 2023-01-10 RX ADMIN — PROMETHAZINE HYDROCHLORIDE 25 MG: 25 TABLET ORAL at 09:01

## 2023-01-10 RX ADMIN — CIPROFLOXACIN 500 MG: 500 TABLET, FILM COATED ORAL at 05:04

## 2023-01-10 ASSESSMENT — PAIN DESCRIPTION - PAIN TYPE
TYPE: ACUTE PAIN

## 2023-01-10 NOTE — WOUND TEAM
[facility]  Inpatient Services  Wound and Skin Care Evaluation    Admission Date: 1/3/2023     Last order of IP CONSULT TO WOUND CARE was found on 1/3/2023 from Hospital Encounter on 1/3/2023     HPI, PMH, SH: Reviewed    Past Surgical History:   Procedure Laterality Date    SKIN ABSCESS INCISION AND DRAINAGE Left 1/5/2023    Procedure: LEFT LEG INCISION AND DRAINAGE ABSCESS;  Surgeon: Cesar Odom M.D.;  Location: SURGERY [facility];  Service: General     Social History     Tobacco Use    Smoking status: Former    Smokeless tobacco: Never   Substance Use Topics    Alcohol use: Yes     Comment: occ     Chief Complaint   Patient presents with    Wound Check     Hx of cellulitis in the LLE, sent by wound care for IV ABX. Hx of diabetes.      Diagnosis: Sepsis due to cellulitis (HCC) [L03.90, A41.9]    Unit where seen by Wound Team: [facility]     WOUND CONSULT/FOLLOW UP RELATED TO:  LLE VAC placement    WOUND HISTORY:  pt was recently admitted with severe cellulitis to the E, pt was ultimately DC'd home with outpatient wound clinic follow up. Pt was sent to ER by outpatient wound clinic due to wounds worsened.     Underwent I&D with MD Odom 1/5/23. Vac placement today.     WOUND ASSESSMENT/LDA    Negative Pressure Wound Therapy 01/06/23 Surgical Pretibial Left (Active)   NPWT Pump Mode / Pressure Setting 125 mmHg;Ulta 01/06/23 1300   Dressing Type Medium;Black Foam (Veraflo)    Number of Foam Pieces Used 4    Canister Changed Yes    NEXT Dressing Change/Treatment Date 01/09/23    VAC VeraFlo Irrigant Normal Saline    VAC VeraFlo Soak Time (mins) 10    VAC VeraFlo Instill Volume (ml) 40    VAC VeraFlo - Therapy Time (hrs) 2.5    VAC VeraFlo Pressure (mm/Hg) 125 mmHg    WOUND NURSE ONLY - Time Spent with Patient (mins) 60         Wound 12/19/22 Full Thickness Wound Pretibial Anterior Left Surgical (Active)   Wound Image        01/06/23 1300   Site Assessment Slough;Red;Yellow;Fragile    Periwound  Assessment Red;Warm;Edema;Induration    Margins Unattached edges;Defined edges    Closure Secondary intention    Drainage Amount Small    Drainage Description Serosanguineous    Treatments Cleansed;Site care;Offloading;Tape change    Wound Cleansing Approved Wound Cleanser    Periwound Protectant Skin Protectant Wipes to Periwound;Paste Ring;Hydrocolloid    Dressing Cleansing/Solutions Normal Saline    Dressing Options Wound Vac    Dressing Changed New    Dressing Status Clean;Dry;Intact    Dressing Change/Treatment Frequency Monday, Wednesday, Friday, and As Needed    NEXT Dressing Change/Treatment Date 01/09/23    NEXT Weekly Photo (Inpatient Only) 01/13/23    Non-staged Wound Description Full thickness    Wound Length (cm) 11 cm    Wound Width (cm) 2.3 cm    Wound Depth (cm) 2 cm    Wound Surface Area (cm^2) 25.3 cm^2    Wound Volume (cm^3) 50.6 cm^3    Wound Healing % -8    Undermining (cm) 3 cm    Undermining of Wound, 1st Location From 2 o'clock;To 4 o'clock    Shape cluster of round wounds    Wound Odor None    Pulses 1+;DP    Exposed Structures SANJUANA        Wound 12/19/22 Full Thickness Wound Pretibial Medial;Posterior Left Surgical (Active)   Wound Image     01/06/23 1300   Site Assessment Yellow;Red;Fragile;Slough    Periwound Assessment Red;Warm;Edema    Margins Unattached edges;Defined edges    Closure Secondary intention    Drainage Amount Small    Drainage Description Serosanguineous    Treatments Cleansed;Site care;Tape change    Wound Cleansing Approved Wound Cleanser    Periwound Protectant Skin Protectant Wipes to Periwound;Paste Ring;Drape;Hydrocolloid    Dressing Cleansing/Solutions Normal Saline    Dressing Options Wound Vac    Dressing Changed New    Dressing Status Clean;Dry;Intact    Dressing Change/Treatment Frequency Monday, Wednesday, Friday, and As Needed    NEXT Dressing Change/Treatment Date 01/09/23    NEXT Weekly Photo (Inpatient Only) 01/13/23    Non-staged Wound Description Full  thickness    Wound Length (cm) 7.5 cm    Wound Width (cm) 2 cm    Wound Depth (cm) 1.7 cm    Wound Surface Area (cm^2) 15 cm^2    Wound Volume (cm^3) 25.5 cm^3    Wound Healing % -43    Undermining (cm) 2.3 cm    Undermining of Wound, 1st Location From 2 o'clock;To 4 o'clock    Shape circular    Wound Odor None    Pulses 1+;DP    Exposed Structures SANJUANA      Vascular:    SHEELA:   No results found.    Lab Values:    Lab Results   Component Value Date/Time    WBC 10.5 01/06/2023 07:07 AM    RBC 3.95 (L) 01/06/2023 07:07 AM    HEMOGLOBIN 13.0 (L) 01/06/2023 07:07 AM    HEMATOCRIT 37.1 (L) 01/06/2023 07:07 AM    CREACTPROT 0.95 (H) 01/03/2023 07:06 PM    SEDRATEWES 51 (H) 01/03/2023 03:49 PM    HBA1C 8.5 (H) 12/04/2022 09:27 PM      Culture Results show:  Recent Results (from the past 720 hour(s))   CULTURE WOUND W/ GRAM STAIN    Collection Time: 01/05/23 10:56 AM    Specimen: Wound   Result Value Ref Range    Significant Indicator NEG     Source WND     Site Left Leg Abscess     Culture Result No growth at 72 hours.     Gram Stain Result No organisms seen.        Pain Level/Medicated:  premed with IV pain meds, Lidocaine applied to wound bed      INTERVENTIONS BY WOUND TEAM:  Chart and images reviewed. Discussed with bedside RN. All areas of concern (based on picture review, LDA review and discussion with bedside RN) have been thoroughly assessed. Documentation of areas based on significant findings. This RN in to assess patient. Performed standard wound care which includes appropriate positioning, dressing removal and non-selective debridement. Pictures and measurements obtained weekly if/when required.         LLE anterior & posterior  Preparation for Dressing removal: Dressing soaked with wound cleanser and Lidocaine, easily removed.   Non-selectively Debrided with:  Wound cleanser and Gauze   Sharp debridement: NA  Inez wound: Cleansed with Wound cleanser and Gauze, Prepped with No Sting, paste ring, and aquacel ag to  wound edges/satellite wounds, and drape bridge between the two.   Primary Dressing: Veraflo Black foam spiral, and 2 small wedges to fill space, and button  Secondary (Outer) Dressing: drape to secure, trac pad. Suction obtained at 125mmhg with Veraflo test cycle completed. 40mL, 10min, Q2.5h     Interdisciplinary consultation: Patient, Bedside RN     EVALUATION / RATIONALE FOR TREATMENT:  Most Recent Date:    01/09/23: Nithin-wound red and warm to touch, wound with slough present. Pt would benefit from repeat washout. Updated Dr. Lowe who agreed to pt staying inpatient for a couple more dressing changes and potential imaging to rule out fluid collection/infection. Pt still needs home vac and outpatient follow up arranged. Continued with veraflo, fluid instillation increased.     1/6/23: Veraflo vac placement to LLE wounds x2 (anterior and posterior). Wound beds are fragile, 40% necrotic and 60% fragile red tissue, undermining present in both wound beds from 5899-1474, slight odor present. Wound beds need to continue to be cleansed with Dakins Veraflo, will assist in closure by secondary intention, assist in debridement of non-viable tissue, management of bioburden and exudate, encourage granulation tissue production, and increase oxygenation to the wound bed.  1/4: Pt has cluster of pustules to medial LLE proximal to ankle. He has two full thickness wounds to lateral LLE. Anterior shin rough texture, indurated and raised above surrounding skin. Culture taken from lateral wounds since able to probe depth. Medial wounds unable to probe. Aquacel Ag Hydrofiber applied to manage bioburden, absorb exudate, and maintain a moist wound environment without laterally wicking exudate therefore reducing nithin-wound maceration. ABD pads for drainage and Tubi  F for light compression.      Goals: Steady decrease in wound area and depth weekly.    WOUND TEAM PLAN OF CARE ([X] for frequency of wound follow up,):   Nursing to  follow dressing orders written for wound care. Contact wound team if area fails to progress, deteriorates or with any questions/concerns if something comes up before next scheduled follow up (See below as to whether wound is following and frequency of wound follow up)  Dressing changes by wound team:                   Follow up 3 times weekly:                NPWT change 3 times weekly:   SUSANNA SMALL  Follow up 1-2 times weekly:      Follow up Bi-Monthly:           Follow up Monthly (High Risk):                        Follow up as needed:     Other (explain):     NURSING PLAN OF CARE ORDERS (X):  Dressing changes: See Dressing Care orders: x  Skin care: See Skin Care orders:   RN Prevention Protocol: seen in ED  Rectal tube care: See Rectal Tube Care orders:   Other orders:    RSKIN:   CURRENTLY IN PLACE (X), APPLIED THIS VISIT (A), ORDERED (O):   Q shift Glynn:  X  Q shift pressure point assessments:  X    Surface/Positioning   Pressure redistribution mattress   X         Low Airloss          ICU Low Airloss   Bariatric PANCHITO     Waffle cushion        Waffle Overlay          Reposition q 2 hours      TAPs Turning system     Z Albin Pillow     Offloading/Redistribution   Sacral Mepilex (Silicone dressing)     Heel Mepilex (Silicone dressing)     X    Heel float boots (Prevalon boot)             Float Heels off Bed with Pillows           Respiratory RA  Silicone O2 tubing         Gray Foam Ear protectors     Cannula fixation Device (Tender )          High flow offloading Clip    Elastic head band offloading device      Anchorfast                                                         Trach with Optifoam split foam             Containment/Moisture Prevention using BSC    Rectal tube or BMS    Purwick/Condom Cath        Vargas Catheter    Barrier wipes           Barrier paste       Antifungal tx      Interdry        Mobilization       Up to chair        Ambulate   reports with pain to LLE   PT/OT      Nutrition        Dietician        Diabetes Education      PO   x  TF     TPN     NPO   # days     Other        Anticipated discharge plans:   LTACH:        SNF/Rehab:                  Home Health Care:           Outpatient Wound Center:   needs to resume, referral placed  Self/Family Care:        Other:                  Vac Discharge Needs:   Not Applicable Pt not on a wound vac:   x    Regular Vac while inpatient, alternative dressing at DC:        Regular Vac in use and continued at DC:            Reg. Vac w/ Skin Sub/Biologic in use. Will need to be changed 2x wkly:      Veraflo Vac while inpatient, ok to transition to Regular Vac on Discharge:           Veraflo Vac while inpatient, will need to remain on Veraflo Vac upon discharge:

## 2023-01-10 NOTE — PROGRESS NOTES
Received report from day shift RN. Patient lying in bed in comfortable position. A&OX4, denies pain at this time. Assessment completed. Bed locked and in low position, call bell within reach. Will continue to monitor.

## 2023-01-10 NOTE — CARE PLAN
The patient is Stable - Low risk of patient condition declining or worsening    Shift Goals  Clinical Goals: wound vac maintenance.  Patient Goals: pain control. Comfort.  Family Goals: n/a    Progress made toward(s) clinical / shift goals:  Patient will keep appropriate hydration during this shift. Patient will verbalizes pain level 5/10 or less during this shift.     Problem: Fluid Volume  Goal: Fluid volume balance will be maintained  Outcome: Progressing     Problem: Pain - Standard  Goal: Alleviation of pain or a reduction in pain to the patient’s comfort goal  Outcome: Progressing       Patient is not progressing towards the following goals:

## 2023-01-10 NOTE — HOSPITAL COURSE
This is a 62-year-old male with past medical history of hypertension, type 2 diabetes A1c 8.5, MVR, hypothyroidism and morbid obesity with BMI of 40 who was admitted on 1/3/2023 with sepsis secondary to left lower extremity cellulitis.    Patient was recently discharged on 12/4/2022 for sepsis secondary to left lower extremity cellulitis, during hospital stay, wound cultures grew strep pyogenes group A and MSSA and he was treated with cefazolin, completing a 10-day course of antibiotics, discharged to follow-up with wound care.  Hospital course was complicated for KENRICK, requiring need for dialysis with later improvement of renal function.  Patient states since discharge, patient followed up with wound care and because of residual erythema and swelling and purulent lesion to anterior shin, patient was prescribed another 7-day course of linezolid that he finished on 12/30/2022.    On this admission, surgery was consulted, patient underwent I&D of left leg abscess with wound VAC placement on 01/5/2023.  Cultures are growing Enterobacter cloacae which is sensitive to ciprofloxacin.  Patient was switched to a ciprofloxacin oral antibiotic regimen which he is to conclude on 1/21/2023.  Patient has follow-up with outpatient wound clinic on 1/16/2023.

## 2023-01-10 NOTE — PROGRESS NOTES
Assumed care of patient 0700. Received Report from [facility] nurse. Patient A&O x 4, on RA, Reporting a pain level of 0/10. Call light within reach, belongings within reach, Fall precautions in place, and bed alarm is on and bed in lowest position. Patient does not have any other needs at this time.  Patient is resting quietly at this time.

## 2023-01-10 NOTE — DISCHARGE PLANNING
Case Management Discharge Planning    Admission Date: 1/3/2023  GMLOS: 5.1  ALOS: 7    6-Clicks ADL Score: 23  6-Clicks Mobility Score: 24      Anticipated Discharge Dispo:      DME Needed: No    Action(s) Taken: OTHER    LSW called Outpatient Wound Clinic and scheduled a outpatient appt for 1/16/2023 at 9am.     Escalations Completed: None    Medically Clear: No    Next Steps: LSW will follow and assist with any additional dc needs.     Barriers to Discharge: Medical clearance    Is the patient up for discharge tomorrow: No

## 2023-01-10 NOTE — CARE PLAN
The patient is Stable - Low risk of patient condition declining or worsening    Shift Goals  Clinical Goals: pain management  Patient Goals: pain control, rest  Family Goals: n/a    Progress made toward(s) clinical / shift goals:    Problem: Knowledge Deficit - Standard  Goal: Patient and family/care givers will demonstrate understanding of plan of care, disease process/condition, diagnostic tests and medications  Outcome: Progressing     Problem: Physical Regulation  Goal: Signs and symptoms of infection will decrease  Outcome: Progressing     Problem: Pain - Standard  Goal: Alleviation of pain or a reduction in pain to the patient’s comfort goal  Outcome: Progressing       Patient is not progressing towards the following goals:

## 2023-01-11 LAB
GLUCOSE BLD STRIP.AUTO-MCNC: 143 MG/DL (ref 65–99)
GLUCOSE BLD STRIP.AUTO-MCNC: 155 MG/DL (ref 65–99)
GLUCOSE BLD STRIP.AUTO-MCNC: 174 MG/DL (ref 65–99)

## 2023-01-11 PROCEDURE — 700102 HCHG RX REV CODE 250 W/ 637 OVERRIDE(OP)

## 2023-01-11 PROCEDURE — 700102 HCHG RX REV CODE 250 W/ 637 OVERRIDE(OP): Performed by: GENERAL PRACTICE

## 2023-01-11 PROCEDURE — 700102 HCHG RX REV CODE 250 W/ 637 OVERRIDE(OP): Performed by: STUDENT IN AN ORGANIZED HEALTH CARE EDUCATION/TRAINING PROGRAM

## 2023-01-11 PROCEDURE — A9270 NON-COVERED ITEM OR SERVICE: HCPCS | Performed by: STUDENT IN AN ORGANIZED HEALTH CARE EDUCATION/TRAINING PROGRAM

## 2023-01-11 PROCEDURE — 82962 GLUCOSE BLOOD TEST: CPT | Mod: 91

## 2023-01-11 PROCEDURE — 700111 HCHG RX REV CODE 636 W/ 250 OVERRIDE (IP): Performed by: GENERAL PRACTICE

## 2023-01-11 PROCEDURE — A9270 NON-COVERED ITEM OR SERVICE: HCPCS | Performed by: GENERAL PRACTICE

## 2023-01-11 PROCEDURE — 770006 HCHG ROOM/CARE - MED/SURG/GYN SEMI*

## 2023-01-11 PROCEDURE — 97597 DBRDMT OPN WND 1ST 20 CM/<: CPT

## 2023-01-11 PROCEDURE — 99233 SBSQ HOSP IP/OBS HIGH 50: CPT | Performed by: GENERAL PRACTICE

## 2023-01-11 PROCEDURE — 700101 HCHG RX REV CODE 250: Performed by: STUDENT IN AN ORGANIZED HEALTH CARE EDUCATION/TRAINING PROGRAM

## 2023-01-11 PROCEDURE — 306591 TRAY SUTURE REMOVAL DISP: Performed by: GENERAL PRACTICE

## 2023-01-11 PROCEDURE — 302098 PASTE RING (FLAT): Performed by: GENERAL PRACTICE

## 2023-01-11 PROCEDURE — 97606 NEG PRS WND THER DME>50 SQCM: CPT

## 2023-01-11 PROCEDURE — 700101 HCHG RX REV CODE 250: Performed by: GENERAL PRACTICE

## 2023-01-11 PROCEDURE — RXMED WILLOW AMBULATORY MEDICATION CHARGE: Performed by: GENERAL PRACTICE

## 2023-01-11 PROCEDURE — A9270 NON-COVERED ITEM OR SERVICE: HCPCS

## 2023-01-11 RX ORDER — LIDOCAINE HYDROCHLORIDE 40 MG/ML
SOLUTION TOPICAL
Status: DISCONTINUED | OUTPATIENT
Start: 2023-01-11 | End: 2023-01-13 | Stop reason: HOSPADM

## 2023-01-11 RX ORDER — LIDOCAINE HYDROCHLORIDE 20 MG/ML
20 INJECTION, SOLUTION INFILTRATION; PERINEURAL
Status: DISCONTINUED | OUTPATIENT
Start: 2023-01-11 | End: 2023-01-13 | Stop reason: HOSPADM

## 2023-01-11 RX ORDER — LIDOCAINE HYDROCHLORIDE 20 MG/ML
1 JELLY TOPICAL
Status: DISCONTINUED | OUTPATIENT
Start: 2023-01-11 | End: 2023-01-13 | Stop reason: HOSPADM

## 2023-01-11 RX ADMIN — PROMETHAZINE HYDROCHLORIDE 25 MG: 25 TABLET ORAL at 09:32

## 2023-01-11 RX ADMIN — HYDROCODONE BITARTRATE AND ACETAMINOPHEN 1 TABLET: 10; 325 TABLET ORAL at 16:15

## 2023-01-11 RX ADMIN — ROSUVASTATIN CALCIUM 20 MG: 20 TABLET, FILM COATED ORAL at 05:10

## 2023-01-11 RX ADMIN — PREGABALIN 50 MG: 25 CAPSULE ORAL at 05:11

## 2023-01-11 RX ADMIN — LEVOTHYROXINE SODIUM 112 MCG: 0.11 TABLET ORAL at 05:11

## 2023-01-11 RX ADMIN — OMEPRAZOLE 40 MG: 20 CAPSULE, DELAYED RELEASE ORAL at 05:10

## 2023-01-11 RX ADMIN — HYDROCODONE BITARTRATE AND ACETAMINOPHEN 1 TABLET: 10; 325 TABLET ORAL at 09:32

## 2023-01-11 RX ADMIN — LIDOCAINE HYDROCHLORIDE 1 APPLICATION: 40 SOLUTION TOPICAL at 11:50

## 2023-01-11 RX ADMIN — HYDROMORPHONE HYDROCHLORIDE 1 MG: 1 INJECTION, SOLUTION INTRAMUSCULAR; INTRAVENOUS; SUBCUTANEOUS at 12:29

## 2023-01-11 RX ADMIN — PREGABALIN 50 MG: 25 CAPSULE ORAL at 17:45

## 2023-01-11 RX ADMIN — FINASTERIDE 5 MG: 5 TABLET, FILM COATED ORAL at 05:10

## 2023-01-11 RX ADMIN — CIPROFLOXACIN 500 MG: 500 TABLET, FILM COATED ORAL at 17:45

## 2023-01-11 RX ADMIN — AMLODIPINE BESYLATE 10 MG: 10 TABLET ORAL at 05:10

## 2023-01-11 RX ADMIN — PROMETHAZINE HYDROCHLORIDE 25 MG: 25 TABLET ORAL at 16:15

## 2023-01-11 RX ADMIN — SODIUM HYPOCHLORITE 30 ML: 1.25 SOLUTION TOPICAL at 08:45

## 2023-01-11 RX ADMIN — HYDROCODONE BITARTRATE AND ACETAMINOPHEN 1 TABLET: 10; 325 TABLET ORAL at 22:15

## 2023-01-11 RX ADMIN — TAMSULOSIN HYDROCHLORIDE 0.4 MG: 0.4 CAPSULE ORAL at 05:11

## 2023-01-11 RX ADMIN — PROMETHAZINE HYDROCHLORIDE 25 MG: 25 TABLET ORAL at 22:14

## 2023-01-11 RX ADMIN — ENOXAPARIN SODIUM 40 MG: 40 INJECTION SUBCUTANEOUS at 17:45

## 2023-01-11 RX ADMIN — CIPROFLOXACIN 500 MG: 500 TABLET, FILM COATED ORAL at 05:11

## 2023-01-11 ASSESSMENT — PAIN DESCRIPTION - PAIN TYPE
TYPE: ACUTE PAIN

## 2023-01-11 NOTE — PROGRESS NOTES
Received report from day shift RN. Patient lying in bed resting, A&OX4, requested pain control medication, same provided, assessment completed, bed locked and in low position, call bell within reach, will continue to monitor.

## 2023-01-11 NOTE — WOUND TEAM
Assisted FLORIN Bowen with wound care, non-selective debridement performed using wound cleanser/NS and gauze.Veraflo NPWT was then applied. Please see Jessi's wound note for further wound care details.

## 2023-01-11 NOTE — PROGRESS NOTES
Hospital Medicine Daily Progress Note    Date of Service  1/11/2023    Chief Complaint  Thomas Borja is a 62 y.o. male admitted 1/3/2023 with left lower extremity wound    Hospital Course  This is a 62-year-old male with past medical history of hypertension, type 2 diabetes A1c 8.5, MVR, hypothyroidism and morbid obesity with BMI of 40 who was admitted on 1/3/2023 with sepsis secondary to left lower extremity cellulitis.    Patient was recently discharged on 12/4/2022 for sepsis secondary to left lower extremity cellulitis, during hospital stay, wound cultures grew strep pyogenes group A and MSSA and he was treated with cefazolin, completing a 10-day course of antibiotics, discharged to follow-up with wound care.  Hospital course was complicated for KENRICK, requiring need for dialysis with later improvement of renal function.  Patient states since discharge, patient followed up with wound care and because of residual erythema and swelling and purulent lesion to anterior shin, patient was prescribed another 7-day course of linezolid that he finished on 12/30/2022.    On this admission, surgery was consulted, patient underwent I&D of left leg abscess with wound VAC placement on 01/5/2023.  Cultures are growing Enterobacter cloacae which is sensitive to ciprofloxacin.  Patient was switched to a ciprofloxacin oral antibiotic regimen which he is to conclude on 1/21/2023.    Interval Problem Update  Patient to be discharged on Friday after wound VAC change, patient will have outpatient wound clinic appointment on 1/16.    Meds to beds sent for his antibiotics and pain regimen.    I have discussed this patient's plan of care and discharge plan at IDT rounds today with Case Management, Nursing, Nursing leadership, and other members of the IDT team.    Consultants/Specialty  orthopedics    Code Status  Full Code    Disposition  Patient is medically cleared for discharge.   Anticipate discharge to to home with close  outpatient follow-up.  I have placed the appropriate orders for post-discharge needs.    Review of Systems  Review of Systems   All other systems reviewed and are negative.     Physical Exam  Temp:  [35.9 °C (96.6 °F)-37.1 °C (98.7 °F)] 36.7 °C (98.1 °F)  Pulse:  [90-97] 96  Resp:  [17-18] 18  BP: (118-138)/(67-79) 118/67  SpO2:  [91 %-93 %] 91 %    Physical Exam  Vitals and nursing note reviewed.   Constitutional:       General: He is not in acute distress.     Appearance: Normal appearance.   HENT:      Head: Normocephalic and atraumatic.   Eyes:      Extraocular Movements: Extraocular movements intact.      Conjunctiva/sclera: Conjunctivae normal.      Pupils: Pupils are equal, round, and reactive to light.   Cardiovascular:      Rate and Rhythm: Normal rate and regular rhythm.      Pulses: Normal pulses.      Heart sounds: No murmur heard.    No friction rub. No gallop.   Pulmonary:      Effort: Pulmonary effort is normal. No respiratory distress.      Breath sounds: Normal breath sounds. No wheezing, rhonchi or rales.   Abdominal:      General: Bowel sounds are normal. There is no distension.      Palpations: Abdomen is soft.      Tenderness: There is no abdominal tenderness.   Musculoskeletal:         General: No swelling or tenderness. Normal range of motion.      Cervical back: Normal range of motion and neck supple. No muscular tenderness.      Right lower leg: No edema.      Left lower leg: No edema.      Comments: Left lower extremity wound with wound VAC in place   Skin:     General: Skin is warm and dry.      Capillary Refill: Capillary refill takes less than 2 seconds.      Findings: No bruising, erythema or rash.   Neurological:      General: No focal deficit present.      Mental Status: He is alert and oriented to person, place, and time.       Fluids    Intake/Output Summary (Last 24 hours) at 1/11/2023 1523  Last data filed at 1/11/2023 0932  Gross per 24 hour   Intake 325 ml   Output 250 ml   Net  75 ml       Laboratory                        Imaging  US-EXTREMITY VENOUS LOWER UNILAT LEFT   Final Result      DX-CHEST-PORTABLE (1 VIEW)   Final Result      No acute cardiac or pulmonary abnormalities are identified.        US-EXTREMITY VENOUS LOWER UNILAT LEFT   Final Result      DX-CHEST-PORTABLE (1 VIEW)   Final Result      No acute cardiac or pulmonary abnormalities are identified.           Assessment/Plan  * Leg abscess- (present on admission)  Assessment & Plan  On this admission, surgery was consulted, patient underwent I&D of left leg abscess with wound VAC placement on 01/5/2023.  Cultures are growing Enterobacter cloacae which is sensitive to ciprofloxacin.  Patient was switched to a ciprofloxacin oral antibiotic regimen which he is to conclude on 1/21/2023.    LLE Cellulitis with purulent drainage- (present on admission)  Assessment & Plan  Recently discharged on 12/4/2022 for sepsis secondary to left lower extremity cellulitis, during hospital stay, wound cultures grew strep pyogenes group A and MSSA and he was treated with cefazolin, completing a 10-day course of antibiotics, discharged to follow-up with wound care. Was prescribed outpt linezolid for persistent erythema, no response, and pt noted development of new draining lesions and areas of fluctuance.   On this admission, surgery was consulted, patient underwent I&D of left leg abscess with wound VAC placement on 01/5/2023.  Cultures are growing Enterobacter cloacae which is sensitive to ciprofloxacin.  Patient was switched to a ciprofloxacin oral antibiotic regimen which he is to conclude on 1/21/2023.    Sepsis due to cellulitis (HCC)- (present on admission)  Assessment & Plan  This is Sepsis Present on admission  SIRS criteria identified on my evaluation include: Tachycardia, with heart rate greater than 90 BPM, Tachypnea, with respirations greater than 20 per minute and Leukocytosis, with WBC greater than 12,000  Source is LLE  cellulitis  Sepsis protocol initiated  Fluid resuscitation ordered per protocol  Crystalloid Fluid Administration: given fluid in ED  IV antibiotics as appropriate for source of sepsis  Reassessment: I have reassessed the patient's hemodynamic status    Uncomplicated type 2 diabetes mellitus (HCC)- (present on admission)  Assessment & Plan  Hold home glipizide. Metformin was held on recent hospital discharge due to need for short term dialysis need for KENRICK with subsequent improvement of renal function.   - sliding scale insulin   - hypoglycemia protocol  A1C 8.5    Benign essential hypertension- (present on admission)  Assessment & Plan  Continue home amlodipine         VTE prophylaxis: enoxaparin ppx    I have performed a physical exam and reviewed and updated ROS and Plan today (1/11/2023). In review of yesterday's note (1/10/2023), there are no changes except as documented above.

## 2023-01-11 NOTE — WOUND TEAM
[facility]  Inpatient Services  Wound and Skin Care Evaluation    Admission Date: 1/3/2023     Last order of IP CONSULT TO WOUND CARE was found on 1/3/2023 from Hospital Encounter on 1/3/2023     HPI, PMH, SH: Reviewed    Past Surgical History:   Procedure Laterality Date    SKIN ABSCESS INCISION AND DRAINAGE Left 1/5/2023    Procedure: LEFT LEG INCISION AND DRAINAGE ABSCESS;  Surgeon: Cesar Odom M.D.;  Location: SURGERY [facility];  Service: General     Social History     Tobacco Use    Smoking status: Former    Smokeless tobacco: Never   Substance Use Topics    Alcohol use: Yes     Comment: occ     Chief Complaint   Patient presents with    Wound Check     Hx of cellulitis in the LLE, sent by wound care for IV ABX. Hx of diabetes.      Diagnosis: Sepsis due to cellulitis (HCC) [L03.90, A41.9]    Unit where seen by Wound Team: S530/02    WOUND CONSULT/FOLLOW UP RELATED TO:  LLE VAC     WOUND HISTORY:  pt was recently admitted with severe cellulitis to the E, pt was ultimately DC'd home with outpatient wound clinic follow up. Pt was sent to ER by outpatient wound clinic due to wounds worsened.     Underwent I&D with MD Odom 1/5/23. Vac placement today.     WOUND ASSESSMENT/LDA     Negative Pressure Wound Therapy 01/06/23 Surgical Pretibial Left (Active)   Vacuum Serial Number [id number] 01/11/23 1300   NPWT Pump Mode / Pressure Setting Ulta    Dressing Type Medium;Black Foam (Veraflo);Gray Foam (Cleanse Choice)    Number of Foam Pieces Used 4    Canister Changed No    NEXT Dressing Change/Treatment Date 01/13/23    VAC VeraFlo Irrigant 1/4 Strength Dakins    VAC VeraFlo Soak Time (mins) 10    VAC VeraFlo Instill Volume (ml) 40    VAC VeraFlo - Therapy Time (hrs) 2.5    VAC VeraFlo Pressure (mm/Hg) Intermittent;125 mmHg    WOUND NURSE ONLY - Time Spent with Patient (mins) 60            Wound 12/19/22 Full Thickness Wound Pretibial Anterior Left Surgical (Active)   Wound Image      01/11/23 1300    Site Assessment Red;Painful;Slough;Drainage;Edema    Periwound Assessment Red;Warm;Edema    Margins Attached edges;Defined edges    Closure Secondary intention    Drainage Amount Small    Drainage Description Serosanguineous    Treatments Cleansed;Site care;Offloading;Chemical Debridement;CSWD - Conservative Sharp Wound Debridement;Topical Lidocaine    Wound Cleansing Dakin's Solution    Periwound Protectant Skin Protectant Wipes to Periwound;Paste Ring;Drape;Hydrofiber    Dressing Cleansing/Solutions 1/4 Strength Dakin's Solution    Dressing Options Wound Vac;Tubigrip    Dressing Changed Changed    Dressing Status Clean;Intact;Dry    Dressing Change/Treatment Frequency Monday, Wednesday, Friday, and As Needed    NEXT Dressing Change/Treatment Date 01/13/23    NEXT Weekly Photo (Inpatient Only) 01/18/23    Non-staged Wound Description Full thickness    Wound Length (cm) 10.4 cm    Wound Width (cm) 3.4 cm    Wound Depth (cm) 1.2 cm    Wound Surface Area (cm^2) 35.36 cm^2    Wound Volume (cm^3) 42.432 cm^3    Wound Healing % 9    Undermining (cm) 3 cm    Undermining of Wound, 1st Location From 2 o'clock;To 4 o'clock    Shape cluster of round wounds    Wound Odor None    Pulses Left;DP;2+    Exposed Structures SANJUANA    WOUND NURSE ONLY - Time Spent with Patient (mins) 60        Wound 12/19/22 Full Thickness Wound Pretibial Medial;Posterior Left Surgical (Active)   Wound Image      01/11/23 1300   Site Assessment Red;Drainage;Edema;Early/partial granulation;Painful;Slough    Periwound Assessment Red;Edema;Painful    Margins Attached edges;Defined edges    Closure Secondary intention    Drainage Amount Small    Drainage Description Serosanguineous    Treatments Cleansed;Site care;Offloading;Topical Lidocaine;Chemical Debridement;CSWD - Conservative Sharp Wound Debridement    Wound Cleansing Approved Wound Cleanser    Periwound Protectant Skin Protectant Wipes to Periwound;Paste Ring;Drape    Dressing Cleansing/Solutions  1/4 Strength Dakin's Solution    Dressing Options Wound Vac;Tubigrip    Dressing Changed Changed    Dressing Status Dry;Intact;Clean    Dressing Change/Treatment Frequency Monday, Wednesday, Friday, and As Needed    NEXT Dressing Change/Treatment Date 01/13/23    NEXT Weekly Photo (Inpatient Only) 01/18/23    Non-staged Wound Description Full thickness    Wound Length (cm) 6.5 cm    Wound Width (cm) 3.2 cm    Wound Depth (cm) 1.6 cm    Wound Surface Area (cm^2) 20.8 cm^2    Wound Volume (cm^3) 33.28 cm^3    Wound Healing % -86    Undermining (cm) 1.7 cm    Undermining of Wound, 1st Location From 1 o'clock;To 4 o'clock    Shape Oval    Wound Odor None    Pulses Left;2+;DP    Exposed Structures SANJUANA    WOUND NURSE ONLY - Time Spent with Patient (mins) 90      Vascular:    SHEELA:   No results found.    Lab Values:    Lab Results   Component Value Date/Time    WBC 10.5 01/06/2023 07:07 AM    RBC 3.95 (L) 01/06/2023 07:07 AM    HEMOGLOBIN 13.0 (L) 01/06/2023 07:07 AM    HEMATOCRIT 37.1 (L) 01/06/2023 07:07 AM    CREACTPROT 0.95 (H) 01/03/2023 07:06 PM    SEDRATEWES 51 (H) 01/03/2023 03:49 PM    HBA1C 8.5 (H) 12/04/2022 09:27 PM      Culture Results show:  Recent Results (from the past 720 hour(s))   CULTURE WOUND W/ GRAM STAIN    Collection Time: 01/05/23 10:56 AM    Specimen: Wound   Result Value Ref Range    Significant Indicator NEG     Source WND     Site Left Leg Abscess     Culture Result No growth at 72 hours.     Gram Stain Result No organisms seen.      Pain Level/Medicated:  premed with IV pain meds, Lidocaine applied to wound bed      INTERVENTIONS BY WOUND TEAM:  Chart and images reviewed. Discussed with bedside RN. All areas of concern (based on picture review, LDA review and discussion with bedside RN) have been thoroughly assessed. Documentation of areas based on significant findings. This RN in to assess patient. Performed standard wound care which includes appropriate positioning, dressing removal and  non-selective debridement. Pictures and measurements obtained weekly if/when required.         LLE anterior & posterior  Preparation for Dressing removal: Dressing soaked with wound cleanser and Lidocaine, easily removed.   Non-selectively Debrided with:  Wound cleanser and Gauze   Sharp debridement: NA  Inez wound: Cleansed with Wound cleanser and Gauze, Prepped with No Sting, 2.5 paste rings, and aquacel ag to wound edges/satellite wounds, and drape bridge between the two.   Primary Dressing: A piece of veraflo gray cleanse choice waffle layer foam was applied to each wound. Veraflo Black foam spiral applied into the anterior wound and then across the drape bridge to the posterior wound. All foam secured with drape,   Secondary (Outer) Dressing: A hole was cut in drape over foam bridge and a 4th and final piece of black circular veraflo foam was applied as a button for trac pad. veraflo trac pad applied. Suction obtained at 125mmhg with Veraflo test cycle completed. 40mL, 10min, Q2.5h. Tubigrip F applied with hole for vac tubing.      Interdisciplinary consultation: Patient, Bedside RN, Wound RN (Haim)     EVALUATION / RATIONALE FOR TREATMENT:  Most Recent Date:    01/11/23: Added cleanse choice waffle layer in attempt to debride stringy slough at wound edges/undermined areas. Continued with veraflo with the increased fluid rate. Tubigrip F reapplied as well for mild compression. Per pt plan is for him to DC home on Friday after scheduled vac change. Pt will need to be transitioned to regular vac at that time has ALL VAC SUPPLIES FOR Friday IN ROOM. Still waiting on home vac to be delivered. Pt states he has a follow up appointment at the outpatient wound clinic on Monday.     01/09/23: Inez-wound red and warm to touch, wound with slough present. Pt would benefit from repeat washout. Updated Dr. Lowe who agreed to pt staying inpatient for a couple more dressing changes and potential imaging to rule out fluid  collection/infection. Pt still needs home vac and outpatient follow up arranged. Continued with veraflo, fluid instillation increased.   1/6/23: Veraflo vac placement to LLE wounds x2 (anterior and posterior). Wound beds are fragile, 40% necrotic and 60% fragile red tissue, undermining present in both wound beds from 6750-1016, slight odor present. Wound beds need to continue to be cleansed with Dakins Veraflo, will assist in closure by secondary intention, assist in debridement of non-viable tissue, management of bioburden and exudate, encourage granulation tissue production, and increase oxygenation to the wound bed.  1/4: Pt has cluster of pustules to medial LLE proximal to ankle. He has two full thickness wounds to lateral LLE. Anterior shin rough texture, indurated and raised above surrounding skin. Culture taken from lateral wounds since able to probe depth. Medial wounds unable to probe. Aquacel Ag Hydrofiber applied to manage bioburden, absorb exudate, and maintain a moist wound environment without laterally wicking exudate therefore reducing nithin-wound maceration. ABD pads for drainage and Tubi  F for light compression.      Goals: Steady decrease in wound area and depth weekly.    WOUND TEAM PLAN OF CARE ([X] for frequency of wound follow up,):   Nursing to follow dressing orders written for wound care. Contact wound team if area fails to progress, deteriorates or with any questions/concerns if something comes up before next scheduled follow up (See below as to whether wound is following and frequency of wound follow up)  Dressing changes by wound team:                   Follow up 3 times weekly:                NPWT change 3 times weekly:   SUSANNA SMALL  Follow up 1-2 times weekly:      Follow up Bi-Monthly:           Follow up Monthly (High Risk):                        Follow up as needed:     Other (explain):     NURSING PLAN OF CARE ORDERS (X):  Dressing changes: See Dressing Care orders: x  Skin care:  See Skin Care orders:   RN Prevention Protocol: seen in ED  Rectal tube care: See Rectal Tube Care orders:   Other orders:    RSKIN:   CURRENTLY IN PLACE (X), APPLIED THIS VISIT (A), ORDERED (O):   Q shift Glynn:  X  Q shift pressure point assessments:  X    Surface/Positioning   Pressure redistribution mattress   X         Low Airloss          ICU Low Airloss   Bariatric PANCHITO     Waffle cushion        Waffle Overlay          Reposition q 2 hours      TAPs Turning system     Z Albin Pillow     Offloading/Redistribution   Sacral Mepilex (Silicone dressing)     Heel Mepilex (Silicone dressing)     X    Heel float boots (Prevalon boot)             Float Heels off Bed with Pillows           Respiratory RA  Silicone O2 tubing         Gray Foam Ear protectors     Cannula fixation Device (Tender )          High flow offloading Clip    Elastic head band offloading device      Anchorfast                                                         Trach with Optifoam split foam             Containment/Moisture Prevention using BSC    Rectal tube or BMS    Purwick/Condom Cath        Vargas Catheter    Barrier wipes           Barrier paste       Antifungal tx      Interdry        Mobilization       Up to chair        Ambulate   reports with pain to LLE   PT/OT      Nutrition       Dietician        Diabetes Education      PO   x  TF     TPN     NPO   # days     Other        Anticipated discharge plans:   LTACH:        SNF/Rehab:                  Home Health Care:           Outpatient Wound Center:   needs to resume, referral placed  Self/Family Care:        Other:                  Vac Discharge Needs:   Not Applicable Pt not on a wound vac:   x    Regular Vac while inpatient, alternative dressing at DC:        Regular Vac in use and continued at DC:            Reg. Vac w/ Skin Sub/Biologic in use. Will need to be changed 2x wkly:      Veraflo Vac while inpatient, ok to transition to Regular Vac on Discharge:           Veraflo Vac  while inpatient, will need to remain on Veraflo Vac upon discharge:

## 2023-01-11 NOTE — PROGRESS NOTES
Hospital Medicine Daily Progress Note    Date of Service  1/10/2023    Chief Complaint  Thomas Borja is a 62 y.o. male admitted 1/3/2023 with left lower extremity wound    Hospital Course  This is a 62-year-old male with past medical history of hypertension, type 2 diabetes A1c 8.5, MVR, hypothyroidism and morbid obesity with BMI of 40 who was admitted on 1/3/2023 with sepsis secondary to left lower extremity cellulitis.    Patient was recently discharged on 12/4/2022 for sepsis secondary to left lower extremity cellulitis, during hospital stay, wound cultures grew strep pyogenes group A and MSSA and he was treated with cefazolin, completing a 10-day course of antibiotics, discharged to follow-up with wound care.  Hospital course was complicated for KENRICK, requiring need for dialysis with later improvement of renal function.  Patient states since discharge, patient followed up with wound care and because of residual erythema and swelling and purulent lesion to anterior shin, patient was prescribed another 7-day course of linezolid that he finished on 12/30/2022.    On this admission, surgery was consulted, patient underwent I&D of left leg abscess with wound VAC placement on 01/5/2023.  Cultures are growing Enterobacter cloacae which is sensitive to ciprofloxacin.  Patient was switched to a ciprofloxacin oral antibiotic regimen which he is to conclude on 1/21/2023.    Interval Problem Update  Patient to be discharged on Friday after wound VAC change, patient will have outpatient wound clinic appointment on 1/16.    Meds to bedside for his antibiotics and pain regimen.    I have discussed this patient's plan of care and discharge plan at IDT rounds today with Case Management, Nursing, Nursing leadership, and other members of the IDT team.    Consultants/Specialty  orthopedics    Code Status  Full Code    Disposition  Patient is not medically cleared for discharge.   Anticipate discharge to to home with close  outpatient follow-up.  I have placed the appropriate orders for post-discharge needs.    Review of Systems  Review of Systems   All other systems reviewed and are negative.     Physical Exam  Temp:  [36.2 °C (97.2 °F)-37.1 °C (98.7 °F)] 37.1 °C (98.7 °F)  Pulse:  [90-97] 97  Resp:  [16-18] 18  BP: (127-141)/(72-80) 136/75  SpO2:  [93 %-98 %] 93 %    Physical Exam  Vitals and nursing note reviewed.   Constitutional:       General: He is not in acute distress.     Appearance: Normal appearance.   HENT:      Head: Normocephalic and atraumatic.   Eyes:      Extraocular Movements: Extraocular movements intact.      Conjunctiva/sclera: Conjunctivae normal.      Pupils: Pupils are equal, round, and reactive to light.   Cardiovascular:      Rate and Rhythm: Normal rate and regular rhythm.      Pulses: Normal pulses.      Heart sounds: No murmur heard.    No friction rub. No gallop.   Pulmonary:      Effort: Pulmonary effort is normal. No respiratory distress.      Breath sounds: Normal breath sounds. No wheezing, rhonchi or rales.   Abdominal:      General: Bowel sounds are normal. There is no distension.      Palpations: Abdomen is soft.      Tenderness: There is no abdominal tenderness.   Musculoskeletal:         General: No swelling or tenderness. Normal range of motion.      Cervical back: Normal range of motion and neck supple. No muscular tenderness.      Right lower leg: No edema.      Left lower leg: No edema.      Comments: Left lower extremity wound with wound VAC in place   Skin:     General: Skin is warm and dry.      Capillary Refill: Capillary refill takes less than 2 seconds.      Findings: No bruising, erythema or rash.   Neurological:      General: No focal deficit present.      Mental Status: He is alert and oriented to person, place, and time.       Fluids    Intake/Output Summary (Last 24 hours) at 1/10/2023 1948  Last data filed at 1/10/2023 0628  Gross per 24 hour   Intake 220 ml   Output 850 ml   Net  -630 ml       Laboratory                        Imaging  US-EXTREMITY VENOUS LOWER UNILAT LEFT   Final Result      DX-CHEST-PORTABLE (1 VIEW)   Final Result      No acute cardiac or pulmonary abnormalities are identified.        US-EXTREMITY VENOUS LOWER UNILAT LEFT   Final Result      DX-CHEST-PORTABLE (1 VIEW)   Final Result      No acute cardiac or pulmonary abnormalities are identified.           Assessment/Plan  * Leg abscess- (present on admission)  Assessment & Plan  Status post left leg abscess incision and drainage on 1/5/2023  1/3 blood cultures with no growth to date  1/3 urine culture no growth to date  1/4 left leg wound cultures growing Enterobacter cloacae which is resistant to ampicillin/sulbactam, cefazolin, and cefuroxime   1/5 left leg OR cultures with no growth to date  Continue oral Ciprofloxacin, end on 1/21/2023  Wound care following for wound VAC    LLE Cellulitis with purulent drainage- (present on admission)  Assessment & Plan  Recently discharged on 12/4/2022 for sepsis secondary to left lower extremity cellulitis, during hospital stay, wound cultures grew strep pyogenes group A and MSSA and he was treated with cefazolin, completing a 10-day course of antibiotics, discharged to follow-up with wound care. Was prescribed outpt linezolid for persistent erythema, no response, and pt noted development of new draining lesions and areas of fluctuance.   - US LLE neg for DVT  Continue ancef and linezolid; although prior culture grew group A strep and MSSA  Repeat blood and wound cultures  - MRSA nares pending.   - Norco PRN for pain control  - wound care consult  Surgery consulted for possible I+D    Sepsis due to cellulitis (HCC)- (present on admission)  Assessment & Plan  This is Sepsis Present on admission  SIRS criteria identified on my evaluation include: Tachycardia, with heart rate greater than 90 BPM, Tachypnea, with respirations greater than 20 per minute and Leukocytosis, with WBC  greater than 12,000  Source is LLE cellulitis  Sepsis protocol initiated  Fluid resuscitation ordered per protocol  Crystalloid Fluid Administration: given fluid in ED  IV antibiotics as appropriate for source of sepsis  Reassessment: I have reassessed the patient's hemodynamic status    Lactic acidosis present on admission, downtrended.    Uncomplicated type 2 diabetes mellitus (HCC)- (present on admission)  Assessment & Plan  Hold home glipizide. Metformin was held on recent hospital discharge due to need for short term dialysis need for KENRICK with subsequent improvement of renal function.   - sliding scale insulin   - hypoglycemia protocol    Benign essential hypertension- (present on admission)  Assessment & Plan  Continue home amlodipine         VTE prophylaxis: enoxaparin ppx    I have performed a physical exam and reviewed and updated ROS and Plan today (1/10/2023). In review of yesterday's note (1/9/2023), there are no changes except as documented above.

## 2023-01-11 NOTE — CARE PLAN
The patient is Stable - Low risk of patient condition declining or worsening    Shift Goals  Clinical Goals: wound vac  Patient Goals: pain control.  Family Goals: n/a    Progress made toward(s) clinical / shift goals:  patient will verbalizes understanding of lab test results. Patient will verbalize pain score 5/10 or less during this shift.     Problem: Knowledge Deficit - Standard  Goal: Patient and family/care givers will demonstrate understanding of plan of care, disease process/condition, diagnostic tests and medications  Outcome: Progressing     Problem: Pain - Standard  Goal: Alleviation of pain or a reduction in pain to the patient’s comfort goal  Outcome: Progressing       Patient is not progressing towards the following goals:

## 2023-01-12 LAB
GLUCOSE BLD STRIP.AUTO-MCNC: 132 MG/DL (ref 65–99)
GLUCOSE BLD STRIP.AUTO-MCNC: 144 MG/DL (ref 65–99)
GLUCOSE BLD STRIP.AUTO-MCNC: 172 MG/DL (ref 65–99)

## 2023-01-12 PROCEDURE — A9270 NON-COVERED ITEM OR SERVICE: HCPCS | Performed by: STUDENT IN AN ORGANIZED HEALTH CARE EDUCATION/TRAINING PROGRAM

## 2023-01-12 PROCEDURE — 82962 GLUCOSE BLOOD TEST: CPT

## 2023-01-12 PROCEDURE — 700111 HCHG RX REV CODE 636 W/ 250 OVERRIDE (IP): Performed by: GENERAL PRACTICE

## 2023-01-12 PROCEDURE — A9270 NON-COVERED ITEM OR SERVICE: HCPCS | Performed by: GENERAL PRACTICE

## 2023-01-12 PROCEDURE — 700102 HCHG RX REV CODE 250 W/ 637 OVERRIDE(OP): Performed by: STUDENT IN AN ORGANIZED HEALTH CARE EDUCATION/TRAINING PROGRAM

## 2023-01-12 PROCEDURE — A9270 NON-COVERED ITEM OR SERVICE: HCPCS

## 2023-01-12 PROCEDURE — 700102 HCHG RX REV CODE 250 W/ 637 OVERRIDE(OP)

## 2023-01-12 PROCEDURE — 770006 HCHG ROOM/CARE - MED/SURG/GYN SEMI*

## 2023-01-12 PROCEDURE — 99233 SBSQ HOSP IP/OBS HIGH 50: CPT | Performed by: GENERAL PRACTICE

## 2023-01-12 PROCEDURE — 700102 HCHG RX REV CODE 250 W/ 637 OVERRIDE(OP): Performed by: GENERAL PRACTICE

## 2023-01-12 PROCEDURE — 700101 HCHG RX REV CODE 250: Performed by: STUDENT IN AN ORGANIZED HEALTH CARE EDUCATION/TRAINING PROGRAM

## 2023-01-12 RX ADMIN — CIPROFLOXACIN 500 MG: 500 TABLET, FILM COATED ORAL at 04:36

## 2023-01-12 RX ADMIN — PROMETHAZINE HYDROCHLORIDE 25 MG: 25 TABLET ORAL at 17:35

## 2023-01-12 RX ADMIN — LEVOTHYROXINE SODIUM 112 MCG: 0.11 TABLET ORAL at 04:36

## 2023-01-12 RX ADMIN — HYDROCODONE BITARTRATE AND ACETAMINOPHEN 1 TABLET: 10; 325 TABLET ORAL at 17:36

## 2023-01-12 RX ADMIN — PROMETHAZINE HYDROCHLORIDE 25 MG: 25 TABLET ORAL at 04:35

## 2023-01-12 RX ADMIN — HYDROCODONE BITARTRATE AND ACETAMINOPHEN 1 TABLET: 10; 325 TABLET ORAL at 04:35

## 2023-01-12 RX ADMIN — ROSUVASTATIN CALCIUM 20 MG: 20 TABLET, FILM COATED ORAL at 04:36

## 2023-01-12 RX ADMIN — PREGABALIN 50 MG: 25 CAPSULE ORAL at 04:36

## 2023-01-12 RX ADMIN — SODIUM HYPOCHLORITE 1 ML: 1.25 SOLUTION TOPICAL at 10:24

## 2023-01-12 RX ADMIN — PROMETHAZINE HYDROCHLORIDE 25 MG: 25 TABLET ORAL at 10:32

## 2023-01-12 RX ADMIN — PREGABALIN 50 MG: 25 CAPSULE ORAL at 17:35

## 2023-01-12 RX ADMIN — TAMSULOSIN HYDROCHLORIDE 0.4 MG: 0.4 CAPSULE ORAL at 04:36

## 2023-01-12 RX ADMIN — HYDROMORPHONE HYDROCHLORIDE 1 MG: 1 INJECTION, SOLUTION INTRAMUSCULAR; INTRAVENOUS; SUBCUTANEOUS at 22:01

## 2023-01-12 RX ADMIN — HYDROCODONE BITARTRATE AND ACETAMINOPHEN 1 TABLET: 10; 325 TABLET ORAL at 10:33

## 2023-01-12 RX ADMIN — AMLODIPINE BESYLATE 10 MG: 10 TABLET ORAL at 04:36

## 2023-01-12 RX ADMIN — CIPROFLOXACIN 500 MG: 500 TABLET, FILM COATED ORAL at 18:00

## 2023-01-12 RX ADMIN — FINASTERIDE 5 MG: 5 TABLET, FILM COATED ORAL at 04:36

## 2023-01-12 RX ADMIN — OMEPRAZOLE 40 MG: 20 CAPSULE, DELAYED RELEASE ORAL at 04:36

## 2023-01-12 RX ADMIN — ENOXAPARIN SODIUM 40 MG: 40 INJECTION SUBCUTANEOUS at 17:35

## 2023-01-12 ASSESSMENT — PAIN DESCRIPTION - PAIN TYPE
TYPE: ACUTE PAIN
TYPE: ACUTE PAIN

## 2023-01-12 ASSESSMENT — PAIN SCALES - WONG BAKER: WONGBAKER_NUMERICALRESPONSE: HURTS JUST A LITTLE BIT

## 2023-01-12 NOTE — DISCHARGE PLANNING
Case Management Discharge Planning    Admission Date: 1/3/2023  GMLOS: 5.1  ALOS: 9    6-Clicks ADL Score: 23  6-Clicks Mobility Score: 24      Anticipated Discharge Dispo: Discharge Disposition: Discharged to home/self care (01)    DME Needed: Yes    DME Ordered: Yes    Action(s) Taken: OTHER    VM left for Rosangela w/ [facility] requesting return call to f/u on wound vac delivery     Escalations Completed: None    Medically Clear: No    Next Steps: F/U with [facility]    Barriers to Discharge: DME    Is the patient up for discharge tomorrow: No

## 2023-01-12 NOTE — CARE PLAN
The patient is Stable - Low risk of patient condition declining or worsening    Shift Goals  Clinical Goals: Wound vac and pain management  Patient Goals: Pain management  Family Goals: n/a      Problem: Knowledge Deficit - Standard  Goal: Patient and family/care givers will demonstrate understanding of plan of care, disease process/condition, diagnostic tests and medications  Outcome: Progressing     Problem: Hemodynamics  Goal: Patient's hemodynamics, fluid balance and neurologic status will be stable or improve  Outcome: Progressing     Problem: Physical Regulation  Goal: Diagnostic test results will improve  Outcome: Progressing  Goal: Signs and symptoms of infection will decrease  Outcome: Progressing     Problem: Pain - Standard  Goal: Alleviation of pain or a reduction in pain to the patient’s comfort goal  Outcome: Progressing       Progress made toward(s) clinical / shift goals:  Patient updated on the plan of care. All questions answered. Pain assessed. Medicated per MAR. Skin assessed. Wound vac assessed. Fall precautions in place. Care ongoing.

## 2023-01-12 NOTE — DISCHARGE PLANNING
Received Choice form at [phone]  Agency/Facility Name: [facility]  Referral sent per Choice form @ [phone]

## 2023-01-12 NOTE — DISCHARGE PLANNING
Case Management Discharge Planning    Admission Date: 1/3/2023  GMLOS: 5.1  ALOS: 9    6-Clicks ADL Score: 23  6-Clicks Mobility Score: 24      Anticipated Discharge Dispo: Discharge Disposition: Discharged to home/self care (01)    DME Needed: Yes    DME Ordered: Yes    Action(s) Taken: OTHER    Discussed pt's case with Rosangela baez/ [facility]. Per Rosangela, they ran pt's insurance and pt doesn't have any DME coverage. Self pay cost would be about $3000/ month.    Met with pt at bedside. Pt states he will call his insurance to look into DME coverage. Pt states self pay cost is too high. Discussed SNF placement for wound care and pt is willing to consider if insurance doesn't cover wound vac. [facility]'s [facility] choice is [facility].     Choice form completed and faxed to [facility]    Escalations Completed: Leadership    Medically Clear: No    Next Steps: F/U with SNF referrals     Barriers to Discharge: DME and Outpatient referrals pending    Is the patient up for discharge tomorrow: No

## 2023-01-12 NOTE — PROGRESS NOTES
Received report from FLORIN Rader and assumed care of patient at 19:00. Orders reviewed. Assessment completed. A&Ox4, VSS on RA, wound vac to continuous suction. Medicated per MAR. Fall precautions in place. Bed in the lowest position, wheels locked, and call light within reach. Patient calls appropriately. Care ongoing.

## 2023-01-12 NOTE — PROGRESS NOTES
Report received and pt care assumed at 645. Assessment completed and pain checked. A&OX4 and up with standby. Pain 2/10 in the L leg. LBM 1/11. IV clean, dry, and intact. Bed alarm on. Call light within reach.

## 2023-01-12 NOTE — CARE PLAN
The patient is Stable - Low risk of patient condition declining or worsening    Shift Goals  Clinical Goals: wound vac pain  Patient Goals: pain control  Family Goals: n/a    Progress made toward(s) clinical / shift goals:    Problem: Knowledge Deficit - Standard  Goal: Patient and family/care givers will demonstrate understanding of plan of care, disease process/condition, diagnostic tests and medications  Outcome: Progressing     Problem: Respiratory  Goal: Patient will achieve/maintain optimum respiratory ventilation and gas exchange  Outcome: Progressing     Problem: Fluid Volume  Goal: Fluid volume balance will be maintained  Outcome: Progressing       Patient is not progressing towards the following goals:

## 2023-01-12 NOTE — DISCHARGE PLANNING
Case Management Discharge Planning    Admission Date: 1/3/2023  GMLOS: 5.1  ALOS: 9    6-Clicks ADL Score: 23  6-Clicks Mobility Score: 24      Anticipated Discharge Dispo: Discharge Disposition: Discharged to home/self care (01)    DME Needed: Yes    DME Ordered: Yes    Action(s) Taken: Referral(s) sent    Notified by pt he called his insurance and was told he does have DME benefit but needs to go through [facility].     LSW provided Thomas baez/ [facility] with facesheet, OP note, wound note and signed wound vac order.     Escalations Completed: Social Work    Medically Clear: No    Next Steps: F/U with [facility]     Barriers to Discharge: DME and Outpatient referrals pending    Is the patient up for discharge tomorrow: No      Care Transition Team Assessment    Pt lives at address listed on facesheet with spouse. Pt is independent with all self care and driving. Pt works full time from home. PCP is Dr. Grey. Pt reports no barriers with going to [facility] wound clinic for wound vac changes.     Information Source  Orientation Level: Oriented X4  Information Given By: Patient    Elopement Risk  Legal Hold: No  Ambulatory or Self Mobile in Wheelchair: Yes  Disoriented: No  Psychiatric Symptoms: None  History of Wandering: No  Elopement this Admit: No  Vocalizing Wanting to Leave: No  Displays Behaviors, Body Language Wanting to Leave: No-Not at Risk for Elopement  Elopement Risk: Not at Risk for Elopement    Interdisciplinary Discharge Planning  Lives with - Patient's Self Care Capacity: (P) Spouse  Patient or legal guardian wants to designate a caregiver: No  Support Systems: (P) Family Member(s), Friends / Neighbors  Housing / Facility: (P) [address]  Patient Prefers to be Discharged to:: (P) Home  Durable Medical Equipment: (P) Not Applicable    Discharge Preparedness  What is your plan after discharge?: Home with help  What are your discharge supports?: Spouse  Prior Functional Level: Ambulatory, Independent with Activities  of Daily Living, Independent with Medication Management    Functional Assesment  Prior Functional Level: Ambulatory, Independent with Activities of Daily Living, Independent with Medication Management    Finances  Financial Barriers to Discharge: No  Prescription Coverage: Yes    Vision / Hearing Impairment  Right Eye Vision: Impaired, Wears Glasses  Left Eye Vision: Impaired, Wears Glasses    Domestic Abuse  Have you ever been the victim of abuse or violence?: No  Physical Abuse or Sexual Abuse: No  Verbal Abuse or Emotional Abuse: No  Possible Abuse/Neglect Reported to:: Not Applicable    Psychological Assessment  History of Substance Abuse: None  History of Psychiatric Problems: No  Non-compliant with Treatment: No  Newly Diagnosed Illness: Yes    Discharge Risks or Barriers  Discharge risks or barriers?: No    Anticipated Discharge Information  Discharge Disposition: Discharged to home/self care (01)

## 2023-01-13 ENCOUNTER — PHARMACY VISIT (OUTPATIENT)
Dept: PHARMACY | Facility: MEDICAL CENTER | Age: 63
End: 2023-01-13
Payer: COMMERCIAL

## 2023-01-13 VITALS
WEIGHT: 295.2 LBS | HEART RATE: 108 BPM | TEMPERATURE: 97.7 F | OXYGEN SATURATION: 91 % | BODY MASS INDEX: 39.98 KG/M2 | SYSTOLIC BLOOD PRESSURE: 108 MMHG | RESPIRATION RATE: 18 BRPM | HEIGHT: 72 IN | DIASTOLIC BLOOD PRESSURE: 69 MMHG

## 2023-01-13 LAB
GLUCOSE BLD STRIP.AUTO-MCNC: 132 MG/DL (ref 65–99)
GLUCOSE BLD STRIP.AUTO-MCNC: 176 MG/DL (ref 65–99)

## 2023-01-13 PROCEDURE — A9270 NON-COVERED ITEM OR SERVICE: HCPCS | Performed by: STUDENT IN AN ORGANIZED HEALTH CARE EDUCATION/TRAINING PROGRAM

## 2023-01-13 PROCEDURE — 700102 HCHG RX REV CODE 250 W/ 637 OVERRIDE(OP)

## 2023-01-13 PROCEDURE — 700101 HCHG RX REV CODE 250: Performed by: GENERAL PRACTICE

## 2023-01-13 PROCEDURE — 700102 HCHG RX REV CODE 250 W/ 637 OVERRIDE(OP): Performed by: GENERAL PRACTICE

## 2023-01-13 PROCEDURE — 700111 HCHG RX REV CODE 636 W/ 250 OVERRIDE (IP): Performed by: GENERAL PRACTICE

## 2023-01-13 PROCEDURE — 700102 HCHG RX REV CODE 250 W/ 637 OVERRIDE(OP): Performed by: STUDENT IN AN ORGANIZED HEALTH CARE EDUCATION/TRAINING PROGRAM

## 2023-01-13 PROCEDURE — 99239 HOSP IP/OBS DSCHRG MGMT >30: CPT | Performed by: GENERAL PRACTICE

## 2023-01-13 PROCEDURE — 97602 WOUND(S) CARE NON-SELECTIVE: CPT

## 2023-01-13 PROCEDURE — A9270 NON-COVERED ITEM OR SERVICE: HCPCS

## 2023-01-13 PROCEDURE — 97605 NEG PRS WND THER DME<=50SQCM: CPT

## 2023-01-13 PROCEDURE — A9270 NON-COVERED ITEM OR SERVICE: HCPCS | Performed by: GENERAL PRACTICE

## 2023-01-13 PROCEDURE — 82962 GLUCOSE BLOOD TEST: CPT | Mod: 91

## 2023-01-13 RX ADMIN — HYDROCODONE BITARTRATE AND ACETAMINOPHEN 1 TABLET: 10; 325 TABLET ORAL at 06:04

## 2023-01-13 RX ADMIN — HYDROCODONE BITARTRATE AND ACETAMINOPHEN 1 TABLET: 10; 325 TABLET ORAL at 13:10

## 2023-01-13 RX ADMIN — PROMETHAZINE HYDROCHLORIDE 25 MG: 25 TABLET ORAL at 06:04

## 2023-01-13 RX ADMIN — TAMSULOSIN HYDROCHLORIDE 0.4 MG: 0.4 CAPSULE ORAL at 04:17

## 2023-01-13 RX ADMIN — CIPROFLOXACIN 500 MG: 500 TABLET, FILM COATED ORAL at 06:00

## 2023-01-13 RX ADMIN — PREGABALIN 50 MG: 25 CAPSULE ORAL at 04:17

## 2023-01-13 RX ADMIN — HYDROMORPHONE HYDROCHLORIDE 1 MG: 1 INJECTION, SOLUTION INTRAMUSCULAR; INTRAVENOUS; SUBCUTANEOUS at 10:31

## 2023-01-13 RX ADMIN — LIDOCAINE HYDROCHLORIDE 20 ML: 20 INJECTION, SOLUTION INFILTRATION; PERINEURAL at 09:50

## 2023-01-13 RX ADMIN — LEVOTHYROXINE SODIUM 112 MCG: 0.11 TABLET ORAL at 04:18

## 2023-01-13 RX ADMIN — AMLODIPINE BESYLATE 10 MG: 10 TABLET ORAL at 04:18

## 2023-01-13 RX ADMIN — OMEPRAZOLE 40 MG: 20 CAPSULE, DELAYED RELEASE ORAL at 04:17

## 2023-01-13 RX ADMIN — FINASTERIDE 5 MG: 5 TABLET, FILM COATED ORAL at 04:17

## 2023-01-13 RX ADMIN — ROSUVASTATIN CALCIUM 20 MG: 20 TABLET, FILM COATED ORAL at 04:17

## 2023-01-13 ASSESSMENT — PAIN SCALES - WONG BAKER: WONGBAKER_NUMERICALRESPONSE: HURTS JUST A LITTLE BIT

## 2023-01-13 ASSESSMENT — PAIN DESCRIPTION - PAIN TYPE: TYPE: ACUTE PAIN

## 2023-01-13 NOTE — PROGRESS NOTES
Assumed care of patient at 1900. Patient is A&Ox4, VSS on 2L. Patient states he wears O2 just when he sleeps due to sleep apnea, otherwise pt is RA. Patient complaining of pain in leg, PO medication unavailable, IV given one time. Patient has no other complaints at this time. Bed locked and in lowest position, call light and personal belongings within reach.

## 2023-01-13 NOTE — PROGRESS NOTES
Arrive to dc lounge via wheelchair. A/O, dc instructions reviewed w/ pt, verbalized understanding. Meds to bed delivered.

## 2023-01-13 NOTE — DISCHARGE PLANNING
Case Management Discharge Planning    Admission Date: 1/3/2023  GMLOS: 5.1  ALOS: 10    6-Clicks ADL Score: 23  6-Clicks Mobility Score: 24      Anticipated Discharge Dispo: Discharge Disposition: Discharged to home/self care (01)    DME Needed: Yes    DME Ordered: Yes    Action(s) Taken: OTHER    Spoke nestor/ Thomas @ [facility], wound vac is approved and will be delivered today.     Wound appt scheduled for Monday @ 1500    Escalations Completed: None    Medically Clear: Yes    Next Steps: D/C home after wound vac delivery    Barriers to Discharge: None

## 2023-01-13 NOTE — CARE PLAN
The patient is Stable - Low risk of patient condition declining or worsening    Shift Goals  Clinical Goals: wound vac, pain control, d/c  Patient Goals: pain control  Family Goals: n/a    Progress made toward(s) clinical / shift goals:  Patient stable at this time.    Patient is not progressing towards the following goals: N/A

## 2023-01-13 NOTE — WOUND TEAM
[facility]  Inpatient Services  Wound and Skin Care Evaluation    Admission Date: 1/3/2023     Last order of IP CONSULT TO WOUND CARE was found on 1/3/2023 from Hospital Encounter on 1/3/2023     HPI, PMH, SH: Reviewed    Past Surgical History:   Procedure Laterality Date    SKIN ABSCESS INCISION AND DRAINAGE Left 1/5/2023    Procedure: LEFT LEG INCISION AND DRAINAGE ABSCESS;  Surgeon: Cesar Odom M.D.;  Location: SURGERY [facility];  Service: General     Social History     Tobacco Use    Smoking status: Former    Smokeless tobacco: Never   Substance Use Topics    Alcohol use: Yes     Comment: occ     Chief Complaint   Patient presents with    Wound Check     Hx of cellulitis in the LLE, sent by wound care for IV ABX. Hx of diabetes.      Diagnosis: Sepsis due to cellulitis (HCC) [L03.90, A41.9]    Unit where seen by Wound Team: S530/02    WOUND CONSULT/FOLLOW UP RELATED TO:  LLE VAC     WOUND HISTORY:  pt was recently admitted with severe cellulitis to the E, pt was ultimately DC'd home with outpatient wound clinic follow up. Pt was sent to ER by outpatient wound clinic due to wounds worsened.     Underwent I&D with MD Odom 1/5/23. Vac placement today.     WOUND ASSESSMENT/LDA     Negative Pressure Wound Therapy 01/06/23 Surgical Pretibial Left (Active)   Vacuum Serial Number [id number] 01/11/23 1300   NPWT Pump Mode / Pressure Setting Ulta    Dressing Type Medium;Black [employer] Foam    Number of Foam Pieces Used 3    Canister Changed No    NEXT Dressing Change/Treatment Date 01/16/23    WOUND NURSE ONLY - Time Spent with Patient (mins) 60            Wound 12/19/22 Full Thickness Wound Pretibial Anterior Left Surgical (Active)   Wound Image   01/11/23 1300   Site Assessment Red;Painful;Slough;Drainage;Edema    Periwound Assessment Red;Warm;Edema    Margins Attached edges;Defined edges    Closure Secondary intention    Drainage Amount Small    Drainage Description Serosanguineous    Treatments  Cleansed;Site care;Offloading;Chemical Debridement;CSWD - Conservative Sharp Wound Debridement;Topical Lidocaine    Wound Cleansing Dakin's Solution    Periwound Protectant Skin Protectant Wipes to Periwound;Paste Ring;Drape;Hydrofiber    Dressing Cleansing/Solutions 1/4 Strength Dakin's Solution    Dressing Options Wound Vac;Tubigrip    Dressing Changed Changed    Dressing Status Clean;Intact;Dry    Dressing Change/Treatment Frequency Monday, Wednesday, Friday, and As Needed    NEXT Dressing Change/Treatment Date 01/13/23    NEXT Weekly Photo (Inpatient Only) 01/18/23    Non-staged Wound Description Full thickness    Wound Length (cm) 10.4 cm    Wound Width (cm) 3.4 cm    Wound Depth (cm) 1.2 cm    Wound Surface Area (cm^2) 35.36 cm^2    Wound Volume (cm^3) 42.432 cm^3    Wound Healing % 9    Undermining (cm) 3 cm    Undermining of Wound, 1st Location From 2 o'clock;To 4 o'clock    Shape cluster of round wounds    Wound Odor None    Pulses Left;DP;2+    Exposed Structures SANJUANA    WOUND NURSE ONLY - Time Spent with Patient (mins) 60        Wound 12/19/22 Full Thickness Wound Pretibial Medial;Posterior Left Surgical (Active)   Wound Image      01/11/23 1300   Site Assessment Red;Drainage;Edema;Early/partial granulation;Painful;Slough    Periwound Assessment Red;Edema;Painful    Margins Attached edges;Defined edges    Closure Secondary intention    Drainage Amount Small    Drainage Description Serosanguineous    Treatments Cleansed;Site care;Offloading;Topical Lidocaine;Chemical Debridement;CSWD - Conservative Sharp Wound Debridement    Wound Cleansing Approved Wound Cleanser    Periwound Protectant Skin Protectant Wipes to Periwound;Paste Ring;Drape    Dressing Cleansing/Solutions 1/4 Strength Dakin's Solution    Dressing Options Wound Vac;Tubigrip    Dressing Changed Changed    Dressing Status Dry;Intact;Clean    Dressing Change/Treatment Frequency Monday, Wednesday, Friday, and As Needed    NEXT Dressing  Change/Treatment Date 01/13/23    NEXT Weekly Photo (Inpatient Only) 01/18/23    Non-staged Wound Description Full thickness    Wound Length (cm) 6.5 cm    Wound Width (cm) 3.2 cm    Wound Depth (cm) 1.6 cm    Wound Surface Area (cm^2) 20.8 cm^2    Wound Volume (cm^3) 33.28 cm^3    Wound Healing % -86    Undermining (cm) 1.7 cm    Undermining of Wound, 1st Location From 1 o'clock;To 4 o'clock    Shape Oval    Wound Odor None    Pulses Left;2+;DP    Exposed Structures SANJUANA    WOUND NURSE ONLY - Time Spent with Patient (mins) 90      Vascular:    SHEELA:   No results found.    Lab Values:    Lab Results   Component Value Date/Time    WBC 10.5 01/06/2023 07:07 AM    RBC 3.95 (L) 01/06/2023 07:07 AM    HEMOGLOBIN 13.0 (L) 01/06/2023 07:07 AM    HEMATOCRIT 37.1 (L) 01/06/2023 07:07 AM    CREACTPROT 0.95 (H) 01/03/2023 07:06 PM    SEDRATEWES 51 (H) 01/03/2023 03:49 PM    HBA1C 8.5 (H) 12/04/2022 09:27 PM      Culture Results show:  Recent Results (from the past 720 hour(s))   CULTURE WOUND W/ GRAM STAIN    Collection Time: 01/05/23 10:56 AM    Specimen: Wound   Result Value Ref Range    Significant Indicator NEG     Source WND     Site Left Leg Abscess     Culture Result No growth at 72 hours.     Gram Stain Result No organisms seen.      Pain Level/Medicated:  premed with IV pain meds, Lidocaine applied to wound bed      INTERVENTIONS BY WOUND TEAM:  Chart and images reviewed. Discussed with bedside RN. All areas of concern (based on picture review, LDA review and discussion with bedside RN) have been thoroughly assessed. Documentation of areas based on significant findings. This RN in to assess patient. Performed standard wound care which includes appropriate positioning, dressing removal and non-selective debridement. Pictures and measurements obtained weekly if/when required.         LLE anterior & posterior  Preparation for Dressing removal: Dressing soaked with wound cleanser and Lidocaine, easily removed.    Non-selectively Debrided with:  Wound cleanser and Gauze   Sharp debridement: NA  Inez wound: Cleansed with Wound cleanser and Gauze, Prepped with No Sting, 2.5 paste rings, and aquacel ag to wound edges/satellite wounds, and drape bridge between the two.   Primary Dressin piece of cut to fit full thickness black [employer] foam applied into anterior wound. 2nd piece of spiraled full thickness black [employer] foam applied as a bridge to the posterior wound. 3rd piece of cut to fit full thickness black [employer] foam applied into posterior wound. All foam secured with drape. A hole was cut in drape over foam bridge and [employer] trac pad was applied. Suction initiated.  Secondary (Outer) Dressing: Double layer of tubigrip applied.     Interdisciplinary consultation: Patient, Bedside RN, Wound RN (Alejandra)     EVALUATION / RATIONALE FOR TREATMENT:  Most Recent Date:    23: Transitioned to regular vac for DC. [employer] vac applied. Pt has appointment to follow up at outpatient wound clinic on Monday. Pt was educated on vac care and wet to dry application and when to apply if needed.     23: Added cleanse choice waffle layer in attempt to debride stringy slough at wound edges/undermined areas. Continued with veraflo with the increased fluid rate. Tubigrip F reapplied as well for mild compression. Per pt plan is for him to DC home on Friday after scheduled vac change. Pt will need to be transitioned to regular vac at that time has ALL VAC SUPPLIES FOR Friday IN ROOM. Still waiting on home vac to be delivered. Pt states he has a follow up appointment at the outpatient wound clinic on Monday.   23: Inez-wound red and warm to touch, wound with slough present. Pt would benefit from repeat washout. Updated Dr. Lwoe who agreed to pt staying inpatient for a couple more dressing changes and potential imaging to rule out fluid collection/infection. Pt still needs home vac and outpatient follow up arranged. Continued with  veraflo, fluid instillation increased.   1/6/23: Veraflo vac placement to LLE wounds x2 (anterior and posterior). Wound beds are fragile, 40% necrotic and 60% fragile red tissue, undermining present in both wound beds from [id number], slight odor present. Wound beds need to continue to be cleansed with Dakins Veraflo, will assist in closure by secondary intention, assist in debridement of non-viable tissue, management of bioburden and exudate, encourage granulation tissue production, and increase oxygenation to the wound bed.  1/4: Pt has cluster of pustules to medial LLE proximal to ankle. He has two full thickness wounds to lateral LLE. Anterior shin rough texture, indurated and raised above surrounding skin. Culture taken from lateral wounds since able to probe depth. Medial wounds unable to probe. Aquacel Ag Hydrofiber applied to manage bioburden, absorb exudate, and maintain a moist wound environment without laterally wicking exudate therefore reducing nithin-wound maceration. ABD pads for drainage and Tubi  F for light compression.      Goals: Steady decrease in wound area and depth weekly.    WOUND TEAM PLAN OF CARE ([X] for frequency of wound follow up,):   Nursing to follow dressing orders written for wound care. Contact wound team if area fails to progress, deteriorates or with any questions/concerns if something comes up before next scheduled follow up (See below as to whether wound is following and frequency of wound follow up)  Dressing changes by wound team:                   Follow up 3 times weekly:                NPWT change 3 times weekly:   XSUSANNA  Follow up 1-2 times weekly:      Follow up Bi-Monthly:           Follow up Monthly (High Risk):                        Follow up as needed:     Other (explain):     NURSING PLAN OF CARE ORDERS (X):  Dressing changes: See Dressing Care orders: x  Skin care: See Skin Care orders:   RN Prevention Protocol: seen in ED  Rectal tube care: See Rectal Tube  Care orders:   Other orders:    RSKIN:   CURRENTLY IN PLACE (X), APPLIED THIS VISIT (A), ORDERED (O):   Q shift Glynn:  X  Q shift pressure point assessments:  X    Surface/Positioning   Pressure redistribution mattress   X         Low Airloss          ICU Low Airloss   Bariatric PANCHITO     Waffle cushion        Waffle Overlay          Reposition q 2 hours      TAPs Turning system     Z Albin Pillow     Offloading/Redistribution   Sacral Mepilex (Silicone dressing)     Heel Mepilex (Silicone dressing)     X    Heel float boots (Prevalon boot)             Float Heels off Bed with Pillows           Respiratory RA  Silicone O2 tubing         Gray Foam Ear protectors     Cannula fixation Device (Tender )          High flow offloading Clip    Elastic head band offloading device      Anchorfast                                                         Trach with Optifoam split foam             Containment/Moisture Prevention using BSC    Rectal tube or BMS    Purwick/Condom Cath        Vargas Catheter    Barrier wipes           Barrier paste       Antifungal tx      Interdry        Mobilization       Up to chair        Ambulate   reports with pain to LLE   PT/OT      Nutrition       Dietician        Diabetes Education      PO   x  TF     TPN     NPO   # days     Other        Anticipated discharge plans:   LTACH:        SNF/Rehab:                  Home Health Care:           Outpatient Wound Center:   needs to resume, referral placed  Self/Family Care:        Other:                  Vac Discharge Needs:   Not Applicable Pt not on a wound vac:   x    Regular Vac while inpatient, alternative dressing at DC:        Regular Vac in use and continued at DC:            Reg. Vac w/ Skin Sub/Biologic in use. Will need to be changed 2x wkly:      Veraflo Vac while inpatient, ok to transition to Regular Vac on Discharge:           Veraflo Vac while inpatient, will need to remain on Veraflo Vac upon discharge:

## 2023-01-13 NOTE — DISCHARGE SUMMARY
Discharge Summary    CHIEF COMPLAINT ON ADMISSION  Chief Complaint   Patient presents with    Wound Check     Hx of cellulitis in the LLE, sent by wound care for IV ABX. Hx of diabetes.        Reason for Admission  Sent by MD     Admission Date  1/3/2023    CODE STATUS  Full Code    HPI & HOSPITAL COURSE  This is a 62-year-old male with past medical history of hypertension, type 2 diabetes A1c 8.5, MVR, hypothyroidism and morbid obesity with BMI of 40 who was admitted on 1/3/2023 with sepsis secondary to left lower extremity cellulitis.    Patient was recently discharged on 12/4/2022 for sepsis secondary to left lower extremity cellulitis, during hospital stay, wound cultures grew strep pyogenes group A and MSSA and he was treated with cefazolin, completing a 10-day course of antibiotics, discharged to follow-up with wound care.  Hospital course was complicated for KENRICK, requiring need for dialysis with later improvement of renal function.  Patient states since discharge, patient followed up with wound care and because of residual erythema and swelling and purulent lesion to anterior shin, patient was prescribed another 7-day course of linezolid that he finished on 12/30/2022.    On this admission, surgery was consulted, patient underwent I&D of left leg abscess with wound VAC placement on 01/5/2023.  Cultures are growing Enterobacter cloacae which is sensitive to ciprofloxacin.  Patient was switched to a ciprofloxacin oral antibiotic regimen which he is to conclude on 1/21/2023.  Patient has follow-up with outpatient wound clinic on 1/16/2023.    Therefore, he is discharged in good and stable condition to home with close outpatient follow-up.    The patient met 2-midnight criteria for an inpatient stay at the time of discharge.    Discharge Date  1/13/2023    FOLLOW UP ITEMS POST DISCHARGE  Primary care physician  Wound clinic    DISCHARGE DIAGNOSES  Principal Problem:    Leg abscess POA: Yes  Active Problems:     Benign essential hypertension POA: Yes    Uncomplicated type 2 diabetes mellitus (HCC) POA: Yes    Sepsis due to cellulitis (HCC) POA: Yes    LLE Cellulitis with purulent drainage POA: Yes  Resolved Problems:    * No resolved hospital problems. *      FOLLOW UP  Future Appointments   Date Time Provider Department Center   1/16/2023  3:00 PM Iggy Aaron R.N. [facility] [address] [place] [address] WOUND CARE CENTER  [address] [facility] [address]  [facility] [address]  [phone]          [facility] W[facility]  [address] [facility] [address]  [facility] [address]  [phone]          Benji Grey M.D.  [address] [facility] [id number] [phone]    Follow up        MEDICATIONS ON DISCHARGE     Medication List        START taking these medications        Instructions   ciprofloxacin 500 MG Tabs  Commonly known as: CIPRO   Take 1 Tablet by mouth every 12 hours for 10 days.  Dose: 500 mg     HYDROcodone-acetaminophen 5-325 MG Tabs per tablet  Commonly known as: NORCO   Take 1 Tablet by mouth every 8 hours as needed (severe pain) for up to 4 days.  Dose: 1 Tablet            CONTINUE taking these medications        Instructions   acetaminophen 325 MG Tabs  Commonly known as: Tylenol   Take 650 mg by mouth every four hours as needed.  Dose: 650 mg     amLODIPine 10 MG Tabs  Commonly known as: NORVASC   Take 10 mg by mouth every day.  Dose: 10 mg     finasteride 5 MG Tabs  Commonly known as: PROSCAR   Take 5 mg by mouth every day.  Dose: 5 mg     glipiZIDE 5 MG Tabs  Commonly known as: GLUCOTROL   Take 0.5 Tablets by mouth every day.  Dose: 2.5 mg     levothyroxine 112 MCG Tabs  Commonly known as: SYNTHROID   Take 112 mcg by mouth every morning on an empty stomach.  Dose: 112 mcg     omeprazole 40 MG delayed-release capsule  Commonly known as: PRILOSEC   Take 40 mg by mouth every day.  Dose: 40 mg     pregabalin 50 MG capsule  Commonly known as: LYRICA   Take 50 mg by mouth 2 times a day.  Dose: 50 mg     rosuvastatin 20 MG Tabs  Commonly  known as: CRESTOR   Take 20 mg by mouth every day.  Dose: 20 mg     tamsulosin 0.4 MG capsule  Commonly known as: FLOMAX   Take 0.4 mg by mouth every day.  Dose: 0.4 mg     traZODone 50 MG Tabs  Commonly known as: DESYREL   Take 100 mg by mouth every evening.  Dose: 100 mg            STOP taking these medications      linezolid 600 MG Tabs  Commonly known as: ZYVOX              Allergies  Allergies   Allergen Reactions    Amoxicillin Hives    Codeine Nausea    Erythromycin Nausea    Oxycodone Rash             DIET  Orders Placed This Encounter   Procedures    Diet Order Diet: Consistent CHO (Diabetic)     Standing Status:   Standing     Number of Occurrences:   1     Order Specific Question:   Diet:     Answer:   Consistent CHO (Diabetic) [4]       ACTIVITY  As tolerated.  Weight bearing as tolerated    CONSULTATIONS  General surgery    PROCEDURES  I&D and wound VAC placement    LABORATORY  Lab Results   Component Value Date    SODIUM 135 01/06/2023    POTASSIUM 4.6 01/06/2023    CHLORIDE 99 01/06/2023    CO2 26 01/06/2023    GLUCOSE 168 (H) 01/06/2023    BUN 16 01/06/2023    CREATININE 1.20 01/06/2023        Lab Results   Component Value Date    WBC 10.5 01/06/2023    HEMOGLOBIN 13.0 (L) 01/06/2023    HEMATOCRIT 37.1 (L) 01/06/2023    PLATELETCT 389 01/06/2023      US-EXTREMITY VENOUS LOWER UNILAT LEFT   Final Result      DX-CHEST-PORTABLE (1 VIEW)   Final Result      No acute cardiac or pulmonary abnormalities are identified.         Total time of the discharge process exceeds 45 minutes.

## 2023-01-13 NOTE — CARE PLAN
The patient is Stable - Low risk of patient condition declining or worsening    Shift Goals  Clinical Goals: Wound vac  Patient Goals: Rest  Family Goals: n/a    Progress made toward(s) clinical / shift goals:    Problem: Knowledge Deficit - Standard  Goal: Patient and family/care givers will demonstrate understanding of plan of care, disease process/condition, diagnostic tests and medications  Outcome: Progressing     Problem: Hemodynamics  Goal: Patient's hemodynamics, fluid balance and neurologic status will be stable or improve  Outcome: Progressing     Problem: Fluid Volume  Goal: Fluid volume balance will be maintained  Outcome: Progressing       Patient is not progressing towards the following goals:

## 2023-01-13 NOTE — PROGRESS NOTES
Report received and pt care assumed at 645. Assessment completed and pain checked. A&OX4 and up with standby. Pain 1/10. LBM 1/11. IV clean, dry, and intact. Bed alarm on. Call light within reach.

## 2023-01-13 NOTE — PROGRESS NOTES
Hospital Medicine Daily Progress Note    Date of Service  1/12/2023    Chief Complaint  Thomas Borja is a 62 y.o. male admitted 1/3/2023 with left lower extremity wound    Hospital Course  This is a 62-year-old male with past medical history of hypertension, type 2 diabetes A1c 8.5, MVR, hypothyroidism and morbid obesity with BMI of 40 who was admitted on 1/3/2023 with sepsis secondary to left lower extremity cellulitis.    Patient was recently discharged on 12/4/2022 for sepsis secondary to left lower extremity cellulitis, during hospital stay, wound cultures grew strep pyogenes group A and MSSA and he was treated with cefazolin, completing a 10-day course of antibiotics, discharged to follow-up with wound care.  Hospital course was complicated for KENRICK, requiring need for dialysis with later improvement of renal function.  Patient states since discharge, patient followed up with wound care and because of residual erythema and swelling and purulent lesion to anterior shin, patient was prescribed another 7-day course of linezolid that he finished on 12/30/2022.    On this admission, surgery was consulted, patient underwent I&D of left leg abscess with wound VAC placement on 01/5/2023.  Cultures are growing Enterobacter cloacae which is sensitive to ciprofloxacin.  Patient was switched to a ciprofloxacin oral antibiotic regimen which he is to conclude on 1/21/2023.    Interval Problem Update  Patient to be discharged tomorrow after wound VAC change and pending insurance auth for wound vac, patient will have outpatient wound clinic appointment on 1/16.    Meds to beds sent for his antibiotics and pain regimen.    I have discussed this patient's plan of care and discharge plan at IDT rounds today with Case Management, Nursing, Nursing leadership, and other members of the IDT team.    Consultants/Specialty  orthopedics    Code Status  Full Code    Disposition  Patient is medically cleared for discharge.    Anticipate discharge to to home with close outpatient follow-up.  I have placed the appropriate orders for post-discharge needs.    Review of Systems  Review of Systems   All other systems reviewed and are negative.     Physical Exam  Temp:  [36.1 °C (97 °F)-36.4 °C (97.6 °F)] 36.1 °C (97 °F)  Pulse:  [91-96] 96  Resp:  [13-18] 18  BP: (116-124)/(70-74) 116/70  SpO2:  [91 %-93 %] 92 %    Physical Exam  Vitals and nursing note reviewed.   Constitutional:       General: He is not in acute distress.     Appearance: Normal appearance.   HENT:      Head: Normocephalic and atraumatic.   Eyes:      Extraocular Movements: Extraocular movements intact.      Conjunctiva/sclera: Conjunctivae normal.      Pupils: Pupils are equal, round, and reactive to light.   Cardiovascular:      Rate and Rhythm: Normal rate and regular rhythm.      Pulses: Normal pulses.      Heart sounds: No murmur heard.    No friction rub. No gallop.   Pulmonary:      Effort: Pulmonary effort is normal. No respiratory distress.      Breath sounds: Normal breath sounds. No wheezing, rhonchi or rales.   Abdominal:      General: Bowel sounds are normal. There is no distension.      Palpations: Abdomen is soft.      Tenderness: There is no abdominal tenderness.   Musculoskeletal:         General: No swelling or tenderness. Normal range of motion.      Cervical back: Normal range of motion and neck supple. No muscular tenderness.      Right lower leg: No edema.      Left lower leg: No edema.      Comments: Left lower extremity wound with wound VAC in place   Skin:     General: Skin is warm and dry.      Capillary Refill: Capillary refill takes less than 2 seconds.      Findings: No bruising, erythema or rash.   Neurological:      General: No focal deficit present.      Mental Status: He is alert and oriented to person, place, and time.       Fluids    Intake/Output Summary (Last 24 hours) at 1/12/2023 1701  Last data filed at 1/12/2023 0921  Gross per 24  hour   Intake 480 ml   Output 2700 ml   Net -2220 ml       Laboratory                        Imaging  US-EXTREMITY VENOUS LOWER UNILAT LEFT   Final Result      DX-CHEST-PORTABLE (1 VIEW)   Final Result      No acute cardiac or pulmonary abnormalities are identified.        US-EXTREMITY VENOUS LOWER UNILAT LEFT   Final Result      DX-CHEST-PORTABLE (1 VIEW)   Final Result      No acute cardiac or pulmonary abnormalities are identified.           Assessment/Plan  * Leg abscess- (present on admission)  Assessment & Plan  On this admission, surgery was consulted, patient underwent I&D of left leg abscess with wound VAC placement on 01/5/2023.  Cultures are growing Enterobacter cloacae which is sensitive to ciprofloxacin.  Patient was switched to a ciprofloxacin oral antibiotic regimen which he is to conclude on 1/21/2023.    LLE Cellulitis with purulent drainage- (present on admission)  Assessment & Plan  Recently discharged on 12/4/2022 for sepsis secondary to left lower extremity cellulitis, during hospital stay, wound cultures grew strep pyogenes group A and MSSA and he was treated with cefazolin, completing a 10-day course of antibiotics, discharged to follow-up with wound care. Was prescribed outpt linezolid for persistent erythema, no response, and pt noted development of new draining lesions and areas of fluctuance.   On this admission, surgery was consulted, patient underwent I&D of left leg abscess with wound VAC placement on 01/5/2023.  Cultures are growing Enterobacter cloacae which is sensitive to ciprofloxacin.  Patient was switched to a ciprofloxacin oral antibiotic regimen which he is to conclude on 1/21/2023.    Sepsis due to cellulitis (HCC)- (present on admission)  Assessment & Plan  This is Sepsis Present on admission  SIRS criteria identified on my evaluation include: Tachycardia, with heart rate greater than 90 BPM, Tachypnea, with respirations greater than 20 per minute and Leukocytosis, with WBC  greater than 12,000  Source is LLE cellulitis  Sepsis protocol initiated  Fluid resuscitation ordered per protocol  Crystalloid Fluid Administration: given fluid in ED  IV antibiotics as appropriate for source of sepsis  Reassessment: I have reassessed the patient's hemodynamic status    Uncomplicated type 2 diabetes mellitus (HCC)- (present on admission)  Assessment & Plan  Hold home glipizide. Metformin was held on recent hospital discharge due to need for short term dialysis need for KENRICK with subsequent improvement of renal function.   - sliding scale insulin   - hypoglycemia protocol  A1C 8.5    Benign essential hypertension- (present on admission)  Assessment & Plan  Continue home amlodipine         VTE prophylaxis: enoxaparin ppx    I have performed a physical exam and reviewed and updated ROS and Plan today (1/12/2023). In review of yesterday's note (1/11/2023), there are no changes except as documented above.

## 2023-01-16 ENCOUNTER — NON-PROVIDER VISIT (OUTPATIENT)
Dept: WOUND CARE | Facility: MEDICAL CENTER | Age: 63
End: 2023-01-16
Attending: INTERNAL MEDICINE
Payer: COMMERCIAL

## 2023-01-16 PROCEDURE — 97605 NEG PRS WND THER DME<=50SQCM: CPT

## 2023-01-16 PROCEDURE — 99211 OFF/OP EST MAY X REQ PHY/QHP: CPT

## 2023-01-16 NOTE — CERTIFICATION
Non Provider Encounter- Lower Extremity Ulcer    HISTORY OF PRESENT ILLNESS  Wound History:    START OF CARE IN CLINIC: 01/16/2023       REFERRING PROVIDER: Elizabeth Paige MD     WOUND ETIOLOGY: surgical   LOCATION: L anterior LE x 2, L anteromedial LE, L posterior LE   HISTORY:This is a 62-year-old male with past medical history of hypertension, type 2 diabetes A1c 8.5, MVR, hypothyroidism and morbid obesity with BMI of 40 who was admitted on 1/3/2023 with sepsis secondary to left lower extremity cellulitis.     Patient was recently discharged on 12/4/2022 for sepsis secondary to left lower extremity cellulitis, during hospital stay, wound cultures grew strep pyogenes group A and MSSA and he was treated with cefazolin, completing a 10-day course of antibiotics, discharged to follow-up with wound care.  Hospital course was complicated for KENRICK, requiring need for dialysis with later improvement of renal function.  Patient states since discharge, patient followed up with wound care and because of residual erythema and swelling and purulent lesion to anterior shin, patient was prescribed another 7-day course of linezolid that he finished on 12/30/2022.     On this admission, surgery was consulted, patient underwent I&D of left leg abscess with wound VAC placement on 01/5/2023.  Cultures are growing Enterobacter cloacae which is sensitive to ciprofloxacin.  Patient was switched to a ciprofloxacin oral antibiotic regimen which he is to conclude on 1/21/2023.  Patient has follow-up with outpatient wound clinic on 1/16/2023.    MOST RECENT VASCULAR STUDIES:   Vascular Laboratory   CONCLUSIONS   No deep or superficial venous abnormalities in the left leg.    Mixed echogenic material in calf in area of infection could represent    abscess.      STEPHANE JAMISON      Exam Date:     01/03/2023 19:08      Room #:     Inpatient      Priority:     Stat      Ht (in):             Wt (lb):      Ordering Physician:        VIKY HARTMAN       Referring Physician:       DEVAN Mcghee      Sonographer:               Maru Kulkarni RVT      Study Type:                Complete Unilateral      Technical Quality:         Adequate      Age:    62    Gender:     M      MRN:    [id number]      :    1960      BSA:      Indications:     Localized swelling, mass and lump, left lower limb      CPT Codes:       09777      ICD Codes:       R22.42      History:         Swelling of left calf due to cellulitis and infection. No                     prior duplex.      Limitations:      PROCEDURES:   Left lower extremity venous duplex imaging.    The following venous structures were evaluated: common femoral, deep    femoral, great saphenous, femoral, popliteal, peroneal, and posterior    tibial veins.    Serial compression, color, and spectral Doppler flow evaluations were    performed.       FINDINGS:   Left lower extremity.    The peroneal and posterior tibial veins are difficult to assess for    compressibility, but flow response to augmentation is demonstrated.    All other veins demonstrate complete color filling and compressibility with    normal venous flow dynamics including spontaneous flow and respiratory    phasicity.    No evidence of superficial or deep venous thrombosis   Mixed echogenic material noted superficially in the mid calf in area of    infection.       Flow was evaluated in the contralateral common femoral vein and normal    venous flow dynamics including spontaneous flow and respiratory phasic    variation were demonstrated.       Ryan Palafox   (Electronically Signed)   Final Date:      2023                     19:51           Specimen Collected: 23  7:08 PM Last Resulted: 23  7:52 PM                Clinic SHEELA: not done due to wound locations    Patient allergies and medications reviewed via Epic.     WOUND ASSESSMENT      Wound 23 Incision Pretibial Anterior Left (Active)   Wound Image   23 1700   Site  Assessment Red 01/16/23 1700   Periwound Assessment Dry;Intact 01/16/23 1700   Margins Unattached edges 01/16/23 1700   Closure Secondary intention 01/16/23 1700   Drainage Amount Large 01/16/23 1700   Drainage Description Serosanguineous 01/16/23 1700   Treatments Cleansed 01/16/23 1700   Wound Cleansing Normal Saline Irrigation 01/16/23 1700   Periwound Protectant Benzoin;Drape;Paste Ring 01/16/23 1700   Dressing Cleansing/Solutions Not Applicable 01/16/23 1700   Dressing Options Collagen Dressing;Wound Vac;Tubigrip 01/16/23 1700   Dressing Changed Changed 01/16/23 1700   Dressing Status Clean;Dry;Intact 01/16/23 1700   Dressing Change/Treatment Frequency Monday, Wednesday, Friday, and As Needed 01/16/23 1700   Non-staged Wound Description Full thickness 01/16/23 1700   Wound Length (cm) 10 cm 01/16/23 1700   Wound Width (cm) 3.8 cm 01/16/23 1700   Wound Depth (cm) 2.6 cm 01/16/23 1700   Wound Surface Area (cm^2) 38 cm^2 01/16/23 1700   Wound Volume (cm^3) 98.8 cm^3 01/16/23 1700   Tunneling (cm) 0 cm 01/16/23 1700   Undermining (cm) 4.2 cm 01/16/23 1700   Undermining of Wound, 1st Location From 12 o'clock;To 3 o'clock 01/16/23 1700   Undermining (cm) - 2nd location 2.8 cm 01/16/23 1700   Undermining of Wound, 2nd Location From 4 o'clock;To 6 o'clock 01/16/23 1700   Undermining (cm) - 3rd location 3 cm 01/16/23 1700   Undermining of Wound, 3rd Location From 7 o'clock;To 9 o'clock 01/16/23 1700   Wound Odor None 01/16/23 1700   Pulses 1+;Left;DP;PT 01/16/23 1700   Exposed Structures None 01/16/23 1700       Wound 01/16/23 Incision Leg Anterior;Medial Left Communicates with anterior large wound (Active)   Wound Image   01/16/23 1700   Site Assessment Red 01/16/23 1700   Periwound Assessment Dry;Intact 01/16/23 1700   Margins Unattached edges 01/16/23 1700   Closure Secondary intention 01/16/23 1700   Drainage Amount Large 01/16/23 1700   Drainage Description Serosanguineous 01/16/23 1700   Treatments Cleansed  01/16/23 1700   Wound Cleansing Normal Saline Irrigation 01/16/23 1700   Periwound Protectant Drape;Paste Ring;Benzoin 01/16/23 1700   Dressing Cleansing/Solutions Normal Saline 01/16/23 1700   Dressing Options Wound Vac 01/16/23 1700   Dressing Changed Changed 01/16/23 1700   Dressing Status Clean;Dry;Intact 01/16/23 1700   Dressing Change/Treatment Frequency Monday, Wednesday, Friday, and As Needed 01/16/23 1700   Non-staged Wound Description Full thickness 01/16/23 1700   Wound Length (cm) 0.7 cm 01/16/23 1700   Wound Width (cm) 0.5 cm 01/16/23 1700   Wound Depth (cm) 0.9 cm 01/16/23 1700   Wound Surface Area (cm^2) 0.35 cm^2 01/16/23 1700   Wound Volume (cm^3) 0.315 cm^3 01/16/23 1700   Tunneling (cm) 0 cm 01/16/23 1700   Wound Odor None 01/16/23 1700   Exposed Structures None 01/16/23 1700       Wound 01/16/23 Incision Leg Posterior Left (Active)   Wound Image   01/16/23 1700   Site Assessment Red 01/16/23 1700   Periwound Assessment Dry;Intact 01/16/23 1700   Margins Unattached edges 01/16/23 1700   Closure Secondary intention 01/16/23 1700   Drainage Amount Large 01/16/23 1700   Drainage Description Serosanguineous 01/16/23 1700   Treatments Cleansed 01/16/23 1700   Wound Cleansing Normal Saline Irrigation 01/16/23 1700   Periwound Protectant Benzoin;Drape;Paste Ring 01/16/23 1700   Dressing Cleansing/Solutions Normal Saline 01/16/23 1700   Dressing Options Wound Vac 01/16/23 1700   Dressing Changed Changed 01/16/23 1700   Dressing Status Clean;Dry;Intact 01/16/23 1700   Dressing Change/Treatment Frequency Monday, Wednesday, Friday, and As Needed 01/16/23 1700   Non-staged Wound Description Full thickness 01/16/23 1700   Wound Length (cm) 6.5 cm 01/16/23 1700   Wound Width (cm) 2.5 cm 01/16/23 1700   Wound Depth (cm) 2.2 cm 01/16/23 1700   Wound Surface Area (cm^2) 16.25 cm^2 01/16/23 1700   Wound Volume (cm^3) 35.75 cm^3 01/16/23 1700   Undermining (cm) 1.5 cm 01/16/23 1700   Undermining of Wound, 1st  Location From 12 o'clock;To 3 o'clock 01/16/23 1700   Undermining (cm) - 2nd location 1.8 cm 01/16/23 1700   Undermining of Wound, 2nd Location From 3 o'clock;To 5 o'clock 01/16/23 1700   Wound Odor None 01/16/23 1700   Exposed Structures None 01/16/23 1700       Wound 01/16/23 Full Thickness Wound Leg Proximal;Anterior Left (Active)   Wound Image   01/16/23 1700   Site Assessment Red 01/16/23 1700   Periwound Assessment Dry;Intact 01/16/23 1700   Margins Unattached edges 01/16/23 1700   Closure Secondary intention 01/16/23 1700   Drainage Amount Small 01/16/23 1700   Drainage Description Tan 01/16/23 1700   Treatments Cleansed 01/16/23 1700   Wound Cleansing Puracyn [place] 01/16/23 1700   Periwound Protectant Barrier Paste;Skin Protectant Wipes to Periwound 01/16/23 1700   Dressing Cleansing/Solutions Not Applicable 01/16/23 1700   Dressing Options Hydrofiber Silver;Silicone Adhesive Foam;Tubigrip 01/16/23 1700   Dressing Changed Changed 01/16/23 1700   Dressing Status Clean;Dry;Intact 01/16/23 1700   Non-staged Wound Description Full thickness 01/16/23 1700   Wound Length (cm) 0.7 cm 01/16/23 1700   Wound Width (cm) 0.5 cm 01/16/23 1700   Wound Depth (cm) 0.1 cm 01/16/23 1700   Wound Surface Area (cm^2) 0.35 cm^2 01/16/23 1700   Wound Volume (cm^3) 0.035 cm^3 01/16/23 1700   Wound Bed Granulation (%) 100 % 01/16/23 1700   Tunneling (cm) 0 cm 01/16/23 1700   Undermining (cm) 0 cm 01/16/23 1700   Wound Odor None 01/16/23 1700   Exposed Structures None 01/16/23 1700          -Refer to flowsheet for wound care details.       PATIENT EDUCATION   -Nutrition - Patient instructed increased protein diet unless contraindicated in renal failure (meat, eggs, fish, yogurt, cottage cheese, beans), use of multivitamin with minerals and Arginaid supplementation (check if ok with Primary Care Provider first).    Infection -  instructed signs and symptoms of infection, increased redness and swelling, localized heat over wound and  surrounding area/fever/chills/nausea and vomiting, when to call doctor or go to Emergency Room.     Dressing Changes - Instructed patient rationale for wound care products. Instructed to keep dressings clean and dry, shower on clinic days right before coming in. Change your dressing if it becomes soiled, soaked, or falls off.      Diabetic Instruction - Patient instructed keep tight control over Blood Sugar, <140 for optimal wound healing; Implications of loss of protective sensation (LOPS) discussed with patient, including increased risk for amputation.  Advised to check feet at least daily, moisturize feet, and to always wear protective foot wear, arrange meticulous regular foot care by podiatrist or Certified Foot Care Nurse (CFCN). Patient with good understanding.       Compression Wraps - Avoid prolonged standing or sitting without elevating your legs.  Apply tubigrip to your legs ending 2 fingers below back of knee without wrinkles.     Wound VAC may not have any drainage in tube or canister & it will still be working.   Change canister if it does become full by pressing tab on side of machine to remove canister and snap on new one. Full canister can be thrown in the trash. If canister fills with bright red blood, turn machine off and go to Emergency Room immediately. Dressing will be changed every Monday, Wednesday and Friday at the wound clinic.  If you are having issues with your wound VAC, please consider patching leaks, changing the canister, or calling [phone] for troubleshooting. If the wound VAC has been off or un-operational for over 2 hours, call wound care center to inform them and remove all dressings including black foam and replace with normal saline damp gauze, then dry gauze over that, secure with tape.       Questions - should you have any questions regarding your home care instructions, please contact the wound center at [phone]. If after hours, contact your primary care  physician or go to the hospital emergency room.       -Advised to go to ER for any increased redness, swelling, drainage or odor, or if patient develops fever, chills, nausea or vomiting.

## 2023-01-17 NOTE — PROCEDURES
Madela NPWT VAC dressing was applied, with one piece of black foam to each wound bed (3 pieces), one piece for bridge (4 black total ). Collagen to undermined areas. Pump set to neg 125mmhg continuous, no leaks noted.

## 2023-01-17 NOTE — PATIENT INSTRUCTIONS
After Visit Summary Wound Care Instructions    Nutrition - Patient instructed increased protein diet unless contraindicated in renal failure (meat, eggs, fish, yogurt, cottage cheese, beans), use of multivitamin with minerals and Arginaid supplementation (check if ok with Primary Care Provider first).    Infection -  instructed signs and symptoms of infection, increased redness and swelling, localized heat over wound and surrounding area/fever/chills/nausea and vomiting, when to call doctor or go to Emergency Room.     Dressing Changes - Instructed patient rationale for wound care products. Instructed to keep dressings clean and dry, shower on clinic days right before coming in. Change your dressing if it becomes soiled, soaked, or falls off.      Diabetic Instruction - Patient instructed keep tight control over Blood Sugar, <140 for optimal wound healing; Implications of loss of protective sensation (LOPS) discussed with patient, including increased risk for amputation.  Advised to check feet at least daily, moisturize feet, and to always wear protective foot wear, arrange meticulous regular foot care by podiatrist or Certified Foot Care Nurse (CFCN). Patient with good understanding.       Compression Wraps - Avoid prolonged standing or sitting without elevating your legs.  Apply tubigrip to your legs ending 2 fingers below back of knee without wrinkles.     Wound VAC may not have any drainage in tube or canister & it will still be working.   Change canister if it does become full by pressing tab on side of machine to remove canister and snap on new one. Full canister can be thrown in the trash. If canister fills with bright red blood, turn machine off and go to Emergency Room immediately. Dressing will be changed every Monday, Wednesday and Friday at the wound clinic.  If you are having issues with your wound VAC, please consider patching leaks, changing the canister, or calling [phone] for  troubleshooting. If the wound VAC has been off or un-operational for over 2 hours, call wound care center to inform them and remove all dressings including black foam and replace with normal saline damp gauze, then dry gauze over that, secure with tape.       Questions - should you have any questions regarding your home care instructions, please contact the wound center at [phone]. If after hours, contact your primary care physician or go to the hospital emergency room.

## 2023-01-18 ENCOUNTER — OFFICE VISIT (OUTPATIENT)
Dept: WOUND CARE | Facility: MEDICAL CENTER | Age: 63
End: 2023-01-18
Attending: INTERNAL MEDICINE
Payer: COMMERCIAL

## 2023-01-18 VITALS
OXYGEN SATURATION: 96 % | RESPIRATION RATE: 20 BRPM | TEMPERATURE: 96.9 F | SYSTOLIC BLOOD PRESSURE: 112 MMHG | DIASTOLIC BLOOD PRESSURE: 68 MMHG | HEART RATE: 108 BPM

## 2023-01-18 DIAGNOSIS — T14.8XXA HEMATOMA: ICD-10-CM

## 2023-01-18 DIAGNOSIS — L08.9 WOUND INFECTION: ICD-10-CM

## 2023-01-18 DIAGNOSIS — S81.802D OPEN WOUND OF LEFT LOWER EXTREMITY, SUBSEQUENT ENCOUNTER: ICD-10-CM

## 2023-01-18 DIAGNOSIS — T14.8XXA WOUND INFECTION: ICD-10-CM

## 2023-01-18 DIAGNOSIS — E11.9 TYPE 2 DIABETES MELLITUS WITHOUT COMPLICATION, WITHOUT LONG-TERM CURRENT USE OF INSULIN (HCC): ICD-10-CM

## 2023-01-18 PROCEDURE — 10060 I&D ABSCESS SIMPLE/SINGLE: CPT | Mod: 59 | Performed by: STUDENT IN AN ORGANIZED HEALTH CARE EDUCATION/TRAINING PROGRAM

## 2023-01-18 PROCEDURE — 11045 DBRDMT SUBQ TISS EACH ADDL: CPT

## 2023-01-18 PROCEDURE — 99213 OFFICE O/P EST LOW 20 MIN: CPT

## 2023-01-18 PROCEDURE — 11042 DBRDMT SUBQ TIS 1ST 20SQCM/<: CPT | Performed by: STUDENT IN AN ORGANIZED HEALTH CARE EDUCATION/TRAINING PROGRAM

## 2023-01-18 PROCEDURE — 11045 DBRDMT SUBQ TISS EACH ADDL: CPT | Performed by: STUDENT IN AN ORGANIZED HEALTH CARE EDUCATION/TRAINING PROGRAM

## 2023-01-18 PROCEDURE — 11042 DBRDMT SUBQ TIS 1ST 20SQCM/<: CPT

## 2023-01-18 PROCEDURE — 99213 OFFICE O/P EST LOW 20 MIN: CPT | Mod: 25 | Performed by: STUDENT IN AN ORGANIZED HEALTH CARE EDUCATION/TRAINING PROGRAM

## 2023-01-18 ASSESSMENT — ENCOUNTER SYMPTOMS
NAUSEA: 0
SHORTNESS OF BREATH: 0
FEVER: 0
CHILLS: 0
HEADACHES: 0
DIZZINESS: 0
VOMITING: 0
COUGH: 0
PALPITATIONS: 0

## 2023-01-18 NOTE — PATIENT INSTRUCTIONS
-Keep dressings clean and dry. Change dressings every 3-4 days, and if they become over saturated, soiled or fall off.     -Avoid prolonged standing or sitting without elevating your legs.    -Remove your compression garments if you have severe pain, severe swelling, numbness, color change, or temperature change in your toes. If you need to remove your compression garments, do so by unrolling them. Do not cut the compression garments off, this is to prevent cutting yourself on accident.    -Should you experience any significant changes in your wounds, such as signs of infection (increasing redness, swelling, localized heat, increased pain, fever > 101 F, chills) or have any questions regarding your home care instructions, please contact the wound center at [phone]. If after hours, contact your primary care physician or go to the hospital emergency room.     -If you are 5 or more minutes late for an appointment, we reserve the right to cancel and reschedule that appointment. Additionally, if you are habitually late or not showing (3 late cancellations and/or no shows), we reserve the right to cancel your remaining appointments and it will be your responsibility to obtain a new referral if services are still needed.

## 2023-01-18 NOTE — PROGRESS NOTES
Provider Encounter- Full Thickness wound    HISTORY OF PRESENT ILLNESS  Wound History:   START OF CARE IN CLINIC: 1/16/2023 (return to clinic following hospitalization)   REFERRING PROVIDER:   Dr. Paige  WOUND- Full Thickness Wound   LOCATION: Left lower extremity, right anterior knee, left anterior knee   HISTORY: Patient presented to [facility] on 12/4/2022 with left lower extremity, fever, malaise, erythema x1 week.  He was originally admitted to [facility] for acute kidney injury and sepsis but with worsening renal function was transferred to [facility].  Wound cultures grew Streptococcus pyogenes group A and MSSA.  Patient was initially treated with cefazolin.  He is seen by the wound care team who recommended follow-up as an outpatient in wound care clinic.  While inpatient nephrology saw the patient and started him on dialysis for acute kidney injury.  His renal function improved and dialysis was stopped.  Patient was discharged with follow-up in the wound care clinic.    Patient was following with wound clinic, however on clinic visit 1/3/2022 he was noticed to have worsening wounds and concern for infection. He had failed outpatient Abx with zyvox and was sent to ED for further evaluation. Patient was admitted to [facility] from 1/3-1/13/2023 for sepsis and left lower extremity cellulitis with abscess. He was taken to OR for I&D with Dr. Odom. Patient's wound was treated with wound VAC. Surgical cultures were positive for enterobacter cloacae and was treated with ciprofloxacin with end date of 1/21/2023. Patient was referred back to [facility] for further care.    Pertinent Medical History: Hypertension, alcohol abuse, kidney disease, GERD, mitral valve prolapse, morbid obesity, obstructive sleep apnea, type 2 diabetes    TOBACCO USE: Former smoker denies use of smokeless tobacco    Patient's problem list, allergies, and current medications reviewed and updated in Epic    Interval  History:  2023: Clinic visit with Travis Bonilla MD. Patient reports feeling well since hospitalization. He presented with wound VAC. He will be traveling to [place] for  and will be back on Monday. Wound VAC will be held while out of town and will plan to reapply next week. He was instructed on performing wound care until returns to clinic. Patient is tolerating Abx, continues to have some periwound erythema without warmth or pain. He has two new areas of fluctuance, concerning for superficial abscess, discussed performing I&D, patient agreeable.      REVIEW OF SYSTEMS:   Review of Systems   Constitutional:  Negative for chills and fever.   Respiratory:  Negative for cough and shortness of breath.    Cardiovascular:  Positive for leg swelling. Negative for chest pain and palpitations.   Gastrointestinal:  Negative for nausea and vomiting.   Skin:         Left leg swelling and erythema  Open wounds s/p surgical I&D   Neurological:  Negative for dizziness and headaches.     PHYSICAL EXAMINATION:   /68   Pulse (!) 108   Temp 36.1 °C (96.9 °F) (Temporal)   Resp 20   SpO2 96%     Physical Exam  Constitutional:       Appearance: He is obese.   Cardiovascular:      Rate and Rhythm: Normal rate.      Pulses: Normal pulses.   Pulmonary:      Effort: Pulmonary effort is normal. No respiratory distress.      Breath sounds: No wheezing.   Skin:     Comments: Multiple full thickness wounds left leg in various states of healing  Left anterior lower extremity full thickness wound s/p I&D - large wound with some slough, good granulation tissue forming at base  Left posteromedial lower extremity full thickness wound s/p I&D - Granulation tissue forming at base, thin layer of slough    Left anterior proximal fluid collection - Fluctuant area of tissue proximal just distal to knee concerning for abscess.  Left anterior medial fluid collection distal - Fluctuant area of tissue medical to surgical wound  concerning for superficial abscess.   Neurological:      Mental Status: He is alert.       WOUND ASSESSMENT  Wound 01/16/23 Incision Pretibial Anterior Left (Active)   Wound Image    01/18/23 0915   Site Assessment Red;Yellow 01/18/23 0915   Periwound Assessment Blanchable erythema;Edema 01/18/23 0915   Margins Unattached edges 01/18/23 0915   Closure Secondary intention 01/16/23 1700   Drainage Amount Large 01/18/23 0915   Drainage Description Serosanguineous 01/18/23 0915   Treatments Cleansed;Topical Lidocaine;Provider debridement 01/18/23 0915   Wound Cleansing Normal Saline Irrigation 01/18/23 0915   Periwound Protectant Barrier Paste 01/18/23 0915   Dressing Cleansing/Solutions Not Applicable 01/18/23 0915   Dressing Options Hydrofiber Silver Strip;Hydrofiber Silver;Absorbent Abdominal Pad;Dry Roll Gauze;Hypafix Tape;Tubigrip 01/18/23 0915   Dressing Changed New 01/18/23 0915   Dressing Status Clean;Dry;Intact 01/16/23 1700   Dressing Change/Treatment Frequency Monday, Wednesday, Friday, and As Needed 01/18/23 0915   Non-staged Wound Description Full thickness 01/18/23 0915   Wound Length (cm) 9.9 cm 01/18/23 0915   Wound Width (cm) 3.3 cm 01/18/23 0915   Wound Depth (cm) 1.1 cm 01/18/23 0915   Wound Surface Area (cm^2) 32.67 cm^2 01/18/23 0915   Wound Volume (cm^3) 35.937 cm^3 01/18/23 0915   Post-Procedure Length (cm) 9.9 cm 01/18/23 0915   Post-Procedure Width (cm) 3.3 cm 01/18/23 0915   Post-Procedure Depth (cm) 1.5 cm 01/18/23 0915   Post-Procedure Surface Area (cm^2) 32.67 cm^2 01/18/23 0915   Post-Procedure Volume (cm^3) 49.005 cm^3 01/18/23 0915   Wound Healing % 64 01/18/23 0915   Tunneling (cm) 0 cm 01/18/23 0915   Undermining (cm) 1.5 cm 01/18/23 0915   Undermining of Wound, 1st Location From 3 o'clock;To 4 o'clock 01/18/23 0915   Undermining (cm) - 2nd location 1.5 cm 01/18/23 0915   Undermining of Wound, 2nd Location From 8 o'clock;To 9 o'clock 01/18/23 0915   Undermining (cm) - 3rd location 1.5  cm 01/18/23 0915   Undermining of Wound, 3rd Location From 7 o'clock;To 9 o'clock 01/16/23 1700   Wound Odor None 01/18/23 0915   Pulses 1+;Left;DP;PT 01/16/23 1700   Exposed Structures Adipose 01/18/23 0915   Number of days: 2       Wound 01/16/23 Incision Leg Anterior;Medial Left Communicates with anterior large wound (Active)   Wound Image   01/18/23 0915   Site Assessment Red 01/18/23 0915   Periwound Assessment Blanchable erythema;Edema 01/18/23 0915   Margins Unattached edges 01/18/23 0915   Closure Secondary intention 01/16/23 1700   Drainage Amount Large 01/18/23 0915   Drainage Description Serosanguineous 01/18/23 0915   Treatments Cleansed 01/18/23 0915   Wound Cleansing Normal Saline Irrigation 01/18/23 0915   Periwound Protectant Barrier Paste 01/18/23 0915   Dressing Cleansing/Solutions Not Applicable 01/18/23 0915   Dressing Options Hydrofiber Silver;Absorbent Abdominal Pad;Dry Roll Gauze;Hypafix Tape;Tubigrip 01/18/23 0915   Dressing Changed New 01/18/23 0915   Dressing Status Clean;Dry;Intact 01/16/23 1700   Dressing Change/Treatment Frequency Monday, Wednesday, Friday, and As Needed 01/18/23 0915   Non-staged Wound Description Full thickness 01/18/23 0915   Wound Length (cm) 0.6 cm 01/18/23 0915   Wound Width (cm) 0.5 cm 01/18/23 0915   Wound Depth (cm) 1 cm 01/18/23 0915   Wound Surface Area (cm^2) 0.3 cm^2 01/18/23 0915   Wound Volume (cm^3) 0.3 cm^3 01/18/23 0915   Wound Healing % 5 01/18/23 0915   Tunneling (cm) 0 cm 01/16/23 1700   Wound Odor None 01/18/23 0915   Exposed Structures SANJUANA 01/18/23 0915   Number of days: 2       Wound 01/16/23 Incision Leg Posterior;Medial Left --Left Posteromedial LE (Active)   Wound Image    01/18/23 0915   Site Assessment Red;Yellow 01/18/23 0915   Periwound Assessment Blanchable erythema;Edema 01/18/23 0915   Margins Epibole (rolled edges);Unattached edges 01/18/23 0915   Closure Secondary intention 01/16/23 1700   Drainage Amount Large 01/18/23 0915   Drainage  Description Serosanguineous 01/18/23 0915   Treatments Cleansed;Topical Lidocaine;Provider debridement 01/18/23 0915   Wound Cleansing Normal Saline Irrigation 01/18/23 0915   Periwound Protectant Barrier Paste 01/18/23 0915   Dressing Cleansing/Solutions Not Applicable 01/18/23 0915   Dressing Options Hydrofiber Silver;Absorbent Abdominal Pad;Dry Roll Gauze;Hypafix Tape;Tubigrip 01/18/23 0915   Dressing Changed New 01/18/23 0915   Dressing Status Clean;Dry;Intact 01/16/23 1700   Dressing Change/Treatment Frequency Monday, Wednesday, Friday, and As Needed 01/18/23 0915   Non-staged Wound Description Full thickness 01/18/23 0915   Wound Length (cm) 5.9 cm 01/18/23 0915   Wound Width (cm) 2.9 cm 01/18/23 0915   Wound Depth (cm) 1 cm 01/18/23 0915   Wound Surface Area (cm^2) 17.11 cm^2 01/18/23 0915   Wound Volume (cm^3) 17.11 cm^3 01/18/23 0915   Post-Procedure Length (cm) 5.9 cm 01/18/23 0915   Post-Procedure Width (cm) 2.9 cm 01/18/23 0915   Post-Procedure Depth (cm) 1.2 cm 01/18/23 0915   Post-Procedure Surface Area (cm^2) 17.11 cm^2 01/18/23 0915   Post-Procedure Volume (cm^3) 20.532 cm^3 01/18/23 0915   Wound Healing % 52 01/18/23 0915   Undermining (cm) 1.3 cm 01/18/23 0915   Undermining of Wound, 1st Location From 1 o'clock;To 3 o'clock 01/18/23 0915   Undermining (cm) - 2nd location 1.8 cm 01/16/23 1700   Undermining of Wound, 2nd Location From 3 o'clock;To 5 o'clock 01/16/23 1700   Wound Odor None 01/18/23 0915   Exposed Structures None 01/18/23 0915   Number of days: 2       Wound 01/16/23 Full Thickness Wound Leg Proximal;Anterior Left (Active)   Wound Image    01/18/23 0915   Site Assessment Other (Comment) 01/18/23 0915   Periwound Assessment Edema 01/18/23 0915   Margins Unattached edges 01/16/23 1700   Closure Secondary intention 01/16/23 1700   Drainage Amount Small 01/16/23 1700   Drainage Description Tan 01/16/23 1700   Treatments Cleansed;Injectible Lidocaine;Provider debridement 01/18/23 0915    Wound Cleansing Normal Saline Irrigation 01/18/23 0915   Periwound Protectant Skin Protectant Wipes to Periwound;Barrier Paste 01/18/23 0915   Dressing Cleansing/Solutions Not Applicable 01/18/23 0915   Dressing Options Hydrofiber Silver Strip;Hydrofiber Silver;Silicone Adhesive Foam;Tubigrip 01/18/23 0915   Dressing Changed New 01/18/23 0915   Dressing Status Clean;Dry;Intact 01/16/23 1700   Dressing Change/Treatment Frequency Monday, Wednesday, Friday, and As Needed 01/18/23 0915   Non-staged Wound Description Full thickness 01/18/23 0915   Wound Length (cm) 0.7 cm 01/16/23 1700   Wound Width (cm) 0.5 cm 01/16/23 1700   Wound Depth (cm) 0.1 cm 01/16/23 1700   Wound Surface Area (cm^2) 0.35 cm^2 01/16/23 1700   Wound Volume (cm^3) 0.035 cm^3 01/16/23 1700   Post-Procedure Length (cm) 0.7 cm 01/18/23 0915   Post-Procedure Width (cm) 0.1 cm 01/18/23 0915   Post-Procedure Depth (cm) 0.4 cm 01/18/23 0915   Post-Procedure Surface Area (cm^2) 0.07 cm^2 01/18/23 0915   Post-Procedure Volume (cm^3) 0.028 cm^3 01/18/23 0915   Wound Bed Granulation (%) 100 % 01/16/23 1700   Tunneling (cm) 0 cm 01/16/23 1700   Undermining (cm) 0 cm 01/16/23 1700   Wound Odor None 01/18/23 0915   Exposed Structures None 01/18/23 0915   Number of days: 2       PROCEDURE: Debridement of surgical sites anterior and posteromedial wounds  -2% viscous lidocaine applied topically to wound bed for approximately 5 minutes prior to debridement  -Curette used to debride wound bed.  Excisional debridement was performed to remove devitalized tissue until healthy, bleeding tissue was visualized.   Entire surface of wound, 49.78 cm2 debrided.  Tissue debrided into the subcutaneous layer.    -Bleeding controlled with manual pressure.    -Wound care completed by wound RN, refer to flowsheet  -Patient tolerated the procedure well, without c/o pain or discomfort.    PROCEDURE: I&D of suspected Abscess, anterior lower extremity  - Due to history of abscess and  fluctuant nature of tissue, concerning for superficial abscess. Discussed options for I&D including benefits and risks not limited to pain, bleeding, infection, causing new wound. Patient agreeable, informed consent obtained and signed  - Timeout performed with nurse to confirm patient, site, procedure  - Skin was prepped in normal sterile fashion with chlorhexidine. Sterile gloves and technique was used for procedure.  - 2ml of 2% lidocaine with epinephrine was injected over cavity into subcutaneous space  - Used #15 blade scalpel to cut down into abscess cavity, sharp and blunt dissection was performed with scissors  - Blood and small amount of serous fluid was evacuated, no purulence noted. Appears to be hematoma forming without infection  - Estimated blood loss 2ml  - Bleeding controlled with manual pressure  - Tolerated procedure without complication  - Wound care performed by nursing        Pertinent Labs and Diagnostics:    Labs:     A1c:   Lab Results   Component Value Date/Time    HBA1C 8.5 (H) 12/04/2022 09:27 PM          IMAGING: Dx to tibia and fibula dated 12/5/2022  IMPRESSION:     No radiographic evidence of acute bony abnormality     Skin thickening and edema with some soft tissue calcifications most likely indicates venous stasis. Cellulitis is a possibility and there is no evidence of soft tissue gas. Dermatomyositis can be seen with soft tissue calcifications but these are not as   extensive as that often entails      LAST  WOUND CULTURE:  DATE :   Lab Results   Component Value Date/Time    CULTRSULT No growth at 72 hours. 01/05/2023 10:56 AM    CULTRSULT No Anaerobes isolated. 01/05/2023 10:56 AM    CULTRSULT No fungal growth to date. 01/05/2023 10:56 AM         ASSESSMENT AND PLAN:   1. Open wound of left lower extremity, subsequent encounter    1/18/2023  - Patient returns to clinic after surgical I&D by Dr. Odom on 1/5/2023. Wounds treated with wound VAC  - Wounds appear to be improving with  granulation tissue at base and thin layer of slough.  - Excisional debridement was performed in clinic, medically necessary to promote wound healing.  - Patient is travelling out of town this week and weekend for a . Wound VAC will be held. He will return to clinic Monday to apply wound VAC  - Patient instructed on wound care while out of town  - Punch biopsy obtained  was negative for malignancy     Wound care: Hydrofiber silver, abdominal pad, dry roll gauze, Hypafix tape, Tubigrip F x2    2. Type 2 diabetes mellitus without complication, without long-term current use of insulin ([facility])    2023  Comments: Patient is currently on glipizide only  - Patient's glucose is poorly controlled, A1c 8.5. Reports most recent glucose 223.  Patient instructed to keep tight control over FBS, <140 for optimal wound healing; Implications of loss of protective sensation (LOPS) discussed with patient, including increased risk for amputation.  Advised to check feet at least daily, moisturize feet, and to always wear protective foot wear, arrange meticulous regular foot care by podiatrist or [facility]. Pt with good understanding.      3. Wound infection    2023  - Surgical cultures positive for enterobacter cloacae   - Patient on ciprofloxacin until 2023  - Continues to have some erythema without pain or warmth, will continue to monitor for resolution.    4. Hematoma    2023  - Patient with two areas of fluctuance concerning for possible abscess. I&D performed in clinic to anterior fluctuance and needle aspiration to anteromedial fluctuance with only blood expressed. Appeared to be hematoma.  - Did not appear infected, culture not sent  - Will continue to monitor area.      PATIENT EDUCATION  - Importance of adequate nutrition for wound healing  -Advised to go to ER for any increased redness, swelling, drainage, or odor, or if patient develops fever, chills, nausea or vomiting.     My total time spent caring  for the patient on the day of the encounter was 20 minutes, reviewing history, assessment, counseling and education, and coordination of care.  This does not include time spent on separately billable procedures/tests.        Please note that this note may have been created using voice recognition software. I have worked with technical experts from [facility] to optimize the interface.  I have made every reasonable attempt to correct obvious errors, but there may be errors of grammar and possibly content that I did not discover before finalizing the note.    N

## 2023-01-20 ENCOUNTER — APPOINTMENT (OUTPATIENT)
Dept: WOUND CARE | Facility: MEDICAL CENTER | Age: 63
End: 2023-01-20
Attending: INTERNAL MEDICINE
Payer: COMMERCIAL

## 2023-01-23 ENCOUNTER — APPOINTMENT (OUTPATIENT)
Dept: WOUND CARE | Facility: MEDICAL CENTER | Age: 63
End: 2023-01-23
Attending: INTERNAL MEDICINE
Payer: COMMERCIAL

## 2023-01-25 ENCOUNTER — NON-PROVIDER VISIT (OUTPATIENT)
Dept: WOUND CARE | Facility: MEDICAL CENTER | Age: 63
End: 2023-01-25
Attending: INTERNAL MEDICINE
Payer: COMMERCIAL

## 2023-01-25 PROCEDURE — 97605 NEG PRS WND THER DME<=50SQCM: CPT

## 2023-01-26 NOTE — PROCEDURES
All previous hydrofiber silver dressing removed. Medela NPWT dressing was applied, with black foam to each wound bed (wicking undermining) & bridge with mepitel underneath over satellite lesions (3 pieces total). Pump set to neg 125mmhg continuous, no leaks noted.

## 2023-01-26 NOTE — PATIENT INSTRUCTIONS
Wound VAC at 125mmHg continuous black foam. Dressing will be changed every MWF at the wound clinic or with your home health nurse.  If you are having issues with your wound VAC, please consider patching leaks, changing the canister, or call [facility] for troubleshooting. If the wound VAC has been off or un-operational for over 2 hours, call wound care center to inform them and remove all dressings including black foam and replace with normal saline damp gauze.     Should you experience any significant changes in your wound(s), such as infection (redness, swelling, localized heat, increased pain, fever > 101 F, chills) or have any questions regarding your home care instructions, please contact the wound center at [phone]. If after hours, contact your primary care physician or go to the hospital emergency room.   Keep dressing clean, dry and cover when bathing. Only change dressing if it's over saturated, soiled or falls off.

## 2023-01-27 ENCOUNTER — OFFICE VISIT (OUTPATIENT)
Dept: WOUND CARE | Facility: MEDICAL CENTER | Age: 63
End: 2023-01-27
Attending: INTERNAL MEDICINE
Payer: COMMERCIAL

## 2023-01-27 VITALS
HEART RATE: 105 BPM | DIASTOLIC BLOOD PRESSURE: 76 MMHG | SYSTOLIC BLOOD PRESSURE: 132 MMHG | TEMPERATURE: 97.4 F | OXYGEN SATURATION: 96 % | RESPIRATION RATE: 20 BRPM

## 2023-01-27 DIAGNOSIS — E11.9 TYPE 2 DIABETES MELLITUS WITHOUT COMPLICATION, WITHOUT LONG-TERM CURRENT USE OF INSULIN (HCC): ICD-10-CM

## 2023-01-27 DIAGNOSIS — L08.9 WOUND INFECTION: ICD-10-CM

## 2023-01-27 DIAGNOSIS — T14.8XXA WOUND INFECTION: ICD-10-CM

## 2023-01-27 DIAGNOSIS — T14.8XXA HEMATOMA: ICD-10-CM

## 2023-01-27 DIAGNOSIS — S81.802D OPEN WOUND OF LEFT LOWER EXTREMITY, SUBSEQUENT ENCOUNTER: ICD-10-CM

## 2023-01-27 PROCEDURE — 11042 DBRDMT SUBQ TIS 1ST 20SQCM/<: CPT

## 2023-01-27 PROCEDURE — 11045 DBRDMT SUBQ TISS EACH ADDL: CPT | Performed by: NURSE PRACTITIONER

## 2023-01-27 PROCEDURE — 11042 DBRDMT SUBQ TIS 1ST 20SQCM/<: CPT | Performed by: NURSE PRACTITIONER

## 2023-01-27 PROCEDURE — 97606 NEG PRS WND THER DME>50 SQCM: CPT

## 2023-01-27 PROCEDURE — 99213 OFFICE O/P EST LOW 20 MIN: CPT

## 2023-01-27 PROCEDURE — 11045 DBRDMT SUBQ TISS EACH ADDL: CPT

## 2023-01-27 PROCEDURE — 99213 OFFICE O/P EST LOW 20 MIN: CPT | Mod: 25 | Performed by: NURSE PRACTITIONER

## 2023-01-27 NOTE — PROGRESS NOTES
Provider Encounter- Full Thickness wound    HISTORY OF PRESENT ILLNESS  Wound History:   START OF CARE IN CLINIC: 1/16/2023 (return to clinic following hospitalization)   REFERRING PROVIDER:   Dr. Paige  WOUND- Full Thickness Wound   LOCATION: Left lower extremity, right anterior knee, left anterior knee   HISTORY: Patient presented to [facility] on 12/4/2022 with left lower extremity, fever, malaise, erythema x1 week.  He was originally admitted to [facility] for acute kidney injury and sepsis but with worsening renal function was transferred to [facility].  Wound cultures grew Streptococcus pyogenes group A and MSSA.  Patient was initially treated with cefazolin.  He is seen by the wound care team who recommended follow-up as an outpatient in wound care clinic.  While inpatient nephrology saw the patient and started him on dialysis for acute kidney injury.  His renal function improved and dialysis was stopped.  Patient was discharged with follow-up in the wound care clinic.    Patient was following with wound clinic, however on clinic visit 1/3/2022 he was noticed to have worsening wounds and concern for infection. He had failed outpatient Abx with zyvox and was sent to ED for further evaluation. Patient was admitted to [facility] from 1/3-1/13/2023 for sepsis and left lower extremity cellulitis with abscess. He was taken to OR for I&D with Dr. Odom. Patient's wound was treated with wound VAC. Surgical cultures were positive for enterobacter cloacae and was treated with ciprofloxacin with end date of 1/21/2023. Patient was referred back to [facility] for further care.    Pertinent Medical History: Hypertension, alcohol abuse, kidney disease, GERD, mitral valve prolapse, morbid obesity, obstructive sleep apnea, type 2 diabetes    TOBACCO USE: Former smoker denies use of smokeless tobacco    Patient's problem list, allergies, and current medications reviewed and updated in Epic    Interval  History:  2023: Clinic visit with Travis Bonilla MD. Patient reports feeling well since hospitalization. He presented with wound VAC. He will be traveling to [place] for  and will be back on Monday. Wound VAC will be held while out of town and will plan to reapply next week. He was instructed on performing wound care until returns to clinic. Patient is tolerating Abx, continues to have some periwound erythema without warmth or pain. He has two new areas of fluctuance, concerning for superficial abscess, discussed performing I&D, patient agreeable.    2023 : Clinic visit with PANCHO Medina, NIRAV-BC, JERIN, CFVALE.   Patient states that overall he is feeling well blood sugars have been in the low 100s.  No significant improvement in his wounds.  He does mention that he has raised bumps to his arms, this has been going on for several weeks, he noticed a new bump today on his left forearm.  They are slightly tender with palpation, no fluctuance.  He also has a diffuse rash to his arms and hands.  He completed his antibiotics 3 or 4 days ago.  States he may go into the emergency room in [place] after he leaves the clinic.  Denies any fevers, chills, nausea, vomiting.          REVIEW OF SYSTEMS:   Unchanged from previous clinic visit on 2023, except as documented in interval history above    PHYSICAL EXAMINATION:   /76   Pulse (!) 105   Temp 36.3 °C (97.4 °F) (Temporal)   Resp 20   SpO2 96%     Physical Exam  Constitutional:       Appearance: He is obese.   Cardiovascular:      Rate and Rhythm: Normal rate.      Pulses: Normal pulses.   Pulmonary:      Effort: Pulmonary effort is normal. No respiratory distress.      Breath sounds: No wheezing.   Skin:     Comments: Multiple full thickness wounds left leg in various states of healing  Left anterior lower extremity full thickness wound s/p I&D -wound area about the same, moderate serosanguineous drainage, thin layer of slough to wound  bed, evidence of new granulation  Left posteromedial lower extremity full thickness wound-wound area about the same, new granulation tissue noted, thin layer of slough, moderate serosanguineous drainage    Refer to wound flowsheet and photos    Raised red bumps, quarter sized, to right and left forearms, no drainage, skin intact, no fluctuance, mildly tender to palpation   Neurological:      Mental Status: He is alert.       WOUND ASSESSMENT  Wound 01/16/23 Incision Pretibial Anterior Left (Active)   Wound Image    01/27/23 1647   Site Assessment Red;Yellow 01/27/23 1647   Periwound Assessment Blanchable erythema;Edema;Satellite lesions;Warm;Red 01/27/23 1647   Margins Unattached edges 01/27/23 1647   Closure Secondary intention 01/16/23 1700   Drainage Amount Large 01/27/23 1647   Drainage Description Serosanguineous 01/27/23 1647   Treatments Cleansed;Topical Lidocaine;Provider debridement 01/27/23 1647   Wound Cleansing Normal Saline Irrigation 01/27/23 1647   Periwound Protectant Skin Protectant Wipes to Periwound;Drape 01/27/23 1647   Dressing Cleansing/Solutions Not Applicable 01/27/23 1647   Dressing Options Wound Vac;Tubigrip 01/27/23 1647   Dressing Changed Changed 01/25/23 1300   Dressing Status Clean;Dry;Intact 01/16/23 1700   Dressing Change/Treatment Frequency Monday, Wednesday, Friday, and As Needed 01/27/23 1647   Non-staged Wound Description Full thickness 01/27/23 1647   Wound Length (cm) 10.3 cm 01/27/23 1647   Wound Width (cm) 2.8 cm 01/27/23 1647   Wound Depth (cm) 0.9 cm 01/27/23 1647   Wound Surface Area (cm^2) 28.84 cm^2 01/27/23 1647   Wound Volume (cm^3) 25.956 cm^3 01/27/23 1647   Post-Procedure Length (cm) 10.3 cm 01/27/23 1647   Post-Procedure Width (cm) 2.8 cm 01/27/23 1647   Post-Procedure Depth (cm) 1 cm 01/27/23 1647   Post-Procedure Surface Area (cm^2) 28.84 cm^2 01/27/23 1647   Post-Procedure Volume (cm^3) 28.84 cm^3 01/27/23 1647   Wound Healing % 74 01/27/23 1647   Tunneling (cm)  2.7 cm 01/27/23 1647   Tunneling Clock Position of Wound 1 01/27/23 1647   Tunneling - 2nd Location (cm) 1.9 cm 01/27/23 1647   Tunneling Clock Position of Wound - 2nd Location 4 01/27/23 1647   Undermining (cm) 2.5 cm 01/27/23 1647   Undermining of Wound, 1st Location From 7 o'clock;To 10 o'clock 01/27/23 1647   Undermining (cm) - 2nd location 1.5 cm 01/18/23 0915   Undermining of Wound, 2nd Location From 8 o'clock;To 9 o'clock 01/18/23 0915   Undermining (cm) - 3rd location 1.5 cm 01/18/23 0915   Undermining of Wound, 3rd Location From 7 o'clock;To 9 o'clock 01/16/23 1700   Wound Odor None 01/27/23 1647   Pulses 1+;Left;DP;PT 01/16/23 1700   Exposed Structures Adipose 01/27/23 1647   Number of days: 11       Wound 01/16/23 Incision Leg Posterior;Medial Left --Left Posteromedial LE (Active)   Wound Image    01/27/23 1647   Site Assessment Red;Yellow 01/27/23 1647   Periwound Assessment Blanchable erythema;Edema;Painful;Fragile 01/27/23 1647   Margins Attached edges;Epibole (rolled edges) 01/27/23 1647   Closure Secondary intention 01/16/23 1700   Drainage Amount Large 01/27/23 1647   Drainage Description Serosanguineous 01/27/23 1647   Treatments Cleansed;Topical Lidocaine;Provider debridement;Site care 01/27/23 1647   Wound Cleansing Normal Saline Irrigation 01/27/23 1647   Periwound Protectant Skin Protectant Wipes to Periwound;Drape 01/27/23 1647   Dressing Cleansing/Solutions Not Applicable 01/27/23 1647   Dressing Options Wound Vac;Tubigrip 01/27/23 1647   Dressing Changed Changed 01/25/23 1300   Dressing Status Clean;Dry;Intact 01/16/23 1700   Dressing Change/Treatment Frequency Monday, Wednesday, Friday, and As Needed 01/25/23 1300   Non-staged Wound Description Full thickness 01/27/23 1647   Wound Length (cm) 6.5 cm 01/27/23 1647   Wound Width (cm) 3 cm 01/27/23 1647   Wound Depth (cm) 0.6 cm 01/27/23 1647   Wound Surface Area (cm^2) 19.5 cm^2 01/27/23 1647   Wound Volume (cm^3) 11.7 cm^3 01/27/23 1647    Post-Procedure Length (cm) 6.5 cm 01/27/23 1647   Post-Procedure Width (cm) 3.1 cm 01/27/23 1647   Post-Procedure Depth (cm) 0.6 cm 01/27/23 1647   Post-Procedure Surface Area (cm^2) 20.15 cm^2 01/27/23 1647   Post-Procedure Volume (cm^3) 12.09 cm^3 01/27/23 1647   Wound Healing % 67 01/27/23 1647   Undermining (cm) 0 cm 01/27/23 1647   Undermining of Wound, 1st Location From 1 o'clock;To 3 o'clock 01/18/23 0915   Undermining (cm) - 2nd location 0 cm 01/27/23 1647   Undermining of Wound, 2nd Location From 3 o'clock;To 5 o'clock 01/16/23 1700   Wound Odor None 01/27/23 1647   Exposed Structures None 01/27/23 1647   Number of days: 11       Wound 01/16/23 Full Thickness Wound Leg Proximal;Anterior Left (Active)   Wound Image   01/27/23 1647   Site Assessment Pink;Red;Yellow 01/27/23 1647   Periwound Assessment Edema;Fragile;Satellite lesions 01/27/23 1647   Margins Attached edges 01/27/23 1647   Closure Secondary intention 01/16/23 1700   Drainage Amount Moderate 01/27/23 1647   Drainage Description Serosanguineous 01/27/23 1647   Treatments Cleansed;Site care 01/27/23 1647   Wound Cleansing Normal Saline Irrigation 01/27/23 1647   Periwound Protectant Skin Protectant Wipes to Periwound 01/27/23 1647   Dressing Cleansing/Solutions Not Applicable 01/27/23 1647   Dressing Options Hydrofiber Silver;Silicone Adhesive Foam;Tubigrip 01/27/23 1647   Dressing Changed Changed 01/25/23 1300   Dressing Status Clean;Dry;Intact 01/16/23 1700   Dressing Change/Treatment Frequency Monday, Wednesday, Friday, and As Needed 01/25/23 1300   Non-staged Wound Description Full thickness 01/27/23 1647   Wound Length (cm) 0.7 cm 01/16/23 1700   Wound Width (cm) 0.5 cm 01/16/23 1700   Wound Depth (cm) 0.1 cm 01/16/23 1700   Wound Surface Area (cm^2) 0.35 cm^2 01/16/23 1700   Wound Volume (cm^3) 0.035 cm^3 01/16/23 1700   Post-Procedure Length (cm) 0.7 cm 01/18/23 0915   Post-Procedure Width (cm) 0.1 cm 01/18/23 0915   Post-Procedure Depth  (cm) 0.4 cm 01/18/23 0915   Post-Procedure Surface Area (cm^2) 0.07 cm^2 01/18/23 0915   Post-Procedure Volume (cm^3) 0.028 cm^3 01/18/23 0915   Wound Bed Granulation (%) 100 % 01/16/23 1700   Tunneling (cm) 0 cm 01/16/23 1700   Undermining (cm) 0 cm 01/16/23 1700   Wound Odor None 01/27/23 1647   Exposed Structures None 01/27/23 1647   Number of days: 11       Wound 01/27/23 Full Thickness Wound Leg Anterior;Medial Left Cluster (Active)   Wound Image    01/27/23 1647   Site Assessment Red;Yellow;Boggy 01/27/23 1647   Periwound Assessment Edema;Fragile;Blanchable erythema;Painful;Warm 01/27/23 1647   Margins Unattached edges 01/27/23 1647   Drainage Amount Large 01/27/23 1647   Drainage Description Serosanguineous 01/27/23 1647   Treatments Cleansed;Site care 01/27/23 1647   Wound Cleansing Normal Saline Irrigation 01/27/23 1647   Periwound Protectant Skin Protectant Wipes to Periwound;Drape 01/27/23 1647   Dressing Cleansing/Solutions Not Applicable 01/27/23 1647   Dressing Options Hydrofera Blue Ready;Vac Drape;Wound Vac;Tubigrip 01/27/23 1647   Non-staged Wound Description Full thickness 01/27/23 1647   Post-Procedure Length (cm) 1 cm 01/27/23 1647   Post-Procedure Width (cm) 7.5 cm 01/27/23 1647   Post-Procedure Depth (cm) 0.5 cm 01/27/23 1647   Post-Procedure Surface Area (cm^2) 7.5 cm^2 01/27/23 1647   Post-Procedure Volume (cm^3) 3.75 cm^3 01/27/23 1647   Tunneling (cm) 0 cm 01/27/23 1647   Undermining (cm) 0 cm 01/27/23 1647   Wound Odor None 01/27/23 1647   Exposed Structures None 01/27/23 1647   Number of days: 0       PROCEDURE: Debridement of surgical sites anterior and posteromedial wounds  -2% viscous lidocaine applied topically to wound bedS for approximately 5 minutes prior to debridement  -Curette used to debride wound bedS.  Excisional debridement was performed to remove devitalized tissue until healthy, bleeding tissue was visualized.   Entire surface of wounds, 56.49 cm2 debrided.  Tissue  debrided into the subcutaneous layer.    -Bleeding controlled with manual pressure.    -Wound care completed by wound RN, refer to flowsheet  -Patient tolerated the procedure well, without c/o pain or discomfort.          Pertinent Labs and Diagnostics:    Labs:     A1c:   Lab Results   Component Value Date/Time    HBA1C 8.5 (H) 12/04/2022 09:27 PM          IMAGING: Dx to tibia and fibula dated 12/5/2022  IMPRESSION:     No radiographic evidence of acute bony abnormality     Skin thickening and edema with some soft tissue calcifications most likely indicates venous stasis. Cellulitis is a possibility and there is no evidence of soft tissue gas. Dermatomyositis can be seen with soft tissue calcifications but these are not as   extensive as that often entails      LAST  WOUND CULTURE:  DATE :   Lab Results   Component Value Date/Time    CULTRSULT No growth at 72 hours. 01/05/2023 10:56 AM    CULTRSULT No Anaerobes isolated. 01/05/2023 10:56 AM    CULTRSULT No fungal growth to date. 01/05/2023 10:56 AM         ASSESSMENT AND PLAN:   1. Open wound of left lower extremity, subsequent encounter    1/27/2023: Full-thickness wounds x2 to left lower extremity.  Thick layer of slough to wound beds, area about the same.  Patient is tolerating npwt without any difficulty.  - Excisional debridement was performed in clinic, medically necessary to promote wound healing.  -Patient to return to clinic 3 times weekly for assessment, and VAC dressing change.  1 of these visits will be with provider for debridement.       Wound care: NPWT at 125 mmHg to accelerate granulation, change 3 times per week.     2. Type 2 diabetes mellitus without complication, without long-term current use of insulin ([facility])  Comments: Patient is currently on glipizide only    1/27/2023: Patient states his blood sugars are consistently in the low 100s.  However, most recent A1c 8.5  Patient instructed to keep tight control over FBS, <140 for optimal wound  healing; Implications of loss of protective sensation (LOPS) discussed with patient, including increased risk for amputation.  Advised to check feet at least daily, moisturize feet, and to always wear protective foot wear, arrange meticulous regular foot care by podiatrist or [facility]. Pt with good understanding.      3. Wound infection    1/27/2023: Patient just completed 10-day course of ciprofloxacin.  Chronic appearing cellulitis around wounds.  No odor.  He denies fevers, chills, nausea, vomiting, cough or shortness of breath.   -We will monitor for signs and symptoms of infection each clinic visit  -Pt advised to go to ER for any increased redness, swelling, drainage or odor, or if he develops fever, chills, nausea or vomiting.  He states he may go to the emergency room later today due to appearance of raised red bumps to his arms over the past few weeks.    4. Hematoma    1/27/2023: I&D's performed in clinic last visit, 1/18.  Sites appear to be healing.  - Will continue to monitor area.      PATIENT EDUCATION  - Importance of adequate nutrition for wound healing  -Advised to go to ER for any increased redness, swelling, drainage, or odor, or if patient develops fever, chills, nausea or vomiting.     My total time spent caring for the patient on the day of the encounter was 20 minutes, reviewing history, assessment, counseling and education, and coordination of care.  This does not include time spent on separately billable procedures/tests.        Please note that this note may have been created using voice recognition software. I have worked with technical experts from [facility] to optimize the interface.  I have made every reasonable attempt to correct obvious errors, but there may be errors of grammar and possibly content that I did not discover before finalizing the note.    N

## 2023-01-28 NOTE — PROCEDURES
Wound vac removed by CNA, wound bed inspected by this RN, no retained foam noted. Wound vac >50cm applied to LLE wounds using 6 pieces of black foam and 4 pieces of hydrofera blue ready transfer (HFBRT). HFRBT applied to left anteromedial LE wound cluster, left posteromedial LE satellite wound, and left anterior LE satellite wounds. HFBRT sealed with drape and a hole cut in drape to allow negative pressure. Wound vac applied over these with black foam to achieve this. 2 pieces of black foam used to fill areas of undermining to left anterior LE wound. 1 piece of black foam to fill L anterior LE wound. 2 pieces of black foam to bridge wounds together. 1 piece of black foam to fill posteromedial LE wound.   Negative pressure wound therapy resumed at 125mmHg continuous pressure with no leaks noted.   Tubi F double layer to LLE with hole cut for tubing and tubing threaded through to the outside of compression garment.

## 2023-01-28 NOTE — PATIENT INSTRUCTIONS
-Keep your wound dressing clean, dry, and intact.    -Change your dressing if it becomes soiled, soaked, or falls off.    -Remove your compression wrap if you have severe pain, severe swelling, numbness, color change, or temperature change in your toes. If you need to remove your compression wrap, do so by unrolling it. Do not cut the compression wrap off to prevent cutting yourself on accident.    -Wound vac may not have any drainage in tube or cannister & it will still be working.   Change cannister if it does become full by pressing tab on side of machine to remove canister and snap on new one. Full canister can be thrown in the trash. If cannister fills with bright red blood - go to ER. Dressing will be changed every MWF at the wound clinic.  If you are having issues with your wound VAC, please consider patching leaks, changing the canister, or calling [phone] for troubleshooting. If the wound VAC has been off or un-operational for over 2 hours, call wound care center to inform them and remove all dressings including black foam and replace with normal saline damp gauze.     -Should you experience any significant changes in your wound(s), such as infection (redness, swelling, localized heat, increased pain, fever > 101 F, chills) or have any questions regarding your home care instructions, please contact the wound center at [phone]. If after hours, contact your primary care physician or go to the hospital emergency room.

## 2023-01-30 ENCOUNTER — NON-PROVIDER VISIT (OUTPATIENT)
Dept: WOUND CARE | Facility: MEDICAL CENTER | Age: 63
End: 2023-01-30
Attending: INTERNAL MEDICINE
Payer: COMMERCIAL

## 2023-01-30 ENCOUNTER — HOSPITAL ENCOUNTER (OUTPATIENT)
Facility: MEDICAL CENTER | Age: 63
End: 2023-01-30
Attending: NURSE PRACTITIONER
Payer: COMMERCIAL

## 2023-01-30 DIAGNOSIS — L08.9 WOUND INFECTION: ICD-10-CM

## 2023-01-30 DIAGNOSIS — T14.8XXA WOUND INFECTION: ICD-10-CM

## 2023-01-30 DIAGNOSIS — S81.802D OPEN WOUND OF LEFT LOWER EXTREMITY, SUBSEQUENT ENCOUNTER: ICD-10-CM

## 2023-01-30 LAB
GRAM STN SPEC: NORMAL
SIGNIFICANT IND 70042: NORMAL
SITE SITE: NORMAL
SOURCE SOURCE: NORMAL

## 2023-01-30 PROCEDURE — 87186 SC STD MICRODIL/AGAR DIL: CPT

## 2023-01-30 PROCEDURE — 97605 NEG PRS WND THER DME<=50SQCM: CPT

## 2023-01-30 PROCEDURE — 87077 CULTURE AEROBIC IDENTIFY: CPT

## 2023-01-30 PROCEDURE — 87205 SMEAR GRAM STAIN: CPT

## 2023-01-30 PROCEDURE — 87070 CULTURE OTHR SPECIMN AEROBIC: CPT

## 2023-01-30 PROCEDURE — 99212 OFFICE O/P EST SF 10 MIN: CPT | Performed by: NURSE PRACTITIONER

## 2023-01-30 RX ORDER — LINEZOLID 600 MG/1
600 TABLET, FILM COATED ORAL 2 TIMES DAILY
Qty: 14 TABLET | Refills: 0 | Status: SHIPPED | OUTPATIENT
Start: 2023-01-30 | End: 2023-02-06

## 2023-01-30 NOTE — PATIENT INSTRUCTIONS
-Keep your wound dressing clean, dry, and intact.    -Change your dressing if it becomes soiled, soaked, or falls off.    -Wound vac may not have any drainage in tube or cannister & it will still be working.   Change cannister if it does become full by pressing tab on side of machine to remove canister and snap on new one. Full canister can be thrown in the trash. If cannister fills with bright red blood - go to ER. Dressing will be changed every MWF at the wound clini.  If you are having issues with your wound VAC, please consider patching leaks, changing the canister, or calling [phone] for troubleshooting. If the wound VAC has been off or un-operational for over 2 hours, call wound care center to inform them and remove all dressings including black foam and replace with normal saline damp gauze.     -Should you experience any significant changes in your wound(s), such as infection (redness, swelling, localized heat, increased pain, fever > 101 F, chills) or have any questions regarding your home care instructions, please contact the wound center at [phone]. If after hours, contact your primary care physician or go to the hospital emergency room.

## 2023-01-30 NOTE — PROCEDURES
Wound Vac removed all 10 pieces of foam accounted for.  Wound cleansed and provider in and culture taken.  NPWT re-applied using 5 pieces of black foam and 3 pieces of hydrofera blue ready on satellite wounds under drape.  Vac re-started at 125mmHg continuous with no leaks noted.    Wound culture sent to lab, Antibiotic prescription sent to pharmacy.  Referral to surgeon placed.

## 2023-01-31 LAB
FUNGUS SPEC CULT: NORMAL
FUNGUS SPEC FUNGUS STN: NORMAL
SIGNIFICANT IND 70042: NORMAL
SITE SITE: NORMAL
SOURCE SOURCE: NORMAL

## 2023-01-31 NOTE — PROGRESS NOTES
I was asked to see the patient today due to concerns of recurring infection and digression of the smaller wounds to the anterior aspect of the patient's left lower extremity.  The wounds probe deeper in clinic today with increased boggy fluctuant tissue. Patient recently was hospitalized for abscess formation and underwent surgical debridement and washout.  Patient denies any fever or chills at this time.  Patient does not appear to be any acute distress.  Culture taken in clinic today to help rule out bacterial infection causing the aggression of the patient's wounds.  Patient will be empirically started on Zyvox as the patient has an allergy to penicillins.  We will follow culture and adjust as necessary.    >10 min spent face to face with patient, >50% of time spent counseling, coordinating care, reviewing records, discussing POC, educating patient

## 2023-02-01 ENCOUNTER — OFFICE VISIT (OUTPATIENT)
Dept: WOUND CARE | Facility: MEDICAL CENTER | Age: 63
End: 2023-02-01
Attending: GENERAL PRACTICE
Payer: COMMERCIAL

## 2023-02-01 DIAGNOSIS — E11.9 TYPE 2 DIABETES MELLITUS WITHOUT COMPLICATION, WITHOUT LONG-TERM CURRENT USE OF INSULIN (HCC): ICD-10-CM

## 2023-02-01 DIAGNOSIS — L08.9 WOUND INFECTION: ICD-10-CM

## 2023-02-01 DIAGNOSIS — T14.8XXA WOUND INFECTION: ICD-10-CM

## 2023-02-01 DIAGNOSIS — S81.802D OPEN WOUND OF LEFT LOWER EXTREMITY, SUBSEQUENT ENCOUNTER: ICD-10-CM

## 2023-02-01 LAB
BACTERIA WND AEROBE CULT: ABNORMAL
BACTERIA WND AEROBE CULT: ABNORMAL
GRAM STN SPEC: ABNORMAL
SIGNIFICANT IND 70042: ABNORMAL
SITE SITE: ABNORMAL
SOURCE SOURCE: ABNORMAL

## 2023-02-01 PROCEDURE — 11045 DBRDMT SUBQ TISS EACH ADDL: CPT

## 2023-02-01 PROCEDURE — 99213 OFFICE O/P EST LOW 20 MIN: CPT | Mod: 25 | Performed by: NURSE PRACTITIONER

## 2023-02-01 PROCEDURE — 11042 DBRDMT SUBQ TIS 1ST 20SQCM/<: CPT | Performed by: NURSE PRACTITIONER

## 2023-02-01 PROCEDURE — 11042 DBRDMT SUBQ TIS 1ST 20SQCM/<: CPT

## 2023-02-01 PROCEDURE — 11045 DBRDMT SUBQ TISS EACH ADDL: CPT | Performed by: NURSE PRACTITIONER

## 2023-02-02 NOTE — PATIENT INSTRUCTIONS
-Keep your wound dressing clean, dry, and intact.    -Change your dressing if it becomes soiled, soaked, or falls off.t.    -Wound vac may not have any drainage in tube or cannister & it will still be working.   Change cannister if it does become full by pressing tab on side of machine to remove canister and snap on new one. Full canister can be thrown in the trash. If cannister fills with bright red blood - go to ER. Dressing will be changed every MWF at the wound clini.  If you are having issues with your wound VAC, please consider patching leaks, changing the canister, or calling [phone] for troubleshooting. If the wound VAC has been off or un-operational for over 2 hours, call wound care center to inform them and remove all dressings including black foam and replace with normal saline damp gauze.     -Should you experience any significant changes in your wound(s), such as infection (redness, swelling, localized heat, increased pain, fever > 101 F, chills) or have any questions regarding your home care instructions, please contact the wound center at [phone]. If after hours, contact your primary care physician or go to the hospital emergency room.

## 2023-02-02 NOTE — PROGRESS NOTES
Provider Encounter- Full Thickness wound    HISTORY OF PRESENT ILLNESS  Wound History:   START OF CARE IN CLINIC: 1/16/2023 (return to clinic following hospitalization)   REFERRING PROVIDER:   Dr. Paige  WOUND- Full Thickness Wound   LOCATION: Left lower extremity, right anterior knee, left anterior knee   HISTORY: Patient presented to [facility] on 12/4/2022 with left lower extremity, fever, malaise, erythema x1 week.  He was originally admitted to [facility] for acute kidney injury and sepsis but with worsening renal function was transferred to [facility].  Wound cultures grew Streptococcus pyogenes group A and MSSA.  Patient was initially treated with cefazolin.  He is seen by the wound care team who recommended follow-up as an outpatient in wound care clinic.  While inpatient nephrology saw the patient and started him on dialysis for acute kidney injury.  His renal function improved and dialysis was stopped.  Patient was discharged with follow-up in the wound care clinic.    Patient was following with wound clinic, however on clinic visit 1/3/2022 he was noticed to have worsening wounds and concern for infection. He had failed outpatient Abx with zyvox and was sent to ED for further evaluation. Patient was admitted to [facility] from 1/3-1/13/2023 for sepsis and left lower extremity cellulitis with abscess. He was taken to OR for I&D with Dr. Odom. Patient's wound was treated with wound VAC. Surgical cultures were positive for enterobacter cloacae and was treated with ciprofloxacin with end date of 1/21/2023. Patient was referred back to [facility] for further care.    Pertinent Medical History: Hypertension, alcohol abuse, kidney disease, GERD, mitral valve prolapse, morbid obesity, obstructive sleep apnea, type 2 diabetes    TOBACCO USE: Former smoker denies use of smokeless tobacco    Patient's problem list, allergies, and current medications reviewed and updated in Epic    Interval  History:  2023: Clinic visit with Travis Bonilla MD. Patient reports feeling well since hospitalization. He presented with wound VAC. He will be traveling to [place] for  and will be back on Monday. Wound VAC will be held while out of town and will plan to reapply next week. He was instructed on performing wound care until returns to clinic. Patient is tolerating Abx, continues to have some periwound erythema without warmth or pain. He has two new areas of fluctuance, concerning for superficial abscess, discussed performing I&D, patient agreeable.    2023 : Clinic visit with PANCHO Medina, EL, MERI, RYLEE.   Patient states that overall he is feeling well blood sugars have been in the low 100s.  No significant improvement in his wounds.  He does mention that he has raised bumps to his arms, this has been going on for several weeks, he noticed a new bump today on his left forearm.  They are slightly tender with palpation, no fluctuance.  He also has a diffuse rash to his arms and hands.  He completed his antibiotics 3 or 4 days ago.  States he may go into the emergency room in [place] after he leaves the clinic.  Denies any fevers, chills, nausea, vomiting.    2023 : Clinic visit with PANCHO Medina, EL, MERI, RYLEE.   Kwesi states that overall he is feeling well, though a little bit stressed.  His stepdaughter is in hospital.  He did  his Zyvox prescription, prescribed on Monday for positive MRSA wound culture, light growth.  He is tolerating without any difficulty.  The smaller wounds to his leg present today with boggy tissue, at least 1 of these communicates with the larger wound.  He has an appointment to follow-up with his surgeon, Dr. Odom, on .  His blood sugar today was 126.  The bumps on his forearms are about the same, he did not go to the emergency room last week.        REVIEW OF SYSTEMS:   Unchanged from previous clinic visit on 2023, except as  documented in interval history above    PHYSICAL EXAMINATION:   There were no vitals taken for this visit.    Physical Exam  Constitutional:       Appearance: He is obese.   Cardiovascular:      Rate and Rhythm: Normal rate.      Pulses: Normal pulses.   Pulmonary:      Effort: Pulmonary effort is normal. No respiratory distress.      Breath sounds: No wheezing.   Skin:     Comments: Multiple full thickness wounds left leg in various states of healing  Left anterior lower extremity full thickness wound s/p I&D -wound area measures about the same, increased granulation tissue noted, thin layer of slough to wound bed, moderate serosanguineous drainage, no odor  Left posteromedial lower extremity full thickness wound-wound area about the same, new granulation tissue noted, thin layer of slough, moderate serosanguineous drainage    Refer to wound flowsheet and photos    Raised red bumps, quarter sized, to right and left forearms, no drainage, skin intact, no fluctuance, mildly tender to palpation   Neurological:      Mental Status: He is alert.       WOUND ASSESSMENT  Wound 01/16/23 Incision Pretibial Anterior Left (Active)   Wound Image    02/01/23 1530   Site Assessment Red;Yellow;Granulation tissue 02/01/23 1530   Periwound Assessment Blanchable erythema;Edema;Satellite lesions;Warm;Red 02/01/23 1530   Margins Unattached edges 02/01/23 1530   Closure Secondary intention 01/16/23 1700   Drainage Amount Large 02/01/23 1530   Drainage Description Serosanguineous 02/01/23 1530   Treatments Cleansed;Topical Lidocaine;Provider debridement;Site care 02/01/23 1530   Wound Cleansing Normal Saline Irrigation 02/01/23 1530   Periwound Protectant Skin Protectant Wipes to Periwound;Drape 02/01/23 1530   Dressing Cleansing/Solutions Not Applicable 02/01/23 1530   Dressing Options Wound Vac;Tubigrip;Hydrofiber Silver 02/01/23 1530   Dressing Changed Changed 02/01/23 1530   Dressing Status Clean;Dry;Intact 01/16/23 1700   Dressing  Change/Treatment Frequency Monday, Wednesday, Friday, and As Needed 02/01/23 1530   Non-staged Wound Description Full thickness 02/01/23 1530   Wound Length (cm) 10 cm 02/01/23 1530   Wound Width (cm) 3.3 cm 02/01/23 1530   Wound Depth (cm) 0.9 cm 02/01/23 1530   Wound Surface Area (cm^2) 33 cm^2 02/01/23 1530   Wound Volume (cm^3) 29.7 cm^3 02/01/23 1530   Post-Procedure Length (cm) 10 cm 02/01/23 1530   Post-Procedure Width (cm) 3 cm 02/01/23 1530   Post-Procedure Depth (cm) 0.1 cm 02/01/23 1530   Post-Procedure Surface Area (cm^2) 30 cm^2 02/01/23 1530   Post-Procedure Volume (cm^3) 3 cm^3 02/01/23 1530   Wound Healing % 70 02/01/23 1530   Tunneling (cm) 4.2 cm 02/01/23 1530   Tunneling Clock Position of Wound 1 02/01/23 1530   Tunneling - 2nd Location (cm) 1.3 cm 02/01/23 1530   Tunneling Clock Position of Wound - 2nd Location 7 02/01/23 1530   Tunneling - 3rd Location (cm) 4.2 cm 02/01/23 1530   Tunneling Clock Position of Wound - 3rd Location 11 02/01/23 1530   Undermining (cm) 1.5 cm 02/01/23 1530   Undermining of Wound, 1st Location From 4 o'clock;To 5 o'clock 02/01/23 1530   Undermining (cm) - 2nd location 2.4 cm 02/01/23 1530   Undermining of Wound, 2nd Location From 9 o'clock;To 10 o'clock 02/01/23 1530   Undermining (cm) - 3rd location 1.5 cm 01/18/23 0915   Undermining of Wound, 3rd Location From 7 o'clock;To 9 o'clock 01/16/23 1700   Wound Odor None 02/01/23 1530   Pulses 1+;Left;DP;PT 01/16/23 1700   Exposed Structures Adipose 02/01/23 1530   Number of days: 17       Wound 01/16/23 Incision Leg Posterior;Medial Left --Left Posteromedial LE (Active)   Wound Image    02/01/23 1530   Site Assessment Red;Yellow;Granulation tissue 02/01/23 1530   Periwound Assessment Blanchable erythema;Edema;Painful;Fragile 02/01/23 1530   Margins Attached edges;Epibole (rolled edges) 02/01/23 1530   Closure Secondary intention 01/16/23 1700   Drainage Amount Large 02/01/23 1530   Drainage Description Serosanguineous  02/01/23 1530   Treatments Cleansed;Topical Lidocaine;Provider debridement;Site care 02/01/23 1530   Wound Cleansing Normal Saline Irrigation 02/01/23 1530   Periwound Protectant Skin Protectant Wipes to Periwound;Barrier Paste 02/01/23 1530   Dressing Cleansing/Solutions Not Applicable 02/01/23 1530   Dressing Options Wound Vac;Tubigrip 02/01/23 1530   Dressing Changed Changed 02/01/23 1530   Dressing Status Clean;Dry;Intact 01/16/23 1700   Dressing Change/Treatment Frequency Monday, Wednesday, Friday, and As Needed 02/01/23 1530   Non-staged Wound Description Full thickness 02/01/23 1530   Wound Length (cm) 5.2 cm 02/01/23 1530   Wound Width (cm) 2.2 cm 02/01/23 1530   Wound Depth (cm) 0.4 cm 02/01/23 1530   Wound Surface Area (cm^2) 11.44 cm^2 02/01/23 1530   Wound Volume (cm^3) 4.576 cm^3 02/01/23 1530   Post-Procedure Length (cm) 5.5 cm 02/01/23 1530   Post-Procedure Width (cm) 2.4 cm 02/01/23 1530   Post-Procedure Depth (cm) 0.5 cm 02/01/23 1530   Post-Procedure Surface Area (cm^2) 13.2 cm^2 02/01/23 1530   Post-Procedure Volume (cm^3) 6.6 cm^3 02/01/23 1530   Wound Healing % 87 02/01/23 1530   Undermining (cm) 0 cm 02/01/23 1530   Undermining of Wound, 1st Location From 1 o'clock;To 3 o'clock 01/18/23 0915   Undermining (cm) - 2nd location 0 cm 02/01/23 1530   Undermining of Wound, 2nd Location From 3 o'clock;To 5 o'clock 01/16/23 1700   Wound Odor None 02/01/23 1530   Exposed Structures None 02/01/23 1530   Number of days: 17       Wound 01/16/23 Full Thickness Wound Leg Proximal;Anterior Left (Active)   Wound Image   02/01/23 1530   Site Assessment Brown;Epithelialization 02/01/23 1530   Periwound Assessment Edema;Fragile 02/01/23 1530   Margins Attached edges 01/30/23 1100   Closure Secondary intention 01/16/23 1700   Drainage Amount Scant 02/01/23 1530   Drainage Description Serosanguineous 02/01/23 1530   Treatments Cleansed;Site care 02/01/23 1530   Wound Cleansing Foam Cleanser/Washcloth 02/01/23 1530    Periwound Protectant Not Applicable 02/01/23 1530   Dressing Cleansing/Solutions Not Applicable 02/01/23 1530   Dressing Options Tubigrip 02/01/23 1530   Dressing Changed New 02/01/23 1530   Dressing Status Clean;Dry;Intact 01/16/23 1700   Dressing Change/Treatment Frequency Monday, Wednesday, Friday, and As Needed 02/01/23 1530   Non-staged Wound Description Full thickness 01/30/23 1100   Wound Length (cm) 0.7 cm 01/16/23 1700   Wound Width (cm) 0.5 cm 01/16/23 1700   Wound Depth (cm) 0.1 cm 01/16/23 1700   Wound Surface Area (cm^2) 0.35 cm^2 01/16/23 1700   Wound Volume (cm^3) 0.035 cm^3 01/16/23 1700   Post-Procedure Length (cm) 0.7 cm 01/18/23 0915   Post-Procedure Width (cm) 0.1 cm 01/18/23 0915   Post-Procedure Depth (cm) 0.4 cm 01/18/23 0915   Post-Procedure Surface Area (cm^2) 0.07 cm^2 01/18/23 0915   Post-Procedure Volume (cm^3) 0.028 cm^3 01/18/23 0915   Wound Bed Granulation (%) 100 % 01/16/23 1700   Tunneling (cm) 0 cm 02/01/23 1530   Undermining (cm) 0 cm 02/01/23 1530   Wound Odor None 02/01/23 1530   Exposed Structures None 02/01/23 1530   Number of days: 17       Wound 01/27/23 Full Thickness Wound Leg Anterior;Medial Left Cluster (Active)   Wound Image   02/01/23 1530   Site Assessment Red;Yellow;Boggy 02/01/23 1530   Periwound Assessment Edema;Fragile;Blanchable erythema;Painful;Warm 02/01/23 1530   Margins Attached edges;Unattached edges 02/01/23 1530   Drainage Amount Large 02/01/23 1530   Drainage Description Serosanguineous 02/01/23 1530   Treatments Cleansed;Topical Lidocaine;Provider debridement;Site care 02/01/23 1530   Wound Cleansing Foam Cleanser/Washcloth 02/01/23 1530   Periwound Protectant Skin Protectant Wipes to Periwound;Drape 02/01/23 1530   Dressing Cleansing/Solutions Not Applicable 02/01/23 1530   Dressing Options Wound Vac;Tubigrip;Hydrofiber Silver 02/01/23 1530   Dressing Change/Treatment Frequency Monday, Wednesday, Friday, and As Needed 02/01/23 1530   Non-staged Wound  Description Full thickness 02/01/23 1530   Wound Length (cm) 1 cm 02/01/23 1530   Wound Width (cm) 7.5 cm 02/01/23 1530   Wound Depth (cm) 1.4 cm 02/01/23 1530   Wound Surface Area (cm^2) 7.5 cm^2 02/01/23 1530   Wound Volume (cm^3) 10.5 cm^3 02/01/23 1530   Post-Procedure Length (cm) 1 cm 01/27/23 1647   Post-Procedure Width (cm) 7.5 cm 01/27/23 1647   Post-Procedure Depth (cm) 0.5 cm 01/27/23 1647   Post-Procedure Surface Area (cm^2) 7.5 cm^2 01/27/23 1647   Post-Procedure Volume (cm^3) 3.75 cm^3 01/27/23 1647   Tunneling (cm) 0 cm 02/01/23 1530   Undermining (cm) 0 cm 02/01/23 1530   Wound Odor None 02/01/23 1530   Exposed Structures None 02/01/23 1530   Number of days: 6       Wound 02/01/23 Full Thickness Wound Left posteromedial LE wound, inferior (Active)   Wound Image   02/01/23 1530   Site Assessment Red;Yellow 02/01/23 1530   Periwound Assessment Blanchable erythema;Edema 02/01/23 1530   Margins Attached edges 02/01/23 1530   Drainage Amount Moderate 02/01/23 1530   Drainage Description Serosanguineous 02/01/23 1530   Treatments Cleansed;Site care 02/01/23 1530   Wound Cleansing Foam Cleanser/Washcloth 02/01/23 1530   Periwound Protectant Skin Protectant Wipes to Periwound 02/01/23 1530   Dressing Options Hydrofiber Silver;Vac Drape 02/01/23 1530   Dressing Changed New 02/01/23 1530   Dressing Change/Treatment Frequency Monday, Wednesday, Friday, and As Needed 02/01/23 1530   Non-staged Wound Description Full thickness 02/01/23 1530   Wound Length (cm) 0.9 cm 02/01/23 1530   Wound Width (cm) 1.3 cm 02/01/23 1530   Wound Depth (cm) 0.1 cm 02/01/23 1530   Wound Surface Area (cm^2) 1.17 cm^2 02/01/23 1530   Wound Volume (cm^3) 0.117 cm^3 02/01/23 1530   Tunneling (cm) 0 cm 02/01/23 1530   Undermining (cm) 0 cm 02/01/23 1530   Wound Odor None 02/01/23 1530   Exposed Structures None 02/01/23 1530   Number of days: 1       PROCEDURE: Debridement of surgical sites anterior and posteromedial wounds  -2% viscous  lidocaine applied topically to wound bedS for approximately 5 minutes prior to debridement  -Curette used to debride wound bedS.  Excisional debridement was performed to remove devitalized tissue until healthy, bleeding tissue was visualized.   Entire surface of wounds, 50.7 cm² debrided.  Tissue debrided into the subcutaneous layer.    -Bleeding controlled with manual pressure.    -Wound care completed by wound RN, refer to flowsheet  -Patient tolerated the procedure well, without c/o pain or discomfort.          Pertinent Labs and Diagnostics:    Labs:     A1c:   Lab Results   Component Value Date/Time    HBA1C 8.5 (H) 12/04/2022 09:27 PM          IMAGING: Dx to tibia and fibula dated 12/5/2022  IMPRESSION:     No radiographic evidence of acute bony abnormality     Skin thickening and edema with some soft tissue calcifications most likely indicates venous stasis. Cellulitis is a possibility and there is no evidence of soft tissue gas. Dermatomyositis can be seen with soft tissue calcifications but these are not as   extensive as that often entails      LAST  WOUND CULTURE:  DATE :   Lab Results   Component Value Date/Time    CULTRSULT - (A) 01/30/2023 10:40 AM    CULTRSULT (A) 01/30/2023 10:40 AM     Methicillin Resistant Staphylococcus aureus  Light growth           ASSESSMENT AND PLAN:   1. Open wound of left lower extremity, subsequent encounter    2/1/2023: Total wound area has decreased since last assessment.  However, patient is developing new, small, scattered wounds to this leg, some of which are communicating to the larger wound.  - Excisional debridement was performed in clinic, medically necessary to promote wound healing.  -Patient to return to clinic 3 times weekly for assessment, and VAC dressing change.  1 of these visits will be with provider for debridement.  -Follow-up with surgeon on 2/17     Wound care: NPWT at 125 mmHg to accelerate granulation, change 3 times per week.     2. Type 2 diabetes  mellitus without complication, without long-term current use of insulin (HCC)  Comments: Patient is currently on glipizide only    2/1/2023: Patient states his blood sugar today was 126.  Most recent A1c was 8.5  Patient instructed to keep tight control over FBS, <140 for optimal wound healing; Implications of loss of protective sensation (LOPS) discussed with patient, including increased risk for amputation.  Advised to check feet at least daily, moisturize feet, and to always wear protective foot wear, arrange meticulous regular foot care by podiatrist or [facility]. Pt with good understanding.      -Consider referral to endocrinology    3. Wound infection    2/1/2023: Patient was prescribed Zyvox for recent culture positive for MRSA and he is tolerating without difficulty  -He is to complete his antibiotics as prescribed  -Pt advised to go to ER for any increased redness, swelling, drainage or odor, or if he develops fever, chills, nausea or vomiting.  He states he may go to the emergency room later today due to appearance of raised red bumps to his arms over the past few weeks.          PATIENT EDUCATION  - Importance of adequate nutrition for wound healing  -Advised to go to ER for any increased redness, swelling, drainage, or odor, or if patient develops fever, chills, nausea or vomiting.     My total time spent caring for the patient on the day of the encounter was 20 minutes.   This does not include time spent on separately billable procedures/tests.         Please note that this note may have been created using voice recognition software. I have worked with technical experts from [facility] to optimize the interface.  I have made every reasonable attempt to correct obvious errors, but there may be errors of grammar and possibly content that I did not discover before finalizing the note.    N

## 2023-02-03 ENCOUNTER — OFFICE VISIT (OUTPATIENT)
Dept: WOUND CARE | Facility: MEDICAL CENTER | Age: 63
End: 2023-02-03
Attending: INTERNAL MEDICINE
Payer: COMMERCIAL

## 2023-02-03 DIAGNOSIS — S81.802D OPEN WOUND OF LEFT LOWER EXTREMITY, SUBSEQUENT ENCOUNTER: ICD-10-CM

## 2023-02-03 PROCEDURE — 97605 NEG PRS WND THER DME<=50SQCM: CPT

## 2023-02-03 NOTE — PROGRESS NOTES
Brief Wound Care Provider Note    Patient was seen by provider earlier this week. Here for nursing VAC change. Did not need to see provider today.

## 2023-02-04 NOTE — PROCEDURES
VAC dressing removed by CNA. Wound bed inspected and no residual foam noted. Wound VAC  <50cm2 changed with 6 black foam. Restarted at 125mmHg continuous. No leak noted.

## 2023-02-04 NOTE — PATIENT INSTRUCTIONS
Wound VAC at 125mmHg continuous with 6 foams. Dressing will be changed every MWF at the wound clinic or with your home health nurse.  If you are having issues with your wound VAC, please consider patching leaks, changing the canister, or calling [phone] for troubleshooting. If the wound VAC has been off or un-operational for over 2 hours, call wound care center to inform them and remove all dressings including black foam and replace with normal saline damp gauze.     Should you experience any significant changes in your wound(s), such as infection (redness, swelling, localized heat, increased pain, fever > 101 F, chills) or have any questions regarding your home care instructions, please contact the wound center at [phone]. If after hours, contact your primary care physician or go to the hospital emergency room.   Keep dressing clean, dry and cover when bathing. Only change dressing if it's over saturated, soiled or falls off.

## 2023-02-06 ENCOUNTER — HOSPITAL ENCOUNTER (EMERGENCY)
Facility: MEDICAL CENTER | Age: 63
End: 2023-02-06
Attending: EMERGENCY MEDICINE
Payer: COMMERCIAL

## 2023-02-06 ENCOUNTER — NON-PROVIDER VISIT (OUTPATIENT)
Dept: WOUND CARE | Facility: MEDICAL CENTER | Age: 63
End: 2023-02-06
Attending: INTERNAL MEDICINE
Payer: COMMERCIAL

## 2023-02-06 VITALS
OXYGEN SATURATION: 98 % | HEART RATE: 91 BPM | BODY MASS INDEX: 39.83 KG/M2 | TEMPERATURE: 98.1 F | DIASTOLIC BLOOD PRESSURE: 86 MMHG | RESPIRATION RATE: 16 BRPM | HEIGHT: 72 IN | SYSTOLIC BLOOD PRESSURE: 141 MMHG | WEIGHT: 294.09 LBS

## 2023-02-06 DIAGNOSIS — Z51.89 ENCOUNTER FOR WOUND RE-CHECK: ICD-10-CM

## 2023-02-06 DIAGNOSIS — R21 RASH: ICD-10-CM

## 2023-02-06 DIAGNOSIS — I10 BENIGN ESSENTIAL HYPERTENSION: ICD-10-CM

## 2023-02-06 LAB
ALBUMIN SERPL BCP-MCNC: 3.5 G/DL (ref 3.2–4.9)
ALBUMIN/GLOB SERPL: 0.9 G/DL
ALP SERPL-CCNC: 81 U/L (ref 30–99)
ALT SERPL-CCNC: 6 U/L (ref 2–50)
ANION GAP SERPL CALC-SCNC: 10 MMOL/L (ref 7–16)
AST SERPL-CCNC: 9 U/L (ref 12–45)
BASOPHILS # BLD AUTO: 0.9 % (ref 0–1.8)
BASOPHILS # BLD: 0.09 K/UL (ref 0–0.12)
BILIRUB SERPL-MCNC: 0.6 MG/DL (ref 0.1–1.5)
BUN SERPL-MCNC: 13 MG/DL (ref 8–22)
CALCIUM ALBUM COR SERPL-MCNC: 9.5 MG/DL (ref 8.5–10.5)
CALCIUM SERPL-MCNC: 9.1 MG/DL (ref 8.5–10.5)
CHLORIDE SERPL-SCNC: 100 MMOL/L (ref 96–112)
CO2 SERPL-SCNC: 23 MMOL/L (ref 20–33)
CREAT SERPL-MCNC: 0.99 MG/DL (ref 0.5–1.4)
CRP SERPL HS-MCNC: 2.57 MG/DL (ref 0–0.75)
EOSINOPHIL # BLD AUTO: 0.11 K/UL (ref 0–0.51)
EOSINOPHIL NFR BLD: 1.2 % (ref 0–6.9)
ERYTHROCYTE [DISTWIDTH] IN BLOOD BY AUTOMATED COUNT: 45.1 FL (ref 35.9–50)
ERYTHROCYTE [SEDIMENTATION RATE] IN BLOOD BY WESTERGREN METHOD: 93 MM/HOUR (ref 0–20)
GFR SERPLBLD CREATININE-BSD FMLA CKD-EPI: 85 ML/MIN/1.73 M 2
GLOBULIN SER CALC-MCNC: 3.8 G/DL (ref 1.9–3.5)
GLUCOSE SERPL-MCNC: 210 MG/DL (ref 65–99)
HCT VFR BLD AUTO: 39.2 % (ref 42–52)
HGB BLD-MCNC: 13.3 G/DL (ref 14–18)
IMM GRANULOCYTES # BLD AUTO: 0.03 K/UL (ref 0–0.11)
IMM GRANULOCYTES NFR BLD AUTO: 0.3 % (ref 0–0.9)
LACTATE SERPL-SCNC: 1.6 MMOL/L (ref 0.5–2)
LYMPHOCYTES # BLD AUTO: 1.59 K/UL (ref 1–4.8)
LYMPHOCYTES NFR BLD: 16.8 % (ref 22–41)
MCH RBC QN AUTO: 31.7 PG (ref 27–33)
MCHC RBC AUTO-ENTMCNC: 33.9 G/DL (ref 33.7–35.3)
MCV RBC AUTO: 93.3 FL (ref 81.4–97.8)
MONOCYTES # BLD AUTO: 0.69 K/UL (ref 0–0.85)
MONOCYTES NFR BLD AUTO: 7.3 % (ref 0–13.4)
NEUTROPHILS # BLD AUTO: 6.97 K/UL (ref 1.82–7.42)
NEUTROPHILS NFR BLD: 73.5 % (ref 44–72)
NRBC # BLD AUTO: 0 K/UL
NRBC BLD-RTO: 0 /100 WBC
PHOSPHATE SERPL-MCNC: 2.9 MG/DL (ref 2.5–4.5)
PLATELET # BLD AUTO: 364 K/UL (ref 164–446)
PMV BLD AUTO: 8.2 FL (ref 9–12.9)
POTASSIUM SERPL-SCNC: 3.7 MMOL/L (ref 3.6–5.5)
PROT SERPL-MCNC: 7.3 G/DL (ref 6–8.2)
RBC # BLD AUTO: 4.2 M/UL (ref 4.7–6.1)
SODIUM SERPL-SCNC: 133 MMOL/L (ref 135–145)
WBC # BLD AUTO: 9.5 K/UL (ref 4.8–10.8)

## 2023-02-06 PROCEDURE — 80053 COMPREHEN METABOLIC PANEL: CPT

## 2023-02-06 PROCEDURE — 86140 C-REACTIVE PROTEIN: CPT

## 2023-02-06 PROCEDURE — 85652 RBC SED RATE AUTOMATED: CPT

## 2023-02-06 PROCEDURE — 87040 BLOOD CULTURE FOR BACTERIA: CPT | Mod: 91

## 2023-02-06 PROCEDURE — 97605 NEG PRS WND THER DME<=50SQCM: CPT

## 2023-02-06 PROCEDURE — 84100 ASSAY OF PHOSPHORUS: CPT

## 2023-02-06 PROCEDURE — 36415 COLL VENOUS BLD VENIPUNCTURE: CPT

## 2023-02-06 PROCEDURE — 85025 COMPLETE CBC W/AUTO DIFF WBC: CPT

## 2023-02-06 PROCEDURE — 97602 WOUND(S) CARE NON-SELECTIVE: CPT

## 2023-02-06 PROCEDURE — 99283 EMERGENCY DEPT VISIT LOW MDM: CPT

## 2023-02-06 PROCEDURE — 83605 ASSAY OF LACTIC ACID: CPT

## 2023-02-06 RX ORDER — NALOXONE HYDROCHLORIDE 4 MG/.1ML
1 SPRAY NASAL
Status: SHIPPED | COMMUNITY
End: 2023-03-15

## 2023-02-06 RX ORDER — CIPROFLOXACIN 500 MG/1
500 TABLET, FILM COATED ORAL EVERY 12 HOURS
Status: SHIPPED | COMMUNITY
End: 2023-03-15

## 2023-02-06 RX ORDER — HYDROCODONE BITARTRATE AND ACETAMINOPHEN 10; 325 MG/1; MG/1
1 TABLET ORAL EVERY 4 HOURS PRN
Status: SHIPPED | COMMUNITY
Start: 2023-01-17 | End: 2023-06-22

## 2023-02-06 RX ORDER — GLIPIZIDE 5 MG/1
5 TABLET ORAL DAILY
Status: SHIPPED | COMMUNITY
End: 2023-03-15

## 2023-02-06 ASSESSMENT — FIBROSIS 4 INDEX: FIB4 SCORE: 0.53

## 2023-02-06 ASSESSMENT — LIFESTYLE VARIABLES: DO YOU DRINK ALCOHOL: NO

## 2023-02-06 NOTE — ED TRIAGE NOTES
"Thomas Zambrano Jonh  62 y.o.  male  Chief Complaint   Patient presents with    Wound Check     Red, warm & tender firm areas to bilateral forearms & to RLE, ongoing x 1 month, came to ED as they are \"not going away\". Sees wound care for wound vac on LLE, advised for past 2 weeks by RNs at wound care to come to ED but pt was unable.      Labs ordered. Pt denies fevers. Patient educated on triage process, to alert staff if any changes in condition.    "

## 2023-02-06 NOTE — PATIENT INSTRUCTIONS
-Keep your wound dressing clean, dry, and intact.    -Change your dressing if it becomes soiled, soaked, or falls off.    -Remove your compression stocking if you have severe pain, severe swelling, numbness, color change, or temperature change in your toes. If you need to remove your compression wrap, do so by unrolling it. Do not cut the compression wrap off to prevent cutting yourself on accident.    -Wound vac may not have any drainage in tube or cannister & it will still be working.   Change cannister if it does become full by pressing tab on side of machine to remove canister and snap on new one. Full canister can be thrown in the trash. If cannister fills with bright red blood - go to ER. Dressing will be changed every MWF at the wound clini.  If you are having issues with your wound VAC, please consider patching leaks, changing the canister, or calling [phone] for troubleshooting. If the wound VAC has been off or un-operational for over 2 hours, call wound care center to inform them and remove all dressings including black foam and replace with normal saline damp gauze.     -Should you experience any significant changes in your wound(s), such as infection (redness, swelling, localized heat, increased pain, fever > 101 F, chills) or have any questions regarding your home care instructions, please contact the wound center at [phone]. If after hours, contact your primary care physician or go to the hospital emergency room.

## 2023-02-06 NOTE — PROCEDURES
Non-selective debridement to LLE wounds and nithin-wound using foam cleanser and washcloth, then Puracyn spray and gauze to remove biofilm and non-viable tissue.      - Medela NPWT dressing changed to LLE wounds wound, < 50 cm ².   - 5 pieces of black foam removed, and no residual foam noted in wound bed.   -2 piece(s) of black foam used (1 piece bridged between all wounds and 1 for button)   - Initiated continuous suction at 125 mm Hg, no leaks noted.

## 2023-02-06 NOTE — ED PROVIDER NOTES
"ED Physician note    CHIEF COMPLAINT  Chief Complaint   Patient presents with    Wound Check     Red, warm & tender firm areas to bilateral forearms & to RLE, ongoing x 1 month, came to ED as they are \"not going away\". Sees wound care for wound vac on LLE, advised for past 2 weeks by RNs at wound care to come to ED but pt was unable.        EXTERNAL RECORDS REVIEWED  Mr. Borja was seen in wound care clinic earlier today.  RN note reviewed from that visit.  Left lower extremity wound dressings were changed.  Black foam was removed, and replaced.  Continuous suction initiated, no leaks noted.    Discharge summary reviewed from 1/13/2023.  He is noted have a history of sepsis secondary to left lower extremity cellulitis.  He completed 10-day course of antibiotics.  Hospital course was complicated by acute kidney injury requiring need for dialysis with later improvement in renal function.  He underwent I&D of the left leg abscess with wound VAC placement.    HPI/ROS    Thomas Borja is a 62 y.o. male who presents the emergency department for evaluation of painful nodules and areas of redness on the skin.  He has a chronic wound on the left lower extremity, followed by wound care after an abscess drainage complicated by subsequent infection.  He states that he has been on 2 courses of Zyvox, and is completing a second course at this time.  About a month ago he developed some painful lesions to the right forearm initially, now developing lesions to the left forearm, as well as the right lower extremity.  These are firm palpable nodules with overlying redness, he states he does have some pain with palpation of the affected areas.  He has pointed them out to his wound care nurses who have previously recommended that he present to the emergency department, due to worsening wounds he presents to the emergency department today.  He has not had any associated fevers, no darkened areas.  They are tender, though not " "acutely painful.  They do seem to be growing in size and number, he is unable to identify any reliably exacerbating or alleviating factors.    PAST MEDICAL HISTORY   History of sepsis secondary to left lower extremity cellulitis complicated by acute kidney injury requiring dialysis, subsequently renal function improved.    SURGICAL HISTORY   has a past surgical history that includes skin abscess incision and drainage (Left, 1/5/2023).    FAMILY HISTORY  No family history on file.    SOCIAL HISTORY  Social History     Tobacco Use    Smoking status: Former    Smokeless tobacco: Never   Vaping Use    Vaping Use: Never used   Substance and Sexual Activity    Alcohol use: Yes     Comment: occ    Drug use: Never    Sexual activity: Not on file       CURRENT MEDICATIONS  Home Medications       Reviewed by Tony Olvera (Pharmacy Tech) on 02/06/23 at 1824  Med List Status: Complete     Medication Last Dose Status   acetaminophen (TYLENOL) 500 MG Tab \"COUPLE WEEKS AGO\" Active   amLODIPine (NORVASC) 10 MG Tab 2/5/2023 Active   ciprofloxacin (CIPRO) 500 MG Tab 1/23/2023 Active   finasteride (PROSCAR) 5 MG Tab 2/5/2023 Active   glipiZIDE (GLUCOTROL) 5 MG Tab 2/5/2023 Active   HYDROcodone/acetaminophen (NORCO)  MG Tab \"COUPLE DAYS AGO\" Active   levothyroxine (SYNTHROID) 112 MCG Tab 2/5/2023 Active   linezolid (ZYVOX) 600 MG Tab 2/5/2023 Active   Naloxone (NARCAN) 4 MG/0.1ML Liquid PRN Active   omeprazole (PRILOSEC) 40 MG delayed-release capsule 2/5/2023 Active   pregabalin (LYRICA) 50 MG capsule 2/5/2023 Active   rosuvastatin (CRESTOR) 20 MG Tab 2/5/2023 Active   tamsulosin (FLOMAX) 0.4 MG capsule 2/5/2023 Active   traZODone (DESYREL) 50 MG Tab \"OVER A WEEK AGO\" Active                    ALLERGIES  Allergies   Allergen Reactions    Amoxicillin Hives    Oxycodone Hives    Codeine Nausea    Erythromycin Nausea       PHYSICAL EXAM  VITAL SIGNS: BP (!) 141/86   Pulse 91   Temp 36.7 °C (98.1 °F)   Resp 16   Ht 1.829 m " (6')   Wt (!) 133 kg (294 lb 1.5 oz)   SpO2 98%   BMI 39.89 kg/m²    Constitutional: Alert, no acute distress  HEENT: No intraoral lesions  Neck: Supple, normal range of motion, no stridor  Cardiovascular: Extremities are warm and well perfused, no murmur appreciated, tachycardic rate  Pulmonary: No respiratory distress, normal work of breathing, no accessory muscule usage, breath sounds clear and equal bilaterally  Abdomen: Soft, non-distended  Skin: Right lower extremity is well dressed, bandages are clean, dry, and intact, wound VAC is in place from wound care visit immediately prior to arrival.  Left upper extremity, right upper extremity, as well as right lower extremity have areas of tender, somewhat firm nodular lesions, overlying skin is slightly reddened and blanching, no overlying eschar, no overlying ecchymosis, no hemorrhagic bullae.  No pain out of portion to exam, soft compartments throughout bilateral lower and lower extremities.  No joint involvement.  Musculoskeletal: Abnormalities as documented in skin exam.  Neurologic: Awake and alert, no slurred speech, normal motor function, normal orientation      DIAGNOSTIC STUDIES / PROCEDURES    LABS  All labs reviewed as documented below.    COURSE & MEDICAL DECISION MAKING    Differential diagnosis includes potential severe life-threatening etiology including SIRS, sepsis, wound infection, bacteremia, calciphylaxis.  At this time, due to diagnostic uncertainty, the need for further testing, as well as observation, patient placed in observation status at 3:35 PM, 2/6/2023.     Upon Reevaluation, the patient's condition has: Remained stable without worsening.    Patient discharged from ED Observation status at 6:58 PM 2/6/2023    INITIAL ASSESSMENT, COURSE AND PLAN  Care Narrative: Mr. Borja presents to the emergency department today for evaluation of multiple abnormal nodular lesions on the bilateral arms and legs.  Areas are tender, though not  exquisitely painful.  Overlying skin is blanching.  Areas are not fluctuance, appearance is not consistent with abscess, less consistent with cellulitis.  I did consider a more severe etiology including calciphylaxis, however these symptoms have been mild, and present for a month, he is got no necrotic skin, no ecchymotic lesions, suspect some of the earlier lesions would have progressed to more severe disease within the past month if this was due to calciphylaxis.    On arrival to the emergency department he is tachycardic to 114, he is afebrile, he has no hypotension.  Vital signs are less concerning for severe sepsis.  Heart rate normalized without intervention.    On laboratory evaluation he has a normal white blood count, this is less concerning for severe infection.  Hemoglobin is 13.3 consistent with his baseline values.  No significant abnormalities on differential.  CMP is reassuring, glucose is 210, he has no liver enzyme abnormalities, no electrolyte abnormalities excepting a slightly low sodium at 133, doubt this is the cause of his symptoms.  ESR and CRP are elevated, this may be related to these abnormal lesions, may also be related to his large left lower extremity wound.    At this time, I do not believe he requires any further emergent diagnostics or treatment, however he should have close follow-up, as biopsy of the lesion may be necessary to make a definitive diagnosis.  He does not live in [place], prefers to come here for his medical care.  I discussed his presentation with Dr. Lester, family medicine resident physician on-call today, he kindly evaluated the patient in the emergency department.  He is in agreement that he does not require hospitalization, and was able to schedule an appointment for Mr. Borja in family medicine clinic, where he will be able to receive a biopsy.  At this time, I do not believe requires any further prescription medications.  Plan is for supportive care with close  outpatient follow-up.  He is discharged home in stable condition.      DISPOSITION AND DISCUSSIONS  I have discussed management of the patient with the following physicians: Dr. Lester, R Family Medicine Resident    Escalation of care considered, and ultimately not performed: Escalation of antibiotics.    Barriers to care at this time, including but not limited to: Patient lives outside of the area, does not have extensive subspecialist availability in his hometown    FINAL DIAGNOSIS  1. Encounter for wound re-check    2. Rash           Electronically signed by: Rosangela Rene M.D.

## 2023-02-07 NOTE — ED NOTES
"Med rec completed per patient at bedside and patient's pharmacy [facility] in [place] ([phone]) to verify strength of patient's amlodipine, as patient was unsure.  Allergies reviewed with patient.  On 1/13/2023 patient was prescribed a 10 day course of Cipro, which he states she finished.  Patient is on a 7 day course of Zyvox prescribed 1/30/2023.    Per [facility], amlodipine 10 mg 1 tablet every day was last dispensed to patient on 7/11/2022 for a 30 day supply. Patient states that he \"had gotten behind on taking it for a while,\" but that he started this medication again \"a couple of months ago\" and that it would still be the same strength.  "

## 2023-02-07 NOTE — DISCHARGE INSTRUCTIONS
Please call the family medicine clinic at the number listed above to confirm your appointment with Dr. Lester.  Return to the emergency department immediately if you develop any new or worsening symptoms, especially if the rashes become dark, purple, develop blisters, or start to look like bruises.  Additionally, please return if you develop worsening pain, fevers, or worsening rash before you are able to follow-up in the clinic.

## 2023-02-08 ENCOUNTER — TELEPHONE (OUTPATIENT)
Dept: WOUND CARE | Facility: MEDICAL CENTER | Age: 63
End: 2023-02-08
Payer: COMMERCIAL

## 2023-02-08 ENCOUNTER — NON-PROVIDER VISIT (OUTPATIENT)
Dept: WOUND CARE | Facility: MEDICAL CENTER | Age: 63
End: 2023-02-08
Attending: INTERNAL MEDICINE
Payer: COMMERCIAL

## 2023-02-08 ENCOUNTER — HOSPITAL ENCOUNTER (OUTPATIENT)
Facility: MEDICAL CENTER | Age: 63
End: 2023-02-08
Attending: NURSE PRACTITIONER
Payer: COMMERCIAL

## 2023-02-08 DIAGNOSIS — S81.802D OPEN WOUND OF LEFT LOWER EXTREMITY, SUBSEQUENT ENCOUNTER: ICD-10-CM

## 2023-02-08 DIAGNOSIS — T14.8XXA WOUND INFECTION: ICD-10-CM

## 2023-02-08 DIAGNOSIS — L08.9 WOUND INFECTION: ICD-10-CM

## 2023-02-08 DIAGNOSIS — S81.802D OPEN WOUND OF LEFT LOWER EXTREMITY, SUBSEQUENT ENCOUNTER: Primary | ICD-10-CM

## 2023-02-08 PROCEDURE — 87186 SC STD MICRODIL/AGAR DIL: CPT | Mod: 91

## 2023-02-08 PROCEDURE — 87205 SMEAR GRAM STAIN: CPT

## 2023-02-08 PROCEDURE — 97605 NEG PRS WND THER DME<=50SQCM: CPT

## 2023-02-08 PROCEDURE — 87077 CULTURE AEROBIC IDENTIFY: CPT

## 2023-02-08 PROCEDURE — 87070 CULTURE OTHR SPECIMN AEROBIC: CPT

## 2023-02-08 NOTE — TELEPHONE ENCOUNTER
Faxed to Elizabeth [email or web address] from 2/1/23 for documentation of wound description and measurements.

## 2023-02-09 LAB
GRAM STN SPEC: NORMAL
SIGNIFICANT IND 70042: NORMAL
SITE SITE: NORMAL
SOURCE SOURCE: NORMAL

## 2023-02-09 NOTE — PROCEDURES
Reapplied Medela NPWT to all left lower leg wounds and sealed with drape to 125 mmHg continuous, no leaks noted. Applied two layer compression wrap with hose on the outside.

## 2023-02-09 NOTE — PATIENT INSTRUCTIONS
Wound VAC at 125mmHg continuous or intermittent with black foam. Dressing will be changed every MWF at the wound clinic or with your home health nurse.  If you are having issues with your wound VAC, please consider patching leaks, changing the canister, or calling [facility] for troubleshooting. If the wound VAC has been off or un-operational for over 2 hours, call wound care center to inform them and remove all dressings including black foam and replace with normal saline damp gauze.     Should you experience any significant changes in your wound(s), such as infection (redness, swelling, localized heat, increased pain, fever > 101 F, chills) or have any questions regarding your home care instructions, please contact the wound center at [phone]. If after hours, contact your primary care physician or go to the hospital emergency room.   Keep dressing clean, dry and cover when bathing. Only change dressing if it's over saturated, soiled or falls off.    If compression wrap is painful or decreases your circulation, unwrap it & take it off.

## 2023-02-09 NOTE — PROGRESS NOTES
Culture taken 2/8/23 of LLE anterior wound.  Plan for possible excision of extensive undermining 2/10/23.

## 2023-02-10 ENCOUNTER — OFFICE VISIT (OUTPATIENT)
Dept: WOUND CARE | Facility: MEDICAL CENTER | Age: 63
End: 2023-02-10
Attending: INTERNAL MEDICINE
Payer: COMMERCIAL

## 2023-02-10 VITALS
SYSTOLIC BLOOD PRESSURE: 124 MMHG | HEART RATE: 89 BPM | OXYGEN SATURATION: 98 % | TEMPERATURE: 97.1 F | RESPIRATION RATE: 20 BRPM | DIASTOLIC BLOOD PRESSURE: 65 MMHG

## 2023-02-10 DIAGNOSIS — L08.9 WOUND INFECTION: ICD-10-CM

## 2023-02-10 DIAGNOSIS — E11.9 TYPE 2 DIABETES MELLITUS WITHOUT COMPLICATION, WITHOUT LONG-TERM CURRENT USE OF INSULIN (HCC): ICD-10-CM

## 2023-02-10 DIAGNOSIS — S81.802D OPEN WOUND OF LEFT LOWER EXTREMITY, SUBSEQUENT ENCOUNTER: ICD-10-CM

## 2023-02-10 DIAGNOSIS — T14.8XXA WOUND INFECTION: ICD-10-CM

## 2023-02-10 PROCEDURE — 11042 DBRDMT SUBQ TIS 1ST 20SQCM/<: CPT

## 2023-02-10 PROCEDURE — 99212 OFFICE O/P EST SF 10 MIN: CPT | Mod: 25 | Performed by: NURSE PRACTITIONER

## 2023-02-10 PROCEDURE — 11042 DBRDMT SUBQ TIS 1ST 20SQCM/<: CPT | Performed by: NURSE PRACTITIONER

## 2023-02-10 PROCEDURE — 11045 DBRDMT SUBQ TISS EACH ADDL: CPT | Performed by: NURSE PRACTITIONER

## 2023-02-10 PROCEDURE — 97606 NEG PRS WND THER DME>50 SQCM: CPT

## 2023-02-10 PROCEDURE — 99213 OFFICE O/P EST LOW 20 MIN: CPT

## 2023-02-10 PROCEDURE — 11045 DBRDMT SUBQ TISS EACH ADDL: CPT

## 2023-02-10 NOTE — PROGRESS NOTES
Provider Encounter- Full Thickness wound    HISTORY OF PRESENT ILLNESS  Wound History:   START OF CARE IN CLINIC: 1/16/2023 (return to clinic following hospitalization)   REFERRING PROVIDER:   Dr. Paige  WOUND- Full Thickness Wound   LOCATION: Left lower extremity, right anterior knee, left anterior knee   HISTORY: Patient presented to [facility] on 12/4/2022 with left lower extremity, fever, malaise, erythema x1 week.  He was originally admitted to [facility] for acute kidney injury and sepsis but with worsening renal function was transferred to [facility].  Wound cultures grew Streptococcus pyogenes group A and MSSA.  Patient was initially treated with cefazolin.  He is seen by the wound care team who recommended follow-up as an outpatient in wound care clinic.  While inpatient nephrology saw the patient and started him on dialysis for acute kidney injury.  His renal function improved and dialysis was stopped.  Patient was discharged with follow-up in the wound care clinic.    Patient was following with wound clinic, however on clinic visit 1/3/2022 he was noticed to have worsening wounds and concern for infection. He had failed outpatient Abx with zyvox and was sent to ED for further evaluation. Patient was admitted to [facility] from 1/3-1/13/2023 for sepsis and left lower extremity cellulitis with abscess. He was taken to OR for I&D with Dr. Odom. Patient's wound was treated with wound VAC. Surgical cultures were positive for enterobacter cloacae and was treated with ciprofloxacin with end date of 1/21/2023. Patient was referred back to [facility] for further care.    Pertinent Medical History: Hypertension, alcohol abuse, kidney disease, GERD, mitral valve prolapse, morbid obesity, obstructive sleep apnea, type 2 diabetes    TOBACCO USE: Former smoker denies use of smokeless tobacco    Patient's problem list, allergies, and current medications reviewed and updated in Epic    Interval  History:  2023: Clinic visit with Travis Bonilla MD. Patient reports feeling well since hospitalization. He presented with wound VAC. He will be traveling to [place] for  and will be back on Monday. Wound VAC will be held while out of town and will plan to reapply next week. He was instructed on performing wound care until returns to clinic. Patient is tolerating Abx, continues to have some periwound erythema without warmth or pain. He has two new areas of fluctuance, concerning for superficial abscess, discussed performing I&D, patient agreeable.    2023 : Clinic visit with PANCHO Medina, EL, MERI, RYLEE.   Patient states that overall he is feeling well blood sugars have been in the low 100s.  No significant improvement in his wounds.  He does mention that he has raised bumps to his arms, this has been going on for several weeks, he noticed a new bump today on his left forearm.  They are slightly tender with palpation, no fluctuance.  He also has a diffuse rash to his arms and hands.  He completed his antibiotics 3 or 4 days ago.  States he may go into the emergency room in [place] after he leaves the clinic.  Denies any fevers, chills, nausea, vomiting.    2023 : Clinic visit with PANCHO Medina, EL, MERI, RYLEE.   Kwesi states that overall he is feeling well, though a little bit stressed.  His stepdaughter is in hospital.  He did  his Zyvox prescription, prescribed on Monday for positive MRSA wound culture, light growth.  He is tolerating without any difficulty.  The smaller wounds to his leg present today with boggy tissue, at least 1 of these communicates with the larger wound.  He has an appointment to follow-up with his surgeon, Dr. Odom, on .  His blood sugar today was 126.  The bumps on his forearms are about the same, he did not go to the emergency room last week.    2/10/2023 : Clinic visit with PANCHO Medina, EL, MERI, RYLEE.   Patient states  that overall he is feeling well.  His blood sugars have consistently been running between 120 and 140.  As discussed last visit, the tissue between the lateral wound and a small anterior wound, overlying wound tract, was excised in clinic today using local anesthesia.  Wound VAC continued.  He does still have quite a bit of undermining all around the wound.  He sees his surgeon next Friday.   He will also be undergoing biopsies of the lumps on his arms and hands on Monday by his PCP.           REVIEW OF SYSTEMS:   Unchanged from previous clinic visit on 2/1/2023, except as documented in interval history above    PHYSICAL EXAMINATION:   /65   Pulse 89   Temp 36.2 °C (97.1 °F) (Temporal)   Resp 20   SpO2 98%     Physical Exam  Constitutional:       Appearance: He is obese.   Cardiovascular:      Rate and Rhythm: Normal rate.      Pulses: Normal pulses.   Pulmonary:      Effort: Pulmonary effort is normal. No respiratory distress.      Breath sounds: No wheezing.   Skin:     Comments: Multiple full thickness wounds left leg in various states of healing  Left anterior lower extremity full thickness wound s/p I&D -wound area has decreased slightly, increased granulation tissue, thin layer of slough to wound bed moderate serosanguineous drainage, no odor  Left posteromedial lower extremity full thickness wound-wound area about the same, new granulation tissue noted, thin layer of slough, moderate serosanguineous drainage    Refer to wound flowsheet and photos    Raised red bumps, quarter sized, to right and left forearms, no drainage, skin intact, no fluctuance, mildly tender to palpation   Neurological:      Mental Status: He is alert.       WOUND ASSESSMENT  Wound 01/16/23 Incision Pretibial Anterior Left (Active)   Wound Image    02/10/23 1330   Site Assessment Red;Granulation tissue;Yellow 02/10/23 1330   Periwound Assessment Edema 02/10/23 1330   Margins Epibole (rolled edges);Unattached edges 02/10/23 1330    Closure Secondary intention 01/16/23 1700   Drainage Amount Large 02/10/23 1330   Drainage Description Serosanguineous 02/10/23 1330   Treatments Cleansed;Topical Lidocaine;Injectible Lidocaine;Provider debridement 02/10/23 1330   Wound Cleansing Puracyn [place] 02/10/23 1330   Periwound Protectant Skin Protectant Wipes to Periwound;Drape 02/10/23 1330   Dressing Cleansing/Solutions Not Applicable 02/10/23 1330   Dressing Options Surgiform;Wound Vac;Tubigrip 02/10/23 1330   Dressing Changed Changed 02/10/23 1330   Dressing Status Clean;Dry;Intact 01/16/23 1700   Dressing Change/Treatment Frequency Monday, Wednesday, Friday, and As Needed 02/10/23 1330   Non-staged Wound Description Full thickness 02/10/23 1330   Wound Length (cm) 9.6 cm 02/10/23 1330   Wound Width (cm) 2.6 cm 02/10/23 1330   Wound Depth (cm) 0.3 cm 02/10/23 1330   Wound Surface Area (cm^2) 24.96 cm^2 02/10/23 1330   Wound Volume (cm^3) 7.488 cm^3 02/10/23 1330   Post-Procedure Length (cm) 10.1 cm 02/10/23 1330   Post-Procedure Width (cm) 4.8 cm 02/10/23 1330   Post-Procedure Depth (cm) 1 cm 02/10/23 1330   Post-Procedure Surface Area (cm^2) 48.48 cm^2 02/10/23 1330   Post-Procedure Volume (cm^3) 48.48 cm^3 02/10/23 1330   Wound Healing % 92 02/10/23 1330   Wound Bed Granulation (%) 95 % 02/08/23 1500   Tunneling (cm) 0 cm 02/10/23 1330   Tunneling Clock Position of Wound 1 02/08/23 1500   Tunneling - 2nd Location (cm) 1.6 cm 02/08/23 1500   Tunneling Clock Position of Wound - 2nd Location 7 02/08/23 1500   Tunneling - 3rd Location (cm) 4.2 cm 02/01/23 1530   Tunneling Clock Position of Wound - 3rd Location 11 02/01/23 1530   Undermining (cm) 3.4 cm 02/10/23 1330   Undermining of Wound, 1st Location From 9 o'clock;To 11 o'clock 02/10/23 1330   Undermining (cm) - 2nd location 3 cm 02/10/23 1330   Undermining of Wound, 2nd Location From 1 o'clock;To 5 o'clock 02/10/23 1330   Undermining (cm) - 3rd location 2 cm 02/08/23 1500   Undermining of Wound, 3rd  Location From 5 o'clock;To 5 o'clock 02/08/23 1500   Wound Odor None 02/10/23 1330   Pulses Left;1+;DP 02/08/23 1500   Exposed Structures Adipose 02/10/23 1330   Number of days: 25       Wound 01/16/23 Incision Leg Posterior;Medial Left --Left Posteromedial LE (Active)   Wound Image    02/10/23 1330   Site Assessment Red;Granulation tissue;Yellow 02/10/23 1330   Periwound Assessment Edema 02/10/23 1330   Margins Attached edges 02/10/23 1330   Closure Secondary intention 01/16/23 1700   Drainage Amount Small 02/10/23 1330   Drainage Description Serosanguineous 02/10/23 1330   Treatments Cleansed;Topical Lidocaine;Provider debridement 02/10/23 1330   Wound Cleansing Puracyn [place] 02/10/23 1330   Periwound Protectant Skin Protectant Wipes to Periwound;Drape 02/10/23 1330   Dressing Cleansing/Solutions Not Applicable 02/10/23 1330   Dressing Options Wound Vac;Tubigrip 02/10/23 1330   Dressing Changed Changed 02/10/23 1330   Dressing Status Clean;Dry;Intact 01/16/23 1700   Dressing Change/Treatment Frequency Monday, Wednesday, Friday, and As Needed 02/10/23 1330   Non-staged Wound Description Full thickness 02/10/23 1330   Wound Length (cm) 5.2 cm 02/10/23 1330   Wound Width (cm) 1.5 cm 02/10/23 1330   Wound Depth (cm) 0.3 cm 02/10/23 1330   Wound Surface Area (cm^2) 7.8 cm^2 02/10/23 1330   Wound Volume (cm^3) 2.34 cm^3 02/10/23 1330   Post-Procedure Length (cm) 5.1 cm 02/10/23 1330   Post-Procedure Width (cm) 1.7 cm 02/10/23 1330   Post-Procedure Depth (cm) 0.3 cm 02/10/23 1330   Post-Procedure Surface Area (cm^2) 8.67 cm^2 02/10/23 1330   Post-Procedure Volume (cm^3) 2.601 cm^3 02/10/23 1330   Wound Healing % 93 02/10/23 1330   Wound Bed Granulation (%) 100 % 02/08/23 1500   Undermining (cm) 0 cm 02/10/23 1330   Undermining of Wound, 1st Location From 1 o'clock;To 3 o'clock 01/18/23 0915   Undermining (cm) - 2nd location 0 cm 02/10/23 1330   Undermining of Wound, 2nd Location From 3 o'clock;To 5 o'clock 01/16/23 1700    Wound Odor None 02/10/23 1330   Pulses Left;1+;DP;PT 02/08/23 1500   Exposed Structures None 02/10/23 1330   Number of days: 25       Wound 01/27/23 Full Thickness Wound Leg Anterior;Medial Left Cluster (Active)   Wound Image   02/10/23 1330   Site Assessment Red;Yellow;Boggy 02/10/23 1330   Periwound Assessment Edema 02/10/23 1330   Margins Unattached edges 02/10/23 1330   Drainage Amount Moderate 02/10/23 1330   Drainage Description Serosanguineous 02/10/23 1330   Treatments Cleansed;Topical Lidocaine;Site care 02/10/23 1330   Wound Cleansing Puracyn [place] 02/10/23 1330   Periwound Protectant Skin Protectant Wipes to Periwound;Drape 02/10/23 1330   Dressing Cleansing/Solutions Not Applicable 02/10/23 1330   Dressing Options Wound Vac;Tubigrip 02/10/23 1330   Dressing Changed Changed 02/10/23 1330   Dressing Change/Treatment Frequency Monday, Wednesday, Friday, and As Needed 02/10/23 1330   Non-staged Wound Description Full thickness 02/10/23 1330   Wound Length (cm) 9.5 cm 02/10/23 1330   Wound Width (cm) 6.5 cm 02/10/23 1330   Wound Depth (cm) 0.9 cm 02/10/23 1330   Wound Surface Area (cm^2) 61.75 cm^2 02/10/23 1330   Wound Volume (cm^3) 55.575 cm^3 02/10/23 1330   Post-Procedure Length (cm) 7.5 cm 02/08/23 1500   Post-Procedure Width (cm) 0.5 cm 02/08/23 1500   Post-Procedure Depth (cm) 1 cm 02/08/23 1500   Post-Procedure Surface Area (cm^2) 3.75 cm^2 02/08/23 1500   Post-Procedure Volume (cm^3) 3.75 cm^3 02/08/23 1500   Wound Healing % -429 02/10/23 1330   Tunneling (cm) 0 cm 02/10/23 1330   Undermining (cm) 0 cm 02/10/23 1330   Wound Odor None 02/10/23 1330   Pulses Left;1+;DP;PT 02/08/23 1500   Exposed Structures None 02/10/23 1330   Number of days: 14       Wound 02/01/23 Full Thickness Wound Left posteromedial LE wound, inferior (Active)   Wound Image   02/10/23 1330   Site Assessment Red;Granulation tissue;Yellow 02/10/23 1330   Periwound Assessment Edema 02/10/23 1330   Margins Attached edges 02/10/23  1330   Closure Secondary intention 02/10/23 1330   Drainage Amount Small 02/10/23 1330   Drainage Description Serosanguineous 02/10/23 1330   Treatments Cleansed;Topical Lidocaine;Site care 02/10/23 1330   Wound Cleansing Puracyn [place] 02/10/23 1330   Periwound Protectant Skin Protectant Wipes to Periwound;Drape 02/10/23 1330   Dressing Cleansing/Solutions Not Applicable 02/10/23 1330   Dressing Options Wound Vac;Tubigrip 02/10/23 1330   Dressing Changed Changed 02/10/23 1330   Dressing Change/Treatment Frequency Monday, Wednesday, Friday, and As Needed 02/10/23 1330   Non-staged Wound Description Full thickness 02/10/23 1330   Wound Length (cm) 0.4 cm 02/10/23 1330   Wound Width (cm) 1 cm 02/10/23 1330   Wound Depth (cm) 0.1 cm 02/10/23 1330   Wound Surface Area (cm^2) 0.4 cm^2 02/10/23 1330   Wound Volume (cm^3) 0.04 cm^3 02/10/23 1330   Post-Procedure Length (cm) 0.5 cm 02/08/23 1500   Post-Procedure Width (cm) 1.2 cm 02/08/23 1500   Post-Procedure Depth (cm) 0.1 cm 02/08/23 1500   Post-Procedure Surface Area (cm^2) 0.6 cm^2 02/08/23 1500   Post-Procedure Volume (cm^3) 0.06 cm^3 02/08/23 1500   Wound Healing % 66 02/10/23 1330   Tunneling (cm) 0 cm 02/10/23 1330   Undermining (cm) 0 cm 02/10/23 1330   Wound Odor None 02/10/23 1330   Pulses Left;DP;PT;1+ 02/08/23 1500   Exposed Structures None 02/10/23 1330   Number of days: 9     DESCRIPTION OF PROCEDURE: Excision of skin and subcutaneous tissue overlying wound tract between anterior wound and smaller medial wound; excisional debridement of  lower extremity wounds x 2 (the 2 largest wounds)    The procedure, rationale for doing so, and the possible risks- including infection, pain and bleeding- have been discussed with the patient.  The patient  verbalized understanding and is agreeable with proceeding.  Consent signed    ANESTHESIA:  7 ml 2% lidocaine without epinephrine    PROCEDURE:   -A formal timeout was performed to confirm patient's correct name, correct  site, correct procedure and correct laterality.  -Skin prepped with Chlorahexidine.    -Lidocaine injected subcutaneously tissue overlying wound tract  - Scalpel and forceps used to excise wedge of tissue, approximately 3 cm², from over wound tract.  -Bleeding controlled with manual pressure, AgNO3, and Surgifoam  -Curette then used to excise slough and senescent red tissue from wound beds of the 2 largest wounds.  Total area debrided, 57.47.  Debridement carried into the subcutaneous tissue layer.   -Bleeding controlled with manual pressure.    -Wound care completed by wound RN, refer to flowsheet.    -Patient tolerated the procedure well, without c/o pain or discomfort.               Pertinent Labs and Diagnostics:    Labs:     A1c:   Lab Results   Component Value Date/Time    HBA1C 8.5 (H) 12/04/2022 09:27 PM          IMAGING: Dx to tibia and fibula dated 12/5/2022  IMPRESSION:     No radiographic evidence of acute bony abnormality     Skin thickening and edema with some soft tissue calcifications most likely indicates venous stasis. Cellulitis is a possibility and there is no evidence of soft tissue gas. Dermatomyositis can be seen with soft tissue calcifications but (are not as   extensive as that often entails      LAST  WOUND CULTURE:  DATE :   Lab Results   Component Value Date/Time    CULTRSULT - (A) 02/08/2023 03:00 PM    CULTRSULT (A) 02/08/2023 03:00 PM     Lactose fermenting Gram negative muna  Light growth      CULTRSULT Staphylococcus aureus  Rare growth   (A) 02/08/2023 03:00 PM         ASSESSMENT AND PLAN:   1. Open wound of left lower extremity, subsequent encounter    2/10/2023: Overall, patient's wounds are progressing, with increased granulation tissue noted and slight decrease in area.  The anterior wound, which is also the largest wound, tracks to a smaller wound more medial.    -Skin and subcutaneous tissue over tract between largest wound and more medial wound was excised in clinic today in  an effort to expedite healing of this wound.  - Excisional debridement of all wounds was performed in clinic, medically necessary to promote wound healing.  -Patient to return to clinic 3 times weekly for assessment, and VAC dressing change.  1 of these visits will be with provider for debridement.  -Follow-up with surgeon on 2/17     Wound care: NPWT at 125 mmHg to accelerate granulation, change 3 times per week.     2. Type 2 diabetes mellitus without complication, without long-term current use of insulin ([facility])  Comments: Patient is currently on glipizide only    2/10/2023: Patient states his sugars are consistently between 120 and 140.  He did not check it today after lunch.  Most recent A1c was 8.5  Patient has been instructed to keep tight control over FBS, <140 for optimal wound healing; Implications of loss of protective sensation (LOPS) discussed with patient, including increased risk for amputation.  Advised to check feet at least daily, moisturize feet, and to always wear protective foot wear, arrange meticulous regular foot care by podiatrist or [facility]. Pt with good understanding.      -Consider referral to endocrinology    3. Wound infection    2/10/2023: No clinical evidence of wound infection today  -Monitor for signs and symptoms of infection each clinic visit  -Pt advised to go to ER for any increased redness, swelling, drainage or odor, or if he develops fever, chills, nausea or vomiting.  He states he may go to the emergency room later today due to appearance of raised red bumps to his arms over the past few weeks.          PATIENT EDUCATION  - Importance of adequate nutrition for wound healing  -Advised to go to ER for any increased redness, swelling, drainage, or odor, or if patient develops fever, chills, nausea or vomiting.     My total time spent caring for the patient on the day of the encounter was 20 minutes.   This does not include time spent on separately billable procedures/tests.          Please note that this note may have been created using voice recognition software. I have worked with technical experts from [facility] to optimize the interface.  I have made every reasonable attempt to correct obvious errors, but there may be errors of grammar and possibly content that I did not discover before finalizing the note.    N

## 2023-02-11 LAB
BACTERIA BLD CULT: NORMAL
BACTERIA BLD CULT: NORMAL
SIGNIFICANT IND 70042: NORMAL
SIGNIFICANT IND 70042: NORMAL
SITE SITE: NORMAL
SITE SITE: NORMAL
SOURCE SOURCE: NORMAL
SOURCE SOURCE: NORMAL

## 2023-02-11 NOTE — PROCEDURES
VAC dressing removed by CNA. Wound bed inspected and no residual foam noted. NPWT changed this visit using 1 piece of black foam. Black foam bolstered over UM to lateral side of anterior wound. Pump set to neg 125mmhg continuous. No leaks noted.

## 2023-02-11 NOTE — PATIENT INSTRUCTIONS
-Keep your wound dressing clean, dry, and intact.    -Change your dressing if it becomes soiled, soaked, or falls off.    -Wound vac may not have any drainage in tube or cannister & it will still be working.   Change cannister if it does become full by pressing tab on side of machine to remove canister and snap on new one. Full canister can be thrown in the trash. If cannister fills with bright red blood - go to ER. Dressing will be changed every MWF at the wound clinic.  If you are having issues with your wound VAC, please consider patching leaks, changing the canister, or calling [phone] for troubleshooting. If the wound VAC has been off or un-operational for over 2 hours, call wound care center to inform them and remove all dressings including black foam and replace with normal saline damp gauze.     -Should you experience any significant changes in your wound(s), such as infection (redness, swelling, localized heat, increased pain, fever > 101 F, chills) or have any questions regarding your home care instructions, please contact the wound center at [phone]. If after hours, contact your primary care physician or go to the hospital emergency room.

## 2023-02-12 LAB
BACTERIA WND AEROBE CULT: ABNORMAL
GRAM STN SPEC: ABNORMAL
SIGNIFICANT IND 70042: ABNORMAL
SITE SITE: ABNORMAL
SOURCE SOURCE: ABNORMAL

## 2023-02-13 ENCOUNTER — OFFICE VISIT (OUTPATIENT)
Dept: MEDICAL GROUP | Facility: CLINIC | Age: 63
End: 2023-02-13
Payer: COMMERCIAL

## 2023-02-13 ENCOUNTER — NON-PROVIDER VISIT (OUTPATIENT)
Dept: WOUND CARE | Facility: MEDICAL CENTER | Age: 63
End: 2023-02-13
Attending: INTERNAL MEDICINE
Payer: COMMERCIAL

## 2023-02-13 VITALS
WEIGHT: 295 LBS | RESPIRATION RATE: 16 BRPM | BODY MASS INDEX: 39.96 KG/M2 | HEART RATE: 100 BPM | HEIGHT: 72 IN | OXYGEN SATURATION: 95 % | SYSTOLIC BLOOD PRESSURE: 129 MMHG | TEMPERATURE: 98.1 F | DIASTOLIC BLOOD PRESSURE: 89 MMHG

## 2023-02-13 DIAGNOSIS — L08.9 WOUND INFECTION: ICD-10-CM

## 2023-02-13 DIAGNOSIS — T14.8XXA WOUND INFECTION: ICD-10-CM

## 2023-02-13 DIAGNOSIS — L98.9 SKIN LESION OF HAND: ICD-10-CM

## 2023-02-13 DIAGNOSIS — L98.9 SKIN LESIONS: ICD-10-CM

## 2023-02-13 PROCEDURE — 99213 OFFICE O/P EST LOW 20 MIN: CPT | Mod: GC

## 2023-02-13 PROCEDURE — 97606 NEG PRS WND THER DME>50 SQCM: CPT

## 2023-02-13 PROCEDURE — 99212 OFFICE O/P EST SF 10 MIN: CPT | Performed by: NURSE PRACTITIONER

## 2023-02-13 RX ORDER — MINOCYCLINE HYDROCHLORIDE 100 MG/1
100 TABLET ORAL 2 TIMES DAILY
Qty: 14 TABLET | Refills: 0 | Status: SHIPPED | OUTPATIENT
Start: 2023-02-13 | End: 2023-02-13

## 2023-02-13 RX ORDER — LINEZOLID 600 MG/1
600 TABLET, FILM COATED ORAL 2 TIMES DAILY
Qty: 20 TABLET | Refills: 0 | Status: SHIPPED | OUTPATIENT
Start: 2023-02-13 | End: 2023-03-15

## 2023-02-13 RX ORDER — LINEZOLID 600 MG/1
600 TABLET, FILM COATED ORAL 2 TIMES DAILY
Qty: 14 TABLET | Refills: 0 | Status: SHIPPED | OUTPATIENT
Start: 2023-02-13 | End: 2023-02-13

## 2023-02-13 RX ORDER — MINOCYCLINE HYDROCHLORIDE 100 MG/1
100 TABLET ORAL 2 TIMES DAILY
Qty: 14 TABLET | Refills: 0 | Status: SHIPPED | OUTPATIENT
Start: 2023-02-13 | End: 2023-02-20

## 2023-02-13 ASSESSMENT — FIBROSIS 4 INDEX: FIB4 SCORE: 0.65

## 2023-02-14 ENCOUNTER — TELEPHONE (OUTPATIENT)
Dept: WOUND CARE | Facility: MEDICAL CENTER | Age: 63
End: 2023-02-14
Payer: COMMERCIAL

## 2023-02-14 NOTE — ASSESSMENT & PLAN NOTE
He was seen in the ER with erythema on bilateral sections of distal arms which was approx 10cm in length and 3cm in width. The lesions were erythematous, slightly raised, and had a feeling of thickness/firmness in the subcuticular/subdermal region. By the time he came to the appt today, the erythema had resolved but he still has firmness in dermal region.  - F/u dermatology

## 2023-02-14 NOTE — PROGRESS NOTES
I was asked to see the patient in clinic today by FLORIN Cantor.  I reviewed the patients culture results and assessed the patients wounds. I then spoke with infection disease pharmacist Miladis regarding the patients wound history and cultures results. He made the following recommendations Minocycline 100mg BID and zyvox 600mg BID for 7 days.    I have discussed with the patient the above recommendations he is in agreement with this plan.     >10 min spent face to face with patient, >50% of time spent counseling, coordinating care, reviewing records, discussing POC, educating patient

## 2023-02-14 NOTE — PROGRESS NOTES
Subjective:     CC:   Skin Lesion    HPI:   Thomas presents today with:    Problem   Skin Lesion of Hand    Pt has a lesion on the dorsum of his left hand which is raised and scaly, non-tender. First noticed 2 weeks ago and has been growing very rapidly. 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      Skin Lesions    Pt reports skin lesions on arms bilaterally that come and go. Non-painful.          Current Outpatient Medications Ordered in Epic   Medication Sig Dispense Refill    HYDROcodone/acetaminophen (NORCO)  MG Tab Take 1 Tablet by mouth every four hours as needed for Severe Pain. NOT TO EXCEED 5 tablets per 24 hours.      glipiZIDE (GLUCOTROL) 5 MG Tab Take 5 mg by mouth every day.      pregabalin (LYRICA) 50 MG capsule Take 50 mg by mouth 2 times a day.      traZODone (DESYREL) 50 MG Tab Take  mg by mouth at bedtime as needed for Sleep. 1 to 2 tablets = 50 to 100 mg.      acetaminophen (TYLENOL) 500 MG Tab Take 500-1,000 mg by mouth 2 times a day as needed for Mild Pain or Moderate Pain. 1 to 2 tablets = 500 to 1,000 mg. DO NOT USE WITH HYDROCODONE-ACETAMINOPHEN.      rosuvastatin (CRESTOR) 20 MG Tab Take 20 mg by mouth every day.      levothyroxine (SYNTHROID) 112 MCG Tab Take 112 mcg by mouth every morning on an empty stomach.      finasteride (PROSCAR) 5 MG Tab Take 5 mg by mouth every day.      tamsulosin (FLOMAX) 0.4 MG capsule Take 0.4 mg by mouth every day.      amLODIPine (NORVASC) 10 MG Tab Take 10 mg by mouth every day.      omeprazole (PRILOSEC) 40 MG delayed-release capsule Take 40 mg by mouth every day.      Naloxone (NARCAN) 4 MG/0.1ML Liquid Administer 1 Spray into affected nostril(S) one time as needed. Indications: Opioid Overdose (Patient not taking: Reported on 2/13/2023)      ciprofloxacin (CIPRO) 500 MG Tab Take 500 mg by mouth every 12 hours. 10 day course prescribed 1/13/2023. (FINISHED) (Patient not taking: Reported on  2/13/2023)       No current Epic-ordered facility-administered medications on file.       ROS:  Negative except for above in HPI    Objective:     Exam:  /89 (BP Location: Left arm, Patient Position: Sitting, BP Cuff Size: Large adult)   Pulse 100   Temp 36.7 °C (98.1 °F) (Temporal)   Resp 16   Ht 1.829 m (6')   Wt (!) 134 kg (295 lb)   SpO2 95%   BMI 40.01 kg/m²  Body mass index is 40.01 kg/m².    Gen: Alert and oriented, No apparent distress.  HEENT: NCAT, MMM  Ext: No clubbing, cyanosis, edema.  Neuro: Non-focal  Skin: Erythematous areas on bilateral arms have improved since last week, but there is a raised, 1.5cm diameter lesion on dorsal left hand which is erythematous, scaley, NTP    Labs: No new labs    Assessment & Plan:     62 y.o. male with the following -     Problem List Items Addressed This Visit       Skin lesion of hand     Pt has a raised, 1.5cm diameter lesion on dorsal left hand which is erythematous, scaley, NTP. Appearance of squamous cell carcinoma.Pt has a PMHx of SCC on the same hand, and the well healed surgical wound is also on the dorsum of his same hand.  - Urgent referral to dermatology ordered         Skin lesions     He was seen in the ER with erythema on bilateral sections of distal arms which was approx 10cm in length and 3cm in width. The lesions were erythematous, slightly raised, and had a feeling of thickness/firmness in the subcuticular/subdermal region. By the time he came to the appt today, the erythema had resolved but he still has firmness in dermal region.  - F/u dermatology

## 2023-02-14 NOTE — TELEPHONE ENCOUNTER
Thomas Borja prescription for Minocycline tablets changed to capsules and called to [facility] on [address] per Charles DELEON. Returned call to Thomas, however mailbox full and unable to leave message.

## 2023-02-14 NOTE — ASSESSMENT & PLAN NOTE
Pt has a raised, 1.5cm diameter lesion on dorsal left hand which is erythematous, scaley, NTP. Appearance of squamous cell carcinoma.Pt has a PMHx of SCC on the same hand, and the well healed surgical wound is also on the dorsum of his same hand.  - Urgent referral to dermatology ordered

## 2023-02-14 NOTE — PROCEDURES
NPWT > 50 sq cm dressing changed this visit, 1 pieces of foam removed. 1 new piece of black foam placed to each wound bed and used for tracpad (2 total). Pump set to 125 mmhg, continuous suction. No leaks detected. Refer to flow sheet for wound care details. Patient tolerated the procedure well.

## 2023-02-14 NOTE — PATIENT INSTRUCTIONS
-Keep your wound dressing clean, dry, and intact.    -Change your dressing if it becomes soiled, soaked, or falls off.    -Wound vac may not have any drainage in tube or cannister & it will still be working.   Change cannister if it does become full by pressing tab on side of machine to remove canister and snap on new one. Full canister can be thrown in the trash. If cannister fills with bright red blood - go to ER. Dressing will be changed every MWF at the wound clini.  If you are having issues with your wound VAC, please consider patching leaks, changing the canister, or calling [phone] for troubleshooting. If the wound VAC has been off or un-operational for over 2 hours, call wound care center to inform them and remove all dressings including black foam and replace with normal saline damp gauze.     -Should you experience any significant changes in your wound(s), such as infection (redness, swelling, localized heat, increased pain, fever > 101 F, chills) or have any questions regarding your home care instructions, please contact the wound center at [phone]. If after hours, contact your primary care physician or go to the hospital emergency room.

## 2023-02-15 ENCOUNTER — NON-PROVIDER VISIT (OUTPATIENT)
Dept: WOUND CARE | Facility: MEDICAL CENTER | Age: 63
End: 2023-02-15
Attending: INTERNAL MEDICINE
Payer: COMMERCIAL

## 2023-02-15 PROCEDURE — 97605 NEG PRS WND THER DME<=50SQCM: CPT

## 2023-02-15 NOTE — PATIENT INSTRUCTIONS
-Keep your wound dressing clean, dry, and intact.    -Change your dressing if it becomes soiled, soaked, or falls off.    -Remove your compression wrap if you have severe pain, severe swelling, numbness, color change, or temperature change in your toes. If you need to remove your compression wrap, do so by unrolling it. Do not cut the compression wrap off to prevent cutting yourself on accident.    -Wound vac may not have any drainage in tube or cannister & it will still be working.   Change cannister if it does become full by pressing tab on side of machine to remove canister and snap on new one. Full canister can be thrown in the trash. If cannister fills with bright red blood - go to ER. Dressing will be changed every MWF at the wound clinic.  If you are having issues with your wound VAC, please consider patching leaks, changing the canister, or calling [phone] for troubleshooting. If the wound VAC has been off or un-operational for over 2 hours, call wound care center to inform them and remove all dressings including black foam and replace with normal saline damp gauze.     -Should you experience any significant changes in your wound(s), such as infection (redness, swelling, localized heat, increased pain, fever > 101 F, chills) or have any questions regarding your home care instructions, please contact the wound center at [phone]. If after hours, contact your primary care physician or go to the hospital emergency room.

## 2023-02-15 NOTE — PROCEDURES
Wound vac removed by CNA, wound bed inspected by this RN, no retained foam noted. Wound vac <50cm applied to LLE wounds using 2 pieces of black foam.   Negative pressure wound therapy resumed at 125mmHg continuous pressure with no leaks noted.   Tubi F double layer applied to LLE with hole cut for tubing and tubing threaded through to outside compression garment.

## 2023-02-17 ENCOUNTER — TELEPHONE (OUTPATIENT)
Dept: WOUND CARE | Facility: MEDICAL CENTER | Age: 63
End: 2023-02-17
Payer: COMMERCIAL

## 2023-02-17 ENCOUNTER — OFFICE VISIT (OUTPATIENT)
Dept: WOUND CARE | Facility: MEDICAL CENTER | Age: 63
End: 2023-02-17
Attending: INTERNAL MEDICINE
Payer: COMMERCIAL

## 2023-02-17 VITALS
RESPIRATION RATE: 20 BRPM | SYSTOLIC BLOOD PRESSURE: 144 MMHG | TEMPERATURE: 97.5 F | DIASTOLIC BLOOD PRESSURE: 90 MMHG | OXYGEN SATURATION: 97 % | HEART RATE: 70 BPM

## 2023-02-17 DIAGNOSIS — T14.8XXA WOUND INFECTION: ICD-10-CM

## 2023-02-17 DIAGNOSIS — E11.9 TYPE 2 DIABETES MELLITUS WITHOUT COMPLICATION, WITHOUT LONG-TERM CURRENT USE OF INSULIN (HCC): ICD-10-CM

## 2023-02-17 DIAGNOSIS — S81.802D OPEN WOUND OF LEFT LOWER EXTREMITY, SUBSEQUENT ENCOUNTER: ICD-10-CM

## 2023-02-17 DIAGNOSIS — L08.9 WOUND INFECTION: ICD-10-CM

## 2023-02-17 PROCEDURE — 11042 DBRDMT SUBQ TIS 1ST 20SQCM/<: CPT

## 2023-02-17 PROCEDURE — 11045 DBRDMT SUBQ TISS EACH ADDL: CPT | Performed by: NURSE PRACTITIONER

## 2023-02-17 PROCEDURE — 99213 OFFICE O/P EST LOW 20 MIN: CPT

## 2023-02-17 PROCEDURE — 11042 DBRDMT SUBQ TIS 1ST 20SQCM/<: CPT | Performed by: NURSE PRACTITIONER

## 2023-02-17 PROCEDURE — 99213 OFFICE O/P EST LOW 20 MIN: CPT | Mod: 25 | Performed by: NURSE PRACTITIONER

## 2023-02-17 PROCEDURE — 97605 NEG PRS WND THER DME<=50SQCM: CPT

## 2023-02-17 PROCEDURE — 11045 DBRDMT SUBQ TISS EACH ADDL: CPT

## 2023-02-18 NOTE — PROCEDURES
Wound vac removed by surgeons office prior to this appointment, wound bed inspected by this RN, no retained foam noted. Wound vac <50cm applied to LLE wounds using 2 pieces of black foam.   Negative pressure wound therapy resumed at 125mmHg continuous pressure with no leaks noted.   Coflex 2 layer wrap applied to LLE with tubing threaded through to outside compression garment.

## 2023-02-18 NOTE — PROGRESS NOTES
Provider Encounter- Full Thickness wound    HISTORY OF PRESENT ILLNESS  Wound History:   START OF CARE IN CLINIC: 1/16/2023 (return to clinic following hospitalization)   REFERRING PROVIDER:   Dr. Paige  WOUND- Full Thickness Wound   LOCATION: Left lower extremity, right anterior knee, left anterior knee   HISTORY: Patient presented to [facility] on 12/4/2022 with left lower extremity, fever, malaise, erythema x1 week.  He was originally admitted to [facility] for acute kidney injury and sepsis but with worsening renal function was transferred to [facility].  Wound cultures grew Streptococcus pyogenes group A and MSSA.  Patient was initially treated with cefazolin.  He is seen by the wound care team who recommended follow-up as an outpatient in wound care clinic.  While inpatient nephrology saw the patient and started him on dialysis for acute kidney injury.  His renal function improved and dialysis was stopped.  Patient was discharged with follow-up in the wound care clinic.    Patient was following with wound clinic, however on clinic visit 1/3/2022 he was noticed to have worsening wounds and concern for infection. He had failed outpatient Abx with zyvox and was sent to ED for further evaluation. Patient was admitted to [facility] from 1/3-1/13/2023 for sepsis and left lower extremity cellulitis with abscess. He was taken to OR for I&D with Dr. Odom. Patient's wound was treated with wound VAC. Surgical cultures were positive for enterobacter cloacae and was treated with ciprofloxacin with end date of 1/21/2023. Patient was referred back to [facility] for further care.    Pertinent Medical History: Hypertension, alcohol abuse, kidney disease, GERD, mitral valve prolapse, morbid obesity, obstructive sleep apnea, type 2 diabetes    TOBACCO USE: Former smoker denies use of smokeless tobacco    Patient's problem list, allergies, and current medications reviewed and updated in Epic    Interval  History:  2023: Clinic visit with Travis Bonilla MD. Patient reports feeling well since hospitalization. He presented with wound VAC. He will be traveling to [place] for  and will be back on Monday. Wound VAC will be held while out of town and will plan to reapply next week. He was instructed on performing wound care until returns to clinic. Patient is tolerating Abx, continues to have some periwound erythema without warmth or pain. He has two new areas of fluctuance, concerning for superficial abscess, discussed performing I&D, patient agreeable.    2023 : Clinic visit with PANCHO Medina, EL, MERI, RYLEE.   Patient states that overall he is feeling well blood sugars have been in the low 100s.  No significant improvement in his wounds.  He does mention that he has raised bumps to his arms, this has been going on for several weeks, he noticed a new bump today on his left forearm.  They are slightly tender with palpation, no fluctuance.  He also has a diffuse rash to his arms and hands.  He completed his antibiotics 3 or 4 days ago.  States he may go into the emergency room in [place] after he leaves the clinic.  Denies any fevers, chills, nausea, vomiting.    2023 : Clinic visit with PANCHO Medina, EL, MERI, RYLEE.   Kwesi states that overall he is feeling well, though a little bit stressed.  His stepdaughter is in hospital.  He did  his Zyvox prescription, prescribed on Monday for positive MRSA wound culture, light growth.  He is tolerating without any difficulty.  The smaller wounds to his leg present today with boggy tissue, at least 1 of these communicates with the larger wound.  He has an appointment to follow-up with his surgeon, Dr. Odom, on .  His blood sugar today was 126.  The bumps on his forearms are about the same, he did not go to the emergency room last week.    2/10/2023 : Clinic visit with PANCHO Medina, EL, EMRI, RYLEE.   Patient states  that overall he is feeling well.  His blood sugars have consistently been running between 120 and 140.  As discussed last visit, the tissue between the lateral wound and a small anterior wound, overlying wound tract, was excised in clinic today using local anesthesia.  Wound VAC continued.  He does still have quite a bit of undermining all around the wound.  He sees his surgeon next Friday.   He will also be undergoing biopsies of the lumps on his arms and hands on Monday by his PCP.     2/17/2023 : Clinic visit with PANCHO Mdeina, FNP-BC, CWAMAN, CFCN.   Kwesi continues to feel well.  He was seen by a surgeon earlier today, who was overall satisfied with wound progress, did not feel skin over undermining needed to be excised.  Per patient, surgeon is going to speak with one of his colleagues about possible skin grafting at some point.  Surgeon did feel patient would benefit from higher levels of compression as his leg is quite edematous still.  Patient was prescribed minocycline and Zyvox earlier this week due to positive wound culture.  He is taking these as prescribed, and states he is tolerating without difficulty.   He did not have the biopsy of lumps in his arms as expected earlier this week.  Lumps have dissipated.  However raised lesion to left dorsal thenar hand has grown larger, suspicious for malignancy.  He has an appointment with dermatology next Wednesday.      REVIEW OF SYSTEMS:   Unchanged from previous clinic visit on 2/10/2023, except as documented in interval history above    PHYSICAL EXAMINATION:   BP (!) 144/90   Pulse 70   Temp 36.4 °C (97.5 °F) (Temporal)   Resp 20   SpO2 97%     Physical Exam  Constitutional:       Appearance: He is obese.   Cardiovascular:      Rate and Rhythm: Normal rate.      Pulses: Normal pulses.   Pulmonary:      Effort: Pulmonary effort is normal. No respiratory distress.      Breath sounds: No wheezing.   Musculoskeletal:      Right lower leg: Edema present.       Comments: 3+ edema of left lower extremity   Skin:     Findings: Erythema present.      Comments: Multiple full thickness wounds left leg in various states of healing  Left anterior lower extremity full thickness wound s/p I&D -wound area has again decreased slightly, granulation tissue noted to wound bed, thin layer of slough to wound bed, moderate amount of serosanguineous drainage.  Periwound erythema persist.  Left posteromedial lower extremity full thickness wound-wound area has decreased since last assessment, thin layer of slough to wound bed, moderate serosanguineous drainage to wound bed.  Periwound erythema persists    Refer to wound flowsheet and photos    Raised lesion to left hand, thenar aspect, has increased in area since last assessment.  Dermatology managing   Neurological:      Mental Status: He is alert.       WOUND ASSESSMENT  Wound 01/16/23 Incision Pretibial Anterior Left (Active)   Wound Image    02/17/23 1619   Site Assessment Red;Yellow 02/17/23 1619   Periwound Assessment Edema;Blanchable erythema 02/17/23 1619   Margins Unattached edges 02/17/23 1619   Closure Secondary intention 01/16/23 1700   Drainage Amount Moderate 02/17/23 1619   Drainage Description Serosanguineous 02/17/23 1619   Treatments Cleansed;Topical Lidocaine;Provider debridement;Compression 02/17/23 1619   Wound Cleansing Normal Saline Irrigation 02/17/23 1619   Periwound Protectant Skin Protectant Wipes to Periwound;Drape 02/17/23 1619   Dressing Cleansing/Solutions Not Applicable 02/17/23 1619   Dressing Options Wound Vac;Compression Wrap Two Layer 02/17/23 1619   Dressing Changed Changed 02/13/23 1500   Dressing Status Clean;Dry;Intact 01/16/23 1700   Dressing Change/Treatment Frequency Monday, Wednesday, Friday, and As Needed 02/17/23 1619   Non-staged Wound Description Full thickness 02/17/23 1619   Wound Length (cm) 10.5 cm 02/17/23 1619   Wound Width (cm) 3 cm 02/17/23 1619   Wound Depth (cm) 0.9 cm 02/17/23  1619   Wound Surface Area (cm^2) 31.5 cm^2 02/17/23 1619   Wound Volume (cm^3) 28.35 cm^3 02/17/23 1619   Post-Procedure Length (cm) 10.5 cm 02/17/23 1619   Post-Procedure Width (cm) 3 cm 02/17/23 1619   Post-Procedure Depth (cm) 1 cm 02/17/23 1619   Post-Procedure Surface Area (cm^2) 31.5 cm^2 02/17/23 1619   Post-Procedure Volume (cm^3) 31.5 cm^3 02/17/23 1619   Wound Healing % 71 02/17/23 1619   Wound Bed Granulation (%) 95 % 02/08/23 1500   Tunneling (cm) 2.1 cm 02/17/23 1619   Tunneling Clock Position of Wound 2 02/17/23 1619   Tunneling - 2nd Location (cm) 1.6 cm 02/08/23 1500   Tunneling Clock Position of Wound - 2nd Location 7 02/08/23 1500   Tunneling - 3rd Location (cm) 4.2 cm 02/01/23 1530   Tunneling Clock Position of Wound - 3rd Location 11 02/01/23 1530   Undermining (cm) 3.4 cm 02/10/23 1330   Undermining of Wound, 1st Location From 9 o'clock;To 11 o'clock 02/10/23 1330   Undermining (cm) - 2nd location 3 cm 02/10/23 1330   Undermining of Wound, 2nd Location From 1 o'clock;To 5 o'clock 02/10/23 1330   Undermining (cm) - 3rd location 2 cm 02/08/23 1500   Undermining of Wound, 3rd Location From 5 o'clock;To 5 o'clock 02/08/23 1500   Wound Odor None 02/17/23 1619   Pulses Left;1+;DP 02/08/23 1500   Exposed Structures Fascia 02/17/23 1619   Number of days: 32       Wound 01/16/23 Incision Leg Posterior;Medial Left --Left Posteromedial LE (Active)   Wound Image    02/17/23 1619   Site Assessment Red;Yellow 02/17/23 1619   Periwound Assessment Blanchable erythema;Edema 02/17/23 1619   Margins Attached edges 02/17/23 1619   Closure Secondary intention 01/16/23 1700   Drainage Amount Moderate 02/17/23 1619   Drainage Description Serosanguineous 02/17/23 1619   Treatments Cleansed;Topical Lidocaine;Provider debridement;Compression 02/17/23 1619   Wound Cleansing Normal Saline Irrigation 02/17/23 1619   Periwound Protectant Skin Protectant Wipes to Periwound;Drape 02/17/23 1619   Dressing Cleansing/Solutions  Not Applicable 02/17/23 1619   Dressing Options Wound Vac;Compression Wrap Two Layer 02/17/23 1619   Dressing Changed Changed 02/13/23 1500   Dressing Status Clean;Dry;Intact 01/16/23 1700   Dressing Change/Treatment Frequency Monday, Wednesday, Friday, and As Needed 02/10/23 1330   Non-staged Wound Description Full thickness 02/17/23 1619   Wound Length (cm) 4.5 cm 02/17/23 1619   Wound Width (cm) 1.1 cm 02/17/23 1619   Wound Depth (cm) 0.3 cm 02/17/23 1619   Wound Surface Area (cm^2) 4.95 cm^2 02/17/23 1619   Wound Volume (cm^3) 1.485 cm^3 02/17/23 1619   Post-Procedure Length (cm) 4.7 cm 02/17/23 1619   Post-Procedure Width (cm) 1.1 cm 02/17/23 1619   Post-Procedure Depth (cm) 0.3 cm 02/17/23 1619   Post-Procedure Surface Area (cm^2) 5.17 cm^2 02/17/23 1619   Post-Procedure Volume (cm^3) 1.551 cm^3 02/17/23 1619   Wound Healing % 96 02/17/23 1619   Wound Bed Granulation (%) 100 % 02/08/23 1500   Undermining (cm) 0 cm 02/17/23 1619   Undermining of Wound, 1st Location From 1 o'clock;To 3 o'clock 01/18/23 0915   Undermining (cm) - 2nd location 0 cm 02/17/23 1619   Undermining of Wound, 2nd Location From 3 o'clock;To 5 o'clock 01/16/23 1700   Wound Odor None 02/17/23 1619   Pulses Left;1+;DP;PT 02/08/23 1500   Exposed Structures None 02/17/23 1619   Number of days: 32       Wound 01/27/23 Full Thickness Wound Leg Anterior;Medial Left Cluster (Active)   Wound Image   02/17/23 1619   Site Assessment Red;Yellow 02/17/23 1619   Periwound Assessment Edema;Blanchable erythema 02/17/23 1619   Margins Attached edges 02/17/23 1619   Drainage Amount Moderate 02/17/23 1619   Drainage Description Serosanguineous 02/17/23 1619   Treatments Cleansed;Topical Lidocaine;Site care;Compression 02/17/23 1619   Wound Cleansing Normal Saline Irrigation 02/17/23 1619   Periwound Protectant Skin Protectant Wipes to Periwound;Drape 02/17/23 1619   Dressing Cleansing/Solutions Not Applicable 02/17/23 1619   Dressing Options Wound  Vac;Hydrofiber Silver;Compression Wrap Two Layer 02/17/23 1619   Dressing Changed Changed 02/13/23 1500   Dressing Change/Treatment Frequency Monday, Wednesday, Friday, and As Needed 02/17/23 1619   Non-staged Wound Description Full thickness 02/17/23 1619   Wound Length (cm) 0.5 cm 02/17/23 1619   Wound Width (cm) 3 cm 02/17/23 1619   Wound Depth (cm) 0.2 cm 02/17/23 1619   Wound Surface Area (cm^2) 1.5 cm^2 02/17/23 1619   Wound Volume (cm^3) 0.3 cm^3 02/17/23 1619   Post-Procedure Length (cm) 7.5 cm 02/08/23 1500   Post-Procedure Width (cm) 0.5 cm 02/08/23 1500   Post-Procedure Depth (cm) 1 cm 02/08/23 1500   Post-Procedure Surface Area (cm^2) 3.75 cm^2 02/08/23 1500   Post-Procedure Volume (cm^3) 3.75 cm^3 02/08/23 1500   Wound Healing % 97 02/17/23 1619   Tunneling (cm) 0 cm 02/17/23 1619   Undermining (cm) 0 cm 02/17/23 1619   Wound Odor None 02/17/23 1619   Pulses Left;1+;DP;PT 02/08/23 1500   Exposed Structures None 02/17/23 1619   Number of days: 21       Wound 02/01/23 Full Thickness Wound Left posteromedial LE wound, inferior (Active)   Wound Image   02/17/23 1619   Site Assessment Red 02/17/23 1619   Periwound Assessment Edema;Blanchable erythema 02/17/23 1619   Margins Attached edges 02/17/23 1619   Closure Secondary intention 02/10/23 1330   Drainage Amount Small 02/17/23 1619   Drainage Description Serosanguineous 02/17/23 1619   Treatments Cleansed;Topical Lidocaine;Site care;Compression 02/17/23 1619   Wound Cleansing Normal Saline Irrigation 02/17/23 1619   Periwound Protectant Skin Protectant Wipes to Periwound;Drape 02/17/23 1619   Dressing Cleansing/Solutions Not Applicable 02/17/23 1619   Dressing Options Wound Vac;Compression Wrap Two Layer 02/17/23 1619   Dressing Changed Changed 02/13/23 1500   Dressing Change/Treatment Frequency Monday, Wednesday, Friday, and As Needed 02/17/23 1619   Non-staged Wound Description Full thickness 02/17/23 1619   Wound Length (cm) 0.5 cm 02/17/23 1619   Wound  Width (cm) 1 cm 02/17/23 1619   Wound Depth (cm) 0.1 cm 02/17/23 1619   Wound Surface Area (cm^2) 0.5 cm^2 02/17/23 1619   Wound Volume (cm^3) 0.05 cm^3 02/17/23 1619   Post-Procedure Length (cm) 0.5 cm 02/08/23 1500   Post-Procedure Width (cm) 1.2 cm 02/08/23 1500   Post-Procedure Depth (cm) 0.1 cm 02/08/23 1500   Post-Procedure Surface Area (cm^2) 0.6 cm^2 02/08/23 1500   Post-Procedure Volume (cm^3) 0.06 cm^3 02/08/23 1500   Wound Healing % 57 02/17/23 1619   Tunneling (cm) 0 cm 02/17/23 1619   Undermining (cm) 0 cm 02/17/23 1619   Wound Odor None 02/17/23 1619   Pulses Left;DP;PT;1+ 02/08/23 1500   Exposed Structures None 02/17/23 1619   Number of days: 16     PROCEDURE: Excisional debridement of left anterior and posterior lower leg wounds  -2% viscous lidocaine applied topically to wound beds for approximately 5 minutes prior to debridement  -Curette used to debride wound beds.  Excisional debridement was performed to remove devitalized tissue until healthy, bleeding tissue was visualized.   Entire surface of wounds, 36.67 cm² debrided.  Tissue debrided into the subcutaneous layer.    -Bleeding controlled with manual pressure.    -Wound care completed by wound RN, refer to flowsheet  -Patient tolerated the procedure well, without c/o pain or discomfort.                Pertinent Labs and Diagnostics:    Labs:     A1c:   Lab Results   Component Value Date/Time    HBA1C 8.5 (H) 12/04/2022 09:27 PM          IMAGING: Dx to tibia and fibula dated 12/5/2022  IMPRESSION:     No radiographic evidence of acute bony abnormality     Skin thickening and edema with some soft tissue calcifications most likely indicates venous stasis. Cellulitis is a possibility and there is no evidence of soft tissue gas. Dermatomyositis can be seen with soft tissue calcifications but (are not as   extensive as that often entails      LAST  WOUND CULTURE:  DATE :   Lab Results   Component Value Date/Time    CULTRSULT - (A) 02/08/2023 03:00  PM    CULTRSULT (A) 02/08/2023 03:00 PM     Klebsiella pneumoniae ESBL  Light growth  Extended Spectrum Beta-lactamase (ESBL) isolated.  ESBL's may be clinically resistant to therapy with  Penicillins,Cephalosporins or Aztreonam despite  apparent in vitro susceptibility to some of these agents.  The patient requires contact isolation.  Please contact pharmacy or an Infectious Disease Specialist  if you have any questions about appropriate therapy.      CULTRSULT (A) 02/08/2023 03:00 PM     Methicillin Resistant Staphylococcus aureus  Rare growth           ASSESSMENT AND PLAN:   1. Open wound of left lower extremity, subsequent encounter    2/17/2023: Area of patient's wounds is decreasing, though  slowly.   Increased granulation tissue noted.  Patient was seen by surgeon earlier today, no further surgical intervention planned at this time.  - Excisional debridement of all wounds was performed in clinic, medically necessary to promote wound healing.  -Patient to return to clinic 3 times weekly for assessment, and VAC dressing change.  1 of these visits will be with provider for debridement.  -Increase compression.  2 layer compression wrap applied to lower extremity  -Follow-up with surgeon on 2/17 again in March.  Per patient, STSG being considered at some point     Wound care: NPWT at 125 mmHg to accelerate granulation, change 3 times per week.  Wear compression wrap    2. Type 2 diabetes mellitus without complication, without long-term current use of insulin ([facility])  Comments: Patient is currently on glipizide only    2/17/2023: Patient states that he did not check his blood sugar today, but that it was 141 yesterday.  Patient has been instructed to keep tight control over FBS, <140 for optimal wound healing; Implications of loss of protective sensation (LOPS) discussed with patient, including increased risk for amputation.  Advised to check feet at least daily, moisturize feet, and to always wear protective foot  wear, arrange meticulous regular foot care by podiatrist or [facility]. Pt with good understanding.      -Consider referral to endocrinology    3. Wound infection    2/17/2023: Periwound erythema persists.  Wound culture collected last week positive for light growth Klebsiella, rare growth MRSA.  He was prescribed minocycline and Zyvox.  He has been taking these as prescribed and reports no adverse effects.  -Patient did complete antibiotics as prescribed  -Monitor for signs and symptoms of infection each clinic visit.          PATIENT EDUCATION  - Importance of adequate nutrition for wound healing  -Advised to go to ER for any increased redness, swelling, drainage, or odor, or if patient develops fever, chills, nausea or vomiting.     My total time spent caring for the patient on the day of the encounter was 20 minutes.   This does not include time spent on separately billable procedures/tests.         Please note that this note may have been created using voice recognition software. I have worked with technical experts from [facility] to optimize the interface.  I have made every reasonable attempt to correct obvious errors, but there may be errors of grammar and possibly content that I did not discover before finalizing the note.    N

## 2023-02-18 NOTE — PATIENT INSTRUCTIONS
-Keep your wound dressing clean, dry, and intact.    -Change your dressing if it becomes soiled, soaked, or falls off.    -Remove your compression wrap if you have severe pain, severe swelling, numbness, color change, or temperature change in your toes. If you need to remove your compression wrap, do so by unrolling it. Do not cut the compression wrap off to prevent cutting yourself on accident.    -Wound vac may not have any drainage in tube or cannister & it will still be working.   Change cannister if it does become full by pressing tab on side of machine to remove canister and snap on new one. Full canister can be thrown in the trash. If cannister fills with bright red blood - go to ER. Dressing will be changed every MWF at the wound clinic.  If you are having issues with your wound VAC, please consider patching leaks, changing the canister, or calling [phone] for troubleshooting. If the wound VAC has been off or un-operational for over 2 hours, call wound care center to inform them and remove all dressings including black foam and replace with normal saline damp gauze.     -Should you experience any significant changes in your wound(s), such as infection (redness, swelling, localized heat, increased pain, fever > 101 F, chills) or have any questions regarding your home care instructions, please contact the wound center at [phone]. If after hours, contact your primary care physician or go to the hospital emergency room.

## 2023-02-20 ENCOUNTER — NON-PROVIDER VISIT (OUTPATIENT)
Dept: WOUND CARE | Facility: MEDICAL CENTER | Age: 63
End: 2023-02-20
Attending: INTERNAL MEDICINE
Payer: COMMERCIAL

## 2023-02-20 PROCEDURE — 97597 DBRDMT OPN WND 1ST 20 CM/<: CPT

## 2023-02-20 NOTE — PATIENT INSTRUCTIONS
-Keep your wound dressing clean, dry, and intact.    -Change your dressing if it becomes soiled, soaked, or falls off.    -Remove your compression wrap if you have severe pain, severe swelling, numbness, color change, or temperature change in your toes. If you need to remove your compression wrap, do so by unrolling it. Do not cut the compression wrap off to prevent cutting yourself on accident.    -Wound vac may not have any drainage in tube or cannister & it will still be working.   Change cannister if it does become full by pressing tab on side of machine to remove canister and snap on new one. Full canister can be thrown in the trash. If cannister fills with bright red blood - go to ER. Dressing will be changed every MWF at the wound clini.  If you are having issues with your wound VAC, please consider patching leaks, changing the canister, or calling [phone] for troubleshooting. If the wound VAC has been off or un-operational for over 2 hours, call wound care center to inform them and remove all dressings including black foam and replace with normal saline damp gauze.     -Should you experience any significant changes in your wound(s), such as infection (redness, swelling, localized heat, increased pain, fever > 101 F, chills) or have any questions regarding your home care instructions, please contact the wound center at [phone]. If after hours, contact your primary care physician or go to the hospital emergency room.

## 2023-02-20 NOTE — PROCEDURES
2% viscous lidocaine applied topically to wound beds for approximately 5 minutes prior to debridement.   Conservative sharp wound debridement with curette to debride left anterior LE wound and left medial LE wound beds and periwound of non-viable tissue, ~20 cm2 debrided.      NPWT < 50 sq cm dressing changed this visit, 2 pieces of foam removed, 3 pieces of foam used this visit. Pump set to 125 mmhg,  continuous suction. No leaks detected.    Patient tolerated the procedure well.    Coflex 2 layer wrap applied to LLE with tubing threaded through to outside compression garment.

## 2023-02-22 ENCOUNTER — NON-PROVIDER VISIT (OUTPATIENT)
Dept: WOUND CARE | Facility: MEDICAL CENTER | Age: 63
End: 2023-02-22
Attending: INTERNAL MEDICINE
Payer: COMMERCIAL

## 2023-02-22 PROCEDURE — 97606 NEG PRS WND THER DME>50 SQCM: CPT

## 2023-02-23 NOTE — PROCEDURES
All black foam removed from LLE wound bed, none retained. Applied NPWT with 2 pieces of black foam to all LLE wounds with periwound protection of drape & bolstered over undermining of wound. Sealed with drape to 125 mmHg continuous, no leaks noted. Applied two layer compression wrap.

## 2023-02-23 NOTE — PATIENT INSTRUCTIONS
Wound NPWT at 125mmHg continuous with black foam. Dressing will be changed every MWF at the wound clinic.  If you are having issues with your wound VAC, please consider patching leaks, changing the canister, or calling [employer] for troubleshooting. If the machine has been off or un-operational for over 2 hours, call wound care center to inform them and remove all dressings including black foam and replace with normal saline damp gauze.     Should you experience any significant changes in your wound(s), such as infection (redness, swelling, localized heat, increased pain, fever > 101 F, chills) or have any questions regarding your home care instructions, please contact the wound center at [phone]. If after hours, contact your primary care physician or go to the hospital emergency room.   Keep dressing clean, dry and cover when bathing. Only change dressing if it's over saturated, soiled or falls off.

## 2023-02-24 ENCOUNTER — NON-PROVIDER VISIT (OUTPATIENT)
Dept: WOUND CARE | Facility: MEDICAL CENTER | Age: 63
End: 2023-02-24
Attending: INTERNAL MEDICINE
Payer: COMMERCIAL

## 2023-02-24 PROCEDURE — 97606 NEG PRS WND THER DME>50 SQCM: CPT

## 2023-02-25 NOTE — PROGRESS NOTES
VAC dressing removed by CNA. Wound bed inspected and no residual foam noted. NPWT changed this visit using 2 pieces of black foam. Pump set to neg 125mmhg continuous. No leaks noted.

## 2023-02-25 NOTE — PATIENT INSTRUCTIONS
-Keep your wound dressing clean, dry, and intact.    -Change your dressing if it becomes soiled, soaked, or falls off.    -Remove your compression wrap if you have severe pain, severe swelling, numbness, color change, or temperature change in your toes. If you need to remove your compression wrap, do so by unrolling it. Do not cut the compression wrap off to prevent cutting yourself on accident.    -Wound vac may not have any drainage in tube or cannister & it will still be working.   Change cannister if it does become full by pressing tab on side of machine to remove canister and snap on new one. Full canister can be thrown in the trash. If cannister fills with bright red blood - go to ER. Dressing will be changed every MWF at the wound clinic.  If you are having issues with your wound VAC, please consider patching leaks, changing the canister, or calling [phone] for troubleshooting. If the wound VAC has been off or un-operational for over 2 hours, call wound care center to inform them and remove all dressings including black foam and replace with normal saline damp gauze.     -Should you experience any significant changes in your wound(s), such as infection (redness, swelling, localized heat, increased pain, fever > 101 F, chills) or have any questions regarding your home care instructions, please contact the wound center at [phone]. If after hours, contact your primary care physician or go to the hospital emergency room.

## 2023-02-27 ENCOUNTER — OFFICE VISIT (OUTPATIENT)
Dept: WOUND CARE | Facility: MEDICAL CENTER | Age: 63
End: 2023-02-27
Attending: INTERNAL MEDICINE
Payer: COMMERCIAL

## 2023-02-27 DIAGNOSIS — T14.8XXA WOUND INFECTION: ICD-10-CM

## 2023-02-27 DIAGNOSIS — L08.9 WOUND INFECTION: ICD-10-CM

## 2023-02-27 DIAGNOSIS — E11.9 TYPE 2 DIABETES MELLITUS WITHOUT COMPLICATION, WITHOUT LONG-TERM CURRENT USE OF INSULIN (HCC): ICD-10-CM

## 2023-02-27 DIAGNOSIS — S81.802D OPEN WOUND OF LEFT LOWER EXTREMITY, SUBSEQUENT ENCOUNTER: Primary | ICD-10-CM

## 2023-02-27 PROCEDURE — 11042 DBRDMT SUBQ TIS 1ST 20SQCM/<: CPT

## 2023-02-27 PROCEDURE — 11042 DBRDMT SUBQ TIS 1ST 20SQCM/<: CPT | Performed by: NURSE PRACTITIONER

## 2023-02-27 PROCEDURE — 11045 DBRDMT SUBQ TISS EACH ADDL: CPT

## 2023-02-27 PROCEDURE — 11045 DBRDMT SUBQ TISS EACH ADDL: CPT | Performed by: NURSE PRACTITIONER

## 2023-02-27 PROCEDURE — 97605 NEG PRS WND THER DME<=50SQCM: CPT

## 2023-02-28 NOTE — PATIENT INSTRUCTIONS
-Keep your wound dressing clean, dry, and intact.    -Change your dressing if it becomes soiled, soaked, or falls off.    -Remove your compression wrap if you have severe pain, severe swelling, numbness, color change, or temperature change in your toes. If you need to remove your compression wrap, do so by unrolling it. Do not cut the compression wrap off to prevent cutting yourself on accident.    -Wound vac may not have any drainage in tube or cannister & it will still be working.   Change cannister if it does become full by pressing tab on side of machine to remove canister and snap on new one. Full canister can be thrown in the trash. If cannister fills with bright red blood - go to ER. Dressing will be changed every MWF at the wound clini.  If you are having issues with your wound VAC, please consider patching leaks, changing the canister, or calling [phone] for troubleshooting. If the wound VAC has been off or un-operational for over 2 hours, call wound care center to inform them and remove all dressings including black foam and replace with normal saline damp gauze.     -Should you experience any significant changes in your wound(s), such as infection (redness, swelling, localized heat, increased pain, fever > 101 F, chills) or have any questions regarding your home care instructions, please contact the wound center at [phone]. If after hours, contact your primary care physician or go to the hospital emergency room.

## 2023-02-28 NOTE — PROGRESS NOTES
Provider Encounter- Full Thickness wound    HISTORY OF PRESENT ILLNESS  Wound History:   START OF CARE IN CLINIC: 1/16/2023 (return to clinic following hospitalization)   REFERRING PROVIDER:   Dr. Paige  WOUND- Full Thickness Wound   LOCATION: Left lower extremity, right anterior knee, left anterior knee   HISTORY: Patient presented to [facility] on 12/4/2022 with left lower extremity, fever, malaise, erythema x1 week.  He was originally admitted to [facility] for acute kidney injury and sepsis but with worsening renal function was transferred to [facility].  Wound cultures grew Streptococcus pyogenes group A and MSSA.  Patient was initially treated with cefazolin.  He is seen by the wound care team who recommended follow-up as an outpatient in wound care clinic.  While inpatient nephrology saw the patient and started him on dialysis for acute kidney injury.  His renal function improved and dialysis was stopped.  Patient was discharged with follow-up in the wound care clinic.    Patient was following with wound clinic, however on clinic visit 1/3/2022 he was noticed to have worsening wounds and concern for infection. He had failed outpatient Abx with zyvox and was sent to ED for further evaluation. Patient was admitted to [facility] from 1/3-1/13/2023 for sepsis and left lower extremity cellulitis with abscess. He was taken to OR for I&D with Dr. Odom. Patient's wound was treated with wound VAC. Surgical cultures were positive for enterobacter cloacae and was treated with ciprofloxacin with end date of 1/21/2023. Patient was referred back to [facility] for further care.    Pertinent Medical History: Hypertension, alcohol abuse, kidney disease, GERD, mitral valve prolapse, morbid obesity, obstructive sleep apnea, type 2 diabetes    TOBACCO USE: Former smoker denies use of smokeless tobacco    Patient's problem list, allergies, and current medications reviewed and updated in Epic    Interval  History:  2023: Clinic visit with Travis Bonilla MD. Patient reports feeling well since hospitalization. He presented with wound VAC. He will be traveling to [place] for  and will be back on Monday. Wound VAC will be held while out of town and will plan to reapply next week. He was instructed on performing wound care until returns to clinic. Patient is tolerating Abx, continues to have some periwound erythema without warmth or pain. He has two new areas of fluctuance, concerning for superficial abscess, discussed performing I&D, patient agreeable.    2023 : Clinic visit with PANCHO Medina, EL, EMRI, RYLEE.   Patient states that overall he is feeling well blood sugars have been in the low 100s.  No significant improvement in his wounds.  He does mention that he has raised bumps to his arms, this has been going on for several weeks, he noticed a new bump today on his left forearm.  They are slightly tender with palpation, no fluctuance.  He also has a diffuse rash to his arms and hands.  He completed his antibiotics 3 or 4 days ago.  States he may go into the emergency room in [place] after he leaves the clinic.  Denies any fevers, chills, nausea, vomiting.    2023 : Clinic visit with PANCHO Medina, EL, MERI, RYLEE.   Kwesi states that overall he is feeling well, though a little bit stressed.  His stepdaughter is in hospital.  He did  his Zyvox prescription, prescribed on Monday for positive MRSA wound culture, light growth.  He is tolerating without any difficulty.  The smaller wounds to his leg present today with boggy tissue, at least 1 of these communicates with the larger wound.  He has an appointment to follow-up with his surgeon, Dr. Odom, on .  His blood sugar today was 126.  The bumps on his forearms are about the same, he did not go to the emergency room last week.    2/10/2023 : Clinic visit with PANCHO Medina, EL, MERI, RYLEE.   Patient states  that overall he is feeling well.  His blood sugars have consistently been running between 120 and 140.  As discussed last visit, the tissue between the lateral wound and a small anterior wound, overlying wound tract, was excised in clinic today using local anesthesia.  Wound VAC continued.  He does still have quite a bit of undermining all around the wound.  He sees his surgeon next Friday.   He will also be undergoing biopsies of the lumps on his arms and hands on Monday by his PCP.     2/17/2023 : Clinic visit with PANCHO Medina, FNP-BC, CWOCN, CFCN.   Kwesi continues to feel well.  He was seen by a surgeon earlier today, who was overall satisfied with wound progress, did not feel skin over undermining needed to be excised.  Per patient, surgeon is going to speak with one of his colleagues about possible skin grafting at some point.  Surgeon did feel patient would benefit from higher levels of compression as his leg is quite edematous still.  Patient was prescribed minocycline and Zyvox earlier this week due to positive wound culture.  He is taking these as prescribed, and states he is tolerating without difficulty.   He did not have the biopsy of lumps in his arms as expected earlier this week.  Lumps have dissipated.  However raised lesion to left dorsal thenar hand has grown larger, suspicious for malignancy.  He has an appointment with dermatology next Wednesday.    2/27/2023: Clinic visit with PANCHO Teixeira.  Patient reports that he completed his oral antibiotics last Saturday patient reports he had mild diarrhea from the antibiotics however it is improving since he has completed his antibiotics Saturday.  Patient's wound beds are improving decreased depth to the 2 superior left anterior wounds.  Granulation tissue throughout the largest left anterior lower extremity wound and the posterior left lower extremity wound.   Does have 1 raised lump along the previous track pad area no signs or  symptoms of infection however, patient reports that these lumps are how the other wounds started.  No additional antibiotics we will continue to monitor.      REVIEW OF SYSTEMS:   Unchanged from previous clinic visit on 2/17/2023, except as documented in interval history above    PHYSICAL EXAMINATION:   There were no vitals taken for this visit.    Physical Exam  Constitutional:       Appearance: He is obese.   Cardiovascular:      Pulses: Normal pulses.   Pulmonary:      Effort: Pulmonary effort is normal. No respiratory distress.      Breath sounds: No wheezing.   Musculoskeletal:      Left lower leg: Edema present.      Comments: 2+ edema of left lower extremity   Skin:     Comments: Full-thickness wound to the left anterior lower extremity status post I&D -slight improvement with beefy red granulation tissue throughout.  No purulent drainage, no acute periwound erythema, or foul smelling odor from the wound bed.  The 2 smaller wounds superior to the left anterior lower extremity wound have less depth.  The left posterior lateral wound is more superficial in nature with granulation tissue throughout.    Refer to wound flowsheet and photos    Raised lesion to left hand, plantar aspect, has increased in area since last assessment.  Dermatology managing   Neurological:      Mental Status: He is alert.       WOUND ASSESSMENT  Wound 01/16/23 Incision Pretibial Anterior Left (Active)   Wound Image    02/27/23 1600   Site Assessment Red;Yellow;Early/partial granulation 02/27/23 1600   Periwound Assessment Edema;Blanchable erythema 02/27/23 1600   Margins Unattached edges 02/27/23 1600   Closure Secondary intention 01/16/23 1700   Drainage Amount Large 02/27/23 1600   Drainage Description Serosanguineous 02/27/23 1600   Treatments Cleansed;Topical Lidocaine;Provider debridement 02/27/23 1600   Wound Cleansing Foam Cleanser/Washcloth 02/27/23 1600   Periwound Protectant Skin Protectant Wipes to Periwound;Benzoin;Drape  02/27/23 1600   Dressing Cleansing/Solutions Normal Saline 02/27/23 1600   Dressing Options Collagen Dressing;Wound Vac;Compression Wrap Two Layer 02/27/23 1600   Dressing Changed Changed 02/27/23 1600   Dressing Status Clean;Dry;Intact 01/16/23 1700   Dressing Change/Treatment Frequency Monday, Wednesday, Friday, and As Needed 02/27/23 1600   Non-staged Wound Description Full thickness 02/27/23 1600   Wound Length (cm) 9.3 cm 02/27/23 1600   Wound Width (cm) 4.2 cm 02/27/23 1600   Wound Depth (cm) 0.7 cm 02/27/23 1600   Wound Surface Area (cm^2) 39.06 cm^2 02/27/23 1600   Wound Volume (cm^3) 27.342 cm^3 02/27/23 1600   Post-Procedure Length (cm) 9.6 cm 02/27/23 1600   Post-Procedure Width (cm) 4.9 cm 02/27/23 1600   Post-Procedure Depth (cm) 0.7 cm 02/27/23 1600   Post-Procedure Surface Area (cm^2) 47.04 cm^2 02/27/23 1600   Post-Procedure Volume (cm^3) 32.928 cm^3 02/27/23 1600   Wound Healing % 72 02/27/23 1600   Wound Bed Granulation (%) 95 % 02/22/23 1400   Tunneling (cm) 0 cm 02/27/23 1600   Tunneling Clock Position of Wound 2 02/17/23 1619   Tunneling - 2nd Location (cm) 1.6 cm 02/08/23 1500   Tunneling Clock Position of Wound - 2nd Location 7 02/08/23 1500   Tunneling - 3rd Location (cm) 4.2 cm 02/01/23 1530   Tunneling Clock Position of Wound - 3rd Location 11 02/01/23 1530   Undermining (cm) 2 cm 02/27/23 1600   Undermining of Wound, 1st Location From 11 o'clock;To 3 o'clock 02/27/23 1600   Undermining (cm) - 2nd location 3 cm 02/10/23 1330   Undermining of Wound, 2nd Location From 1 o'clock;To 5 o'clock 02/10/23 1330   Undermining (cm) - 3rd location 2 cm 02/08/23 1500   Undermining of Wound, 3rd Location From 5 o'clock;To 5 o'clock 02/08/23 1500   Wound Odor None 02/27/23 1600   Pulses Left;1+;DP 02/08/23 1500   Exposed Structures SANJUANA 02/27/23 1600   Number of days: 42       Wound 01/16/23 Incision Leg Posterior;Medial Left --Left Posteromedial LE (Active)   Wound Image   02/22/23 1400   Site Assessment  Red;Granulation tissue 02/27/23 1600   Periwound Assessment Edema 02/27/23 1600   Margins Defined edges;Attached edges 02/27/23 1600   Closure Secondary intention 01/16/23 1700   Drainage Amount Small 02/27/23 1600   Drainage Description Serosanguineous 02/27/23 1600   Treatments Cleansed;Site care 02/27/23 1600   Wound Cleansing Foam Cleanser/Washcloth 02/27/23 1600   Periwound Protectant Skin Protectant Wipes to Periwound;Drape 02/27/23 1600   Dressing Cleansing/Solutions Not Applicable 02/27/23 1600   Dressing Options Wound Vac;Compression Wrap Two Layer 02/27/23 1600   Dressing Changed Changed 02/24/23 1600   Dressing Status Clean;Dry;Intact 01/16/23 1700   Dressing Change/Treatment Frequency Monday, Wednesday, Friday, and As Needed 02/27/23 1600   Non-staged Wound Description Full thickness 02/27/23 1600   Wound Length (cm) 4.4 cm 02/22/23 1400   Wound Width (cm) 1.3 cm 02/22/23 1400   Wound Depth (cm) 0.1 cm 02/22/23 1400   Wound Surface Area (cm^2) 5.72 cm^2 02/22/23 1400   Wound Volume (cm^3) 0.572 cm^3 02/22/23 1400   Post-Procedure Length (cm) 4.4 cm 02/22/23 1400   Post-Procedure Width (cm) 1.3 cm 02/22/23 1400   Post-Procedure Depth (cm) 0.1 cm 02/22/23 1400   Post-Procedure Surface Area (cm^2) 5.72 cm^2 02/22/23 1400   Post-Procedure Volume (cm^3) 0.572 cm^3 02/22/23 1400   Wound Healing % 98 02/22/23 1400   Wound Bed Granulation (%) 100 % 02/22/23 1400   Tunneling (cm) 0 cm 02/27/23 1600   Undermining (cm) 0 cm 02/27/23 1600   Undermining of Wound, 1st Location From 1 o'clock;To 3 o'clock 01/18/23 0915   Undermining (cm) - 2nd location 0 cm 02/22/23 1400   Undermining of Wound, 2nd Location From 3 o'clock;To 5 o'clock 01/16/23 1700   Wound Odor None 02/27/23 1600   Pulses Left;1+;DP;PT 02/08/23 1500   Exposed Structures None 02/27/23 1600   Number of days: 42       Wound 01/27/23 Full Thickness Wound Leg Anterior;Medial Left Cluster (Active)   Wound Image    02/27/23 1600   Site Assessment Red;[place]  02/27/23 1600   Periwound Assessment Edema;Fragile 02/27/23 1600   Margins Attached edges 02/27/23 1600   Drainage Amount Scant 02/27/23 1600   Drainage Description Serosanguineous 02/27/23 1600   Treatments Cleansed;Topical Lidocaine;Provider debridement 02/27/23 1600   Wound Cleansing Foam Cleanser/Washcloth 02/27/23 1600   Periwound Protectant Skin Protectant Wipes to Periwound;Benzoin;Drape 02/27/23 1600   Dressing Cleansing/Solutions Not Applicable 02/27/23 1600   Dressing Options Hydrofiber Silver;Compression Wrap Two Layer 02/27/23 1600   Dressing Changed New 02/27/23 1600   Dressing Change/Treatment Frequency Monday, Wednesday, Friday, and As Needed 02/27/23 1600   Non-staged Wound Description Full thickness 02/27/23 1600   Wound Length (cm) 0.2 cm 02/22/23 1400   Wound Width (cm) 2.5 cm 02/22/23 1400   Wound Depth (cm) 0.5 cm 02/22/23 1400   Wound Surface Area (cm^2) 0.5 cm^2 02/22/23 1400   Wound Volume (cm^3) 0.25 cm^3 02/22/23 1400   Post-Procedure Length (cm) 0.2 cm 02/27/23 1600   Post-Procedure Width (cm) 2.5 cm 02/27/23 1600   Post-Procedure Depth (cm) 0.2 cm 02/27/23 1600   Post-Procedure Surface Area (cm^2) 0.5 cm^2 02/27/23 1600   Post-Procedure Volume (cm^3) 0.1 cm^3 02/27/23 1600   Wound Healing % 98 02/22/23 1400   Wound Bed Granulation (%) 100 % 02/22/23 1400   Tunneling (cm) 0 cm 02/27/23 1600   Undermining (cm) 0 cm 02/27/23 1600   Wound Odor None 02/27/23 1600   Pulses Left;1+;DP;PT 02/08/23 1500   Exposed Structures None 02/27/23 1600   Number of days: 31       Wound 02/01/23 Full Thickness Wound Left posteromedial LE wound, inferior (Active)   Wound Image    02/27/23 1600   Site Assessment [place] 02/27/23 1600   Periwound Assessment Edema 02/27/23 1600   Margins Attached edges 02/27/23 1600   Closure Secondary intention 02/10/23 1330   Drainage Amount Small 02/27/23 1600   Drainage Description Serosanguineous 02/27/23 1600   Treatments Cleansed;Topical Lidocaine;Provider debridement  02/27/23 1600   Wound Cleansing Foam Cleanser/Washcloth 02/27/23 1600   Periwound Protectant Skin Protectant Wipes to Periwound;Benzoin;Drape 02/27/23 1600   Dressing Cleansing/Solutions Normal Saline 02/27/23 1600   Dressing Options Collagen Dressing;Wound Vac;Compression Wrap Two Layer 02/27/23 1600   Dressing Changed Changed 02/27/23 1600   Dressing Change/Treatment Frequency Monday, Wednesday, Friday, and As Needed 02/27/23 1600   Non-staged Wound Description Full thickness 02/27/23 1600   Wound Length (cm) 3.1 cm 02/27/23 1600   Wound Width (cm) 1.1 cm 02/27/23 1600   Wound Depth (cm) 0.2 cm 02/27/23 1600   Wound Surface Area (cm^2) 3.41 cm^2 02/27/23 1600   Wound Volume (cm^3) 0.682 cm^3 02/27/23 1600   Post-Procedure Length (cm) 3.3 cm 02/27/23 1600   Post-Procedure Width (cm) 1.1 cm 02/27/23 1600   Post-Procedure Depth (cm) 0.2 cm 02/27/23 1600   Post-Procedure Surface Area (cm^2) 3.63 cm^2 02/27/23 1600   Post-Procedure Volume (cm^3) 0.726 cm^3 02/27/23 1600   Wound Healing % -483 02/27/23 1600   Wound Bed Granulation (%) 100 % 02/22/23 1400   Tunneling (cm) 0 cm 02/27/23 1600   Undermining (cm) 0 cm 02/27/23 1600   Wound Odor None 02/27/23 1600   Pulses Left;DP;PT;1+ 02/08/23 1500   Exposed Structures None 02/27/23 1600   Number of days: 26     PROCEDURE: Excisional debridement of the largest left anterior lower extremity wound only in clinic today.  -2% viscous lidocaine applied topically to wound bed for approximately 5 minutes prior to debridement  -Curette used to debride wound bed.  Excisional debridement was performed to remove devitalized tissue until healthy, bleeding tissue was visualized.  Approximately 40 cm2 debrided.  Tissue debrided into the subcutaneous layer.    -Bleeding controlled with manual pressure.    -Wound care completed by wound RN, refer to flowsheet  -Patient tolerated the procedure well, without c/o pain or discomfort.                Pertinent Labs and Diagnostics:    Labs:      A1c:   Lab Results   Component Value Date/Time    HBA1C 8.5 (H) 12/04/2022 09:27 PM          IMAGING: Dx to tibia and fibula dated 12/5/2022  IMPRESSION:     No radiographic evidence of acute bony abnormality     Skin thickening and edema with some soft tissue calcifications most likely indicates venous stasis. Cellulitis is a possibility and there is no evidence of soft tissue gas. Dermatomyositis can be seen with soft tissue calcifications but (are not as   extensive as that often entails      LAST  WOUND CULTURE:  DATE :   Lab Results   Component Value Date/Time    CULTRSULT - (A) 02/08/2023 03:00 PM    CULTRSULT (A) 02/08/2023 03:00 PM     Klebsiella pneumoniae ESBL  Light growth  Extended Spectrum Beta-lactamase (ESBL) isolated.  ESBL's may be clinically resistant to therapy with  Penicillins,Cephalosporins or Aztreonam despite  apparent in vitro susceptibility to some of these agents.  The patient requires contact isolation.  Please contact pharmacy or an Infectious Disease Specialist  if you have any questions about appropriate therapy.      CULTRSULT (A) 02/08/2023 03:00 PM     Methicillin Resistant Staphylococcus aureus  Rare growth           ASSESSMENT AND PLAN:   1. Open wound of left lower extremity, subsequent encounter    2/27/2023: Slight decrease in wound size and depth to all wound beds.  Patient has beefy red granulation tissue throughout the large left anterior lower extremity wound and the left posterior lower extremity wound.  -Excisional debridement of the left anterior lower extremity wound only in clinic today.  -Patient to return to clinic 3 times weekly for assessment, and VAC dressing change.  1 of these visits will be with provider for debridement.  -Continue with 2 layer compression  -Discussed with patient next clinical appointment his next follow-up with his surgeon.  Per patient, STSG being considered at some point  -Patient does have a small raised bump along the line of the  track pad.  Patient reports this is similar to the other wound is progression which started as a small raised lump and opened into a full thickness wound.  Continue to monitor this area.  Patient does not have any current signs or symptoms of infection.     Wound care: NPWT at 125 mmHg to accelerate granulation, change 3 times per week.  2 layer compression wrap    2. Type 2 diabetes mellitus without complication, without long-term current use of insulin ([facility])  Comments: Patient is currently on glipizide only    2/27/2023:   Patient instructed to keep tight control over FBS, <140 for optimal wound healing; Implications of loss of protective sensation (LOPS) discussed with patient, including increased risk for amputation.  Advised to check feet at least daily, moisturize feet, and to always wear protective foot wear, arrange meticulous regular foot care by podiatrist or CFCN. Pt with good understanding.      -Consider referral to endocrinology    3. Wound infection    2/27/2023:   -No acute signs or symptoms of infections clinic visit  -Reports he completed all antibiotics on Saturday.  Patient reports he had slight diarrhea however it is resolving.  -I will monitor for signs and symptoms of infection at each additional clinic visit        PATIENT EDUCATION  - Importance of adequate nutrition for wound healing  -Advised to go to ER for any increased redness, swelling, drainage, or odor, or if patient develops fever, chills, nausea or vomiting.       Please note that this note may have been created using voice recognition software. I have worked with technical experts from [employer] to optimize the interface.  I have made every reasonable attempt to correct obvious errors, but there may be errors of grammar and possibly content that I did not discover before finalizing the note.    N

## 2023-02-28 NOTE — PROGRESS NOTES
Wound vac and compression wrap removed by RN.  All foam accounted for.  NPWT reapplied using 3 pieces of black foam and collagen to undermining.  Pump resumed at 125mmHg continuous.  No leaks noted.

## 2023-03-01 ENCOUNTER — NON-PROVIDER VISIT (OUTPATIENT)
Dept: WOUND CARE | Facility: MEDICAL CENTER | Age: 63
End: 2023-03-01
Attending: GENERAL PRACTICE
Payer: COMMERCIAL

## 2023-03-01 PROCEDURE — 97602 WOUND(S) CARE NON-SELECTIVE: CPT

## 2023-03-02 NOTE — PATIENT INSTRUCTIONS
-Keep your wound dressing clean, dry, and intact.    -Change your dressing if it becomes soiled, soaked, or falls off.    -Remove your compression wrap if you have severe pain, severe swelling, numbness, color change, or temperature change in your toes. If you need to remove your compression wrap, do so by unrolling it. Do not cut the compression wrap off to prevent cutting yourself on accident.    -Wound vac may not have any drainage in tube or cannister & it will still be working.   Change cannister if it does become full by pressing tab on side of machine to remove canister and snap on new one. Full canister can be thrown in the trash. If cannister fills with bright red blood - go to ER. Dressing will be changed every MWF at the wound clini.  If you are having issues with your wound VAC, please consider patching leaks, changing the canister, or calling [phone] for troubleshooting. If the wound VAC has been off or un-operational for over 2 hours, call wound care center to inform them and remove all dressings including black foam and replace with normal saline damp gauze.     -Should you experience any significant changes in your wound(s), such as infection (redness, swelling, localized heat, increased pain, fever > 101 F, chills) or have any questions regarding your home care instructions, please contact the wound center at [phone]. If after hours, contact your primary care physician or go to the hospital emergency room.

## 2023-03-02 NOTE — PROCEDURES
Wound vac removed by RN accounting for 3 pieces of black foam.    Non-selective debridement of wounds using Puracyn spray and dry gauze to remove biofilm from wound beds.  Wound vac re-applied using 3 pieces of black foam. Restarted at 125mmHg continuous with no leaks detected.  Pt tolerated well.

## 2023-03-03 ENCOUNTER — OFFICE VISIT (OUTPATIENT)
Dept: WOUND CARE | Facility: MEDICAL CENTER | Age: 63
End: 2023-03-03
Attending: GENERAL PRACTICE
Payer: COMMERCIAL

## 2023-03-03 DIAGNOSIS — S81.802D OPEN WOUND OF LEFT LOWER EXTREMITY, SUBSEQUENT ENCOUNTER: ICD-10-CM

## 2023-03-03 PROCEDURE — 97605 NEG PRS WND THER DME<=50SQCM: CPT

## 2023-03-03 PROCEDURE — 97602 WOUND(S) CARE NON-SELECTIVE: CPT

## 2023-03-04 NOTE — PATIENT INSTRUCTIONS
-Keep your wound dressing clean, dry, and intact.    -Change your dressing if it becomes soiled, soaked, or falls off.    -Remove your compression wrap if you have severe pain, severe swelling, numbness, color change, or temperature change in your toes. If you need to remove your compression wrap, do so by unrolling it. Do not cut the compression wrap off to prevent cutting yourself on accident.    -Wound vac may not have any drainage in tube or cannister & it will still be working.   Change cannister if it does become full by pressing tab on side of machine to remove canister and snap on new one. Full canister can be thrown in the trash. If cannister fills with bright red blood - go to ER. Dressing will be changed every MWF at the wound clini.  If you are having issues with your wound VAC, please consider patching leaks, changing the canister, or calling [phone] for troubleshooting. If the wound VAC has been off or un-operational for over 2 hours, call wound care center to inform them and remove all dressings including black foam and replace with normal saline damp gauze.     -Should you experience any significant changes in your wound(s), such as infection (redness, swelling, localized heat, increased pain, fever > 101 F, chills) or have any questions regarding your home care instructions, please contact the wound center at [phone]. If after hours, contact your primary care physician or go to the hospital emergency room.

## 2023-03-04 NOTE — PROGRESS NOTES
Non-Selective Debridement with foam cleanser and a wash cloth to debride non-viable tissue from the wound bed and periwound areas of the left posteromedial LE inferior wound, left posteromedial LE wound and Left anteromedial LE cluster wounds. NPWT < 50 sq cm dressing changed this visit to the left anterior LE wound, 3 pieces of foam removed. 1 new piece of black foam placed to wound bed and used for tracpad. Pump set to 125 mmhg, continuous suction. No leaks detected. Two layer compression wrap applied to left lower extremity. Refer to flow sheet for wound care details. Patient tolerated the procedure well.    
Patient seen by provider earlier this week, does not need to be seen by me today.  Wound care by nursing.  
to be discussed at postpartum visit

## 2023-03-06 ENCOUNTER — NON-PROVIDER VISIT (OUTPATIENT)
Dept: WOUND CARE | Facility: MEDICAL CENTER | Age: 63
End: 2023-03-06
Attending: GENERAL PRACTICE
Payer: COMMERCIAL

## 2023-03-06 PROCEDURE — 97605 NEG PRS WND THER DME<=50SQCM: CPT

## 2023-03-06 NOTE — PATIENT INSTRUCTIONS
Wound NPWT at 125mmHg continuous with black foam. Dressing will be changed every MWF at the wound clinic or with your home health nurse.  If you are having issues with your wound NPWT, please consider patching leaks, changing the canister, or calling  for troubleshooting. If the wound NPWT has been off or un-operational for over 2 hours, call wound care center to inform them and remove all dressings including black foam and replace with normal saline damp gauze.     Should you experience any significant changes in your wound(s), such as infection (redness, swelling, localized heat, increased pain, fever > 101 F, chills) or have any questions regarding your home care instructions, please contact the wound center at [phone]. If after hours, contact your primary care physician or go to the hospital emergency room.   Keep dressing clean, dry and cover when bathing. Only change dressing if it's over saturated, soiled or falls off.

## 2023-03-06 NOTE — PROCEDURES
Removed all black foam from LLE wound and replaced with 3 piece of black foam including yahir to undermining greatest depths & bolster over all undermining, sealed with drape to 125 mmHg continuous, no leaks noted. Then 2 layer compression wrap applied.

## 2023-03-08 ENCOUNTER — OFFICE VISIT (OUTPATIENT)
Dept: WOUND CARE | Facility: MEDICAL CENTER | Age: 63
End: 2023-03-08
Attending: GENERAL PRACTICE
Payer: COMMERCIAL

## 2023-03-08 VITALS
TEMPERATURE: 97.6 F | DIASTOLIC BLOOD PRESSURE: 76 MMHG | OXYGEN SATURATION: 100 % | SYSTOLIC BLOOD PRESSURE: 151 MMHG | HEART RATE: 66 BPM | RESPIRATION RATE: 18 BRPM

## 2023-03-08 DIAGNOSIS — T14.8XXA WOUND INFECTION: ICD-10-CM

## 2023-03-08 DIAGNOSIS — S81.802D OPEN WOUND OF LEFT LOWER EXTREMITY, SUBSEQUENT ENCOUNTER: ICD-10-CM

## 2023-03-08 DIAGNOSIS — L08.9 WOUND INFECTION: ICD-10-CM

## 2023-03-08 DIAGNOSIS — E11.9 TYPE 2 DIABETES MELLITUS WITHOUT COMPLICATION, WITHOUT LONG-TERM CURRENT USE OF INSULIN (HCC): ICD-10-CM

## 2023-03-08 PROCEDURE — 11042 DBRDMT SUBQ TIS 1ST 20SQCM/<: CPT

## 2023-03-08 PROCEDURE — 11042 DBRDMT SUBQ TIS 1ST 20SQCM/<: CPT | Performed by: STUDENT IN AN ORGANIZED HEALTH CARE EDUCATION/TRAINING PROGRAM

## 2023-03-08 PROCEDURE — 11045 DBRDMT SUBQ TISS EACH ADDL: CPT

## 2023-03-08 PROCEDURE — 11045 DBRDMT SUBQ TISS EACH ADDL: CPT | Performed by: STUDENT IN AN ORGANIZED HEALTH CARE EDUCATION/TRAINING PROGRAM

## 2023-03-09 NOTE — PROGRESS NOTES
Provider Encounter- Full Thickness wound    HISTORY OF PRESENT ILLNESS  Wound History:   START OF CARE IN CLINIC: 1/16/2023 (return to clinic following hospitalization)   REFERRING PROVIDER:   Dr. Paige  WOUND- Full Thickness Wound   LOCATION: Left lower extremity, right anterior knee, left anterior knee   HISTORY: Patient presented to [facility] on 12/4/2022 with left lower extremity, fever, malaise, erythema x1 week.  He was originally admitted to [facility] for acute kidney injury and sepsis but with worsening renal function was transferred to [facility].  Wound cultures grew Streptococcus pyogenes group A and MSSA.  Patient was initially treated with cefazolin.  He is seen by the wound care team who recommended follow-up as an outpatient in wound care clinic.  While inpatient nephrology saw the patient and started him on dialysis for acute kidney injury.  His renal function improved and dialysis was stopped.  Patient was discharged with follow-up in the wound care clinic.    Patient was following with wound clinic, however on clinic visit 1/3/2022 he was noticed to have worsening wounds and concern for infection. He had failed outpatient Abx with zyvox and was sent to ED for further evaluation. Patient was admitted to [facility] from 1/3-1/13/2023 for sepsis and left lower extremity cellulitis with abscess. He was taken to OR for I&D with Dr. Odom. Patient's wound was treated with wound VAC. Surgical cultures were positive for enterobacter cloacae and was treated with ciprofloxacin with end date of 1/21/2023. Patient was referred back to [facility] for further care.    Pertinent Medical History: Hypertension, alcohol abuse, kidney disease, GERD, mitral valve prolapse, morbid obesity, obstructive sleep apnea, type 2 diabetes    TOBACCO USE: Former smoker denies use of smokeless tobacco    Patient's problem list, allergies, and current medications reviewed and updated in Epic    Interval  History:  2023: Clinic visit with Travis Bonilla MD. Patient reports feeling well since hospitalization. He presented with wound VAC. He will be traveling to [place] for  and will be back on Monday. Wound VAC will be held while out of town and will plan to reapply next week. He was instructed on performing wound care until returns to clinic. Patient is tolerating Abx, continues to have some periwound erythema without warmth or pain. He has two new areas of fluctuance, concerning for superficial abscess, discussed performing I&D, patient agreeable.    2023 : Clinic visit with PANCHO Medina, EL, MERI, RYLEE.   Patient states that overall he is feeling well blood sugars have been in the low 100s.  No significant improvement in his wounds.  He does mention that he has raised bumps to his arms, this has been going on for several weeks, he noticed a new bump today on his left forearm.  They are slightly tender with palpation, no fluctuance.  He also has a diffuse rash to his arms and hands.  He completed his antibiotics 3 or 4 days ago.  States he may go into the emergency room in [place] after he leaves the clinic.  Denies any fevers, chills, nausea, vomiting.    2023 : Clinic visit with PANCHO Medina, EL, MERI, RYLEE.   Kwesi states that overall he is feeling well, though a little bit stressed.  His stepdaughter is in hospital.  He did  his Zyvox prescription, prescribed on Monday for positive MRSA wound culture, light growth.  He is tolerating without any difficulty.  The smaller wounds to his leg present today with boggy tissue, at least 1 of these communicates with the larger wound.  He has an appointment to follow-up with his surgeon, Dr. Odom, on .  His blood sugar today was 126.  The bumps on his forearms are about the same, he did not go to the emergency room last week.    2/10/2023 : Clinic visit with PANCHO Medina, EL, MERI, RYLEE.   Patient states  that overall he is feeling well.  His blood sugars have consistently been running between 120 and 140.  As discussed last visit, the tissue between the lateral wound and a small anterior wound, overlying wound tract, was excised in clinic today using local anesthesia.  Wound VAC continued.  He does still have quite a bit of undermining all around the wound.  He sees his surgeon next Friday.   He will also be undergoing biopsies of the lumps on his arms and hands on Monday by his PCP.     2/17/2023 : Clinic visit with PANCHO Medina, FNP-BC, CWOCN, CFCN.   Kwesi continues to feel well.  He was seen by a surgeon earlier today, who was overall satisfied with wound progress, did not feel skin over undermining needed to be excised.  Per patient, surgeon is going to speak with one of his colleagues about possible skin grafting at some point.  Surgeon did feel patient would benefit from higher levels of compression as his leg is quite edematous still.  Patient was prescribed minocycline and Zyvox earlier this week due to positive wound culture.  He is taking these as prescribed, and states he is tolerating without difficulty.   He did not have the biopsy of lumps in his arms as expected earlier this week.  Lumps have dissipated.  However raised lesion to left dorsal thenar hand has grown larger, suspicious for malignancy.  He has an appointment with dermatology next Wednesday.    2/27/2023: Clinic visit with PANCHO Teixeira.  Patient reports that he completed his oral antibiotics last Saturday patient reports he had mild diarrhea from the antibiotics however it is improving since he has completed his antibiotics Saturday.  Patient's wound beds are improving decreased depth to the 2 superior left anterior wounds.  Granulation tissue throughout the largest left anterior lower extremity wound and the posterior left lower extremity wound.   Does have 1 raised lump along the previous track pad area no signs or  symptoms of infection however, patient reports that these lumps are how the other wounds started.  No additional antibiotics we will continue to monitor.    3/8/2023: Clinic visit with Travis Bonilla MD. Patient reports doing ok. Denies any problems with his wound VAC. Patient's wounds are slowly improving. Less undermining to medial aspect of wound, continues to have undermining 2 o'clock. His posterior wound appears to be improving. 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      REVIEW OF SYSTEMS:   Unchanged from previous clinic visit on 2/27/2023, except as documented in interval history above    PHYSICAL EXAMINATION:   BP (!) 151/76 Comment: RN notified  Pulse 66   Temp 36.4 °C (97.6 °F) (Temporal)   Resp 18   SpO2 100%     Physical Exam  Constitutional:       Appearance: He is obese.   Cardiovascular:      Pulses: Normal pulses.   Pulmonary:      Effort: Pulmonary effort is normal. No respiratory distress.      Breath sounds: No wheezing.   Musculoskeletal:      Left lower leg: Edema present.      Comments: 2+ edema of left lower extremity   Skin:     Comments: Full-thickness wound to the left anterior lower extremity status post I&D - Continues to improve slowly. Good granulation tissue with thin layer of slough. Medial undermining has improved. Continues to have undermining 2 o'clock.  No purulent drainage, no acute periwound erythema, or foul smelling odor from the wound bed.  The 2 smaller wounds superior to the left anterior lower extremity appear about the same  The left posterior lateral wound is measuring smaller with some epithelium forming at periphery.    Refer to wound flowsheet and photos     Neurological:      Mental Status: He is alert.       WOUND ASSESSMENT  Wound 01/16/23 Incision Pretibial Anterior Left (Active)   Wound Image    03/08/23 1530   Site Assessment Red;Granulation  tissue;Yellow 03/08/23 1530   Periwound Assessment Edema;Blanchable erythema 03/08/23 1530   Margins Unattached edges;Attached edges 03/08/23 1530   Closure Secondary intention 01/16/23 1700   Drainage Amount Moderate 03/08/23 1530   Drainage Description Serosanguineous 03/08/23 1530   Treatments Cleansed;Topical Lidocaine;Provider debridement;Site care 03/08/23 1530   Wound Cleansing Puracyn [place] 03/08/23 1530   Periwound Protectant Skin Protectant Wipes to Periwound;Drape;Skin Moisturizer 03/08/23 1530   Dressing Cleansing/Solutions Normal Saline 03/08/23 1530   Dressing Options Collagen Dressing;Wound Vac;Compression Wrap Two Layer 03/08/23 1530   Dressing Changed New 03/08/23 1530   Dressing Status Clean;Dry;Intact 01/16/23 1700   Dressing Change/Treatment Frequency Monday, Wednesday, Friday, and As Needed 03/08/23 1530   Non-staged Wound Description Full thickness 03/08/23 1530   Wound Length (cm) 9.1 cm 03/08/23 1530   Wound Width (cm) 3.3 cm 03/08/23 1530   Wound Depth (cm) 0.5 cm 03/08/23 1530   Wound Surface Area (cm^2) 30.03 cm^2 03/08/23 1530   Wound Volume (cm^3) 15.015 cm^3 03/08/23 1530   Post-Procedure Length (cm) 9.9 cm 03/08/23 1530   Post-Procedure Width (cm) 3.5 cm 03/08/23 1530   Post-Procedure Depth (cm) 0.5 cm 03/08/23 1530   Post-Procedure Surface Area (cm^2) 34.65 cm^2 03/08/23 1530   Post-Procedure Volume (cm^3) 17.325 cm^3 03/08/23 1530   Wound Healing % 85 03/08/23 1530   Wound Bed Granulation (%) 100 % 03/06/23 1438   Tunneling (cm) 0.5 cm 03/08/23 1530   Tunneling Clock Position of Wound 12 03/08/23 1530   Tunneling - 2nd Location (cm) 1.6 cm 02/08/23 1500   Tunneling Clock Position of Wound - 2nd Location 7 02/08/23 1500   Tunneling - 3rd Location (cm) 4.2 cm 02/01/23 1530   Tunneling Clock Position of Wound - 3rd Location 11 02/01/23 1530   Undermining (cm) 2.5 cm 03/08/23 1530   Undermining of Wound, 1st Location From 1 o'clock;To 5 o'clock 03/08/23 1530   Undermining (cm) - 2nd  location 1 cm 03/08/23 1530   Undermining of Wound, 2nd Location From 10 o'clock;To 11 o'clock 03/08/23 1530   Undermining (cm) - 3rd location 2 cm 02/08/23 1500   Undermining of Wound, 3rd Location From 5 o'clock;To 5 o'clock 02/08/23 1500   Wound Odor None 03/08/23 1530   Pulses Left;1+;DP 02/08/23 1500   Exposed Structures None 03/08/23 1530   Number of days: 51       Wound 01/16/23 Incision Leg Posterior;Medial Left --Left Posteromedial LE (Active)   Wound Image    03/08/23 1530   Site Assessment Red;Yellow;Pink;Epithelialization 03/08/23 1530   Periwound Assessment Edema;Blanchable erythema 03/08/23 1530   Margins Attached edges 03/08/23 1530   Closure Secondary intention 01/16/23 1700   Drainage Amount Small 03/08/23 1530   Drainage Description Serosanguineous 03/08/23 1530   Treatments Cleansed;Topical Lidocaine;Provider debridement;Site care 03/08/23 1530   Wound Cleansing Puracyn [place] 03/08/23 1530   Periwound Protectant Barrier Paste;Skin Moisturizer 03/08/23 1530   Dressing Cleansing/Solutions Not Applicable 03/08/23 1530   Dressing Options Hydrofiber Silver;Compression Wrap Two Layer 03/08/23 1530   Dressing Changed New 03/08/23 1530   Dressing Status Clean;Dry;Intact 01/16/23 1700   Dressing Change/Treatment Frequency Monday, Wednesday, Friday, and As Needed 03/08/23 1530   Non-staged Wound Description Full thickness 03/08/23 1530   Wound Length (cm) 3.5 cm 03/08/23 1530   Wound Width (cm) 1.2 cm 03/08/23 1530   Wound Depth (cm) 0.2 cm 03/08/23 1530   Wound Surface Area (cm^2) 4.2 cm^2 03/08/23 1530   Wound Volume (cm^3) 0.84 cm^3 03/08/23 1530   Post-Procedure Length (cm) 2.9 cm 03/08/23 1530   Post-Procedure Width (cm) 0.9 cm 03/08/23 1530   Post-Procedure Depth (cm) 0.2 cm 03/08/23 1530   Post-Procedure Surface Area (cm^2) 2.61 cm^2 03/08/23 1530   Post-Procedure Volume (cm^3) 0.522 cm^3 03/08/23 1530   Wound Healing % 98 03/08/23 1530   Wound Bed Granulation (%) 100 % 03/06/23 1439   Tunneling (cm)  0 cm 03/08/23 1530   Undermining (cm) 0 cm 03/08/23 1530   Undermining of Wound, 1st Location From 1 o'clock;To 3 o'clock 01/18/23 0915   Undermining (cm) - 2nd location 0 cm 02/22/23 1400   Undermining of Wound, 2nd Location From 3 o'clock;To 5 o'clock 01/16/23 1700   Wound Odor None 03/08/23 1530   Pulses Left;1+;DP;PT 02/08/23 1500   Exposed Structures None 03/08/23 1530   Number of days: 51       Wound 01/27/23 Full Thickness Wound Leg Anterior;Medial Left Cluster (Active)   Wound Image    03/08/23 1530   Site Assessment Pink;Yellow 03/08/23 1530   Periwound Assessment Edema;Blanchable erythema 03/08/23 1530   Margins Unattached edges;Attached edges 03/08/23 1530   Drainage Amount Scant 03/08/23 1530   Drainage Description Serosanguineous 03/08/23 1530   Treatments Cleansed;Topical Lidocaine;Provider debridement;Site care 03/08/23 1530   Wound Cleansing Puracyn [place] 03/08/23 1530   Periwound Protectant Barrier Paste;Skin Moisturizer 03/08/23 1530   Dressing Cleansing/Solutions Not Applicable 03/08/23 1530   Dressing Options Hydrofiber Silver;Compression Wrap Two Layer 03/08/23 1530   Dressing Changed New 03/08/23 1530   Dressing Change/Treatment Frequency Monday, Wednesday, Friday, and As Needed 03/08/23 1530   Non-staged Wound Description Full thickness 03/08/23 1530   Wound Length (cm) 2.4 cm 03/08/23 1530   Wound Width (cm) 0.2 cm 03/08/23 1530   Wound Depth (cm) 0.5 cm 03/08/23 1530   Wound Surface Area (cm^2) 0.48 cm^2 03/08/23 1530   Wound Volume (cm^3) 0.24 cm^3 03/08/23 1530   Post-Procedure Length (cm) 2.4 cm 03/08/23 1530   Post-Procedure Width (cm) 0.3 cm 03/08/23 1530   Post-Procedure Depth (cm) 0.5 cm 03/08/23 1530   Post-Procedure Surface Area (cm^2) 0.72 cm^2 03/08/23 1530   Post-Procedure Volume (cm^3) 0.36 cm^3 03/08/23 1530   Wound Healing % 98 03/08/23 1530   Wound Bed Granulation (%) 100 % 03/06/23 1438   Tunneling (cm) 0 cm 03/08/23 1530   Undermining (cm) 0 cm 03/08/23 1530   Wound Odor  None 03/08/23 1530   Pulses Left;1+;DP;PT 02/08/23 1500   Exposed Structures None 03/08/23 1530   Number of days: 40     PROCEDURE: Excisional debridement of the left anterior lower extremity wound, two small anterior lower extremity wounds, and posterolateral wound    -2% viscous lidocaine applied topically to wound bed for approximately 5 minutes prior to debridement  -Curette used to debride wound beds.  Excisional debridement was performed to remove devitalized tissue until healthy, bleeding tissue was visualized and to open epibole.  Approximately 37.98 cm2 debrided.  Tissue debrided into the subcutaneous layer.    -Bleeding controlled with manual pressure.    -Wound care completed by wound RN, refer to flowsheet  -Patient tolerated the procedure well, without c/o pain or discomfort.                Pertinent Labs and Diagnostics:    Labs:     A1c:   Lab Results   Component Value Date/Time    HBA1C 8.5 (H) 12/04/2022 09:27 PM          IMAGING: Dx to tibia and fibula dated 12/5/2022  IMPRESSION:     No radiographic evidence of acute bony abnormality     Skin thickening and edema with some soft tissue calcifications most likely indicates venous stasis. Cellulitis is a possibility and there is no evidence of soft tissue gas. Dermatomyositis can be seen with soft tissue calcifications but (are not as   extensive as that often entails      LAST  WOUND CULTURE:  DATE :   Lab Results   Component Value Date/Time    CULTRSULT - (A) 02/08/2023 03:00 PM    CULTRSULT (A) 02/08/2023 03:00 PM     Klebsiella pneumoniae ESBL  Light growth  Extended Spectrum Beta-lactamase (ESBL) isolated.  ESBL's may be clinically resistant to therapy with  Penicillins,Cephalosporins or Aztreonam despite  apparent in vitro susceptibility to some of these agents.  The patient requires contact isolation.  Please contact pharmacy or an Infectious Disease Specialist  if you have any questions about appropriate therapy.      CULTRSULT (A) 02/08/2023  03:00 PM     Methicillin Resistant Staphylococcus aureus  Rare growth           ASSESSMENT AND PLAN:   1. Open wound of left lower extremity, subsequent encounter    3/8/2023: Improvement in anterior lower extremity wound and posterior extremity wounds. 2 small wounds anteromedial leg are about the same.  -Excisional debridement of the left anterior lower extremity wound, posterior wound, and two anteromedial wounds in clinic today to remove nonviable tissue and open epibole.  -Patient to return to clinic 3 times weekly for assessment, and VAC dressing change.  1 of these visits will be with provider for debridement.  -Continue with 2 layer compression  - Patient has not seen surgeon, recommend follow up.  Per patient, STSG being considered at some point     Wound care: NPWT at 125 mmHg to accelerate granulation, change 3 times per week.  2 layer compression wrap    2. Type 2 diabetes mellitus without complication, without long-term current use of insulin ([facility])  Comments: Patient is currently on glipizide only    3/8/2023:   Patient instructed to keep tight control over FBS, <140 for optimal wound healing; Implications of loss of protective sensation (LOPS) discussed with patient, including increased risk for amputation.  Advised to check feet at least daily, moisturize feet, and to always wear protective foot wear, arrange meticulous regular foot care by podiatrist or CFCN. Pt with good understanding.      -Consider referral to endocrinology    3. Wound infection    3/8/2023:   -No acute signs or symptoms of infections clinic visit  -Will monitor for signs and symptoms of infection at each additional clinic visit        PATIENT EDUCATION  - Importance of adequate nutrition for wound healing  -Advised to go to ER for any increased redness, swelling, drainage, or odor, or if patient develops fever, chills, nausea or vomiting.       Please note that this note may have been created using voice recognition software. I  have worked with technical experts from [employer] to optimize the interface.  I have made every reasonable attempt to correct obvious errors, but there may be errors of grammar and possibly content that I did not discover before finalizing the note.    N

## 2023-03-09 NOTE — PATIENT INSTRUCTIONS
-Keep your wound dressing clean, dry, and intact.    -Change your dressing if it becomes soiled, soaked, or falls off.    -Remove your compression wrap if you have severe pain, severe swelling, numbness, color change, or temperature change in your toes. If you need to remove your compression wrap, do so by unrolling it. Do not cut the compression wrap off to prevent cutting yourself on accident.    -Wound vac may not have any drainage in tube or cannister & it will still be working.   Change cannister if it does become full by pressing tab on side of machine to remove canister and snap on new one. Full canister can be thrown in the trash. If cannister fills with bright red blood - go to ER. Dressing will be changed every MWF at the wound clini.  If you are having issues with your wound VAC, please consider patching leaks, changing the canister, or calling [phone] for troubleshooting. If the wound VAC has been off or un-operational for over 2 hours, call wound care center to inform them and remove all dressings including black foam and replace with normal saline damp gauze.     -Should you experience any significant changes in your wound(s), such as infection (redness, swelling, localized heat, increased pain, fever > 101 F, chills) or have any questions regarding your home care instructions, please contact the wound center at [phone]. If after hours, contact your primary care physician or go to the hospital emergency room.

## 2023-03-10 ENCOUNTER — OFFICE VISIT (OUTPATIENT)
Dept: WOUND CARE | Facility: MEDICAL CENTER | Age: 63
End: 2023-03-10
Attending: GENERAL PRACTICE
Payer: COMMERCIAL

## 2023-03-10 DIAGNOSIS — S81.802D OPEN WOUND OF LEFT LOWER EXTREMITY, SUBSEQUENT ENCOUNTER: ICD-10-CM

## 2023-03-10 PROCEDURE — 97602 WOUND(S) CARE NON-SELECTIVE: CPT

## 2023-03-10 NOTE — PROCEDURES
Nonselective debridement performed to LLE wounds using foam cleanser and washcloth to remove loosely adherent nonviable tissue.  Pt tolerated well.    NPWT dressing also changed to anterior LLE wound, per usual protocol.

## 2023-03-10 NOTE — PATIENT INSTRUCTIONS
-Keep your wound dressing clean, dry, and intact.    -Change your dressing if it becomes soiled, soaked, or falls off.    -Remove your compression wrap if you have severe pain, severe swelling, numbness, color change, or temperature change in your toes. If you need to remove your compression wrap, do so by unrolling it. Do not cut the compression wrap off to prevent cutting yourself on accident.    -Wound vac may not have any drainage in tube or cannister & it will still be working.   Change cannister if it does become full by pressing tab on side of machine to remove canister and snap on new one. Full canister can be thrown in the trash. If cannister fills with bright red blood - go to ER. Dressing will be changed every MWF at the wound clinic.  If you are having issues with your wound VAC, please consider patching leaks, changing the canister, or calling [phone] for troubleshooting. If the wound VAC has been off or un-operational for over 2 hours, call wound care center to inform them and remove all dressings including black foam and replace with normal saline damp gauze.     -Should you experience any significant changes in your wound(s), such as infection (redness, swelling, localized heat, increased pain, fever > 101 F, chills) or have any questions regarding your home care instructions, please contact the wound center at [phone]. If after hours, contact your primary care physician or go to the hospital emergency room.

## 2023-03-13 ENCOUNTER — OFFICE VISIT (OUTPATIENT)
Dept: WOUND CARE | Facility: MEDICAL CENTER | Age: 63
End: 2023-03-13
Attending: GENERAL PRACTICE
Payer: COMMERCIAL

## 2023-03-13 DIAGNOSIS — L02.419 LEG ABSCESS: ICD-10-CM

## 2023-03-13 PROCEDURE — 97602 WOUND(S) CARE NON-SELECTIVE: CPT

## 2023-03-13 NOTE — PATIENT INSTRUCTIONS
-Keep your wound dressing clean, dry, and intact.    -Change your dressing if it becomes soiled, soaked, or falls off.    -Should you experience any significant changes in your wound(s), such as infection (redness, swelling, localized heat, increased pain, fever > 101 F, chills) or have any questions regarding your home care instructions, please contact the wound center at [phone]. If after hours, contact your primary care physician or go to the hospital emergency room.

## 2023-03-13 NOTE — PROGRESS NOTES
Pt came from Orthopedic appt with wound vac off and wounds covered with dressing.    Non-selective debridement of wound using puracyn spray and dry gauze to remove biofilm from wound beds.    Periwound with raised Pustular bumps and increase redness.  Consulted with APRN and vac held and Mupirison ointment applied and Pt instructed to  bring vac to appt on Wednesday.  See flowsheet for complete wound care details.

## 2023-03-15 ENCOUNTER — OFFICE VISIT (OUTPATIENT)
Dept: WOUND CARE | Facility: MEDICAL CENTER | Age: 63
End: 2023-03-15
Attending: GENERAL PRACTICE
Payer: COMMERCIAL

## 2023-03-15 VITALS
OXYGEN SATURATION: 96 % | HEART RATE: 76 BPM | RESPIRATION RATE: 18 BRPM | TEMPERATURE: 97.4 F | DIASTOLIC BLOOD PRESSURE: 99 MMHG | SYSTOLIC BLOOD PRESSURE: 158 MMHG

## 2023-03-15 DIAGNOSIS — R03.0 ELEVATED BLOOD PRESSURE READING: ICD-10-CM

## 2023-03-15 DIAGNOSIS — L08.9 WOUND INFECTION: ICD-10-CM

## 2023-03-15 DIAGNOSIS — S81.802D OPEN WOUND OF LEFT LOWER EXTREMITY, SUBSEQUENT ENCOUNTER: ICD-10-CM

## 2023-03-15 DIAGNOSIS — T14.8XXA WOUND INFECTION: ICD-10-CM

## 2023-03-15 DIAGNOSIS — E11.9 TYPE 2 DIABETES MELLITUS WITHOUT COMPLICATION, WITHOUT LONG-TERM CURRENT USE OF INSULIN (HCC): ICD-10-CM

## 2023-03-15 PROCEDURE — 11042 DBRDMT SUBQ TIS 1ST 20SQCM/<: CPT | Performed by: NURSE PRACTITIONER

## 2023-03-15 PROCEDURE — 97605 NEG PRS WND THER DME<=50SQCM: CPT

## 2023-03-15 PROCEDURE — 11045 DBRDMT SUBQ TISS EACH ADDL: CPT

## 2023-03-15 PROCEDURE — 11042 DBRDMT SUBQ TIS 1ST 20SQCM/<: CPT

## 2023-03-15 PROCEDURE — 11045 DBRDMT SUBQ TISS EACH ADDL: CPT | Performed by: NURSE PRACTITIONER

## 2023-03-15 RX ORDER — MECLIZINE HCL 12.5 MG/1
1 TABLET ORAL 3 TIMES DAILY PRN
COMMUNITY
End: 2023-06-22 | Stop reason: SDUPTHER

## 2023-03-15 RX ORDER — METHYLPHENIDATE HYDROCHLORIDE 36 MG/1
1 TABLET ORAL DAILY
COMMUNITY
End: 2023-06-22 | Stop reason: SDUPTHER

## 2023-03-15 RX ORDER — AMLODIPINE BESYLATE 10 MG/1
10 TABLET ORAL DAILY
COMMUNITY
End: 2023-06-22 | Stop reason: SDUPTHER

## 2023-03-15 RX ORDER — TAMSULOSIN HYDROCHLORIDE 0.4 MG/1
1 CAPSULE ORAL DAILY
COMMUNITY
End: 2023-06-22 | Stop reason: SDUPTHER

## 2023-03-15 RX ORDER — ROSUVASTATIN CALCIUM 20 MG/1
1 TABLET, COATED ORAL DAILY
COMMUNITY
End: 2023-06-22 | Stop reason: SDUPTHER

## 2023-03-15 RX ORDER — PREGABALIN 150 MG/1
1 CAPSULE ORAL 2 TIMES DAILY
COMMUNITY
End: 2023-03-15

## 2023-03-15 RX ORDER — ACETAMINOPHEN 325 MG/1
1 TABLET ORAL EVERY 6 HOURS PRN
COMMUNITY
End: 2023-03-15

## 2023-03-15 RX ORDER — TRAZODONE HYDROCHLORIDE 50 MG/1
1 TABLET ORAL
COMMUNITY
End: 2023-06-22 | Stop reason: SDUPTHER

## 2023-03-15 RX ORDER — PROMETHAZINE HYDROCHLORIDE 25 MG/1
1 TABLET ORAL PRN
COMMUNITY
End: 2023-06-22

## 2023-03-15 RX ORDER — OMEPRAZOLE 40 MG/1
1 CAPSULE, DELAYED RELEASE ORAL DAILY
COMMUNITY
End: 2023-06-22 | Stop reason: SDUPTHER

## 2023-03-15 RX ORDER — LEVOTHYROXINE SODIUM 112 UG/1
1 TABLET ORAL DAILY
COMMUNITY
End: 2023-06-22 | Stop reason: SDUPTHER

## 2023-03-15 RX ORDER — FINASTERIDE 5 MG/1
5 TABLET, FILM COATED ORAL DAILY
COMMUNITY
End: 2023-06-22 | Stop reason: SDUPTHER

## 2023-03-15 RX ORDER — GLIPIZIDE 5 MG/1
1 TABLET ORAL 2 TIMES DAILY
COMMUNITY
End: 2023-06-22 | Stop reason: SDUPTHER

## 2023-03-15 RX ORDER — NALOXONE HYDROCHLORIDE 0.4 MG/ML
1 INJECTION, SOLUTION INTRAMUSCULAR; INTRAVENOUS; SUBCUTANEOUS PRN
COMMUNITY
End: 2023-06-22

## 2023-03-15 NOTE — PROGRESS NOTES
Provider Encounter- Full Thickness wound    HISTORY OF PRESENT ILLNESS  Wound History:   START OF CARE IN CLINIC: 1/16/2023 (return to clinic following hospitalization)   REFERRING PROVIDER:   Dr. Paige  WOUND- Full Thickness Wound   LOCATION: Left lower extremity, right anterior knee, left anterior knee   HISTORY: Patient presented to [facility] on 12/4/2022 with left lower extremity, fever, malaise, erythema x1 week.  He was originally admitted to [facility] for acute kidney injury and sepsis but with worsening renal function was transferred to [facility].  Wound cultures grew Streptococcus pyogenes group A and MSSA.  Patient was initially treated with cefazolin.  He is seen by the wound care team who recommended follow-up as an outpatient in wound care clinic.  While inpatient nephrology saw the patient and started him on dialysis for acute kidney injury.  His renal function improved and dialysis was stopped.  Patient was discharged with follow-up in the wound care clinic.    Patient was following with wound clinic, however on clinic visit 1/3/2022 he was noticed to have worsening wounds and concern for infection. He had failed outpatient Abx with zyvox and was sent to ED for further evaluation. Patient was admitted to [facility] from 1/3-1/13/2023 for sepsis and left lower extremity cellulitis with abscess. He was taken to OR for I&D with Dr. Odom. Patient's wound was treated with wound VAC. Surgical cultures were positive for enterobacter cloacae and was treated with ciprofloxacin with end date of 1/21/2023. Patient was referred back to [facility] for further care.    Pertinent Medical History: Hypertension, alcohol abuse, kidney disease, GERD, mitral valve prolapse, morbid obesity, obstructive sleep apnea, type 2 diabetes    TOBACCO USE: Former smoker denies use of smokeless tobacco    Patient's problem list, allergies, and current medications reviewed and updated in Epic    Interval  History:  2023: Clinic visit with Travis Bonilla MD. Patient reports feeling well since hospitalization. He presented with wound VAC. He will be traveling to [place] for  and will be back on Monday. Wound VAC will be held while out of town and will plan to reapply next week. He was instructed on performing wound care until returns to clinic. Patient is tolerating Abx, continues to have some periwound erythema without warmth or pain. He has two new areas of fluctuance, concerning for superficial abscess, discussed performing I&D, patient agreeable.    2023 : Clinic visit with PANCHO Medina, EL, MERI, RYLEE.   Patient states that overall he is feeling well blood sugars have been in the low 100s.  No significant improvement in his wounds.  He does mention that he has raised bumps to his arms, this has been going on for several weeks, he noticed a new bump today on his left forearm.  They are slightly tender with palpation, no fluctuance.  He also has a diffuse rash to his arms and hands.  He completed his antibiotics 3 or 4 days ago.  States he may go into the emergency room in [place] after he leaves the clinic.  Denies any fevers, chills, nausea, vomiting.    2023 : Clinic visit with PANCHO Medina, EL, MERI, RYLEE.   Kwesi states that overall he is feeling well, though a little bit stressed.  His stepdaughter is in hospital.  He did  his Zyvox prescription, prescribed on Monday for positive MRSA wound culture, light growth.  He is tolerating without any difficulty.  The smaller wounds to his leg present today with boggy tissue, at least 1 of these communicates with the larger wound.  He has an appointment to follow-up with his surgeon, Dr. Odom, on .  His blood sugar today was 126.  The bumps on his forearms are about the same, he did not go to the emergency room last week.    2/10/2023 : Clinic visit with PANCHO Medina, EL, MERI, RYLEE.   Patient states  that overall he is feeling well.  His blood sugars have consistently been running between 120 and 140.  As discussed last visit, the tissue between the lateral wound and a small anterior wound, overlying wound tract, was excised in clinic today using local anesthesia.  Wound VAC continued.  He does still have quite a bit of undermining all around the wound.  He sees his surgeon next Friday.   He will also be undergoing biopsies of the lumps on his arms and hands on Monday by his PCP.     2/17/2023 : Clinic visit with PANCHO Medina, FNP-BC, CWOCN, CFCN.   Kwesi continues to feel well.  He was seen by a surgeon earlier today, who was overall satisfied with wound progress, did not feel skin over undermining needed to be excised.  Per patient, surgeon is going to speak with one of his colleagues about possible skin grafting at some point.  Surgeon did feel patient would benefit from higher levels of compression as his leg is quite edematous still.  Patient was prescribed minocycline and Zyvox earlier this week due to positive wound culture.  He is taking these as prescribed, and states he is tolerating without difficulty.   He did not have the biopsy of lumps in his arms as expected earlier this week.  Lumps have dissipated.  However raised lesion to left dorsal thenar hand has grown larger, suspicious for malignancy.  He has an appointment with dermatology next Wednesday.    2/27/2023: Clinic visit with PANCHO Teixeira.  Patient reports that he completed his oral antibiotics last Saturday patient reports he had mild diarrhea from the antibiotics however it is improving since he has completed his antibiotics Saturday.  Patient's wound beds are improving decreased depth to the 2 superior left anterior wounds.  Granulation tissue throughout the largest left anterior lower extremity wound and the posterior left lower extremity wound.   Does have 1 raised lump along the previous track pad area no signs or  symptoms of infection however, patient reports that these lumps are how the other wounds started.  No additional antibiotics we will continue to monitor.    3/8/2023: Clinic visit with Travis Bonilla MD. Patient reports doing ok. Denies any problems with his wound VAC. Patient's wounds are slowly improving. Less undermining to medial aspect of wound, continues to have undermining 2 o'clock. His posterior wound appears to be improving. 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    3/15/2023 : Clinic visit with PANCHO Medina, FNP-BC, CWAMAN, CFVALE.   Kwesi states he is feeling well overall.  He is currently not on any antibiotics.  Erythema to his left lower leg has been persistent, no changes however.  VAC has been on hold t since 3/13 due to periwound issues, which have since resolved..  Patient is agreeable to restarting VAC today.  He is anxious to get this wound healed as quickly as possible.   He was seen by his surgeon earlier this week, who was satisfied with current progress of this wound.   Patient's blood pressure elevated today, 175/99.  He denies any symptoms of CVA or MI-reviewed with him in clinic today.  He states he did not take his blood pressure medication today, will take it when he gets home.    REVIEW OF SYSTEMS:   Unchanged from previous clinic visit on 3/8/2023, except as documented in interval history above    PHYSICAL EXAMINATION:   BP (!) 158/99   Pulse 76   Temp 36.3 °C (97.4 °F) (Temporal)   Resp 18   SpO2 96%     Physical Exam  Constitutional:       Appearance: He is obese.   Cardiovascular:      Pulses: Normal pulses.   Pulmonary:      Effort: Pulmonary effort is normal. No respiratory distress.      Breath sounds: No wheezing.   Musculoskeletal:      Left lower leg: Edema present.      Comments: 2+ edema of left lower extremity   Skin:     Comments: Full-thickness wound  to the left anterior lower extremity status post I&D -wound area has decreased slightly, periwound issues have resolved.  Thick layer of slough to wound bed, moderate serosanguineous drainage, no odor.  Periwound, and lower leg in general with persistent erythema, appears to be stable.  The 2 smaller wounds superior to the left anterior lower extremity are essentially resolved, no drainage over the past week.  The left posterior lateral wound -area has decreased since last assessment, thin layer of slough to wound bed, moderate serosanguineous drainage, no evidence of infection    Refer to wound flowsheet and photos     Neurological:      Mental Status: He is alert.       WOUND ASSESSMENT  Wound 01/16/23 Incision Leg Anterior;Lower Left (Active)   Wound Image    03/15/23 1530   Site Assessment Red;Yellow;Early/partial granulation 03/15/23 1530   Periwound Assessment Edema;Blanchable erythema;Clean;Dry;Intact 03/15/23 1530   Margins Unattached edges 03/15/23 1530   Closure Secondary intention 01/16/23 1700   Drainage Amount Moderate 03/15/23 1530   Drainage Description Serosanguineous;Yellow 03/15/23 1530   Treatments Cleansed;Topical Lidocaine;Provider debridement;Site care;Compression 03/15/23 1530   Wound Cleansing Puracyn [place] 03/15/23 1530   Periwound Protectant Skin Protectant Wipes to Periwound;Drape;Benzoin 03/15/23 1530   Dressing Cleansing/Solutions Not Applicable 03/15/23 1530   Dressing Options Wound Vac;Soft Conforming Roll;Compression Wrap Two Layer 03/15/23 1530   Dressing Changed New 03/15/23 1530   Dressing Status Clean;Dry;Intact 03/15/23 1530   Dressing Change/Treatment Frequency Monday, Wednesday, Friday, and As Needed 03/15/23 1530   Non-staged Wound Description Full thickness 03/15/23 1530   Wound Length (cm) 9.5 cm 03/15/23 1530   Wound Width (cm) 4.1 cm 03/15/23 1530   Wound Depth (cm) 0.6 cm 03/15/23 1530   Wound Surface Area (cm^2) 38.95 cm^2 03/15/23 1530   Wound Volume (cm^3) 23.37 cm^3  03/15/23 1530   Post-Procedure Length (cm) 10 cm 03/15/23 1530   Post-Procedure Width (cm) 4.3 cm 03/15/23 1530   Post-Procedure Depth (cm) 1 cm 03/15/23 1530   Post-Procedure Surface Area (cm^2) 43 cm^2 03/15/23 1530   Post-Procedure Volume (cm^3) 43 cm^3 03/15/23 1530   Wound Healing % 76 03/15/23 1530   Wound Bed Granulation (%) 100 % 03/06/23 1438   Tunneling (cm) 0 cm 03/15/23 1530   Tunneling Clock Position of Wound 12 03/08/23 1530   Tunneling - 2nd Location (cm) 1.6 cm 02/08/23 1500   Tunneling Clock Position of Wound - 2nd Location 7 02/08/23 1500   Tunneling - 3rd Location (cm) 4.2 cm 02/01/23 1530   Tunneling Clock Position of Wound - 3rd Location 11 02/01/23 1530   Undermining (cm) 2.5 cm 03/15/23 1530   Undermining of Wound, 1st Location From 1 o'clock;To 2 o'clock 03/15/23 1530   Undermining (cm) - 2nd location 1 cm 03/08/23 1530   Undermining of Wound, 2nd Location From 10 o'clock;To 11 o'clock 03/08/23 1530   Undermining (cm) - 3rd location 2 cm 02/08/23 1500   Undermining of Wound, 3rd Location From 5 o'clock;To 5 o'clock 02/08/23 1500   Wound Odor None 03/15/23 1530   Pulses Left;1+;DP 02/08/23 1500   Exposed Structures None 03/15/23 1530   Number of days: 59       Wound 01/16/23 Incision Leg Posterior;Medial Left --Left Posteromedial LE (Active)   Wound Image    03/15/23 1530   Site Assessment Red;Pink;Early/partial granulation 03/15/23 1530   Periwound Assessment Edema;Blanchable erythema 03/15/23 1530   Margins Attached edges 03/15/23 1530   Closure Secondary intention 01/16/23 1700   Drainage Amount Moderate 03/15/23 1530   Drainage Description Serosanguineous 03/15/23 1530   Treatments Cleansed;Topical Lidocaine;Provider debridement;Site care 03/15/23 1530   Wound Cleansing Puracyn [place] 03/15/23 1530   Periwound Protectant Skin Protectant Wipes to Periwound;Drape;Benzoin 03/15/23 1530   Dressing Cleansing/Solutions Not Applicable 03/15/23 1530   Dressing Options Hydrofiber Silver;Silicone  Adhesive Foam;Soft Conforming Roll;Compression Wrap Two Layer 03/15/23 1530   Dressing Changed New 03/15/23 1530   Dressing Status Clean;Dry;Intact 01/16/23 1700   Dressing Change/Treatment Frequency Monday, Wednesday, Friday, and As Needed 03/15/23 1530   Non-staged Wound Description Full thickness 03/15/23 1530   Wound Length (cm) 1.1 cm 03/15/23 1530   Wound Width (cm) 0.4 cm 03/15/23 1530   Wound Depth (cm) 0.2 cm 03/15/23 1530   Wound Surface Area (cm^2) 0.44 cm^2 03/15/23 1530   Wound Volume (cm^3) 0.088 cm^3 03/15/23 1530   Post-Procedure Length (cm) 1 cm 03/15/23 1530   Post-Procedure Width (cm) 0.6 cm 03/15/23 1530   Post-Procedure Depth (cm) 0.3 cm 03/15/23 1530   Post-Procedure Surface Area (cm^2) 0.6 cm^2 03/15/23 1530   Post-Procedure Volume (cm^3) 0.18 cm^3 03/15/23 1530   Wound Healing % 100 03/15/23 1530   Wound Bed Granulation (%) 100 % 03/06/23 1439   Tunneling (cm) 0 cm 03/15/23 1530   Undermining (cm) 0 cm 03/15/23 1530   Undermining of Wound, 1st Location From 1 o'clock;To 3 o'clock 01/18/23 0915   Undermining (cm) - 2nd location 0 cm 02/22/23 1400   Undermining of Wound, 2nd Location From 3 o'clock;To 5 o'clock 01/16/23 1700   Wound Odor None 03/15/23 1530   Pulses Left;1+;DP;PT 02/08/23 1500   Exposed Structures None 03/15/23 1530   Number of days: 59       Wound 01/27/23 Full Thickness Wound Leg Anterior;Medial;Lower Left Cluster (Active)   Wound Image   03/15/23 1530   Site Assessment Yellow 03/15/23 1530   Periwound Assessment Edema;Blanchable erythema 03/15/23 1530   Margins Attached edges 03/15/23 1530   Drainage Amount None 03/15/23 1530   Drainage Description Serosanguineous 03/13/23 1500   Treatments Cleansed;Site care 03/15/23 1530   Wound Cleansing Foam Cleanser/Washcloth 03/15/23 1530   Periwound Protectant Skin Moisturizer 03/15/23 1530   Dressing Cleansing/Solutions Not Applicable 03/15/23 1530   Dressing Options Soft Conforming Roll;Compression Wrap Two Layer 03/15/23 1530    Dressing Changed New 03/15/23 1530   Dressing Status Clean;Dry;Intact 03/15/23 1530   Dressing Change/Treatment Frequency Monday, Wednesday, Friday, and As Needed 03/15/23 1530   Non-staged Wound Description Partial thickness 03/15/23 1530   Wound Length (cm) 2.4 cm 03/08/23 1530   Wound Width (cm) 0.2 cm 03/08/23 1530   Wound Depth (cm) 0.5 cm 03/08/23 1530   Wound Surface Area (cm^2) 0.48 cm^2 03/08/23 1530   Wound Volume (cm^3) 0.24 cm^3 03/08/23 1530   Post-Procedure Length (cm) 2.4 cm 03/08/23 1530   Post-Procedure Width (cm) 0.3 cm 03/08/23 1530   Post-Procedure Depth (cm) 0.5 cm 03/08/23 1530   Post-Procedure Surface Area (cm^2) 0.72 cm^2 03/08/23 1530   Post-Procedure Volume (cm^3) 0.36 cm^3 03/08/23 1530   Wound Healing % 98 03/08/23 1530   Wound Bed Granulation (%) 100 % 03/06/23 1438   Tunneling (cm) 0 cm 03/15/23 1530   Undermining (cm) 0 cm 03/15/23 1530   Wound Odor None 03/15/23 1530   Pulses Left;1+;DP;PT 02/08/23 1500   Exposed Structures None 03/15/23 1530   Number of days: 48     PROCEDURE: Excisional debridement of the left anterior lower extremity wound,  and posterolateral wound    -2% viscous lidocaine applied topically to wound beds for approximately 5 minutes prior to debridement  -Curette used to debride wound beds.  Excisional debridement was performed to remove devitalized tissue until healthy, bleeding tissue was visualized and to open epibole.  Total area debrided, 43.6 cm².  Tissue debrided into the subcutaneous layer.    -Bleeding controlled with manual pressure.    -Wound care completed by wound RN, refer to flowsheet  -Patient tolerated the procedure well, without c/o pain or discomfort.                Pertinent Labs and Diagnostics:    Labs:     A1c:   Lab Results   Component Value Date/Time    HBA1C 8.5 (H) 12/04/2022 09:27 PM          IMAGING: Dx to tibia and fibula dated 12/5/2022  IMPRESSION:     No radiographic evidence of acute bony abnormality     Skin thickening and edema  with some soft tissue calcifications most likely indicates venous stasis. Cellulitis is a possibility and there is no evidence of soft tissue gas. Dermatomyositis can be seen with soft tissue calcifications but (are not as   extensive as that often entails      LAST  WOUND CULTURE:  DATE :   Lab Results   Component Value Date/Time    CULTRSULT - (A) 02/08/2023 03:00 PM    CULTRSULT (A) 02/08/2023 03:00 PM     Klebsiella pneumoniae ESBL  Light growth  Extended Spectrum Beta-lactamase (ESBL) isolated.  ESBL's may be clinically resistant to therapy with  Penicillins,Cephalosporins or Aztreonam despite  apparent in vitro susceptibility to some of these agents.  The patient requires contact isolation.  Please contact pharmacy or an Infectious Disease Specialist  if you have any questions about appropriate therapy.      CULTRSULT (A) 02/08/2023 03:00 PM     Methicillin Resistant Staphylococcus aureus  Rare growth           ASSESSMENT AND PLAN:   1. Open wound of left lower extremity, subsequent encounter    3/15/2023: Medial wounds are essentially resolved, area of anterior and posterior wounds have decreased slightly.  Slough to both wound beds.  VAC has been on hold.  Periwound issues have resolved  -Excisional debridement of the left anterior lower extremity wound, posterior wound, and two anteromedial wounds in clinic today to remove nonviable tissue and open epibole.  -Patient to return to clinic 3 times weekly for assessment, and VAC dressing change.  1 of these visits will be with provider for debridement.  -Resume with 2 layer compression  -Restart back  -Patient has been seen by his surgeon, he is satisfied with current progress     Wound care: NPWT at 125 mmHg to accelerate granulation, change 3 times per week.  2 layer compression wrap    2. Type 2 diabetes mellitus without complication, without long-term current use of insulin ([facility])  Comments: Patient is currently on glipizide only    3/15/2023: Patient did  not check his blood sugar today, states it was 138 yesterday  Patient instructed to keep tight control over FBS, <140 for optimal wound healing; Implications of loss of protective sensation (LOPS) discussed with patient, including increased risk for amputation.  Advised to check feet at least daily, moisturize feet, and to always wear protective foot wear, arrange meticulous regular foot care by podiatrist or CFCN. Pt with good understanding.      -Consider referral to endocrinology    3. Wound infection    3/15/2023: Patient is currently not on any antibiotics.  He has had persistent erythema to his left lower extremity which appears to be stable.  -No acute signs or symptoms of infections clinic visit  -Will monitor for signs and symptoms of infection at each  clinic visit    4.  Elevated blood pressure reading    3/15/2023: Patient's blood pressure 175/99.  He states he did not take his blood pressure medication.  He denies headache, dizziness, confusion, numbness, chest pain or chest pressure.    -Risk of untreated hypertension discussed, including stroke, heart attack, sudden death.  Patient advised to call 911 for any of these symptoms.    -Follow-up with PCP    PATIENT EDUCATION  - Importance of adequate nutrition for wound healing  -Advised to go to ER for any increased redness, swelling, drainage, or odor, or if patient develops fever, chills, nausea or vomiting.       Please note that this note may have been created using voice recognition software. I have worked with technical experts from [employer] to optimize the interface.  I have made every reasonable attempt to correct obvious errors, but there may be errors of grammar and possibly content that I did not discover before finalizing the note.    N

## 2023-03-16 NOTE — PATIENT INSTRUCTIONS
-Keep your wound dressing clean, dry, and intact.    -Remove your compression wrap if you have severe pain, severe swelling, numbness, color change, or temperature change in your toes. If you need to remove your compression wrap, do so by unrolling it. Do not cut the compression wrap off to prevent cutting yourself on accident.    -Wound vac may not have any drainage in tube or cannister & it will still be working.   Change cannister if it does become full by pressing tab on side of machine to remove canister and snap on new one. Full canister can be thrown in the trash. If cannister fills with bright red blood - go to ER. Dressing will be changed every MWF at the wound clinic.  If you are having issues with your wound VAC, please consider patching leaks, changing the canister, or calling [phone] for troubleshooting. If the wound VAC has been off or un-operational for over 2 hours, call wound care center to inform them and remove all dressings including black foam and replace with normal saline damp gauze.     -Should you experience any significant changes in your wound(s), such as infection (redness, swelling, localized heat, increased pain, fever > 101 F, chills) or have any questions regarding your home care instructions, please contact the wound center at [phone]. If after hours, contact your primary care physician or go to the hospital emergency room.

## 2023-03-16 NOTE — PROCEDURES
Previous dressing removed, no vac in place. Vac break was initiated on 3/13 for nithin wound rash. Copious amounts of drainage noted with removal of Aquacel and ABD dressing. NPWT resumed today, 1 half thickness piece of black foam into undermined area, 1 large piece over top to bolster over all undermining and fill wound bed, sealed with drape to 125 mmHg continuous, no leaks noted. Then 2 layer compression wrap applied, soft roll applied before wrap to protect skin.

## 2023-03-17 ENCOUNTER — OFFICE VISIT (OUTPATIENT)
Dept: WOUND CARE | Facility: MEDICAL CENTER | Age: 63
End: 2023-03-17
Attending: GENERAL PRACTICE
Payer: COMMERCIAL

## 2023-03-17 DIAGNOSIS — S81.802D OPEN WOUND OF LEFT LOWER EXTREMITY, SUBSEQUENT ENCOUNTER: ICD-10-CM

## 2023-03-17 PROCEDURE — 97605 NEG PRS WND THER DME<=50SQCM: CPT

## 2023-03-17 NOTE — PROGRESS NOTES
Brief Wound Care Provider Note    Patient previously seen by provider this week. Here for nursing wound VAC change.

## 2023-03-17 NOTE — PROCEDURES
Wound vac removed by CNA, wound bed inspected by this RN, no retained foam noted. Wound vac <50cm applied to LLE anterior  wound using 1 piece of black foam to fill wound bed and bolster over undermining.   Negative pressure wound therapy resumed at 125mmHg continuous pressure with no leaks noted.   Sof roll cast padding and 2 layer compression wrap applied to LLE with tubing from wound vac left outside of wrap.

## 2023-03-17 NOTE — PATIENT INSTRUCTIONS
-Keep your wound dressing clean, dry, and intact.    -Change your dressing if it becomes soiled, soaked, or falls off.    -Remove your compression wrap if you have severe pain, severe swelling, numbness, color change, or temperature change in your toes. If you need to remove your compression wrap, do so by unrolling it. Do not cut the compression wrap off to prevent cutting yourself on accident.    -Wound vac may not have any drainage in tube or cannister & it will still be working.   Change cannister if it does become full by pressing tab on side of machine to remove canister and snap on new one. Full canister can be thrown in the trash. If cannister fills with bright red blood - go to ER. Dressing will be changed every MWF at the wound clinic.  If you are having issues with your wound VAC, please consider patching leaks, changing the canister, or calling [phone] for troubleshooting. If the wound VAC has been off or un-operational for over 2 hours, call wound care center to inform them and remove all dressings including black foam and replace with normal saline damp gauze.     -Should you experience any significant changes in your wound(s), such as infection (redness, swelling, localized heat, increased pain, fever > 101 F, chills) or have any questions regarding your home care instructions, please contact the wound center at [phone]. If after hours, contact your primary care physician or go to the hospital emergency room.

## 2023-03-20 ENCOUNTER — NON-PROVIDER VISIT (OUTPATIENT)
Dept: WOUND CARE | Facility: MEDICAL CENTER | Age: 63
End: 2023-03-20
Attending: GENERAL PRACTICE
Payer: COMMERCIAL

## 2023-03-20 PROCEDURE — 97605 NEG PRS WND THER DME<=50SQCM: CPT

## 2023-03-20 NOTE — PROCEDURES
All black foam removed from LLE wound, no foam retained. Replaced with 1 piece black foam and sealed with drape to 125 mmHg continuous NPWT, no leaks noted. Applied two layer wrap with hose on outside of wrap.

## 2023-03-20 NOTE — PATIENT INSTRUCTIONS
Avoid prolonged standing or sitting without elevating your legs.  - Multilayer compression wrap to left leg. Do not get wet and keep on for the week. Only remove if temperature or sensation changes.   If compression needs to be removed, un-wrap it do not cut it off.     Wound NPWT at 125mmHg continuous or intermittent with black foam. Dressing will be changed every MWF at the wound clinic or with your home health nurse.  If you are having issues with your wound NPWT, please consider patching leaks, changing the canister, or calling [facility] for troubleshooting. If the wound NPWT has been off or un-operational for over 2 hours, call wound care center to inform them and remove all dressings including black foam and replace with normal saline damp gauze.     Should you experience any significant changes in your wound(s), such as infection (redness, swelling, localized heat, increased pain, fever > 101 F, chills) or have any questions regarding your home care instructions, please contact the wound center at [phone]. If after hours, contact your primary care physician or go to the hospital emergency room.   Keep dressing clean, dry and cover when bathing. Only change dressing if it's over saturated, soiled or falls off.

## 2023-03-22 ENCOUNTER — NON-PROVIDER VISIT (OUTPATIENT)
Dept: WOUND CARE | Facility: MEDICAL CENTER | Age: 63
End: 2023-03-22
Attending: GENERAL PRACTICE
Payer: COMMERCIAL

## 2023-03-22 PROCEDURE — 97602 WOUND(S) CARE NON-SELECTIVE: CPT

## 2023-03-22 NOTE — PATIENT INSTRUCTIONS
-Keep your wound dressing clean, dry, and intact.    -Change your dressing if it becomes soiled, soaked, or falls off.    -Remove your compression wrap if you have severe pain, severe swelling, numbness, color change, or temperature change in your toes. If you need to remove your compression wrap, do so by unrolling it. Do not cut the compression wrap off to prevent cutting yourself on accident.    -Wound vac may not have any drainage in tube or cannister & it will still be working.   Change cannister if it does become full by pressing tab on side of machine to remove canister and snap on new one. Full canister can be thrown in the trash. If cannister fills with bright red blood - go to ER. Dressing will be changed every MWF at the wound clini.  If you are having issues with your wound VAC, please consider patching leaks, changing the canister, or calling [phone] for troubleshooting. If the wound VAC has been off or un-operational for over 2 hours, call wound care center to inform them and remove all dressings including black foam and replace with normal saline damp gauze.     -Should you experience any significant changes in your wound(s), such as infection (redness, swelling, localized heat, increased pain, fever > 101 F, chills) or have any questions regarding your home care instructions, please contact the wound center at [phone]. If after hours, contact your primary care physician or go to the hospital emergency room.

## 2023-03-22 NOTE — PROCEDURES
Non-selective debridement to Anterior LLE wound and nithin-wound using normal saline and gauze to remove biofilm and non-viable tissue.     - NPWT dressing changed to anterior LLE wound, < 50 cm ².   -1 piece of foam removed from wound bed, no residual foam noted.   - 1 piece of black foam used.   - Initiated continuous suction at 125 mm Hg, no leaks noted.

## 2023-03-24 ENCOUNTER — NON-PROVIDER VISIT (OUTPATIENT)
Dept: WOUND CARE | Facility: MEDICAL CENTER | Age: 63
End: 2023-03-24
Attending: GENERAL PRACTICE
Payer: COMMERCIAL

## 2023-03-24 PROCEDURE — 97598 DBRDMT OPN WND ADDL 20CM/<: CPT

## 2023-03-24 PROCEDURE — 97597 DBRDMT OPN WND 1ST 20 CM/<: CPT

## 2023-03-24 NOTE — PROCEDURES
CSWD to LLE anterior wound using curette to remove ~25cm2 nonviable tissue from woundbed.  2% topical lidocaine applied prior to debridement with ~5 minute dwell time.  Pt tolerated well.

## 2023-03-24 NOTE — PATIENT INSTRUCTIONS
-Keep your wound dressing clean, dry, and intact.    -Remove your compression wrap if you have severe pain, severe swelling, numbness, color change, or temperature change in your toes. If you need to remove your compression wrap, do so by unrolling it. Do not cut the compression wrap off to prevent cutting yourself on accident.    -Wound vac may not have any drainage in tube or cannister & it will still be working.   Change cannister if it does become full by pressing tab on side of machine to remove canister and snap on new one. Full canister can be thrown in the trash. If cannister fills with bright red blood - go to ER. Dressing will be changed every MWF at the wound clinic.  If you are having issues with your wound VAC, please consider patching leaks, changing the canister, or calling [phone] for troubleshooting. If the wound VAC has been off or un-operational for over 2 hours, call wound care center to inform them and remove all dressings including black foam and replace with normal saline damp gauze.     -Should you experience any significant changes in your wound(s), such as infection (redness, swelling, localized heat, increased pain, fever > 101 F, chills) or have any questions regarding your home care instructions, please contact the wound center at [phone]. If after hours, contact your primary care physician or go to the hospital emergency room.

## 2023-03-27 ENCOUNTER — NON-PROVIDER VISIT (OUTPATIENT)
Dept: WOUND CARE | Facility: MEDICAL CENTER | Age: 63
End: 2023-03-27
Attending: GENERAL PRACTICE
Payer: COMMERCIAL

## 2023-03-27 PROCEDURE — 97605 NEG PRS WND THER DME<=50SQCM: CPT

## 2023-03-27 NOTE — PATIENT INSTRUCTIONS
Wound VAC at 125mmHg continuous or intermittent with black foam. Dressing will be changed every MWF at the wound clinic or with your home health nurse.  If you are having issues with your wound VAC, please consider patching leaks, changing the canister, or calling [phone] for troubleshooting. If the wound VAC has been off or un-operational for over 2 hours, call wound care center to inform them and remove all dressings including black foam and replace with normal saline damp gauze.     Should you experience any significant changes in your wound(s), such as infection (redness, swelling, localized heat, increased pain, fever > 101 F, chills) or have any questions regarding your home care instructions, please contact the wound center at [phone]. If after hours, contact your primary care physician or go to the hospital emergency room.   Keep dressing clean, dry and cover when bathing. Only change dressing if it's over saturated, soiled or falls off.

## 2023-03-27 NOTE — PROGRESS NOTES
NPWT therapy removed from LLE with 1 piece of black foam removed, intact from wound bed. Cleansed wound bed and reapplied NPWT with 1 piece of black foam with bolster over undermining on lateral aspect of wound edge. Resumed at 125mmhg continuous with no leaks noted. Soft roll and 2 layer applied to LLE.

## 2023-03-29 ENCOUNTER — APPOINTMENT (OUTPATIENT)
Dept: WOUND CARE | Facility: MEDICAL CENTER | Age: 63
End: 2023-03-29
Attending: GENERAL PRACTICE
Payer: COMMERCIAL

## 2023-03-29 PROCEDURE — 97605 NEG PRS WND THER DME<=50SQCM: CPT

## 2023-03-29 NOTE — PROGRESS NOTES
NPWT < 50 sq cm dressing changed this visit, 1 pieces of foam removed. 1 new piece of black foam placed to wound bed and used for tracpad. Pump set to 125 mmhg,  continuous suction. No leaks detected. Soft roll and 2 layer compression wrap applied to Left Lower Extremity. Refer to flow sheet for wound care details. Patient tolerated the procedure well.

## 2023-03-29 NOTE — PATIENT INSTRUCTIONS
-Keep your wound dressing clean, dry, and intact.    -Change your dressing if it becomes soiled, soaked, or falls off.    -Remove your compression wrap if you have severe pain, severe swelling, numbness, color change, or temperature change in your toes. If you need to remove your compression wrap, do so by unrolling it. Do not cut the compression wrap off to prevent cutting yourself on accident.    -Wound vac may not have any drainage in tube or cannister & it will still be working.   Change cannister if it does become full by pressing tab on side of machine to remove canister and snap on new one. Full canister can be thrown in the trash. If cannister fills with bright red blood - go to ER. Dressing will be changed every MWF at the wound clini.  If you are having issues with your wound VAC, please consider patching leaks, changing the canister, or calling [phone] for troubleshooting. If the wound VAC has been off or un-operational for over 2 hours, call wound care center to inform them and remove all dressings including black foam and replace with normal saline damp gauze.     -Should you experience any significant changes in your wound(s), such as infection (redness, swelling, localized heat, increased pain, fever > 101 F, chills) or have any questions regarding your home care instructions, please contact the wound center at [phone]. If after hours, contact your primary care physician or go to the hospital emergency room.

## 2023-03-31 ENCOUNTER — NON-PROVIDER VISIT (OUTPATIENT)
Dept: WOUND CARE | Facility: MEDICAL CENTER | Age: 63
End: 2023-03-31
Attending: GENERAL PRACTICE
Payer: COMMERCIAL

## 2023-03-31 PROCEDURE — 97597 DBRDMT OPN WND 1ST 20 CM/<: CPT

## 2023-03-31 PROCEDURE — 97598 DBRDMT OPN WND ADDL 20CM/<: CPT

## 2023-03-31 NOTE — PROCEDURES
2% Viscous lidocaine applied to wound and periwound 10 minutes dwell time.   CSWD using curette to remove approx. ~22.25cm2 of nonviable tissue from wound bed.   VAC dressing removed by CNA. Wound bed inspected and no residual foam noted. Wound VAC changed with 1 black foam. Restarted at 125mmHg continuous. No leak noted.

## 2023-04-03 ENCOUNTER — NON-PROVIDER VISIT (OUTPATIENT)
Dept: WOUND CARE | Facility: MEDICAL CENTER | Age: 63
End: 2023-04-03
Attending: GENERAL PRACTICE
Payer: COMMERCIAL

## 2023-04-03 PROCEDURE — 97605 NEG PRS WND THER DME<=50SQCM: CPT

## 2023-04-04 NOTE — PROGRESS NOTES
Wound vac removed with 1 piece of black foam, no foam noted to be left behind in wound bed. Wound vac reapplied with 1 piece of black foam to wound bed. Resumed at 125mmhg with no leaks noted.

## 2023-04-04 NOTE — PATIENT INSTRUCTIONS
Wound VAC at 125mmHg continuous or intermittent with black foam. Dressing will be changed every MWF at the wound clinic or with your home health nurse.  If you are having issues with your wound VAC, please consider patching leaks, changing the canister, or calling [phone] for troubleshooting. If the wound VAC has been off or un-operational for over 2 hours, call wound care center to inform them and remove all dressings including black foam and replace with normal saline damp gauze.     Should you experience any significant changes in your wound(s), such as infection (redness, swelling, localized heat, increased pain, fever > 101 F, chills) or have any questions regarding your home care instructions, please contact the wound center at [phone]. If after hours, contact your primary care physician or go to the hospital emergency room.   Keep dressing clean, dry and cover when bathing. Only change dressing if it's over saturated, soiled or falls off.

## 2023-04-05 ENCOUNTER — OFFICE VISIT (OUTPATIENT)
Dept: WOUND CARE | Facility: MEDICAL CENTER | Age: 63
End: 2023-04-05
Attending: GENERAL PRACTICE
Payer: COMMERCIAL

## 2023-04-05 VITALS
OXYGEN SATURATION: 92 % | DIASTOLIC BLOOD PRESSURE: 91 MMHG | HEART RATE: 96 BPM | TEMPERATURE: 97.4 F | RESPIRATION RATE: 20 BRPM | SYSTOLIC BLOOD PRESSURE: 158 MMHG

## 2023-04-05 DIAGNOSIS — R03.0 ELEVATED BLOOD PRESSURE READING: ICD-10-CM

## 2023-04-05 DIAGNOSIS — E11.9 TYPE 2 DIABETES MELLITUS WITHOUT COMPLICATION, WITHOUT LONG-TERM CURRENT USE OF INSULIN (HCC): ICD-10-CM

## 2023-04-05 DIAGNOSIS — T14.8XXA WOUND INFECTION: ICD-10-CM

## 2023-04-05 DIAGNOSIS — S81.802D OPEN WOUND OF LEFT LOWER EXTREMITY, SUBSEQUENT ENCOUNTER: ICD-10-CM

## 2023-04-05 DIAGNOSIS — L08.9 WOUND INFECTION: ICD-10-CM

## 2023-04-05 PROCEDURE — 11042 DBRDMT SUBQ TIS 1ST 20SQCM/<: CPT | Performed by: STUDENT IN AN ORGANIZED HEALTH CARE EDUCATION/TRAINING PROGRAM

## 2023-04-05 PROCEDURE — 97605 NEG PRS WND THER DME<=50SQCM: CPT

## 2023-04-05 PROCEDURE — 11042 DBRDMT SUBQ TIS 1ST 20SQCM/<: CPT

## 2023-04-05 NOTE — PROGRESS NOTES
Provider Encounter- Full Thickness wound    HISTORY OF PRESENT ILLNESS  Wound History:   START OF CARE IN CLINIC: 1/16/2023 (return to clinic following hospitalization)   REFERRING PROVIDER:   Dr. Paige  WOUND- Full Thickness Wound   LOCATION: Left lower extremity, right anterior knee, left anterior knee   HISTORY: Patient presented to [facility] on 12/4/2022 with left lower extremity, fever, malaise, erythema x1 week.  He was originally admitted to [facility] for acute kidney injury and sepsis but with worsening renal function was transferred to [facility].  Wound cultures grew Streptococcus pyogenes group A and MSSA.  Patient was initially treated with cefazolin.  He is seen by the wound care team who recommended follow-up as an outpatient in wound care clinic.  While inpatient nephrology saw the patient and started him on dialysis for acute kidney injury.  His renal function improved and dialysis was stopped.  Patient was discharged with follow-up in the wound care clinic.    Patient was following with wound clinic, however on clinic visit 1/3/2022 he was noticed to have worsening wounds and concern for infection. He had failed outpatient Abx with zyvox and was sent to ED for further evaluation. Patient was admitted to [facility] from 1/3-1/13/2023 for sepsis and left lower extremity cellulitis with abscess. He was taken to OR for I&D with Dr. Odom. Patient's wound was treated with wound VAC. Surgical cultures were positive for enterobacter cloacae and was treated with ciprofloxacin with end date of 1/21/2023. Patient was referred back to [facility] for further care.    Pertinent Medical History: Hypertension, alcohol abuse, kidney disease, GERD, mitral valve prolapse, morbid obesity, obstructive sleep apnea, type 2 diabetes    TOBACCO USE: Former smoker denies use of smokeless tobacco    Patient's problem list, allergies, and current medications reviewed and updated in Epic    Interval  History:  2023: Clinic visit with Travis Bonilla MD. Patient reports feeling well since hospitalization. He presented with wound VAC. He will be traveling to [place] for  and will be back on Monday. Wound VAC will be held while out of town and will plan to reapply next week. He was instructed on performing wound care until returns to clinic. Patient is tolerating Abx, continues to have some periwound erythema without warmth or pain. He has two new areas of fluctuance, concerning for superficial abscess, discussed performing I&D, patient agreeable.    2023 : Clinic visit with PANCHO Medina, EL, MERI, RYLEE.   Patient states that overall he is feeling well blood sugars have been in the low 100s.  No significant improvement in his wounds.  He does mention that he has raised bumps to his arms, this has been going on for several weeks, he noticed a new bump today on his left forearm.  They are slightly tender with palpation, no fluctuance.  He also has a diffuse rash to his arms and hands.  He completed his antibiotics 3 or 4 days ago.  States he may go into the emergency room in [place] after he leaves the clinic.  Denies any fevers, chills, nausea, vomiting.    2023 : Clinic visit with PANCHO eMdina, EL, MERI, RYLEE.   Kwesi states that overall he is feeling well, though a little bit stressed.  His stepdaughter is in hospital.  He did  his Zyvox prescription, prescribed on Monday for positive MRSA wound culture, light growth.  He is tolerating without any difficulty.  The smaller wounds to his leg present today with boggy tissue, at least 1 of these communicates with the larger wound.  He has an appointment to follow-up with his surgeon, Dr. Odom, on .  His blood sugar today was 126.  The bumps on his forearms are about the same, he did not go to the emergency room last week.    2/10/2023 : Clinic visit with PANCHO Medina, EL, MERI, RYLEE.   Patient states  that overall he is feeling well.  His blood sugars have consistently been running between 120 and 140.  As discussed last visit, the tissue between the lateral wound and a small anterior wound, overlying wound tract, was excised in clinic today using local anesthesia.  Wound VAC continued.  He does still have quite a bit of undermining all around the wound.  He sees his surgeon next Friday.   He will also be undergoing biopsies of the lumps on his arms and hands on Monday by his PCP.     2/17/2023 : Clinic visit with PANCHO Medina, FNP-BC, CWOCN, CFCN.   Kwesi continues to feel well.  He was seen by a surgeon earlier today, who was overall satisfied with wound progress, did not feel skin over undermining needed to be excised.  Per patient, surgeon is going to speak with one of his colleagues about possible skin grafting at some point.  Surgeon did feel patient would benefit from higher levels of compression as his leg is quite edematous still.  Patient was prescribed minocycline and Zyvox earlier this week due to positive wound culture.  He is taking these as prescribed, and states he is tolerating without difficulty.   He did not have the biopsy of lumps in his arms as expected earlier this week.  Lumps have dissipated.  However raised lesion to left dorsal thenar hand has grown larger, suspicious for malignancy.  He has an appointment with dermatology next Wednesday.    2/27/2023: Clinic visit with PANCHO Teixeira.  Patient reports that he completed his oral antibiotics last Saturday patient reports he had mild diarrhea from the antibiotics however it is improving since he has completed his antibiotics Saturday.  Patient's wound beds are improving decreased depth to the 2 superior left anterior wounds.  Granulation tissue throughout the largest left anterior lower extremity wound and the posterior left lower extremity wound.   Does have 1 raised lump along the previous track pad area no signs or  symptoms of infection however, patient reports that these lumps are how the other wounds started.  No additional antibiotics we will continue to monitor.    3/8/2023: Clinic visit with Travis Bonilla MD. Patient reports doing ok. Denies any problems with his wound VAC. Patient's wounds are slowly improving. Less undermining to medial aspect of wound, continues to have undermining 2 o'clock. His posterior wound appears to be improving. 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    3/15/2023 : Clinic visit with PANCHO Medina, FNP-BC, CWAMAN, CFVALE.   Kwesi states he is feeling well overall.  He is currently not on any antibiotics.  Erythema to his left lower leg has been persistent, no changes however.  VAC has been on hold t since 3/13 due to periwound issues, which have since resolved..  Patient is agreeable to restarting VAC today.  He is anxious to get this wound healed as quickly as possible.   He was seen by his surgeon earlier this week, who was satisfied with current progress of this wound.   Patient's blood pressure elevated today, 175/99.  He denies any symptoms of CVA or MI-reviewed with him in clinic today.  He states he did not take his blood pressure medication today, will take it when he gets home.    4/5/2023: Clinic visit with Travis Bonilla MD. Patient reports doing well. Denies any issues with wound VAC. He is tolerating compression without issue. Wound is measuring smaller and undermining has improved. Patient's BP remains slightly elevated, improved from previous and recommend he follow up with PCP for BP control. Reports glucose has been well controlled, mostly under 150.    REVIEW OF SYSTEMS:   Unchanged from previous clinic visit on 3/15/2023, except as documented in interval history above    PHYSICAL EXAMINATION:   BP (!) 158/91   Pulse 96   Temp 36.3 °C (97.4 °F) (Temporal)    Resp 20   SpO2 92%     Physical Exam  Constitutional:       Appearance: He is obese.   Cardiovascular:      Pulses: Normal pulses.   Pulmonary:      Effort: Pulmonary effort is normal. No respiratory distress.      Breath sounds: No wheezing.   Musculoskeletal:      Left lower leg: Edema present.      Comments: 2+ edema of left lower extremity   Skin:     Comments: -Full-thickness wound to the left anterior lower extremity status post I&D - Wound measuring smaller and less undermining. Periwound tissue improved. Thin layer of slough to wound bed, moderate serosanguineous drainage, no odor.  Periwound, and lower leg in general with persistent erythema, appears to be stable.  -The 2 smaller wounds superior to the left anterior lower extremity are resolved    Refer to wound flowsheet and photos     Neurological:      Mental Status: He is alert.       WOUND ASSESSMENT  Wound 01/16/23 Incision Leg Anterior;Lower Left (Active)   Wound Image    04/05/23 1535   Site Assessment Red;Yellow 04/05/23 1535   Periwound Assessment Edema;Scar tissue 04/05/23 1535   Margins Unattached edges;Attached edges 04/05/23 1535   Closure Secondary intention 01/16/23 1700   Drainage Amount Moderate 04/05/23 1535   Drainage Description Serosanguineous 04/05/23 1535   Treatments Cleansed;Topical Lidocaine;Provider debridement;Compression 04/05/23 1535   Wound Cleansing Normal Saline Irrigation 04/05/23 1535   Periwound Protectant Skin Protectant Wipes to Periwound;Drape 04/05/23 1535   Dressing Cleansing/Solutions Normal Saline 04/05/23 1535   Dressing Options Collagen Dressing;Wound Vac;Compression Wrap Two Layer 04/05/23 1535   Dressing Changed New 04/03/23 1445   Dressing Status Clean;Dry;Intact 03/22/23 1500   Dressing Change/Treatment Frequency Monday, Wednesday, Friday, and As Needed 04/05/23 1535   Non-staged Wound Description Full thickness 04/05/23 1535   Wound Length (cm) 8.8 cm 04/05/23 1535   Wound Width (cm) 2 cm 04/05/23 1535    Wound Depth (cm) 0.1 cm 04/05/23 1535   Wound Surface Area (cm^2) 17.6 cm^2 04/05/23 1535   Wound Volume (cm^3) 1.76 cm^3 04/05/23 1535   Post-Procedure Length (cm) 8.9 cm 04/05/23 1535   Post-Procedure Width (cm) 2 cm 04/05/23 1535   Post-Procedure Depth (cm) 0.1 cm 04/05/23 1535   Post-Procedure Surface Area (cm^2) 17.8 cm^2 04/05/23 1535   Post-Procedure Volume (cm^3) 1.78 cm^3 04/05/23 1535   Wound Healing % 98 04/05/23 1535   Wound Bed Granulation (%) 100 % 03/06/23 1438   Tunneling (cm) 0 cm 03/24/23 1500   Tunneling Clock Position of Wound 12 03/08/23 1530   Tunneling - 2nd Location (cm) 1.6 cm 02/08/23 1500   Tunneling Clock Position of Wound - 2nd Location 7 02/08/23 1500   Tunneling - 3rd Location (cm) 4.2 cm 02/01/23 1530   Tunneling Clock Position of Wound - 3rd Location 11 02/01/23 1530   Undermining (cm) 0.2 cm 04/05/23 1535   Undermining of Wound, 1st Location From 1 o'clock;To 2 o'clock 04/05/23 1535   Undermining (cm) - 2nd location 0.1 cm 04/05/23 1535   Undermining of Wound, 2nd Location From 11 o'clock;To 12 o'clock 04/05/23 1535   Undermining (cm) - 3rd location 2 cm 02/08/23 1500   Undermining of Wound, 3rd Location From 5 o'clock;To 5 o'clock 02/08/23 1500   Wound Odor None 04/05/23 1535   Pulses Left;1+;DP 02/08/23 1500   Exposed Structures None 04/05/23 1535   Number of days: 79     PROCEDURE: Excisional debridement of the left anterior lower extremity wound  -2% viscous lidocaine applied topically to wound beds for approximately 5 minutes prior to debridement  -Forceps, scalpel, and scissors used to superior aspect skin over undermining. Curette used to debride wound beds.  Excisional debridement was performed to remove devitalized tissue until healthy, bleeding tissue was visualized and to open epibole.  Total area debrided, 17.8 cm².  Tissue debrided into the subcutaneous layer.    -Bleeding controlled with manual pressure.    -Wound care completed by wound RN, refer to  flowsheet  -Patient tolerated the procedure well, without c/o pain or discomfort.          Pertinent Labs and Diagnostics:    Labs:     A1c:   Lab Results   Component Value Date/Time    HBA1C 8.5 (H) 12/04/2022 09:27 PM          IMAGING: Dx to tibia and fibula dated 12/5/2022  IMPRESSION:     No radiographic evidence of acute bony abnormality     Skin thickening and edema with some soft tissue calcifications most likely indicates venous stasis. Cellulitis is a possibility and there is no evidence of soft tissue gas. Dermatomyositis can be seen with soft tissue calcifications but (are not as   extensive as that often entails      LAST  WOUND CULTURE:  DATE :   Lab Results   Component Value Date/Time    CULTRSULT - (A) 02/08/2023 03:00 PM    CULTRSULT (A) 02/08/2023 03:00 PM     Klebsiella pneumoniae ESBL  Light growth  Extended Spectrum Beta-lactamase (ESBL) isolated.  ESBL's may be clinically resistant to therapy with  Penicillins,Cephalosporins or Aztreonam despite  apparent in vitro susceptibility to some of these agents.  The patient requires contact isolation.  Please contact pharmacy or an Infectious Disease Specialist  if you have any questions about appropriate therapy.      CULTRSULT (A) 02/08/2023 03:00 PM     Methicillin Resistant Staphylococcus aureus  Rare growth           ASSESSMENT AND PLAN:   1. Open wound of left lower extremity, subsequent encounter    4/5/2023: Wound measuring significantly smaller than last provider visit, thin layer of slough. Improved undermining.  -Excisional debridement of the left anterior lower extremity wound to remove nonviable tissue and open epibole.  -Patient to return to clinic 3 times weekly for assessment, and VAC dressing change.  1 of these visits will be with provider for debridement.  -Continue with 2 layer compression  -Patient has been seen by his surgeon, he is satisfied with current progress     Wound care: NPWT at -125 mmHg to accelerate granulation, change  3 times per week.  2 layer compression wrap    2. Type 2 diabetes mellitus without complication, without long-term current use of insulin ([facility])  Comments: Patient is currently on glipizide only    4/5/2023: Patient reports his glucose was slightly higher than normal this morning, glucose was 150  Patient instructed to keep tight control over FBS, <140 for optimal wound healing; Implications of loss of protective sensation (LOPS) discussed with patient, including increased risk for amputation.  Advised to check feet at least daily, moisturize feet, and to always wear protective foot wear, arrange meticulous regular foot care by podiatrist or CFCN. Pt with good understanding.    -Consider referral to endocrinology    3. Wound infection    4/5/2023: Patient is currently not on any antibiotics. He has had persistent erythema to his left lower extremity which appears to be stable.  -No acute signs or symptoms of infections clinic visit  -Will monitor for signs and symptoms of infection at each  clinic visit    4.  Elevated blood pressure reading    4/5/2023: Patient's blood pressure improved today but not at goal.    -Follow-up with PCP    PATIENT EDUCATION  - Importance of adequate nutrition for wound healing  -Advised to go to ER for any increased redness, swelling, drainage, or odor, or if patient develops fever, chills, nausea or vomiting.       Please note that this note may have been created using voice recognition software. I have worked with technical experts from [employer] to optimize the interface.  I have made every reasonable attempt to correct obvious errors, but there may be errors of grammar and possibly content that I did not discover before finalizing the note.    N

## 2023-04-05 NOTE — PROCEDURES
Wound vac removed by CNA, wound bed inspected by this RN, no retained foam noted. Wound vac <50cm applied to LLE anterior wound using 1 piece of black foam to fill wound bed and to bolster areas of undermining.  Negative pressure wound therapy resumed at 125mmHg continuous pressure with no leaks noted.   2 layer compression wrap applied to LLE with tubing of wound vac tubing wrapped outside of compression wrap.

## 2023-04-05 NOTE — PATIENT INSTRUCTIONS
-Keep your wound dressing clean, dry, and intact.    -Change your dressing if it becomes soiled, soaked, or falls off.    -Remove your compression wrap if you have severe pain, severe swelling, numbness, color change, or temperature change in your toes. If you need to remove your compression wrap, do so by unrolling it. Do not cut the compression wrap off to prevent cutting yourself on accident.    -Wound vac may not have any drainage in tube or cannister & it will still be working.   Change cannister if it does become full by pressing tab on side of machine to remove canister and snap on new one. Full canister can be thrown in the trash. If cannister fills with bright red blood - go to ER. Dressing will be changed every MWF at the wound clinic.  If you are having issues with your wound VAC, please consider patching leaks, changing the canister, or calling [phone] for troubleshooting. If the wound VAC has been off or un-operational for over 2 hours, call wound care center to inform them and remove all dressings including black foam and replace with normal saline damp gauze.       -Should you experience any significant changes in your wound(s), such as infection (redness, swelling, localized heat, increased pain, fever > 101 F, chills) or have any questions regarding your home care instructions, please contact the wound center at [phone]. If after hours, contact your primary care physician or go to the hospital emergency room.

## 2023-04-07 ENCOUNTER — NON-PROVIDER VISIT (OUTPATIENT)
Dept: WOUND CARE | Facility: MEDICAL CENTER | Age: 63
End: 2023-04-07
Attending: GENERAL PRACTICE
Payer: COMMERCIAL

## 2023-04-07 PROCEDURE — 97605 NEG PRS WND THER DME<=50SQCM: CPT

## 2023-04-07 NOTE — PATIENT INSTRUCTIONS
-Keep your wound dressing clean, dry, and intact.    -Change your dressing if it becomes soiled, soaked, or falls off.    -Remove your compression wrap if you have severe pain, severe swelling, numbness, color change, or temperature change in your toes. If you need to remove your compression wrap, do so by unrolling it. Do not cut the compression wrap off to prevent cutting yourself on accident.    -Wound vac may not have any drainage in tube or cannister & it will still be working.   Change cannister if it does become full by pressing tab on side of machine to remove canister and snap on new one. Full canister can be thrown in the trash. If cannister fills with bright red blood - go to ER. Dressing will be changed every MWF at the wound clinic.  If you are having issues with your wound VAC, please consider patching leaks, changing the canister, or calling [phone] for troubleshooting. If the wound VAC has been off or un-operational for over 2 hours, call wound care center to inform them and remove all dressings including black foam and replace with normal saline damp gauze.     -Should you experience any significant changes in your wound(s), such as infection (redness, swelling, localized heat, increased pain, fever > 101 F, chills) or have any questions regarding your home care instructions, please contact the wound center at [phone]. If after hours, contact your primary care physician or go to the hospital emergency room.

## 2023-04-10 ENCOUNTER — NON-PROVIDER VISIT (OUTPATIENT)
Dept: WOUND CARE | Facility: MEDICAL CENTER | Age: 63
End: 2023-04-10
Attending: GENERAL PRACTICE
Payer: COMMERCIAL

## 2023-04-10 PROCEDURE — 97602 WOUND(S) CARE NON-SELECTIVE: CPT

## 2023-04-10 NOTE — PATIENT INSTRUCTIONS
-Keep your wound dressing clean, dry, and intact.    -Change your dressing if it becomes soiled, soaked, or falls off.    -Remove your compression wrap if you have severe pain, severe swelling, numbness, color change, or temperature change in your toes. If you need to remove your compression wrap, do so by unrolling it. Do not cut the compression wrap off to prevent cutting yourself on accident.    -Wound vac may not have any drainage in tube or cannister & it will still be working.   Change cannister if it does become full by pressing tab on side of machine to remove canister and snap on new one. Full canister can be thrown in the trash. If cannister fills with bright red blood - go to ER. Dressing will be changed every MWF at the wound clini.  If you are having issues with your wound VAC, please consider patching leaks, changing the canister, or calling [phone] for troubleshooting. If the wound VAC has been off or un-operational for over 2 hours, call wound care center to inform them and remove all dressings including black foam and replace with normal saline damp gauze.     -Should you experience any significant changes in your wound(s), such as infection (redness, swelling, localized heat, increased pain, fever > 101 F, chills) or have any questions regarding your home care instructions, please contact the wound center at [phone]. If after hours, contact your primary care physician or go to the hospital emergency room.

## 2023-04-10 NOTE — PROCEDURES
Non-selective debridement to wound and nithin-wound using normal saline and gauze to remove biofilm and non-viable tissue.       - NPWT dressing changed to r anterior leg wound, < 50 cm ².   Foam removed by surgeon's office prior to AWC appointment. No residual foam noted in wound bed  - 1 piece(s) of black foam used.   - Initiated continuous suction at 125 mm Hg, no leaks noted.

## 2023-04-12 ENCOUNTER — OFFICE VISIT (OUTPATIENT)
Dept: WOUND CARE | Facility: MEDICAL CENTER | Age: 63
End: 2023-04-12
Attending: GENERAL PRACTICE
Payer: COMMERCIAL

## 2023-04-12 VITALS
DIASTOLIC BLOOD PRESSURE: 94 MMHG | SYSTOLIC BLOOD PRESSURE: 159 MMHG | RESPIRATION RATE: 20 BRPM | OXYGEN SATURATION: 95 % | HEART RATE: 75 BPM | TEMPERATURE: 97.6 F

## 2023-04-12 DIAGNOSIS — S81.802D OPEN WOUND OF LEFT LOWER EXTREMITY, SUBSEQUENT ENCOUNTER: Primary | ICD-10-CM

## 2023-04-12 DIAGNOSIS — T14.8XXA WOUND INFECTION: ICD-10-CM

## 2023-04-12 DIAGNOSIS — L08.9 WOUND INFECTION: ICD-10-CM

## 2023-04-12 DIAGNOSIS — E11.9 TYPE 2 DIABETES MELLITUS WITHOUT COMPLICATION, WITHOUT LONG-TERM CURRENT USE OF INSULIN (HCC): ICD-10-CM

## 2023-04-12 DIAGNOSIS — R03.0 ELEVATED BLOOD PRESSURE READING: ICD-10-CM

## 2023-04-12 PROCEDURE — 99213 OFFICE O/P EST LOW 20 MIN: CPT | Performed by: NURSE PRACTITIONER

## 2023-04-12 PROCEDURE — 99213 OFFICE O/P EST LOW 20 MIN: CPT

## 2023-04-12 PROCEDURE — 97605 NEG PRS WND THER DME<=50SQCM: CPT

## 2023-04-12 NOTE — PROGRESS NOTES
Provider Encounter- Full Thickness wound    HISTORY OF PRESENT ILLNESS  Wound History:   START OF CARE IN CLINIC: 1/16/2023 (return to clinic following hospitalization)   REFERRING PROVIDER:   Dr. Paige  WOUND- Full Thickness Wound   LOCATION: Left lower extremity, right anterior knee, left anterior knee   HISTORY: Patient presented to [facility] on 12/4/2022 with left lower extremity, fever, malaise, erythema x1 week.  He was originally admitted to [facility] for acute kidney injury and sepsis but with worsening renal function was transferred to [facility].  Wound cultures grew Streptococcus pyogenes group A and MSSA.  Patient was initially treated with cefazolin.  He is seen by the wound care team who recommended follow-up as an outpatient in wound care clinic.  While inpatient nephrology saw the patient and started him on dialysis for acute kidney injury.  His renal function improved and dialysis was stopped.  Patient was discharged with follow-up in the wound care clinic.    Patient was following with wound clinic, however on clinic visit 1/3/2022 he was noticed to have worsening wounds and concern for infection. He had failed outpatient Abx with zyvox and was sent to ED for further evaluation. [facility] 1/3-1/13/2023 for sepsis and left lower extremity cellulitis with abscess. He was taken to OR for I&D with Dr. Odom. Patient's wound was treated with wound VAC. Surgical cultures were positive for enterobacter cloacae and was treated with ciprofloxacin with end date of 1/21/2023. Patient was referred back to [facility] for further care.    Pertinent Medical History: Hypertension, alcohol abuse, kidney disease, GERD, mitral valve prolapse, morbid obesity, obstructive sleep apnea, type 2 diabetes    TOBACCO USE: Former smoker denies use of smokeless tobacco    Patient's problem list, allergies, and current medications reviewed and updated in Epic    Interval  History:  2023: Clinic visit with Travis Bonilla MD. Patient reports feeling well since hospitalization. He presented with wound VAC. He will be traveling to [place] for  and will be back on Monday. Wound VAC will be held while out of town and will plan to reapply next week. He was instructed on performing wound care until returns to clinic. Patient is tolerating Abx, continues to have some periwound erythema without warmth or pain. He has two new areas of fluctuance, concerning for superficial abscess, discussed performing I&D, patient agreeable.    2023 : Clinic visit with PANCHO Medina, EL, MERI, RYLEE.   Patient states that overall he is feeling well blood sugars have been in the low 100s.  No significant improvement in his wounds.  He does mention that he has raised bumps to his arms, this has been going on for several weeks, he noticed a new bump today on his left forearm.  They are slightly tender with palpation, no fluctuance.  He also has a diffuse rash to his arms and hands.  He completed his antibiotics 3 or 4 days ago.  States he may go into the emergency room in [place] after he leaves the clinic.  Denies any fevers, chills, nausea, vomiting.    2023 : Clinic visit with PANCHO Medina, EL, MERI, RYLEE.   Kwesi states that overall he is feeling well, though a little bit stressed.  His stepdaughter is in hospital.  He did  his Zyvox prescription, prescribed on Monday for positive MRSA wound culture, light growth.  He is tolerating without any difficulty.  The smaller wounds to his leg present today with boggy tissue, at least 1 of these communicates with the larger wound.  He has an appointment to follow-up with his surgeon, Dr. Odom, on .  His blood sugar today was 126.  The bumps on his forearms are about the same, he did not go to the emergency room last week.    2/10/2023 : Clinic visit with PANCHO Medina, EL, MERI, RYLEE.   Patient states  that overall he is feeling well.  His blood sugars have consistently been running between 120 and 140.  As discussed last visit, the tissue between the lateral wound and a small anterior wound, overlying wound tract, was excised in clinic today using local anesthesia.  Wound VAC continued.  He does still have quite a bit of undermining all around the wound.  He sees his surgeon next Friday.   He will also be undergoing biopsies of the lumps on his arms and hands on Monday by his PCP.     2/17/2023 : Clinic visit with PANCHO Medina, FNP-BC, CWOCN, CFCN.   Kwesi continues to feel well.  He was seen by a surgeon earlier today, who was overall satisfied with wound progress, did not feel skin over undermining needed to be excised.  Per patient, surgeon is going to speak with one of his colleagues about possible skin grafting at some point.  Surgeon did feel patient would benefit from higher levels of compression as his leg is quite edematous still.  Patient was prescribed minocycline and Zyvox earlier this week due to positive wound culture.  He is taking these as prescribed, and states he is tolerating without difficulty.   He did not have the biopsy of lumps in his arms as expected earlier this week.  Lumps have dissipated.  However raised lesion to left dorsal thenar hand has grown larger, suspicious for malignancy.  He has an appointment with dermatology next Wednesday.    2/27/2023: Clinic visit with PANCHO Teixeira.  Patient reports that he completed his oral antibiotics last Saturday patient reports he had mild diarrhea from the antibiotics however it is improving since he has completed his antibiotics Saturday.  Patient's wound beds are improving decreased depth to the 2 superior left anterior wounds.  Granulation tissue throughout the largest left anterior lower extremity wound and the posterior left lower extremity wound.   Does have 1 raised lump along the previous track pad area no signs or  symptoms of infection however, patient reports that these lumps are how the other wounds started.  No additional antibiotics we will continue to monitor.    3/8/2023: Clinic visit with Travis Bonilla MD. Patient reports doing ok. Denies any problems with his wound VAC. Patient's wounds are slowly improving. Less undermining to medial aspect of wound, continues to have undermining 2 o'clock. His posterior wound appears to be improving. 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    3/15/2023 : Clinic visit with PANCHO Medina, FNFERMÍN-BC, CWAMAN, CFVALE.   Kwesi states he is feeling well overall.  He is currently not on any antibiotics.  Erythema to his left lower leg has been persistent, no changes however.  VAC has been on hold t since 3/13 due to periwound issues, which have since resolved..  Patient is agreeable to restarting VAC today.  He is anxious to get this wound healed as quickly as possible.   He was seen by his surgeon earlier this week, who was satisfied with current progress of this wound.   Patient's blood pressure elevated today, 175/99.  He denies any symptoms of CVA or MI-reviewed with him in clinic today.  He states he did not take his blood pressure medication today, will take it when he gets home.    4/5/2023: Clinic visit with Travis Bonilla MD. Patient reports doing well. Denies any issues with wound VAC. He is tolerating compression without issue. Wound is measuring smaller and undermining has improved. Patient's BP remains slightly elevated, improved from previous and recommend he follow up with PCP for BP control. Reports glucose has been well controlled, mostly under 150.    4/12/2023: Clinic visit with PANCHO Teixeira.  Patient reports that he is doing well denies any fever or chills.  Patient's wound has beefy red granulation tissue forming throughout the wound bed there is  a slight amount of epithelial tissue at the periphery to the eye inferior lateral aspect.    REVIEW OF SYSTEMS:   Unchanged from previous clinic visit on 4/5/2023, except as documented in interval history above    PHYSICAL EXAMINATION:   BP (!) 159/94   Pulse 75   Temp 36.4 °C (97.6 °F) (Temporal)   Resp 20   SpO2 95%     Physical Exam  Constitutional:       Appearance: He is obese.   Cardiovascular:      Rate and Rhythm: Normal rate.      Pulses: Normal pulses.   Pulmonary:      Effort: Pulmonary effort is normal. No respiratory distress.      Breath sounds: No wheezing.   Musculoskeletal:      Left lower leg: Edema present.      Comments: 1+ edema of left lower extremity   Skin:     Comments: -Full-thickness wound to the left anterior lower extremity status post I&D -overall the wound is steadily improving.  No periwound erythema edema or foul-smelling odor emanating wound bed      Refer to wound flowsheet and photos     Neurological:      Mental Status: He is alert and oriented to person, place, and time.       WOUND ASSESSMENT  Wound 01/16/23 Incision Leg Anterior;Lower Left (Active)   Wound Image   04/12/23 1500   Site Assessment Red;Granulation tissue 04/12/23 1500   Periwound Assessment Blanchable erythema;Edema;Scar tissue 04/12/23 1500   Margins Attached edges 04/12/23 1500   Closure Secondary intention 01/16/23 1700   Drainage Amount Moderate 04/12/23 1500   Drainage Description Serosanguineous 04/12/23 1500   Treatments Cleansed 04/12/23 1500   Wound Cleansing Puracyn [place] 04/12/23 1500   Periwound Protectant Skin Protectant Wipes to Periwound;Drape 04/12/23 1500   Dressing Cleansing/Solutions Not Applicable 04/12/23 1500   Dressing Options Wound Vac;Soft Conforming Roll;Compression Wrap Two Layer 04/12/23 1500   Dressing Changed Changed 04/12/23 1500   Dressing Status Clean;Dry;Intact 03/22/23 1500   Dressing Change/Treatment Frequency Monday, Wednesday, Friday, and As Needed 04/12/23 1500    Non-staged Wound Description Full thickness 04/12/23 1500   Wound Length (cm) 8.1 cm 04/12/23 1500   Wound Width (cm) 3.5 cm 04/12/23 1500   Wound Depth (cm) 0.2 cm 04/12/23 1500   Wound Surface Area (cm^2) 28.35 cm^2 04/12/23 1500   Wound Volume (cm^3) 5.67 cm^3 04/12/23 1500   Post-Procedure Length (cm) 8.1 cm 04/12/23 1500   Post-Procedure Width (cm) 3.5 cm 04/12/23 1500   Post-Procedure Depth (cm) 0.2 cm 04/12/23 1500   Post-Procedure Surface Area (cm^2) 28.35 cm^2 04/12/23 1500   Post-Procedure Volume (cm^3) 5.67 cm^3 04/12/23 1500   Wound Healing % 94 04/12/23 1500   Wound Bed Granulation (%) 100 % 03/06/23 1438   Tunneling (cm) 0 cm 03/24/23 1500   Tunneling Clock Position of Wound 12 03/08/23 1530   Tunneling - 2nd Location (cm) 1.6 cm 02/08/23 1500   Tunneling Clock Position of Wound - 2nd Location 7 02/08/23 1500   Tunneling - 3rd Location (cm) 4.2 cm 02/01/23 1530   Tunneling Clock Position of Wound - 3rd Location 11 02/01/23 1530   Undermining (cm) 0 cm 04/12/23 1500   Undermining of Wound, 1st Location From 1 o'clock;To 2 o'clock 04/05/23 1535   Undermining (cm) - 2nd location 0 cm 04/12/23 1500   Undermining of Wound, 2nd Location From 11 o'clock;To 12 o'clock 04/05/23 1535   Undermining (cm) - 3rd location 2 cm 02/08/23 1500   Undermining of Wound, 3rd Location From 5 o'clock;To 5 o'clock 02/08/23 1500   Wound Odor None 04/12/23 1500   Pulses Left;1+;DP 02/08/23 1500   Exposed Structures None 04/12/23 1500   Number of days: 86       PROCEDURE:   - no excisional debridement required in clinic today.        Pertinent Labs and Diagnostics:    Labs:     A1c:   Lab Results   Component Value Date/Time    HBA1C 8.5 (H) 12/04/2022 09:27 PM          IMAGING: Dx to tibia and fibula dated 12/5/2022  IMPRESSION:     No radiographic evidence of acute bony abnormality     Skin thickening and edema with some soft tissue calcifications most likely indicates venous stasis. Cellulitis is a possibility and there is no  evidence of soft tissue gas. Dermatomyositis can be seen with soft tissue calcifications but (are not as   extensive as that often entails      LAST  WOUND CULTURE:  DATE :   Lab Results   Component Value Date/Time    CULTRSULT - (A) 02/08/2023 03:00 PM    CULTRSULT (A) 02/08/2023 03:00 PM     Klebsiella pneumoniae ESBL  Light growth  Extended Spectrum Beta-lactamase (ESBL) isolated.  ESBL's may be clinically resistant to therapy with  Penicillins,Cephalosporins or Aztreonam despite  apparent in vitro susceptibility to some of these agents.  The patient requires contact isolation.  Please contact pharmacy or an Infectious Disease Specialist  if you have any questions about appropriate therapy.      CULTRSULT (A) 02/08/2023 03:00 PM     Methicillin Resistant Staphylococcus aureus  Rare growth           ASSESSMENT AND PLAN:   1. Open wound of left lower extremity, subsequent encounter    4/12/2023: Wound bed continues to decrease in length.  -No excisional debridement required in clinic today.  -Patient to return to clinic 3 times weekly for assessment, and VAC dressing change.  1 of these visits will be with provider for debridement.  -2 layer compression continued in clinic today patient is having good success managing edema with this modality  -Patient has been seen by his surgeon, he is satisfied with current progress     Wound care: NPWT at -125 mmHg to accelerate granulation, change 3 times per week.  2 layer compression wrap    2. Type 2 diabetes mellitus without complication, without long-term current use of insulin ([facility])  Comments: Patient is currently on glipizide only    4/12/2023: Patient reports his blood sugar was in the low 160s  Patient instructed to keep tight control over FBS, <140 for optimal wound healing; Implications of loss of protective sensation (LOPS) discussed with patient, including increased risk for amputation.  Advised to check feet at least daily, moisturize feet, and to always wear  protective foot wear, arrange meticulous regular foot care by podiatrist or CFCN. Pt with good understanding.    -Consider referral to endocrinology    3. Wound infection    4/12/2023: Patient is currently not on any antibiotics. He has had persistent erythema to his left lower extremity which appears to be stable.  -No acute signs or symptoms infections clinic visit  -I will monitor for signs and symptoms of infection at each additional clinic visit    4.  Elevated blood pressure reading    4/12/2023: Patient's blood pressure improved today but not at goal.    -Follow-up with PCP    PATIENT EDUCATION  - Importance of adequate nutrition for wound healing  -Advised to go to ER for any increased redness, swelling, drainage, or odor, or if patient develops fever, chills, nausea or vomiting.     >20 min spent face to face with patient, >50% of time spent counseling, coordinating care, reviewing records, discussing POC, educating patient      Please note that this note may have been created using voice recognition software. I have worked with technical experts from [facility] to optimize the interface.  I have made every reasonable attempt to correct obvious errors, but there may be errors of grammar and possibly content that I did not discover before finalizing the note.    N

## 2023-04-12 NOTE — PATIENT INSTRUCTIONS
-You have a wound vac at 125 mmHg continuous pressure with black foam. The dressing will be changed every Monday, Wednesday, and Friday at the wound clinic.    -If you are having issues with your wound vac, please consider patching leaks, changing the canister, or calling [phone] for troubleshooting. If the wound vac has been off or non-operational for over 2 hours, call wound care center to inform them and remove all dressings including black foam and replace with gauze moistened with normal saline.     -Should you experience any significant changes in your wound, such as signs of infection (increasing redness, swelling, localized heat, increased pain, fever > 101 F, chills) or have any questions regarding your home care instructions, please contact the wound center at [phone]. If after hours, contact your primary care physician or go to the hospital emergency room.

## 2023-04-12 NOTE — PROGRESS NOTES
Wound Care Procedures 4/12/2023:  Wound vac removed by CNA. Wound inspected by this RN, no retained foam noted in wound bed.    Wound vac reapplied using one piece of black granufoam. Negative pressure wound therapy resumed at 125mmHg continuous pressure, no leaks noted.

## 2023-04-14 ENCOUNTER — OFFICE VISIT (OUTPATIENT)
Dept: WOUND CARE | Facility: MEDICAL CENTER | Age: 63
End: 2023-04-14
Attending: GENERAL PRACTICE
Payer: COMMERCIAL

## 2023-04-14 DIAGNOSIS — S81.802D OPEN WOUND OF LEFT LOWER EXTREMITY, SUBSEQUENT ENCOUNTER: ICD-10-CM

## 2023-04-14 PROCEDURE — 97605 NEG PRS WND THER DME<=50SQCM: CPT

## 2023-04-14 NOTE — PATIENT INSTRUCTIONS
-You have a wound vac at 125 mmHg continuous pressure with black foam. The dressing will be changed every Monday, Wednesday, and Friday at the wound clinic.    -If you are having issues with your wound vac, please consider patching leaks, changing the canister, or calling [phone] for troubleshooting. If the wound vac has been off or non-operational for over 2 hours, call wound care center to inform them and remove all dressings including black foam and replace with gauze moistened with normal saline.     -Should you experience any significant changes in your wounds, such as signs of infection (increasing redness, swelling, localized heat, increased pain, fever > 101 F, chills) or have any questions regarding your home care instructions, please contact the wound center at [phone]. If after hours, contact your primary care physician or go to the hospital emergency room.

## 2023-04-14 NOTE — PROGRESS NOTES
Brief Wound Care Provider Note    Patient seen and debrided by provider earlier this visit. Wound has improved. Will decrease frequency of visits to 2x per week. Patient in agreement. Nursing visit for wound VAC change.

## 2023-04-14 NOTE — PROGRESS NOTES
Wound Care Procedures 4/14/2023:  Wound vac removed by CNA. Wound inspected by this RN, no retained foam noted in wound bed.    Wound vac reapplied using one piece of black granufoam. Negative pressure wound therapy resumed at 125 mmHg continuous pressure, no leaks noted.    Patient has a new left posteromedial lower leg wound. Patient denies any recent injuries to the area, but states that the new wound is at the site of a recently healed wound.    Discussed reducing wound vac changes to twice weekly. Patient is agreeable with plan, but he needs to rearrange his other appointments before he can change his schedule at the wound clinic. Will discuss again next week.

## 2023-04-17 ENCOUNTER — TELEPHONE (OUTPATIENT)
Dept: WOUND CARE | Facility: MEDICAL CENTER | Age: 63
End: 2023-04-17
Payer: COMMERCIAL

## 2023-04-17 ENCOUNTER — NON-PROVIDER VISIT (OUTPATIENT)
Dept: WOUND CARE | Facility: MEDICAL CENTER | Age: 63
End: 2023-04-17
Attending: GENERAL PRACTICE
Payer: COMMERCIAL

## 2023-04-17 PROCEDURE — 97602 WOUND(S) CARE NON-SELECTIVE: CPT

## 2023-04-17 NOTE — PATIENT INSTRUCTIONS
-Keep your wound dressing clean, dry, and intact.    -Change your dressing if it becomes soiled, soaked, or falls off.    -Remove your compression wrap if you have severe pain, severe swelling, numbness, color change, or temperature change in your toes. If you need to remove your compression wrap, do so by unrolling it. Do not cut the compression wrap off to prevent cutting yourself on accident.    -Wound vac may not have any drainage in tube or cannister & it will still be working.   Change cannister if it does become full by pressing tab on side of machine to remove canister and snap on new one. Full canister can be thrown in the trash. If cannister fills with bright red blood - go to ER. Dressing will be changed every MWF at the wound clinic.  If you are having issues with your wound VAC, please consider patching leaks or changing the canister. If the wound VAC has been off or un-operational for over 2 hours, call wound care center to inform them and remove all dressings including black foam and replace with normal saline damp gauze.     -Should you experience any significant changes in your wound(s), such as infection (redness, swelling, localized heat, increased pain, fever > 101 F, chills) or have any questions regarding your home care instructions, please contact the wound center at [phone]. If after hours, contact your primary care physician or go to the hospital emergency room.

## 2023-04-17 NOTE — PROCEDURES
Nonselective debridement to anterior LLE wound using foamcleanser and washcloth to remove loosely adherent nonviable tissue.  Pt tolerated well; denied pain; reports decreased sensation.

## 2023-04-19 ENCOUNTER — APPOINTMENT (OUTPATIENT)
Dept: WOUND CARE | Facility: MEDICAL CENTER | Age: 63
End: 2023-04-19
Attending: GENERAL PRACTICE
Payer: COMMERCIAL

## 2023-04-21 ENCOUNTER — NON-PROVIDER VISIT (OUTPATIENT)
Dept: WOUND CARE | Facility: MEDICAL CENTER | Age: 63
End: 2023-04-21
Attending: GENERAL PRACTICE
Payer: COMMERCIAL

## 2023-04-21 PROCEDURE — 97597 DBRDMT OPN WND 1ST 20 CM/<: CPT

## 2023-04-21 NOTE — PATIENT INSTRUCTIONS
-Keep your wound dressing clean, dry, and intact.    -Wound vac may not have any drainage in tube or cannister & it will still be working.   Change cannister if it does become full by pressing tab on side of machine to remove canister and snap on new one. Full canister can be thrown in the trash. If cannister fills with bright red blood - go to ER. Dressing will be changed every MWF at the wound clinic.  If you are having issues with your wound VAC, please consider patching leaks, changing the canister, or calling [phone] for troubleshooting. If the wound VAC has been off or un-operational for over 2 hours, call wound care center to inform them and remove all dressings including black foam and replace with normal saline damp gauze.     -Should you experience any significant changes in your wound(s), such as infection (redness, swelling, localized heat, increased pain, fever > 101 F, chills) or have any questions regarding your home care instructions, please contact the wound center at [phone]. If after hours, contact your primary care physician or go to the hospital emergency room.

## 2023-04-21 NOTE — PROCEDURES
CSWD with curette to remove adherent biofilm from LLE anterior wound, ~ 16 cm² total debrided, minimal bleeding noted, controlled with manual pressure. Patient had to remove vac prior to this appointment due to break in the cord. He picked up a new cord on the way to the appointment  and NPWT dressing applied, < 50 cm² total, 1 piece of black foam to fill wound bed, trac pad applied and NPWT resumed at 125 mm Hg continuous, no leaks noted.

## 2023-04-24 ENCOUNTER — APPOINTMENT (OUTPATIENT)
Dept: WOUND CARE | Facility: MEDICAL CENTER | Age: 63
End: 2023-04-24
Attending: GENERAL PRACTICE
Payer: COMMERCIAL

## 2023-04-24 PROCEDURE — 97605 NEG PRS WND THER DME<=50SQCM: CPT

## 2023-04-24 NOTE — PATIENT INSTRUCTIONS
-Keep your wound dressing clean, dry, and intact.    -Change your dressing if it becomes soiled, soaked, or falls off.    -Wound vac may not have any drainage in tube or cannister & it will still be working.   Change cannister if it does become full by pressing tab on side of machine to remove canister and snap on new one. Full canister can be thrown in the trash. If cannister fills with bright red blood - go to ER. Dressing will be changed every MWF at the wound clini.  If you are having issues with your wound VAC, please consider patching leaks, changing the canister, or calling [phone] for troubleshooting. If the wound VAC has been off or un-operational for over 2 hours, call wound care center to inform them and remove all dressings including black foam and replace with normal saline damp gauze.     -Should you experience any significant changes in your wound(s), such as infection (redness, swelling, localized heat, increased pain, fever > 101 F, chills) or have any questions regarding your home care instructions, please contact the wound center at [phone]. If after hours, contact your primary care physician or go to the hospital emergency room.

## 2023-04-24 NOTE — PROGRESS NOTES
NPWT < 50 sq cm dressing changed this visit, 1 pieces of foam removed. 1 new piece of black foam placed to wound bed and used for tracpad. Pump set to 125 mmhg,  continuous suction. No leaks detected. Soft conforming roll with 2 layer compression wrap applied to LLE. Refer to flow sheet for wound care details. Patient tolerated the procedure well.

## 2023-04-26 ENCOUNTER — APPOINTMENT (OUTPATIENT)
Dept: WOUND CARE | Facility: MEDICAL CENTER | Age: 63
End: 2023-04-26
Attending: GENERAL PRACTICE
Payer: COMMERCIAL

## 2023-04-27 ENCOUNTER — OFFICE VISIT (OUTPATIENT)
Dept: WOUND CARE | Facility: MEDICAL CENTER | Age: 63
End: 2023-04-27
Attending: GENERAL PRACTICE
Payer: COMMERCIAL

## 2023-04-27 VITALS
DIASTOLIC BLOOD PRESSURE: 94 MMHG | TEMPERATURE: 97 F | OXYGEN SATURATION: 97 % | HEART RATE: 83 BPM | SYSTOLIC BLOOD PRESSURE: 151 MMHG | RESPIRATION RATE: 18 BRPM

## 2023-04-27 DIAGNOSIS — L08.9 WOUND INFECTION: ICD-10-CM

## 2023-04-27 DIAGNOSIS — T14.8XXA WOUND INFECTION: ICD-10-CM

## 2023-04-27 DIAGNOSIS — S81.802D OPEN WOUND OF LEFT LOWER EXTREMITY, SUBSEQUENT ENCOUNTER: Primary | ICD-10-CM

## 2023-04-27 DIAGNOSIS — R03.0 ELEVATED BLOOD PRESSURE READING: ICD-10-CM

## 2023-04-27 DIAGNOSIS — E11.9 TYPE 2 DIABETES MELLITUS WITHOUT COMPLICATION, WITHOUT LONG-TERM CURRENT USE OF INSULIN (HCC): ICD-10-CM

## 2023-04-27 PROCEDURE — 11042 DBRDMT SUBQ TIS 1ST 20SQCM/<: CPT

## 2023-04-27 PROCEDURE — 11042 DBRDMT SUBQ TIS 1ST 20SQCM/<: CPT | Performed by: NURSE PRACTITIONER

## 2023-04-27 NOTE — PROGRESS NOTES
Provider Encounter- Full Thickness wound    HISTORY OF PRESENT ILLNESS  Wound History:   START OF CARE IN CLINIC: 1/16/2023 (return to clinic following hospitalization)   REFERRING PROVIDER:   Dr. Paige  WOUND- Full Thickness Wound   LOCATION: Left lower extremity, right anterior knee, left anterior knee   HISTORY: Patient presented to [facility] on 12/4/2022 with left lower extremity, fever, malaise, erythema x1 week.  He was originally admitted to [facility] for acute kidney injury and sepsis but with worsening renal function was transferred to [facility].  Wound cultures grew Streptococcus pyogenes group A and MSSA.  Patient was initially treated with cefazolin.  He is seen by the wound care team who recommended follow-up as an outpatient in wound care clinic.  While inpatient nephrology saw the patient and started him on dialysis for acute kidney injury.  His renal function improved and dialysis was stopped.  Patient was discharged with follow-up in the wound care clinic.    Patient was following with wound clinic, however on clinic visit 1/3/2022 he was noticed to have worsening wounds and concern for infection. He had failed outpatient Abx with zyvox and was sent to ED for further evaluation. Patient was admitted to [facility] from 1/3-1/13/2023 for sepsis and left lower extremity cellulitis with abscess. He was taken to OR for I&D with Dr. Odom. Patient's wound was treated with wound VAC. Surgical cultures were positive for enterobacter cloacae and was treated with ciprofloxacin with end date of 1/21/2023. Patient was referred back to [facility] for further care.    Pertinent Medical History: Hypertension, alcohol abuse, kidney disease, GERD, mitral valve prolapse, morbid obesity, obstructive sleep apnea, type 2 diabetes    TOBACCO USE: Former smoker denies use of smokeless tobacco    Patient's problem list, allergies, and current medications reviewed and updated in Epic    Interval  History:  2023: Clinic visit with Travis Bonilla MD. Patient reports feeling well since hospitalization. He presented with wound VAC. He will be traveling to [place] for  and will be back on Monday. Wound VAC will be held while out of town and will plan to reapply next week. He was instructed on performing wound care until returns to clinic. Patient is tolerating Abx, continues to have some periwound erythema without warmth or pain. He has two new areas of fluctuance, concerning for superficial abscess, discussed performing I&D, patient agreeable.    2023 : Clinic visit with PANCHO Medina, EL, MERI, RYLEE.   Patient states that overall he is feeling well blood sugars have been in the low 100s.  No significant improvement in his wounds.  He does mention that he has raised bumps to his arms, this has been going on for several weeks, he noticed a new bump today on his left forearm.  They are slightly tender with palpation, no fluctuance.  He also has a diffuse rash to his arms and hands.  He completed his antibiotics 3 or 4 days ago.  States he may go into the emergency room in [place] after he leaves the clinic.  Denies any fevers, chills, nausea, vomiting.    2023 : Clinic visit with PANCHO Medina, EL, MERI, RYLEE.   Kwesi states that overall he is feeling well, though a little bit stressed.  His stepdaughter is in hospital.  He did  his Zyvox prescription, prescribed on Monday for positive MRSA wound culture, light growth.  He is tolerating without any difficulty.  The smaller wounds to his leg present today with boggy tissue, at least 1 of these communicates with the larger wound.  He has an appointment to follow-up with his surgeon, Dr. Odom, on .  His blood sugar today was 126.  The bumps on his forearms are about the same, he did not go to the emergency room last week.    2/10/2023 : Clinic visit with PANCHO Medina, EL, MERI, RYLEE.   Patient states  that overall he is feeling well.  His blood sugars have consistently been running between 120 and 140.  As discussed last visit, the tissue between the lateral wound and a small anterior wound, overlying wound tract, was excised in clinic today using local anesthesia.  Wound VAC continued.  He does still have quite a bit of undermining all around the wound.  He sees his surgeon next Friday.   He will also be undergoing biopsies of the lumps on his arms and hands on Monday by his PCP.     2/17/2023 : Clinic visit with PANCHO Medina, FNP-BC, CWOCN, CFCN.   Kwesi continues to feel well.  He was seen by a surgeon earlier today, who was overall satisfied with wound progress, did not feel skin over undermining needed to be excised.  Per patient, surgeon is going to speak with one of his colleagues about possible skin grafting at some point.  Surgeon did feel patient would benefit from higher levels of compression as his leg is quite edematous still.  Patient was prescribed minocycline and Zyvox earlier this week due to positive wound culture.  He is taking these as prescribed, and states he is tolerating without difficulty.   He did not have the biopsy of lumps in his arms as expected earlier this week.  Lumps have dissipated.  However raised lesion to left dorsal thenar hand has grown larger, suspicious for malignancy.  He has an appointment with dermatology next Wednesday.    2/27/2023: Clinic visit with PANCHO Teixeira.  Patient reports that he completed his oral antibiotics last Saturday patient reports he had mild diarrhea from the antibiotics however it is improving since he has completed his antibiotics Saturday.  Patient's wound beds are improving decreased depth to the 2 superior left anterior wounds.  Granulation tissue throughout the largest left anterior lower extremity wound and the posterior left lower extremity wound.   Does have 1 raised lump along the previous track pad area no signs or  symptoms of infection however, patient reports that these lumps are how the other wounds started.  No additional antibiotics we will continue to monitor.    3/8/2023: Clinic visit with Travis Bonilla MD. Patient reports doing ok. Denies any problems with his wound VAC. Patient's wounds are slowly improving. Less undermining to medial aspect of wound, continues to have undermining 2 o'clock. His posterior wound appears to be improving. 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    3/15/2023 : Clinic visit with PANCHO Medina, FNFERMÍN-BC, CWAMAN, CFVALE.   Kwesi states he is feeling well overall.  He is currently not on any antibiotics.  Erythema to his left lower leg has been persistent, no changes however.  VAC has been on hold t since 3/13 due to periwound issues, which have since resolved..  Patient is agreeable to restarting VAC today.  He is anxious to get this wound healed as quickly as possible.   He was seen by his surgeon earlier this week, who was satisfied with current progress of this wound.   Patient's blood pressure elevated today, 175/99.  He denies any symptoms of CVA or MI-reviewed with him in clinic today.  He states he did not take his blood pressure medication today, will take it when he gets home.    4/5/2023: Clinic visit with Travis Bonilla MD. Patient reports doing well. Denies any issues with wound VAC. He is tolerating compression without issue. Wound is measuring smaller and undermining has improved. Patient's BP remains slightly elevated, improved from previous and recommend he follow up with PCP for BP control. Reports glucose has been well controlled, mostly under 150.    4/12/2023: Clinic visit with PANCHO Teixeira.  Patient reports that he is doing well denies any fever or chills.  Patient's wound has beefy red granulation tissue forming throughout the wound bed there is  a slight amount of epithelial tissue at the periphery to the eye inferior lateral aspect.    4/27/2023: Clinic visit with PANCHO Teixeira.  Patient's wound is progressing at this point we will hold the wound VAC.  If the wound is progressing we will have the patient return to the wound VAC on Monday.  I will quickly follow-up on Monday to assess the wound and determine whether we will continue it or send it back to [facility]/.  Patient's insurance is requesting either a peer to peer or sending the wound VAC back.  The wound is appropriate at this time for discharge of the wound VAC however if it digresses significantly over the weekend we will reapply and I will contact the patient's insurance to perform a peer to peer consultation.      REVIEW OF SYSTEMS:   Unchanged from previous clinic visit on 4/12/2023, except as documented in interval history above    PHYSICAL EXAMINATION:   BP (!) 151/94 (BP Location: Left arm, Patient Position: Sitting) Comment: RN notified  Pulse 83   Temp 36.1 °C (97 °F) (Temporal)   Resp 18   SpO2 97%     Physical Exam  Constitutional:       Appearance: He is obese.   Cardiovascular:      Rate and Rhythm: Normal rate.      Pulses: Normal pulses.   Pulmonary:      Effort: Pulmonary effort is normal. No respiratory distress.      Breath sounds: No wheezing.   Musculoskeletal:      Left lower leg: Edema present.      Comments: 1+ edema of left lower extremity   Skin:     Comments: -Full-thickness wound to the left anterior lower extremity status post I&D -wound bed is steadily improving.  Some odor however I believe this is associated with having the wound VAC on rather than any infectious process.  There is no periwound erythema or edema or purulent drainage from the wound bed.  Wound bed is beefy red with granulation tissue throughout.      Refer to wound flowsheet and photos     Neurological:      Mental Status: He is alert and oriented to person, place, and time.       WOUND  ASSESSMENT  Wound 01/16/23 Incision Leg Anterior;Lower Left (Active)   Wound Image    04/27/23 1540   Site Assessment Red;Yellow;Granulation tissue 04/27/23 1540   Periwound Assessment Blanchable erythema;Edema 04/27/23 1540   Margins Attached edges 04/27/23 1540   Closure Secondary intention 01/16/23 1700   Drainage Amount Moderate 04/27/23 1540   Drainage Description Serosanguineous 04/27/23 1540   Treatments Cleansed;Topical Lidocaine;Provider debridement 04/27/23 1540   Wound Cleansing Puracyn Spray 04/27/23 1540   Periwound Protectant Barrier Paste 04/27/23 1540   Dressing Cleansing/Solutions Normal Saline 04/27/23 1540   Dressing Options Collagen Dressing;Hydrofiber Silver;Hydrofiber;Absorbent Abdominal Pad;Unna Boot;Coban 04/27/23 1540   Dressing Changed New 04/27/23 1540   Dressing Status Clean;Dry;Intact 03/22/23 1500   Dressing Change/Treatment Frequency Other (Comments) 04/21/23 1400   Non-staged Wound Description Full thickness 04/27/23 1540   Wound Length (cm) 8.2 cm 04/27/23 1540   Wound Width (cm) 2.2 cm 04/27/23 1540   Wound Depth (cm) 0.2 cm 04/27/23 1540   Wound Surface Area (cm^2) 18.04 cm^2 04/27/23 1540   Wound Volume (cm^3) 3.608 cm^3 04/27/23 1540   Post-Procedure Length (cm) 8.2 cm 04/27/23 1540   Post-Procedure Width (cm) 2.4 cm 04/27/23 1540   Post-Procedure Depth (cm) 0.2 cm 04/27/23 1540   Post-Procedure Surface Area (cm^2) 19.68 cm^2 04/27/23 1540   Post-Procedure Volume (cm^3) 3.936 cm^3 04/27/23 1540   Wound Healing % 96 04/27/23 1540   Wound Bed Granulation (%) 100 % 03/06/23 1438   Tunneling (cm) 0 cm 04/27/23 1540   Tunneling Clock Position of Wound 12 03/08/23 1530   Tunneling - 2nd Location (cm) 1.6 cm 02/08/23 1500   Tunneling Clock Position of Wound - 2nd Location 7 02/08/23 1500   Tunneling - 3rd Location (cm) 4.2 cm 02/01/23 1530   Tunneling Clock Position of Wound - 3rd Location 11 02/01/23 1530   Undermining (cm) 0 cm 04/27/23 1540   Undermining of Wound, 1st Location From 1  o'clock;To 2 o'clock 04/05/23 1535   Undermining (cm) - 2nd location 0 cm 04/14/23 1415   Undermining of Wound, 2nd Location From 11 o'clock;To 12 o'clock 04/05/23 1535   Undermining (cm) - 3rd location 2 cm 02/08/23 1500   Undermining of Wound, 3rd Location From 5 o'clock;To 5 o'clock 02/08/23 1500   Wound Odor Mild 04/27/23 1540   Pulses Left;1+;DP 02/08/23 1500   Exposed Structures None 04/27/23 1540   Number of days: 101       PROCEDURE:   -Excisional debridement performed to remove thin layer of slough from the wound bed and to stimulate granulation tissue formation.    -2% viscous lidocaine applied topically to wound bed for approximately 5 minutes prior to debridement  -Curette used to debride wound bed.  Excisional debridement was performed to remove devitalized tissue until healthy, bleeding tissue was visualized.   Entire surface of wound, 19.68 cm2 debrided.  Tissue debrided into the subcutaneous layer.    -Bleeding controlled with manual pressure.    -Wound care completed by wound RN, refer to flowsheet  -Patient tolerated the procedure well, without c/o pain or discomfort.        Pertinent Labs and Diagnostics:    Labs:     A1c:   Lab Results   Component Value Date/Time    HBA1C 8.5 (H) 12/04/2022 09:27 PM          IMAGING: Dx to tibia and fibula dated 12/5/2022  IMPRESSION:     No radiographic evidence of acute bony abnormality     Skin thickening and edema with some soft tissue calcifications most likely indicates venous stasis. Cellulitis is a possibility and there is no evidence of soft tissue gas. Dermatomyositis can be seen with soft tissue calcifications but (are not as   extensive as that often entails      LAST  WOUND CULTURE:  DATE :   Lab Results   Component Value Date/Time    CULTRSULT - (A) 02/08/2023 03:00 PM    CULTRSULT (A) 02/08/2023 03:00 PM     Klebsiella pneumoniae ESBL  Light growth  Extended Spectrum Beta-lactamase (ESBL) isolated.  ESBL's may be clinically resistant to therapy  with  Penicillins,Cephalosporins or Aztreonam despite  apparent in vitro susceptibility to some of these agents.  The patient requires contact isolation.  Please contact pharmacy or an Infectious Disease Specialist  if you have any questions about appropriate therapy.      RUEL (MADDISON) 02/08/2023 03:00 PM     Methicillin Resistant Staphylococcus aureus  Rare growth           ASSESSMENT AND PLAN:   1. Open wound of left lower extremity, subsequent encounter    4/27/2023:   -Excisional debridement was performed to remove nonviable tissue from the wound bed and stimulate granulation tissue formation.  -Patient is to return to the clinic on Monday we will reevaluate if the wound is progressing we can have the patient send the wound VAC back to [facility].  At that point we can also reduce the patient's clinical appointments down to once weekly.  - Unna boot with Coban used due to dry irritated skin       Wound care: Dressing, Hydrofiber silver, Hydrofiber, sore abdominal pad, Unna boot, Coban    2. Type 2 diabetes mellitus without complication, without long-term current use of insulin ([facility])  Comments: Patient is currently on glipizide only    4/27/2023: Patient reports that his blood sugar was in the 150s today.  Patient instructed to keep tight control over FBS, <140 for optimal wound healing; Implications of loss of protective sensation (LOPS) discussed with patient, including increased risk for amputation.  Advised to check feet at least daily, moisturize feet, and to always wear protective foot wear, arrange meticulous regular foot care by podiatrist or CFCN. Pt with good understanding.    -Consider referral to endocrinology    3. Wound infection    4/27/2023: Patient is currently not on any antibiotics. He has had persistent erythema to his left lower extremity which appears to be stable.  -No acute signs or symptoms of infection at this time.  -Continue monitor for signs symptoms of infection is additional clinic  visit    4.  Elevated blood pressure reading    4/27/2023: Patient's blood pressure improved today but not at goal.    -Patient to follow-up PCP.    PATIENT EDUCATION  - Importance of adequate nutrition for wound healing  -Advised to go to ER for any increased redness, swelling, drainage, or odor, or if patient develops fever, chills, nausea or vomiting.           Please note that this note may have been created using voice recognition software. I have worked with technical experts from [employer] to optimize the interface.  I have made every reasonable attempt to correct obvious errors, but there may be errors of grammar and possibly content that I did not discover before finalizing the note.    N

## 2023-04-27 NOTE — PATIENT INSTRUCTIONS
-Keep dressings clean and dry. Change dressings if they become over saturated, soiled or fall off.     -Avoid prolonged standing or sitting without elevating your legs.    -Remove your compression garments if you have severe pain, severe swelling, numbness, color change, or temperature change in your toes. If you need to remove your compression garments, do so by unrolling them. Do not cut the compression garments off, this is to prevent cutting yourself on accident.    -Should you experience any significant changes in your wound, such as signs of infection (increasing redness, swelling, localized heat, increased pain, fever > 101 F, chills) or have any questions regarding your home care instructions, please contact the wound center at [phone]. If after hours, contact your primary care physician or go to the hospital emergency room.     -If you are 5 or more minutes late for an appointment, we reserve the right to cancel and reschedule that appointment. Additionally, if you are habitually late or not showing (3 late cancellations and/or no shows), we reserve the right to cancel your remaining appointments and it will be your responsibility to obtain a new referral if services are still needed.

## 2023-04-28 ENCOUNTER — APPOINTMENT (OUTPATIENT)
Dept: WOUND CARE | Facility: MEDICAL CENTER | Age: 63
End: 2023-04-28
Attending: GENERAL PRACTICE
Payer: COMMERCIAL

## 2023-05-01 ENCOUNTER — NON-PROVIDER VISIT (OUTPATIENT)
Dept: WOUND CARE | Facility: MEDICAL CENTER | Age: 63
End: 2023-05-01
Attending: GENERAL PRACTICE
Payer: COMMERCIAL

## 2023-05-01 PROCEDURE — 97597 DBRDMT OPN WND 1ST 20 CM/<: CPT

## 2023-05-01 NOTE — PATIENT INSTRUCTIONS
-Keep your wound dressing clean, dry, and intact.    -Change your dressing if it becomes soiled, soaked, or falls off.    -Should you experience any significant changes in your wound(s), such as infection (redness, swelling, localized heat, increased pain, fever > 101 F, chills) or have any questions regarding your home care instructions, please contact the wound center at [phone]. If after hours, contact your primary care physician or go to the hospital emergency room.

## 2023-05-01 NOTE — PROGRESS NOTES
CSWD with curette to debride wound bed and periwound of non-viable tissue. Entire surface of wound, < 20 cm2 debrided. Refer to flow sheet for wound care details. Patient tolerated the procedure well.

## 2023-05-04 ENCOUNTER — OFFICE VISIT (OUTPATIENT)
Dept: WOUND CARE | Facility: MEDICAL CENTER | Age: 63
End: 2023-05-04
Attending: GENERAL PRACTICE
Payer: COMMERCIAL

## 2023-05-04 VITALS
SYSTOLIC BLOOD PRESSURE: 137 MMHG | HEART RATE: 76 BPM | TEMPERATURE: 96.8 F | RESPIRATION RATE: 18 BRPM | OXYGEN SATURATION: 96 % | DIASTOLIC BLOOD PRESSURE: 77 MMHG

## 2023-05-04 DIAGNOSIS — S81.802D OPEN WOUND OF LEFT LOWER EXTREMITY, SUBSEQUENT ENCOUNTER: ICD-10-CM

## 2023-05-04 DIAGNOSIS — L08.9 WOUND INFECTION: ICD-10-CM

## 2023-05-04 DIAGNOSIS — T14.8XXA WOUND INFECTION: ICD-10-CM

## 2023-05-04 DIAGNOSIS — E11.9 TYPE 2 DIABETES MELLITUS WITHOUT COMPLICATION, WITHOUT LONG-TERM CURRENT USE OF INSULIN (HCC): ICD-10-CM

## 2023-05-04 PROCEDURE — 11042 DBRDMT SUBQ TIS 1ST 20SQCM/<: CPT

## 2023-05-04 PROCEDURE — 11042 DBRDMT SUBQ TIS 1ST 20SQCM/<: CPT | Performed by: NURSE PRACTITIONER

## 2023-05-04 NOTE — PATIENT INSTRUCTIONS
-Keep dressings clean and dry. Change dressings if the dressings become saturated, soiled, or fall off.    -Avoid prolonged standing or sitting without elevating your legs.    -Remove your compression garments if you have severe pain, severe swelling, numbness, color change, or temperature change in your toes. If you need to remove your compression garments, do so by unrolling them. Do not cut the compression garments off, this is to prevent cutting yourself on accident.    -Wear your offloading boot any time you are up walking.    -Never walk around the house barefoot. Always wear a rubber soled slipper when walking around the house.    -Should you experience any significant changes in your wound, such as signs of infection (increasing redness, swelling, localized heat, increased pain, fever > 101 F, chills) or have any questions regarding your home care instructions, please contact the wound center at [phone]. If after hours, contact your primary care physician or go to the hospital emergency room.     -If you are 5 or more minutes late for an appointment, we reserve the right to cancel and reschedule that appointment. Additionally, if you are habitually late or not showing (3 late cancellations and/or no shows), we reserve the right to cancel your remaining appointments and it will be your responsibility to obtain a new referral if services are still needed.

## 2023-05-08 ENCOUNTER — NON-PROVIDER VISIT (OUTPATIENT)
Dept: WOUND CARE | Facility: MEDICAL CENTER | Age: 63
End: 2023-05-08
Attending: GENERAL PRACTICE
Payer: COMMERCIAL

## 2023-05-08 PROCEDURE — 97602 WOUND(S) CARE NON-SELECTIVE: CPT

## 2023-05-08 NOTE — PATIENT INSTRUCTIONS
-Keep your wound dressing clean, dry, and intact.    -Change your dressing if it becomes soiled, soaked, or falls off.    -Should you experience any significant changes in your wound(s), such as infection (redness, swelling, localized heat, increased pain, fever > 101 F, chills) or have any questions regarding your home care instructions, please contact the wound center at [phone]. If after hours, contact your primary care physician or go to the hospital emergency room.

## 2023-05-08 NOTE — PROGRESS NOTES
Non-Selective Debridement with puracyn spray and clean gauze to debride non-viable tissue from the wound bed and periwound areas. Unna Boot with Coban applied to Left Lower extremity. Refer to flow sheet for wound care details. Patient tolerated the procedure well.

## 2023-05-11 ENCOUNTER — OFFICE VISIT (OUTPATIENT)
Dept: WOUND CARE | Facility: MEDICAL CENTER | Age: 63
End: 2023-05-11
Attending: GENERAL PRACTICE
Payer: COMMERCIAL

## 2023-05-11 VITALS
TEMPERATURE: 97.9 F | OXYGEN SATURATION: 97 % | RESPIRATION RATE: 18 BRPM | DIASTOLIC BLOOD PRESSURE: 91 MMHG | HEART RATE: 82 BPM | SYSTOLIC BLOOD PRESSURE: 162 MMHG

## 2023-05-11 DIAGNOSIS — S81.802D OPEN WOUND OF LEFT LOWER EXTREMITY, SUBSEQUENT ENCOUNTER: Primary | ICD-10-CM

## 2023-05-11 DIAGNOSIS — E11.9 TYPE 2 DIABETES MELLITUS WITHOUT COMPLICATION, WITHOUT LONG-TERM CURRENT USE OF INSULIN (HCC): ICD-10-CM

## 2023-05-11 DIAGNOSIS — L08.9 WOUND INFECTION: ICD-10-CM

## 2023-05-11 DIAGNOSIS — T14.8XXA WOUND INFECTION: ICD-10-CM

## 2023-05-11 PROCEDURE — 3077F SYST BP >= 140 MM HG: CPT | Performed by: NURSE PRACTITIONER

## 2023-05-11 PROCEDURE — 11042 DBRDMT SUBQ TIS 1ST 20SQCM/<: CPT

## 2023-05-11 PROCEDURE — 11042 DBRDMT SUBQ TIS 1ST 20SQCM/<: CPT | Performed by: NURSE PRACTITIONER

## 2023-05-11 PROCEDURE — 3080F DIAST BP >= 90 MM HG: CPT | Performed by: NURSE PRACTITIONER

## 2023-05-11 NOTE — PROGRESS NOTES
Provider Encounter- Full Thickness wound    HISTORY OF PRESENT ILLNESS  Wound History:   START OF CARE IN CLINIC: 1/16/2023 (return to clinic following hospitalization)   REFERRING PROVIDER:   Dr. Paige  WOUND- Full Thickness Wound   LOCATION: Left lower extremity, right anterior knee, left anterior knee   HISTORY: Patient presented to [facility] on 12/4/2022 with left lower extremity, fever, malaise, erythema x1 week.  He was originally admitted to [facility] for acute kidney injury and sepsis but with worsening renal function was transferred to [facility].  Wound cultures grew Streptococcus pyogenes group A and MSSA.  Patient was initially treated with cefazolin.  He is seen by the wound care team who recommended follow-up as an outpatient in wound care clinic.  While inpatient nephrology saw the patient and started him on dialysis for acute kidney injury.  His renal function improved and dialysis was stopped.  Patient was discharged with follow-up in the wound care clinic.    Patient was following with wound clinic, however on clinic visit 1/3/2022 he was noticed to have worsening wounds and concern for infection. He had failed outpatient Abx with zyvox and was sent to ED for further evaluation. Patient was admitted to [facility] from 1/3-1/13/2023 for sepsis and left lower extremity cellulitis with abscess. He was taken to OR for I&D with Dr. Odom. Patient's wound was treated with wound VAC. Surgical cultures were positive for enterobacter cloacae and was treated with ciprofloxacin with end date of 1/21/2023. Patient was referred back to [facility] for further care.    Pertinent Medical History: Hypertension, alcohol abuse, kidney disease, GERD, mitral valve prolapse, morbid obesity, obstructive sleep apnea, type 2 diabetes    TOBACCO USE: Former smoker denies use of smokeless tobacco    Patient's problem list, allergies, and current medications reviewed and updated in Epic    Interval  History:  2023: Clinic visit with Travis Bonilla MD. Patient reports feeling well since hospitalization. He presented with wound VAC. He will be traveling to [place] for  and will be back on Monday. Wound VAC will be held while out of town and will plan to reapply next week. He was instructed on performing wound care until returns to clinic. Patient is tolerating Abx, continues to have some periwound erythema without warmth or pain. He has two new areas of fluctuance, concerning for superficial abscess, discussed performing I&D, patient agreeable.    2023 : Clinic visit with PANCHO Medina, EL, MERI, RYLEE.   Patient states that overall he is feeling well blood sugars have been in the low 100s.  No significant improvement in his wounds.  He does mention that he has raised bumps to his arms, this has been going on for several weeks, he noticed a new bump today on his left forearm.  They are slightly tender with palpation, no fluctuance.  He also has a diffuse rash to his arms and hands.  He completed his antibiotics 3 or 4 days ago.  States he may go into the emergency room in [place] after he leaves the clinic.  Denies any fevers, chills, nausea, vomiting.    2023 : Clinic visit with PANCHO Medina, EL, MERI, RYLEE.   Kwesi states that overall he is feeling well, though a little bit stressed.  His stepdaughter is in hospital.  He did  his Zyvox prescription, prescribed on Monday for positive MRSA wound culture, light growth.  He is tolerating without any difficulty.  The smaller wounds to his leg present today with boggy tissue, at least 1 of these communicates with the larger wound.  He has an appointment to follow-up with his surgeon, Dr. Odom, on .  His blood sugar today was 126.  The bumps on his forearms are about the same, he did not go to the emergency room last week.    2/10/2023 : Clinic visit with PANCHO Medina, EL, MERI, RYLEE.   Patient states  that overall he is feeling well.  His blood sugars have consistently been running between 120 and 140.  As discussed last visit, the tissue between the lateral wound and a small anterior wound, overlying wound tract, was excised in clinic today using local anesthesia.  Wound VAC continued.  He does still have quite a bit of undermining all around the wound.  He sees his surgeon next Friday.   He will also be undergoing biopsies of the lumps on his arms and hands on Monday by his PCP.     2/17/2023 : Clinic visit with PANCHO Medina, FNP-BC, CWOCN, CFCN.   Kwesi continues to feel well.  He was seen by a surgeon earlier today, who was overall satisfied with wound progress, did not feel skin over undermining needed to be excised.  Per patient, surgeon is going to speak with one of his colleagues about possible skin grafting at some point.  Surgeon did feel patient would benefit from higher levels of compression as his leg is quite edematous still.  Patient was prescribed minocycline and Zyvox earlier this week due to positive wound culture.  He is taking these as prescribed, and states he is tolerating without difficulty.   He did not have the biopsy of lumps in his arms as expected earlier this week.  Lumps have dissipated.  However raised lesion to left dorsal thenar hand has grown larger, suspicious for malignancy.  He has an appointment with dermatology next Wednesday.    2/27/2023: Clinic visit with PANCHO Teixeira.  Patient reports that he completed his oral antibiotics last Saturday patient reports he had mild diarrhea from the antibiotics however it is improving since he has completed his antibiotics Saturday.  Patient's wound beds are improving decreased depth to the 2 superior left anterior wounds.  Granulation tissue throughout the largest left anterior lower extremity wound and the posterior left lower extremity wound.   Does have 1 raised lump along the previous track pad area no signs or  symptoms of infection however, patient reports that these lumps are how the other wounds started.  No additional antibiotics we will continue to monitor.    3/8/2023: Clinic visit with Travis Bonilla MD. Patient reports doing ok. Denies any problems with his wound VAC. Patient's wounds are slowly improving. Less undermining to medial aspect of wound, continues to have undermining 2 o'clock. His posterior wound appears to be improving. 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    3/15/2023 : Clinic visit with PANCHO Medina, FNFERMÍN-BC, CWAMAN, CFVALE.   Kwesi states he is feeling well overall.  He is currently not on any antibiotics.  Erythema to his left lower leg has been persistent, no changes however.  VAC has been on hold t since 3/13 due to periwound issues, which have since resolved..  Patient is agreeable to restarting VAC today.  He is anxious to get this wound healed as quickly as possible.   He was seen by his surgeon earlier this week, who was satisfied with current progress of this wound.   Patient's blood pressure elevated today, 175/99.  He denies any symptoms of CVA or MI-reviewed with him in clinic today.  He states he did not take his blood pressure medication today, will take it when he gets home.    4/5/2023: Clinic visit with Travis Bonilla MD. Patient reports doing well. Denies any issues with wound VAC. He is tolerating compression without issue. Wound is measuring smaller and undermining has improved. Patient's BP remains slightly elevated, improved from previous and recommend he follow up with PCP for BP control. Reports glucose has been well controlled, mostly under 150.    4/12/2023: Clinic visit with PANCHO Teixeira.  Patient reports that he is doing well denies any fever or chills.  Patient's wound has beefy red granulation tissue forming throughout the wound bed there is  a slight amount of epithelial tissue at the periphery to the eye inferior lateral aspect.    4/27/2023: Clinic visit with PANCHO Teixeira.  Patient's wound is progressing at this point we will hold the wound VAC.  If the wound is progressing we will have the patient return to the wound VAC on Monday.  I will quickly follow-up on Monday to assess the wound and determine whether we will continue it or send it back to [facility]/.  Patient's insurance is requesting either a peer to peer or sending the wound VAC back.  The wound is appropriate at this time for discharge of the wound VAC however if it digresses significantly over the weekend we will reapply and I will contact the patient's insurance to perform a peer to peer consultation.    5/4/2023 : Clinic visit with PANCHO Medina, EL, JERIN, CFCN.   Patient's wound continues to progress, area has decreased since last assessment.  VAC discontinued.  Patient to send pump back to .  He states that he is feeling well, his blood sugar today was 132.    5/11/2023: Clinic visit with PANCHO Teixeira.  My next wound continues to progress slowly.  Patient has increased epithelial tissue at the superior and inferior aspects of the wound bed and laterally.  Granulation tissue continues to fill the wound bed.  Patient reports that he has had bad allergies lately he reports he is currently taking Zyrtec.  Patient denies any fever or chills.      REVIEW OF SYSTEMS:   Unchanged from previous clinic visit on 5/4/2023, except as documented in interval history above    PHYSICAL EXAMINATION:   BP (!) 162/91 (BP Location: Right arm, Patient Position: Sitting) Comment: RN notified  Pulse 82   Temp 36.6 °C (97.9 °F) (Temporal)   Resp 18   SpO2 97%     Physical Exam  Constitutional:       Appearance: He is obese.   Cardiovascular:      Rate and Rhythm: Normal rate.      Pulses: Normal pulses.   Pulmonary:      Effort: Pulmonary effort is normal. No respiratory  distress.      Breath sounds: No wheezing.   Musculoskeletal:      Left lower leg: Edema present.      Comments: 1+ edema of left lower extremity   Skin:     Comments: Full-thickness wound to the left anterior lower extremity status post I&D -wound continues to improve slowly.  Increased epithelial tissue.  No periwound edema, erythema or foul-smelling odor from the wound bed.      Refer to wound flowsheet and photos     Neurological:      Mental Status: He is alert and oriented to person, place, and time.         WOUND ASSESSMENT  Wound 01/16/23 Incision Leg Anterior;Lower Left (Active)   Wound Image    05/11/23 1500   Site Assessment Pink;Early/partial granulation;Epithelialization;Yellow 05/11/23 1500   Periwound Assessment Blanchable erythema;Edema 05/11/23 1500   Margins Attached edges 05/11/23 1500   Closure Secondary intention 01/16/23 1700   Drainage Amount Large 05/11/23 1500   Drainage Description Serosanguineous 05/11/23 1500   Treatments Cleansed;Topical Lidocaine;Provider debridement;Site care 05/11/23 1500   Wound Cleansing Puracyn [place] 05/11/23 1500   Periwound Protectant Skin Moisturizer;Barrier Paste 05/11/23 1500   Dressing Cleansing/Solutions Not Applicable 05/11/23 1500   Dressing Options Hydrofiber Silver;Other (Comments);Unna Boot;Coban 05/11/23 1500   Dressing Changed New 05/11/23 1500   Dressing Status Clean;Dry;Intact 03/22/23 1500   Dressing Change/Treatment Frequency Other (Comments) 05/11/23 1500   Non-staged Wound Description Full thickness 05/11/23 1500   Wound Length (cm) 7.5 cm 05/11/23 1500   Wound Width (cm) 2 cm 05/11/23 1500   Wound Depth (cm) 0.3 cm 05/11/23 1500   Wound Surface Area (cm^2) 15 cm^2 05/11/23 1500   Wound Volume (cm^3) 4.5 cm^3 05/11/23 1500   Post-Procedure Length (cm) 7.5 cm 05/11/23 1500   Post-Procedure Width (cm) 2 cm 05/11/23 1500   Post-Procedure Depth (cm) 0.3 cm 05/11/23 1500   Post-Procedure Surface Area (cm^2) 15 cm^2 05/11/23 1500   Post-Procedure  Volume (cm^3) 4.5 cm^3 05/11/23 1500   Wound Healing % 95 05/11/23 1500   Wound Bed Granulation (%) 100 % 03/06/23 1438   Tunneling (cm) 0 cm 05/11/23 1500   Tunneling Clock Position of Wound 2 05/04/23 1445   Tunneling - 2nd Location (cm) 1.6 cm 02/08/23 1500   Tunneling Clock Position of Wound - 2nd Location 7 02/08/23 1500   Tunneling - 3rd Location (cm) 4.2 cm 02/01/23 1530   Tunneling Clock Position of Wound - 3rd Location 11 02/01/23 1530   Undermining (cm) 0 cm 05/11/23 1500   Undermining of Wound, 1st Location From 1 o'clock;To 2 o'clock 04/05/23 1535   Undermining (cm) - 2nd location 0 cm 04/14/23 1415   Undermining of Wound, 2nd Location From 11 o'clock;To 12 o'clock 04/05/23 1535   Undermining (cm) - 3rd location 2 cm 02/08/23 1500   Undermining of Wound, 3rd Location From 5 o'clock;To 5 o'clock 02/08/23 1500   Wound Odor None 05/11/23 1500   Pulses Left;1+;DP 02/08/23 1500   Exposed Structures None 05/11/23 1500   Number of days: 115       PROCEDURE: Excisional debridement of left anterior lower extremity wound  -2% viscous lidocaine applied topically to wound bed for approximately 5 minutes prior to debridement  -Curette used to debride wound bed.  Excisional debridement was performed to remove devitalized tissue until healthy, bleeding tissue was visualized.   Entire surface of wound, 15 cm2 debrided.  Tissue debrided into the subcutaneous layer.    -Bleeding controlled with manual pressure.    -Wound care completed by wound RN, refer to flowsheet  -Patient tolerated the procedure well, without c/o pain or discomfort.        Pertinent Labs and Diagnostics:    Labs:     A1c:   Lab Results   Component Value Date/Time    HBA1C 8.5 (H) 12/04/2022 09:27 PM            IMAGING: Dx to tibia and fibula dated 12/5/2022  IMPRESSION:     No radiographic evidence of acute bony abnormality     Skin thickening and edema with some soft tissue calcifications most likely indicates venous stasis. Cellulitis is a  possibility and there is no evidence of soft tissue gas. Dermatomyositis can be seen with soft tissue calcifications but (are not as   extensive as that often entails      LAST  WOUND CULTURE:  DATE :   Lab Results   Component Value Date/Time    CULTRSULT - (A) 02/08/2023 03:00 PM    CULTRSULT (A) 02/08/2023 03:00 PM     Klebsiella pneumoniae ESBL  Light growth  Extended Spectrum Beta-lactamase (ESBL) isolated.  ESBL's may be clinically resistant to therapy with  Penicillins,Cephalosporins or Aztreonam despite  apparent in vitro susceptibility to some of these agents.  The patient requires contact isolation.  Please contact pharmacy or an Infectious Disease Specialist  if you have any questions about appropriate therapy.      CULTRSULT (A) 02/08/2023 03:00 PM     Methicillin Resistant Staphylococcus aureus  Rare growth           ASSESSMENT AND PLAN:   1. Open wound of left lower extremity, subsequent encounter    5/11/2023: Wound is currently progressing, area has decreased since last assessment, no evidence of infection  -Excisional debridement performed remove slough from the wound bed.  -Patient to return to the clinic 2 times per week.  1 time to be seen by a provider for assessment and debridement as needed.  A second time for wound dressing change by wound care.  -Patient to keep compression wrap dry in between clinic visits     Wound care: Hydrofiber silver, Hydrofiber, super absorbent pad, Unna boot, Coban    2. Type 2 diabetes mellitus without complication, without long-term current use of insulin ([facility])  Comments: Patient is currently on glipizide only    5/11/2023:   -Reports that his blood sugar was 119 this morning  Patient has been instructed to keep tight control over FBS, <140 for optimal wound healing;  - Implications of loss of protective sensation (LOPS) previously discussed with patient, including increased risk for amputation.  Advised to check feet at least daily, moisturize feet, and to always  wear protective foot wear, arrange meticulous regular foot care by podiatrist or CFCN. Pt with good understanding.    -Consider referral to endocrinology    3. Wound infection    5/11/2023:   -No overt signs or symptoms of acute infection.  -Continue monitor for signs symptoms of infection at each additional clinic visit.        PATIENT EDUCATION  - Importance of adequate nutrition for wound healing  -Advised to go to ER for any increased redness, swelling, drainage, or odor, or if patient develops fever, chills, nausea or vomiting.           Please note that this note may have been created using voice recognition software. I have worked with technical experts from [employer] to optimize the interface.  I have made every reasonable attempt to correct obvious errors, but there may be errors of grammar and possibly content that I did not discover before finalizing the note.    N

## 2023-05-11 NOTE — PATIENT INSTRUCTIONS
-Keep your wound dressing clean, dry, and intact.    -Change your dressing if it becomes soiled, soaked, or falls off.    -Remove your compression wrap if you have severe pain, severe swelling, numbness, color change, or temperature change in your toes. If you need to remove your compression wrap, do so by unrolling it. Do not cut the compression wrap off to prevent cutting yourself on accident.    -Should you experience any significant changes in your wound(s), such as infection (redness, swelling, localized heat, increased pain, fever > 101 F, chills) or have any questions regarding your home care instructions, please contact the wound center at [phone]. If after hours, contact your primary care physician or go to the hospital emergency room.

## 2023-05-15 ENCOUNTER — NON-PROVIDER VISIT (OUTPATIENT)
Dept: WOUND CARE | Facility: MEDICAL CENTER | Age: 63
End: 2023-05-15
Attending: GENERAL PRACTICE
Payer: COMMERCIAL

## 2023-05-15 PROCEDURE — 97602 WOUND(S) CARE NON-SELECTIVE: CPT

## 2023-05-15 NOTE — PROGRESS NOTES
Non-Selective Debridement with no-rinse foam cleanser and a wash cloth to debride non-viable tissue from the wound bed and periwound areas. Unna Boot with Coban applied to LLE. Refer to flow sheet for wound care details. Patient tolerated the procedure well.

## 2023-05-15 NOTE — PATIENT INSTRUCTIONS
-Keep your wound dressing clean, dry, and intact.    -Change your dressing if it becomes soiled, soaked, or falls off.    -Remove your compression wrap if you have severe pain, severe swelling, numbness, color change, or temperature change in your toes. If you need to remove your compression wrap, do so by unrolling it. Do not cut the compression wrap off to prevent cutting yourself on accident.    -Should you experience any significant changes in your wound(s), such as infection (redness, swelling, localized heat, increased pain, fever > 101 F, chills) or have any questions regarding your home care instructions, please contact the wound center at [phone]. If after hours, contact your primary care physician or go to the hospital emergency room.

## 2023-05-18 ENCOUNTER — OFFICE VISIT (OUTPATIENT)
Dept: WOUND CARE | Facility: MEDICAL CENTER | Age: 63
End: 2023-05-18
Attending: GENERAL PRACTICE
Payer: COMMERCIAL

## 2023-05-18 VITALS
SYSTOLIC BLOOD PRESSURE: 148 MMHG | HEART RATE: 75 BPM | TEMPERATURE: 97.6 F | DIASTOLIC BLOOD PRESSURE: 92 MMHG | RESPIRATION RATE: 18 BRPM | OXYGEN SATURATION: 97 %

## 2023-05-18 DIAGNOSIS — S81.802D OPEN WOUND OF LEFT LOWER EXTREMITY, SUBSEQUENT ENCOUNTER: ICD-10-CM

## 2023-05-18 DIAGNOSIS — L08.9 WOUND INFECTION: ICD-10-CM

## 2023-05-18 DIAGNOSIS — T14.8XXA WOUND INFECTION: ICD-10-CM

## 2023-05-18 DIAGNOSIS — E11.9 TYPE 2 DIABETES MELLITUS WITHOUT COMPLICATION, WITHOUT LONG-TERM CURRENT USE OF INSULIN (HCC): ICD-10-CM

## 2023-05-18 PROCEDURE — 3077F SYST BP >= 140 MM HG: CPT | Performed by: NURSE PRACTITIONER

## 2023-05-18 PROCEDURE — 3080F DIAST BP >= 90 MM HG: CPT | Performed by: NURSE PRACTITIONER

## 2023-05-18 PROCEDURE — 99213 OFFICE O/P EST LOW 20 MIN: CPT | Performed by: NURSE PRACTITIONER

## 2023-05-18 PROCEDURE — 99213 OFFICE O/P EST LOW 20 MIN: CPT

## 2023-05-18 NOTE — PATIENT INSTRUCTIONS
-Keep your wound dressing clean, dry, and intact.    -Change your dressing if it becomes soiled, soaked, or falls off.    -Remove your compression wrap if you have severe pain, severe swelling, numbness, color change, or temperature change in your toes. If you need to remove your compression wrap, do so by unrolling it. Do not cut the compression wrap off to prevent cutting yourself on accident.    -Should you experience any significant changes in your wound(s), such as infection (redness, swelling, localized heat, increased pain, fever > 101 F, chills) or have any questions regarding your home care instructions, please contact the wound center at [phone]. If after hours, contact your primary care physician or go to the hospital emergency room.

## 2023-05-18 NOTE — PROGRESS NOTES
Provider Encounter- Full Thickness wound    HISTORY OF PRESENT ILLNESS  Wound History:   START OF CARE IN CLINIC: 1/16/2023 (return to clinic following hospitalization)   REFERRING PROVIDER:   Dr. Paige  WOUND- Full Thickness Wound   LOCATION: Left lower extremity, right anterior knee, left anterior knee   HISTORY: Patient presented to [facility] on 12/4/2022 with left lower extremity, fever, malaise, erythema x1 week.  He was originally admitted to [facility] for acute kidney injury and sepsis but with worsening renal function was transferred to [facility].  Wound cultures grew Streptococcus pyogenes group A and MSSA.  Patient was initially treated with cefazolin.  He is seen by the wound care team who recommended follow-up as an outpatient in wound care clinic.  While inpatient nephrology saw the patient and started him on dialysis for acute kidney injury.  His renal function improved and dialysis was stopped.  Patient was discharged with follow-up in the wound care clinic.    Patient was following with wound clinic, however on clinic visit 1/3/2022 he was noticed to have worsening wounds and concern for infection. He had failed outpatient Abx with zyvox and was sent to ED for further evaluation. Patient was admitted to [facility] from 1/3-1/13/2023 for sepsis and left lower extremity cellulitis with abscess. He was taken to OR for I&D with Dr. Odom. Patient's wound was treated with wound VAC. Surgical cultures were positive for enterobacter cloacae and was treated with ciprofloxacin with end date of 1/21/2023. Patient was referred back to Health system for further care.    Pertinent Medical History: Hypertension, alcohol abuse, kidney disease, GERD, mitral valve prolapse, morbid obesity, obstructive sleep apnea, type 2 diabetes    TOBACCO USE: Former smoker denies use of smokeless tobacco    Patient's problem list, allergies, and current medications reviewed and updated in Epic    Interval  History:  2023: Clinic visit with Travis Bonilla MD. Patient reports feeling well since hospitalization. He presented with wound VAC. He will be traveling to [place] for  and will be back on Monday. Wound VAC will be held while out of town and will plan to reapply next week. He was instructed on performing wound care until returns to clinic. Patient is tolerating Abx, continues to have some periwound erythema without warmth or pain. He has two new areas of fluctuance, concerning for superficial abscess, discussed performing I&D, patient agreeable.    2023 : Clinic visit with PANCHO Medina, EL, MERI, RYLEE.   Patient states that overall he is feeling well blood sugars have been in the low 100s.  No significant improvement in his wounds.  He does mention that he has raised bumps to his arms, this has been going on for several weeks, he noticed a new bump today on his left forearm.  They are slightly tender with palpation, no fluctuance.  He also has a diffuse rash to his arms and hands.  He completed his antibiotics 3 or 4 days ago.  States he may go into the emergency room in [place] after he leaves the clinic.  Denies any fevers, chills, nausea, vomiting.    2023 : Clinic visit with PANCHO Medina, EL, MERI, RYLEE.   Kwesi states that overall he is feeling well, though a little bit stressed.  His stepdaughter is in hospital.  He did  his Zyvox prescription, prescribed on Monday for positive MRSA wound culture, light growth.  He is tolerating without any difficulty.  The smaller wounds to his leg present today with boggy tissue, at least 1 of these communicates with the larger wound.  He has an appointment to follow-up with his surgeon, Dr. Odom, on .  His blood sugar today was 126.  The bumps on his forearms are about the same, he did not go to the emergency room last week.    2/10/2023 : Clinic visit with PANCHO Medina, EL, MERI, RYLEE.   Patient states  that overall he is feeling well.  His blood sugars have consistently been running between 120 and 140.  As discussed last visit, the tissue between the lateral wound and a small anterior wound, overlying wound tract, was excised in clinic today using local anesthesia.  Wound VAC continued.  He does still have quite a bit of undermining all around the wound.  He sees his surgeon next Friday.   He will also be undergoing biopsies of the lumps on his arms and hands on Monday by his PCP.     2/17/2023 : Clinic visit with PANCHO Medina, FNP-BC, CWOCN, CFCN.   Kwesi continues to feel well.  He was seen by a surgeon earlier today, who was overall satisfied with wound progress, did not feel skin over undermining needed to be excised.  Per patient, surgeon is going to speak with one of his colleagues about possible skin grafting at some point.  Surgeon did feel patient would benefit from higher levels of compression as his leg is quite edematous still.  Patient was prescribed minocycline and Zyvox earlier this week due to positive wound culture.  He is taking these as prescribed, and states he is tolerating without difficulty.   He did not have the biopsy of lumps in his arms as expected earlier this week.  Lumps have dissipated.  However raised lesion to left dorsal thenar hand has grown larger, suspicious for malignancy.  He has an appointment with dermatology next Wednesday.    2/27/2023: Clinic visit with PANCHO Teixeira.  Patient reports that he completed his oral antibiotics last Saturday patient reports he had mild diarrhea from the antibiotics however it is improving since he has completed his antibiotics Saturday.  Patient's wound beds are improving decreased depth to the 2 superior left anterior wounds.  Granulation tissue throughout the largest left anterior lower extremity wound and the posterior left lower extremity wound.   Does have 1 raised lump along the previous track pad area no signs or  symptoms of infection however, patient reports that these lumps are how the other wounds started.  No additional antibiotics we will continue to monitor.    3/8/2023: Clinic visit with Travis Bonilla MD. Patient reports doing ok. Denies any problems with his wound VAC. Patient's wounds are slowly improving. Less undermining to medial aspect of wound, continues to have undermining 2 o'clock. His posterior wound appears to be improving. 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    3/15/2023 : Clinic visit with PANCHO Medina, FNFERMÍN-BC, CWAMAN, CFVALE.   Kwesi states he is feeling well overall.  He is currently not on any antibiotics.  Erythema to his left lower leg has been persistent, no changes however.  VAC has been on hold t since 3/13 due to periwound issues, which have since resolved..  Patient is agreeable to restarting VAC today.  He is anxious to get this wound healed as quickly as possible.   He was seen by his surgeon earlier this week, who was satisfied with current progress of this wound.   Patient's blood pressure elevated today, 175/99.  He denies any symptoms of CVA or MI-reviewed with him in clinic today.  He states he did not take his blood pressure medication today, will take it when he gets home.    4/5/2023: Clinic visit with Travis Bonilla MD. Patient reports doing well. Denies any issues with wound VAC. He is tolerating compression without issue. Wound is measuring smaller and undermining has improved. Patient's BP remains slightly elevated, improved from previous and recommend he follow up with PCP for BP control. Reports glucose has been well controlled, mostly under 150.    4/12/2023: Clinic visit with PANCHO Teixeira.  Patient reports that he is doing well denies any fever or chills.  Patient's wound has beefy red granulation tissue forming throughout the wound bed there is  a slight amount of epithelial tissue at the periphery to the eye inferior lateral aspect.    4/27/2023: Clinic visit with PANCHO Teixeira.  Patient's wound is progressing at this point we will hold the wound VAC.  If the wound is progressing we will have the patient return to the wound VAC on Monday.  I will quickly follow-up on Monday to assess the wound and determine whether we will continue it or send it [facility] to [facility]  Patient's insurance is requesting either a peer to peer or sending the wound VAC back.  The wound is appropriate at this time for discharge of the wound VAC however if it digresses significantly over the weekend we will reapply and I will contact the patient's insurance to perform a peer to peer consultation.    5/4/2023 : Clinic visit with PANCHO Medina, EL, JERIN, CFVALE.   Patient's wound continues to progress, area has decreased since last assessment.  VAC discontinued.  Patient to send pump back to .  He states that he is feeling well, his blood sugar today was 132.    5/11/2023: Clinic visit with PANCHO Teixeira.  My next wound continues to progress slowly.  Patient has increased epithelial tissue at the superior and inferior aspects of the wound bed and laterally.  Granulation tissue continues to fill the wound bed.  Patient reports that he has had bad allergies lately he reports he is currently taking Zyrtec.  Patient denies any fever or chills.    5/18/2023: Clinic visit with PANCHO Teixeira. Patient's wound is improving however measurements have not decreased.  Patient does have new epithelial tissue forming to the superior and inferior aspect of the wound bed.      REVIEW OF SYSTEMS:   Unchanged from previous clinic visit on 5/4/2023, except as documented in interval history above    PHYSICAL EXAMINATION:   BP (!) 148/92 (BP Location: Left arm, Patient Position: Sitting) Comment: RN notified  Pulse 75   Temp 36.4 °C (97.6 °F) (Temporal)   Resp 18    SpO2 97%     Physical Exam  Constitutional:       Appearance: He is obese.   Cardiovascular:      Rate and Rhythm: Normal rate.      Pulses: Normal pulses.   Pulmonary:      Effort: Pulmonary effort is normal.   Musculoskeletal:      Right lower leg: Edema present.      Left lower leg: Edema present.      Comments: Generalized dependent edema bilateral lower extremities   Skin:     Comments: Full-thickness wound to the left anterior lower extremity status post I&D -measurements are approximately same however there is increased epithelial tissue forming in the inferior and superior aspects of the wound bed.  Suspect that the wound will measure smaller next clinical appointment.  Patient does have granulation tissue throughout the manner of the wound bed.    Refer to wound flowsheet and photos     Neurological:      Mental Status: He is alert and oriented to person, place, and time.         WOUND ASSESSMENT  Wound 01/16/23 Incision Leg Anterior;Lower Left (Active)   Wound Image   05/18/23 1400   Site Assessment Red;Pink;Granulation tissue 05/18/23 1400   Periwound Assessment Maceration;Blanchable erythema;Edema 05/18/23 1400   Margins Attached edges 05/18/23 1400   Closure Secondary intention 01/16/23 1700   Drainage Amount Large 05/18/23 1400   Drainage Description Serosanguineous 05/18/23 1400   Treatments Cleansed;Topical Lidocaine;Site care 05/18/23 1400   Wound Cleansing Foam Cleanser/Washcloth 05/18/23 1400   Periwound Protectant Skin Moisturizer;Barrier Paste 05/18/23 1400   Dressing Cleansing/Solutions Not Applicable 05/18/23 1400   Dressing Options Hydrofera Blue Ready;Super Absorbent Pad;Soft Conforming Roll;Compression Wrap Two Layer 05/18/23 1400   Dressing Changed New 05/18/23 1400   Dressing Status Clean;Dry;Intact 03/22/23 1500   Dressing Change/Treatment Frequency Other (Comments) 05/11/23 1500   Non-staged Wound Description Full thickness 05/18/23 1400   Wound Length (cm) 8.3 cm 05/18/23 1400   Wound  Width (cm) 2.3 cm 05/18/23 1400   Wound Depth (cm) 0.3 cm 05/18/23 1400   Wound Surface Area (cm^2) 19.09 cm^2 05/18/23 1400   Wound Volume (cm^3) 5.727 cm^3 05/18/23 1400   Post-Procedure Length (cm) 8.3 cm 05/18/23 1400   Post-Procedure Width (cm) 2.3 cm 05/18/23 1400   Post-Procedure Depth (cm) 0.3 cm 05/18/23 1400   Post-Procedure Surface Area (cm^2) 19.09 cm^2 05/18/23 1400   Post-Procedure Volume (cm^3) 5.727 cm^3 05/18/23 1400   Wound Healing % 94 05/18/23 1400   Wound Bed Granulation (%) 100 % 03/06/23 1438   Tunneling (cm) 0 cm 05/18/23 1400   Tunneling Clock Position of Wound 2 05/04/23 1445   Tunneling - 2nd Location (cm) 1.6 cm 02/08/23 1500   Tunneling Clock Position of Wound - 2nd Location 7 02/08/23 1500   Tunneling - 3rd Location (cm) 4.2 cm 02/01/23 1530   Tunneling Clock Position of Wound - 3rd Location 11 02/01/23 1530   Undermining (cm) 0 cm 05/18/23 1400   Undermining of Wound, 1st Location From 1 o'clock;To 2 o'clock 04/05/23 1535   Undermining (cm) - 2nd location 0 cm 04/14/23 1415   Undermining of Wound, 2nd Location From 11 o'clock;To 12 o'clock 04/05/23 1535   Undermining (cm) - 3rd location 2 cm 02/08/23 1500   Undermining of Wound, 3rd Location From 5 o'clock;To 5 o'clock 02/08/23 1500   Wound Odor None 05/18/23 1400   Pulses Left;1+;DP 02/08/23 1500   Exposed Structures None 05/18/23 1400   Number of days: 122       PROCEDURE:   - no excisional debridement performed in clinic today.    Pertinent Labs and Diagnostics:    Labs:     A1c:   Lab Results   Component Value Date/Time    HBA1C 8.5 (H) 12/04/2022 09:27 PM            IMAGING: Dx to tibia and fibula dated 12/5/2022  IMPRESSION:     No radiographic evidence of acute bony abnormality     Skin thickening and edema with some soft tissue calcifications most likely indicates venous stasis. Cellulitis is a possibility and there is no evidence of soft tissue gas. Dermatomyositis can be seen with soft tissue calcifications but (are not as    extensive as that often entails      LAST  WOUND CULTURE:  DATE :   Lab Results   Component Value Date/Time    CULTRSULT - (A) 02/08/2023 03:00 PM    CULTRSULT (A) 02/08/2023 03:00 PM     Klebsiella pneumoniae ESBL  Light growth  Extended Spectrum Beta-lactamase (ESBL) isolated.  ESBL's may be clinically resistant to therapy with  Penicillins,Cephalosporins or Aztreonam despite  apparent in vitro susceptibility to some of these agents.  The patient requires contact isolation.  Please contact pharmacy or an Infectious Disease Specialist  if you have any questions about appropriate therapy.      CULTRSULT (A) 02/08/2023 03:00 PM     Methicillin Resistant Staphylococcus aureus  Rare growth           ASSESSMENT AND PLAN:   1. Open wound of left lower extremity, subsequent encounter    5/18/2023: Wound bed with epithelization to the superior inferior aspect about to convert fully.  Granulation tissue throughout the remainder of the wound bed.    -No excisional debridement required in clinic today.  -Patient to return to the clinic 2 times per week.  1 time to be seen by a provider for assessment and debridement as needed.  A second time for wound dressing change by wound care.  -2 layer compression wrap initiated in clinic today.     Wound care: Hydrofiber silver, Hydrofiber, super absorbent pad, Unna boot, Coban    2. Type 2 diabetes mellitus without complication, without long-term current use of insulin ([facility])  Comments: Patient is currently on glipizide only    5/18/2023:   -Patient reports that his sugar this morning is 141  Patient has been instructed to keep tight control over FBS, <140 for optimal wound healing;  -Instructed to keep tight control over FBS, <140 for optimal wound healing; Implications of loss of protective sensation (LOPS) discussed with patient, including increased risk for amputation.  Advised to check feet at least daily, moisturize feet, and to always wear protective foot wear, arrange  meticulous regular foot care by podiatrist or CFCN. Pt with good understanding.      3. Wound infection    5/18/2023:   -No acute signs or symptoms of infection in clinic today.  -Continue to monitor for signs and symptoms of infection at each additional clinic visit        PATIENT EDUCATION  - Importance of adequate nutrition for wound healing  -Advised to go to ER for any increased redness, swelling, drainage, or odor, or if patient develops fever, chills, nausea or vomiting.     >20 min spent face to face with patient, >50% of time spent counseling, coordinating care, reviewing records, discussing POC, educating patient        Please note that this note may have been created using voice recognition software. I have worked with technical experts from [employer] [facility] [employer] to optimize the interface.  I have made every reasonable attempt to correct obvious errors, but there may be errors of grammar and possibly content that I did not discover before finalizing the note.    N

## 2023-05-22 ENCOUNTER — NON-PROVIDER VISIT (OUTPATIENT)
Dept: WOUND CARE | Facility: MEDICAL CENTER | Age: 63
End: 2023-05-22
Attending: GENERAL PRACTICE
Payer: COMMERCIAL

## 2023-05-22 PROCEDURE — 97602 WOUND(S) CARE NON-SELECTIVE: CPT

## 2023-05-22 NOTE — PATIENT INSTRUCTIONS
-Keep your wound dressing clean, dry, and intact.    -Change your dressing if it becomes soiled, soaked, or falls off.    -Remove your compression wrap if you have severe pain, severe swelling, numbness, color change, or temperature change in your toes. If you need to remove your compression wrap, do so by unrolling it. Do not cut the compression wrap off to prevent cutting yourself on accident.    -Should you experience any significant changes in your wound(s), such as infection (redness, swelling, localized heat, increased pain, fever > 101 F, chills) or have any questions regarding your home care instructions, please contact the wound center at [phone]. If after hours, contact your primary care physician or go to the hospital emergency room.

## 2023-05-22 NOTE — PROGRESS NOTES
Non-Selective Debridement with puracyn spray and clean gauze to debride non-viable tissue from the wound bed and periwound areas. Left lower extremity wrapped with soft conforming roll and 2 layer compression wrap. Refer to flow sheet for wound care details. Patient tolerated the procedure well.

## 2023-05-25 ENCOUNTER — OFFICE VISIT (OUTPATIENT)
Dept: WOUND CARE | Facility: MEDICAL CENTER | Age: 63
End: 2023-05-25
Attending: GENERAL PRACTICE
Payer: COMMERCIAL

## 2023-05-25 VITALS
RESPIRATION RATE: 18 BRPM | SYSTOLIC BLOOD PRESSURE: 142 MMHG | OXYGEN SATURATION: 94 % | HEART RATE: 81 BPM | TEMPERATURE: 97.7 F | DIASTOLIC BLOOD PRESSURE: 88 MMHG

## 2023-05-25 DIAGNOSIS — E11.9 TYPE 2 DIABETES MELLITUS WITHOUT COMPLICATION, WITHOUT LONG-TERM CURRENT USE OF INSULIN (HCC): ICD-10-CM

## 2023-05-25 DIAGNOSIS — T14.8XXA WOUND INFECTION: ICD-10-CM

## 2023-05-25 DIAGNOSIS — L08.9 WOUND INFECTION: ICD-10-CM

## 2023-05-25 DIAGNOSIS — S81.802D OPEN WOUND OF LEFT LOWER EXTREMITY, SUBSEQUENT ENCOUNTER: ICD-10-CM

## 2023-05-25 PROCEDURE — 11042 DBRDMT SUBQ TIS 1ST 20SQCM/<: CPT

## 2023-05-25 PROCEDURE — 3077F SYST BP >= 140 MM HG: CPT | Performed by: NURSE PRACTITIONER

## 2023-05-25 PROCEDURE — 3079F DIAST BP 80-89 MM HG: CPT | Performed by: NURSE PRACTITIONER

## 2023-05-25 PROCEDURE — 11042 DBRDMT SUBQ TIS 1ST 20SQCM/<: CPT | Performed by: NURSE PRACTITIONER

## 2023-05-25 NOTE — PATIENT INSTRUCTIONS
-Keep your wound dressing clean, dry, and intact.    -Change your dressing if it becomes soiled, soaked, or falls off.    -Should you experience any significant changes in your wound(s), such as infection (redness, swelling, localized heat, increased pain, fever > 101 F, chills) or have any questions regarding your home care instructions, please contact the wound center at [phone]. If after hours, contact your primary care physician or go to the hospital emergency room.

## 2023-05-25 NOTE — PROGRESS NOTES
Order for solaris ready wraps faxed to [facility]. Measurements as follows:  Left:  Calf- 41cm  Ankle- 26cm  Length- 47cm    Right:  Calf- 43cm  Ankle- 24cm  Length- 47cm      Wound 01/16/23 Incision Leg Anterior;Lower Left (Active)   Wound Image    05/25/23 1430   Site Assessment Red;Granulation tissue;Yellow 05/25/23 1430   Periwound Assessment Maceration;Blanchable erythema;Edema 05/25/23 1430   Margins Attached edges 05/25/23 1430   Closure Secondary intention 01/16/23 1700   Drainage Amount Moderate 05/25/23 1430   Drainage Description Serosanguineous 05/25/23 1430   Treatments Cleansed;Topical Lidocaine;Provider debridement 05/25/23 1430   Wound Cleansing Puracyn [place] 05/25/23 1430   Periwound Protectant Barrier Paste;Skin Moisturizer 05/25/23 1430   Dressing Cleansing/Solutions Not Applicable 05/25/23 1430   Dressing Options Hydrofiber Silver;Absorbent Abdominal Pad;Dry Roll Gauze;Hypafix Tape;Tubigrip 05/25/23 1430   Dressing Changed Changed 05/22/23 1500   Dressing Status Clean;Dry;Intact 03/22/23 1500   Dressing Change/Treatment Frequency Other (Comments) 05/11/23 1500   Non-staged Wound Description Full thickness 05/25/23 1430   Wound Length (cm) 6 cm 05/25/23 1430   Wound Width (cm) 2 cm 05/25/23 1430   Wound Depth (cm) 0.2 cm 05/25/23 1430   Wound Surface Area (cm^2) 12 cm^2 05/25/23 1430   Wound Volume (cm^3) 2.4 cm^3 05/25/23 1430   Post-Procedure Length (cm) 4.8 cm 05/25/23 1430   Post-Procedure Width (cm) 1.8 cm 05/25/23 1430   Post-Procedure Depth (cm) 0.2 cm 05/25/23 1430   Post-Procedure Surface Area (cm^2) 8.64 cm^2 05/25/23 1430   Post-Procedure Volume (cm^3) 1.728 cm^3 05/25/23 1430   Wound Healing % 98 05/25/23 1430   Wound Bed Granulation (%) 100 % 03/06/23 1438   Tunneling (cm) 0 cm 05/25/23 1430   Tunneling Clock Position of Wound 2 05/04/23 1445   Tunneling - 2nd Location (cm) 1.6 cm 02/08/23 1500   Tunneling Clock Position of Wound - 2nd Location 7 02/08/23 1500   Tunneling - 3rd Location  (cm) 4.2 cm 02/01/23 1530   Tunneling Clock Position of Wound - 3rd Location 11 02/01/23 1530   Undermining (cm) 0 cm 05/25/23 1430   Undermining of Wound, 1st Location From 1 o'clock;To 2 o'clock 04/05/23 1535   Undermining (cm) - 2nd location 0 cm 04/14/23 1415   Undermining of Wound, 2nd Location From 11 o'clock;To 12 o'clock 04/05/23 1535   Undermining (cm) - 3rd location 2 cm 02/08/23 1500   Undermining of Wound, 3rd Location From 5 o'clock;To 5 o'clock 02/08/23 1500   Wound Odor None 05/25/23 1430   Pulses Left;1+;DP 02/08/23 1500   Exposed Structures None 05/25/23 1430

## 2023-05-26 NOTE — PROGRESS NOTES
Provider Encounter- Full Thickness wound    HISTORY OF PRESENT ILLNESS  Wound History:   START OF CARE IN CLINIC: 1/16/2023 (return to clinic following hospitalization)   REFERRING PROVIDER:   Dr. Paige  WOUND- Full Thickness Wound   LOCATION: Left lower extremity, right anterior knee, left anterior knee   HISTORY: Patient presented to [facility] on 12/4/2022 with left lower extremity, fever, malaise, erythema x1 week.  He was originally admitted to [facility] for acute kidney injury and sepsis but with worsening renal function was transferred to [facility].  Wound cultures grew Streptococcus pyogenes group A and MSSA.  Patient was initially treated with cefazolin.  He is seen by the wound care team who recommended follow-up as an outpatient in wound care clinic.  While inpatient nephrology saw the patient and started him on dialysis for acute kidney injury.  His renal function improved and dialysis was stopped.  Patient was discharged with follow-up in the wound care clinic.    Patient was following with wound clinic, however on clinic visit 1/3/2022 he was noticed to have worsening wounds and concern for infection. He had failed outpatient Abx with zyvox and was sent to ED for further evaluation. Patient was admitted to [facility] from 1/3-1/13/2023 for sepsis and left lower extremity cellulitis with abscess. He was taken to OR for I&D with Dr. Odom. Patient's wound was treated with wound VAC. Surgical cultures were positive for enterobacter cloacae and was treated with ciprofloxacin with end date of 1/21/2023. Patient was referred back to [facility] for further care.    Pertinent Medical History: Hypertension, alcohol abuse, kidney disease, GERD, mitral valve prolapse, morbid obesity, obstructive sleep apnea, type 2 diabetes    TOBACCO USE: Former smoker denies use of smokeless tobacco    Patient's problem list, allergies, and current medications reviewed and updated in Epic    Interval  History:  2023: Clinic visit with Travis Bonilla MD. Patient reports feeling well since hospitalization. He presented with wound VAC. He will be traveling to [place] for  and will be back on Monday. Wound VAC will be held while out of town and will plan to reapply next week. He was instructed on performing wound care until returns to clinic. Patient is tolerating Abx, continues to have some periwound erythema without warmth or pain. He has two new areas of fluctuance, concerning for superficial abscess, discussed performing I&D, patient agreeable.    2023 : Clinic visit with PANCHO Medina, EL, MERI, RYLEE.   Patient states that overall he is feeling well blood sugars have been in the low 100s.  No significant improvement in his wounds.  He does mention that he has raised bumps to his arms, this has been going on for several weeks, he noticed a new bump today on his left forearm.  They are slightly tender with palpation, no fluctuance.  He also has a diffuse rash to his arms and hands.  He completed his antibiotics 3 or 4 days ago.  States he may go into the emergency room in [place] after he leaves the clinic.  Denies any fevers, chills, nausea, vomiting.    2023 : Clinic visit with PANCHO Medina, EL, MERI, RYLEE.   Kwesi states that overall he is feeling well, though a little bit stressed.  His stepdaughter is in hospital.  He did  his Zyvox prescription, prescribed on Monday for positive MRSA wound culture, light growth.  He is tolerating without any difficulty.  The smaller wounds to his leg present today with boggy tissue, at least 1 of these communicates with the larger wound.  He has an appointment to follow-up with his surgeon, Dr. Odom, on .  His blood sugar today was 126.  The bumps on his forearms are about the same, he did not go to the emergency room last week.    2/10/2023 : Clinic visit with PANCHO Medina, EL, MERI, RYLEE.   Patient states  that overall he is feeling well.  His blood sugars have consistently been running between 120 and 140.  As discussed last visit, the tissue between the lateral wound and a small anterior wound, overlying wound tract, was excised in clinic today using local anesthesia.  Wound VAC continued.  He does still have quite a bit of undermining all around the wound.  He sees his surgeon next Friday.   He will also be undergoing biopsies of the lumps on his arms and hands on Monday by his PCP.     2/17/2023 : Clinic visit with PANCHO Medina, FNP-BC, CWOCN, CFCN.   Kwesi continues to feel well.  He was seen by a surgeon earlier today, who was overall satisfied with wound progress, did not feel skin over undermining needed to be excised.  Per patient, surgeon is going to speak with one of his colleagues about possible skin grafting at some point.  Surgeon did feel patient would benefit from higher levels of compression as his leg is quite edematous still.  Patient was prescribed minocycline and Zyvox earlier this week due to positive wound culture.  He is taking these as prescribed, and states he is tolerating without difficulty.   He did not have the biopsy of lumps in his arms as expected earlier this week.  Lumps have dissipated.  However raised lesion to left dorsal thenar hand has grown larger, suspicious for malignancy.  He has an appointment with dermatology next Wednesday.    2/27/2023: Clinic visit with PANCHO Teixeira.  Patient reports that he completed his oral antibiotics last Saturday patient reports he had mild diarrhea from the antibiotics however it is improving since he has completed his antibiotics Saturday.  Patient's wound beds are improving decreased depth to the 2 superior left anterior wounds.  Granulation tissue throughout the largest left anterior lower extremity wound and the posterior left lower extremity wound.   Does have 1 raised lump along the previous track pad area no signs or  symptoms of infection however, patient reports that these lumps are how the other wounds started.  No additional antibiotics we will continue to monitor.    3/8/2023: Clinic visit with Travis Bonilla MD. Patient reports doing ok. Denies any problems with his wound VAC. Patient's wounds are slowly improving. Less undermining to medial aspect of wound, continues to have undermining 2 o'clock. His posterior wound appears to be improving. 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    3/15/2023 : Clinic visit with PANCHO Medina, FNFERMÍN-BC, CWAMAN, CFVALE.   Kwesi states he is feeling well overall.  He is currently not on any antibiotics.  Erythema to his left lower leg has been persistent, no changes however.  VAC has been on hold t since 3/13 due to periwound issues, which have since resolved..  Patient is agreeable to restarting VAC today.  He is anxious to get this wound healed as quickly as possible.   He was seen by his surgeon earlier this week, who was satisfied with current progress of this wound.   Patient's blood pressure elevated today, 175/99.  He denies any symptoms of CVA or MI-reviewed with him in clinic today.  He states he did not take his blood pressure medication today, will take it when he gets home.    4/5/2023: Clinic visit with Travis Bonilla MD. Patient reports doing well. Denies any issues with wound VAC. He is tolerating compression without issue. Wound is measuring smaller and undermining has improved. Patient's BP remains slightly elevated, improved from previous and recommend he follow up with PCP for BP control. Reports glucose has been well controlled, mostly under 150.    4/12/2023: Clinic visit with PANCHO Teixeira.  Patient reports that he is doing well denies any fever or chills.  Patient's wound has beefy red granulation tissue forming throughout the wound bed there is  a slight amount of epithelial tissue at the periphery to the eye inferior lateral aspect.    4/27/2023: Clinic visit with PANCHO Teixeira.  Patient's wound is progressing at this point we will hold the wound VAC.  If the wound is progressing we will have the patient return to the wound VAC on Monday.  I will quickly follow-up on Monday to assess the wound and determine whether we will continue it or send it back to [facility]/.  Patient's insurance is requesting either a peer to peer or sending the wound VAC back.  The wound is appropriate at this time for discharge of the wound VAC however if it digresses significantly over the weekend we will reapply and I will contact the patient's insurance to perform a peer to peer consultation.    5/4/2023 : Clinic visit with PANCHO Medina, EL, MERI, RYLEE.   Patient's wound continues to progress, area has decreased since last assessment.  VAC discontinued.  Patient to send pump back to .  He states that he is feeling well, his blood sugar today was 132.    5/11/2023: Clinic visit with PANCHO Teixeira.  My next wound continues to progress slowly.  Patient has increased epithelial tissue at the superior and inferior aspects of the wound bed and laterally.  Granulation tissue continues to fill the wound bed.  Patient reports that he has had bad allergies lately he reports he is currently taking Zyrtec.  Patient denies any fever or chills.    5/18/2023: Clinic visit with PANCHO Teixeira. Patient's wound is improving however measurements have not decreased.  Patient does have new epithelial tissue forming to the superior and inferior aspect of the wound bed.    5/26/2023: Clinic visit with PANCHO Teixeira. Patient's wound continues to improve.  Decreasing by mentions.  Patient has increased epithelial tissue inferiorly and to the medial and superior aspect of the wound bed.      REVIEW OF SYSTEMS:   Unchanged from previous clinic visit on  5/18/2023, except as documented in interval history above    PHYSICAL EXAMINATION:   BP (!) 142/88 (BP Location: Right arm, Patient Position: Sitting) Comment: RN notified  Pulse 81   Temp 36.5 °C (97.7 °F) (Temporal)   Resp 18   SpO2 94%     Physical Exam  Constitutional:       Appearance: He is obese.   Cardiovascular:      Rate and Rhythm: Normal rate.   Pulmonary:      Effort: Pulmonary effort is normal.   Musculoskeletal:      Right lower leg: Edema present.      Left lower leg: Edema present.      Comments: Generalized dependent edema bilateral lower extremities   Skin:     Comments: Full-thickness wound to the left anterior lower extremity status post I&D - wound continues to improve increased the tissue.  No periwound erythema, edema or foul-smelling odor emanating from wound bed  Refer to wound flowsheet and photos     Neurological:      Mental Status: He is alert and oriented to person, place, and time.         WOUND ASSESSMENT  Wound 01/16/23 Incision Leg Anterior;Lower Left (Active)   Wound Image    05/25/23 1430   Site Assessment Red;Granulation tissue;Yellow 05/25/23 1430   Periwound Assessment Maceration;Blanchable erythema;Edema 05/25/23 1430   Margins Attached edges 05/25/23 1430   Closure Secondary intention 01/16/23 1700   Drainage Amount Moderate 05/25/23 1430   Drainage Description Serosanguineous 05/25/23 1430   Treatments Cleansed;Topical Lidocaine;Provider debridement 05/25/23 1430   Wound Cleansing Puracyn [place] 05/25/23 1430   Periwound Protectant Barrier Paste;Skin Moisturizer 05/25/23 1430   Dressing Cleansing/Solutions Not Applicable 05/25/23 1430   Dressing Options Hydrofiber Silver;Absorbent Abdominal Pad;Dry Roll Gauze;Hypafix Tape;Tubigrip 05/25/23 1430   Dressing Changed Changed 05/22/23 1500   Dressing Status Clean;Dry;Intact 03/22/23 1500   Dressing Change/Treatment Frequency Other (Comments) 05/11/23 1500   Non-staged Wound Description Full thickness 05/25/23 1430   Wound  Length (cm) 6 cm 05/25/23 1430   Wound Width (cm) 2 cm 05/25/23 1430   Wound Depth (cm) 0.2 cm 05/25/23 1430   Wound Surface Area (cm^2) 12 cm^2 05/25/23 1430   Wound Volume (cm^3) 2.4 cm^3 05/25/23 1430   Post-Procedure Length (cm) 4.8 cm 05/25/23 1430   Post-Procedure Width (cm) 1.8 cm 05/25/23 1430   Post-Procedure Depth (cm) 0.2 cm 05/25/23 1430   Post-Procedure Surface Area (cm^2) 8.64 cm^2 05/25/23 1430   Post-Procedure Volume (cm^3) 1.728 cm^3 05/25/23 1430   Wound Healing % 98 05/25/23 1430   Wound Bed Granulation (%) 100 % 03/06/23 1438   Tunneling (cm) 0 cm 05/25/23 1430   Tunneling Clock Position of Wound 2 05/04/23 1445   Tunneling - 2nd Location (cm) 1.6 cm 02/08/23 1500   Tunneling Clock Position of Wound - 2nd Location 7 02/08/23 1500   Tunneling - 3rd Location (cm) 4.2 cm 02/01/23 1530   Tunneling Clock Position of Wound - 3rd Location 11 02/01/23 1530   Undermining (cm) 0 cm 05/25/23 1430   Undermining of Wound, 1st Location From 1 o'clock;To 2 o'clock 04/05/23 1535   Undermining (cm) - 2nd location 0 cm 04/14/23 1415   Undermining of Wound, 2nd Location From 11 o'clock;To 12 o'clock 04/05/23 1535   Undermining (cm) - 3rd location 2 cm 02/08/23 1500   Undermining of Wound, 3rd Location From 5 o'clock;To 5 o'clock 02/08/23 1500   Wound Odor None 05/25/23 1430   Pulses Left;1+;DP 02/08/23 1500   Exposed Structures None 05/25/23 1430   Number of days: 130       PROCEDURE: Excisional debridement of the left anterior lower extremity wound to remove nonviable tissue  -2% viscous lidocaine applied topically to wound bed for approximately 5 minutes prior to debridement  -Curette used to debride wound bed.  Excisional debridement was performed to remove devitalized tissue until healthy, bleeding tissue was visualized.   Entire surface of wound, 8.64 cm2 debrided.  Tissue debrided into the subcutaneous layer.    -Bleeding controlled with manual pressure.    -Wound care completed by wound RN, refer to  flowsheet  -Patient tolerated the procedure well, without c/o pain or discomfort.      Pertinent Labs and Diagnostics:    Labs:     A1c:   Lab Results   Component Value Date/Time    HBA1C 8.5 (H) 12/04/2022 09:27 PM            IMAGING: Dx to tibia and fibula dated 12/5/2022  IMPRESSION:     No radiographic evidence of acute bony abnormality     Skin thickening and edema with some soft tissue calcifications most likely indicates venous stasis. Cellulitis is a possibility and there is no evidence of soft tissue gas. Dermatomyositis can be seen with soft tissue calcifications but (are not as   extensive as that often entails      LAST  WOUND CULTURE:  DATE :   Lab Results   Component Value Date/Time    CULTRSULT - (A) 02/08/2023 03:00 PM    CULTRSULT (A) 02/08/2023 03:00 PM     Klebsiella pneumoniae ESBL  Light growth  Extended Spectrum Beta-lactamase (ESBL) isolated.  ESBL's may be clinically resistant to therapy with  Penicillins,Cephalosporins or Aztreonam despite  apparent in vitro susceptibility to some of these agents.  The patient requires contact isolation.  Please contact pharmacy or an Infectious Disease Specialist  if you have any questions about appropriate therapy.      CULTRSULT (A) 02/08/2023 03:00 PM     Methicillin Resistant Staphylococcus aureus  Rare growth           ASSESSMENT AND PLAN:   1. Open wound of left lower extremity, subsequent encounter    5/25/2023: Wound bed with epithelization to the superior inferior aspect about to convert fully.  Granulation tissue throughout the remainder of the wound bed.    -Excisional debridement performed to remove nonviable tissue from the periwound area  -Patient to return to the clinic 2 times per week.  1 time to be seen by a provider for assessment and debridement as needed.  A second time for wound dressing change by wound care.  - continue 2 layer compression wrap to the left lower extremity.  -Solaris ready wrap ordered for the left lower extremity.   Patient would like to purchase one for the right lower extremity.  Patient given the number for prism to call in order the right lower extremity compression wrap.  Patient given measurements for both legs.     Wound care: Hydrofiber silver, absorbent abdominal pad, dry roll gauze, Heifitz tape, Tubigrip E    2. Type 2 diabetes mellitus without complication, without long-term current use of insulin ([facility])  Comments: Patient is currently on glipizide only    5/25/2023:   Patient has been instructed to keep tight control over FBS, <140 for optimal wound healing;  -Instructed to keep tight control over FBS, <140 for optimal wound healing; Implications of loss of protective sensation (LOPS) discussed with patient, including increased risk for amputation.  Advised to check feet at least daily, moisturize feet, and to always wear protective foot wear, arrange meticulous regular foot care by podiatrist or CFCN. Pt with good understanding.      3. Wound infection    5/25/2023:   -No acute signs or symptoms of infection this clinic visit  -Continue to monitor for signs symptoms of infection at each additional clinic visit.        PATIENT EDUCATION  - Importance of adequate nutrition for wound healing  -Advised to go to ER for any increased redness, swelling, drainage, or odor, or if patient develops fever, chills, nausea or vomiting.           Please note that this note may have been created using voice recognition software. I have worked with technical experts from [employer] to optimize the interface.  I have made every reasonable attempt to correct obvious errors, but there may be errors of grammar and possibly content that I did not discover before finalizing the note.    N

## 2023-05-29 ENCOUNTER — NON-PROVIDER VISIT (OUTPATIENT)
Dept: WOUND CARE | Facility: MEDICAL CENTER | Age: 63
End: 2023-05-29
Attending: GENERAL PRACTICE
Payer: COMMERCIAL

## 2023-05-29 PROCEDURE — 97602 WOUND(S) CARE NON-SELECTIVE: CPT

## 2023-05-29 NOTE — PATIENT INSTRUCTIONS
-Keep your wound dressing clean, dry, and intact.    -Change your dressing if it becomes soiled, soaked, or falls off.    -Should you experience any significant changes in your wound(s), such as infection (redness, swelling, localized heat, increased pain, fever > 101 F, chills) or have any questions regarding your home care instructions, please contact the wound center at [phone]. If after hours, contact your primary care physician or go to the hospital emergency room.

## 2023-05-29 NOTE — PROCEDURES
Non-selective debridement using puracyn wound cleanser spray and dry gauze to remove built up drainage and biofilm from wound bed.  Pt brought in his Ready Wrap compression stocking and was instructed on how to apply it with a return demonstration.

## 2023-06-01 ENCOUNTER — OFFICE VISIT (OUTPATIENT)
Dept: WOUND CARE | Facility: MEDICAL CENTER | Age: 63
End: 2023-06-01
Attending: GENERAL PRACTICE
Payer: COMMERCIAL

## 2023-06-01 VITALS
OXYGEN SATURATION: 98 % | DIASTOLIC BLOOD PRESSURE: 86 MMHG | HEART RATE: 82 BPM | SYSTOLIC BLOOD PRESSURE: 162 MMHG | RESPIRATION RATE: 18 BRPM | TEMPERATURE: 97.7 F

## 2023-06-01 DIAGNOSIS — S81.802D OPEN WOUND OF LEFT LOWER EXTREMITY, SUBSEQUENT ENCOUNTER: ICD-10-CM

## 2023-06-01 DIAGNOSIS — E11.9 TYPE 2 DIABETES MELLITUS WITHOUT COMPLICATION, WITHOUT LONG-TERM CURRENT USE OF INSULIN (HCC): ICD-10-CM

## 2023-06-01 DIAGNOSIS — L08.9 WOUND INFECTION: ICD-10-CM

## 2023-06-01 DIAGNOSIS — T14.8XXA WOUND INFECTION: ICD-10-CM

## 2023-06-01 PROCEDURE — 3079F DIAST BP 80-89 MM HG: CPT | Performed by: NURSE PRACTITIONER

## 2023-06-01 PROCEDURE — 11042 DBRDMT SUBQ TIS 1ST 20SQCM/<: CPT

## 2023-06-01 PROCEDURE — 11042 DBRDMT SUBQ TIS 1ST 20SQCM/<: CPT | Performed by: NURSE PRACTITIONER

## 2023-06-01 PROCEDURE — 3077F SYST BP >= 140 MM HG: CPT | Performed by: NURSE PRACTITIONER

## 2023-06-02 NOTE — PROGRESS NOTES
Provider Encounter- Full Thickness wound    HISTORY OF PRESENT ILLNESS  Wound History:   START OF CARE IN CLINIC: 1/16/2023 (return to clinic following hospitalization)   REFERRING PROVIDER:   Dr. Paige  WOUND- Full Thickness Wound   LOCATION: Left lower extremity, right anterior knee, left anterior knee   HISTORY: Patient presented to [facility] on 12/4/2022 with left lower extremity, fever, malaise, erythema x1 week.  He was originally admitted to [facility] for acute kidney injury and sepsis but with worsening renal function was transferred to [facility].  Wound cultures grew Streptococcus pyogenes group A and MSSA.  Patient was initially treated with cefazolin.  He is seen by the wound care team who recommended follow-up as an outpatient in wound care clinic.  While inpatient nephrology saw the patient and started him on dialysis for acute kidney injury.  His renal function improved and dialysis was stopped.  Patient was discharged with follow-up in the wound care clinic.    Patient was following with wound clinic, however on clinic visit 1/3/2022 he was noticed to have worsening wounds and concern for infection. He had failed outpatient Abx with zyvox and was sent to ED for further evaluation. Patient was [place] [facility] [place] 1/3-1/13/2023 for sepsis and left lower extremity cellulitis with abscess. He was taken to OR for I&D with Dr. Odom. Patient's wound was treated with wound VAC. Surgical cultures were positive for enterobacter cloacae and was treated with ciprofloxacin with end date of 1/21/2023. Patient was referred back to [facility] for further care.    Pertinent Medical History: Hypertension, alcohol abuse, kidney disease, GERD, mitral valve prolapse, morbid obesity, obstructive sleep apnea, type 2 diabetes    TOBACCO USE: Former smoker denies use of smokeless tobacco    Patient's problem list, allergies, and current medications reviewed and updated in Epic    Interval  History:  2023: Clinic visit with Travis Bonilla MD. Patient reports feeling well since hospitalization. He presented with wound VAC. He will be traveling to [place] for  and will be back on Monday. Wound VAC will be held while out of town and will plan to reapply next week. He was instructed on performing wound care until returns to clinic. Patient is tolerating Abx, continues to have some periwound erythema without warmth or pain. He has two new areas of fluctuance, concerning for superficial abscess, discussed performing I&D, patient agreeable.    2023 : Clinic visit with PANCHO Medina, EL, MERI, RYLEE.   Patient states that overall he is feeling well blood sugars have been in the low 100s.  No significant improvement in his wounds.  He does mention that he has raised bumps to his arms, this has been going on for several weeks, he noticed a new bump today on his left forearm.  They are slightly tender with palpation, no fluctuance.  He also has a diffuse rash to his arms and hands.  He completed his antibiotics 3 or 4 days ago.  States he may go into the emergency room in [place] after he leaves the clinic.  Denies any fevers, chills, nausea, vomiting.    2023 : Clinic visit with PANCHO Medina, EL, MERI, RYLEE.   Kwesi states that overall he is feeling well, though a little bit stressed.  His stepdaughter is in hospital.  He did  his Zyvox prescription, prescribed on Monday for positive MRSA wound culture, light growth.  He is tolerating without any difficulty.  The smaller wounds to his leg present today with boggy tissue, at least 1 of these communicates with the larger wound.  He has an appointment to follow-up with his surgeon, Dr. Odom, on .  His blood sugar today was 126.  The bumps on his forearms are about the same, he did not go to the emergency room last week.    2/10/2023 : Clinic visit with PANCHO Medina, EL, MERI, RYLEE.   Patient states  that overall he is feeling well.  His blood sugars have consistently been running between 120 and 140.  As discussed last visit, the tissue between the lateral wound and a small anterior wound, overlying wound tract, was excised in clinic today using local anesthesia.  Wound VAC continued.  He does still have quite a bit of undermining all around the wound.  He sees his surgeon next Friday.   He will also be undergoing biopsies of the lumps on his arms and hands on Monday by his PCP.     2/17/2023 : Clinic visit with PANCHO Medina, FNP-BC, CWOCN, CFCN.   Kwesi continues to feel well.  He was seen by a surgeon earlier today, who was overall satisfied with wound progress, did not feel skin over undermining needed to be excised.  Per patient, surgeon is going to speak with one of his colleagues about possible skin grafting at some point.  Surgeon did feel patient would benefit from higher levels of compression as his leg is quite edematous still.  Patient was prescribed minocycline and Zyvox earlier this week due to positive wound culture.  He is taking these as prescribed, and states he is tolerating without difficulty.   He did not have the biopsy of lumps in his arms as expected earlier this week.  Lumps have dissipated.  However raised lesion to left dorsal thenar hand has grown larger, suspicious for malignancy.  He has an appointment with dermatology next Wednesday.    2/27/2023: Clinic visit with PANCHO Teixeira.  Patient reports that he completed his oral antibiotics last Saturday patient reports he had mild diarrhea from the antibiotics however it is improving since he has completed his antibiotics Saturday.  Patient's wound beds are improving decreased depth to the 2 superior left anterior wounds.  Granulation tissue throughout the largest left anterior lower extremity wound and the posterior left lower extremity wound.   Does have 1 raised lump along the previous track pad area no signs or  symptoms of infection however, patient reports that these lumps are how the other wounds started.  No additional antibiotics we will continue to monitor.    3/8/2023: Clinic visit with Travis Bonilla MD. Patient reports doing ok. Denies any problems with his wound VAC. Patient's wounds are slowly improving. Less undermining to medial aspect of wound, continues to have undermining 2 o'clock. His posterior wound appears to be improving. 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    3/15/2023 : Clinic visit with PANCHO Medina, FNFERMÍN-BC, CWAMAN, CFVALE.   Kwesi states he is feeling well overall.  He is currently not on any antibiotics.  Erythema to his left lower leg has been persistent, no changes however.  VAC has been on hold t since 3/13 due to periwound issues, which have since resolved..  Patient is agreeable to restarting VAC today.  He is anxious to get this wound healed as quickly as possible.   He was seen by his surgeon earlier this week, who was satisfied with current progress of this wound.   Patient's blood pressure elevated today, 175/99.  He denies any symptoms of CVA or MI-reviewed with him in clinic today.  He states he did not take his blood pressure medication today, will take it when he gets home.    4/5/2023: Clinic visit with Travis Bonilla MD. Patient reports doing well. Denies any issues with wound VAC. He is tolerating compression without issue. Wound is measuring smaller and undermining has improved. Patient's BP remains slightly elevated, improved from previous and recommend he follow up with PCP for BP control. Reports glucose has been well controlled, mostly under 150.    4/12/2023: Clinic visit with PANCHO Teixeira.  Patient reports that he is doing well denies any fever or chills.  Patient's wound has beefy red granulation tissue forming throughout the wound bed there is  a slight amount of epithelial tissue at the periphery to the eye inferior lateral aspect.    4/27/2023: Clinic visit with PANCHO Teixeira.  Patient's wound is progressing at this point we will hold the wound VAC.  If the wound is progressing we will have the patient return to the wound VAC on Monday.  I will quickly follow-up on Monday to assess the wound and determine whether we will continue it or send it back to [facility]/.  Patient's insurance is requesting either a peer to peer or sending the wound VAC back.  The wound is appropriate at this time for discharge of the wound VAC however if it digresses significantly over the weekend we will reapply and I will contact the patient's insurance to perform a peer to peer consultation.    5/4/2023 : Clinic visit with PANCHO Medina, EL, MERI, RYLEE.   Patient's wound continues to progress, area has decreased since last assessment.  VAC discontinued.  Patient to send pump back to .  He states that he is feeling well, his blood sugar today was 132.    5/11/2023: Clinic visit with PANCHO Teixeira.  My next wound continues to progress slowly.  Patient has increased epithelial tissue at the superior and inferior aspects of the wound bed and laterally.  Granulation tissue continues to fill the wound bed.  Patient reports that he has had bad allergies lately he reports he is currently taking Zyrtec.  Patient denies any fever or chills.    5/18/2023: Clinic visit with PANCHO Teixeira. Patient's wound is improving however measurements have not decreased.  Patient does have new epithelial tissue forming to the superior and inferior aspect of the wound bed.    5/26/2023: Clinic visit with PANCHO Teixeira. Patient's wound continues to improve.  Decreasing by mentions.  Patient has increased epithelial tissue inferiorly and to the medial and superior aspect of the wound bed.     6/1/2023: Clinic visit with PANCHO Teixeira.  Patient's wound  continues to progress nicely to the inferior half of the wound bed with new epithelial tissue.  The entirety of the patient's wound bed is filling with granulation tissue.  Hopeful for resolution within 3 weeks.      REVIEW OF SYSTEMS:   Unchanged from previous clinic visit on 5/26/2023, except as documented in interval history above    PHYSICAL EXAMINATION:   BP (!) 162/86   Pulse 82   Temp 36.5 °C (97.7 °F) (Temporal)   Resp 18   SpO2 98%     Physical Exam  Constitutional:       Appearance: He is obese.   Cardiovascular:      Rate and Rhythm: Normal rate.   Pulmonary:      Effort: Pulmonary effort is normal.   Musculoskeletal:      Right lower leg: Edema present.      Left lower leg: Edema present.      Comments: Generalized dependent edema bilateral lower extremities   Skin:     Comments: Full-thickness wound to the left anterior lower extremity status post I&D -wound bed continues to progress proximally 50% of the wound bed has epithelialized.  There is no periwound erythema, edema or foul-smelling odor every for the wound bed.     Neurological:      Mental Status: He is alert and oriented to person, place, and time.         WOUND ASSESSMENT  Wound 01/16/23 Incision Leg Anterior;Lower Left (Active)   Wound Image    06/01/23 1430   Site Assessment Red;Granulation tissue;Yellow 06/01/23 1430   Periwound Assessment Intact;Induration;Blanchable erythema 06/01/23 1430   Margins Attached edges 06/01/23 1430   Closure Secondary intention 01/16/23 1700   Drainage Amount Large 06/01/23 1430   Drainage Description Serosanguineous;Rodriguez 06/01/23 1430   Treatments Cleansed;Provider debridement 06/01/23 1430   Wound Cleansing Foam Cleanser/Washcloth 06/01/23 1430   Periwound Protectant Not Applicable 06/01/23 1430   Dressing Cleansing/Solutions Not Applicable 06/01/23 1430   Dressing Options Other (Comments);Dry Roll Gauze;Hypafix Tape 06/01/23 1430   Dressing Changed New 06/01/23 1430   Dressing Status Clean;Dry;Intact  03/22/23 1500   Dressing Change/Treatment Frequency Every 72 hrs, and As Needed 06/01/23 1430   Non-staged Wound Description Full thickness 06/01/23 1430   Wound Length (cm) 4.8 cm 06/01/23 1430   Wound Width (cm) 1.4 cm 06/01/23 1430   Wound Depth (cm) 0.2 cm 06/01/23 1430   Wound Surface Area (cm^2) 6.72 cm^2 06/01/23 1430   Wound Volume (cm^3) 1.344 cm^3 06/01/23 1430   Post-Procedure Length (cm) 4.5 cm 06/01/23 1430   Post-Procedure Width (cm) 1.6 cm 06/01/23 1430   Post-Procedure Depth (cm) 0.2 cm 06/01/23 1430   Post-Procedure Surface Area (cm^2) 7.2 cm^2 06/01/23 1430   Post-Procedure Volume (cm^3) 1.44 cm^3 06/01/23 1430   Wound Healing % 99 06/01/23 1430   Wound Bed Granulation (%) 100 % 03/06/23 1438   Tunneling (cm) 0 cm 06/01/23 1430   Tunneling Clock Position of Wound 2 05/04/23 1445   Tunneling - 2nd Location (cm) 1.6 cm 02/08/23 1500   Tunneling Clock Position of Wound - 2nd Location 7 02/08/23 1500   Tunneling - 3rd Location (cm) 4.2 cm 02/01/23 1530   Tunneling Clock Position of Wound - 3rd Location 11 02/01/23 1530   Undermining (cm) 0 cm 06/01/23 1430   Undermining of Wound, 1st Location From 1 o'clock;To 2 o'clock 04/05/23 1535   Undermining (cm) - 2nd location 0 cm 04/14/23 1415   Undermining of Wound, 2nd Location From 11 o'clock;To 12 o'clock 04/05/23 1535   Undermining (cm) - 3rd location 2 cm 02/08/23 1500   Undermining of Wound, 3rd Location From 5 o'clock;To 5 o'clock 02/08/23 1500   Wound Odor None 06/01/23 1430   Pulses Left;1+;DP 02/08/23 1500   Exposed Structures None 06/01/23 1430   Number of days: 136       PROCEDURE: Excisional debridement left anterior lower extremity wound.  -2% viscous lidocaine applied topically to wound bed for approximately 5 minutes prior to debridement  -Curette used to debride wound bed.  Excisional debridement was performed to remove devitalized tissue until healthy, bleeding tissue was visualized.   Entire surface of wound, 7.2 cm2 debrided.  Tissue  debrided into the subcutaneous layer.    -Bleeding controlled with manual pressure.    -Wound care completed by wound RN, refer to flowsheet  -Patient tolerated the procedure well, without c/o pain or discomfort.      Pertinent Labs and Diagnostics:    Labs:     A1c:   Lab Results   Component Value Date/Time    HBA1C 8.5 (H) 12/04/2022 09:27 PM            IMAGING: Dx to tibia and fibula dated 12/5/2022  IMPRESSION:     No radiographic evidence of acute bony abnormality     Skin thickening and edema with some soft tissue calcifications most likely indicates venous stasis. Cellulitis is a possibility and there is no evidence of soft tissue gas. Dermatomyositis can be seen with soft tissue calcifications but (are not as   extensive as that often entails      LAST  WOUND CULTURE:  DATE :   Lab Results   Component Value Date/Time    CULTRSULT - (A) 02/08/2023 03:00 PM    CULTRSULT (A) 02/08/2023 03:00 PM     Klebsiella pneumoniae ESBL  Light growth  Extended Spectrum Beta-lactamase (ESBL) isolated.  ESBL's may be clinically resistant to therapy with  Penicillins,Cephalosporins or Aztreonam despite  apparent in vitro susceptibility to some of these agents.  The patient requires contact isolation.  Please contact pharmacy or an Infectious Disease Specialist  if you have any questions about appropriate therapy.      CULTRSULT (A) 02/08/2023 03:00 PM     Methicillin Resistant Staphylococcus aureus  Rare growth           ASSESSMENT AND PLAN:   1. Open wound of left lower extremity, subsequent encounter    6/1/2023: Patient's wound continues to progress the granulation tissue to the inferior half of the wound bed is epithelializing.  Patient had a small amount of hypergranulation tissue to the superior aspect the wound which was taken down with silver nitrate in clinic today.  -Excisional debridement performed to remove nonviable tissue from the wound bed  -Patient to return to the clinic 2 times per week.  1 time to be seen  by a provider for assessment and debridement as needed.  A second time for wound dressing changes by wound care RN.  -Solaris ready wrap applied today in clinic.  Patient reports that he had minor itching at the wound bed site no other significant issues with the Solaris ready wrap     Wound care: Enluxtra solaris ready wrap    2. Type 2 diabetes mellitus without complication, without long-term current use of insulin ([facility])  Comments: Patient is currently on glipizide only    6/1/2023:   -I did not ask the patient his blood sugar in clinic today.   -Patient instructed to keep tight control over fasting blood sugar, <140 for optimal wound healing; Implications of loss of protective sensation (LOPS) discussed with patient, including increased risk for amputation.  Advised to check feet at least daily, moisturize feet, and to always wear protective foot wear, arrange meticulous regular foot care by podiatrist or CFCN. Pt with good understanding.       3. Wound infection    6/1/2023:   -No acute signs or symptoms of infection today in clinic  - I will monitor the wound bed for infection at each additional clinical appointment.         PATIENT EDUCATION  - Importance of adequate nutrition for wound healing  -Advised to go to ER for any increased redness, swelling, drainage, or odor, or if patient develops fever, chills, nausea or vomiting.           Please note that this note may have been created using voice recognition software. I have worked with technical experts from [employer] to optimize the interface.  I have made every reasonable attempt to correct obvious errors, but there may be errors of grammar and possibly content that I did not discover before finalizing the note.    N

## 2023-06-02 NOTE — PATIENT INSTRUCTIONS
-Keep your wound dressing clean, dry, and intact.    -Change your dressing if it becomes soiled, soaked, or falls off.    -Should you experience any significant changes in your wound(s), such as infection (redness, swelling, localized heat, increased pain, fever > 101 F, chills) or have any questions regarding your home care instructions, please contact the wound center at [phone]. If after hours, contact your primary care physician or go to the hospital emergency room.

## 2023-06-05 ENCOUNTER — NON-PROVIDER VISIT (OUTPATIENT)
Dept: WOUND CARE | Facility: MEDICAL CENTER | Age: 63
End: 2023-06-05
Attending: GENERAL PRACTICE
Payer: COMMERCIAL

## 2023-06-05 PROCEDURE — 97602 WOUND(S) CARE NON-SELECTIVE: CPT

## 2023-06-05 NOTE — PATIENT INSTRUCTIONS
Avoid prolonged standing or sitting without elevating your legs.  - Knee-high gradient compression to legs    Should you experience any significant changes in your wound(s), such as infection (redness, swelling, localized heat, increased pain, fever > 101 F, chills) or have any questions regarding your home care instructions, please contact the wound center at [phone]. If after hours, contact your primary care physician or go to the hospital emergency room.   Keep dressing clean, dry and covered while bathing. Only change dressing if it becomes over saturated, soiled or falls off.

## 2023-06-08 ENCOUNTER — OFFICE VISIT (OUTPATIENT)
Dept: WOUND CARE | Facility: MEDICAL CENTER | Age: 63
End: 2023-06-08
Attending: GENERAL PRACTICE
Payer: COMMERCIAL

## 2023-06-08 VITALS
HEART RATE: 69 BPM | OXYGEN SATURATION: 98 % | RESPIRATION RATE: 16 BRPM | TEMPERATURE: 97.2 F | DIASTOLIC BLOOD PRESSURE: 83 MMHG | SYSTOLIC BLOOD PRESSURE: 154 MMHG

## 2023-06-08 DIAGNOSIS — L08.9 WOUND INFECTION: ICD-10-CM

## 2023-06-08 DIAGNOSIS — E11.9 TYPE 2 DIABETES MELLITUS WITHOUT COMPLICATION, WITHOUT LONG-TERM CURRENT USE OF INSULIN (HCC): ICD-10-CM

## 2023-06-08 DIAGNOSIS — S81.802D OPEN WOUND OF LEFT LOWER EXTREMITY, SUBSEQUENT ENCOUNTER: ICD-10-CM

## 2023-06-08 DIAGNOSIS — T14.8XXA WOUND INFECTION: ICD-10-CM

## 2023-06-08 PROCEDURE — 3077F SYST BP >= 140 MM HG: CPT | Performed by: STUDENT IN AN ORGANIZED HEALTH CARE EDUCATION/TRAINING PROGRAM

## 2023-06-08 PROCEDURE — 11042 DBRDMT SUBQ TIS 1ST 20SQCM/<: CPT

## 2023-06-08 PROCEDURE — 11042 DBRDMT SUBQ TIS 1ST 20SQCM/<: CPT | Performed by: STUDENT IN AN ORGANIZED HEALTH CARE EDUCATION/TRAINING PROGRAM

## 2023-06-08 PROCEDURE — 3079F DIAST BP 80-89 MM HG: CPT | Performed by: STUDENT IN AN ORGANIZED HEALTH CARE EDUCATION/TRAINING PROGRAM

## 2023-06-08 NOTE — PROGRESS NOTES
Provider Encounter- Full Thickness wound    HISTORY OF PRESENT ILLNESS  Wound History:   START OF CARE IN CLINIC: 1/16/2023 (return to clinic following hospitalization)   REFERRING PROVIDER:   Dr. Paige  WOUND- Full Thickness Wound   LOCATION: Left lower extremity, right anterior knee, left anterior knee   HISTORY: Patient presented to [facility] on 12/4/2022 with left lower extremity, fever, malaise, erythema x1 week.  He was originally admitted to [facility] for acute kidney injury and sepsis but with worsening renal function was transferred to [facility].  Wound cultures grew Streptococcus pyogenes group A and MSSA.  Patient was initially treated with cefazolin.  He is seen by the wound care team who recommended follow-up as an outpatient in wound care clinic.  While inpatient nephrology saw the patient and started him on dialysis for acute kidney injury.  His renal function improved and dialysis was stopped.  Patient was discharged with follow-up in the wound care clinic.    Patient was following with wound clinic, however on clinic visit 1/3/2022 he was noticed to have worsening wounds and concern for infection. He had failed outpatient Abx with zyvox and was sent to ED for further evaluation. Patient was admitted to [facility] [place] 1/3-1/13/2023 for sepsis and left lower extremity cellulitis with abscess. He was taken to OR for I&D with Dr. Odom. Patient's wound was treated with wound VAC. Surgical cultures were positive for enterobacter cloacae and was treated with ciprofloxacin with end date of 1/21/2023. Patient was referred back to [facility] for further care.    Pertinent Medical History: Hypertension, alcohol abuse, kidney disease, GERD, mitral valve prolapse, morbid obesity, obstructive sleep apnea, type 2 diabetes    TOBACCO USE: Former smoker denies use of smokeless tobacco    Patient's problem list, allergies, and current medications reviewed and updated in Epic    Interval  History:  2023: Clinic visit with Travis Bonilla MD. Patient reports feeling well since hospitalization. He presented with wound VAC. He will be traveling to [place] for  and will be back on Monday. Wound VAC will be held while out of town and will plan to reapply next week. He was instructed on performing wound care until returns to clinic. Patient is tolerating Abx, continues to have some periwound erythema without warmth or pain. He has two new areas of fluctuance, concerning for superficial abscess, discussed performing I&D, patient agreeable.    2023 : Clinic visit with PANCHO Medina, EL, MERI, RYLEE.   Patient states that overall he is feeling well blood sugars have been in the low 100s.  No significant improvement in his wounds.  He does mention that he has raised bumps to his arms, this has been going on for several weeks, he noticed a new bump today on his left forearm.  They are slightly tender with palpation, no fluctuance.  He also has a diffuse rash to his arms and hands.  He completed his antibiotics 3 or 4 days ago.  States he may go into the emergency room in [place] after he leaves the clinic.  Denies any fevers, chills, nausea, vomiting.    2023 : Clinic visit with PANCHO Medina, EL, MERI, RYLEE.   Kwesi states that overall he is feeling well, though a little bit stressed.  His stepdaughter is in hospital.  He did  his Zyvox prescription, prescribed on Monday for positive MRSA wound culture, light growth.  He is tolerating without any difficulty.  The smaller wounds to his leg present today with boggy tissue, at least 1 of these communicates with the larger wound.  He has an appointment to follow-up with his surgeon, Dr. Odom, on .  His blood sugar today was 126.  The bumps on his forearms are about the same, he did not go to the emergency room last week.    2/10/2023 : Clinic visit with PANCHO Medina, EL, MERI, RYLEE.   Patient states  that overall he is feeling well.  His blood sugars have consistently been running between 120 and 140.  As discussed last visit, the tissue between the lateral wound and a small anterior wound, overlying wound tract, was excised in clinic today using local anesthesia.  Wound VAC continued.  He does still have quite a bit of undermining all around the wound.  He sees his surgeon next Friday.   He will also be undergoing biopsies of the lumps on his arms and hands on Monday by his PCP.     2/17/2023 : Clinic visit with PANCHO Medina, FNP-BC, CWOCN, CFCN.   Kwesi continues to feel well.  He was seen by a surgeon earlier today, who was overall satisfied with wound progress, did not feel skin over undermining needed to be excised.  Per patient, surgeon is going to speak with one of his colleagues about possible skin grafting at some point.  Surgeon did feel patient would benefit from higher levels of compression as his leg is quite edematous still.  Patient was prescribed minocycline and Zyvox earlier this week due to positive wound culture.  He is taking these as prescribed, and states he is tolerating without difficulty.   He did not have the biopsy of lumps in his arms as expected earlier this week.  Lumps have dissipated.  However raised lesion to left dorsal thenar hand has grown larger, suspicious for malignancy.  He has an appointment with dermatology next Wednesday.    2/27/2023: Clinic visit with PANCHO Teixeira.  Patient reports that he completed his oral antibiotics last Saturday patient reports he had mild diarrhea from the antibiotics however it is improving since he has completed his antibiotics Saturday.  Patient's wound beds are improving decreased depth to the 2 superior left anterior wounds.  Granulation tissue throughout the largest left anterior lower extremity wound and the posterior left lower extremity wound.   Does have 1 raised lump along the previous track pad area no signs or  symptoms of infection however, patient reports that these lumps are how the other wounds started.  No additional antibiotics we will continue to monitor.    3/8/2023: Clinic visit with Travis Bonilla MD. Patient reports doing ok. Denies any problems with his wound VAC. Patient's wounds are slowly improving. Less undermining to medial aspect of wound, continues to have undermining 2 o'clock. His posterior wound appears to be improving. 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    3/15/2023 : Clinic visit with PANCHO Medina, FNFERMÍN-BC, CWAMAN, CFVALE.   Kwesi states he is feeling well overall.  He is currently not on any antibiotics.  Erythema to his left lower leg has been persistent, no changes however.  VAC has been on hold t since 3/13 due to periwound issues, which have since resolved..  Patient is agreeable to restarting VAC today.  He is anxious to get this wound healed as quickly as possible.   He was seen by his surgeon earlier this week, who was satisfied with current progress of this wound.   Patient's blood pressure elevated today, 175/99.  He denies any symptoms of CVA or MI-reviewed with him in clinic today.  He states he did not take his blood pressure medication today, will take it when he gets home.    4/5/2023: Clinic visit with Travis Bonilla MD. Patient reports doing well. Denies any issues with wound VAC. He is tolerating compression without issue. Wound is measuring smaller and undermining has improved. Patient's BP remains slightly elevated, improved from previous and recommend he follow up with PCP for BP control. Reports glucose has been well controlled, mostly under 150.    4/12/2023: Clinic visit with PANCHO Teixeira.  Patient reports that he is doing well denies any fever or chills.  Patient's wound has beefy red granulation tissue forming throughout the wound bed there is  a slight amount of epithelial tissue at the periphery to the eye inferior lateral aspect.    4/27/2023: Clinic visit with PANCHO Teixeira.  Patient's wound is progressing at this point we will hold the wound VAC.  If the wound is progressing we will have the patient return to the wound VAC on Monday.  I will quickly follow-up on Monday to assess the wound and determine whether we will continue it or send it back to [facility]  Patient's insurance is requesting either a peer to peer or sending the wound VAC back.  The wound is appropriate at this time for discharge of the wound VAC however if it digresses significantly over the weekend we will reapply and I will contact the patient's insurance to perform a peer to peer consultation.    5/4/2023 : Clinic visit with PANCHO Medina, EL, MERI, RYLEE.   Patient's wound continues to progress, area has decreased since last assessment.  VAC discontinued.  Patient to send pump back to .  He states that he is feeling well, his blood sugar today was 132.    5/11/2023: Clinic visit with PANCHO Teixeira.  My next wound continues to progress slowly.  Patient has increased epithelial tissue at the superior and inferior aspects of the wound bed and laterally.  Granulation tissue continues to fill the wound bed.  Patient reports that he has had bad allergies lately he reports he is currently taking Zyrtec.  Patient denies any fever or chills.    5/18/2023: Clinic visit with PANCHO Teixeira. Patient's wound is improving however measurements have not decreased.  Patient does have new epithelial tissue forming to the superior and inferior aspect of the wound bed.    5/26/2023: Clinic visit with PANCHO Teixeira. Patient's wound continues to improve.  Decreasing by mentions.  Patient has increased epithelial tissue inferiorly and to the medial and superior aspect of the wound bed.     6/1/2023: Clinic visit with PANCHO Teixeira.  Patient's wound  continues to progress nicely to the inferior half of the wound bed with new epithelial tissue.  The entirety of the patient's wound bed is filling with granulation tissue.  Hopeful for resolution within 3 weeks.    6/8/2023: Clinic visit with Travis Bonilla MD. Patient reports doing well. His wound is measuring larger, I believe that inferior wound was evaluated as closed last week, however appear to be covered with crust over wound and was open. Tissue quality has improved and there is some epithelium forming at periphery. Continues to have heavy drainage, though amount appears to be improving. Denies any signs and symptoms of infection.      REVIEW OF SYSTEMS:   Unchanged from previous clinic visit on 6/1/2023, except as documented in interval history above    PHYSICAL EXAMINATION:   BP (!) 154/83 (BP Location: Right arm, Patient Position: Sitting) Comment: RN notified  Pulse 69   Temp 36.2 °C (97.2 °F) (Temporal)   Resp 16   SpO2 98%     Physical Exam  Constitutional:       Appearance: He is obese.   Cardiovascular:      Rate and Rhythm: Normal rate.   Pulmonary:      Effort: Pulmonary effort is normal.   Musculoskeletal:      Right lower leg: Edema present.      Left lower leg: Edema present.      Comments: Generalized dependent edema bilateral lower extremities   Skin:     Comments: Full-thickness wound to the left anterior lower extremity status post I&D - Wound measures larger today, some of the previous area thought to be closed was indeed open today. Tissue quality has improved with increased granulation and epithelium forming at periphery.  There is no periwound erythema, edema or foul-smelling odor.     Neurological:      Mental Status: He is alert and oriented to person, place, and time.         WOUND ASSESSMENT  Wound 01/16/23 Incision Leg Anterior;Lower Left (Active)   Wound Image    06/08/23 1500   Site Assessment Red;Granulation tissue;Epithelialization;Yellow 06/08/23 1500   Periwound  Assessment Blanchable erythema;Edema 06/08/23 1500   Margins Attached edges;Epibole (rolled edges) 06/08/23 1500   Closure Secondary intention 01/16/23 1700   Drainage Amount Large 06/08/23 1500   Drainage Description Serosanguineous 06/08/23 1500   Treatments Cleansed;Topical Lidocaine;Provider debridement;Site care 06/08/23 1500   Wound Cleansing Pur[place] 06/08/23 1500   Periwound Protectant Not Applicable 06/08/23 1500   Dressing Cleansing/Solutions Not Applicable 06/08/23 1500   Dressing Options Other (Comments);Dry Roll Gauze;Hypafix Tape 06/08/23 1500   Dressing Changed Changed 06/08/23 1500   Dressing Status Clean;Dry;Intact 03/22/23 1500   Dressing Change/Treatment Frequency Every 72 hrs, and As Needed 06/08/23 1500   Non-staged Wound Description Full thickness 06/08/23 1500   Wound Length (cm) 6 cm 06/08/23 1500   Wound Width (cm) 1.2 cm 06/08/23 1500   Wound Depth (cm) 0.1 cm 06/08/23 1500   Wound Surface Area (cm^2) 7.2 cm^2 06/08/23 1500   Wound Volume (cm^3) 0.72 cm^3 06/08/23 1500   Post-Procedure Length (cm) 6.4 cm 06/08/23 1500   Post-Procedure Width (cm) 1.3 cm 06/08/23 1500   Post-Procedure Depth (cm) 0.1 cm 06/08/23 1500   Post-Procedure Surface Area (cm^2) 8.32 cm^2 06/08/23 1500   Post-Procedure Volume (cm^3) 0.832 cm^3 06/08/23 1500   Wound Healing % 99 06/08/23 1500   Wound Bed Granulation (%) 100 % 03/06/23 1438   Tunneling (cm) 0 cm 06/08/23 1500   Tunneling Clock Position of Wound 2 05/04/23 1445   Tunneling - 2nd Location (cm) 1.6 cm 02/08/23 1500   Tunneling Clock Position of Wound - 2nd Location 7 02/08/23 1500   Tunneling - 3rd Location (cm) 4.2 cm 02/01/23 1530   Tunneling Clock Position of Wound - 3rd Location 11 02/01/23 1530   Undermining (cm) 0 cm 06/08/23 1500   Undermining of Wound, 1st Location From 1 o'clock;To 2 o'clock 04/05/23 1535   Undermining (cm) - 2nd location 0 cm 04/14/23 1415   Undermining of Wound, 2nd Location From 11 o'clock;To 12 o'clock 04/05/23 1535    Undermining (cm) - 3rd location 2 cm 02/08/23 1500   Undermining of Wound, 3rd Location From 5 o'clock;To 5 o'clock 02/08/23 1500   Wound Odor None 06/08/23 1500   Pulses Left;1+;DP 02/08/23 1500   Exposed Structures None 06/08/23 1500   Number of days: 143       PROCEDURE: Excisional debridement left anterior lower extremity wound.  -2% viscous lidocaine applied topically to wound bed for approximately 5 minutes prior to debridement  -Curette used to debride wound bed.  Excisional debridement was performed to remove devitalized tissue until healthy, bleeding tissue was visualized.   Entire surface of wound, 8.32 cm2 debrided.  Tissue debrided into the subcutaneous layer.    -Bleeding controlled with manual pressure.    -Wound care completed by wound RN, refer to flowsheet  -Patient tolerated the procedure well, without c/o pain or discomfort.      Pertinent Labs and Diagnostics:    Labs:     A1c:   Lab Results   Component Value Date/Time    HBA1C 8.5 (H) 12/04/2022 09:27 PM            IMAGING: Dx to tibia and fibula dated 12/5/2022  IMPRESSION:     No radiographic evidence of acute bony abnormality     Skin thickening and edema with some soft tissue calcifications most likely indicates venous stasis. Cellulitis is a possibility and there is no evidence of soft tissue gas. Dermatomyositis can be seen with soft tissue calcifications but (are not as   extensive as that often entails      LAST  WOUND CULTURE:  DATE :   Lab Results   Component Value Date/Time    CULTRSULT - (A) 02/08/2023 03:00 PM    CULTRSULT (A) 02/08/2023 03:00 PM     Klebsiella pneumoniae ESBL  Light growth  Extended Spectrum Beta-lactamase (ESBL) isolated.  ESBL's may be clinically resistant to therapy with  Penicillins,Cephalosporins or Aztreonam despite  apparent in vitro susceptibility to some of these agents.  The patient requires contact isolation.  Please contact pharmacy or an Infectious Disease Specialist  if you have any questions about  appropriate therapy.      MENDYULT (A) 02/08/2023 03:00 PM     Methicillin Resistant Staphylococcus aureus  Rare growth           ASSESSMENT AND PLAN:   1. Open wound of left lower extremity, subsequent encounter    6/8/2023: Patient's wound measures larger today, inferior aspect that was epithelialized is open today. Tissue quality continues to improve and noted to have some epithelium forming at periphery of wound.  -Excisional debridement performed to remove nonviable tissue from the wound bed  -Patient to return to the clinic 2 times per week.  1 time to be seen by a provider for assessment and debridement as needed.  A second time for wound dressing changes by wound care RN.  - Continue compression with Solaris Ready Wrap.     Wound care: Enluxtra, solaris ready wrap    2. Type 2 diabetes mellitus without complication, without long-term current use of insulin ([facility])  Comments: Patient is currently on glipizide only    6/8/2023:   -Patient instructed to keep tight control over fasting blood sugar, <140 for optimal wound healing; Implications of loss of protective sensation (LOPS) discussed with patient, including increased risk for amputation.  Advised to check feet at least daily, moisturize feet, and to always wear protective foot wear, arrange meticulous regular foot care by podiatrist or CFCN. Pt with good understanding.       3. Wound infection    6/8/2023:   -No acute signs or symptoms of infection today in clinic  - I will monitor the wound bed for infection at each additional clinical appointment.         PATIENT EDUCATION  - Importance of adequate nutrition for wound healing  -Advised to go to ER for any increased redness, swelling, drainage, or odor, or if patient develops fever, chills, nausea or vomiting.           Please note that this note may have been created using voice recognition software. I have worked with technical experts from [employer] to optimize the interface.  I have made every  reasonable attempt to correct obvious errors, but there may be errors of grammar and possibly content that I did not discover before finalizing the note.    N

## 2023-06-08 NOTE — PATIENT INSTRUCTIONS
-Keep your wound dressing clean, dry, and intact.    -Change your dressing if it becomes soiled, soaked, or falls off.    -Remove your solaris ready compression wrap if you have severe pain, severe swelling, numbness, color change, or temperature change in your toes.     -Should you experience any significant changes in your wound(s), such as infection (redness, swelling, localized heat, increased pain, fever > 101 F, chills) or have any questions regarding your home care instructions, please contact the wound center at [phone]. If after hours, contact your primary care physician or go to the hospital emergency room.

## 2023-06-12 ENCOUNTER — NON-PROVIDER VISIT (OUTPATIENT)
Dept: WOUND CARE | Facility: MEDICAL CENTER | Age: 63
End: 2023-06-12
Attending: GENERAL PRACTICE
Payer: COMMERCIAL

## 2023-06-12 PROCEDURE — 97602 WOUND(S) CARE NON-SELECTIVE: CPT

## 2023-06-12 NOTE — PROGRESS NOTES
Patient agreed to 1x/week appointments on Thursdays with providers & patient reports he will be able to change the dressing the other time during the week.

## 2023-06-12 NOTE — PATIENT INSTRUCTIONS
Avoid prolonged standing or sitting without elevating your legs.  - Knee-high gradient compression to legs  - Do not get dressing wet. Only remove if temperature or sensation changes.     Should you experience any significant changes in your wound(s), such as infection (redness, swelling, localized heat, increased pain, fever > 101 F, chills) or have any questions regarding your home care instructions, please contact the wound center at [phone]. If after hours, contact your primary care physician or go to the hospital emergency room.   Keep dressing clean, dry and covered while bathing. Only change dressing if it becomes over saturated, soiled or falls off.

## 2023-06-15 ENCOUNTER — NON-PROVIDER VISIT (OUTPATIENT)
Dept: WOUND CARE | Facility: MEDICAL CENTER | Age: 63
End: 2023-06-15
Attending: GENERAL PRACTICE
Payer: COMMERCIAL

## 2023-06-15 PROCEDURE — 97602 WOUND(S) CARE NON-SELECTIVE: CPT

## 2023-06-15 NOTE — PATIENT INSTRUCTIONS
-Keep your wound dressing clean, dry, and intact.    -Change your dressing every 3 to 4 days or if it becomes soiled, soaked, or falls off.    -Should you experience any significant changes in your wound(s), such as infection (redness, swelling, localized heat, increased pain, fever > 101 F, chills) or have any questions regarding your home care instructions, please contact the wound center at [phone]. If after hours, contact your primary care physician or go to the hospital emergency room.

## 2023-06-19 ENCOUNTER — APPOINTMENT (OUTPATIENT)
Dept: WOUND CARE | Facility: MEDICAL CENTER | Age: 63
End: 2023-06-19
Attending: GENERAL PRACTICE
Payer: COMMERCIAL

## 2023-06-22 ENCOUNTER — OFFICE VISIT (OUTPATIENT)
Dept: WOUND CARE | Facility: MEDICAL CENTER | Age: 63
End: 2023-06-22
Attending: GENERAL PRACTICE
Payer: COMMERCIAL

## 2023-06-22 ENCOUNTER — OFFICE VISIT (OUTPATIENT)
Dept: MEDICAL GROUP | Facility: MEDICAL CENTER | Age: 63
End: 2023-06-22
Payer: COMMERCIAL

## 2023-06-22 ENCOUNTER — HOSPITAL ENCOUNTER (OUTPATIENT)
Facility: MEDICAL CENTER | Age: 63
End: 2023-06-22
Attending: STUDENT IN AN ORGANIZED HEALTH CARE EDUCATION/TRAINING PROGRAM
Payer: COMMERCIAL

## 2023-06-22 VITALS
HEART RATE: 78 BPM | OXYGEN SATURATION: 96 % | SYSTOLIC BLOOD PRESSURE: 138 MMHG | DIASTOLIC BLOOD PRESSURE: 84 MMHG | RESPIRATION RATE: 16 BRPM | TEMPERATURE: 98 F

## 2023-06-22 VITALS
HEART RATE: 94 BPM | SYSTOLIC BLOOD PRESSURE: 158 MMHG | RESPIRATION RATE: 16 BRPM | OXYGEN SATURATION: 97 % | TEMPERATURE: 98.1 F | WEIGHT: 315 LBS | BODY MASS INDEX: 42.66 KG/M2 | HEIGHT: 72 IN | DIASTOLIC BLOOD PRESSURE: 80 MMHG

## 2023-06-22 DIAGNOSIS — Z00.00 ENCOUNTER FOR MEDICAL EXAMINATION TO ESTABLISH CARE: ICD-10-CM

## 2023-06-22 DIAGNOSIS — Z51.81 MEDICATION MONITORING ENCOUNTER: ICD-10-CM

## 2023-06-22 DIAGNOSIS — E03.9 ACQUIRED HYPOTHYROIDISM: ICD-10-CM

## 2023-06-22 DIAGNOSIS — N40.0 BENIGN PROSTATIC HYPERPLASIA WITHOUT LOWER URINARY TRACT SYMPTOMS: ICD-10-CM

## 2023-06-22 DIAGNOSIS — F51.01 PRIMARY INSOMNIA: ICD-10-CM

## 2023-06-22 DIAGNOSIS — Z98.84 S/P LAPAROSCOPIC SLEEVE GASTRECTOMY: ICD-10-CM

## 2023-06-22 DIAGNOSIS — E11.9 TYPE 2 DIABETES MELLITUS WITHOUT COMPLICATION, WITHOUT LONG-TERM CURRENT USE OF INSULIN (HCC): ICD-10-CM

## 2023-06-22 DIAGNOSIS — T14.8XXA WOUND INFECTION: ICD-10-CM

## 2023-06-22 DIAGNOSIS — K21.9 GERD WITHOUT ESOPHAGITIS: ICD-10-CM

## 2023-06-22 DIAGNOSIS — L08.9 WOUND INFECTION: ICD-10-CM

## 2023-06-22 DIAGNOSIS — S81.802D OPEN WOUND OF LEFT LOWER EXTREMITY, SUBSEQUENT ENCOUNTER: ICD-10-CM

## 2023-06-22 DIAGNOSIS — E11.42 TYPE 2 DIABETES MELLITUS WITH DIABETIC POLYNEUROPATHY, WITHOUT LONG-TERM CURRENT USE OF INSULIN (HCC): ICD-10-CM

## 2023-06-22 DIAGNOSIS — I10 BENIGN ESSENTIAL HYPERTENSION: ICD-10-CM

## 2023-06-22 DIAGNOSIS — E11.42 DIABETIC POLYNEUROPATHY ASSOCIATED WITH TYPE 2 DIABETES MELLITUS (HCC): ICD-10-CM

## 2023-06-22 DIAGNOSIS — F90.2 ATTENTION DEFICIT HYPERACTIVITY DISORDER (ADHD), COMBINED TYPE: ICD-10-CM

## 2023-06-22 PROBLEM — A41.9 SEPSIS DUE TO CELLULITIS (HCC): Status: RESOLVED | Noted: 2023-01-03 | Resolved: 2023-06-22

## 2023-06-22 PROBLEM — E87.1 HYPONATREMIA: Status: RESOLVED | Noted: 2022-12-04 | Resolved: 2023-06-22

## 2023-06-22 PROBLEM — L03.90 SEPSIS DUE TO CELLULITIS (HCC): Status: RESOLVED | Noted: 2023-01-03 | Resolved: 2023-06-22

## 2023-06-22 PROBLEM — L02.419 LEG ABSCESS: Status: RESOLVED | Noted: 2023-01-06 | Resolved: 2023-06-22

## 2023-06-22 PROBLEM — E11.49 TYPE 2 DIABETES MELLITUS WITH NEUROLOGIC COMPLICATION, WITHOUT LONG-TERM CURRENT USE OF INSULIN (HCC): Status: ACTIVE | Noted: 2022-01-07

## 2023-06-22 PROBLEM — N17.9 AKI (ACUTE KIDNEY INJURY) (HCC): Status: RESOLVED | Noted: 2022-12-04 | Resolved: 2023-06-22

## 2023-06-22 PROBLEM — Z99.2 ACUTE HEMODIALYSIS PATIENT (HCC): Status: RESOLVED | Noted: 2022-12-07 | Resolved: 2023-06-22

## 2023-06-22 PROBLEM — A41.9 SEPSIS (HCC): Status: RESOLVED | Noted: 2022-12-04 | Resolved: 2023-06-22

## 2023-06-22 PROBLEM — Z99.2 ESRD (END STAGE RENAL DISEASE) ON DIALYSIS (HCC): Status: RESOLVED | Noted: 2022-12-07 | Resolved: 2023-06-22

## 2023-06-22 PROBLEM — L03.116 CELLULITIS OF LEFT LOWER EXTREMITY: Status: RESOLVED | Noted: 2022-12-04 | Resolved: 2023-06-22

## 2023-06-22 PROBLEM — E11.40 DIABETIC NEUROPATHY (HCC): Status: ACTIVE | Noted: 2023-06-22

## 2023-06-22 PROBLEM — N18.6 ESRD (END STAGE RENAL DISEASE) ON DIALYSIS (HCC): Status: RESOLVED | Noted: 2022-12-07 | Resolved: 2023-06-22

## 2023-06-22 PROBLEM — L03.90 CELLULITIS: Status: RESOLVED | Noted: 2023-01-03 | Resolved: 2023-06-22

## 2023-06-22 PROCEDURE — 3079F DIAST BP 80-89 MM HG: CPT | Performed by: NURSE PRACTITIONER

## 2023-06-22 PROCEDURE — 11042 DBRDMT SUBQ TIS 1ST 20SQCM/<: CPT

## 2023-06-22 PROCEDURE — 3079F DIAST BP 80-89 MM HG: CPT | Performed by: STUDENT IN AN ORGANIZED HEALTH CARE EDUCATION/TRAINING PROGRAM

## 2023-06-22 PROCEDURE — 3075F SYST BP GE 130 - 139MM HG: CPT | Performed by: NURSE PRACTITIONER

## 2023-06-22 PROCEDURE — 80307 DRUG TEST PRSMV CHEM ANLYZR: CPT

## 2023-06-22 PROCEDURE — 99214 OFFICE O/P EST MOD 30 MIN: CPT | Performed by: STUDENT IN AN ORGANIZED HEALTH CARE EDUCATION/TRAINING PROGRAM

## 2023-06-22 PROCEDURE — 11042 DBRDMT SUBQ TIS 1ST 20SQCM/<: CPT | Performed by: NURSE PRACTITIONER

## 2023-06-22 PROCEDURE — G0480 DRUG TEST DEF 1-7 CLASSES: HCPCS

## 2023-06-22 PROCEDURE — 3077F SYST BP >= 140 MM HG: CPT | Performed by: STUDENT IN AN ORGANIZED HEALTH CARE EDUCATION/TRAINING PROGRAM

## 2023-06-22 RX ORDER — PREGABALIN 50 MG/1
50 CAPSULE ORAL 2 TIMES DAILY
Qty: 60 CAPSULE | Refills: 2 | Status: SHIPPED | OUTPATIENT
Start: 2023-06-22 | End: 2023-09-20

## 2023-06-22 RX ORDER — GLIPIZIDE 5 MG/1
5 TABLET ORAL DAILY
Qty: 90 TABLET | Refills: 3 | Status: SHIPPED | OUTPATIENT
Start: 2023-06-22

## 2023-06-22 RX ORDER — METHYLPHENIDATE HYDROCHLORIDE 36 MG/1
36 TABLET ORAL DAILY
Qty: 30 TABLET | Refills: 0 | Status: SHIPPED | OUTPATIENT
Start: 2023-08-21 | End: 2023-09-07 | Stop reason: SDUPTHER

## 2023-06-22 RX ORDER — MECLIZINE HCL 12.5 MG/1
12.5 TABLET ORAL 3 TIMES DAILY PRN
Qty: 90 TABLET | Refills: 1 | Status: SHIPPED | OUTPATIENT
Start: 2023-06-22 | End: 2023-11-02

## 2023-06-22 RX ORDER — METHYLPHENIDATE HYDROCHLORIDE 36 MG/1
36 TABLET ORAL DAILY
Qty: 30 TABLET | Refills: 0 | Status: SHIPPED | OUTPATIENT
Start: 2023-06-22 | End: 2023-06-28

## 2023-06-22 RX ORDER — GLIPIZIDE 5 MG/1
5 TABLET ORAL 2 TIMES DAILY
Qty: 90 TABLET | Refills: 3 | Status: SHIPPED | OUTPATIENT
Start: 2023-06-22 | End: 2023-06-22

## 2023-06-22 RX ORDER — AMLODIPINE BESYLATE 10 MG/1
10 TABLET ORAL DAILY
Qty: 90 TABLET | Refills: 3 | Status: SHIPPED | OUTPATIENT
Start: 2023-06-22 | End: 2024-01-22

## 2023-06-22 RX ORDER — OMEPRAZOLE 40 MG/1
40 CAPSULE, DELAYED RELEASE ORAL DAILY
Qty: 90 CAPSULE | Refills: 3 | Status: SHIPPED | OUTPATIENT
Start: 2023-06-22

## 2023-06-22 RX ORDER — FINASTERIDE 5 MG/1
5 TABLET, FILM COATED ORAL DAILY
Qty: 90 TABLET | Refills: 3 | Status: SHIPPED | OUTPATIENT
Start: 2023-06-22

## 2023-06-22 RX ORDER — TAMSULOSIN HYDROCHLORIDE 0.4 MG/1
0.4 CAPSULE ORAL DAILY
Qty: 90 CAPSULE | Refills: 3 | Status: SHIPPED | OUTPATIENT
Start: 2023-06-22

## 2023-06-22 RX ORDER — METHYLPHENIDATE HYDROCHLORIDE 36 MG/1
36 TABLET ORAL DAILY
Qty: 30 TABLET | Refills: 0 | Status: SHIPPED | OUTPATIENT
Start: 2023-07-22 | End: 2023-06-28

## 2023-06-22 RX ORDER — ROSUVASTATIN CALCIUM 20 MG/1
20 TABLET, COATED ORAL DAILY
Qty: 90 TABLET | Refills: 3 | Status: SHIPPED | OUTPATIENT
Start: 2023-06-22

## 2023-06-22 RX ORDER — LEVOTHYROXINE SODIUM 112 UG/1
112 TABLET ORAL DAILY
Qty: 90 TABLET | Refills: 0 | Status: SHIPPED | OUTPATIENT
Start: 2023-06-22 | End: 2023-11-02

## 2023-06-22 RX ORDER — TRAZODONE HYDROCHLORIDE 50 MG/1
50 TABLET ORAL
Qty: 90 TABLET | Refills: 3 | Status: SHIPPED | OUTPATIENT
Start: 2023-06-22

## 2023-06-22 ASSESSMENT — FIBROSIS 4 INDEX: FIB4 SCORE: 0.65

## 2023-06-22 NOTE — PROGRESS NOTES
Subjective:     CC:  Diagnoses of Encounter for medical examination to establish care, Benign essential hypertension, Attention deficit hyperactivity disorder (ADHD), combined type, Medication monitoring encounter, GERD without esophagitis, Type 2 diabetes mellitus with diabetic polyneuropathy, without long-term current use of insulin (HCC), Acquired hypothyroidism, Diabetic polyneuropathy associated with type 2 diabetes mellitus (HCC), Benign prostatic hyperplasia without lower urinary tract symptoms, Primary insomnia, and S/P laparoscopic sleeve gastrectomy were pertinent to this visit.    HISTORY OF THE PRESENT ILLNESS: Patient is a 62 y.o. male. This pleasant patient is here today to establish care and discuss the following    Problem   Attention Deficit Hyperactivity Disorder (Adhd), Combined Type    This is a chronic condition, previously well controlled with Concerta extended release 36 mg daily     Acquired Hypothyroidism    This is a chronic condition, currently on Synthroid 112 mcg daily, no recent labs     Diabetic Neuropathy (Hcc)    This is a chronic condition, currently takes Lyrica 50 mg twice daily with good relief     Benign Prostatic Hyperplasia Without Lower Urinary Tract Symptoms    This is a chronic condition, currently on finasteride and tamsulosin     Primary Insomnia    This is a chronic condition, currently well controlled with trazodone 50 mg nightly     Benign Essential Hypertension    This is a chronic condition, typically well controlled with Norvasc 10 mg daily, has been out of this medication for several months     Type 2 Diabetes Mellitus With Neurologic Complication, Without Long-Term Current Use of Insulin (Hcc)    This is a chronic condition, no recent labs, currently taking glipizide 5 mg daily only     Gerd Without Esophagitis    This is a chronic condition, well controlled with omeprazole     S/P Laparoscopic Sleeve Gastrectomy    Chronic, gastric sleeve 6 years ago, has been  iron deficient since     Leg Abscess (Resolved)   Sepsis Due to Cellulitis (Hcc) (Resolved)   LLE Cellulitis with purulent drainage (Resolved)   Esrd (End Stage Renal Disease) On Dialysis (Hcc) (Resolved)   Acute Hemodialysis Patient (Hcc) (Resolved)   Sonido (Acute Kidney Injury) (Hcc) (Resolved)   Hyponatremia (Resolved)   Sepsis (Hcc) (Resolved)   Cellulitis of Left Lower Extremity (Resolved)     ROS:   ROS      Objective:     Exam: BP (!) 158/80   Pulse 94   Temp 36.7 °C (98.1 °F) (Temporal)   Resp 16   Ht 1.829 m (6')   Wt (!) 144 kg (317 lb 7.4 oz)   SpO2 97%  Body mass index is 43.06 kg/m².    Physical Exam  Vitals reviewed.   Constitutional:       General: He is not in acute distress.     Appearance: He is not toxic-appearing.   HENT:      Head: Normocephalic and atraumatic.      Right Ear: External ear normal.      Left Ear: External ear normal.   Eyes:      General:         Right eye: No discharge.         Left eye: No discharge.      Extraocular Movements: Extraocular movements intact.      Conjunctiva/sclera: Conjunctivae normal.   Cardiovascular:      Rate and Rhythm: Normal rate and regular rhythm.      Pulses: Normal pulses.      Heart sounds: Normal heart sounds. No murmur heard.     No gallop.   Pulmonary:      Effort: Pulmonary effort is normal. No respiratory distress.      Breath sounds: Normal breath sounds. No wheezing or rales.   Skin:     General: Skin is warm and dry.   Neurological:      Mental Status: He is alert.   Psychiatric:         Mood and Affect: Mood normal.         Behavior: Behavior normal.         Thought Content: Thought content normal.         Judgment: Judgment normal.           Assessment & Plan:   62 y.o. male with the following -    1. Encounter for medical examination to establish care  History, problem list, medications and allergies reviewed.  Records requested from previous provider if applicable.    2. Benign essential hypertension  Chronic, uncontrolled but he has  been off his medication, restart amlodipine and follow-up in 2 weeks  - amLODIPine (NORVASC) 10 MG Tab; Take 1 Tablet by mouth every day.  Dispense: 90 Tablet; Refill: 3  - Comp Metabolic Panel; Future    3. Attention deficit hyperactivity disorder (ADHD), combined type  Chronic stable when on medication, PDMP reviewed and it does appear that he has been taking Concerta consistently up until several months ago.  Refill given.  Discussed controlled substance agreement.  Discussed urine drug screen.  - methylphenidate (CONCERTA) 36 MG CR tablet; Take 1 Tablet by mouth every day for 30 days.  Dispense: 30 Tablet; Refill: 0  - methylphenidate (CONCERTA) 36 MG CR tablet; Take 1 Tablet by mouth every day for 30 days.  Dispense: 30 Tablet; Refill: 0  - methylphenidate (CONCERTA) 36 MG CR tablet; Take 1 Tablet by mouth every day for 30 days.  Dispense: 30 Tablet; Refill: 0    4. Medication monitoring encounter  - Controlled Substance Treatment Agreement  - URINE DRUG SCREEN W/CONF (AR); Future    5. GERD without esophagitis  Chronic, stable, continue omeprazole  - omeprazole (PRILOSEC) 40 MG delayed-release capsule; Take 1 Capsule by mouth every day.  Dispense: 90 Capsule; Refill: 3    6. Type 2 diabetes mellitus with diabetic polyneuropathy, without long-term current use of insulin (HCC)  Chronic, unsure of severity, continue glipizide and Crestor, likely will have to adjust based on A1c  - rosuvastatin (CRESTOR) 20 MG Tab; Take 1 Tablet by mouth every day.  Dispense: 90 Tablet; Refill: 3  - Lipid Profile; Future  - HEMOGLOBIN A1C; Future  - Comp Metabolic Panel; Future  - glipiZIDE (GLUCOTROL) 5 MG Tab; Take 1 Tablet by mouth every day.  Dispense: 90 Tablet; Refill: 3    7. Acquired hypothyroidism  Chronic, no recent labs, continue Synthroid at current dosing for now, TSH pending  - levothyroxine (SYNTHROID) 112 MCG Tab; Take 1 Tablet by mouth every day.  Dispense: 90 Tablet; Refill: 0  - TSH WITH REFLEX TO FT4;  Future    8. Diabetic polyneuropathy associated with type 2 diabetes mellitus (HCC)  Chronic, stable, continue Lyrica  - pregabalin (LYRICA) 50 MG capsule; Take 1 Capsule by mouth 2 times a day for 90 days.  Dispense: 60 Capsule; Refill: 2    9. Benign prostatic hyperplasia without lower urinary tract symptoms  Chronic, stable, continue current medications and recheck PSA  - finasteride (PROSCAR) 5 MG Tab; Take 1 Tablet by mouth every day.  Dispense: 90 Tablet; Refill: 3  - tamsulosin (FLOMAX) 0.4 MG capsule; Take 1 Capsule by mouth every day.  Dispense: 90 Capsule; Refill: 3  - PROSTATE SPECIFIC AG SCREENING; Future    10. Primary insomnia  Chronic, stable, continue trazodone at current dosing  - traZODone (DESYREL) 50 MG Tab; Take 1 Tablet by mouth at bedtime.  Dispense: 90 Tablet; Refill: 3    11. S/P laparoscopic sleeve gastrectomy  Chronic, recheck CBC and iron  - FERRITIN; Future  - IRON/TOTAL IRON BIND; Future  - CBC WITH DIFFERENTIAL; Future      No follow-ups on file.    Please note that this dictation was created using voice recognition software. I have made every reasonable attempt to correct obvious errors, but I expect that there are errors of grammar and possibly content that I did not discover before finalizing the note.

## 2023-06-22 NOTE — LETTER
[facility]  Janine Gomez M.D.  [address]  [place] [address]  Fax: [phone]   Authorization for Release/Disclosure of   Protected Health Information   Name: THOMAS JAMISON : 1960 SSN: [id number]   Address: [address] [place] [address] [place] [address] Phone:    [phone] (home) [phone] (work)   I authorize the entity listed below to release/disclose the PHI below to:   [facility]/Janine Gomez M.D. and Janine Gomez M.D.   Provider or Entity Name:  Dr. Dylon Grey   Address   City, [facility] Phone:      Fax:     Reason for request: continuity of care   Information to be released:    [  ] LAST COLONOSCOPY,  including any PATH REPORT and follow-up  [  ] LAST FIT/COLOGUARD RESULT [  ] LAST DEXA  [  ] LAST MAMMOGRAM  [  ] LAST PAP  [  ] LAST LABS [  ] RETINA EXAM REPORT  [  ] IMMUNIZATION RECORDS  [  ] Release all info      [  ] Check here and initial the line next to each item to release ALL health information INCLUDING  _____ Care and treatment for drug and / or alcohol abuse  _____ HIV testing, infection status, or AIDS  _____ Genetic Testing    DATES OF SERVICE OR TIME PERIOD TO BE DISCLOSED: _____________  I understand and acknowledge that:  * This Authorization may be revoked at any time by you in writing, except if your health information has already been used or disclosed.  * Your health information that will be used or disclosed as a result of you signing this authorization could be re-disclosed by the recipient. If this occurs, your re-disclosed health information may no longer be protected by State or Federal laws.  * You may refuse to sign this Authorization. Your refusal will not affect your ability to obtain treatment.  * This Authorization becomes effective upon signing and will  on (date) __________.      If no date is indicated, this Authorization will  one (1) year from the signature date.    Name: Thomas Zambrano  Jonh  Signature: Date:   6/22/2023     PLEASE FAX REQUESTED RECORDS BACK TO: [phone]

## 2023-06-22 NOTE — PROGRESS NOTES
Provider Encounter- Full Thickness wound    HISTORY OF PRESENT ILLNESS  Wound History:   START OF CARE IN CLINIC: 1/16/2023 (return to clinic following hospitalization)   REFERRING PROVIDER:   Dr. Paige  WOUND- Full Thickness Wound   LOCATION: Left lower extremity, right anterior knee, left anterior knee   HISTORY: Patient presented to [facility] on 12/4/2022 with left lower extremity, fever, malaise, erythema x1 week.  He was originally admitted to [facility] for acute kidney injury and sepsis but with worsening renal function was transferred to [facility].  Wound cultures grew Streptococcus pyogenes group A and MSSA.  Patient was initially treated with cefazolin.  He is seen by the wound care team who recommended follow-up as an outpatient in wound care clinic.  While inpatient nephrology saw the patient and started him on dialysis for acute kidney injury.  His renal function improved and dialysis was stopped.  Patient was discharged with follow-up in the wound care clinic.    Patient was following with wound clinic, however on clinic visit 1/3/2022 he was noticed to have worsening wounds and concern for infection. He had failed outpatient Abx with zyvox and was sent to ED for further evaluation. [place] [facility] [place] for sepsis and left lower extremity cellulitis with abscess. He was taken to OR for I&D with Dr. Odom. Patient's wound was treated with wound VAC. Surgical cultures were positive for enterobacter cloacae and was treated with ciprofloxacin with end date of 1/21/2023. Patient was referred back to [facility] for further care.    Pertinent Medical History: Hypertension, alcohol abuse, kidney disease, GERD, mitral valve prolapse, morbid obesity, obstructive sleep apnea, type 2 diabetes    TOBACCO USE: Former smoker denies use of smokeless tobacco    Patient's problem list, allergies, and current medications reviewed and updated in Epic    Interval  History:  2023: Clinic visit with Travis Bonilla MD. Patient reports feeling well since hospitalization. He presented with wound VAC. He will be traveling to [place] for  and will be back on Monday. Wound VAC will be held while out of town and will plan to reapply next week. He was instructed on performing wound care until returns to clinic. Patient is tolerating Abx, continues to have some periwound erythema without warmth or pain. He has two new areas of fluctuance, concerning for superficial abscess, discussed performing I&D, patient agreeable.    2023 : Clinic visit with PANCHO Medina, EL, MERI, RYLEE.   Patient states that overall he is feeling well blood sugars have been in the low 100s.  No significant improvement in his wounds.  He does mention that he has raised bumps to his arms, this has been going on for several weeks, he noticed a new bump today on his left forearm.  They are slightly tender with palpation, no fluctuance.  He also has a diffuse rash to his arms and hands.  He completed his antibiotics 3 or 4 days ago.  States he may go into the emergency room in [place] after he leaves the clinic.  Denies any fevers, chills, nausea, vomiting.    2023 : Clinic visit with PANCHO Medina, EL, MERI, RYLEE.   Kwesi states that overall he is feeling well, though a little bit stressed.  His stepdaughter is in hospital.  He did  his Zyvox prescription, prescribed on Monday for positive MRSA wound culture, light growth.  He is tolerating without any difficulty.  The smaller wounds to his leg present today with boggy tissue, at least 1 of these communicates with the larger wound.  He has an appointment to follow-up with his surgeon, Dr. Odom, on .  His blood sugar today was 126.  The bumps on his forearms are about the same, he did not go to the emergency room last week.    2/10/2023 : Clinic visit with PANCHO Medina, EL, MERI, RYLEE.   Patient states  that overall he is feeling well.  His blood sugars have consistently been running between 120 and 140.  As discussed last visit, the tissue between the lateral wound and a small anterior wound, overlying wound tract, was excised in clinic today using local anesthesia.  Wound VAC continued.  He does still have quite a bit of undermining all around the wound.  He sees his surgeon next Friday.   He will also be undergoing biopsies of the lumps on his arms and hands on Monday by his PCP.     2/17/2023 : Clinic visit with PANCHO Medina, FNP-BC, CWOCN, CFCN.   Kwesi continues to feel well.  He was seen by a surgeon earlier today, who was overall satisfied with wound progress, did not feel skin over undermining needed to be excised.  Per patient, surgeon is going to speak with one of his colleagues about possible skin grafting at some point.  Surgeon did feel patient would benefit from higher levels of compression as his leg is quite edematous still.  Patient was prescribed minocycline and Zyvox earlier this week due to positive wound culture.  He is taking these as prescribed, and states he is tolerating without difficulty.   He did not have the biopsy of lumps in his arms as expected earlier this week.  Lumps have dissipated.  However raised lesion to left dorsal thenar hand has grown larger, suspicious for malignancy.  He has an appointment with dermatology next Wednesday.    2/27/2023: Clinic visit with PANCHO Teixeira.  Patient reports that he completed his oral antibiotics last Saturday patient reports he had mild diarrhea from the antibiotics however it is improving since he has completed his antibiotics Saturday.  Patient's wound beds are improving decreased depth to the 2 superior left anterior wounds.  Granulation tissue throughout the largest left anterior lower extremity wound and the posterior left lower extremity wound.   Does have 1 raised lump along the previous track pad area no signs or  symptoms of infection however, patient reports that these lumps are how the other wounds started.  No additional antibiotics we will continue to monitor.    3/8/2023: Clinic visit with Travis Bonilla MD. Patient reports doing ok. Denies any problems with his wound VAC. Patient's wounds are slowly improving. Less undermining to medial aspect of wound, continues to have undermining 2 o'clock. His posterior wound appears to be improving. 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    3/15/2023 : Clinic visit with PANCHO Medina, FNFERMÍN-BC, CWAMAN, CFVALE.   Kwesi states he is feeling well overall.  He is currently not on any antibiotics.  Erythema to his left lower leg has been persistent, no changes however.  VAC has been on hold t since 3/13 due to periwound issues, which have since resolved..  Patient is agreeable to restarting VAC today.  He is anxious to get this wound healed as quickly as possible.   He was seen by his surgeon earlier this week, who was satisfied with current progress of this wound.   Patient's blood pressure elevated today, 175/99.  He denies any symptoms of CVA or MI-reviewed with him in clinic today.  He states he did not take his blood pressure medication today, will take it when he gets home.    4/5/2023: Clinic visit with Travis Bonilla MD. Patient reports doing well. Denies any issues with wound VAC. He is tolerating compression without issue. Wound is measuring smaller and undermining has improved. Patient's BP remains slightly elevated, improved from previous and recommend he follow up with PCP for BP control. Reports glucose has been well controlled, mostly under 150.    4/12/2023: Clinic visit with PANCHO Teixeira.  Patient reports that he is doing well denies any fever or chills.  Patient's wound has beefy red granulation tissue forming throughout the wound bed there is  a slight amount of epithelial tissue at the periphery to the eye inferior lateral aspect.    4/27/2023: Clinic visit with PANCHO Teixeira.  Patient's wound is progressing at this point we will hold the wound VAC.  If the wound is progressing we will have the patient return to the wound VAC on Monday.  I will quickly follow-up on Monday to assess the wound and determine whether we will continue it or send it back to [facility]/.  Patient's insurance is requesting either a peer to peer or sending the wound VAC back.  The wound is appropriate at this time for discharge of the wound VAC however if it digresses significantly over the weekend we will reapply and I will contact the patient's insurance to perform a peer to peer consultation.    5/4/2023 : Clinic visit with PANCHO Medina, EL, MERI, RYLEE.   Patient's wound continues to progress, area has decreased since last assessment.  VAC discontinued.  Patient to send pump back to .  He states that he is feeling well, his blood sugar today was 132.    5/11/2023: Clinic visit with PANCHO Teixeira.  My next wound continues to progress slowly.  Patient has increased epithelial tissue at the superior and inferior aspects of the wound bed and laterally.  Granulation tissue continues to fill the wound bed.  Patient reports that he has had bad allergies lately he reports he is currently taking Zyrtec.  Patient denies any fever or chills.    5/18/2023: Clinic visit with PANCHO Teixeira. Patient's wound is improving however measurements have not decreased.  Patient does have new epithelial tissue forming to the superior and inferior aspect of the wound bed.    5/26/2023: Clinic visit with PANCHO Teixeira. Patient's wound continues to improve.  Decreasing by mentions.  Patient has increased epithelial tissue inferiorly and to the medial and superior aspect of the wound bed.     6/1/2023: Clinic visit with PANCHO Teixeira.  Patient's wound  continues to progress nicely to the inferior half of the wound bed with new epithelial tissue.  The entirety of the patient's wound bed is filling with granulation tissue.  Hopeful for resolution within 3 weeks.    6/8/2023: Clinic visit with Travis Bonilla MD. Patient reports doing well. His wound is measuring larger, I believe that inferior wound was evaluated as closed last week, however appear to be covered with crust over wound and was open. Tissue quality has improved and there is some epithelium forming at periphery. Continues to have heavy drainage, though amount appears to be improving. Denies any signs and symptoms of infection.    6/22/2023 : Clinic visit with PANCHO Medina, FNP-BC, CWOCN, CFCN.   Kwesi states he is feeling well today.  His blood sugar at lunchtime was 102.  His lower extremity wound is progressing well at this time, area has decreased since last assessment.  He is tolerating compression without any difficulty.      REVIEW OF SYSTEMS:   Unchanged from previous clinic visit on 6/8/2023, except as documented in interval history above    PHYSICAL EXAMINATION:   /84 (BP Location: Left arm, Patient Position: Sitting)   Pulse 78   Temp 36.7 °C (98 °F) (Temporal)   Resp 16   SpO2 96%     Physical Exam  Constitutional:       Appearance: He is obese.   Cardiovascular:      Rate and Rhythm: Normal rate.   Pulmonary:      Effort: Pulmonary effort is normal.   Musculoskeletal:      Right lower leg: Edema present.      Left lower leg: Edema present.      Comments: Generalized dependent edema bilateral lower extremities   Skin:     Comments: Full-thickness wound to the left anterior lower extremity status post I&D -wound area has decreased significantly since last assessment, slough to wound bed, moderate serosanguineous drainage.  No periwound erythema or induration     Neurological:      Mental Status: He is alert and oriented to person, place, and time.         WOUND ASSESSMENT  Wound  01/16/23 Incision Leg Anterior;Lower Left (Active)   Wound Image    06/22/23 1400   Site Assessment Red;Yellow 06/22/23 1400   Periwound Assessment Scar tissue;Edema;Other (Comment) 06/22/23 1400   Margins Unattached edges 06/22/23 1400   Drainage Amount Moderate 06/22/23 1400   Drainage Description Serosanguineous 06/22/23 1400   Treatments Cleansed;Topical Lidocaine;Provider debridement;Site care 06/22/23 1400   Wound Cleansing Puracyn Keno 06/22/23 1400   Periwound Protectant Not Applicable 06/22/23 1400   Dressing Cleansing/Solutions Not Applicable 06/22/23 1400   Dressing Options Other (Comments);Dry Roll Gauze;Hypafix Tape 06/22/23 1400   Dressing Changed Changed 06/22/23 1400   Dressing Status Clean;Dry;Intact 03/22/23 1500   Dressing Change/Treatment Frequency Every 72 hrs, and As Needed 06/22/23 1400   Non-staged Wound Description Full thickness 06/22/23 1400   Wound Length (cm) 1.7 cm 06/22/23 1400   Wound Width (cm) 0.5 cm 06/22/23 1400   Wound Depth (cm) 0.1 cm 06/22/23 1400   Wound Surface Area (cm^2) 0.85 cm^2 06/22/23 1400   Wound Volume (cm^3) 0.085 cm^3 06/22/23 1400   Post-Procedure Length (cm) 1.7 cm 06/22/23 1400   Post-Procedure Width (cm) 0.6 cm 06/22/23 1400   Post-Procedure Depth (cm) 0.1 cm 06/22/23 1400   Post-Procedure Surface Area (cm^2) 1.02 cm^2 06/22/23 1400   Post-Procedure Volume (cm^3) 0.102 cm^3 06/22/23 1400   Wound Healing % 100 06/22/23 1400   Wound Bed Granulation (%) 100 % 03/06/23 1438   Tunneling (cm) 0 cm 06/22/23 1400   Tunneling Clock Position of Wound 2 05/04/23 1445   Tunneling - 2nd Location (cm) 1.6 cm 02/08/23 1500   Tunneling Clock Position of Wound - 2nd Location 7 02/08/23 1500   Tunneling - 3rd Location (cm) 4.2 cm 02/01/23 1530   Tunneling Clock Position of Wound - 3rd Location 11 02/01/23 1530   Undermining (cm) 0 cm 06/22/23 1400   Undermining of Wound, 1st Location From 1 o'clock;To 2 o'clock 04/05/23 1535   Undermining (cm) - 2nd location 0 cm 04/14/23 1415    Undermining of Wound, 2nd Location From 11 o'clock;To 12 o'clock 04/05/23 1535   Undermining (cm) - 3rd location 2 cm 02/08/23 1500   Undermining of Wound, 3rd Location From 5 o'clock;To 5 o'clock 02/08/23 1500   Wound Odor None 06/22/23 1400   Pulses Left;1+;DP 02/08/23 1500   Exposed Structures None 06/22/23 1400   Number of days: 157       PROCEDURE: Excisional debridement left anterior lower extremity wound.  -2% viscous lidocaine applied topically to wound bed for approximately 5 minutes prior to debridement  -Curette used to debride wound bed.  Excisional debridement was performed to remove devitalized tissue until healthy, bleeding tissue was visualized.   Entire surface of wound, 1.02 cm² debrided.  Tissue debrided into the subcutaneous layer.    -Bleeding controlled with manual pressure.    -Wound care completed by wound RN, refer to flowsheet  -Patient tolerated the procedure well, without c/o pain or discomfort.      Pertinent Labs and Diagnostics:    Labs:     A1c:   Lab Results   Component Value Date/Time    HBA1C 8.5 (H) 12/04/2022 09:27 PM            IMAGING: Dx to tibia and fibula dated 12/5/2022  IMPRESSION:     No radiographic evidence of acute bony abnormality     Skin thickening and edema with some soft tissue calcifications most likely indicates venous stasis. Cellulitis is a possibility and there is no evidence of soft tissue gas. Dermatomyositis can be seen with soft tissue calcifications but (are not as   extensive as that often entails      LAST  WOUND CULTURE:  DATE :   Lab Results   Component Value Date/Time    CULTRSULT - (A) 02/08/2023 03:00 PM    CULTRSULT (A) 02/08/2023 03:00 PM     Klebsiella pneumoniae ESBL  Light growth  Extended Spectrum Beta-lactamase (ESBL) isolated.  ESBL's may be clinically resistant to therapy with  Penicillins,Cephalosporins or Aztreonam despite  apparent in vitro susceptibility to some of these agents.  The patient requires contact isolation.  Please  contact pharmacy or an Infectious Disease Specialist  if you have any questions about appropriate therapy.      RUEL (MADDISON) 02/08/2023 03:00 PM     Methicillin Resistant Staphylococcus aureus  Rare growth           ASSESSMENT AND PLAN:   1. Open wound of left lower extremity, subsequent encounter    6/22/2023: Area of patient's wound has decreased significantly since last assessment.  Slough to wound bed  -Excisional debridement performed in clinic today, medically necessary to promote wound healing  -Patient to return to the weekly for assessment and debridement.    - Continue compression with Solaris Ready Wrap.     Wound care: Enluxtra, solaris ready wrap    2. Type 2 diabetes mellitus without complication, without long-term current use of insulin ([facility])  Comments: Patient is currently on glipizide only    6/22/2023: Patient now has a CGM.  He reports his blood sugar at lunchtime was 102.  -Patient has been instructed to keep tight control over fasting blood sugar, <140 for optimal wound healing; Implications of loss of protective sensation (LOPS) discussed with patient, including increased risk for amputation.  Advised to check feet at least daily, moisturize feet, and to always wear protective foot wear, arrange meticulous regular foot care by podiatrist or CFCN. Pt with good understanding.       3. Wound infection    6/22/2023: His wound does not appear to be infected today  -Monitor for signs and symptoms of infection each clinic visit        PATIENT EDUCATION  - Importance of adequate nutrition for wound healing  -Advised to go to ER for any increased redness, swelling, drainage, or odor, or if patient develops fever, chills, nausea or vomiting.           Please note that this note may have been created using voice recognition software. I have worked with technical experts from [employer] to optimize the interface.  I have made every reasonable attempt to correct obvious errors, but there may be errors  of grammar and possibly content that I did not discover before finalizing the note.    N

## 2023-06-22 NOTE — PATIENT INSTRUCTIONS
Apply Enluxtra to wound bed as directed by provider. Secure dressing with dry rolled gauze and tape. Change every 3~4 days.    Avoid prolonged standing or sitting without elevating your legs.  - Knee-high gradient compression to legs    Should you experience any significant changes in your wound(s), such as infection (redness, swelling, localized heat, increased pain, fever > 101 F, chills) or have any questions regarding your home care instructions, please contact the wound center at [phone]. If after hours, contact your primary care physician or go to the hospital emergency room.   Keep dressing clean, dry and covered while bathing. Only change dressing if it becomes over saturated, soiled or falls off.

## 2023-06-24 LAB
AMPHET CTO UR CFM-MCNC: POSITIVE NG/ML
BARBITURATES CTO UR CFM-MCNC: NEGATIVE NG/ML
BENZODIAZ CTO UR CFM-MCNC: NEGATIVE NG/ML
CANNABINOIDS CTO UR CFM-MCNC: NEGATIVE NG/ML
COCAINE CTO UR CFM-MCNC: NEGATIVE NG/ML
DRUG COMMENT 753798: NORMAL
METHADONE CTO UR CFM-MCNC: NEGATIVE NG/ML
OPIATES CTO UR CFM-MCNC: NEGATIVE NG/ML
PCP CTO UR CFM-MCNC: NEGATIVE NG/ML
PROPOXYPH CTO UR CFM-MCNC: NEGATIVE NG/ML

## 2023-06-26 ENCOUNTER — APPOINTMENT (OUTPATIENT)
Dept: WOUND CARE | Facility: MEDICAL CENTER | Age: 63
End: 2023-06-26
Attending: GENERAL PRACTICE
Payer: COMMERCIAL

## 2023-06-28 ENCOUNTER — PATIENT MESSAGE (OUTPATIENT)
Dept: MEDICAL GROUP | Facility: MEDICAL CENTER | Age: 63
End: 2023-06-28
Payer: COMMERCIAL

## 2023-06-28 LAB
AMPHET UR CFM-MCNC: <50 NG/ML
MDA UR CFM-MCNC: <200 NG/ML
MDEA UR CFM-MCNC: <200 NG/ML
MDMA UR CFM-MCNC: <200 NG/ML
METHAMPHET UR CFM-MCNC: <200 NG/ML
PHENTERMINE UR CFM-MCNC: <200 NG/ML

## 2023-06-29 ENCOUNTER — OFFICE VISIT (OUTPATIENT)
Dept: WOUND CARE | Facility: MEDICAL CENTER | Age: 63
End: 2023-06-29
Attending: GENERAL PRACTICE
Payer: COMMERCIAL

## 2023-06-29 VITALS
OXYGEN SATURATION: 92 % | RESPIRATION RATE: 18 BRPM | SYSTOLIC BLOOD PRESSURE: 154 MMHG | DIASTOLIC BLOOD PRESSURE: 97 MMHG | HEART RATE: 72 BPM | TEMPERATURE: 98.6 F

## 2023-06-29 DIAGNOSIS — E11.9 TYPE 2 DIABETES MELLITUS WITHOUT COMPLICATION, WITHOUT LONG-TERM CURRENT USE OF INSULIN (HCC): ICD-10-CM

## 2023-06-29 DIAGNOSIS — E11.621 DIABETIC ULCER OF TOE OF LEFT FOOT ASSOCIATED WITH TYPE 2 DIABETES MELLITUS, LIMITED TO BREAKDOWN OF SKIN (HCC): ICD-10-CM

## 2023-06-29 DIAGNOSIS — T14.8XXA WOUND INFECTION: ICD-10-CM

## 2023-06-29 DIAGNOSIS — S81.802D OPEN WOUND OF LEFT LOWER EXTREMITY, SUBSEQUENT ENCOUNTER: ICD-10-CM

## 2023-06-29 DIAGNOSIS — L97.521 DIABETIC ULCER OF TOE OF LEFT FOOT ASSOCIATED WITH TYPE 2 DIABETES MELLITUS, LIMITED TO BREAKDOWN OF SKIN (HCC): ICD-10-CM

## 2023-06-29 DIAGNOSIS — L08.9 WOUND INFECTION: ICD-10-CM

## 2023-06-29 PROCEDURE — 3080F DIAST BP >= 90 MM HG: CPT | Performed by: STUDENT IN AN ORGANIZED HEALTH CARE EDUCATION/TRAINING PROGRAM

## 2023-06-29 PROCEDURE — 99213 OFFICE O/P EST LOW 20 MIN: CPT | Performed by: STUDENT IN AN ORGANIZED HEALTH CARE EDUCATION/TRAINING PROGRAM

## 2023-06-29 PROCEDURE — 3077F SYST BP >= 140 MM HG: CPT | Performed by: STUDENT IN AN ORGANIZED HEALTH CARE EDUCATION/TRAINING PROGRAM

## 2023-06-29 PROCEDURE — 99213 OFFICE O/P EST LOW 20 MIN: CPT

## 2023-06-29 NOTE — PATIENT INSTRUCTIONS
Apply Aquacel Ag to wound bed as directed by provider. Cover wound with foam and change dressing every 3~4 days. Secure dressing with dry rolled gauze and tape.    Avoid prolonged standing or sitting without elevating your legs.  - Knee-high gradient compression to legs    Should you experience any significant changes in your wound(s), such as infection (redness, swelling, localized heat, increased pain, fever > 101 F, chills) or have any questions regarding your home care instructions, please contact the wound center at [phone]. If after hours, contact your primary care physician or go to the hospital emergency room.   Keep dressing clean, dry and covered while bathing. Only change dressing if it becomes over saturated, soiled or falls off.

## 2023-06-30 NOTE — PROGRESS NOTES
Provider Encounter- Full Thickness wound    HISTORY OF PRESENT ILLNESS  Wound History:   START OF CARE IN CLINIC: 1/16/2023 (return to clinic following hospitalization)   REFERRING PROVIDER:   Dr. Paige  WOUND- Full Thickness Wound   LOCATION: Left lower extremity, right anterior knee, left anterior knee   HISTORY: Patient presented to [facility] on 12/4/2022 with left lower extremity, fever, malaise, erythema x1 week.  He was originally admitted to [facility] for acute kidney injury and sepsis but with worsening renal function was transferred to [facility].  Wound cultures grew Streptococcus pyogenes group A and MSSA.  Patient was initially treated with cefazolin.  He is seen by the wound care team who recommended follow-up as an outpatient in wound care clinic.  While inpatient nephrology saw the patient and started him on dialysis for acute kidney injury.  His renal function improved and dialysis was stopped.  Patient was discharged with follow-up in the wound care clinic.    Patient was following with wound clinic, however on clinic visit 1/3/2022 he was noticed to have worsening wounds and concern for infection. He had failed outpatient Abx with zyvox and was sent to ED for further evaluation. Patient was admitted to [facility] [place] 1/3[place]1/13/2023 for sepsis and left lower extremity cellulitis with abscess. He was taken to OR for I&D with Dr. Odom. Patient's wound was treated with wound VAC. Surgical cultures were positive for enterobacter cloacae and was treated with ciprofloxacin with end date of 1/21/2023. Patient was referred back to [facility] for further care.    Pertinent Medical History: Hypertension, alcohol abuse, kidney disease, GERD, mitral valve prolapse, morbid obesity, obstructive sleep apnea, type 2 diabetes    TOBACCO USE: Former smoker denies use of smokeless tobacco    Patient's problem list, allergies, and current medications reviewed and updated in Epic    Interval  History:  2023: Clinic visit with Travis Bonilla MD. Patient reports feeling well since hospitalization. He presented with wound VAC. He will be traveling to [place] for  and will be back on Monday. Wound VAC will be held while out of town and will plan to reapply next week. He was instructed on performing wound care until returns to clinic. Patient is tolerating Abx, continues to have some periwound erythema without warmth or pain. He has two new areas of fluctuance, concerning for superficial abscess, discussed performing I&D, patient agreeable.    2023 : Clinic visit with PANCHO Medina, EL, MERI, RYLEE.   Patient states that overall he is feeling well blood sugars have been in the low 100s.  No significant improvement in his wounds.  He does mention that he has raised bumps to his arms, this has been going on for several weeks, he noticed a new bump today on his left forearm.  They are slightly tender with palpation, no fluctuance.  He also has a diffuse rash to his arms and hands.  He completed his antibiotics 3 or 4 days ago.  States he may go into the emergency room in [place] after he leaves the clinic.  Denies any fevers, chills, nausea, vomiting.    2023 : Clinic visit with PANCHO Medina, EL, MERI, RYLEE.   Kwesi states that overall he is feeling well, though a little bit stressed.  His stepdaughter is in hospital.  He did  his Zyvox prescription, prescribed on Monday for positive MRSA wound culture, light growth.  He is tolerating without any difficulty.  The smaller wounds to his leg present today with boggy tissue, at least 1 of these communicates with the larger wound.  He has an appointment to follow-up with his surgeon, Dr. Odom, on .  His blood sugar today was 126.  The bumps on his forearms are about the same, he did not go to the emergency room last week.    2/10/2023 : Clinic visit with PANCHO Medina, EL, MERI, RYLEE.   Patient states  that overall he is feeling well.  His blood sugars have consistently been running between 120 and 140.  As discussed last visit, the tissue between the lateral wound and a small anterior wound, overlying wound tract, was excised in clinic today using local anesthesia.  Wound VAC continued.  He does still have quite a bit of undermining all around the wound.  He sees his surgeon next Friday.   He will also be undergoing biopsies of the lumps on his arms and hands on Monday by his PCP.     2/17/2023 : Clinic visit with PANCHO Medina, FNP-BC, CWOCN, CFCN.   Kwesi continues to feel well.  He was seen by a surgeon earlier today, who was overall satisfied with wound progress, did not feel skin over undermining needed to be excised.  Per patient, surgeon is going to speak with one of his colleagues about possible skin grafting at some point.  Surgeon did feel patient would benefit from higher levels of compression as his leg is quite edematous still.  Patient was prescribed minocycline and Zyvox earlier this week due to positive wound culture.  He is taking these as prescribed, and states he is tolerating without difficulty.   He did not have the biopsy of lumps in his arms as expected earlier this week.  Lumps have dissipated.  However raised lesion to left dorsal thenar hand has grown larger, suspicious for malignancy.  He has an appointment with dermatology next Wednesday.    2/27/2023: Clinic visit with PANCHO Teixeira.  Patient reports that he completed his oral antibiotics last Saturday patient reports he had mild diarrhea from the antibiotics however it is improving since he has completed his antibiotics Saturday.  Patient's wound beds are improving decreased depth to the 2 superior left anterior wounds.  Granulation tissue throughout the largest left anterior lower extremity wound and the posterior left lower extremity wound.   Does have 1 raised lump along the previous track pad area no signs or  symptoms of infection however, patient reports that these lumps are how the other wounds started.  No additional antibiotics we will continue to monitor.    3/8/2023: Clinic visit with Travis Bonilla MD. Patient reports doing ok. Denies any problems with his wound VAC. Patient's wounds are slowly improving. Less undermining to medial aspect of wound, continues to have undermining 2 o'clock. His posterior wound appears to be improving. 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    3/15/2023 : Clinic visit with PANCHO Medina, FNFERMÍN-BC, CWAMAN, CFVALE.   Kwesi states he is feeling well overall.  He is currently not on any antibiotics.  Erythema to his left lower leg has been persistent, no changes however.  VAC has been on hold t since 3/13 due to periwound issues, which have since resolved..  Patient is agreeable to restarting VAC today.  He is anxious to get this wound healed as quickly as possible.   He was seen by his surgeon earlier this week, who was satisfied with current progress of this wound.   Patient's blood pressure elevated today, 175/99.  He denies any symptoms of CVA or MI-reviewed with him in clinic today.  He states he did not take his blood pressure medication today, will take it when he gets home.    4/5/2023: Clinic visit with Travis Bonilla MD. Patient reports doing well. Denies any issues with wound VAC. He is tolerating compression without issue. Wound is measuring smaller and undermining has improved. Patient's BP remains slightly elevated, improved from previous and recommend he follow up with PCP for BP control. Reports glucose has been well controlled, mostly under 150.    4/12/2023: Clinic visit with PANCHO Teixeira.  Patient reports that he is doing well denies any fever or chills.  Patient's wound has beefy red granulation tissue forming throughout the wound bed there is  a slight amount of epithelial tissue at the periphery to the eye inferior lateral aspect.    4/27/2023: Clinic visit with PANCHO Teixeira.  Patient's wound is progressing at this point we will hold the wound VAC.  If the wound is progressing we will have the patient return to the wound VAC on Monday.  I will quickly follow-up on Monday to assess the wound and determine whether we will continue it or send it back to [facility]/.  Patient's insurance is requesting either a peer to peer or sending the wound VAC back.  The wound is appropriate at this time for discharge of the wound VAC however if it digresses significantly over the weekend we will reapply and I will contact the patient's insurance to perform a peer to peer consultation.    5/4/2023 : Clinic visit with PANCHO Medina, EL, MERI, RYLEE.   Patient's wound continues to progress, area has decreased since last assessment.  VAC discontinued.  Patient to send pump back to .  He states that he is feeling well, his blood sugar today was 132.    5/11/2023: Clinic visit with PANCHO Teixeira.  My next wound continues to progress slowly.  Patient has increased epithelial tissue at the superior and inferior aspects of the wound bed and laterally.  Granulation tissue continues to fill the wound bed.  Patient reports that he has had bad allergies lately he reports he is currently taking Zyrtec.  Patient denies any fever or chills.    5/18/2023: Clinic visit with PANCHO Teixeira. Patient's wound is improving however measurements have not decreased.  Patient does have new epithelial tissue forming to the superior and inferior aspect of the wound bed.    5/26/2023: Clinic visit with PANCHO Teixeira. Patient's wound continues to improve.  Decreasing by mentions.  Patient has increased epithelial tissue inferiorly and to the medial and superior aspect of the wound bed.     6/1/2023: Clinic visit with PANCHO Teixeira.  Patient's wound  continues to progress nicely to the inferior half of the wound bed with new epithelial tissue.  The entirety of the patient's wound bed is filling with granulation tissue.  Hopeful for resolution within 3 weeks.    6/8/2023: Clinic visit with Travis Bonilla MD. Patient reports doing well. His wound is measuring larger, I believe that inferior wound was evaluated as closed last week, however appear to be covered with crust over wound and was open. Tissue quality has improved and there is some epithelium forming at periphery. Continues to have heavy drainage, though amount appears to be improving. Denies any signs and symptoms of infection.    6/22/2023 : Clinic visit with PANCHO Medina, FNP-BC, CWOCN, CFCN.   Kwesi states he is feeling well today.  His blood sugar at lunchtime was 102.  His lower extremity wound is progressing well at this time, area has decreased since last assessment.  He is tolerating compression without any difficulty.    6/29/2023: Clinic visit with Travis Bonilla MD. Patient reports feeling well. Denies any issues. He is noted to have new wound to medial left hallux. Patient reports his normal footwear are broken and have been wearing dress shoes with narrow toe box. Counseled that he needs to wear shoes with larger toe box to prevent friction. Patient given Rx for diabetic shoes and inserts.    REVIEW OF SYSTEMS:   Unchanged from previous clinic visit on 6/22/2023, except as documented in interval history above    PHYSICAL EXAMINATION:   BP (!) 154/97   Pulse 72   Temp 37 °C (98.6 °F) (Temporal)   Resp 18   SpO2 92%     Physical Exam  Constitutional:       Appearance: He is obese.   Cardiovascular:      Rate and Rhythm: Normal rate.   Pulmonary:      Effort: Pulmonary effort is normal.   Musculoskeletal:      Right lower leg: Edema present.      Left lower leg: Edema present.      Comments: Generalized dependent edema bilateral lower extremities   Skin:     Comments: Full-thickness  wound to the left anterior lower extremity status post I&D - Wound measuring smaller, some denuded skin periwound may be secondary to pressure from enluxtra.    Full thickness wound to posterior left lower extremity - Patient reports mosquito bite that he has been scratching area and developed superficial wound.    Left medial Hallux DFU - Superficial under dessicated bullae without slough.     Neurological:      Mental Status: He is alert and oriented to person, place, and time.     Monofilament testing with a 10 gram force: sensation intact: decreased bilaterally  Visual Inspection: Feet with maceration, ulcers, fissures.  Pedal pulses: intact bilaterally      WOUND ASSESSMENT  Wound 01/16/23 Incision Leg Anterior;Lower Left (Active)   Wound Image   06/29/23 1445   Site Assessment Red;Fully granulated 06/29/23 1445   Periwound Assessment Maceration;Denuded;Fragile;Other (Comment) 06/29/23 1445   Margins Attached edges 06/29/23 1445   Drainage Amount Small 06/29/23 1445   Drainage Description Serosanguineous 06/29/23 1445   Treatments Cleansed;Site care 06/29/23 1445   Wound Cleansing Puracyn Spray 06/29/23 1445   Periwound Protectant Not Applicable 06/29/23 1445   Dressing Cleansing/Solutions Not Applicable 06/29/23 1445   Dressing Options Hydrofiber Silver;Dry Roll Gauze;Hypafix Tape;Other (Comments) 06/29/23 1445   Dressing Changed New 06/29/23 1445   Dressing Status Clean;Dry;Intact 03/22/23 1500   Dressing Change/Treatment Frequency Every 72 hrs, and As Needed 06/29/23 1445   Non-staged Wound Description Full thickness 06/29/23 1445   Wound Length (cm) 0.6 cm 06/29/23 1445   Wound Width (cm) 0.3 cm 06/29/23 1445   Wound Depth (cm) 0.1 cm 06/29/23 1445   Wound Surface Area (cm^2) 0.18 cm^2 06/29/23 1445   Wound Volume (cm^3) 0.018 cm^3 06/29/23 1445   Post-Procedure Length (cm) 1.7 cm 06/22/23 1400   Post-Procedure Width (cm) 0.6 cm 06/22/23 1400   Post-Procedure Depth (cm) 0.1 cm 06/22/23 1400   Post-Procedure  Surface Area (cm^2) 1.02 cm^2 06/22/23 1400   Post-Procedure Volume (cm^3) 0.102 cm^3 06/22/23 1400   Wound Healing % 100 06/29/23 1445   Wound Bed Granulation (%) 100 % 03/06/23 1438   Tunneling (cm) 0 cm 06/29/23 1445   Tunneling Clock Position of Wound 2 05/04/23 1445   Tunneling - 2nd Location (cm) 1.6 cm 02/08/23 1500   Tunneling Clock Position of Wound - 2nd Location 7 02/08/23 1500   Tunneling - 3rd Location (cm) 4.2 cm 02/01/23 1530   Tunneling Clock Position of Wound - 3rd Location 11 02/01/23 1530   Undermining (cm) 0 cm 06/29/23 1445   Undermining of Wound, 1st Location From 1 o'clock;To 2 o'clock 04/05/23 1535   Undermining (cm) - 2nd location 0 cm 04/14/23 1415   Undermining of Wound, 2nd Location From 11 o'clock;To 12 o'clock 04/05/23 1535   Undermining (cm) - 3rd location 2 cm 02/08/23 1500   Undermining of Wound, 3rd Location From 5 o'clock;To 5 o'clock 02/08/23 1500   Wound Odor None 06/29/23 1445   Pulses Left;1+;DP 02/08/23 1500   Exposed Structures None 06/29/23 1445   Number of days: 165       Wound 06/29/23 LLE Posterior (Active)   Wound Image   06/29/23 1445   Site Assessment Red 06/29/23 1445   Periwound Assessment Other (Comment) 06/29/23 1445   Margins Attached edges 06/29/23 1445   Drainage Amount Small 06/29/23 1445   Drainage Description Serosanguineous 06/29/23 1445   Treatments Cleansed;Site care 06/29/23 1445   Wound Cleansing Puracyn Spray 06/29/23 1445   Periwound Protectant Skin Protectant Wipes to Periwound 06/29/23 1445   Dressing Cleansing/Solutions Not Applicable 06/29/23 1445   Dressing Options Hydrofiber Silver;Dry Roll Gauze;Hypafix Tape;Other (Comments) 06/29/23 1445   Dressing Changed New 06/29/23 1445   Dressing Change/Treatment Frequency Every 72 hrs, and As Needed 06/29/23 1445   Non-staged Wound Description Partial thickness 06/29/23 1445   Wound Length (cm) 1.1 cm 06/29/23 1445   Wound Width (cm) 0.8 cm 06/29/23 1445   Wound Depth (cm) 0.1 cm 06/29/23 1445   Wound  Surface Area (cm^2) 0.88 cm^2 06/29/23 1445   Wound Volume (cm^3) 0.088 cm^3 06/29/23 1445   Tunneling (cm) 0 cm 06/29/23 1445   Undermining (cm) 0 cm 06/29/23 1445   Wound Odor None 06/29/23 1445   Exposed Structures None 06/29/23 1445   Number of days: 1       Wound 06/29/23 Left Medial Hallux (Active)   Wound Image   06/29/23 1445   Site Assessment Pink;Red 06/29/23 1445   Periwound Assessment Callused 06/29/23 1445   Margins Attached edges 06/29/23 1445   Drainage Amount SANJUANA 06/29/23 1445   Treatments Cleansed;Site care 06/29/23 1445   Wound Cleansing Puracyn Spray 06/29/23 1445   Periwound Protectant Skin Protectant Wipes to Periwound 06/29/23 1445   Dressing Cleansing/Solutions Not Applicable 06/29/23 1445   Dressing Options Hydrofiber Silver;Dry Gauze;Hypafix Tape 06/29/23 1445   Dressing Changed New 06/29/23 1445   Dressing Change/Treatment Frequency Every 72 hrs, and As Needed 06/29/23 1445   Non-staged Wound Description Full thickness 06/29/23 1445   Wound Length (cm) 0.3 cm 06/29/23 1445   Wound Width (cm) 0.3 cm 06/29/23 1445   Wound Depth (cm) 0.1 cm 06/29/23 1445   Wound Surface Area (cm^2) 0.09 cm^2 06/29/23 1445   Wound Volume (cm^3) 0.009 cm^3 06/29/23 1445   Tunneling (cm) 0 cm 06/29/23 1445   Undermining (cm) 0 cm 06/29/23 1445   Wound Odor None 06/29/23 1445   Exposed Structures None 06/29/23 1445   Number of days: 1       PROCEDURE:   - No debridement required today    Pertinent Labs and Diagnostics:    Labs:     A1c:   Lab Results   Component Value Date/Time    HBA1C 8.5 (H) 12/04/2022 09:27 PM            IMAGING: Dx to tibia and fibula dated 12/5/2022  IMPRESSION:     No radiographic evidence of acute bony abnormality     Skin thickening and edema with some soft tissue calcifications most likely indicates venous stasis. Cellulitis is a possibility and there is no evidence of soft tissue gas. Dermatomyositis can be seen with soft tissue calcifications but (are not as   extensive as that often  entails      LAST  WOUND CULTURE:  DATE :   Lab Results   Component Value Date/Time    CULTRSULT - (A) 02/08/2023 03:00 PM    CULTRSULT (A) 02/08/2023 03:00 PM     Klebsiella pneumoniae ESBL  Light growth  Extended Spectrum Beta-lactamase (ESBL) isolated.  ESBL's may be clinically resistant to therapy with  Penicillins,Cephalosporins or Aztreonam despite  apparent in vitro susceptibility to some of these agents.  The patient requires contact isolation.  Please contact pharmacy or an Infectious Disease Specialist  if you have any questions about appropriate therapy.      CULTRSULT (A) 02/08/2023 03:00 PM     Methicillin Resistant Staphylococcus aureus  Rare growth           ASSESSMENT AND PLAN:     1. Open wound of left lower extremity, subsequent encounter    6/29/2023: Wound measuring smaller  - New periwound skin breakdown, minor. Due to decreased drainage it may be secondary to pressure from enluxtra foam  - New left posterior leg wound, reports secondary to mosquito bite and worsened by patient scratching and edema  - No debridement required today. Some crusting removed with forceps   -Patient to return to the weekly for assessment and debridement.    - Continue compression with Solaris Ready Wrap.     Wound care: Hydrofiber silver, Dry roll gauze, solaris ready wrap    2. Diabetic ulcer of toe of left foot associated with type 2 diabetes mellitus, limited to breakdown of skin (HCC)    6/29/2023  - New wound noted in clinic today. Superficial DFU left medial hallux  - Patient reports that normal footwear broke this week and he has been wearing dress shoes with narrow toe box causing friction to medial hallux  - Counseled to wear wide toe box and given Rx for diabetic shoes and inserts due to neuropathy  - Return to clinic weekly   Wound Care: Hydrofiber silver, foam, tape    3. Type 2 diabetes mellitus without complication, without long-term current use of insulin (HCC)    Comments: Patient is currently on  glipizide only    6/29/2023: Patient now has a CGM.  He reports his blood sugar was well controlled  -Patient has been instructed to keep tight control over fasting blood sugar, <140 for optimal wound healing; Implications of loss of protective sensation (LOPS) discussed with patient, including increased risk for amputation.  Advised to check feet at least daily, moisturize feet, and to always wear protective foot wear, arrange meticulous regular foot care by podiatrist or CFCN. Pt with good understanding.    4. Wound infection    6/29/2023: His wound does not appear to be infected today  -Monitor for signs and symptoms of infection each clinic visit    PATIENT EDUCATION  - Importance of adequate nutrition for wound healing  -Advised to go to ER for any increased redness, swelling, drainage, or odor, or if patient develops fever, chills, nausea or vomiting.     My total time spent caring for the patient on the day of the encounter was 20 minutes, reviewing history, assessment, counseling and education, and coordination of care.  This does not include time spent on separately billable procedures/tests.    Please note that this note may have been created using voice recognition software. I have worked with technical experts from [employer] to optimize the interface.  I have made every reasonable attempt to correct obvious errors, but there may be errors of grammar and possibly content that I did not discover before finalizing the note.    N

## 2023-07-06 ENCOUNTER — NON-PROVIDER VISIT (OUTPATIENT)
Dept: WOUND CARE | Facility: MEDICAL CENTER | Age: 63
End: 2023-07-06
Attending: GENERAL PRACTICE
Payer: COMMERCIAL

## 2023-07-06 ENCOUNTER — HOSPITAL ENCOUNTER (OUTPATIENT)
Dept: LAB | Facility: MEDICAL CENTER | Age: 63
End: 2023-07-06
Attending: STUDENT IN AN ORGANIZED HEALTH CARE EDUCATION/TRAINING PROGRAM
Payer: COMMERCIAL

## 2023-07-06 ENCOUNTER — APPOINTMENT (OUTPATIENT)
Dept: MEDICAL GROUP | Facility: MEDICAL CENTER | Age: 63
End: 2023-07-06
Payer: COMMERCIAL

## 2023-07-06 ENCOUNTER — HOSPITAL ENCOUNTER (OUTPATIENT)
Facility: MEDICAL CENTER | Age: 63
End: 2023-07-06
Attending: STUDENT IN AN ORGANIZED HEALTH CARE EDUCATION/TRAINING PROGRAM
Payer: COMMERCIAL

## 2023-07-06 DIAGNOSIS — Z98.84 S/P LAPAROSCOPIC SLEEVE GASTRECTOMY: ICD-10-CM

## 2023-07-06 DIAGNOSIS — L08.9 INFECTED WOUND: ICD-10-CM

## 2023-07-06 DIAGNOSIS — I10 BENIGN ESSENTIAL HYPERTENSION: ICD-10-CM

## 2023-07-06 DIAGNOSIS — L08.9 INFECTED WOUND: Primary | ICD-10-CM

## 2023-07-06 DIAGNOSIS — N40.0 BENIGN PROSTATIC HYPERPLASIA WITHOUT LOWER URINARY TRACT SYMPTOMS: ICD-10-CM

## 2023-07-06 DIAGNOSIS — E11.42 TYPE 2 DIABETES MELLITUS WITH DIABETIC POLYNEUROPATHY, WITHOUT LONG-TERM CURRENT USE OF INSULIN (HCC): ICD-10-CM

## 2023-07-06 DIAGNOSIS — T14.8XXA INFECTED WOUND: Primary | ICD-10-CM

## 2023-07-06 DIAGNOSIS — E03.9 ACQUIRED HYPOTHYROIDISM: ICD-10-CM

## 2023-07-06 DIAGNOSIS — T14.8XXA INFECTED WOUND: ICD-10-CM

## 2023-07-06 LAB
ALBUMIN SERPL BCP-MCNC: 4 G/DL (ref 3.2–4.9)
ALBUMIN/GLOB SERPL: 1.3 G/DL
ALP SERPL-CCNC: 126 U/L (ref 30–99)
ALT SERPL-CCNC: 38 U/L (ref 2–50)
ANION GAP SERPL CALC-SCNC: 11 MMOL/L (ref 7–16)
AST SERPL-CCNC: 25 U/L (ref 12–45)
BASOPHILS # BLD AUTO: 1.3 % (ref 0–1.8)
BASOPHILS # BLD: 0.07 K/UL (ref 0–0.12)
BILIRUB SERPL-MCNC: 1 MG/DL (ref 0.1–1.5)
BUN SERPL-MCNC: 12 MG/DL (ref 8–22)
CALCIUM ALBUM COR SERPL-MCNC: 9.1 MG/DL (ref 8.5–10.5)
CALCIUM SERPL-MCNC: 9.1 MG/DL (ref 8.5–10.5)
CHLORIDE SERPL-SCNC: 100 MMOL/L (ref 96–112)
CHOLEST SERPL-MCNC: 115 MG/DL (ref 100–199)
CO2 SERPL-SCNC: 27 MMOL/L (ref 20–33)
CREAT SERPL-MCNC: 1 MG/DL (ref 0.5–1.4)
EOSINOPHIL # BLD AUTO: 0.12 K/UL (ref 0–0.51)
EOSINOPHIL NFR BLD: 2.3 % (ref 0–6.9)
ERYTHROCYTE [DISTWIDTH] IN BLOOD BY AUTOMATED COUNT: 48.7 FL (ref 35.9–50)
EST. AVERAGE GLUCOSE BLD GHB EST-MCNC: 166 MG/DL
FASTING STATUS PATIENT QL REPORTED: NORMAL
FERRITIN SERPL-MCNC: 559 NG/ML (ref 22–322)
GFR SERPLBLD CREATININE-BSD FMLA CKD-EPI: 84 ML/MIN/1.73 M 2
GLOBULIN SER CALC-MCNC: 3 G/DL (ref 1.9–3.5)
GLUCOSE SERPL-MCNC: 151 MG/DL (ref 65–99)
HBA1C MFR BLD: 7.4 % (ref 4–5.6)
HCT VFR BLD AUTO: 47.4 % (ref 42–52)
HDLC SERPL-MCNC: 33 MG/DL
HGB BLD-MCNC: 16.2 G/DL (ref 14–18)
IMM GRANULOCYTES # BLD AUTO: 0.01 K/UL (ref 0–0.11)
IMM GRANULOCYTES NFR BLD AUTO: 0.2 % (ref 0–0.9)
IRON SATN MFR SERPL: 43 % (ref 15–55)
IRON SERPL-MCNC: 89 UG/DL (ref 50–180)
LDLC SERPL CALC-MCNC: 50 MG/DL
LYMPHOCYTES # BLD AUTO: 1.34 K/UL (ref 1–4.8)
LYMPHOCYTES NFR BLD: 25.8 % (ref 22–41)
MCH RBC QN AUTO: 32.1 PG (ref 27–33)
MCHC RBC AUTO-ENTMCNC: 34.2 G/DL (ref 32.3–36.5)
MCV RBC AUTO: 93.9 FL (ref 81.4–97.8)
MONOCYTES # BLD AUTO: 0.73 K/UL (ref 0–0.85)
MONOCYTES NFR BLD AUTO: 14.1 % (ref 0–13.4)
NEUTROPHILS # BLD AUTO: 2.92 K/UL (ref 1.82–7.42)
NEUTROPHILS NFR BLD: 56.3 % (ref 44–72)
NRBC # BLD AUTO: 0 K/UL
NRBC BLD-RTO: 0 /100 WBC (ref 0–0.2)
PLATELET # BLD AUTO: 204 K/UL (ref 164–446)
PMV BLD AUTO: 9.5 FL (ref 9–12.9)
POTASSIUM SERPL-SCNC: 4.2 MMOL/L (ref 3.6–5.5)
PROT SERPL-MCNC: 7 G/DL (ref 6–8.2)
PSA SERPL-MCNC: 0.62 NG/ML (ref 0–4)
RBC # BLD AUTO: 5.05 M/UL (ref 4.7–6.1)
SODIUM SERPL-SCNC: 138 MMOL/L (ref 135–145)
T4 FREE SERPL-MCNC: 1.13 NG/DL (ref 0.93–1.7)
TIBC SERPL-MCNC: 208 UG/DL (ref 250–450)
TRIGL SERPL-MCNC: 159 MG/DL (ref 0–149)
TSH SERPL DL<=0.005 MIU/L-ACNC: 9.53 UIU/ML (ref 0.38–5.33)
UIBC SERPL-MCNC: 119 UG/DL (ref 110–370)
WBC # BLD AUTO: 5.2 K/UL (ref 4.8–10.8)

## 2023-07-06 PROCEDURE — 36415 COLL VENOUS BLD VENIPUNCTURE: CPT

## 2023-07-06 PROCEDURE — 87070 CULTURE OTHR SPECIMN AEROBIC: CPT

## 2023-07-06 PROCEDURE — 84443 ASSAY THYROID STIM HORMONE: CPT

## 2023-07-06 PROCEDURE — 84439 ASSAY OF FREE THYROXINE: CPT

## 2023-07-06 PROCEDURE — 80053 COMPREHEN METABOLIC PANEL: CPT

## 2023-07-06 PROCEDURE — 83540 ASSAY OF IRON: CPT

## 2023-07-06 PROCEDURE — 80061 LIPID PANEL: CPT

## 2023-07-06 PROCEDURE — 84153 ASSAY OF PSA TOTAL: CPT

## 2023-07-06 PROCEDURE — 87077 CULTURE AEROBIC IDENTIFY: CPT | Mod: 91

## 2023-07-06 PROCEDURE — 82728 ASSAY OF FERRITIN: CPT

## 2023-07-06 PROCEDURE — 83036 HEMOGLOBIN GLYCOSYLATED A1C: CPT

## 2023-07-06 PROCEDURE — 87205 SMEAR GRAM STAIN: CPT

## 2023-07-06 PROCEDURE — 97597 DBRDMT OPN WND 1ST 20 CM/<: CPT

## 2023-07-06 PROCEDURE — 85025 COMPLETE CBC W/AUTO DIFF WBC: CPT

## 2023-07-06 PROCEDURE — 87186 SC STD MICRODIL/AGAR DIL: CPT

## 2023-07-06 PROCEDURE — 83550 IRON BINDING TEST: CPT

## 2023-07-06 NOTE — PATIENT INSTRUCTIONS
-Keep your wound dressing clean, dry, and intact.    -Change your dressing every 3 to 4 days or if it becomes soiled, soaked, or falls off.    -Should you experience any significant changes in your wound(s), such as infection (redness, swelling, localized heat, increased pain, fever > 101 F, chills) or have any questions regarding your home care instructions, please contact the wound center at [phone]. If after hours, contact your primary care physician or go to the hospital emergency room.

## 2023-07-07 LAB
GRAM STN SPEC: NORMAL
SIGNIFICANT IND 70042: NORMAL
SITE SITE: NORMAL
SOURCE SOURCE: NORMAL

## 2023-07-09 ENCOUNTER — APPOINTMENT (OUTPATIENT)
Dept: RADIOLOGY | Facility: MEDICAL CENTER | Age: 63
DRG: 623 | End: 2023-07-09
Attending: EMERGENCY MEDICINE
Payer: COMMERCIAL

## 2023-07-09 ENCOUNTER — HOSPITAL ENCOUNTER (INPATIENT)
Facility: MEDICAL CENTER | Age: 63
LOS: 4 days | DRG: 623 | End: 2023-07-13
Attending: EMERGENCY MEDICINE | Admitting: INTERNAL MEDICINE
Payer: COMMERCIAL

## 2023-07-09 DIAGNOSIS — L97.522 DIABETIC ULCER OF TOE OF LEFT FOOT ASSOCIATED WITH TYPE 2 DIABETES MELLITUS, WITH FAT LAYER EXPOSED (HCC): ICD-10-CM

## 2023-07-09 DIAGNOSIS — E11.621 DIABETIC ULCER OF TOE OF LEFT FOOT ASSOCIATED WITH TYPE 2 DIABETES MELLITUS, WITH FAT LAYER EXPOSED (HCC): ICD-10-CM

## 2023-07-09 DIAGNOSIS — L03.116 LEFT LEG CELLULITIS: ICD-10-CM

## 2023-07-09 DIAGNOSIS — A41.9 SEPSIS, DUE TO UNSPECIFIED ORGANISM, UNSPECIFIED WHETHER ACUTE ORGAN DYSFUNCTION PRESENT (HCC): ICD-10-CM

## 2023-07-09 DIAGNOSIS — L03.116 CELLULITIS OF LEFT LEG: ICD-10-CM

## 2023-07-09 PROBLEM — R65.10 SIRS (SYSTEMIC INFLAMMATORY RESPONSE SYNDROME) (HCC): Status: ACTIVE | Noted: 2023-07-09

## 2023-07-09 LAB
ALBUMIN SERPL BCP-MCNC: 4.4 G/DL (ref 3.2–4.9)
ALBUMIN/GLOB SERPL: 1.2 G/DL
ALP SERPL-CCNC: 190 U/L (ref 30–99)
ALT SERPL-CCNC: 39 U/L (ref 2–50)
ANION GAP SERPL CALC-SCNC: 12 MMOL/L (ref 7–16)
APPEARANCE UR: CLEAR
AST SERPL-CCNC: 30 U/L (ref 12–45)
BACTERIA #/AREA URNS HPF: NEGATIVE /HPF
BACTERIA WND AEROBE CULT: ABNORMAL
BASOPHILS # BLD AUTO: 0.4 % (ref 0–1.8)
BASOPHILS # BLD: 0.04 K/UL (ref 0–0.12)
BILIRUB SERPL-MCNC: 1.4 MG/DL (ref 0.1–1.5)
BILIRUB UR QL STRIP.AUTO: NEGATIVE
BUN SERPL-MCNC: 15 MG/DL (ref 8–22)
CALCIUM ALBUM COR SERPL-MCNC: 9.3 MG/DL (ref 8.5–10.5)
CALCIUM SERPL-MCNC: 9.6 MG/DL (ref 8.5–10.5)
CHLORIDE SERPL-SCNC: 94 MMOL/L (ref 96–112)
CO2 SERPL-SCNC: 25 MMOL/L (ref 20–33)
COLOR UR: ABNORMAL
CREAT SERPL-MCNC: 1.13 MG/DL (ref 0.5–1.4)
CRP SERPL HS-MCNC: 19.75 MG/DL (ref 0–0.75)
EOSINOPHIL # BLD AUTO: 0.02 K/UL (ref 0–0.51)
EOSINOPHIL NFR BLD: 0.2 % (ref 0–6.9)
EPI CELLS #/AREA URNS HPF: NEGATIVE /HPF
ERYTHROCYTE [DISTWIDTH] IN BLOOD BY AUTOMATED COUNT: 49.7 FL (ref 35.9–50)
ERYTHROCYTE [SEDIMENTATION RATE] IN BLOOD BY WESTERGREN METHOD: 9 MM/HOUR (ref 0–20)
GFR SERPLBLD CREATININE-BSD FMLA CKD-EPI: 72 ML/MIN/1.73 M 2
GLOBULIN SER CALC-MCNC: 3.7 G/DL (ref 1.9–3.5)
GLUCOSE BLD STRIP.AUTO-MCNC: 108 MG/DL (ref 65–99)
GLUCOSE SERPL-MCNC: 160 MG/DL (ref 65–99)
GLUCOSE UR STRIP.AUTO-MCNC: 100 MG/DL
GRAM STN SPEC: ABNORMAL
HCT VFR BLD AUTO: 48.2 % (ref 42–52)
HGB BLD-MCNC: 17.1 G/DL (ref 14–18)
HYALINE CASTS #/AREA URNS LPF: ABNORMAL /LPF
IMM GRANULOCYTES # BLD AUTO: 0.06 K/UL (ref 0–0.11)
IMM GRANULOCYTES NFR BLD AUTO: 0.6 % (ref 0–0.9)
KETONES UR STRIP.AUTO-MCNC: ABNORMAL MG/DL
LACTATE SERPL-SCNC: 1.6 MMOL/L (ref 0.5–2)
LEUKOCYTE ESTERASE UR QL STRIP.AUTO: NEGATIVE
LYMPHOCYTES # BLD AUTO: 0.95 K/UL (ref 1–4.8)
LYMPHOCYTES NFR BLD: 8.8 % (ref 22–41)
MAGNESIUM SERPL-MCNC: 2 MG/DL (ref 1.5–2.5)
MCH RBC QN AUTO: 32.8 PG (ref 27–33)
MCHC RBC AUTO-ENTMCNC: 35.5 G/DL (ref 32.3–36.5)
MCV RBC AUTO: 92.3 FL (ref 81.4–97.8)
MICRO URNS: ABNORMAL
MONOCYTES # BLD AUTO: 0.44 K/UL (ref 0–0.85)
MONOCYTES NFR BLD AUTO: 4.1 % (ref 0–13.4)
NEUTROPHILS # BLD AUTO: 9.31 K/UL (ref 1.82–7.42)
NEUTROPHILS NFR BLD: 85.9 % (ref 44–72)
NITRITE UR QL STRIP.AUTO: NEGATIVE
NRBC # BLD AUTO: 0 K/UL
NRBC BLD-RTO: 0 /100 WBC (ref 0–0.2)
PH UR STRIP.AUTO: 8 [PH] (ref 5–8)
PLATELET # BLD AUTO: 273 K/UL (ref 164–446)
PMV BLD AUTO: 9 FL (ref 9–12.9)
POTASSIUM SERPL-SCNC: 3.7 MMOL/L (ref 3.6–5.5)
PROCALCITONIN SERPL-MCNC: 0.2 NG/ML
PROT SERPL-MCNC: 8.1 G/DL (ref 6–8.2)
PROT UR QL STRIP: 100 MG/DL
RBC # BLD AUTO: 5.22 M/UL (ref 4.7–6.1)
RBC # URNS HPF: ABNORMAL /HPF
RBC UR QL AUTO: NEGATIVE
SIGNIFICANT IND 70042: ABNORMAL
SITE SITE: ABNORMAL
SODIUM SERPL-SCNC: 131 MMOL/L (ref 135–145)
SOURCE SOURCE: ABNORMAL
SP GR UR STRIP.AUTO: 1.03
UROBILINOGEN UR STRIP.AUTO-MCNC: 1 MG/DL
WBC # BLD AUTO: 10.8 K/UL (ref 4.8–10.8)
WBC #/AREA URNS HPF: ABNORMAL /HPF

## 2023-07-09 PROCEDURE — 96365 THER/PROPH/DIAG IV INF INIT: CPT

## 2023-07-09 PROCEDURE — 99223 1ST HOSP IP/OBS HIGH 75: CPT | Mod: AI | Performed by: INTERNAL MEDICINE

## 2023-07-09 PROCEDURE — 96367 TX/PROPH/DG ADDL SEQ IV INF: CPT

## 2023-07-09 PROCEDURE — A9270 NON-COVERED ITEM OR SERVICE: HCPCS | Performed by: INTERNAL MEDICINE

## 2023-07-09 PROCEDURE — 87040 BLOOD CULTURE FOR BACTERIA: CPT

## 2023-07-09 PROCEDURE — 36415 COLL VENOUS BLD VENIPUNCTURE: CPT

## 2023-07-09 PROCEDURE — 81001 URINALYSIS AUTO W/SCOPE: CPT

## 2023-07-09 PROCEDURE — 85652 RBC SED RATE AUTOMATED: CPT

## 2023-07-09 PROCEDURE — 80053 COMPREHEN METABOLIC PANEL: CPT

## 2023-07-09 PROCEDURE — 86140 C-REACTIVE PROTEIN: CPT

## 2023-07-09 PROCEDURE — 700105 HCHG RX REV CODE 258: Performed by: EMERGENCY MEDICINE

## 2023-07-09 PROCEDURE — 700105 HCHG RX REV CODE 258: Mod: JZ | Performed by: INTERNAL MEDICINE

## 2023-07-09 PROCEDURE — 87086 URINE CULTURE/COLONY COUNT: CPT

## 2023-07-09 PROCEDURE — 99285 EMERGENCY DEPT VISIT HI MDM: CPT

## 2023-07-09 PROCEDURE — 770006 HCHG ROOM/CARE - MED/SURG/GYN SEMI*

## 2023-07-09 PROCEDURE — 96366 THER/PROPH/DIAG IV INF ADDON: CPT

## 2023-07-09 PROCEDURE — 700111 HCHG RX REV CODE 636 W/ 250 OVERRIDE (IP): Performed by: INTERNAL MEDICINE

## 2023-07-09 PROCEDURE — 85025 COMPLETE CBC W/AUTO DIFF WBC: CPT

## 2023-07-09 PROCEDURE — 71045 X-RAY EXAM CHEST 1 VIEW: CPT

## 2023-07-09 PROCEDURE — 700102 HCHG RX REV CODE 250 W/ 637 OVERRIDE(OP): Performed by: EMERGENCY MEDICINE

## 2023-07-09 PROCEDURE — 700102 HCHG RX REV CODE 250 W/ 637 OVERRIDE(OP): Performed by: INTERNAL MEDICINE

## 2023-07-09 PROCEDURE — 83605 ASSAY OF LACTIC ACID: CPT

## 2023-07-09 PROCEDURE — A9270 NON-COVERED ITEM OR SERVICE: HCPCS | Performed by: EMERGENCY MEDICINE

## 2023-07-09 PROCEDURE — 83735 ASSAY OF MAGNESIUM: CPT

## 2023-07-09 PROCEDURE — 96372 THER/PROPH/DIAG INJ SC/IM: CPT

## 2023-07-09 PROCEDURE — 700111 HCHG RX REV CODE 636 W/ 250 OVERRIDE (IP): Performed by: EMERGENCY MEDICINE

## 2023-07-09 PROCEDURE — 84145 PROCALCITONIN (PCT): CPT

## 2023-07-09 PROCEDURE — 82962 GLUCOSE BLOOD TEST: CPT

## 2023-07-09 RX ORDER — ACETAMINOPHEN 325 MG/1
650 TABLET ORAL EVERY 6 HOURS PRN
Status: DISCONTINUED | OUTPATIENT
Start: 2023-07-09 | End: 2023-07-13 | Stop reason: HOSPADM

## 2023-07-09 RX ORDER — ROSUVASTATIN CALCIUM 20 MG/1
20 TABLET, COATED ORAL DAILY
Status: DISCONTINUED | OUTPATIENT
Start: 2023-07-10 | End: 2023-07-13 | Stop reason: HOSPADM

## 2023-07-09 RX ORDER — MECLIZINE HYDROCHLORIDE 25 MG/1
12.5 TABLET ORAL 3 TIMES DAILY PRN
Status: DISCONTINUED | OUTPATIENT
Start: 2023-07-09 | End: 2023-07-13 | Stop reason: HOSPADM

## 2023-07-09 RX ORDER — TAMSULOSIN HYDROCHLORIDE 0.4 MG/1
0.4 CAPSULE ORAL DAILY
Status: DISCONTINUED | OUTPATIENT
Start: 2023-07-10 | End: 2023-07-13 | Stop reason: HOSPADM

## 2023-07-09 RX ORDER — SODIUM CHLORIDE, SODIUM LACTATE, POTASSIUM CHLORIDE, AND CALCIUM CHLORIDE .6; .31; .03; .02 G/100ML; G/100ML; G/100ML; G/100ML
500 INJECTION, SOLUTION INTRAVENOUS
Status: DISCONTINUED | OUTPATIENT
Start: 2023-07-09 | End: 2023-07-13 | Stop reason: HOSPADM

## 2023-07-09 RX ORDER — KETOROLAC TROMETHAMINE 30 MG/ML
15 INJECTION, SOLUTION INTRAMUSCULAR; INTRAVENOUS EVERY 6 HOURS PRN
Status: DISCONTINUED | OUTPATIENT
Start: 2023-07-09 | End: 2023-07-13 | Stop reason: HOSPADM

## 2023-07-09 RX ORDER — PROMETHAZINE HYDROCHLORIDE 25 MG/1
12.5-25 SUPPOSITORY RECTAL EVERY 4 HOURS PRN
Status: DISCONTINUED | OUTPATIENT
Start: 2023-07-09 | End: 2023-07-13 | Stop reason: HOSPADM

## 2023-07-09 RX ORDER — AMLODIPINE BESYLATE 10 MG/1
10 TABLET ORAL DAILY
Status: DISCONTINUED | OUTPATIENT
Start: 2023-07-10 | End: 2023-07-13 | Stop reason: HOSPADM

## 2023-07-09 RX ORDER — ACETAMINOPHEN 325 MG/1
650 TABLET ORAL ONCE
Status: COMPLETED | OUTPATIENT
Start: 2023-07-09 | End: 2023-07-09

## 2023-07-09 RX ORDER — ONDANSETRON 4 MG/1
4 TABLET, ORALLY DISINTEGRATING ORAL EVERY 4 HOURS PRN
Status: DISCONTINUED | OUTPATIENT
Start: 2023-07-09 | End: 2023-07-13 | Stop reason: HOSPADM

## 2023-07-09 RX ORDER — FINASTERIDE 5 MG/1
5 TABLET, FILM COATED ORAL DAILY
Status: DISCONTINUED | OUTPATIENT
Start: 2023-07-10 | End: 2023-07-13 | Stop reason: HOSPADM

## 2023-07-09 RX ORDER — ONDANSETRON 2 MG/ML
4 INJECTION INTRAMUSCULAR; INTRAVENOUS EVERY 4 HOURS PRN
Status: DISCONTINUED | OUTPATIENT
Start: 2023-07-09 | End: 2023-07-13 | Stop reason: HOSPADM

## 2023-07-09 RX ORDER — SODIUM CHLORIDE, SODIUM LACTATE, POTASSIUM CHLORIDE, CALCIUM CHLORIDE 600; 310; 30; 20 MG/100ML; MG/100ML; MG/100ML; MG/100ML
1000 INJECTION, SOLUTION INTRAVENOUS ONCE
Status: COMPLETED | OUTPATIENT
Start: 2023-07-09 | End: 2023-07-09

## 2023-07-09 RX ORDER — OMEPRAZOLE 20 MG/1
40 CAPSULE, DELAYED RELEASE ORAL DAILY
Status: DISCONTINUED | OUTPATIENT
Start: 2023-07-10 | End: 2023-07-13 | Stop reason: HOSPADM

## 2023-07-09 RX ORDER — AMOXICILLIN 250 MG
2 CAPSULE ORAL 2 TIMES DAILY
Status: DISCONTINUED | OUTPATIENT
Start: 2023-07-10 | End: 2023-07-13 | Stop reason: HOSPADM

## 2023-07-09 RX ORDER — TRAZODONE HYDROCHLORIDE 50 MG/1
50 TABLET ORAL
Status: DISCONTINUED | OUTPATIENT
Start: 2023-07-09 | End: 2023-07-13 | Stop reason: HOSPADM

## 2023-07-09 RX ORDER — CYCLOBENZAPRINE HCL 10 MG
5 TABLET ORAL
Status: DISCONTINUED | OUTPATIENT
Start: 2023-07-09 | End: 2023-07-13 | Stop reason: HOSPADM

## 2023-07-09 RX ORDER — PREGABALIN 25 MG/1
50 CAPSULE ORAL 2 TIMES DAILY
Status: DISCONTINUED | OUTPATIENT
Start: 2023-07-09 | End: 2023-07-13 | Stop reason: HOSPADM

## 2023-07-09 RX ORDER — SODIUM CHLORIDE, SODIUM LACTATE, POTASSIUM CHLORIDE, CALCIUM CHLORIDE 600; 310; 30; 20 MG/100ML; MG/100ML; MG/100ML; MG/100ML
INJECTION, SOLUTION INTRAVENOUS CONTINUOUS
Status: DISCONTINUED | OUTPATIENT
Start: 2023-07-09 | End: 2023-07-11

## 2023-07-09 RX ORDER — LABETALOL HYDROCHLORIDE 5 MG/ML
10 INJECTION, SOLUTION INTRAVENOUS EVERY 4 HOURS PRN
Status: DISCONTINUED | OUTPATIENT
Start: 2023-07-09 | End: 2023-07-13 | Stop reason: HOSPADM

## 2023-07-09 RX ORDER — POLYETHYLENE GLYCOL 3350 17 G/17G
1 POWDER, FOR SOLUTION ORAL
Status: DISCONTINUED | OUTPATIENT
Start: 2023-07-09 | End: 2023-07-13 | Stop reason: HOSPADM

## 2023-07-09 RX ORDER — PROCHLORPERAZINE EDISYLATE 5 MG/ML
5-10 INJECTION INTRAMUSCULAR; INTRAVENOUS EVERY 4 HOURS PRN
Status: DISCONTINUED | OUTPATIENT
Start: 2023-07-09 | End: 2023-07-13 | Stop reason: HOSPADM

## 2023-07-09 RX ORDER — LEVOTHYROXINE SODIUM 112 UG/1
112 TABLET ORAL DAILY
Status: DISCONTINUED | OUTPATIENT
Start: 2023-07-10 | End: 2023-07-13 | Stop reason: HOSPADM

## 2023-07-09 RX ORDER — HEPARIN SODIUM 5000 [USP'U]/ML
5000 INJECTION, SOLUTION INTRAVENOUS; SUBCUTANEOUS EVERY 8 HOURS
Status: DISCONTINUED | OUTPATIENT
Start: 2023-07-09 | End: 2023-07-11

## 2023-07-09 RX ORDER — BISACODYL 10 MG
10 SUPPOSITORY, RECTAL RECTAL
Status: DISCONTINUED | OUTPATIENT
Start: 2023-07-09 | End: 2023-07-13 | Stop reason: HOSPADM

## 2023-07-09 RX ORDER — PROMETHAZINE HYDROCHLORIDE 25 MG/1
12.5-25 TABLET ORAL EVERY 4 HOURS PRN
Status: DISCONTINUED | OUTPATIENT
Start: 2023-07-09 | End: 2023-07-13 | Stop reason: HOSPADM

## 2023-07-09 RX ORDER — METHYLPHENIDATE HYDROCHLORIDE 36 MG/1
36 TABLET ORAL DAILY
Status: DISCONTINUED | OUTPATIENT
Start: 2023-07-10 | End: 2023-07-09

## 2023-07-09 RX ADMIN — VANCOMYCIN HYDROCHLORIDE 3 G: 5 INJECTION, POWDER, LYOPHILIZED, FOR SOLUTION INTRAVENOUS at 17:28

## 2023-07-09 RX ADMIN — SODIUM CHLORIDE, POTASSIUM CHLORIDE, SODIUM LACTATE AND CALCIUM CHLORIDE: 600; 310; 30; 20 INJECTION, SOLUTION INTRAVENOUS at 22:45

## 2023-07-09 RX ADMIN — HEPARIN SODIUM 5000 UNITS: 5000 INJECTION, SOLUTION INTRAVENOUS; SUBCUTANEOUS at 22:23

## 2023-07-09 RX ADMIN — CYCLOBENZAPRINE 5 MG: 10 TABLET, FILM COATED ORAL at 22:21

## 2023-07-09 RX ADMIN — SODIUM CHLORIDE, POTASSIUM CHLORIDE, SODIUM LACTATE AND CALCIUM CHLORIDE 1000 ML: 600; 310; 30; 20 INJECTION, SOLUTION INTRAVENOUS at 16:21

## 2023-07-09 RX ADMIN — MEROPENEM 500 MG: 500 INJECTION, POWDER, FOR SOLUTION INTRAVENOUS at 23:17

## 2023-07-09 RX ADMIN — PREGABALIN 50 MG: 25 CAPSULE ORAL at 22:45

## 2023-07-09 RX ADMIN — TRAZODONE HYDROCHLORIDE 50 MG: 50 TABLET ORAL at 22:21

## 2023-07-09 RX ADMIN — ACETAMINOPHEN 650 MG: 325 TABLET, FILM COATED ORAL at 17:04

## 2023-07-09 RX ADMIN — MEROPENEM 500 MG: 500 INJECTION, POWDER, FOR SOLUTION INTRAVENOUS at 16:45

## 2023-07-09 ASSESSMENT — COPD QUESTIONNAIRES
DURING THE PAST 4 WEEKS HOW MUCH DID YOU FEEL SHORT OF BREATH: NONE/LITTLE OF THE TIME
DO YOU EVER COUGH UP ANY MUCUS OR PHLEGM?: NO/ONLY WITH OCCASIONAL COLDS OR INFECTIONS
HAVE YOU SMOKED AT LEAST 100 CIGARETTES IN YOUR ENTIRE LIFE: NO/DON'T KNOW
COPD SCREENING SCORE: 2

## 2023-07-09 ASSESSMENT — PAIN DESCRIPTION - PAIN TYPE: TYPE: ACUTE PAIN

## 2023-07-09 ASSESSMENT — FIBROSIS 4 INDEX: FIB4 SCORE: 1.27

## 2023-07-09 NOTE — ED NOTES
Pt ambulated to [facility] from lobby with steady gait.  On monitor, call light in reach. Chart up for ERP.  ED tech at bedside for PIV and lab draw

## 2023-07-09 NOTE — ED PROVIDER NOTES
ER Provider Note    Scribed for Attila Harden M.d. by Viviane Beck. 7/9/2023  3:54 PM    Primary Care Provider: Janine Gomez M.D.    CHIEF COMPLAINT  Chief Complaint   Patient presents with    Wound Infection    Leg Pain     LIMITATION TO HISTORY   Select: : None    HPI/ROS  OUTSIDE HISTORIAN(S):  Family present at bedside    Thomas Borja is a 62 y.o. male who presents to the ED for left leg pain onset 3 days ago. Patient reports that he had left leg surgery in January 2023 for cellulitis. Patient states that he follows up with wound care weekly since the surgery in January. He went to wound care 3 days ago and they noticed the blister on left great toe. Present in the ED he reports that it has since gotten infected. He has associated fever and loss of appetite but denies vomiting, nausea, or diarrhea. He reports new redness to his left leg prompting him to the ED today for further evaluation. Patient states the last time he was on antibiotics for his leg was a few months ago. No alleviating or exacerbating factors noted.    PAST MEDICAL HISTORY  No past medical history noted.    SURGICAL HISTORY  Past Surgical History:   Procedure Laterality Date    SKIN ABSCESS INCISION AND DRAINAGE Left 1/5/2023    Procedure: LEFT LEG INCISION AND DRAINAGE ABSCESS;  Surgeon: Cesar Odom M.D.;  Location: SURGERY [facility];  Service: General     FAMILY HISTORY  Family History   Problem Relation Age of Onset    Heart Disease Father     Skin cancer Father     Prostate cancer Father     Ovarian Cancer Neg Hx     Tubal Cancer Neg Hx     Peritoneal Cancer Neg Hx     Colorectal Cancer Neg Hx     Breast Cancer Neg Hx      SOCIAL HISTORY   reports that he has quit smoking. His smoking use included cigars. He has never used smokeless tobacco. He reports current alcohol use. He reports that he does not use drugs.    CURRENT MEDICATIONS  Previous Medications    AMLODIPINE (NORVASC) 10 MG TAB    Take 1 Tablet by mouth every  "day.    BLOOD GLUCOSE MONITORING SUPPL (FREESTYLE FREEDOM) KIT    FreeStyle Sheri 2 Sensor kit   APPLY 1 SENSOR EVERY 2 WEEKS    BLOOD GLUCOSE MONITORING SUPPL (ONETOUCH VERIO) W/DEVICE KIT    OneTouch Verio Flex Meter   USE TO TEST BLOOD SUGAR    CONTINUOUS BLOOD GLUC SENSOR (FREESTYLE SHERI 2 SENSOR) [facility] [place]    APPLY 1 SENSOR EVERY 2 WEEKS    CYCLOBENZAPRINE (FLEXERIL) 5 MG TABLET    Take 1 Tablet by mouth at bedtime.    FINASTERIDE (PROSCAR) 5 MG TAB    Take 1 Tablet by mouth every day.    GLIPIZIDE (GLUCOTROL) 5 MG TAB    Take 1 Tablet by mouth every day.    GLUCOSE BLOOD STRIP    Apply 1 Strip topically in the morning, at noon, and at bedtime.    HEMOGLOBIN A1C, HBA1C, TEST (RELION A1C) TEST    relion preppad 100ct   USE 1 PREP PAD PRIOR TO INJECTION    LANCETS (ONETOUCH DELICA PLUS [id number]) MISC    OneTouch Delica Plus Lancet 33 gauge   USE 1 TO CHECK GLUCOSE ONCE DAILY TO TEST BLOOD SUGAR IN THE MORNING BEFORE FIRST MEAL    LEVOTHYROXINE (SYNTHROID) 112 MCG TAB    Take 1 Tablet by mouth every day.    MECLIZINE (ANTIVERT) 12.5 MG TAB    Take 1 Tablet by mouth 3 times a day as needed for Dizziness.    METHYLPHENIDATE (CONCERTA) 36 MG CR TABLET    Take 1 Tablet by mouth every day for 30 days.    OMEPRAZOLE (PRILOSEC) 40 MG DELAYED-RELEASE CAPSULE    Take 1 Capsule by mouth every day.    PREGABALIN (LYRICA) 50 MG CAPSULE    Take 1 Capsule by mouth 2 times a day for 90 days.    ROSUVASTATIN (CRESTOR) 20 MG TAB    Take 1 Tablet by mouth every day.    TAMSULOSIN (FLOMAX) 0.4 MG CAPSULE    Take 1 Capsule by mouth every day.    TRAZODONE (DESYREL) 50 MG TAB    Take 1 Tablet by mouth at bedtime.     ALLERGIES  Amoxicillin, Oxycodone, Codeine, and Erythromycin    PHYSICAL EXAM  BP (!) 184/94   Pulse (!) 117   Temp (!) 38.3 °C (100.9 °F) (Oral)   Resp 16   Ht 1.88 m (6' 2\")   Wt (!) 138 kg (305 lb 5.4 oz)   SpO2 96%   BMI 39.20 kg/m²     Constitutional: Well developed, Well nourished, No acute distress, Non-toxic " "appearance.   Cardiovascular: Normal heart rate, Normal rhythm, No murmurs, No rubs, No gallops.   Thorax & Lungs: Normal breath sounds, No respiratory distress, No wheezing, No chest tenderness.   Skin: See pictures below.  Extremities: Intact distal pulses, No edema, No tenderness, No cyanosis, No clubbing.             DIAGNOSTIC STUDIES & PROCEDURES    Labs:   Labs Reviewed   CBC WITH DIFFERENTIAL - Abnormal; Notable for the following components:       Result Value    Neutrophils-Polys 85.90 (*)     Lymphocytes 8.80 (*)     Neutrophils (Absolute) 9.31 (*)     Lymphs (Absolute) 0.95 (*)     All other components within normal limits   COMP METABOLIC PANEL - Abnormal; Notable for the following components:    Sodium 131 (*)     Chloride 94 (*)     Glucose 160 (*)     Alkaline Phosphatase 190 (*)     Globulin 3.7 (*)     All other components within normal limits   CRP QUANTITIVE (NON-CARDIAC) - Abnormal; Notable for the following components:    Stat C-Reactive Protein 19.75 (*)     All other components within normal limits   LACTIC ACID   PROCALCITONIN   SED RATE   CORRECTED CALCIUM   ESTIMATED GFR   MAGNESIUM   URINALYSIS   URINE CULTURE(NEW)   BLOOD CULTURE    Narrative:     Per Hospital Policy: Only change Specimen Src: to \"Line\" if  specified by physician order.   BLOOD CULTURE    Narrative:     Per Hospital Policy: Only change Specimen Src: to \"Line\" if  specified by physician order.   SED RATE   MAGNESIUM   LACTIC ACID   All labs reviewed by me.    Radiology:   The attending Emergency Physician has independently interpreted the diagnostic imaging associated with this visit and is awaiting the final reading from the radiologist, which will be displayed below.    Preliminary interpretation is a follows: No pneumonia  Radiologist interpretation:   DX-CHEST-PORTABLE (1 VIEW)   Final Result      No acute cardiopulmonary abnormality.           COURSE & MEDICAL DECISION MAKING    ED Observation Status? No; Patient does " not meet criteria for ED Observation.     INITIAL ASSESSMENT AND PLAN  Care Narrative: 62 y.o. M with prior LLE cellulitis and chronic wound from I&D surgical site presenting with worsening redness along with fever and tachycardia.  Concern for sepsis due to cellulitis.  Low suspicion for necrotizing fasciitis at this time.  Pain is not out of proportion, no woody edema, no subcutaneous emphysema.  Normal WBC.  Pt treated with acetaminophen for fever.  Given IV abx.  Spoke with pharmacy and recommend meropenam and vancomycin given prior hx of ESBL klebsiella and MRSA.      Pt will be hospitalized for further care.        3:54 PM - Patient seen and evaluated at bedside. Thomas Borja is a 62 y.o. male with a history of cellulitis, who presents with left great toe pain and left leg pain onset 3 days ago. Ordered DX-chest, CRP, sed rate, Procalcitonin, Lactate, CBC w/ diff, CMP, UA, and blood culture to evaluate. He understands and agrees to the plan of care.    6:20 PM - I discussed the patient's case and the above findings with Dr. Matthews (Internist) who agreed to admit the patient.    CRITICAL CARE  The very real possibilty of a deterioration of this patient's condition required the highest level of my preparedness for sudden, emergent intervention.  I provided critical care services, which included medication orders, frequent reevaluations of the patient's condition and response to treatment, ordering and reviewing test results, and discussing the case with various consultants.  The critical care time associated with the care of the patient was 35 minutes. Review chart for interventions. This time is exclusive of any other billable procedures.       HYDRATION: Based on the patient's presentation of Sepsis the patient was given IV fluids. IV Hydration was used because oral hydration was not adequate alone. Upon recheck following hydration, the patient was improved.    ADDITIONAL PROBLEM LIST AND  DISPOSITION                 DISPOSITION AND DISCUSSIONS  I have discussed management of the patient with the following physicians and ARSENIO's: Dr. Matthews (Internist)    Discussion of management with other [facility] or appropriate source(s): RT per guidelines      Barriers to care at this time, including but not limited to:  None noted .     Decision tools and prescription drugs considered including, but not limited to: Antibiotics   .    DISPOSITION:  Patient will be hospitalized by Dr. Matthews in guarded condition.    FINAL IMPRESSION   1. Sepsis, due to unspecified organism, unspecified whether acute organ dysfunction present (HCC)    2. Left leg cellulitis      Viviane SANDS (Scribwatson), am scribing for, and in the presence of, Attila Harden M.D..    Electronically signed by: Viviane Beck (Leydi), 7/9/2023    Attila SANDS M.D. personally performed the services described in this documentation, as scribed by Viviane Beck in my presence, and it is both accurate and complete.    The note accurately reflects work and decisions made by me.  Attila Harden M.D.  7/9/2023  7:58 PM

## 2023-07-09 NOTE — ED TRIAGE NOTES
Amb to triage w/ c/o LLE pain secondary to a wound infection to L 1st toe.  Patient is a diabetic. Wound began as a blister to L great toe, cultures recently came back today  + for MRSA.  Hx of chronic wound to LLE, followed by wound care.

## 2023-07-10 LAB
ANION GAP SERPL CALC-SCNC: 11 MMOL/L (ref 7–16)
BASOPHILS # BLD AUTO: 0.4 % (ref 0–1.8)
BASOPHILS # BLD: 0.04 K/UL (ref 0–0.12)
BUN SERPL-MCNC: 14 MG/DL (ref 8–22)
CALCIUM SERPL-MCNC: 8.5 MG/DL (ref 8.5–10.5)
CHLORIDE SERPL-SCNC: 98 MMOL/L (ref 96–112)
CO2 SERPL-SCNC: 23 MMOL/L (ref 20–33)
CREAT SERPL-MCNC: 1.13 MG/DL (ref 0.5–1.4)
CRP SERPL HS-MCNC: 13.81 MG/DL (ref 0–0.75)
EOSINOPHIL # BLD AUTO: 0.01 K/UL (ref 0–0.51)
EOSINOPHIL NFR BLD: 0.1 % (ref 0–6.9)
ERYTHROCYTE [DISTWIDTH] IN BLOOD BY AUTOMATED COUNT: 48.7 FL (ref 35.9–50)
ERYTHROCYTE [SEDIMENTATION RATE] IN BLOOD BY WESTERGREN METHOD: 37 MM/HOUR (ref 0–20)
GFR SERPLBLD CREATININE-BSD FMLA CKD-EPI: 72 ML/MIN/1.73 M 2
GLUCOSE BLD STRIP.AUTO-MCNC: 115 MG/DL (ref 65–99)
GLUCOSE BLD STRIP.AUTO-MCNC: 136 MG/DL (ref 65–99)
GLUCOSE BLD STRIP.AUTO-MCNC: 141 MG/DL (ref 65–99)
GLUCOSE BLD STRIP.AUTO-MCNC: 168 MG/DL (ref 65–99)
GLUCOSE SERPL-MCNC: 124 MG/DL (ref 65–99)
HCT VFR BLD AUTO: 40.1 % (ref 42–52)
HGB BLD-MCNC: 14.1 G/DL (ref 14–18)
IMM GRANULOCYTES # BLD AUTO: 0.07 K/UL (ref 0–0.11)
IMM GRANULOCYTES NFR BLD AUTO: 0.6 % (ref 0–0.9)
LACTATE SERPL-SCNC: 1 MMOL/L (ref 0.5–2)
LACTATE SERPL-SCNC: 1 MMOL/L (ref 0.5–2)
LYMPHOCYTES # BLD AUTO: 1 K/UL (ref 1–4.8)
LYMPHOCYTES NFR BLD: 8.9 % (ref 22–41)
MAGNESIUM SERPL-MCNC: 1.8 MG/DL (ref 1.5–2.5)
MCH RBC QN AUTO: 32.3 PG (ref 27–33)
MCHC RBC AUTO-ENTMCNC: 35.2 G/DL (ref 32.3–36.5)
MCV RBC AUTO: 91.8 FL (ref 81.4–97.8)
MONOCYTES # BLD AUTO: 0.63 K/UL (ref 0–0.85)
MONOCYTES NFR BLD AUTO: 5.6 % (ref 0–13.4)
NEUTROPHILS # BLD AUTO: 9.44 K/UL (ref 1.82–7.42)
NEUTROPHILS NFR BLD: 84.4 % (ref 44–72)
NRBC # BLD AUTO: 0 K/UL
NRBC BLD-RTO: 0 /100 WBC (ref 0–0.2)
PLATELET # BLD AUTO: 242 K/UL (ref 164–446)
PMV BLD AUTO: 9 FL (ref 9–12.9)
POTASSIUM SERPL-SCNC: 4.3 MMOL/L (ref 3.6–5.5)
RBC # BLD AUTO: 4.37 M/UL (ref 4.7–6.1)
SODIUM SERPL-SCNC: 132 MMOL/L (ref 135–145)
WBC # BLD AUTO: 11.2 K/UL (ref 4.8–10.8)

## 2023-07-10 PROCEDURE — 83735 ASSAY OF MAGNESIUM: CPT

## 2023-07-10 PROCEDURE — A9270 NON-COVERED ITEM OR SERVICE: HCPCS | Performed by: INTERNAL MEDICINE

## 2023-07-10 PROCEDURE — 86140 C-REACTIVE PROTEIN: CPT

## 2023-07-10 PROCEDURE — 96366 THER/PROPH/DIAG IV INF ADDON: CPT

## 2023-07-10 PROCEDURE — 700102 HCHG RX REV CODE 250 W/ 637 OVERRIDE(OP): Performed by: INTERNAL MEDICINE

## 2023-07-10 PROCEDURE — 85652 RBC SED RATE AUTOMATED: CPT

## 2023-07-10 PROCEDURE — 770006 HCHG ROOM/CARE - MED/SURG/GYN SEMI*

## 2023-07-10 PROCEDURE — 700111 HCHG RX REV CODE 636 W/ 250 OVERRIDE (IP): Mod: JZ | Performed by: NURSE PRACTITIONER

## 2023-07-10 PROCEDURE — 83605 ASSAY OF LACTIC ACID: CPT | Mod: 91

## 2023-07-10 PROCEDURE — 700111 HCHG RX REV CODE 636 W/ 250 OVERRIDE (IP): Performed by: INTERNAL MEDICINE

## 2023-07-10 PROCEDURE — 96372 THER/PROPH/DIAG INJ SC/IM: CPT

## 2023-07-10 PROCEDURE — 700105 HCHG RX REV CODE 258: Mod: JZ | Performed by: INTERNAL MEDICINE

## 2023-07-10 PROCEDURE — 94760 N-INVAS EAR/PLS OXIMETRY 1: CPT

## 2023-07-10 PROCEDURE — 82962 GLUCOSE BLOOD TEST: CPT

## 2023-07-10 PROCEDURE — 99233 SBSQ HOSP IP/OBS HIGH 50: CPT | Mod: FS | Performed by: NURSE PRACTITIONER

## 2023-07-10 PROCEDURE — 36415 COLL VENOUS BLD VENIPUNCTURE: CPT

## 2023-07-10 PROCEDURE — 96367 TX/PROPH/DG ADDL SEQ IV INF: CPT

## 2023-07-10 PROCEDURE — 85025 COMPLETE CBC W/AUTO DIFF WBC: CPT

## 2023-07-10 PROCEDURE — 80048 BASIC METABOLIC PNL TOTAL CA: CPT

## 2023-07-10 RX ORDER — MAGNESIUM SULFATE HEPTAHYDRATE 40 MG/ML
2 INJECTION, SOLUTION INTRAVENOUS ONCE
Status: COMPLETED | OUTPATIENT
Start: 2023-07-10 | End: 2023-07-10

## 2023-07-10 RX ADMIN — OMEPRAZOLE 40 MG: 20 CAPSULE, DELAYED RELEASE ORAL at 06:14

## 2023-07-10 RX ADMIN — MEROPENEM 500 MG: 500 INJECTION, POWDER, FOR SOLUTION INTRAVENOUS at 18:59

## 2023-07-10 RX ADMIN — PREGABALIN 50 MG: 25 CAPSULE ORAL at 06:54

## 2023-07-10 RX ADMIN — HEPARIN SODIUM 5000 UNITS: 5000 INJECTION, SOLUTION INTRAVENOUS; SUBCUTANEOUS at 13:52

## 2023-07-10 RX ADMIN — TAMSULOSIN HYDROCHLORIDE 0.4 MG: 0.4 CAPSULE ORAL at 06:55

## 2023-07-10 RX ADMIN — VANCOMYCIN HYDROCHLORIDE 1250 MG: 5 INJECTION, POWDER, LYOPHILIZED, FOR SOLUTION INTRAVENOUS at 21:49

## 2023-07-10 RX ADMIN — FINASTERIDE 5 MG: 5 TABLET, FILM COATED ORAL at 06:17

## 2023-07-10 RX ADMIN — SODIUM CHLORIDE, POTASSIUM CHLORIDE, SODIUM LACTATE AND CALCIUM CHLORIDE: 600; 310; 30; 20 INJECTION, SOLUTION INTRAVENOUS at 13:48

## 2023-07-10 RX ADMIN — MEROPENEM 500 MG: 500 INJECTION, POWDER, FOR SOLUTION INTRAVENOUS at 06:22

## 2023-07-10 RX ADMIN — MAGNESIUM SULFATE HEPTAHYDRATE 2 G: 2 INJECTION, SOLUTION INTRAVENOUS at 16:56

## 2023-07-10 RX ADMIN — LEVOTHYROXINE SODIUM 112 MCG: 0.11 TABLET ORAL at 06:16

## 2023-07-10 RX ADMIN — CYCLOBENZAPRINE 5 MG: 10 TABLET, FILM COATED ORAL at 21:50

## 2023-07-10 RX ADMIN — HEPARIN SODIUM 5000 UNITS: 5000 INJECTION, SOLUTION INTRAVENOUS; SUBCUTANEOUS at 06:55

## 2023-07-10 RX ADMIN — ROSUVASTATIN CALCIUM 20 MG: 20 TABLET, FILM COATED ORAL at 06:31

## 2023-07-10 RX ADMIN — PREGABALIN 50 MG: 25 CAPSULE ORAL at 18:41

## 2023-07-10 RX ADMIN — MEROPENEM 500 MG: 500 INJECTION, POWDER, FOR SOLUTION INTRAVENOUS at 13:14

## 2023-07-10 RX ADMIN — TRAZODONE HYDROCHLORIDE 50 MG: 50 TABLET ORAL at 21:50

## 2023-07-10 RX ADMIN — HEPARIN SODIUM 5000 UNITS: 5000 INJECTION, SOLUTION INTRAVENOUS; SUBCUTANEOUS at 21:51

## 2023-07-10 RX ADMIN — INSULIN HUMAN 1 UNITS: 100 INJECTION, SOLUTION PARENTERAL at 22:04

## 2023-07-10 RX ADMIN — AMLODIPINE BESYLATE 10 MG: 10 TABLET ORAL at 06:15

## 2023-07-10 RX ADMIN — VANCOMYCIN HYDROCHLORIDE 1250 MG: 5 INJECTION, POWDER, LYOPHILIZED, FOR SOLUTION INTRAVENOUS at 08:40

## 2023-07-10 ASSESSMENT — ENCOUNTER SYMPTOMS
WEAKNESS: 0
CHILLS: 1
DIARRHEA: 0
FEVER: 1
NAUSEA: 0
SHORTNESS OF BREATH: 0
DIZZINESS: 0
ABDOMINAL PAIN: 0
VOMITING: 0
HEADACHES: 0
COUGH: 0
PALPITATIONS: 0
MYALGIAS: 1

## 2023-07-10 ASSESSMENT — COGNITIVE AND FUNCTIONAL STATUS - GENERAL
DAILY ACTIVITIY SCORE: 24
SUGGESTED CMS G CODE MODIFIER MOBILITY: CH
MOBILITY SCORE: 24
SUGGESTED CMS G CODE MODIFIER DAILY ACTIVITY: CH

## 2023-07-10 ASSESSMENT — LIFESTYLE VARIABLES
EVER HAD A DRINK FIRST THING IN THE MORNING TO STEADY YOUR NERVES TO GET RID OF A HANGOVER: NO
AVERAGE NUMBER OF DAYS PER WEEK YOU HAVE A DRINK CONTAINING ALCOHOL: 2
ON A TYPICAL DAY WHEN YOU DRINK ALCOHOL HOW MANY DRINKS DO YOU HAVE: 2
DOES PATIENT WANT TO STOP DRINKING: NO
HAVE PEOPLE ANNOYED YOU BY CRITICIZING YOUR DRINKING: NO
TOTAL SCORE: 0
ALCOHOL_USE: YES
HAVE YOU EVER FELT YOU SHOULD CUT DOWN ON YOUR DRINKING: NO
CONSUMPTION TOTAL: NEGATIVE
HOW MANY TIMES IN THE PAST YEAR HAVE YOU HAD 5 OR MORE DRINKS IN A DAY: 0
EVER FELT BAD OR GUILTY ABOUT YOUR DRINKING: NO

## 2023-07-10 ASSESSMENT — PAIN DESCRIPTION - PAIN TYPE: TYPE: ACUTE PAIN

## 2023-07-10 ASSESSMENT — PATIENT HEALTH QUESTIONNAIRE - PHQ9
1. LITTLE INTEREST OR PLEASURE IN DOING THINGS: NOT AT ALL
SUM OF ALL RESPONSES TO PHQ9 QUESTIONS 1 AND 2: 0
2. FEELING DOWN, DEPRESSED, IRRITABLE, OR HOPELESS: NOT AT ALL

## 2023-07-10 NOTE — ED NOTES
Med rec updated and complete. Allergies reviewed. Confirmed name and date of birth. Pt denies  antibiotic use in last 30 days.      Home pharmacy  [facility]   [phone]

## 2023-07-10 NOTE — ED NOTES
Pt sleeping comfortably in bed, breathing is easy and unlabored. Bed in lowest position, blankets provided for comfort. No s/s of distress noted. No new needs identified, Will continue to monitor.

## 2023-07-10 NOTE — ASSESSMENT & PLAN NOTE
Wound culture from 7/6 grew MRSA and group A strep  Consulted infectious disease, continued on linezolid  Ultrasound negative for DVT and arterial circulation is okay  Continues to have significant edema, continue IV Lasix and leg elevation for edema  Wound care consulted

## 2023-07-10 NOTE — PROGRESS NOTES
Pharmacy Vancomycin Kinetics Note for 7/9/2023   62 y.o. male on Vancomycin day # 1     Vancomycin Indication (AUC Dosing): Skin/skin structure infection  Provider specified end date: 07/19/23    Active Antibiotics (From admission, onward)      Ordered     Ordering Provider       Sun Jul 9, 2023  6:32 PM    07/09/23 1832  MD Alert...Vancomycin per Pharmacy  PHARMACY TO DOSE        Question:  Indication(s) for vancomycin?  Answer:  Skin and soft tissue infection    William Matthews M.D.    07/09/23 1832  meropenem (Merrem) 500 mg in  mL IV-MBP  EVERY 6 HOURS        Question:  Indication  Answer:  Skin and soft tissue infection    William Matthews M.D.            Dosing Weight: 138 kg (304 lb 3.8 oz)    Admission History: Admitted on 7/9/2023 for Cellulitis of left leg [L03.116]  Pertinent history: Pt presented to [facility] [place] ER for c/o LLE pain due to a wound infection. History of DM. The patient notes that he developed a new blister to the LLE great toe approximately 1 week ago. This was cultured in the wound clinic, which grew MRSA and S.pyogenes. In the ER, he was noted to be febrile (101.2F), but without leukocytosis or lactic acidosis. IV vancomycin was initiated on admission.    Allergies:   Amoxicillin, Oxycodone, Codeine, and Erythromycin     Pertinent cultures to date:   Results       Procedure Component Value Units Date/Time    Urinalysis [[id number]]  (Abnormal) Collected: 07/09/23 2030    Order Status: Completed Specimen: Urine Updated: 07/09/23 2100     Color DK Yellow     Character Clear     Specific Gravity 1.027     Ph 8.0     Glucose 100 mg/dL      Ketones Trace mg/dL      Protein 100 mg/dL      Bilirubin Negative     Urobilinogen, Urine 1.0     Nitrite Negative     Leukocyte Esterase Negative     Occult Blood Negative     Micro Urine Req Microscopic    Narrative:      Indication for culture:->Evaluation for sepsis without a  clear source of infection    Urine Culture (New) [[id number]] Collected:  "23    Order Status: Sent Specimen: Urine Updated: 23    Narrative:      Indication for culture:->Evaluation for sepsis without a  clear source of infection    Blood Culture [[id number]] Collected: 23 162    Order Status: Sent Specimen: Blood from Peripheral Updated: 23 165    Narrative:      Per Hospital Policy: Only change Specimen Src: to \"Line\" if  specified by physician order.    Blood Culture [[id number]] Collected: 23 1546    Order Status: Sent Specimen: Blood from Peripheral Updated: 23 162    Narrative:      Per Hospital Policy: Only change Specimen Src: to \"Line\" if  specified by physician order.            Labs:   Estimated Creatinine Clearance: 98.1 mL/min (by C-G formula based on SCr of 1.13 mg/dL).  Recent Labs     23   WBC 10.8   NEUTSPOLYS 85.90*     Recent Labs     23   BUN 15   CREATININE 1.13   ALBUMIN 4.4       Intake/Output Summary (Last 24 hours) at 2023 2321  Last data filed at 2023 2130  Gross per 24 hour   Intake 1500 ml   Output --   Net 1500 ml      BP (!) 142/65   Pulse 87   Temp 37.8 °C (100.1 °F)   Resp 18   Ht 1.88 m (6' 2\")   Wt (!) 138 kg (305 lb 5.4 oz)   SpO2 93%  Temp (24hrs), Av.1 °C (100.6 °F), Min:37.8 °C (100.1 °F), Max:38.4 °C (101.2 °F)      List concerns for Vancomycin clearance:   Obesity    Pharmacokinetics:   AUC kinetics:   Ke (hr ^-1): 0.0858 hr^-1  Half life: 8.08 hr  Clearance: 5.094  Estimated TDD: 2547  Estimated Dose: 1072  Estimated interval: 10.1      A/P:     -  Vancomycin dose: initiate 1250 mg IV Q12H (, 18)     -  Next vancomycin level(s): TBD by rounding pharmD    -  Predicted vancomycin AUC from initial AUC test calculator: 491 mg·hr/L    -  Comments: LPS is to consult.    Bina Loaiza, PharmD    "

## 2023-07-10 NOTE — ASSESSMENT & PLAN NOTE
- SIRS criteria identified on my evaluation include:  Fever, with temperature greater than 100.9 deg F and Leukocytosis, with WBC greater than 12,000.  He does not meet sepsis 3 criteria.  Source is likely left leg cellulitis.  Lactate is normal.  -Start IV fluids with LR at 83 cc/h. sepsis protocol.  Trend lactate level: Now within normal limits  -Continue antibiotics for source of left leg cellulitis.  Follow cultures.  -Close hemodynamic monitoring.

## 2023-07-10 NOTE — ASSESSMENT & PLAN NOTE
Holding glipizide  ISS  Maintain good glucose control in setting of infection, glucose has been stable

## 2023-07-10 NOTE — ASSESSMENT & PLAN NOTE
- Body mass index is 39.2 kg/m²..  -  on weight loss.  - outpatient referral for outpatient weight management.

## 2023-07-10 NOTE — PROGRESS NOTES
"Hospital Medicine Daily Progress Note    Date of Service  7/10/2023    Chief Complaint  Thomas Borja is a 62 y.o. male admitted 7/9/2023 with red, painful left leg with wound    Hospital Course  Thomas Borja is a 62 y.o. male with diabetes mellitus, morbid obesity, hypertension, BPH, who has had chronic left leg wound since January 2023 following left leg surgery. He follows with the wound clinic regularly.  About a week ago he got a blister on his left great toe from new shoes. The wound became infected and was cultured at the wound clinic. Cultures grew strep pyogenes and MRSA.  The patient notice redness, increased warmth and tenderness on his foot which subsequently ascended to his leg and thigh.  He also started to have fevers, initially intermittent then becoming constant, with Tmax of 102.5 °F at home.  He then presented to the emergency department on 7/9.       ED course:  \"On evaluation, vital signs were stable, however he was febrile at 38.4 °C.  Initial blood work-up showed no leukocytosis but had increased neutrophilic predominance.  Sodium was 131, with normal creatinine.  Lactate was normal.  Procalcitonin was normal.  CRP was elevated at 19.75.  Chest x-ray... did not show infiltrates or consolidation.  Patient was given meropenem and vancomycin given his most recent wound cultures.  Patient was given IV LR.  He was subsequent admitted to the hospitalist service.\"    Interval Problem Update  The patient is lying in bed, fully alert and oriented.  Granddaughter at bedside.  The patient is 3/10 left leg pain on movement and palpation.  He had chills and fevers last night.  He denies headache, dizziness, nausea, problems breathing and any other problems.  -Leukocytosis  -Continue vancomycin and Merrem  -IV magnesium for electrolyte support  -Continue IV fluids  -Monitor for clinical improvement    I have discussed this patient's plan of care and discharge plan at IDT rounds today with " Case Management, Nursing, Nursing leadership, and other members of the IDT team. Discussed with Dr. Gastelum    Consultants/Specialty  none    Code Status  Full Code    Disposition  The patient is not medically cleared for discharge to home or a post-acute facility.  Anticipate discharge to: home with organized home healthcare and close outpatient follow-up    I have placed the appropriate orders for post-discharge needs.    Review of Systems  Review of Systems   Constitutional:  Positive for chills and fever.   Respiratory:  Negative for cough and shortness of breath.    Cardiovascular:  Negative for chest pain and palpitations.   Gastrointestinal:  Negative for abdominal pain, diarrhea, nausea and vomiting.   Genitourinary:  Negative for dysuria and urgency.   Musculoskeletal:  Positive for myalgias. Negative for joint pain.   Skin:  Positive for rash. Negative for itching.   Neurological:  Negative for dizziness, weakness and headaches.        Physical Exam  Temp:  [36.9 °C (98.5 °F)-38.4 °C (101.2 °F)] 37.7 °C (99.8 °F)  Pulse:  [] 87  Resp:  [16-28] 20  BP: (121-154)/(56-76) 142/67  SpO2:  [88 %-97 %] 97 %    Physical Exam  Vitals reviewed.   Constitutional:       General: He is awake. He is not in acute distress.     Appearance: Normal appearance. He is obese. He is not toxic-appearing or diaphoretic.      Comments: Body mass index is 39.2 kg/m².   HENT:      Head: Normocephalic and atraumatic.      Mouth/Throat:      Mouth: Mucous membranes are moist.      Pharynx: No oropharyngeal exudate.   Cardiovascular:      Rate and Rhythm: Normal rate and regular rhythm.      Heart sounds: Normal heart sounds. No murmur heard.     No gallop.   Pulmonary:      Effort: Pulmonary effort is normal. No respiratory distress.      Breath sounds: Normal breath sounds. No stridor. No wheezing, rhonchi or rales.   Chest:      Chest wall: No tenderness.   Abdominal:      General: Bowel sounds are normal. There is no  distension.      Palpations: Abdomen is soft. There is no mass.      Tenderness: There is no abdominal tenderness. There is no guarding.   Musculoskeletal:         General: No swelling. Normal range of motion.      Cervical back: Normal range of motion.      Right lower leg: No edema.      Left lower leg: Edema (left leg) present.        Feet:       Comments: Erythema around the left ankle and left thigh  Infected blister on the medial aspect of left great toe   Skin:     General: Skin is warm and dry.      Coloration: Skin is not jaundiced.      Findings: Rash present.             Comments: Scabbed surgical scar in black  Rash in red   Neurological:      General: No focal deficit present.      Mental Status: He is alert and oriented to person, place, and time.      Cranial Nerves: No cranial nerve deficit.   Psychiatric:         Attention and Perception: Attention normal.         Mood and Affect: Mood normal.         Speech: Speech normal.         Behavior: Behavior normal. Behavior is cooperative.         Thought Content: Thought content normal.         Judgment: Judgment normal.         Fluids    Intake/Output Summary (Last 24 hours) at 7/10/2023 1525  Last data filed at 7/10/2023 1238  Gross per 24 hour   Intake 2177.44 ml   Output --   Net 2177.44 ml       Laboratory  Recent Labs     07/09/23  1629 07/10/23  0040   WBC 10.8 11.2*   RBC 5.22 4.37*   HEMOGLOBIN 17.1 14.1   HEMATOCRIT 48.2 40.1*   MCV 92.3 91.8   MCH 32.8 32.3   MCHC 35.5 35.2   RDW 49.7 48.7   PLATELETCT 273 242   MPV 9.0 9.0     Recent Labs     07/09/23  1629 07/10/23  0040   SODIUM 131* 132*   POTASSIUM 3.7 4.3   CHLORIDE 94* 98   CO2 25 23   GLUCOSE 160* 124*   BUN 15 14   CREATININE 1.13 1.13   CALCIUM 9.6 8.5                   Imaging  DX-CHEST-PORTABLE (1 VIEW)   Final Result      No acute cardiopulmonary abnormality.              Assessment/Plan  * Cellulitis of left leg- (present on admission)  Assessment & Plan  - In setting of chronic  wound on the left leg following leg surgery in January 2023.  He has been found to have infected blister on the left great toe, which is likely where the cellulitis started.  No indication of fluctuance or abscess.  Wound culture from outpatient wound clinic grew MRSA, and Streptococcus pyogenes resistant to Unasyn, cefepime/cefazolin, oxacillin, and tetracycline.  -Admit to the medical floors.  -Continue meropenem and vancomycin.  Follow blood cultures.  -Check ESR, and trend CRP: Trending down, remains elevated  -limb preservation service consulted  -Pain control with IV Toradol.    Significant Indicator POS Positive (POS)    Source WND    Site Left hallux    Culture Result - Abnormal     Gram Stain Result Many Gram positive cocci in clusters.   Many Gram positive cocci in chains.    Culture Result  Abnormal   Methicillin Resistant Staphylococcus aureus   Heavy growth     Culture Result  Abnormal   Streptococcus pyogenes (Group A)   Heavy growth          SIRS (systemic inflammatory response syndrome) ([facility])- (present on admission)  Assessment & Plan  - SIRS criteria identified on my evaluation include:  Fever, with temperature greater than 100.9 deg F and Leukocytosis, with WBC greater than 12,000.  He does not meet sepsis 3 criteria.  Source is likely left leg cellulitis.  Lactate is normal.  -Start IV fluids with LR at 83 cc/h. sepsis protocol.  Trend lactate level: Now within normal limits  -Continue antibiotics for source of left leg cellulitis.  Follow cultures.  -Close hemodynamic monitoring.      Benign prostatic hyperplasia without lower urinary tract symptoms- (present on admission)  Assessment & Plan  - continuehome finasteride and Flomax.    Hyponatremia- (present on admission)  Assessment & Plan  - Likely due to SIRS, hypovolemia.  Hydrate with IV LR at 83 cc/h.  Trend sodium levels.    Type 2 diabetes mellitus with neurologic complication, without long-term current use of insulin ([facility])- (present on  admission)  Assessment & Plan  - Hold glipizide for now.  I will start him on sliding scale insulin coverage while in-house. Accu-Cheks before meals and at bedtime. Goal to keep BG below 150 for optimum wound healing.  Diabetic diet.    Essential hypertension- (present on admission)  Assessment & Plan  - Resume home Norvasc.  Monitor blood pressure trend closely, start as needed IV labetalol for significant hypertension.  Optimize blood pressure control.    Morbid obesity due to excess calories (HCC)- (present on admission)  Assessment & Plan  - Body mass index is 39.2 kg/m²..  -  on weight loss.  - outpatient referral for outpatient weight management.         VTE prophylaxis: heparin ppx. Patient is using toradol    I have performed a physical exam and reviewed and updated ROS and Plan today (7/10/2023). In review of yesterday's note (7/9/2023), there are no changes except as documented above.    My total time spent caring for the patient on the day of the encounter was 15 minutes.   This does not include time spent on separately billable procedures/tests. This time is exclusive of the time spent with collaborating physicians.

## 2023-07-10 NOTE — ED NOTES
Bedside report received from previous shift,FLORIN Gilmore. Assumed patient care. Verified patient identification. Checked on bed, connected to monitor,  with unlabored respirations. Pt with ongoing IVF LR. Vital signs is stable. Denied any new complaints. Gurney in low position, side rail up for pt safety. Call light within reach. Will continue to monitor for any complications.

## 2023-07-10 NOTE — H&P
Hospital Medicine History & Physical Note    Date of Service  7/9/2023    Primary Care Physician  Janine Gomez M.D.    Consultants  LPS    Code Status  Full Code    Chief Complaint  Chief Complaint   Patient presents with    Wound Infection    Leg Pain       History of Presenting Illness  Thomas Borja is a 62 y.o. male with diabetes mellitus, morbid obesity, hypertension, BPH, who has had chronic left leg wound since January 2023 following left leg surgery, and follows the wound clinic regularly.  Patient shared that last week, a blister was found on his left great toe, and subsequently became infected.  It was cultured at the wound clinic, with cultures growing strep pyogenes and MRSA.  He then started to notice redness on his foot which subsequently ascended to his leg and thigh.  He also started to have fevers, initially intermittent then becoming constant, with Tmax of 102.5 °F at home.  There is not much pain from the left leg, which she rated 2 out of 10 in severity.  The redness and swelling on the left leg increased, prompting him to go to the ED today.  Notably, he had no other complaints.  He denies any chest pain, shortness of breath, nausea, vomiting, abdominal pain, bowel movement changes.    ED course:  On evaluation, vital signs were stable, however he was febrile at 38.4 °C.  Initial blood work-up showed no leukocytosis but had increased neutrophilic predominance.  Sodium was 131, with normal creatinine.  Lactate was normal.  Procalcitonin was normal.  CRP was elevated at 19.75.  Chest x-ray (personally reviewed) did not show infiltrates or consolidation.  Patient was given meropenem and vancomycin given his most recent wound cultures.  Patient was given IV LR.  He was subsequent admitted to the hospitalist service.      I personally reviewed all lab results mentioned above. Prior medical records from this institution and outside facilities were independently reviewed as noted. I also  personally reviewed all ER physician and consultant recommendations and plans as documented above. History was independently obtained by myself. I discussed the plan of care with patient, family, bedside RN, and ERP .    Review of Systems  ROS    Pertinent positives/negatives as mentioned above.     A complete review of systems was personally done by me. All other systems were negative.       Past Medical History  Diabetes  Hypertension  Obesity  BPH    Surgical History   has a past surgical history that includes skin abscess incision and drainage (Left, 1/5/2023).     Family History  family history includes Heart Disease in his father; Prostate cancer in his father; Skin cancer in his father.       Social History   reports that he has quit smoking. His smoking use included cigars. He has never used smokeless tobacco. He reports current alcohol use. He reports that he does not use drugs.    Allergies  Allergies   Allergen Reactions    Amoxicillin Hives    Oxycodone Hives    Codeine Nausea    Erythromycin Nausea       Medications  Prior to Admission Medications   Prescriptions Last Dose Informant Patient Reported? Taking?   amLODIPine (NORVASC) 10 MG Tab 7/9/2023 at 0800 Patient No No   Sig: Take 1 Tablet by mouth every day.   cyclobenzaprine (FLEXERIL) 5 mg tablet 7/7/2023 at 2130 Patient No No   Sig: Take 1 Tablet by mouth at bedtime.   finasteride (PROSCAR) 5 MG Tab 7/9/2023 at 0800 Patient No No   Sig: Take 1 Tablet by mouth every day.   glipiZIDE (GLUCOTROL) 5 MG Tab 7/9/2023 at 0800 Patient No No   Sig: Take 1 Tablet by mouth every day.   levothyroxine (SYNTHROID) 112 MCG Tab 7/9/2023 at 0800 Patient No No   Sig: Take 1 Tablet by mouth every day.   meclizine (ANTIVERT) 12.5 MG Tab 7/7/2023 at 1930 Patient No No   Sig: Take 1 Tablet by mouth 3 times a day as needed for Dizziness.   methylphenidate (CONCERTA) 36 MG CR tablet new script at n/a Patient No No   Sig: Take 1 Tablet by mouth every day for 30 days.    omeprazole (PRILOSEC) 40 MG delayed-release capsule 7/9/2023 at 0800 Patient No No   Sig: Take 1 Capsule by mouth every day.   pregabalin (LYRICA) 50 MG capsule 7/9/2023 at 0800 Patient No No   Sig: Take 1 Capsule by mouth 2 times a day for 90 days.   rosuvastatin (CRESTOR) 20 MG Tab 7/9/2023 at 0800 Patient No No   Sig: Take 1 Tablet by mouth every day.   tamsulosin (FLOMAX) 0.4 MG capsule 7/9/2023 at 0800 Patient No No   Sig: Take 1 Capsule by mouth every day.   traZODone (DESYREL) 50 MG Tab 7/7/2023 at 2130 Patient No No   Sig: Take 1 Tablet by mouth at bedtime.      Facility-Administered Medications: None       Physical Exam  Temp:  [38.3 °C (100.9 °F)-38.4 °C (101.2 °F)] 38.4 °C (101.2 °F)  Pulse:  [] 88  Resp:  [16-20] 17  BP: (134-184)/(61-94) 134/61  SpO2:  [94 %-97 %] 95 %  Blood Pressure: 134/61   Temperature: (!) 38.4 °C (101.2 °F)   Pulse: 88   Respiration: 17   Pulse Oximetry: 95 %       Physical Exam  Vitals reviewed.   Constitutional:       General: He is not in acute distress.     Appearance: Normal appearance. He is obese. He is not toxic-appearing or diaphoretic.      Comments: Body mass index is 39.2 kg/m².   HENT:      Head: Normocephalic and atraumatic.      Right Ear: External ear normal.      Left Ear: External ear normal.      Mouth/Throat:      Mouth: Mucous membranes are moist.      Pharynx: No oropharyngeal exudate.   Eyes:      General: No scleral icterus.     Extraocular Movements: Extraocular movements intact.      Conjunctiva/sclera: Conjunctivae normal.      Pupils: Pupils are equal, round, and reactive to light.   Cardiovascular:      Rate and Rhythm: Normal rate and regular rhythm.      Heart sounds: Normal heart sounds. No murmur heard.     No gallop.   Pulmonary:      Effort: Pulmonary effort is normal. No respiratory distress.      Breath sounds: Normal breath sounds. No stridor. No wheezing, rhonchi or rales.   Chest:      Chest wall: No tenderness.   Abdominal:       General: Bowel sounds are normal. There is no distension.      Palpations: Abdomen is soft. There is no mass.      Tenderness: There is no abdominal tenderness. There is no guarding or rebound.   Musculoskeletal:         General: No swelling. Normal range of motion.      Cervical back: Normal range of motion and neck supple.      Right lower leg: No edema.      Left lower leg: Edema (left leg) present.      Comments: Erythema around the left ankle and left thigh  Infected blister on the medial aspect of left great toe   Lymphadenopathy:      Cervical: No cervical adenopathy.   Skin:     General: Skin is warm and dry.      Coloration: Skin is not jaundiced.      Findings: No rash.   Neurological:      General: No focal deficit present.      Mental Status: He is alert and oriented to person, place, and time.      Cranial Nerves: No cranial nerve deficit.   Psychiatric:         Mood and Affect: Mood normal.         Behavior: Behavior normal.         Thought Content: Thought content normal.         Judgment: Judgment normal.             Laboratory:  Recent Labs     07/09/23  1629   WBC 10.8   RBC 5.22   HEMOGLOBIN 17.1   HEMATOCRIT 48.2   MCV 92.3   MCH 32.8   MCHC 35.5   RDW 49.7   PLATELETCT 273   MPV 9.0     Recent Labs     07/09/23  1629   SODIUM 131*   POTASSIUM 3.7   CHLORIDE 94*   CO2 25   GLUCOSE 160*   BUN 15   CREATININE 1.13   CALCIUM 9.6     Recent Labs     07/09/23  1629   ALTSGPT 39   ASTSGOT 30   ALKPHOSPHAT 190*   TBILIRUBIN 1.4   GLUCOSE 160*         No results for input(s): NTPROBNP in the last 72 hours.      No results for input(s): TROPONINT in the last 72 hours.    Imaging:  DX-CHEST-PORTABLE (1 VIEW)   Final Result      No acute cardiopulmonary abnormality.               Imaging studies and EKG results were independently reviewed as above.      Assessment/Plan:  Justification for Admission Status  I anticipate this patient will require at least two midnights for appropriate medical management,  necessitating inpatient admission because of left leg cellulitis, with SIRS.  He will need IV antibiotics in the hospital given resistant organisms on most recent wound cultures. Patient is at risk for clinical decompensation, and will need close monitoring in the hospital.  Medically necessary services cannot be provided at a lower level of care.      * Cellulitis of left leg- (present on admission)  Assessment & Plan  - In setting of chronic wound on the left leg following leg surgery in January 2023.  He has been found to have infected blister on the left great toe, which is likely where the cellulitis started.  No indication of fluctuance or abscess.  Wound culture from outpatient wound clinic grew MRSA, and Streptococcus pyogenes resistant to Unasyn, cefepime/cefazolin, oxacillin, and tetracycline.  -Admit to the medical floors.  -I will start him on meropenem and vancomycin.  Follow blood cultures.  -Check ESR, and trend CRP.  -I will get limb preservation service on board.  -Pain control with IV Toradol.    SIRS (systemic inflammatory response syndrome) (HCC)- (present on admission)  Assessment & Plan  - SIRS criteria identified on my evaluation include:  Fever, with temperature greater than 100.9 deg F and Leukocytosis, with WBC greater than 12,000.  He does not meet sepsis 3 criteria.  Source is likely left leg cellulitis.  Lactate is normal.  -Start IV fluids with LR at 83 cc/h.  Start sepsis protocol.  Trend lactate level.  -Start antibiotics for source of left leg cellulitis.  Follow cultures.  -Close hemodynamic monitoring.      Hyponatremia- (present on admission)  Assessment & Plan  - Likely due to SIRS, hypovolemia.  Hydrate with IV LR at 83 cc/h.  Trend sodium levels.    Benign prostatic hyperplasia without lower urinary tract symptoms- (present on admission)  Assessment & Plan  - Resume home finasteride and Flomax.    Type 2 diabetes mellitus with neurologic complication, without long-term current  use of insulin (HCC)- (present on admission)  Assessment & Plan  - Hold glipizide for now.  I will start him on sliding scale insulin coverage while in-house. Accu-Cheks before meals and at bedtime. Goal to keep BG between 140-180 per 2019 ADA guidelines.  Diabetic diet.    Essential hypertension- (present on admission)  Assessment & Plan  - Resume home Norvasc.  Monitor blood pressure trend closely, start as needed IV labetalol for significant hypertension.  Optimize blood pressure control.    Morbid obesity due to excess calories (HCC)- (present on admission)  Assessment & Plan  - Body mass index is 39.2 kg/m²..  -  on weight loss.  - outpatient referral for outpatient weight management.        VTE prophylaxis: heparin ppx

## 2023-07-10 NOTE — ED NOTES
Checked on bed, connected to monitor,  with unlabored respirations. Vital signs monitored Denied any new complaints. Gurney in low position, side rail up for pt safety. Call light within reach. Will continue to monitor for any complications.

## 2023-07-10 NOTE — HOSPITAL COURSE
"Thomas Borja is a 62 y.o. male with diabetes mellitus, morbid obesity, hypertension, BPH, who has had chronic left leg wound since January 2023 following left leg surgery. He follows with the wound clinic regularly.  About a week ago he got a blister on his left great toe from new shoes. The wound became infected and was cultured at the wound clinic. Cultures grew strep pyogenes and MRSA.  The patient notice redness, increased warmth and tenderness on his foot which subsequently ascended to his leg and thigh.  He also started to have fevers, initially intermittent then becoming constant, with Tmax of 102.5 °F at home.  He then presented to the emergency department on 7/9.       ED course:  \"On evaluation, vital signs were stable, however he was febrile at 38.4 °C.  Initial blood work-up showed no leukocytosis but had increased neutrophilic predominance.  Sodium was 131, with normal creatinine.  Lactate was normal.  Procalcitonin was normal.  CRP was elevated at 19.75.  Chest x-ray... did not show infiltrates or consolidation.  Patient was given meropenem and vancomycin given his most recent wound cultures.  Patient was given IV LR.  He was subsequent admitted to the hospitalist service.\"  "

## 2023-07-11 ENCOUNTER — APPOINTMENT (OUTPATIENT)
Dept: RADIOLOGY | Facility: MEDICAL CENTER | Age: 63
DRG: 623 | End: 2023-07-11
Attending: NURSE PRACTITIONER
Payer: COMMERCIAL

## 2023-07-11 ENCOUNTER — APPOINTMENT (OUTPATIENT)
Dept: RADIOLOGY | Facility: MEDICAL CENTER | Age: 63
DRG: 623 | End: 2023-07-11
Attending: INTERNAL MEDICINE
Payer: COMMERCIAL

## 2023-07-11 LAB
ANION GAP SERPL CALC-SCNC: 8 MMOL/L (ref 7–16)
BACTERIA UR CULT: NORMAL
BUN SERPL-MCNC: 13 MG/DL (ref 8–22)
CALCIUM SERPL-MCNC: 8.5 MG/DL (ref 8.5–10.5)
CHLORIDE SERPL-SCNC: 97 MMOL/L (ref 96–112)
CO2 SERPL-SCNC: 27 MMOL/L (ref 20–33)
CREAT SERPL-MCNC: 1.07 MG/DL (ref 0.5–1.4)
CRP SERPL HS-MCNC: 14.86 MG/DL (ref 0–0.75)
ERYTHROCYTE [DISTWIDTH] IN BLOOD BY AUTOMATED COUNT: 49.7 FL (ref 35.9–50)
GFR SERPLBLD CREATININE-BSD FMLA CKD-EPI: 77 ML/MIN/1.73 M 2
GLUCOSE BLD STRIP.AUTO-MCNC: 154 MG/DL (ref 65–99)
GLUCOSE BLD STRIP.AUTO-MCNC: 154 MG/DL (ref 65–99)
GLUCOSE BLD STRIP.AUTO-MCNC: 157 MG/DL (ref 65–99)
GLUCOSE BLD STRIP.AUTO-MCNC: 209 MG/DL (ref 65–99)
GLUCOSE SERPL-MCNC: 136 MG/DL (ref 65–99)
GRAM STN SPEC: NORMAL
HCT VFR BLD AUTO: 41.1 % (ref 42–52)
HGB BLD-MCNC: 14.1 G/DL (ref 14–18)
MCH RBC QN AUTO: 32.1 PG (ref 27–33)
MCHC RBC AUTO-ENTMCNC: 34.3 G/DL (ref 32.3–36.5)
MCV RBC AUTO: 93.6 FL (ref 81.4–97.8)
PLATELET # BLD AUTO: 278 K/UL (ref 164–446)
PMV BLD AUTO: 8.7 FL (ref 9–12.9)
POTASSIUM SERPL-SCNC: 4.3 MMOL/L (ref 3.6–5.5)
RBC # BLD AUTO: 4.39 M/UL (ref 4.7–6.1)
SIGNIFICANT IND 70042: NORMAL
SIGNIFICANT IND 70042: NORMAL
SITE SITE: NORMAL
SITE SITE: NORMAL
SODIUM SERPL-SCNC: 132 MMOL/L (ref 135–145)
SOURCE SOURCE: NORMAL
SOURCE SOURCE: NORMAL
WBC # BLD AUTO: 8.3 K/UL (ref 4.8–10.8)

## 2023-07-11 PROCEDURE — 700111 HCHG RX REV CODE 636 W/ 250 OVERRIDE (IP): Performed by: INTERNAL MEDICINE

## 2023-07-11 PROCEDURE — 86140 C-REACTIVE PROTEIN: CPT

## 2023-07-11 PROCEDURE — 99233 SBSQ HOSP IP/OBS HIGH 50: CPT | Performed by: INTERNAL MEDICINE

## 2023-07-11 PROCEDURE — 82962 GLUCOSE BLOOD TEST: CPT

## 2023-07-11 PROCEDURE — 700105 HCHG RX REV CODE 258: Mod: JZ | Performed by: INTERNAL MEDICINE

## 2023-07-11 PROCEDURE — 94760 N-INVAS EAR/PLS OXIMETRY 1: CPT

## 2023-07-11 PROCEDURE — 93922 UPR/L XTREMITY ART 2 LEVELS: CPT

## 2023-07-11 PROCEDURE — 87205 SMEAR GRAM STAIN: CPT

## 2023-07-11 PROCEDURE — 700102 HCHG RX REV CODE 250 W/ 637 OVERRIDE(OP): Performed by: INTERNAL MEDICINE

## 2023-07-11 PROCEDURE — L3260 AMBULATORY SURGICAL BOOT EAC: HCPCS

## 2023-07-11 PROCEDURE — 11042 DBRDMT SUBQ TIS 1ST 20SQCM/<: CPT | Performed by: NURSE PRACTITIONER

## 2023-07-11 PROCEDURE — 0HBLXZZ EXCISION OF LEFT LOWER LEG SKIN, EXTERNAL APPROACH: ICD-10-PCS | Performed by: ORTHOPAEDIC SURGERY

## 2023-07-11 PROCEDURE — 0JBR0ZZ EXCISION OF LEFT FOOT SUBCUTANEOUS TISSUE AND FASCIA, OPEN APPROACH: ICD-10-PCS | Performed by: ORTHOPAEDIC SURGERY

## 2023-07-11 PROCEDURE — 36415 COLL VENOUS BLD VENIPUNCTURE: CPT

## 2023-07-11 PROCEDURE — 306591 TRAY SUTURE REMOVAL DISP: Performed by: INTERNAL MEDICINE

## 2023-07-11 PROCEDURE — 80048 BASIC METABOLIC PNL TOTAL CA: CPT

## 2023-07-11 PROCEDURE — 770006 HCHG ROOM/CARE - MED/SURG/GYN SEMI*

## 2023-07-11 PROCEDURE — A9270 NON-COVERED ITEM OR SERVICE: HCPCS | Performed by: INTERNAL MEDICINE

## 2023-07-11 PROCEDURE — 85027 COMPLETE CBC AUTOMATED: CPT

## 2023-07-11 PROCEDURE — 99222 1ST HOSP IP/OBS MODERATE 55: CPT | Mod: 25 | Performed by: NURSE PRACTITIONER

## 2023-07-11 PROCEDURE — 87076 CULTURE ANAEROBE IDENT EACH: CPT

## 2023-07-11 PROCEDURE — 87070 CULTURE OTHR SPECIMN AEROBIC: CPT

## 2023-07-11 PROCEDURE — 11055 PARING/CUTG B9 HYPRKER LES 1: CPT | Mod: 59 | Performed by: NURSE PRACTITIONER

## 2023-07-11 PROCEDURE — 87077 CULTURE AEROBIC IDENTIFY: CPT

## 2023-07-11 PROCEDURE — 73660 X-RAY EXAM OF TOE(S): CPT | Mod: LT

## 2023-07-11 PROCEDURE — 306637 HCHG MISC ORTHO ITEM RC 0274

## 2023-07-11 PROCEDURE — 87186 SC STD MICRODIL/AGAR DIL: CPT

## 2023-07-11 PROCEDURE — 94660 CPAP INITIATION&MGMT: CPT

## 2023-07-11 PROCEDURE — 87147 CULTURE TYPE IMMUNOLOGIC: CPT

## 2023-07-11 PROCEDURE — 93971 EXTREMITY STUDY: CPT | Mod: LT

## 2023-07-11 PROCEDURE — 99223 1ST HOSP IP/OBS HIGH 75: CPT | Performed by: INTERNAL MEDICINE

## 2023-07-11 RX ORDER — LINEZOLID 600 MG/1
600 TABLET, FILM COATED ORAL EVERY 12 HOURS
Status: DISCONTINUED | OUTPATIENT
Start: 2023-07-11 | End: 2023-07-13 | Stop reason: HOSPADM

## 2023-07-11 RX ORDER — HEPARIN SODIUM 5000 [USP'U]/ML
5000 INJECTION, SOLUTION INTRAVENOUS; SUBCUTANEOUS EVERY 8 HOURS
Status: COMPLETED | OUTPATIENT
Start: 2023-07-11 | End: 2023-07-11

## 2023-07-11 RX ORDER — FUROSEMIDE 10 MG/ML
20 INJECTION INTRAMUSCULAR; INTRAVENOUS
Status: DISCONTINUED | OUTPATIENT
Start: 2023-07-11 | End: 2023-07-13 | Stop reason: HOSPADM

## 2023-07-11 RX ADMIN — LEVOTHYROXINE SODIUM 112 MCG: 0.11 TABLET ORAL at 05:01

## 2023-07-11 RX ADMIN — ROSUVASTATIN CALCIUM 20 MG: 20 TABLET, FILM COATED ORAL at 05:01

## 2023-07-11 RX ADMIN — INSULIN HUMAN 1 UNITS: 100 INJECTION, SOLUTION PARENTERAL at 09:37

## 2023-07-11 RX ADMIN — MEROPENEM 500 MG: 500 INJECTION, POWDER, FOR SOLUTION INTRAVENOUS at 13:36

## 2023-07-11 RX ADMIN — PREGABALIN 50 MG: 25 CAPSULE ORAL at 05:01

## 2023-07-11 RX ADMIN — INSULIN HUMAN 2 UNITS: 100 INJECTION, SOLUTION PARENTERAL at 20:40

## 2023-07-11 RX ADMIN — TAMSULOSIN HYDROCHLORIDE 0.4 MG: 0.4 CAPSULE ORAL at 05:02

## 2023-07-11 RX ADMIN — MEROPENEM 500 MG: 500 INJECTION, POWDER, FOR SOLUTION INTRAVENOUS at 05:06

## 2023-07-11 RX ADMIN — PREGABALIN 50 MG: 25 CAPSULE ORAL at 18:35

## 2023-07-11 RX ADMIN — OMEPRAZOLE 40 MG: 20 CAPSULE, DELAYED RELEASE ORAL at 05:02

## 2023-07-11 RX ADMIN — HEPARIN SODIUM 5000 UNITS: 5000 INJECTION, SOLUTION INTRAVENOUS; SUBCUTANEOUS at 13:39

## 2023-07-11 RX ADMIN — INSULIN HUMAN 1 UNITS: 100 INJECTION, SOLUTION PARENTERAL at 13:45

## 2023-07-11 RX ADMIN — HEPARIN SODIUM 5000 UNITS: 5000 INJECTION, SOLUTION INTRAVENOUS; SUBCUTANEOUS at 05:10

## 2023-07-11 RX ADMIN — INSULIN HUMAN 1 UNITS: 100 INJECTION, SOLUTION PARENTERAL at 18:33

## 2023-07-11 RX ADMIN — HEPARIN SODIUM 5000 UNITS: 5000 INJECTION, SOLUTION INTRAVENOUS; SUBCUTANEOUS at 20:46

## 2023-07-11 RX ADMIN — TRAZODONE HYDROCHLORIDE 50 MG: 50 TABLET ORAL at 20:46

## 2023-07-11 RX ADMIN — FINASTERIDE 5 MG: 5 TABLET, FILM COATED ORAL at 05:02

## 2023-07-11 RX ADMIN — VANCOMYCIN HYDROCHLORIDE 1250 MG: 5 INJECTION, POWDER, LYOPHILIZED, FOR SOLUTION INTRAVENOUS at 06:32

## 2023-07-11 RX ADMIN — FUROSEMIDE 20 MG: 10 INJECTION INTRAMUSCULAR; INTRAVENOUS at 15:50

## 2023-07-11 RX ADMIN — LINEZOLID 600 MG: 600 TABLET, FILM COATED ORAL at 18:35

## 2023-07-11 RX ADMIN — MEROPENEM 500 MG: 500 INJECTION, POWDER, FOR SOLUTION INTRAVENOUS at 00:28

## 2023-07-11 RX ADMIN — AMLODIPINE BESYLATE 10 MG: 10 TABLET ORAL at 05:02

## 2023-07-11 RX ADMIN — CYCLOBENZAPRINE 5 MG: 10 TABLET, FILM COATED ORAL at 20:46

## 2023-07-11 ASSESSMENT — ENCOUNTER SYMPTOMS
ABDOMINAL PAIN: 0
SHORTNESS OF BREATH: 0
MYALGIAS: 1
WEAKNESS: 0
DIARRHEA: 0
NAUSEA: 0

## 2023-07-11 ASSESSMENT — PAIN DESCRIPTION - PAIN TYPE
TYPE: ACUTE PAIN

## 2023-07-11 NOTE — PROGRESS NOTES
Order fro off loading shoe to be fitted to patient has been submitted to [facility]. If any questions you can contact [facility] at ext # [phone].

## 2023-07-11 NOTE — CONSULTS
LIMB PRESERVATION SERVICE CONSULT      REFERRED BY: Dr. Matthews    DATE OF CONSULTATION: 7/11/2023    REASON FOR CONSULT: Left anterior lower leg, left medial great toe     HISTORY OF PRESENT ILLNESS: Thomas Borja is a 62 y.o.  with a past medical history that includes type 2 diabetes, HTN, alcohol use 2-3x wk, admitted 7/9/2023 for Cellulitis of left leg [L03.116].       LPS has been consulted for left great toe ulcer, left anterior lower leg ulcer.    Patient developed left great toe ulcer around 6/28/23 after wearing dress shoes.  Fortunately he was already established with wound care and this was assessed the next day; advanced wound care dressing was applied.    The leg ulcer initially started as pustules in December 2022.  He developed severe cellulitis and underwent surgery 1/5/2023 by Dr. Odom for I&D with VAC placement.  He had follow-up at wound care clinic for VAC changes for several months.  VAC has since been discontinued.  Currently wound care has consisted of Aquacel Ag and 2 layer compression wrap.  He follows with wound clinic 2 times a week.  He recalls last week the ulcer scabbed over.    Patient developed erythema and edema on 7/8/2023 that extended from his toe to mid thigh.  He experienced fevers and chills which started on 7/6/2023.  He denied any nausea or vomiting.  He proceeded to ED and was admitted on 7/9/2023.    Antibiotics were started on this admission.  Erythema has decreased per patient.  Infectious diseases has not consulted.  There is no x-ray of left foot or left leg.  Ortho and Vascular surgery not involved yet.     Diagnosed with diabetes 10-15 years ago, and is currently managing with glipizide.  Has continuous glucose monitor.  Checks blood sugars 5-6 daily and reports that these typically average 140-250. Does have numbness to feet.  Does not Check feet routinely. Does have rx for shoes and inserts. Has not had chance to fill yet with new toe ulcer      Smoking:    reports that he has quit smoking. His smoking use included cigars. He has never used smokeless tobacco.    Alcohol:   reports current alcohol use.    Drug:   reports no history of drug use.      PAST MEDICAL HISTORY: No past medical history on file.     PAST SURGICAL HISTORY:   Past Surgical History:   Procedure Laterality Date    SKIN ABSCESS INCISION AND DRAINAGE Left 1/5/2023    Procedure: LEFT LEG INCISION AND DRAINAGE ABSCESS;  Surgeon: Cesar Odom M.D.;  Location: SURGERY [facility];  Service: General       MEDICATIONS:   Scheduled Medications   Medication Dose Frequency    MD Alert...Vancomycin per Pharmacy   PHARMACY TO DOSE    meropenem  500 mg Q6HRS    amLODIPine  10 mg DAILY    cyclobenzaprine  5 mg QHS    finasteride  5 mg DAILY    levothyroxine  112 mcg DAILY    omeprazole  40 mg DAILY    pregabalin  50 mg BID    rosuvastatin  20 mg DAILY    tamsulosin  0.4 mg DAILY    traZODone  50 mg QHS    senna-docusate  2 Tablet BID    heparin  5,000 Units Q8HRS    insulin regular  1-6 Units 4X/DAY ACHS    vancomycin  1,250 mg Q12HR       ALLERGIES:    Allergies   Allergen Reactions    Amoxicillin Hives    Oxycodone Hives    Codeine Nausea    Erythromycin Nausea        FAMILY HISTORY:   Family History   Problem Relation Age of Onset    Heart Disease Father     Skin cancer Father     Prostate cancer Father     Ovarian Cancer Neg Hx     Tubal Cancer Neg Hx     Peritoneal Cancer Neg Hx     Colorectal Cancer Neg Hx     Breast Cancer Neg Hx          REVIEW OF SYSTEMS:   Constitutional: Negative for chills, fever   Respiratory: Negative for cough and shortness of breath.    Cardiovascular:Negative for chest pain, and claudication.   Gastrointestinal: Negative for constipation, diarrhea, nausea and vomiting.   Lower extremities: positive for swelling and redness  Neurological: positive for numbness to feet and lower legs  All other systems reviewed and are negative     RESULTS:     Recent Labs     07/09/23  9364  07/10/23  0040 07/11/23  0227   WBC 10.8 11.2* 8.3   RBC 5.22 4.37* 4.39*   HEMOGLOBIN 17.1 14.1 14.1   HEMATOCRIT 48.2 40.1* 41.1*   MCV 92.3 91.8 93.6   MCH 32.8 32.3 32.1   MCHC 35.5 35.2 34.3   RDW 49.7 48.7 49.7   PLATELETCT 273 242 278   MPV 9.0 9.0 8.7*     Recent Labs     07/09/23  1629 07/10/23  0040 07/11/23  0227   SODIUM 131* 132* 132*   POTASSIUM 3.7 4.3 4.3   CHLORIDE 94* 98 97   CO2 25 23 27   GLUCOSE 160* 124* 136*   BUN 15 14 13         ESR:     Results from last 7 days   Lab Units 07/10/23  0040 07/09/23  1629   SED RATE WESTERGREN 1526 mm/hour 37* 9       CRP:       Results from last 7 days   Lab Units 07/11/23  0227 07/10/23  0040 07/09/23  1629   C REACTIVE PROTEIN 4596 mg/dL 14.86* 13.81* 19.75*         COVID-19: Not completed this admission     Imaging:  DX-CHEST-PORTABLE (1 VIEW)   Final Result      No acute cardiopulmonary abnormality.         US-EXTREMITY VENOUS LOWER UNILAT LEFT    (Results Pending)   US-EXTREMITY ARTERY LOWER UNILAT W/SHEELA (COMBO) LEFT    (Results Pending)   US-SHEELA SINGLE LEVEL BILAT    (Results Pending)         Xray/CT/MRI: X-ray left great toe ordered    Arterial studies: ABIs pending, venous duplex pending    A1c:  Lab Results   Component Value Date/Time    HBA1C 7.4 (H) 07/06/2023 08:01 AM            Microbiology:  Results       Procedure Component Value Units Date/Time    Urine Culture (New) [[id number]] Collected: 07/09/23 2030    Order Status: Completed Specimen: Urine Updated: 07/11/23 0804     Significant Indicator NEG     Source UR     Site -     Culture Result Usual skin gus <10,000 cfu/mL    Narrative:      Indication for culture:->Evaluation for sepsis without a  clear source of infection  Indication for culture:->Evaluation for sepsis without a    Blood Culture [[id number]] Collected: 07/09/23 1546    Order Status: Completed Specimen: Blood from Peripheral Updated: 07/10/23 0614     Significant Indicator NEG     Source BLD     Site PERIPHERAL     Culture  "Result No Growth  Note: Blood cultures are incubated for 5 days and  are monitored continuously.Positive blood cultures  are called to the RN and reported as soon as  they are identified.      Narrative:      Per Hospital Policy: Only change Specimen Src: to \"Line\" if  specified by physician order.  Right AC    Blood Culture [[id number]] Collected: 07/09/23 1629    Order Status: Completed Specimen: Blood from Peripheral Updated: 07/10/23 0614     Significant Indicator NEG     Source BLD     Site PERIPHERAL     Culture Result No Growth  Note: Blood cultures are incubated for 5 days and  are monitored continuously.Positive blood cultures  are called to the RN and reported as soon as  they are identified.      Narrative:      Per Hospital Policy: Only change Specimen Src: to \"Line\" if  specified by physician order.  Right AC    Urinalysis [[id number]]  (Abnormal) Collected: 07/09/23 2030    Order Status: Completed Specimen: Urine Updated: 07/09/23 2100     Color DK Yellow     Character Clear     Specific Gravity 1.027     Ph 8.0     Glucose 100 mg/dL      Ketones Trace mg/dL      Protein 100 mg/dL      Bilirubin Negative     Urobilinogen, Urine 1.0     Nitrite Negative     Leukocyte Esterase Negative     Occult Blood Negative     Micro Urine Req Microscopic    Narrative:      Indication for culture:->Evaluation for sepsis without a  clear source of infection             PHYSICAL EXAMINATION:     VITAL SIGNS: /65   Pulse 95   Temp 36.2 °C (97.1 °F) (Temporal)   Resp 19   Ht 1.88 m (6' 2\")   Wt (!) 138 kg (305 lb 5.4 oz)   SpO2 92%   BMI 39.20 kg/m²       General Appearance:  Well developed, well nourished, in no acute distress      Lower Extremity Assessment:    Edema:   Moderate to severe pitting edema to LLE  Induration to medial thigh approximately 7 cm in diameter      ROM dorsi/plantarflexion:   Dorsiflexion intact    Structural /mechanical changes:    mycotic toenails    Sensory " Assessment:  Monofilament testing with a 10 gram force: sensation: decreased bilaterally  Visual Inspection: Feet with maceration, ulcers, fissures.  Pedal pulses: intact bilaterally    Feet Insensate to light touch        Pulses:  R foot: palpable DP, palpable PT  L foot: palpable DP, palpable PT        Wound Assessment:    Wound(s)   location: Left great toe medial ulcer  Full thickness, does not probe to bone  Wound characteristics: Crusted with yellow dried exudate  Erythema: Moderate to severe  Drainage: No  Callus: Moderate  Odor: No  Measures:   0.5 x 0.3 cm            PROCEDURE:   -Curette, scissors, forceps used to debride wound bed and periwound callus.  Excisional debridement was performed to remove devitalized tissue until healthy, bleeding tissue was visualized.   Entire surface of wound, 0.25 cm2 debrided.  Tissue debrided into the subcutaneous layer.  Periwound callus, ~ 3cm2, debrided to skin level, excising hyperkeratinized tissue.   -Bleeding controlled with manual pressure.    -Wound care completed by wound RN, refer to flowsheet  -Patient tolerated the procedure well, without c/o pain or discomfort.     Postdebridement measurement: 0.5 x 0.5 x 0.1 cm              Left anterior lower leg:  Thick scab with no fluctuance, no drainage  Flaky epidermis to periphery  Predebridement measurement 4 x 1 cm              Procedure  Using scissors, forceps excised distal aspect of loose scab, callus revealing intact skin.  Total area debrided less than 20 cm².    Area of scab that remains is 1.5 x 0.6 cm  Wound care completed by wound RN.  See note.                              ASSESSMENT AND PLAN:   62 y.o. admitted for Cellulitis of left leg [L03.116]. Presents with diabetic left great toe ulcer.  And resolving left anterior lower leg ulcer    -Left medial great toe: Dried exudate, nonviable tissue. Excisional debridement performed today.  Medically necessary to promote wound healing.  Ulcer does not probe  to bone.  Follow-up on x-ray to rule out OM, low suspicion however patient cellulitis extends from toe to thigh.  - Wound culture ordered.  Previous wound culture outpatient positive for MRSA and strep a.   ID consult.    -Left shin: Nonviable tissue debrided revealing intact skin.  Adherent scab, no fluctuance remains to proximal aspect.  Wound is close to resolution.    -Recommend hibiclense daily at home for skin care management and prevention of future infection  Recommend US thigh if induration not improved with abx                 Wound care:   -Wound care orders placed for nursing by LPS    -Left great toe: Hydrofera Blue, Hypafix tape.  Change every 48 hours  Left shin: Dimethicone lotion  LLE: Double layer Tubigrip F    Imaging/Labs:  -COVID-19: not completed this admission.     Vascular status:   -  Palpable pedal pulses bilaterally  -  arterial studies with TBI and toe pressures pending to guide compression therapy      Surgery:     -  no plans for surgery at this time    Antibiotics:   -currently on antibiotics managed by hospitalist   Wound culture left great toe taken postdebridement  - recommend ID consult.  Previous wound culture from wound clinic of leg positive for MDRO currently on Merrem        Weight Bearing Status:     -Left foot: Weight bearing as tolerated    Offloading:   -Offloading shoe; ordered  -Orthotic company: [employer]  -shoe size: 10    PT/OT:   Not involved at this time      Diabetes Education:   Not involved at this time  -   Lab Results   Component Value Date/Time    HBA1C 7.4 (H) 07/06/2023 08:01 AM          - Implications of loss of protective sensation (LOPS) discussed with patient- including increased risk for amputation.  Advised to check feet at least daily, moisturize feet, and to always wear protective foot wear.   -avoid trimming own nails. See podiatrist or certified foot and nail RN  -keep blood sugars <150 for improved wound healing            Discharge Plan:    -  recommend patient discharges to home with wound care clinic      Follow up: wound care clinic referral placed         D/W: pt, RN, Rosemarie Gann RN wound team    Total time: 55min I spent greater than 50% of the time for patient care, counseling, and coordination on this date, including patient face-to face time, unit/floor time with review of records/pertinent lab data and studies, as well as discussing diagnostic evaluation/work up, planned therapeutic interventions, and future disposition of care, as per the interval events/subjective and the assessment and plan as noted above.  This is excluding procedures noted above      Please note that this dictation was created using voice recognition software. I have  worked with technical experts from [facility] to optimize the interface.  I have made every reasonable attempt to correct obvious errors, but there may be errors of grammar and possibly content that I did not discover before finalizing the note.    Please contact LPS through [employer].

## 2023-07-11 NOTE — PROGRESS NOTES
4 Eyes Skin Assessment Completed by FLORIN Eduardo and FLORIN Carmen.    Head WDL  Ears WDL  Nose WDL  Mouth WDL  Neck WDL  Breast/Chest WDL  Shoulder Blades WDL  Spine WDL  (R) Arm/Elbow/Hand WDL  (L) Arm/Elbow/Hand WDL  Abdomen WDL  Groin WDL  Scrotum/Coccyx/Buttocks WDL  (R) Leg Redness to knee  (L) Leg Redness, Rash, Scar, Swelling, and Edema  (R) Heel/Foot/Toe dry, cracked  (L) Heel/Foot/Toe Redness and Scab to great toe, dry and cracked heels          Devices In Places NA      Interventions In Place Pillows and Pressure Redistribution Mattress    Possible Skin Injury Yes    Pictures Uploaded Into Epic Yes  Wound Consult Placed Yes  RN Wound Prevention Protocol Ordered No

## 2023-07-11 NOTE — PROGRESS NOTES
Rec'd report from day shift RN. Assumed pt care. Assessment completed. AA&OX4. Denies pain at this time. No s/s of discomfort or distress. Pt is up self, ambulates to the bathroom and maintains steady gait. LLE is red, rash present, scar, slight edema. Dried blister reported by pt to great left toe, noted brown/black spot to toe. Bed in lowest position, bed locked, treaded socks in place, RN and CNA numbers provided, call light within reach.

## 2023-07-11 NOTE — CONSULTS
[facility] INFECTIOUS DISEASES INPATIENT CONSULT NOTE     Date of Service: 7/11/2023    Consult Requested By: Charissa Paige M.D.    Reason for Consultation: Cellulitis    Chief Complaint: Leg infection    History of Present Illness:     Thomas Borja is a 62 y.o. male admitted 7/9/2023.  Extensive review of documentation from multiple specialties performed in generating this HPI.  Patient with poorly controlled type 2 diabetes with recent improved control, hypertension BPH, chronic left leg wound followed by wound clinic, presented initially with a blister on left great toe that has since become infected along with a left leg cellulitis.    In January 2023, patient was admitted with left lower extremity cellulitis with abscess for which she was taken to the OR for I&D, wound VAC.  Surgical cultures were positive for Enterobacter cloacae for which he received ciprofloxacin.  He has since followed with wound care.  Due to the chronic wound and persistent edema, patient appears to have received multiple courses of antibiotics including Zyvox, minocycline due to positive superficial wound swabs.  His chronic leg wound was noted to have a very slow but steady improvement based on wound clinic notes.  He continued to have persistent edema and erythema of the left leg.  On 6/29 he was first noted to have new wound medial left great toe however the wound did not appear to be infected.    He later began to notice redness over the foot which then spread proximally to the leg and thigh and began to experience fevers as high as 102.5 degrees at home, mild pain.  Patient was febrile to 101.2 in the ER, mild elevated white count to 11.2.  He was started on vancomycin and meropenem.  Normal procalcitonin.  A superficial swab from the great toe 7/6 grew MRSA and group A strep.  A superficial swab from February 2023 of the chronic leg wound had grown light growth of ESBL Klebsiella which is why he was started on meropenem.  Due to  lack of improvement, ID was consulted. Patient is now afebrile.  X-ray with no obvious bony erosions of the feet.    Patient notes that his left leg erythema and wound appear the same as it has been over the past several months but what is new is the foot swelling and redness as well as patches of erythema noncontiguous going up the medial thigh.    Review of Systems:  All other systems reviewed and are negative expect as noted in HPI    No past medical history on file.    Past Surgical History:   Procedure Laterality Date    SKIN ABSCESS INCISION AND DRAINAGE Left 1/5/2023    Procedure: LEFT LEG INCISION AND DRAINAGE ABSCESS;  Surgeon: Cesar Odom M.D.;  Location: SURGERY [facility];  Service: General       Family History   Problem Relation Age of Onset    Heart Disease Father     Skin cancer Father     Prostate cancer Father     Ovarian Cancer Neg Hx     Tubal Cancer Neg Hx     Peritoneal Cancer Neg Hx     Colorectal Cancer Neg Hx     Breast Cancer Neg Hx        Social History     Socioeconomic History    Marital status:      Spouse name: Not on file    Number of children: Not on file    Years of education: Not on file    Highest education level: Some college, no degree   Occupational History    Not on file   Tobacco Use    Smoking status: Former     Types: Cigars    Smokeless tobacco: Never   Vaping Use    Vaping Use: Never used   Substance and Sexual Activity    Alcohol use: Yes     Comment: occ    Drug use: Never    Sexual activity: Not on file   Other Topics Concern    Not on file   Social History Narrative    Not on file     Social Determinants of Health     Financial Resource Strain: Low Risk  (1/4/2023)    Overall Financial Resource Strain (CARDIA)     Difficulty of Paying Living Expenses: Not very hard   Food Insecurity: No Food Insecurity (1/4/2023)    Hunger Vital Sign     Worried About Running Out of Food in the Last Year: Never true     Ran Out of Food in the Last Year: Never true    Transportation Needs: No Transportation Needs (1/4/2023)    PRAPARE - Transportation     Lack of Transportation (Medical): No     Lack of Transportation (Non-Medical): No   Physical Activity: Insufficiently Active (1/4/2023)    Exercise Vital Sign     Days of Exercise per Week: 1 day     Minutes of Exercise per Session: 120 min   Stress: Stress Concern Present (1/4/2023)    [employer] [place] [employer] - Occupational Stress Questionnaire     Feeling of Stress : To some extent   Social Connections: Socially Isolated (1/4/2023)    Social Connection and Isolation Panel [NHANES]     Frequency of Communication with Friends and Family: Once a week     Frequency of Social Gatherings with Friends and Family: Once a week     Attends Denominational Services: Never     Active Member of Clubs or Organizations: No     Attends Club or Organization Meetings: Not on file     Marital Status:    Intimate Partner Violence: Not on file   Housing Stability: High Risk (1/4/2023)    Housing Stability Vital Sign     Unable to Pay for Housing in the Last Year: Yes     Number of Places Lived in the Last Year: 1     Unstable Housing in the Last Year: No       Allergies   Allergen Reactions    Amoxicillin Hives    Oxycodone Hives    Codeine Nausea    Erythromycin Nausea       Medications:    Current Facility-Administered Medications:     furosemide (Lasix) injection 20 mg, 20 mg, Intravenous, Q DAY, Charissa Paige M.D.    heparin injection 5,000 Units, 5,000 Units, Subcutaneous, Q8HRS, Charissa Paige M.D.    [START ON 7/12/2023] rivaroxaban (Xarelto) tablet 10 mg, 10 mg, Oral, DAILY AT 1800, Charissa Paige M.D.    MD Alert...Vancomycin per Pharmacy, , Other, PHARMACY TO DOSE, William Matthews M.D.    meropenem (Merrem) 500 mg in  mL IV-MBP, 500 mg, Intravenous, Q6HRS, William Matthews M.D., Last Rate: 200 mL/hr at 07/11/23 1336, 500 mg at 07/11/23 1336    ketorolac (Toradol) injection 15 mg, 15 mg, Intravenous, Q6HRS PRN, William FAJARDO  MIYA Matthews    amLODIPine (Norvasc) tablet 10 mg, 10 mg, Oral, DAILY, William Matthews M.D., 10 mg at 07/11/23 0502    cyclobenzaprine (Flexeril) tablet 5 mg, 5 mg, Oral, QHS, William Matthews M.D., 5 mg at 07/10/23 2150    finasteride (Proscar) tablet 5 mg, 5 mg, Oral, DAILY, William Matthews M.D., 5 mg at 07/11/23 0502    levothyroxine (Synthroid) tablet 112 mcg, 112 mcg, Oral, DAILY, William Matthews M.D., 112 mcg at 07/11/23 0501    meclizine (Antivert) tablet 12.5 mg, 12.5 mg, Oral, TID PRN, William Matthews M.D.    omeprazole (PriLOSEC) capsule 40 mg, 40 mg, Oral, DAILY, William Matthews M.D., 40 mg at 07/11/23 0502    pregabalin (Lyrica) capsule 50 mg, 50 mg, Oral, BID, William Matthews M.D., 50 mg at 07/11/23 0501    rosuvastatin (Crestor) tablet 20 mg, 20 mg, Oral, DAILY, William Matthews M.D., 20 mg at 07/11/23 0501    tamsulosin (Flomax) capsule 0.4 mg, 0.4 mg, Oral, DAILY, William Matthews M.D., 0.4 mg at 07/11/23 0502    traZODone (Desyrel) tablet 50 mg, 50 mg, Oral, QHS, William Matthews M.D., 50 mg at 07/10/23 2150    Respiratory Therapy Consult, , Nebulization, Continuous RT, William Matthews M.D.    acetaminophen (Tylenol) tablet 650 mg, 650 mg, Oral, Q6HRS PRN, William Matthews M.D.    ondansetron (Zofran) syringe/vial injection 4 mg, 4 mg, Intravenous, Q4HRS PRN, William Matthews M.D.    ondansetron (Zofran ODT) dispertab 4 mg, 4 mg, Oral, Q4HRS PRN, William Matthews M.D.    promethazine (Phenergan) tablet 12.5-25 mg, 12.5-25 mg, Oral, Q4HRS PRN, William Matthews M.D.    promethazine (Phenergan) suppository 12.5-25 mg, 12.5-25 mg, Rectal, Q4HRS PRN, William Matthews M.D.    prochlorperazine (Compazine) injection 5-10 mg, 5-10 mg, Intravenous, Q4HRS PRN, William Matthews M.D.    senna-docusate (Pericolace Or Senokot S) 8.6-50 MG per tablet 2 Tablet, 2 Tablet, Oral, BID **AND** polyethylene glycol/lytes (Miralax) PACKET 1 Packet, 1 Packet, Oral, QDAY PRN **AND** magnesium hydroxide (Milk Of Magnesia) suspension 30  "mL, 30 mL, Oral, QDAY PRN **AND** bisacodyl (Dulcolax) suppository 10 mg, 10 mg, Rectal, QDAY PRN, William Matthews M.D.    lactated ringers infusion, , Intravenous, Continuous, William Matthews M.D., Last Rate: 83 mL/hr at 07/10/23 1348, New Bag at 07/10/23 1348    labetalol (Normodyne/Trandate) injection 10 mg, 10 mg, Intravenous, Q4HRS PRN, William Matthews M.D.    insulin regular (HumuLIN R,NovoLIN R) injection, 1-6 Units, Subcutaneous, 4X/DAY ACHS, 1 Units at 23 1345 **AND** POC blood glucose manual result, , , Q AC AND BEDTIME(S) **AND** NOTIFY MD and PharmD, , , Once **AND** Administer 20 grams of glucose (approximately 8 ounces of fruit juice) every 15 minutes PRN FSBG less than 70 mg/dL, , , PRN **AND** dextrose 10 % BOLUS 25 g, 25 g, Intravenous, Q15 MIN PRN, William Matthews M.D.    lactated ringers infusion (BOLUS), 500 mL, Intravenous, Once PRN, William Matthews M.D.    vancomycin (Vancocin) 1,250 mg in  mL IVPB, 1,250 mg, Intravenous, Q12HR, William Matthews M.D., Stopped at 23 0832    Physical Exam:   Vital Signs: /65   Pulse 95   Temp 36.2 °C (97.1 °F) (Temporal)   Resp 19   Ht 1.88 m (6' 2\")   Wt (!) 138 kg (305 lb 5.4 oz)   SpO2 92%   BMI 39.20 kg/m²   Temp  Av.4 °C (99.4 °F)  Min: 36.2 °C (97.1 °F)  Max: 38.4 °C (101.2 °F)  Vital signs reviewed  Constitutional: Patient is oriented to person, place, and time. Appears well-developed and well-nourished. No distress  Head: Atraumatic, normocephalic  Eyes: Conjunctivae normal, EOM intact.  Mouth/Throat: Lips without lesions  Neck: Neck supple  Cardiovascular: Normal rate, regular rhythm.  Pulmonary/Chest: No respiratory distress. Unlabored respiratory effort, Abdominal: Soft, non tender. BS + x 4. No masses or hepatosplenomegaly.   Musculoskeletal: Left leg with chronic wound and surrounding chronic erythema with no increased warmth or tense tissue swelling, chronic compared to pictures as well as per patient.  There is " red slightly tense hard swelling of the dorsum of the foot, small dressing over the medial great toe, there are patches of erythema noncontiguous extending proximally medial thigh  Neurological: Alert and oriented to person, place, and time. No gross cranial nerve deficit. No focal neural deficit noted  Skin: As above  Psychiatric: Normal mood and affect. Behavior is normal.     LABS:  Recent Labs     07/09/23  1629 07/10/23  0040 07/11/23  0227   WBC 10.8 11.2* 8.3      Recent Labs     07/09/23  1629 07/10/23  0040 07/11/23  0227   HEMOGLOBIN 17.1 14.1 14.1   HEMATOCRIT 48.2 40.1* 41.1*   MCV 92.3 91.8 93.6   MCH 32.8 32.3 32.1   PLATELETCT 273 242 278       Recent Labs     07/09/23  1629 07/10/23  0040 07/11/23  0227   SODIUM 131* 132* 132*   POTASSIUM 3.7 4.3 4.3   CHLORIDE 94* 98 97   CO2 25 23 27   CREATININE 1.13 1.13 1.07        Recent Labs     07/09/23 1629   ALBUMIN 4.4        MICRO:  No results found for: BLOODCULTU, BLDCULT, BCHOLD     Latest pertinent labs were reviewed    IMAGING STUDIES:      Hospital Course/Assessment:   Thomas Borja is a 62 y.o. male patient with poorly controlled type 2 diabetes with recent improved control, chronic left leg wound for several months, recent new left great toe wound complicated by foot and leg cellulitis.  A recent superficial swab from the great toe grew MRSA and group A Strep.    The chronic left leg wound and surrounding erythema do not appear infected on erythema, back to by patient who states that it does not look different compared to his chronic erythema and swelling that he has had for many months.  The foot however does exhibit signs of cellulitis and there does appear to be proximal tracking along the medial thigh.    Pertinent Diagnoses:  Left lower extremity cellulitis  Background of chronic left leg wound with surrounding chronic erythema  Type 2 diabetes    Plan:   -Stop vancomycin and meropenem.  Start p.o. linezolid 600 mg twice daily to  continue coverage of Staph and Strep as well as for antitoxin effect  -Limb elevation  -Pictures uploaded to Epic today.  Follow clinically closely.  If any worsening or no improvement, anticipate imaging to look for underlying abscess    Disposition: Unable to determine at this time  Need for PICC line: Unable to determine at this time    Plan was discussed with the primary team, Dr. Paige. ID will follow.  This illness poses a threat to limb function    Daniel Polo M.D.    Please note that this dictation was created using voice recognition software. I have worked with technical experts from [facility] to optimize the interface.  I have made every reasonable attempt to correct obvious errors, but there may be errors of grammar and possibly content that I did not discover before finalizing the note.

## 2023-07-11 NOTE — CARE PLAN
The patient is Stable - Low risk of patient condition declining or worsening    Shift Goals  Clinical Goals: IV ABX  Patient Goals: sleep    Progress made toward(s) clinical / shift goals:  CPAP provided by RT for sleeping at night.      Problem: Respiratory  Goal: Patient will achieve/maintain optimum respiratory ventilation and gas exchange  Outcome: Progressing     Problem: Knowledge Deficit - Standard  Goal: Patient and family/care givers will demonstrate understanding of plan of care, disease process/condition, diagnostic tests and medications  Outcome: Progressing       Patient is not progressing towards the following goals:

## 2023-07-11 NOTE — PROGRESS NOTES
Hospital Medicine Daily Progress Note    Date of Service  7/11/2023    Chief Complaint  Thomas Borja is a 62 y.o. male admitted 7/9/2023 with leg infection    Hospital Course  63 yo man with DM, HTN, BPH, chronic left leg wound followed by wound clinic who presented with blister on his left great toe that has become infected.  It was cultured at wound clinic and grew strep allergies and MRSA and the infection continue to worsen.  He was admitted for cellulitis of his left leg on meropenem and vancomycin.    Interval Problem Update  His leg swelling and erythema is about the same, has not had much improvement.  Consulted infectious disease.  Ordered for SHEELA and Doppler of left leg.  Wound nurse to see him today    I have discussed this patient's plan of care and discharge plan at IDT rounds today with Case Management, Nursing, Nursing leadership, and other members of the IDT team.    Consultants/Specialty  infectious disease    Code Status  Full Code    Disposition  The patient is not medically cleared for discharge to home or a post-acute facility.  Anticipate discharge to: home with close outpatient follow-up    I have placed the appropriate orders for post-discharge needs.    Review of Systems  Review of Systems   Constitutional:  Positive for malaise/fatigue.   Respiratory:  Negative for shortness of breath.    Cardiovascular:  Positive for leg swelling.   Gastrointestinal:  Negative for abdominal pain, diarrhea and nausea.   Musculoskeletal:  Positive for myalgias.   Skin:  Positive for rash.   Neurological:  Negative for weakness.        Physical Exam  Temp:  [36.2 °C (97.1 °F)-36.9 °C (98.5 °F)] 36.2 °C (97.1 °F)  Pulse:  [73-95] 95  Resp:  [18-20] 19  BP: (122-168)/(60-88) 122/65  SpO2:  [91 %-98 %] 92 %    Physical Exam  Vitals and nursing note reviewed.   Constitutional:       General: He is not in acute distress.     Appearance: He is not toxic-appearing.   HENT:      Head: Normocephalic.       Mouth/Throat:      Mouth: Mucous membranes are moist.   Eyes:      General:         Right eye: No discharge.         Left eye: No discharge.   Cardiovascular:      Rate and Rhythm: Normal rate and regular rhythm.      Heart sounds: Murmur heard.   Pulmonary:      Effort: Pulmonary effort is normal. No respiratory distress.      Breath sounds: No wheezing or rales.   Abdominal:      Palpations: Abdomen is soft.      Tenderness: There is no abdominal tenderness.   Musculoskeletal:      Cervical back: Neck supple.      Left lower leg: Edema present.   Skin:     General: Skin is warm and dry.      Comments: Patchy erythema from his foot to his upper thigh, tender to palpation   Neurological:      Mental Status: He is alert and oriented to person, place, and time.         Fluids    Intake/Output Summary (Last 24 hours) at 7/11/2023 1350  Last data filed at 7/11/2023 1000  Gross per 24 hour   Intake 480 ml   Output --   Net 480 ml       Laboratory  Recent Labs     07/09/23  1629 07/10/23  0040 07/11/23  0227   WBC 10.8 11.2* 8.3   RBC 5.22 4.37* 4.39*   HEMOGLOBIN 17.1 14.1 14.1   HEMATOCRIT 48.2 40.1* 41.1*   MCV 92.3 91.8 93.6   MCH 32.8 32.3 32.1   MCHC 35.5 35.2 34.3   RDW 49.7 48.7 49.7   PLATELETCT 273 242 278   MPV 9.0 9.0 8.7*     Recent Labs     07/09/23  1629 07/10/23  0040 07/11/23  0227   SODIUM 131* 132* 132*   POTASSIUM 3.7 4.3 4.3   CHLORIDE 94* 98 97   CO2 25 23 27   GLUCOSE 160* 124* 136*   BUN 15 14 13   CREATININE 1.13 1.13 1.07   CALCIUM 9.6 8.5 8.5                   Imaging  DX-CHEST-PORTABLE (1 VIEW)   Final Result      No acute cardiopulmonary abnormality.         US-EXTREMITY VENOUS LOWER UNILAT LEFT    (Results Pending)   US-SHEELA SINGLE LEVEL BILAT    (Results Pending)   DX-TOE(S) 2+ LEFT    (Results Pending)        Assessment/Plan  * Cellulitis of left leg- (present on admission)  Assessment & Plan  Wound culture from 7/6 grew MRSA and group A strep  Continue on meropenem and IV Vanco.  Monitor  renal function  Consulted infectious disease  Ordered for Doppler ultrasound and SHEELA  Start IV Lasix for edema  Wound care consult      SIRS (systemic inflammatory response syndrome) (HCC)- (present on admission)  Assessment & Plan  - SIRS criteria identified on my evaluation include:  Fever, with temperature greater than 100.9 deg F and Leukocytosis, with WBC greater than 12,000.  He does not meet sepsis 3 criteria.  Source is likely left leg cellulitis.  Lactate is normal.  -Start IV fluids with LR at 83 cc/h. sepsis protocol.  Trend lactate level: Now within normal limits  -Continue antibiotics for source of left leg cellulitis.  Follow cultures.  -Close hemodynamic monitoring.      Benign prostatic hyperplasia without lower urinary tract symptoms- (present on admission)  Assessment & Plan  - continue home finasteride and Flomax.    Hyponatremia- (present on admission)  Assessment & Plan  Mild, trend level    Type 2 diabetes mellitus with neurologic complication, without long-term current use of insulin (HCC)- (present on admission)  Assessment & Plan  Holding glipizide  ISS  Maintain good glucose control in setting of infection, glucose has been stable    Essential hypertension- (present on admission)  Assessment & Plan  Has been intermittently hypertensive.  Continue Norvasc    Morbid obesity due to excess calories (HCC)- (present on admission)  Assessment & Plan  - Body mass index is 39.2 kg/m²..  -  on weight loss.  - outpatient referral for outpatient weight management.         VTE prophylaxis: heparin ppx    I have performed a physical exam and reviewed and updated ROS and Plan today (7/11/2023). In review of yesterday's note (7/10/2023), there are no changes except as documented above.

## 2023-07-11 NOTE — CARE PLAN
The patient is Stable - Low risk of patient condition declining or worsening    Shift Goals  Clinical Goals: IV abx  Patient Goals: Rest    Progress made toward(s) clinical / shift goals:  Patient resting.    Patient is not progressing towards the following goals:

## 2023-07-12 LAB
ANION GAP SERPL CALC-SCNC: 11 MMOL/L (ref 7–16)
BASOPHILS # BLD AUTO: 0.6 % (ref 0–1.8)
BASOPHILS # BLD: 0.05 K/UL (ref 0–0.12)
BUN SERPL-MCNC: 14 MG/DL (ref 8–22)
CALCIUM SERPL-MCNC: 8.3 MG/DL (ref 8.5–10.5)
CHLORIDE SERPL-SCNC: 101 MMOL/L (ref 96–112)
CO2 SERPL-SCNC: 25 MMOL/L (ref 20–33)
CREAT SERPL-MCNC: 1.02 MG/DL (ref 0.5–1.4)
EOSINOPHIL # BLD AUTO: 0.2 K/UL (ref 0–0.51)
EOSINOPHIL NFR BLD: 2.5 % (ref 0–6.9)
ERYTHROCYTE [DISTWIDTH] IN BLOOD BY AUTOMATED COUNT: 48.3 FL (ref 35.9–50)
GFR SERPLBLD CREATININE-BSD FMLA CKD-EPI: 82 ML/MIN/1.73 M 2
GLUCOSE BLD STRIP.AUTO-MCNC: 148 MG/DL (ref 65–99)
GLUCOSE BLD STRIP.AUTO-MCNC: 158 MG/DL (ref 65–99)
GLUCOSE BLD STRIP.AUTO-MCNC: 183 MG/DL (ref 65–99)
GLUCOSE BLD STRIP.AUTO-MCNC: 214 MG/DL (ref 65–99)
GLUCOSE SERPL-MCNC: 149 MG/DL (ref 65–99)
HCT VFR BLD AUTO: 38.3 % (ref 42–52)
HGB BLD-MCNC: 13.2 G/DL (ref 14–18)
IMM GRANULOCYTES # BLD AUTO: 0.05 K/UL (ref 0–0.11)
IMM GRANULOCYTES NFR BLD AUTO: 0.6 % (ref 0–0.9)
LYMPHOCYTES # BLD AUTO: 1.19 K/UL (ref 1–4.8)
LYMPHOCYTES NFR BLD: 14.7 % (ref 22–41)
MCH RBC QN AUTO: 32.4 PG (ref 27–33)
MCHC RBC AUTO-ENTMCNC: 34.5 G/DL (ref 32.3–36.5)
MCV RBC AUTO: 94.1 FL (ref 81.4–97.8)
MONOCYTES # BLD AUTO: 1.05 K/UL (ref 0–0.85)
MONOCYTES NFR BLD AUTO: 13 % (ref 0–13.4)
NEUTROPHILS # BLD AUTO: 5.56 K/UL (ref 1.82–7.42)
NEUTROPHILS NFR BLD: 68.6 % (ref 44–72)
NRBC # BLD AUTO: 0 K/UL
NRBC BLD-RTO: 0 /100 WBC (ref 0–0.2)
PLATELET # BLD AUTO: 294 K/UL (ref 164–446)
PMV BLD AUTO: 8.7 FL (ref 9–12.9)
POTASSIUM SERPL-SCNC: 4.1 MMOL/L (ref 3.6–5.5)
RBC # BLD AUTO: 4.07 M/UL (ref 4.7–6.1)
SODIUM SERPL-SCNC: 137 MMOL/L (ref 135–145)
WBC # BLD AUTO: 8.1 K/UL (ref 4.8–10.8)

## 2023-07-12 PROCEDURE — 700102 HCHG RX REV CODE 250 W/ 637 OVERRIDE(OP): Performed by: INTERNAL MEDICINE

## 2023-07-12 PROCEDURE — 36415 COLL VENOUS BLD VENIPUNCTURE: CPT

## 2023-07-12 PROCEDURE — 94660 CPAP INITIATION&MGMT: CPT

## 2023-07-12 PROCEDURE — A9270 NON-COVERED ITEM OR SERVICE: HCPCS | Performed by: INTERNAL MEDICINE

## 2023-07-12 PROCEDURE — 700111 HCHG RX REV CODE 636 W/ 250 OVERRIDE (IP): Performed by: INTERNAL MEDICINE

## 2023-07-12 PROCEDURE — 85025 COMPLETE CBC W/AUTO DIFF WBC: CPT

## 2023-07-12 PROCEDURE — 770006 HCHG ROOM/CARE - MED/SURG/GYN SEMI*

## 2023-07-12 PROCEDURE — 99232 SBSQ HOSP IP/OBS MODERATE 35: CPT | Performed by: INTERNAL MEDICINE

## 2023-07-12 PROCEDURE — 82962 GLUCOSE BLOOD TEST: CPT

## 2023-07-12 PROCEDURE — 80048 BASIC METABOLIC PNL TOTAL CA: CPT

## 2023-07-12 PROCEDURE — 94760 N-INVAS EAR/PLS OXIMETRY 1: CPT

## 2023-07-12 RX ADMIN — INSULIN HUMAN 1 UNITS: 100 INJECTION, SOLUTION PARENTERAL at 13:39

## 2023-07-12 RX ADMIN — INSULIN HUMAN 2 UNITS: 100 INJECTION, SOLUTION PARENTERAL at 21:14

## 2023-07-12 RX ADMIN — TRAZODONE HYDROCHLORIDE 50 MG: 50 TABLET ORAL at 20:17

## 2023-07-12 RX ADMIN — FUROSEMIDE 20 MG: 10 INJECTION INTRAMUSCULAR; INTRAVENOUS at 05:52

## 2023-07-12 RX ADMIN — OMEPRAZOLE 40 MG: 20 CAPSULE, DELAYED RELEASE ORAL at 05:52

## 2023-07-12 RX ADMIN — AMLODIPINE BESYLATE 10 MG: 10 TABLET ORAL at 05:53

## 2023-07-12 RX ADMIN — LEVOTHYROXINE SODIUM 112 MCG: 0.11 TABLET ORAL at 05:52

## 2023-07-12 RX ADMIN — PREGABALIN 50 MG: 25 CAPSULE ORAL at 17:18

## 2023-07-12 RX ADMIN — LINEZOLID 600 MG: 600 TABLET, FILM COATED ORAL at 05:53

## 2023-07-12 RX ADMIN — FINASTERIDE 5 MG: 5 TABLET, FILM COATED ORAL at 05:53

## 2023-07-12 RX ADMIN — RIVAROXABAN 10 MG: 10 TABLET, FILM COATED ORAL at 17:18

## 2023-07-12 RX ADMIN — CYCLOBENZAPRINE 5 MG: 10 TABLET, FILM COATED ORAL at 20:17

## 2023-07-12 RX ADMIN — LINEZOLID 600 MG: 600 TABLET, FILM COATED ORAL at 17:18

## 2023-07-12 RX ADMIN — INSULIN HUMAN 1 UNITS: 100 INJECTION, SOLUTION PARENTERAL at 17:18

## 2023-07-12 RX ADMIN — ROSUVASTATIN CALCIUM 20 MG: 20 TABLET, FILM COATED ORAL at 05:53

## 2023-07-12 RX ADMIN — PREGABALIN 50 MG: 25 CAPSULE ORAL at 05:53

## 2023-07-12 RX ADMIN — TAMSULOSIN HYDROCHLORIDE 0.4 MG: 0.4 CAPSULE ORAL at 05:52

## 2023-07-12 ASSESSMENT — PAIN DESCRIPTION - PAIN TYPE
TYPE: ACUTE PAIN

## 2023-07-12 ASSESSMENT — ENCOUNTER SYMPTOMS
DIARRHEA: 0
CHILLS: 0
MYALGIAS: 1
NAUSEA: 0
SHORTNESS OF BREATH: 0
WEAKNESS: 0
VOMITING: 0
FEVER: 0
ABDOMINAL PAIN: 0

## 2023-07-12 NOTE — PROGRESS NOTES
Hospital Medicine Daily Progress Note    Date of Service  7/12/2023    Chief Complaint  Thomas Borja is a 62 y.o. male admitted 7/9/2023 with leg infection    Hospital Course  61 yo man with DM, HTN, BPH, chronic left leg wound followed by wound clinic who presented with blister on his left great toe that has become infected.  It was cultured at wound clinic and grew strep allergies and MRSA and the infection continue to worsen.  He was admitted for cellulitis of his left leg on meropenem and vancomycin.    Interval Problem Update  Ordered for linezolid  His erythema and swelling is about the same.  Continue leg elevation and IV Lasix.  He has some pain to his leg, mild    I have discussed this patient's plan of care and discharge plan at IDT rounds today with Case Management, Nursing, Nursing leadership, and other members of the IDT team.    Consultants/Specialty  infectious disease    Code Status  Full Code    Disposition  The patient is not medically cleared for discharge to home or a post-acute facility.  Anticipate discharge to: home with close outpatient follow-up    I have placed the appropriate orders for post-discharge needs.    Review of Systems  Review of Systems   Constitutional:  Negative for malaise/fatigue.   Respiratory:  Negative for shortness of breath.    Cardiovascular:  Positive for leg swelling.   Gastrointestinal:  Negative for abdominal pain, diarrhea and nausea.   Musculoskeletal:  Positive for myalgias.   Neurological:  Negative for weakness.        Physical Exam  Temp:  [36.6 °C (97.9 °F)-36.7 °C (98.1 °F)] 36.7 °C (98 °F)  Pulse:  [75-89] 75  Resp:  [17-19] 18  BP: (125-131)/(69-76) 130/70  SpO2:  [92 %-97 %] 97 %    Physical Exam  Vitals and nursing note reviewed.   Constitutional:       General: He is not in acute distress.     Appearance: He is not ill-appearing.   HENT:      Head: Normocephalic.      Mouth/Throat:      Mouth: Mucous membranes are moist.   Eyes:      General:          Right eye: No discharge.         Left eye: No discharge.   Cardiovascular:      Rate and Rhythm: Normal rate and regular rhythm.      Heart sounds: Murmur heard.   Pulmonary:      Effort: Pulmonary effort is normal. No respiratory distress.      Breath sounds: No wheezing or rales.   Abdominal:      Palpations: Abdomen is soft.      Tenderness: There is no abdominal tenderness.   Musculoskeletal:      Cervical back: Neck supple.      Left lower leg: Edema present.   Skin:     General: Skin is warm and dry.      Comments: Patchy erythema from his foot to his upper thigh, tender to palpation   Neurological:      Mental Status: He is alert and oriented to person, place, and time.         Fluids    Intake/Output Summary (Last 24 hours) at 7/12/2023 1300  Last data filed at 7/12/2023 1000  Gross per 24 hour   Intake 680 ml   Output --   Net 680 ml       Laboratory  Recent Labs     07/10/23  0040 07/11/23  0227 07/12/23  0359   WBC 11.2* 8.3 8.1   RBC 4.37* 4.39* 4.07*   HEMOGLOBIN 14.1 14.1 13.2*   HEMATOCRIT 40.1* 41.1* 38.3*   MCV 91.8 93.6 94.1   MCH 32.3 32.1 32.4   MCHC 35.2 34.3 34.5   RDW 48.7 49.7 48.3   PLATELETCT 242 278 294   MPV 9.0 8.7* 8.7*     Recent Labs     07/10/23  0040 07/11/23 0227 07/12/23  0359   SODIUM 132* 132* 137   POTASSIUM 4.3 4.3 4.1   CHLORIDE 98 97 101   CO2 23 27 25   GLUCOSE 124* 136* 149*   BUN 14 13 14   CREATININE 1.13 1.07 1.02   CALCIUM 8.5 8.5 8.3*                   Imaging  US-EXTREMITY VENOUS LOWER UNILAT LEFT   Final Result      US-SHEELA SINGLE LEVEL BILAT   Final Result      DX-TOE(S) 2+ LEFT   Final Result      No radiographic evidence of acute traumatic injury or bony erosion. Plain films are insensitive for changes of early osteomyelitis. Consider MRI.      DX-CHEST-PORTABLE (1 VIEW)   Final Result      No acute cardiopulmonary abnormality.              Assessment/Plan  * Cellulitis of left leg- (present on admission)  Assessment & Plan  Wound culture from 7/6 grew MRSA  and group A strep  Consulted infectious disease, continued on linezolid  Ultrasound negative for DVT and arterial circulation is okay  Continues to have significant edema, continue IV Lasix and leg elevation for edema  Wound care consulted      SIRS (systemic inflammatory response syndrome) (HCC)- (present on admission)  Assessment & Plan  - SIRS criteria identified on my evaluation include:  Fever, with temperature greater than 100.9 deg F and Leukocytosis, with WBC greater than 12,000.  He does not meet sepsis 3 criteria.  Source is likely left leg cellulitis.  Lactate is normal.  -Start IV fluids with LR at 83 cc/h. sepsis protocol.  Trend lactate level: Now within normal limits  -Continue antibiotics for source of left leg cellulitis.  Follow cultures.  -Close hemodynamic monitoring.      Benign prostatic hyperplasia without lower urinary tract symptoms- (present on admission)  Assessment & Plan  - continue home finasteride and Flomax.    Hyponatremia- (present on admission)  Assessment & Plan  Mild, trend level    Type 2 diabetes mellitus with neurologic complication, without long-term current use of insulin (HCC)- (present on admission)  Assessment & Plan  Holding glipizide  ISS  Maintain good glucose control in setting of infection, glucose has been stable    Essential hypertension- (present on admission)  Assessment & Plan  Hypertension improved.  Continue Norvasc, on IV Lasix    Morbid obesity due to excess calories (HCC)- (present on admission)  Assessment & Plan  - Body mass index is 39.2 kg/m²..  -  on weight loss.  - outpatient referral for outpatient weight management.         VTE prophylaxis: Xarelto 10 mg daily as prophylaxis    I have performed a physical exam and reviewed and updated ROS and Plan today (7/12/2023). In review of yesterday's note (7/11/2023), there are no changes except as documented above.

## 2023-07-12 NOTE — PROGRESS NOTES
Rec'd report from day shift RN. Assumed pt care. Assessment completed. AA&OX4. Denies pain at this time. No s/s of discomfort or distress. Pt is up self, ambulates to the bathroom and maintains steady gait. LLE has tubigrip dressing, great toe has dressing, CDI. Bed in lowest position, bed locked, treaded socks in place, RN and CNA numbers provided, call light within reach.

## 2023-07-12 NOTE — CARE PLAN
The patient is Stable - Low risk of patient condition declining or worsening    Shift Goals  Clinical Goals: sleep  Patient Goals: sleep    Progress made toward(s) clinical / shift goals:  Quiet and dark area provided to promote sleep.        Problem: Pain - Standard  Goal: Alleviation of pain or a reduction in pain to the patient’s comfort goal  Outcome: Progressing     Problem: Knowledge Deficit - Standard  Goal: Patient and family/care givers will demonstrate understanding of plan of care, disease process/condition, diagnostic tests and medications  Outcome: Progressing       Patient is not progressing towards the following goals:

## 2023-07-12 NOTE — DOCUMENTATION QUERY
[facility]                                                                       Query Response Note      PATIENT:               STEPHANE JAMISON  ACCT #:                  [id number]  MRN:                     [id number]  :                      1960  ADMIT DATE:       2023 3:36 PM  DISCH DATE:          RESPONDING  PROVIDER #:        [id number]           QUERY TEXT:    Please clarify in documentation the relationship, if any, between cellulitis and diabetes type 2.    The patient's Clinical Indicators include:  Findings:  PN: cellulitis of left leg Maintain good glucose control in setting of infection, glucose has been stable     Consult: Left great toe ulcer, left lower leg ulcer Patient developed erythema and edema on 2023 that extended from his toe to mid thigh    Treatment: Meropenum, IV vanco, wound care consult    Risk Factors: past medical history of type II diabetes mellitus, diabetic Left great toe ulcer, left lower leg ulcer    KimyoJordyn domínguez RN BSN  Clinical Documentation   [email or web address]  Connect via [email or web address] Messenger  Options provided:   -- Cellulitis is due to or associated with type II diabetes mellitus   -- Cellulitis is not due to or associated with type II diabetes mellitus]  [[Other explanation, (please specify other explanation)      Query created by: Jordyn Hernández on 2023 2:58 AM    RESPONSE TEXT:    Cellulitis is due to or associated with type II diabetes mellitus          Electronically signed by:  SARAH CHAN MD 2023 2:53 PM

## 2023-07-12 NOTE — CARE PLAN
The patient is Stable - Low risk of patient condition declining or worsening    Shift Goals  Clinical Goals: pt. will be at a comfortable pain goal of 3/10 or less this shift  Patient Goals: pt. will keep leg elevated on pillow    Progress made toward(s) clinical / shift goals:  pt. At a comfortable pain level this shift, pt. Has kept leg elevated on pillow to decrease swelling. Pt. Verbalizes understanding of plan of care at this time, monitoring labs.       Problem: Pain - Standard  Goal: Alleviation of pain or a reduction in pain to the patient’s comfort goal  Description: Target End Date:  Prior to discharge or change in level of care    Document on Vitals flowsheet    1.  Document pain using the appropriate pain scale per order or unit policy  2.  Educate and implement non-pharmacologic comfort measures (i.e. relaxation, distraction, massage, cold/heat therapy, etc.)  3.  Pain management medications as ordered  4.  Reassess pain after pain med administration per policy  5.  If opiods administered assess patient's response to pain medication is appropriate per POSS sedation scale  6.  Follow pain management plan developed in collaboration with patient and interdisciplinary team (including palliative care or pain specialists if applicable)  Outcome: Progressing     Problem: Knowledge Deficit - Standard  Goal: Patient and family/care givers will demonstrate understanding of plan of care, disease process/condition, diagnostic tests and medications  Description: Target End Date:  1-3 days or as soon as patient condition allows    Document in Patient Education    1.  Patient and family/caregiver oriented to unit, equipment, visitation policy and means for communicating concern  2.  Complete/review Learning Assessment  3.  Assess knowledge level of disease process/condition, treatment plan, diagnostic tests and medications  4.  Explain disease process/condition, treatment plan, diagnostic tests and  medications  Outcome: Progressing     Problem: Physical Regulation  Goal: Diagnostic test results will improve  Description: Target End Date:  Prior to discharge or change in level of care    1.  Monitor lactic acid levels  2.  Monitor ABG's  3.  Monitor diagnostic test results  Outcome: Progressing     Problem: Physical Regulation  Goal: Signs and symptoms of infection will decrease  Description: Target End Date:  Prior to discharge or change in level of care    1.  Remove potential routes of infection, such as central lines and urinary catheter  2.  Follow facility protocol for changing IV tubing and sites  3.  Collaborate with Infectious Disease  4.  Antibiotic therapy per provider order  5.  Note drug effects and monitor for antibiotic toxicity  Outcome: Progressing       Patient is not progressing towards the following goals:

## 2023-07-12 NOTE — PROGRESS NOTES
Infectious Disease Progress Note    Author: Daniel Polo M.D. Date & Time of service: 2023  8:39 AM    Chief Complaint:  Follow-up for left lower extremity cellulitis    Interval History:   patient remains afebrile, white count is 8.1, tolerated switch to Zyvox.  Creatinine 1.02, last lactate 1.0, normal transaminases, culture results as below.  Note significant improvement of redness and pain and swelling of the left lower extremity since changed to linezolid and limb elevation    Labs Reviewed and Medications Reviewed.    Review of Systems:  Review of Systems   Constitutional:  Negative for chills and fever.   Gastrointestinal:  Negative for abdominal pain, diarrhea, nausea and vomiting.   Musculoskeletal:  Positive for myalgias.   Skin:  Negative for itching and rash.       Hemodynamics:  Temp (24hrs), Av.7 °C (98 °F), Min:36.6 °C (97.9 °F), Max:36.7 °C (98.1 °F)  Temperature: 36.6 °C (97.9 °F)  Pulse  Av.3  Min: 73  Max: 117   Blood Pressure: 131/76       Physical Exam:  Physical Exam  Vitals and nursing note reviewed.   Constitutional:       General: He is not in acute distress.     Appearance: He is not ill-appearing.   HENT:      Mouth/Throat:      Pharynx: No oropharyngeal exudate.   Eyes:      Conjunctiva/sclera: Conjunctivae normal.   Cardiovascular:      Rate and Rhythm: Normal rate.   Pulmonary:      Effort: Pulmonary effort is normal. No respiratory distress.      Breath sounds: No stridor.   Abdominal:      Tenderness: There is no abdominal tenderness.   Musculoskeletal:         General: Swelling present.   Skin:     Findings: Erythema present.      Comments: Left leg with chronic wound and surrounding chronic light erythema with no increased warmth or tense tissue swelling, chronic compared to pictures as well as per patient.    Previous swollen red hot dorsum of foot with significant improvement, now areas of erythema have receded, swelling has improved, warmth has decreased.   Similar improvement of thigh lesions   Neurological:      Mental Status: He is alert and oriented to person, place, and time.   Psychiatric:         Mood and Affect: Mood normal.         Behavior: Behavior normal.         Meds:    Current Facility-Administered Medications:     furosemide    rivaroxaban    linezolid    ketorolac    amLODIPine    cyclobenzaprine    finasteride    levothyroxine    meclizine    omeprazole    pregabalin    rosuvastatin    tamsulosin    traZODone    Respiratory Therapy Consult    acetaminophen    ondansetron    ondansetron    promethazine    promethazine    prochlorperazine    senna-docusate **AND** polyethylene glycol/lytes **AND** magnesium hydroxide **AND** bisacodyl    labetalol    insulin regular **AND** POC blood glucose manual result **AND** NOTIFY MD and PharmD **AND** Administer 20 grams of glucose (approximately 8 ounces of fruit juice) every 15 minutes PRN FSBG less than 70 mg/dL **AND** dextrose bolus    LR    Labs:  Recent Labs     07/09/23  1629 07/10/23  0040 07/11/23  0227 07/12/23  0359   WBC 10.8 11.2* 8.3 8.1   RBC 5.22 4.37* 4.39* 4.07*   HEMOGLOBIN 17.1 14.1 14.1 13.2*   HEMATOCRIT 48.2 40.1* 41.1* 38.3*   MCV 92.3 91.8 93.6 94.1   MCH 32.8 32.3 32.1 32.4   RDW 49.7 48.7 49.7 48.3   PLATELETCT 273 242 278 294   MPV 9.0 9.0 8.7* 8.7*   NEUTSPOLYS 85.90* 84.40*  --  68.60   LYMPHOCYTES 8.80* 8.90*  --  14.70*   MONOCYTES 4.10 5.60  --  13.00   EOSINOPHILS 0.20 0.10  --  2.50   BASOPHILS 0.40 0.40  --  0.60     Recent Labs     07/10/23  0040 07/11/23  0227 07/12/23  0359   SODIUM 132* 132* 137   POTASSIUM 4.3 4.3 4.1   CHLORIDE 98 97 101   CO2 23 27 25   GLUCOSE 124* 136* 149*   BUN 14 13 14     Recent Labs     07/09/23  1629 07/10/23  0040 07/11/23  0227 07/12/23  0359   ALBUMIN 4.4  --   --   --    TBILIRUBIN 1.4  --   --   --    ALKPHOSPHAT 190*  --   --   --    TOTPROTEIN 8.1  --   --   --    ALTSGPT 39  --   --   --    ASTSGOT 30  --   --   --    CREATININE 1.13  1.13 1.07 1.02       Imaging:  US-EXTREMITY VENOUS LOWER UNILAT LEFT    Result Date: 2023   Vascular Laboratory  CONCLUSIONS  1.  No evidence of Left lower extremity DVT.  STEPHANE JAMISON  Exam Date:     2023 16:20  Room #:     Inpatient  Priority:     Routine  Ht (in):             Wt (lb):  Ordering Physician:        SARAH CHAN  Referring Physician:       ROCIO Leigh  Sonographer:               Veronica Trevino RDCS, RVT  Study Type:                Complete Unilateral  Technical Quality:         Adequate  Age:    62    Gender:     M  MRN:    [id number]  :    1960      BSA:  Indications:     Swelling of Limb  CPT Codes:       98203  ICD Codes:       729.81  History:         Patient has known cellulitus since 2022 and is being                   treated for it, left leg is warm and red.  Left leg pain.  Limitations:  PROCEDURES:  Left lower extremity venous duplex imaging.  The following venous structures were evaluated: common femoral, deep  femoral, proximal great saphenous, femoral, popliteal, peroneal, and  posterior tibial veins.  Serial compression, color, and spectral Doppler flow evaluations were  performed.  FINDINGS:  Left lower extremity.  All veins demonstrate complete color filling and compressibility with  normal venous flow dynamics including spontaneous flow and respiratory  phasicity.  No deep venous thrombosis.  The peroneal and posterior tibial veins are difficult to assess for  compressibility, but flow response to augmentation is demonstrated.  Flow was evaluated in the contralateral common femoral vein and normal  venous flow dynamics including spontaneous flow and respiratory phasic  variation were demonstrated.  Julienne Navas  (Electronically Signed)  Final Date:      2023                   17:31    US-SHEELA SINGLE LEVEL BILAT    Result Date: 2023   Vascular Laboratory  Conclusions  There is no evidence of significant arterial disease.  STEPHANE JAMISON  Age:    62     Gender:     M  MRN:    [id number]  :    1960      BSA:  Exam Date:     2023 11:27  Room #:     Inpatient  Priority:     Routine  Ht (in):             Wt (lb):  Ordering Physician:        SARAH CHAN  Referring Physician:       [id number] Stovall  Sonographer:               Maru Kulkarni RVGIOVANNA  Study Type:                Complete Bilateral  Technical Quality:         Adequate  Indications:     Type 2 diabetes mellitus with foot ulcer  CPT Codes:       79838  ICD Codes:       E11.621  History:         Non-healing wound of left great toe. No prior exam.  Limitations:                 RIGHT  Waveform            Systolic BPs (mmHg)                             130           Brachial  Triphasic                                Common Femoral  Triphasic                                Popliteal  Triphasic                  171           Posterior Tibial  Triphasic                  133           Dorsalis Pedis  Normal                     118           Digit                             1.31          SHEELA                             0.91          TBI                       LEFT  Waveform        Systolic BPs (mmHg)                             130           Brachial  Triphasic                                Common Femoral  Triphasic                                Popliteal  Triphasic                  163           Posterior Tibial  Triphasic                  160           Dorsalis Pedis  Normal                     92            Digit                             1.25          SHEELA                             0.71          TBI  Findings  Bilateral.  The ankle-brachial indices are normal.  The toe-brachial indices are normal.  Doppler waveforms of the common femoral, popliteal, posterior tibial, and  dorsalis pedis arteries are of high amplitude and multiphasic.  The 1st digit PPG waveforms are normal.  Additional testing was not performed in accordance with lower extremity  arterial evaluation protocol.  Jean Vargas   (Electronically Signed)  Final Date:      11 July 2023                   15:32    DX-TOE(S) 2+ LEFT    Result Date: 7/11/2023 7/11/2023 1:42 PM HISTORY/REASON FOR EXAM:  LEFT great toe infection, concern for osteomyelitis. TECHNIQUE/EXAM DESCRIPTION AND NUMBER OF VIEWS:  2 views of the  LEFT great  toe. COMPARISON: None FINDINGS: MINERALIZATION: Mineralization is unremarkable for age. INJURY: No acute fracture or gross malalignment is seen. JOINTS: No erosive arthropathy is evident.     No radiographic evidence of acute traumatic injury or bony erosion. Plain films are insensitive for changes of early osteomyelitis. Consider MRI.    DX-CHEST-PORTABLE (1 VIEW)    Result Date: 7/9/2023 7/9/2023 4:25 PM HISTORY/REASON FOR EXAM:  Possible sepsis. TECHNIQUE/EXAM DESCRIPTION AND NUMBER OF VIEWS: Single portable view of the chest. COMPARISON:  1/3/2023 FINDINGS: LUNGS: Clear. No effusions. PNEUMOTHORAX: None. LINES AND TUBES: None. MEDIASTINUM: No cardiomegaly. MUSCULOSKELETAL STRUCTURES: No acute displaced fracture.     No acute cardiopulmonary abnormality.     DX-LUMBAR SPINE-4+ VIEWS    Result Date: 6/28/2023  Table formatting from the original result was not included. DX-LUMBAR SPINE-4+ VIEWS End Exam Questions Answers Comments Number of  Films? 4   Number of Views 4 ap, lat, flex, ext Was this performed as Computed Radiography (CR) or Digital Radiography (DR) Digital Radiography    The patient had AP lateral and flexion-extension films of the lumbar spine. There are multilevel degenerative changes. Significant retrolisthesis of L5 on S1 as well as anteriorlisthesis of L3 on L4. There is no instability on flexion-extension.       Micro:  Results       Procedure Component Value Units Date/Time    GRAM STAIN [[id number]] Collected: 07/11/23 1415    Order Status: Completed Specimen: Wound Updated: 07/11/23 1928     Significant Indicator .     Source WND     Site TOE     Gram Stain Result Rare WBCs.  No organisms seen.    "   Narrative:      Contact  Collected By: MELANIA CHA  Left great toe  Contact    CULTURE WOUND W/ GRAM STAIN [[id number]] Collected: 07/11/23 1415    Order Status: Sent Specimen: Wound from Toe Updated: 07/11/23 1927     Significant Indicator NEG     Source WND     Site TOE     Culture Result -     Gram Stain Result Rare WBCs.  No organisms seen.      Narrative:      Contact  Collected By: MELANIA CHA  Left great toe  Contact    Urine Culture (New) [[id number]] Collected: 07/09/23 2030    Order Status: Completed Specimen: Urine Updated: 07/11/23 0804     Significant Indicator NEG     Source UR     Site -     Culture Result Usual skin gus <10,000 cfu/mL    Narrative:      Indication for culture:->Evaluation for sepsis without a  clear source of infection  Indication for culture:->Evaluation for sepsis without a    Blood Culture [[id number]] Collected: 07/09/23 1546    Order Status: Completed Specimen: Blood from Peripheral Updated: 07/10/23 0614     Significant Indicator NEG     Source BLD     Site PERIPHERAL     Culture Result No Growth  Note: Blood cultures are incubated for 5 days and  are monitored continuously.Positive blood cultures  are called to the RN and reported as soon as  they are identified.      Narrative:      Per Hospital Policy: Only change Specimen Src: to \"Line\" if  specified by physician order.  Right AC    Blood Culture [[id number]] Collected: 07/09/23 1629    Order Status: Completed Specimen: Blood from Peripheral Updated: 07/10/23 0614     Significant Indicator NEG     Source BLD     Site PERIPHERAL     Culture Result No Growth  Note: Blood cultures are incubated for 5 days and  are monitored continuously.Positive blood cultures  are called to the RN and reported as soon as  they are identified.      Narrative:      Per Hospital Policy: Only change Specimen Src: to \"Line\" if  specified by physician order.  Right AC    Urinalysis [[id number]]  (Abnormal) Collected: " 07/09/23 2030    Order Status: Completed Specimen: Urine Updated: 07/09/23 2100     Color DK Yellow     Character Clear     Specific Gravity 1.027     Ph 8.0     Glucose 100 mg/dL      Ketones Trace mg/dL      Protein 100 mg/dL      Bilirubin Negative     Urobilinogen, Urine 1.0     Nitrite Negative     Leukocyte Esterase Negative     Occult Blood Negative     Micro Urine Req Microscopic    Narrative:      Indication for culture:->Evaluation for sepsis without a  clear source of infection            Assessment:  Thomas Borja is a 62 y.o. male patient with poorly controlled type 2 diabetes with recent improved control, chronic left leg wound for several months, recent new left great toe wound complicated by foot and leg cellulitis.  A recent superficial swab from the great toe grew MRSA and group A Strep.     The chronic left leg wound and surrounding erythema do not appear infected on erythema, back to by patient who states that it does not look different compared to his chronic erythema and swelling that he has had for many months.  The foot however does exhibit signs of cellulitis and there does appear to be proximal tracking along the medial thigh.     Pertinent Diagnoses:  Left lower extremity cellulitis  Background of chronic left leg wound with surrounding chronic erythema  Type 2 diabetes    Plan:  -Clinical improvement noted since switch to linezolid.  Recommend completing a full 2-week course  -Limb elevation     Disposition: Anticipate no ID specific disposition needs  Need for PICC line: No     Plan was discussed with the primary team, Dr. Paige. ID will sign off.  This illness poses a threat to limb function

## 2023-07-12 NOTE — PROGRESS NOTES
Report received from night shift RN. Pt. Without complaints or concerns at this time. On room air.  Call light and personal items in reach. Bed wheels locked, bed in lowest position.  Left leg elevated on pillow, hussain CDI

## 2023-07-12 NOTE — WOUND TEAM
LPS APRN saw pt and debrided L hallux and L shin wounds. Scab still present to L shin. Hallux 100% red tissue. Culture collected as requested of hallux wound. Hydrofera blue placed as ordered to hallux. Also placed double layer of Tubi  F over LLE. Orders have already been placed for nsg to perform drsg changes. See LPS note. LPS to reach out to wound team if needed.

## 2023-07-13 ENCOUNTER — APPOINTMENT (OUTPATIENT)
Dept: MEDICAL GROUP | Facility: MEDICAL CENTER | Age: 63
End: 2023-07-13
Payer: COMMERCIAL

## 2023-07-13 ENCOUNTER — APPOINTMENT (OUTPATIENT)
Dept: WOUND CARE | Facility: MEDICAL CENTER | Age: 63
End: 2023-07-13
Attending: GENERAL PRACTICE
Payer: COMMERCIAL

## 2023-07-13 ENCOUNTER — PHARMACY VISIT (OUTPATIENT)
Dept: PHARMACY | Facility: MEDICAL CENTER | Age: 63
End: 2023-07-13
Payer: COMMERCIAL

## 2023-07-13 VITALS
DIASTOLIC BLOOD PRESSURE: 69 MMHG | HEART RATE: 77 BPM | RESPIRATION RATE: 17 BRPM | TEMPERATURE: 97 F | BODY MASS INDEX: 39.19 KG/M2 | WEIGHT: 305.34 LBS | OXYGEN SATURATION: 93 % | HEIGHT: 74 IN | SYSTOLIC BLOOD PRESSURE: 119 MMHG

## 2023-07-13 LAB
ANION GAP SERPL CALC-SCNC: 11 MMOL/L (ref 7–16)
BASOPHILS # BLD AUTO: 1.1 % (ref 0–1.8)
BASOPHILS # BLD: 0.11 K/UL (ref 0–0.12)
BUN SERPL-MCNC: 16 MG/DL (ref 8–22)
CALCIUM SERPL-MCNC: 8.5 MG/DL (ref 8.5–10.5)
CHLORIDE SERPL-SCNC: 99 MMOL/L (ref 96–112)
CO2 SERPL-SCNC: 23 MMOL/L (ref 20–33)
CREAT SERPL-MCNC: 0.91 MG/DL (ref 0.5–1.4)
EOSINOPHIL # BLD AUTO: 0.25 K/UL (ref 0–0.51)
EOSINOPHIL NFR BLD: 2.6 % (ref 0–6.9)
ERYTHROCYTE [DISTWIDTH] IN BLOOD BY AUTOMATED COUNT: 47.6 FL (ref 35.9–50)
GFR SERPLBLD CREATININE-BSD FMLA CKD-EPI: 94 ML/MIN/1.73 M 2
GLUCOSE BLD STRIP.AUTO-MCNC: 142 MG/DL (ref 65–99)
GLUCOSE SERPL-MCNC: 136 MG/DL (ref 65–99)
HCT VFR BLD AUTO: 38.5 % (ref 42–52)
HGB BLD-MCNC: 13.3 G/DL (ref 14–18)
IMM GRANULOCYTES # BLD AUTO: 0.12 K/UL (ref 0–0.11)
IMM GRANULOCYTES NFR BLD AUTO: 1.2 % (ref 0–0.9)
LYMPHOCYTES # BLD AUTO: 1.46 K/UL (ref 1–4.8)
LYMPHOCYTES NFR BLD: 15.2 % (ref 22–41)
MCH RBC QN AUTO: 32.4 PG (ref 27–33)
MCHC RBC AUTO-ENTMCNC: 34.5 G/DL (ref 32.3–36.5)
MCV RBC AUTO: 93.7 FL (ref 81.4–97.8)
MONOCYTES # BLD AUTO: 1.09 K/UL (ref 0–0.85)
MONOCYTES NFR BLD AUTO: 11.3 % (ref 0–13.4)
NEUTROPHILS # BLD AUTO: 6.6 K/UL (ref 1.82–7.42)
NEUTROPHILS NFR BLD: 68.6 % (ref 44–72)
NRBC # BLD AUTO: 0 K/UL
NRBC BLD-RTO: 0 /100 WBC (ref 0–0.2)
PLATELET # BLD AUTO: 346 K/UL (ref 164–446)
PMV BLD AUTO: 8.6 FL (ref 9–12.9)
POTASSIUM SERPL-SCNC: 4 MMOL/L (ref 3.6–5.5)
RBC # BLD AUTO: 4.11 M/UL (ref 4.7–6.1)
SODIUM SERPL-SCNC: 133 MMOL/L (ref 135–145)
WBC # BLD AUTO: 9.6 K/UL (ref 4.8–10.8)

## 2023-07-13 PROCEDURE — 99239 HOSP IP/OBS DSCHRG MGMT >30: CPT | Performed by: INTERNAL MEDICINE

## 2023-07-13 PROCEDURE — 80048 BASIC METABOLIC PNL TOTAL CA: CPT

## 2023-07-13 PROCEDURE — 94760 N-INVAS EAR/PLS OXIMETRY 1: CPT

## 2023-07-13 PROCEDURE — 36415 COLL VENOUS BLD VENIPUNCTURE: CPT

## 2023-07-13 PROCEDURE — 700102 HCHG RX REV CODE 250 W/ 637 OVERRIDE(OP): Performed by: INTERNAL MEDICINE

## 2023-07-13 PROCEDURE — A9270 NON-COVERED ITEM OR SERVICE: HCPCS | Performed by: INTERNAL MEDICINE

## 2023-07-13 PROCEDURE — 700111 HCHG RX REV CODE 636 W/ 250 OVERRIDE (IP): Performed by: INTERNAL MEDICINE

## 2023-07-13 PROCEDURE — 94660 CPAP INITIATION&MGMT: CPT

## 2023-07-13 PROCEDURE — 82962 GLUCOSE BLOOD TEST: CPT

## 2023-07-13 PROCEDURE — 85025 COMPLETE CBC W/AUTO DIFF WBC: CPT

## 2023-07-13 PROCEDURE — RXMED WILLOW AMBULATORY MEDICATION CHARGE: Performed by: INTERNAL MEDICINE

## 2023-07-13 PROCEDURE — 99233 SBSQ HOSP IP/OBS HIGH 50: CPT | Performed by: INTERNAL MEDICINE

## 2023-07-13 RX ORDER — POTASSIUM CHLORIDE 20 MEQ/1
20 TABLET, EXTENDED RELEASE ORAL DAILY
Qty: 5 TABLET | Refills: 0 | Status: SHIPPED | OUTPATIENT
Start: 2023-07-13 | End: 2023-07-18

## 2023-07-13 RX ORDER — GLIPIZIDE 5 MG/1
5 TABLET ORAL DAILY
Status: DISCONTINUED | OUTPATIENT
Start: 2023-07-13 | End: 2023-07-13 | Stop reason: HOSPADM

## 2023-07-13 RX ORDER — FUROSEMIDE 20 MG/1
20 TABLET ORAL 2 TIMES DAILY
Qty: 10 TABLET | Refills: 0 | Status: SHIPPED | OUTPATIENT
Start: 2023-07-13 | End: 2023-07-18

## 2023-07-13 RX ORDER — LINEZOLID 600 MG/1
600 TABLET, FILM COATED ORAL EVERY 12 HOURS
Qty: 26 TABLET | Refills: 0 | Status: ACTIVE | OUTPATIENT
Start: 2023-07-13 | End: 2023-07-26

## 2023-07-13 RX ADMIN — AMLODIPINE BESYLATE 10 MG: 10 TABLET ORAL at 05:19

## 2023-07-13 RX ADMIN — TAMSULOSIN HYDROCHLORIDE 0.4 MG: 0.4 CAPSULE ORAL at 05:21

## 2023-07-13 RX ADMIN — PREGABALIN 50 MG: 25 CAPSULE ORAL at 05:18

## 2023-07-13 RX ADMIN — LINEZOLID 600 MG: 600 TABLET, FILM COATED ORAL at 05:18

## 2023-07-13 RX ADMIN — GLIPIZIDE 5 MG: 5 TABLET ORAL at 08:35

## 2023-07-13 RX ADMIN — FINASTERIDE 5 MG: 5 TABLET, FILM COATED ORAL at 05:19

## 2023-07-13 RX ADMIN — OMEPRAZOLE 40 MG: 20 CAPSULE, DELAYED RELEASE ORAL at 05:19

## 2023-07-13 RX ADMIN — LEVOTHYROXINE SODIUM 112 MCG: 0.11 TABLET ORAL at 05:19

## 2023-07-13 RX ADMIN — SENNOSIDES AND DOCUSATE SODIUM 2 TABLET: 50; 8.6 TABLET ORAL at 05:18

## 2023-07-13 RX ADMIN — FUROSEMIDE 20 MG: 10 INJECTION INTRAMUSCULAR; INTRAVENOUS at 05:19

## 2023-07-13 RX ADMIN — ROSUVASTATIN CALCIUM 20 MG: 20 TABLET, FILM COATED ORAL at 05:19

## 2023-07-13 ASSESSMENT — PAIN DESCRIPTION - PAIN TYPE
TYPE: ACUTE PAIN
TYPE: ACUTE PAIN

## 2023-07-13 NOTE — DISCHARGE INSTRUCTIONS
Discharge Instructions per Charissa Paige M.D.    Left leg infection -your ultrasound showed your circulation was okay, there was no blood clot.  Continue on linezolid for 2 weeks total per infectious disease doctor.  Follow-up with the wound care clinic on Monday as scheduled.  I will continue on Lasix diuretic for 5 more days to help with the swelling.  Please continue to elevate your leg when resting.

## 2023-07-13 NOTE — DISCHARGE PLANNING
Case Management Discharge Planning    Admission Date: 7/9/2023  GMLOS: 5.4  ALOS: 4    6-Clicks ADL Score: 24  6-Clicks Mobility Score: 24      Anticipated Discharge Dispo: Discharge Disposition: D/T to home under [facility] care in anticipation of covered skilled care (06)    DME Needed: No    Action(s) Taken: Referral(s) sent    Escalations Completed: None    Medically Clear: Yes    Next Steps: Patient has OP wound clinic appointment for 7/17/23 @ 14:00.    Barriers to Discharge: None    Is the patient up for discharge tomorrow: No, today

## 2023-07-13 NOTE — CARE PLAN
The patient is Stable - Low risk of patient condition declining or worsening    Shift Goals  Clinical Goals: wound care,  Patient Goals: sleep and rest  Family Goals: na    Progress made toward(s) clinical / shift goals:  wound care done, pt was able to rest and was pain free.  Problem: Pain - Standard  Goal: Alleviation of pain or a reduction in pain to the patient’s comfort goal  Outcome: Progressing       Patient is not progressing towards the following goals:

## 2023-07-13 NOTE — DISCHARGE SUMMARY
Discharge Summary    CHIEF COMPLAINT ON ADMISSION  Chief Complaint   Patient presents with    Wound Infection    Leg Pain       Reason for Admission  leg pain     Admission Date  7/9/2023    CODE STATUS  Full    HPI & HOSPITAL COURSE  61 yo man with DM, HTN, BPH, chronic left leg wound followed by wound clinic who presented with blister on his left great toe that has become infected.  It was cultured at wound clinic and grew strep and MRSA and the infection continue to worsen.  He was admitted for cellulitis of his left leg on meropenem and vancomycin.  He was by wound care and infectious disease was consulted.  He has significant edema which is impairing his healing.  He was treated with diuresis and leg elevation.  Doppler was negative for DVT and arterial ultrasound was okay.  He improved and will discharge on 2 weeks of linezolid as discussed with ID.  He will follow-up with wound clinic after the weekend.  I will continue him on 1 more week of Lasix diuresis.    Therefore, he is discharged in good and stable condition to home with close outpatient follow-up.    The patient met 2-midnight criteria for an inpatient stay at the time of discharge.    Discharge Date  7/13/2023    FOLLOW UP ITEMS POST DISCHARGE  Follow-up with wound care clinic    DISCHARGE DIAGNOSES  Principal Problem:    Cellulitis of left leg (POA: Yes)  Active Problems:    Morbid obesity due to excess calories (HCC) (POA: Yes)    Essential hypertension (POA: Yes)      Overview: This is a chronic condition, typically well controlled with Norvasc 10 mg       daily, has been out of this medication for several months    Type 2 diabetes mellitus with neurologic complication, without long-term current use of insulin (HCC) (POA: Yes)      Overview: This is a chronic condition, no recent labs, currently taking glipizide 5       mg daily only    Hyponatremia (POA: Yes)    Benign prostatic hyperplasia without lower urinary tract symptoms (POA: Yes)       Overview: This is a chronic condition, currently on finasteride and tamsulosin    SIRS (systemic inflammatory response syndrome) (HCC) (POA: Yes)  Resolved Problems:    * No resolved hospital problems. *      FOLLOW UP  Future Appointments   Date Time Provider Department Center   7/17/2023  2:00 PM Ernestine Cantor R.N. [facility] .   7/24/2023  2:30 PM IGNACIO VogelNGhada [facility] .   7/27/2023  2:30 PM WES Crowell [facility] .   7/31/2023  2:30 PM IGNACIO VogelNGhada [facility] .   8/3/2023  3:00 PM Leena Gregg R.N. [facility] .   8/7/2023  2:30 PM IGNACIO NietoNGhada [facility] .   8/10/2023  2:30 PM Rita Gan R.N. [facility] .   8/14/2023  2:00 PM IGNACIO VogelNGhada [facility] .   8/17/2023  2:30 PM WES Crowell [facility] .   8/21/2023  2:30 PM IGNACIO NietoNGhada [facility] .   8/24/2023  2:30 PM IGNACIO NegreteN. [facility] .   8/28/2023  2:30 PM IGNACIO VogelNGhada [facility] .   8/31/2023  2:30 PM WES Crowell [facility] [address] [address]     No follow-up provider specified.    MEDICATIONS ON DISCHARGE     Medication List        START taking these medications        Instructions   furosemide 20 MG Tabs  Commonly known as: Lasix   Take 1 Tablet by mouth 2 times a day for 5 days.  Dose: 20 mg     linezolid 600 MG Tabs  Commonly known as: Zyvox   Take 1 Tablet by mouth every 12 hours for 13 days.  Dose: 600 mg     potassium chloride SA 20 MEQ Tbcr  Commonly known as: Kdur   Take 1 Tablet by mouth every day for 5 days.  Dose: 20 mEq            CONTINUE taking these medications        Instructions   amLODIPine 10 MG Tabs  Commonly known as: Norvasc   Take 1 Tablet by mouth every day.  Dose: 10 mg     cyclobenzaprine 5 mg tablet  Commonly known as: Flexeril   Take 1 Tablet by mouth at bedtime.  Dose: 5 mg     finasteride 5 MG Tabs  Commonly known as: Proscar   Take 1 Tablet by mouth every day.  Dose: 5 mg     glipiZIDE 5 MG Tabs  Commonly known as: Glucotrol   Take 1  Tablet by mouth every day.  Dose: 5 mg     levothyroxine 112 MCG Tabs  Commonly known as: Synthroid   Take 1 Tablet by mouth every day.  Dose: 112 mcg     meclizine 12.5 MG Tabs  Commonly known as: Antivert   Take 1 Tablet by mouth 3 times a day as needed for Dizziness.  Dose: 12.5 mg     methylphenidate 36 MG CR tablet  Start taking on: August 21, 2023  Commonly known as: Concerta   Take 1 Tablet by mouth every day for 30 days.  Dose: 36 mg     omeprazole 40 MG delayed-release capsule  Commonly known as: PriLOSEC   Take 1 Capsule by mouth every day.  Dose: 40 mg     pregabalin 50 MG capsule  Commonly known as: Lyrica   Take 1 Capsule by mouth 2 times a day for 90 days.  Dose: 50 mg     rosuvastatin 20 MG Tabs  Commonly known as: Crestor   Take 1 Tablet by mouth every day.  Dose: 20 mg     tamsulosin 0.4 MG capsule  Commonly known as: Flomax   Take 1 Capsule by mouth every day.  Dose: 0.4 mg     traZODone 50 MG Tabs  Commonly known as: Desyrel   Take 1 Tablet by mouth at bedtime.  Dose: 50 mg              Allergies  Allergies   Allergen Reactions    Amoxicillin Hives    Oxycodone Hives    Codeine Nausea    Erythromycin Nausea       DIET  No orders of the defined types were placed in this encounter.      ACTIVITY  As tolerated.    CONSULTATIONS  ID    PROCEDURES  US-EXTREMITY VENOUS LOWER UNILAT LEFT   Final Result      US-SHEELA SINGLE LEVEL BILAT   Final Result      DX-TOE(S) 2+ LEFT   Final Result      No radiographic evidence of acute traumatic injury or bony erosion. Plain films are insensitive for changes of early osteomyelitis. Consider MRI.      DX-CHEST-PORTABLE (1 VIEW)   Final Result      No acute cardiopulmonary abnormality.               LABORATORY  Lab Results   Component Value Date    SODIUM 133 (L) 07/13/2023    POTASSIUM 4.0 07/13/2023    CHLORIDE 99 07/13/2023    CO2 23 07/13/2023    GLUCOSE 136 (H) 07/13/2023    BUN 16 07/13/2023    CREATININE 0.91 07/13/2023        Lab Results   Component Value Date     WBC 9.6 07/13/2023    HEMOGLOBIN 13.3 (L) 07/13/2023    HEMATOCRIT 38.5 (L) 07/13/2023    PLATELETCT 346 07/13/2023        Total time of the discharge process exceeds 32 minutes.

## 2023-07-13 NOTE — DOCUMENTATION QUERY
[facility]                                                                       Query Response Note      PATIENT:               STEPHANE JAMISON  ACCT #:                  [id number]  MRN:                     [id number]  :                      1960  ADMIT DATE:       2023 3:36 PM  DISCH DATE:          RESPONDING  PROVIDER #:        [id number]           QUERY TEXT:    Documentation within the record reveals sepsis. Can this diagnosis be further specified after workup.      Sepsis - real or suspected infection plus 2 or more SIRS criteria + organ dysfunction related to sepsis    Temp <96.8 or >101  HR >90  RR >20  WBC <4,000 or > 12,000  >10% bandemia         The patient's Clinical Indicators include:  Findings:  Epic: 100.9-184/-33-96% WBC 11.2 Lactic acid 1.6 CRP 14.86     7/10 PN: admitted with sepsis  secondary to chronic left lower extremity wound     Treatment: Vancomycin, Meropenum    Risk Factors: Cellulitis, wound infection    Jordyn Gandhi RN BSN  Clinical Documentation   [email or web address]  Connect via [email or web address] Messenger  Options provided:   -- Sepsis does not exist - amended documentation in the medical record and updated problem list   -- Sepsis exists, (provide SIRS criteria plus sepsis-related organ dysfunction)   -- Other explanation, Please specify      Query created by: Jordyn Hernández on 2023 2:26 AM    RESPONSE TEXT:    Sepsis does not exist - amended documentation in the medical record and updated problem list          Electronically signed by:  SARAH CHAN MD 2023 9:43 AM

## 2023-07-13 NOTE — DISCHARGE PLANNING
Case Management Discharge Planning    Admission Date: 7/9/2023  GMLOS: 5.4  ALOS: 4    6-Clicks ADL Score: 24  6-Clicks Mobility Score: 24      Anticipated Discharge Dispo: Discharge Disposition: D/T to home under [facility]HA care in anticipation of covered skilled care (06)    DME Needed: No    Action(s) Taken: OTHER    Escalations Completed: None    Medically Clear: Yes    Next Steps: RNCM placed call to [facility] wound care, left voicemail regarding confirmation of wound care appointment for 7/17/23 @ 14:00. Patient left prior to [facility] wound care returning call. Still waiting for cb.    Barriers to Discharge: None    Is the patient up for discharge tomorrow: No Patient d/c today.

## 2023-07-14 LAB
BACTERIA BLD CULT: NORMAL
BACTERIA BLD CULT: NORMAL
BACTERIA WND AEROBE CULT: ABNORMAL
GRAM STN SPEC: ABNORMAL
SIGNIFICANT IND 70042: ABNORMAL
SIGNIFICANT IND 70042: NORMAL
SIGNIFICANT IND 70042: NORMAL
SITE SITE: ABNORMAL
SITE SITE: NORMAL
SITE SITE: NORMAL
SOURCE SOURCE: ABNORMAL
SOURCE SOURCE: NORMAL
SOURCE SOURCE: NORMAL

## 2023-07-17 ENCOUNTER — NON-PROVIDER VISIT (OUTPATIENT)
Dept: WOUND CARE | Facility: MEDICAL CENTER | Age: 63
End: 2023-07-17
Attending: GENERAL PRACTICE
Payer: COMMERCIAL

## 2023-07-20 ENCOUNTER — OFFICE VISIT (OUTPATIENT)
Dept: WOUND CARE | Facility: MEDICAL CENTER | Age: 63
End: 2023-07-20
Attending: GENERAL PRACTICE
Payer: COMMERCIAL

## 2023-07-20 VITALS
DIASTOLIC BLOOD PRESSURE: 76 MMHG | HEART RATE: 82 BPM | RESPIRATION RATE: 20 BRPM | SYSTOLIC BLOOD PRESSURE: 141 MMHG | OXYGEN SATURATION: 96 % | TEMPERATURE: 97.6 F

## 2023-07-20 DIAGNOSIS — L08.9 WOUND INFECTION: ICD-10-CM

## 2023-07-20 DIAGNOSIS — S81.802D OPEN WOUND OF LEFT LOWER EXTREMITY, SUBSEQUENT ENCOUNTER: ICD-10-CM

## 2023-07-20 DIAGNOSIS — L97.521 DIABETIC ULCER OF TOE OF LEFT FOOT ASSOCIATED WITH TYPE 2 DIABETES MELLITUS, LIMITED TO BREAKDOWN OF SKIN (HCC): ICD-10-CM

## 2023-07-20 DIAGNOSIS — E11.9 TYPE 2 DIABETES MELLITUS WITHOUT COMPLICATION, WITHOUT LONG-TERM CURRENT USE OF INSULIN (HCC): ICD-10-CM

## 2023-07-20 DIAGNOSIS — T14.8XXA WOUND INFECTION: ICD-10-CM

## 2023-07-20 DIAGNOSIS — E11.621 DIABETIC ULCER OF TOE OF LEFT FOOT ASSOCIATED WITH TYPE 2 DIABETES MELLITUS, LIMITED TO BREAKDOWN OF SKIN (HCC): ICD-10-CM

## 2023-07-20 PROCEDURE — 3078F DIAST BP <80 MM HG: CPT | Performed by: NURSE PRACTITIONER

## 2023-07-20 PROCEDURE — 3077F SYST BP >= 140 MM HG: CPT | Performed by: NURSE PRACTITIONER

## 2023-07-20 PROCEDURE — 99213 OFFICE O/P EST LOW 20 MIN: CPT | Performed by: NURSE PRACTITIONER

## 2023-07-20 PROCEDURE — 99213 OFFICE O/P EST LOW 20 MIN: CPT

## 2023-07-20 ASSESSMENT — ENCOUNTER SYMPTOMS
CLAUDICATION: 0
CHILLS: 0
NAUSEA: 0
COUGH: 0
DIARRHEA: 0
FEVER: 0
SHORTNESS OF BREATH: 0
CONSTIPATION: 0
VOMITING: 0

## 2023-07-20 NOTE — PATIENT INSTRUCTIONS
-Keep your wound dressing clean, dry, and intact.    -Change your dressing if it becomes soiled, soaked, or falls off.    -Remove your compression stocking if you have severe pain, severe swelling, numbness, color change, or temperature change in your toes. If you need to remove your compression wrap, do so by unrolling it. Do not cut the compression wrap off to prevent cutting yourself on accident.    -Should you experience any significant changes in your wound(s), such as infection (redness, swelling, localized heat, increased pain, fever > 101 F, chills) or have any questions regarding your home care instructions, please contact the wound center at [phone]. If after hours, contact your primary care physician or go to the hospital emergency room.

## 2023-07-20 NOTE — PROGRESS NOTES
Provider Encounter- Full Thickness wound    HISTORY OF PRESENT ILLNESS  Wound History:   START OF CARE IN CLINIC: 7/20/2023 (return to clinic following hospitalization)   REFERRING PROVIDER:   Paddy DELEON  WOUND- Full Thickness Wound   LOCATION: Left lower extremity, right anterior knee, left anterior knee   HISTORY: Patient presented to [facility] on 12/4/2022 with left lower extremity, fever, malaise, erythema x1 week.  He was originally admitted to [facility] for acute kidney injury and sepsis but with worsening renal function was transferred to [facility].  Wound cultures grew Streptococcus pyogenes group A and MSSA.  Patient was initially treated with cefazolin.  He is seen by the wound care team who recommended follow-up as an outpatient in wound care clinic.  While inpatient nephrology saw the patient and started him on dialysis for acute kidney injury.  His renal function improved and dialysis was stopped.  Patient was discharged with follow-up in the wound care clinic.    Patient was following with wound clinic, however on clinic visit 1/3/2022 he was noticed to have worsening wounds and concern for infection. He had failed outpatient Abx with zyvox and was sent to ED for further evaluation. Patient was admitted to [facility] from 1/3-1/13/2023 for sepsis and left lower extremity cellulitis with abscess. He was taken to OR for I&D with Dr. Odom. Patient's wound was treated with wound VAC. Surgical cultures were positive for enterobacter cloacae and was treated with ciprofloxacin with end date of 1/21/2023. Patient was referred back to [facility] for further care.    Patient was again hospitalized from 7/9 till 7/13/2023 for wound infection.  Wound culture from the clinic grew strep and MRSA, infection continue to worsen.  He was treated in the hospital with IV meropenem and vancomycin.  Wound care and infectious diseases was consulted.  Lower extremity edema was treated with diuresis and leg  elevation.  Doppler negative for DVT, arterial ultrasound was completed and showed adequate lower extremity perfusion.  He improved and was discharged home on p.o. linezolid, with referral back to [facility] to continue wound care.    Pertinent Medical History: Hypertension, alcohol abuse, kidney disease, GERD, mitral valve prolapse, morbid obesity, obstructive sleep apnea, type 2 diabetes    TOBACCO USE: Former smoker denies use of smokeless tobacco    Patient's problem list, allergies, and current medications reviewed and updated in Epic    Interval History:  7/20/2023 : Clinic visit with Deanna Salazar, PANCHO, FNFERMÍN-BC, CWAMAN, CFCN.   Kwesi states he is feeling well overall, though having a bit more pain today than he has had since discharge.  States he has been up since 6:00 and running errands or going to appointments all day long.  Other than today, he has been resting with leg elevated is much as possible.  He is tolerating linezolid without difficulty.  I did not ask about his blood sugar today.    REVIEW OF SYSTEMS:   Review of Systems   Constitutional:  Negative for chills and fever.   Respiratory:  Negative for cough and shortness of breath.    Cardiovascular:  Positive for leg swelling. Negative for chest pain and claudication.   Gastrointestinal:  Negative for constipation, diarrhea, nausea and vomiting.   Genitourinary:  Negative for dysuria.        PHYSICAL EXAMINATION:   BP (!) 141/76   Pulse 82   Temp 36.4 °C (97.6 °F) (Temporal)   Resp 20   SpO2 96%     Physical Exam  Constitutional:       Appearance: He is obese.   Cardiovascular:      Rate and Rhythm: Normal rate.   Pulmonary:      Effort: Pulmonary effort is normal.   Musculoskeletal:      Right lower leg: Edema present.      Left lower leg: Edema present.      Comments: 3-4+ extremity edema of left lower extremity  3+ edema right lower extremity   Skin:     Findings: Erythema present.      Comments: Full-thickness wound to the left anterior lower  extremity status post I&D -wound area has decreased significantly since last assessment, minimal amount of serous drainage, no evidence of infection    Full thickness wound to posterior left lower extremity -resolved    Left medial hallux ulcer-nearly resolved, small pinpoint area of dry brown tissue, detached epidermis to periwound    Left foot erythemic.  Patchy, noncontiguous erythema to left lower extremity     Neurological:      Mental Status: He is alert and oriented to person, place, and time.     Monofilament testing with a 10 gram force: sensation intact: decreased bilaterally  Visual Inspection: Feet with maceration, ulcers, fissures.  Pedal pulses: intact bilaterally      WOUND ASSESSMENT  Wound 01/16/23 Incision Leg Anterior;Lower Left (Active)   Wound Image   07/20/23 1400   Site Assessment Red;[place] 07/20/23 1400   Periwound Assessment Dry;Blanchable erythema;Edema 07/20/23 1400   Margins Attached edges 07/20/23 1400   Drainage Amount Scant 07/20/23 1400   Drainage Description Serosanguineous 07/20/23 1400   Treatments Cleansed;Site care;Topical Lidocaine 07/20/23 1400   Wound Cleansing Foam Cleanser/Washcloth 07/20/23 1400   Periwound Protectant Skin Moisturizer;Barrier Paste 07/20/23 1400   Dressing Status Clean;Dry;Intact 03/22/23 1500   Dressing Changed New 07/20/23 1400   Dressing Cleansing/Solutions Not Applicable 07/20/23 1400   Dressing Options Open to Air;Tubigrip 07/20/23 1400   Dressing Change/Treatment Frequency Every 72 hrs, and As Needed 07/06/23 1330   Wound Team Following Weekly 07/20/23 1400   Non-staged Wound Description Full thickness 07/20/23 1400   Wound Length (cm) 0.8 cm 07/20/23 1400   Wound Width (cm) 0.2 cm 07/20/23 1400   Wound Depth (cm) 0.1 cm 07/20/23 1400   Wound Surface Area (cm^2) 0.16 cm^2 07/20/23 1400   Wound Volume (cm^3) 0.016 cm^3 07/20/23 1400   Post-Procedure Length (cm) 0.8 cm 07/20/23 1400   Post-Procedure Width (cm) 0.2 cm 07/20/23 1400   Post-Procedure Depth  (cm) 0.1 cm 07/20/23 1400   Post-Procedure Surface Area (cm^2) 0.16 cm^2 07/20/23 1400   Post-Procedure Volume (cm^3) 0.016 cm^3 07/20/23 1400   Wound Healing % 100 07/20/23 1400   Wound Bed Granulation (%) 100 % 03/06/23 1438   Tunneling (cm) 0 cm 07/20/23 1400   Tunneling Clock Position of Wound 2 05/04/23 1445   Undermining (cm) 0 cm 07/20/23 1400   Undermining of Wound, 1st Location From 1 o'clock;To 2 o'clock 04/05/23 1535   Undermining (cm) - 2nd location 0 cm 04/14/23 1415   Undermining of Wound, 2nd Location From 11 o'clock;To 12 o'clock 04/05/23 1535   Wound Odor None 07/20/23 1400   Exposed Structures None 07/20/23 1400   Number of days: 185       Wound 06/29/23 Left Medial Hallux (Active)   Wound Image   07/20/23 1400   Site Assessment Red;Yellow 07/06/23 1330   Periwound Assessment Blanchable erythema 07/20/23 1400   Margins Attached edges 07/20/23 1400   Drainage Amount SANJUANA 07/20/23 1400   Drainage Description Serosanguineous;Purulent 07/06/23 1330   Treatments Cleansed;Site care 07/20/23 1400   Wound Cleansing Foam Cleanser/Washcloth 07/20/23 1400   Periwound Protectant Skin Moisturizer 07/20/23 1400   Dressing Changed New 07/20/23 1400   Dressing Cleansing/Solutions Not Applicable 07/20/23 1400   Dressing Options Open to Air;Tubigrip 07/20/23 1400   Dressing Change/Treatment Frequency Weekly, and As Needed 07/20/23 1400   Wound Team Following Weekly 07/20/23 1400   Non-staged Wound Description Full thickness 07/06/23 1330   Wound Length (cm) 1.3 cm 07/06/23 1330   Wound Width (cm) 0.8 cm 07/06/23 1330   Wound Depth (cm) 0.2 cm 07/06/23 1330   Wound Surface Area (cm^2) 1.04 cm^2 07/06/23 1330   Wound Volume (cm^3) 0.208 cm^3 07/06/23 1330   Post-Procedure Length (cm) 1.3 cm 07/06/23 1330   Post-Procedure Width (cm) 0.8 cm 07/06/23 1330   Post-Procedure Depth (cm) 0.2 cm 07/06/23 1330   Post-Procedure Surface Area (cm^2) 1.04 cm^2 07/06/23 1330   Post-Procedure Volume (cm^3) 0.208 cm^3 07/06/23 1330    Wound Healing % -2211 07/06/23 1330   Tunneling (cm) 0 cm 07/20/23 1400   Undermining (cm) 0 cm 07/20/23 1400   Wound Odor None 07/20/23 1400   Exposed Structures None 07/20/23 1400   Number of days: 21       PROCEDURE:   - No debridement required today    Pertinent Labs and Diagnostics:    Labs:     A1c:   Lab Results   Component Value Date/Time    HBA1C 7.4 (H) 07/06/2023 08:01 AM            IMAGING: Dx to tibia and fibula dated 12/5/2022  IMPRESSION:     No radiographic evidence of acute bony abnormality     Skin thickening and edema with some soft tissue calcifications most likely indicates venous stasis. Cellulitis is a possibility and there is no evidence of soft tissue gas. Dermatomyositis can be seen with soft tissue calcifications but (are not as   extensive as that often entails      LAST  WOUND CULTURE:  DATE :   Lab Results   Component Value Date/Time    CULTRSULT - (A) 07/11/2023 02:15 PM    CULTRSULT (A) 07/11/2023 02:15 PM     Methicillin Resistant Staphylococcus aureus  Light growth      CULTRSULT Streptococcus pyogenes (Group A)  Rare growth   (A) 07/11/2023 02:15 PM         ASSESSMENT AND PLAN:     1. Open wound of left lower extremity, subsequent encounter    7/20/2023: Patient returns to clinic after hospitalization for lower extremity infection.  His left lower extremity wound is nearly resolved.  He does have significant edema and erythema to his left lower  leg today.  -No need for debridement today  -Patient to return to the weekly for assessment and debridement.    - Continue compression with Tubigrip's.  Increase compression as erythema subsides     Wound care: Hydrofiber silver, Dry roll gauze, solaris ready wrap    2. Diabetic ulcer of toe of left foot associated with type 2 diabetes mellitus, limited to breakdown of skin (HCC)    7/20/2023: This wound is nearly resolved.  Only a very small area of dry brown tissue remains.  -No need for debridement today  -Patient advised to wear shoes  with wide toe box previously  -Monitor each clinic visit  -Provided patient with Rx for diabetic shoes and inserts if he does not already have these   Wound Care: Hydrofiber silver, foam, tape    3. Type 2 diabetes mellitus without complication, without long-term current use of insulin ([facility])    Comments: Patient is currently on glipizide only    7/20/2023: Patient now has a CGM.  He did not ask about his blood sugar today, however it has been well controlled as of late.  -Patient has been instructed to keep tight control over fasting blood sugar, <140 for optimal wound healing; Implications of loss of protective sensation (LOPS) deviously discussed with patient, including increased risk for amputation.  Advised to check feet at least daily, moisturize feet, and to always wear protective foot wear, arrange meticulous regular foot care by podiatrist or [facility]. Pt with good understanding.    4. Wound infection    7/20/2023: Patient was recently hospitalized for infection.  He presents today with significant lower extremity erythema, but states it is much better than it was.    -Patient to complete p.o. linezolid as prescribed  -Monitor for signs and symptoms of infection each clinic visit  -Follow-up with ID as needed    PATIENT EDUCATION  - Importance of adequate nutrition for wound healing  -Advised to go to ER for any increased redness, swelling, drainage, or odor, or if patient develops fever, chills, nausea or vomiting.     My total time spent caring for the patient on the day of the encounter was 20 minutes, reviewing history, assessment, counseling and education, and coordination of care.  This does not include time spent on separately billable procedures/tests.    Please note that this note may have been created using voice recognition software. I have worked with technical experts from [employer] to optimize the interface.  I have made every reasonable attempt to correct obvious errors, but there may be  errors of grammar and possibly content that I did not discover before finalizing the note.    N

## 2023-07-24 ENCOUNTER — APPOINTMENT (OUTPATIENT)
Dept: WOUND CARE | Facility: MEDICAL CENTER | Age: 63
End: 2023-07-24
Attending: GENERAL PRACTICE
Payer: COMMERCIAL

## 2023-07-24 ENCOUNTER — OFFICE VISIT (OUTPATIENT)
Dept: MEDICAL GROUP | Facility: MEDICAL CENTER | Age: 63
End: 2023-07-24
Payer: COMMERCIAL

## 2023-07-24 VITALS
HEART RATE: 98 BPM | BODY MASS INDEX: 40.18 KG/M2 | WEIGHT: 313.05 LBS | DIASTOLIC BLOOD PRESSURE: 70 MMHG | HEIGHT: 74 IN | OXYGEN SATURATION: 97 % | RESPIRATION RATE: 16 BRPM | SYSTOLIC BLOOD PRESSURE: 114 MMHG | TEMPERATURE: 97.5 F

## 2023-07-24 DIAGNOSIS — L03.116 CELLULITIS OF LEFT LEG: ICD-10-CM

## 2023-07-24 DIAGNOSIS — E11.42 TYPE 2 DIABETES MELLITUS WITH DIABETIC POLYNEUROPATHY, WITHOUT LONG-TERM CURRENT USE OF INSULIN (HCC): ICD-10-CM

## 2023-07-24 PROBLEM — R65.10 SIRS (SYSTEMIC INFLAMMATORY RESPONSE SYNDROME) (HCC): Status: RESOLVED | Noted: 2023-07-09 | Resolved: 2023-07-24

## 2023-07-24 PROCEDURE — 3074F SYST BP LT 130 MM HG: CPT | Performed by: STUDENT IN AN ORGANIZED HEALTH CARE EDUCATION/TRAINING PROGRAM

## 2023-07-24 PROCEDURE — 92250 FUNDUS PHOTOGRAPHY W/I&R: CPT | Mod: TC | Performed by: STUDENT IN AN ORGANIZED HEALTH CARE EDUCATION/TRAINING PROGRAM

## 2023-07-24 PROCEDURE — 3078F DIAST BP <80 MM HG: CPT | Performed by: STUDENT IN AN ORGANIZED HEALTH CARE EDUCATION/TRAINING PROGRAM

## 2023-07-24 PROCEDURE — 99214 OFFICE O/P EST MOD 30 MIN: CPT | Performed by: STUDENT IN AN ORGANIZED HEALTH CARE EDUCATION/TRAINING PROGRAM

## 2023-07-24 RX ORDER — MELOXICAM 15 MG/1
15 TABLET ORAL DAILY
Qty: 30 TABLET | Refills: 0 | Status: SHIPPED | OUTPATIENT
Start: 2023-07-24 | End: 2023-08-07 | Stop reason: SDUPTHER

## 2023-07-24 ASSESSMENT — FIBROSIS 4 INDEX: FIB4 SCORE: 0.89

## 2023-07-24 NOTE — PROGRESS NOTES
"Subjective:     CC: Cellulitis, DM    HPI:   Thomas presents today with    Problem   Cellulitis of Left Leg    This is a new condition that started about 2 weeks ago.  He was briefly hospitalized and then sent home to complete a course of linezolid.  He still has several days left on this course.  Since he has been home he has been having increased purplish discoloration of the anterior leg near the ankle as well as increase welts and.  He states that these have been progressively getting worse since he was hospitalized.     Type 2 Diabetes Mellitus With Neurologic Complication, Without Long-Term Current Use of Insulin (Hcc)    This is a chronic condition, last A1c was 7.4, currently taking glipizide 5 mg daily only     Sirs (Systemic Inflammatory Response Syndrome) ([facility]) (Resolved)     ROS:  ROS    Objective:     Exam:  /70   Pulse 98   Temp 36.4 °C (97.5 °F) (Temporal)   Resp 16   Ht 1.88 m (6' 2\")   Wt (!) 142 kg (313 lb 0.9 oz)   SpO2 97%   BMI 40.19 kg/m²  Body mass index is 40.19 kg/m².    Physical Exam  Vitals reviewed.   Constitutional:       General: He is not in acute distress.     Appearance: He is not toxic-appearing.   HENT:      Head: Normocephalic and atraumatic.      Right Ear: External ear normal.      Left Ear: External ear normal.   Eyes:      General:         Right eye: No discharge.         Left eye: No discharge.      Extraocular Movements: Extraocular movements intact.      Conjunctiva/sclera: Conjunctivae normal.   Pulmonary:      Effort: Pulmonary effort is normal. No respiratory distress.   Musculoskeletal:      Comments: Following in the left leg to the top of the calf   Skin:     General: Skin is warm and dry.      Comments: Pink discoloration of the skin on the entire left lower extremity distal to the knee, proximal to the knee there are spotty patches of redness.  There is significant edema and purple discoloration on the anterior ankle.  To the touch this feels lumpy " although no discrete pocket of fluid is felt.  Tenderness to palpation of the ankle   Neurological:      Mental Status: He is alert.   Psychiatric:         Mood and Affect: Mood normal.         Behavior: Behavior normal.         Thought Content: Thought content normal.         Judgment: Judgment normal.             Assessment & Plan:     62 y.o. male with the following -     1. Cellulitis of left leg  Due to his worsening discoloration, pain and skin changes in the ankle I have ordered a CT of the lower extremity to rule out any abscess. meloxicam to be taken for pain.  Continue current antibiotics.  Discussed the importance of keeping leg elevated to decrease swelling  - meloxicam (MOBIC) 15 MG tablet; Take 1 Tablet by mouth every day.  Dispense: 30 Tablet; Refill: 0  - CT-EXTREMITY, LOWER WITH & W/O LEFT; Future    2. Type 2 diabetes mellitus with diabetic polyneuropathy, without long-term current use of insulin ([facility])  Retinal exam completed today, last A1c 7.4.  - POCT Retinal Eye Exam      No follow-ups on file.    Please note that this dictation was created using voice recognition software. I have made every reasonable attempt to correct obvious errors, but I expect that there are errors of grammar and possibly content that I did not discover before finalizing the note.

## 2023-07-24 NOTE — LETTER
Critical access hospital  Janine Gomez M.D.  [address]  [place] [address]  Fax: [phone]   Authorization for Release/Disclosure of   Protected Health Information   Name: STEPHANE JAMISON : 1960 SSN: [id number]   Address: [address] [place] [address] [place] [address] Phone:    [phone] (home) [phone] (work)   I authorize the entity listed below to release/disclose the PHI below to:   Critical access hospital/Janine Gomez M.D. and Janine Gomez M.D.   Provider or Entity Name:  Dr. Melvin in [facility], [facility], [facility] Phone:      Fax:     Reason for request: continuity of care   Information to be released:    [ XX ] LAST COLONOSCOPY,  including any PATH REPORT and follow-up  [  ] LAST FIT/COLOGUARD RESULT [  ] LAST DEXA  [  ] LAST MAMMOGRAM  [  ] LAST PAP  [  ] LAST LABS [  ] RETINA EXAM REPORT  [  ] IMMUNIZATION RECORDS  [  ] Release all info      [  ] Check here and initial the line next to each item to release ALL health information INCLUDING  _____ Care and treatment for drug and / or alcohol abuse  _____ HIV testing, infection status, or AIDS  _____ Genetic Testing    DATES OF SERVICE OR TIME PERIOD TO BE DISCLOSED: _____________  I understand and acknowledge that:  * This Authorization may be revoked at any time by you in writing, except if your health information has already been used or disclosed.  * Your health information that will be used or disclosed as a result of you signing this authorization could be re-disclosed by the recipient. If this occurs, your re-disclosed health information may no longer be protected by State or Federal laws.  * You may refuse to sign this Authorization. Your refusal will not affect your ability to obtain treatment.  * This Authorization becomes effective upon signing and will  on (date) __________.      If no date is indicated, this Authorization will  one (1) year from the signature date.    Name:  Thomas Borja  Signature: Date:   7/24/2023     PLEASE FAX REQUESTED RECORDS BACK TO: [phone]

## 2023-07-26 ENCOUNTER — PATIENT MESSAGE (OUTPATIENT)
Dept: MEDICAL GROUP | Facility: MEDICAL CENTER | Age: 63
End: 2023-07-26
Payer: COMMERCIAL

## 2023-07-26 LAB — RETINAL SCREEN: NEGATIVE

## 2023-07-27 ENCOUNTER — OFFICE VISIT (OUTPATIENT)
Dept: WOUND CARE | Facility: MEDICAL CENTER | Age: 63
End: 2023-07-27
Attending: GENERAL PRACTICE
Payer: COMMERCIAL

## 2023-07-27 VITALS
RESPIRATION RATE: 18 BRPM | DIASTOLIC BLOOD PRESSURE: 78 MMHG | OXYGEN SATURATION: 94 % | SYSTOLIC BLOOD PRESSURE: 140 MMHG | TEMPERATURE: 97.3 F | HEART RATE: 81 BPM

## 2023-07-27 DIAGNOSIS — L97.521 DIABETIC ULCER OF TOE OF LEFT FOOT ASSOCIATED WITH TYPE 2 DIABETES MELLITUS, LIMITED TO BREAKDOWN OF SKIN (HCC): ICD-10-CM

## 2023-07-27 DIAGNOSIS — S81.802D OPEN WOUND OF LEFT LOWER EXTREMITY, SUBSEQUENT ENCOUNTER: ICD-10-CM

## 2023-07-27 DIAGNOSIS — E11.621 DIABETIC ULCER OF TOE OF LEFT FOOT ASSOCIATED WITH TYPE 2 DIABETES MELLITUS, LIMITED TO BREAKDOWN OF SKIN (HCC): ICD-10-CM

## 2023-07-27 DIAGNOSIS — E11.9 TYPE 2 DIABETES MELLITUS WITHOUT COMPLICATION, WITHOUT LONG-TERM CURRENT USE OF INSULIN (HCC): ICD-10-CM

## 2023-07-27 DIAGNOSIS — L08.9 WOUND INFECTION: ICD-10-CM

## 2023-07-27 DIAGNOSIS — T14.8XXA WOUND INFECTION: ICD-10-CM

## 2023-07-27 PROCEDURE — 99213 OFFICE O/P EST LOW 20 MIN: CPT

## 2023-07-27 PROCEDURE — 3077F SYST BP >= 140 MM HG: CPT | Performed by: NURSE PRACTITIONER

## 2023-07-27 PROCEDURE — 3078F DIAST BP <80 MM HG: CPT | Performed by: NURSE PRACTITIONER

## 2023-07-27 PROCEDURE — 99213 OFFICE O/P EST LOW 20 MIN: CPT | Performed by: NURSE PRACTITIONER

## 2023-07-27 NOTE — PROGRESS NOTES
Provider Encounter- Full Thickness wound    HISTORY OF PRESENT ILLNESS  Wound History:   START OF CARE IN CLINIC: 7/20/2023 (return to clinic following hospitalization)   REFERRING PROVIDER:   Paddy DELEON  WOUND- Full Thickness Wound   LOCATION: Left lower extremity, right anterior knee, left anterior knee   HISTORY: Patient presented to [facility] on 12/4/2022 with left lower extremity, fever, malaise, erythema x1 week.  He was originally admitted to [facility] for acute kidney injury and sepsis but with worsening renal function was transferred to [facility].  Wound cultures grew Streptococcus pyogenes group A and MSSA.  Patient was initially treated with cefazolin.  He is seen by the wound care team who recommended follow-up as an outpatient in wound care clinic.  While inpatient nephrology saw the patient and started him on dialysis for acute kidney injury.  His renal function improved and dialysis was stopped.  Patient was discharged with follow-up in the wound care clinic.    Patient was following with wound clinic, however on clinic visit 1/3/2022 he was noticed to have worsening wounds and concern for infection. He had failed outpatient Abx with zyvox and was sent to ED for further evaluation. Patient was admitted to [facility] from 1/3-1/13/2023 for sepsis and left lower extremity cellulitis with abscess. He was taken to OR for I&D with Dr. Odom. Patient's wound was treated with wound VAC. Surgical cultures were positive for enterobacter cloacae and was treated with ciprofloxacin with end date of 1/21/2023. Patient was referred back to [facility] for further care.    Patient was again hospitalized from 7/9 till 7/13/2023 for wound infection.  Wound culture from the clinic grew strep and MRSA, infection continue to worsen.  He was treated in the hospital with IV meropenem and vancomycin.  Wound care and infectious diseases was consulted.  Lower extremity edema was treated with diuresis and leg  "elevation.  Doppler negative for DVT, arterial ultrasound was completed and showed adequate lower extremity perfusion.  He improved and was discharged home on p.o. linezolid, with referral back to [facility] to continue wound care.    Pertinent Medical History: Hypertension, alcohol abuse, kidney disease, GERD, mitral valve prolapse, morbid obesity, obstructive sleep apnea, type 2 diabetes    TOBACCO USE: Former smoker denies use of smokeless tobacco    Patient's problem list, allergies, and current medications reviewed and updated in Epic    Interval History:  7/20/2023 : Clinic visit with PANCHO Medina, EL, MERI, RYLEE.   Kwesi states he is feeling well overall, though having a bit more pain today than he has had since discharge.  States he has been up since 6:00 and running errands or going to appointments all day long.  Other than today, he has been resting with leg elevated is much as possible.  He is tolerating linezolid without difficulty.  I did not ask about his blood sugar today.    7/27/2023 : Clinic visit with PANCHO Medina, EL, MERI, RYLEE.   Patient presents today with erythema and edema to his left lower leg, which has been chronic, however appears to be slightly worse today.  His foot and ankle in particular are more erythemic.  He states he is having pain but this is nothing new.  States that the skin to his thigh and upper calf itches, \"a lot\".  He just completed his Zyvox yesterday.  He does not have follow-up with ID.  He was seen by his PCP who ordered a CT scan, and told him to stop wearing Tubigrip.  He has not yet scheduled his CT scan.     If no improvement next week, or if significant deterioration, will consider referral back to ID, or send him to ED.  He understands he is to go to the emergency room if he experiences any signs or symptoms of infection.    REVIEW OF SYSTEMS:   Unchanged from previous clinic visit on 7/20/2023, except as documented in interval history " above    PHYSICAL EXAMINATION:   BP (!) 140/78   Pulse 81   Temp 36.3 °C (97.3 °F) (Temporal)   Resp 18   SpO2 94%     Physical Exam  Constitutional:       Appearance: He is obese.   Cardiovascular:      Rate and Rhythm: Normal rate.   Pulmonary:      Effort: Pulmonary effort is normal.   Musculoskeletal:      Right lower leg: Edema present.      Left lower leg: Edema present.      Comments: 3-4+ extremity edema of left lower extremity  3+ edema right lower extremity   Skin:     Findings: Erythema present.      Comments: Full-thickness wound to the left anterior lower extremity status post I&D -wound nearly resolved, area is decreased significantly.  Minimal drainage, no need for debridement    Full thickness wound to posterior left lower extremity -remains resolved    Left medial hallux ulcer-resolved    Patchy erythema to left distal thigh and small calf  Contiguous erythema from ankle to foot     Neurological:      Mental Status: He is alert and oriented to person, place, and time.   Psychiatric:         Mood and Affect: Mood normal.     Monofilament testing with a 10 gram force: sensation intact: decreased bilaterally  Visual Inspection: Feet with maceration, ulcers, fissures.  Pedal pulses: intact bilaterally      WOUND ASSESSMENT  Wound 01/16/23 Incision Leg Anterior;Lower Left (Active)   Wound Image    07/27/23 1445   Site Assessment Red 07/27/23 1445   Periwound Assessment Scar tissue;Blanchable erythema;Edema;Rash 07/27/23 1445   Margins Attached edges 07/27/23 1445   Drainage Amount Scant 07/27/23 1445   Drainage Description Serosanguineous 07/27/23 1445   Treatments Cleansed 07/27/23 1445   Wound Cleansing Hypochlorus Acid 07/27/23 1445   Periwound Protectant Skin Protectant Wipes to Periwound;TAC Ointment 07/27/23 1445   Dressing Status Clean;Dry;Intact 03/22/23 1500   Dressing Changed New 07/27/23 1445   Dressing Cleansing/Solutions Not Applicable 07/27/23 1445   Dressing Options Hydrofiber  Silver;Silicone Adhesive Foam 07/27/23 1445   Dressing Change/Treatment Frequency Every 72 hrs, and As Needed 07/06/23 1330   Wound Team Following Weekly 07/20/23 1400   Non-staged Wound Description Full thickness 07/27/23 1445   Wound Length (cm) 1 cm 07/27/23 1445   Wound Width (cm) 0.7 cm 07/27/23 1445   Wound Depth (cm) 0.1 cm 07/27/23 1445   Wound Surface Area (cm^2) 0.7 cm^2 07/27/23 1445   Wound Volume (cm^3) 0.07 cm^3 07/27/23 1445   Post-Procedure Length (cm) 1 cm 07/27/23 1445   Post-Procedure Width (cm) 0.7 cm 07/27/23 1445   Post-Procedure Depth (cm) 0.1 cm 07/27/23 1445   Post-Procedure Surface Area (cm^2) 0.7 cm^2 07/27/23 1445   Post-Procedure Volume (cm^3) 0.07 cm^3 07/27/23 1445   Wound Healing % 100 07/27/23 1445   Wound Bed Granulation (%) 100 % 03/06/23 1438   Tunneling (cm) 0 cm 07/27/23 1445   Tunneling Clock Position of Wound 2 05/04/23 1445   Undermining (cm) 0 cm 07/27/23 1445   Undermining of Wound, 1st Location From 1 o'clock;To 2 o'clock 04/05/23 1535   Undermining (cm) - 2nd location 0 cm 04/14/23 1415   Undermining of Wound, 2nd Location From 11 o'clock;To 12 o'clock 04/05/23 1535   Wound Odor None 07/27/23 1445   Exposed Structures None 07/27/23 1445   Number of days: 192       PROCEDURE:   - No debridement required today  -Care by nursing    Pertinent Labs and Diagnostics:    Labs:     A1c:   Lab Results   Component Value Date/Time    HBA1C 7.4 (H) 07/06/2023 08:01 AM            IMAGING: Dx to tibia and fibula dated 12/5/2022  IMPRESSION:     No radiographic evidence of acute bony abnormality     Skin thickening and edema with some soft tissue calcifications most likely indicates venous stasis. Cellulitis is a possibility and there is no evidence of soft tissue gas. Dermatomyositis can be seen with soft tissue calcifications but (are not as   extensive as that often entails      LAST  WOUND CULTURE:  DATE :   Lab Results   Component Value Date/Time    CULTRSULT - (A) 07/11/2023 02:15  PM    CULTRSULT (A) 07/11/2023 02:15 PM     Methicillin Resistant Staphylococcus aureus  Light growth      CULTRSULT Streptococcus pyogenes (Group A)  Rare growth   (A) 07/11/2023 02:15 PM         ASSESSMENT AND PLAN:     1. Open wound of left lower extremity, subsequent encounter    7/27/2023: Patient presents with increased erythema to his foot and ankle, patchy redness to thigh and upper calf, which he states itches.  -No need for debridement of open wound to anterior leg today  -Patient to complete CT scan as ordered by PCP  -Triamcinolone applied to rashy area of thigh and upper calf today.  Patient instructed to apply OTC steroid cream daily  -Patient to elevate his leg is much as possible.  Hold off on Tubigrip's for now  -Patient understands that he is to go to the emergency room immediately if he experiences fevers, chills, nausea, vomiting, or if he notices worsening of erythema, edema, and/or pain       Wound care: Hydrofiber silver, Dry roll gauze, solaris ready wrap       2. Diabetic ulcer of toe of left foot associated with type 2 diabetes mellitus, limited to breakdown of skin (HCC)    7/27/2023: This wound is resolved  -Monitor each clinic visit  -Patient already has Rx for diabetic shoes and inserts.  However, due to edema in his foot will need to wait to begin process for these.   Wound Care: Hydrofiber silver, foam, tape    3. Type 2 diabetes mellitus without complication, without long-term current use of insulin (HCC)    Comments: Patient is currently on glipizide only    7/27/2023: Patient now has a CGM.  His most recent blood sugar was 92  -Patient has been instructed to keep tight control over fasting blood sugar, <140 for optimal wound healing; Implications of loss of protective sensation (LOPS) deviously discussed with patient, including increased risk for amputation.  Advised to check feet at least daily, moisturize feet, and to always wear protective foot wear, arrange meticulous regular  foot care by podiatrist or [facility]. Pt with good understanding.    4. Wound infection    7/27/2023: Patient was recently hospitalized for infection.  Lower extremity erythema and edema persists.  He has completed course of p.o. linezolid.  -Pt advised to go to ER for any increased redness, swelling, drainage or odor, or if he develops fever, chills, nausea or vomiting.   -We will monitor for signs and symptoms of infection each clinic visit  -Consider referral back to ID if no improvement    PATIENT EDUCATION  - Importance of adequate nutrition for wound healing  -Advised to go to ER for any increased redness, swelling, drainage, or odor, or if patient develops fever, chills, nausea or vomiting.     My total time spent caring for the patient on the day of the encounter was 20 minutes, reviewing history, assessment, counseling and education, and coordination of care.  This does not include time spent on separately billable procedures/tests.    Please note that this note may have been created using voice recognition software. I have worked with technical experts from [facility] to optimize the interface.  I have made every reasonable attempt to correct obvious errors, but there may be errors of grammar and possibly content that I did not discover before finalizing the note.    N

## 2023-07-27 NOTE — PATIENT INSTRUCTIONS
-Keep dressings clean and dry. Change dressings every 3-4 days, and if they become over saturated, soiled or fall off.     -Avoid prolonged standing or sitting without elevating your legs.    -Should you experience any significant changes in your wound, such as signs of infection (increasing redness, swelling, localized heat, increased pain, fever > 101 F, chills) or have any questions regarding your home care instructions, please contact the wound center at [phone]. If after hours, contact your primary care physician or go to the hospital emergency room.     -If you are 5 or more minutes late for an appointment, we reserve the right to cancel and reschedule that appointment. Additionally, if you are habitually late or not showing (3 late cancellations and/or no shows), we reserve the right to cancel your remaining appointments and it will be your responsibility to obtain a new referral if services are still needed.

## 2023-07-31 ENCOUNTER — APPOINTMENT (OUTPATIENT)
Dept: WOUND CARE | Facility: MEDICAL CENTER | Age: 63
End: 2023-07-31
Attending: GENERAL PRACTICE
Payer: COMMERCIAL

## 2023-08-03 ENCOUNTER — NON-PROVIDER VISIT (OUTPATIENT)
Dept: WOUND CARE | Facility: MEDICAL CENTER | Age: 63
End: 2023-08-03
Attending: NURSE PRACTITIONER
Payer: COMMERCIAL

## 2023-08-03 PROCEDURE — 97602 WOUND(S) CARE NON-SELECTIVE: CPT

## 2023-08-03 NOTE — PROGRESS NOTES
Non-selective debridement to wound and periwound using puracyn spray and gauze to remove nonviable tissue and biofilm.    Patient presented today with some new nodule type wounds to the left anterior ankle. They are scabbed at this pointed, however patient stated that they were draining over the past week. Spoke with PANCHO Koch, who came to assess the patient and stated that she will refer him to ID. Patient instructed to return to the ED for any worsening s/s infection.

## 2023-08-03 NOTE — PATIENT INSTRUCTIONS
-Keep your wound dressing clean, dry, and intact.    -Change your dressing every 3 to 4 days or if it becomes soiled, soaked, or falls off.    -Should you experience any significant changes in your wound(s), such as infection (redness, swelling, localized heat, increased pain, fever > 101 F, chills) or have any questions regarding your home care instructions, please contact the wound center at [phone]. If after hours, contact your primary care physician or go to the hospital emergency room.

## 2023-08-07 ENCOUNTER — OFFICE VISIT (OUTPATIENT)
Dept: MEDICAL GROUP | Facility: MEDICAL CENTER | Age: 63
End: 2023-08-07
Payer: COMMERCIAL

## 2023-08-07 VITALS
HEIGHT: 74 IN | TEMPERATURE: 97.9 F | HEART RATE: 102 BPM | BODY MASS INDEX: 40.43 KG/M2 | OXYGEN SATURATION: 97 % | SYSTOLIC BLOOD PRESSURE: 148 MMHG | WEIGHT: 315 LBS | DIASTOLIC BLOOD PRESSURE: 70 MMHG | RESPIRATION RATE: 16 BRPM

## 2023-08-07 DIAGNOSIS — L03.116 CELLULITIS OF LEFT LEG: ICD-10-CM

## 2023-08-07 PROCEDURE — 3077F SYST BP >= 140 MM HG: CPT | Performed by: STUDENT IN AN ORGANIZED HEALTH CARE EDUCATION/TRAINING PROGRAM

## 2023-08-07 PROCEDURE — 99214 OFFICE O/P EST MOD 30 MIN: CPT | Performed by: STUDENT IN AN ORGANIZED HEALTH CARE EDUCATION/TRAINING PROGRAM

## 2023-08-07 PROCEDURE — 3078F DIAST BP <80 MM HG: CPT | Performed by: STUDENT IN AN ORGANIZED HEALTH CARE EDUCATION/TRAINING PROGRAM

## 2023-08-07 RX ORDER — MELOXICAM 15 MG/1
15 TABLET ORAL DAILY
Qty: 90 TABLET | Refills: 0 | Status: SHIPPED | OUTPATIENT
Start: 2023-08-07 | End: 2023-09-07

## 2023-08-07 ASSESSMENT — FIBROSIS 4 INDEX: FIB4 SCORE: 0.89

## 2023-08-07 NOTE — PROGRESS NOTES
"Subjective:     CC: cellulitis    HPI:   Thomas presents today with    Problem   Cellulitis of Left Leg    This is a new condition that started about 4 weeks ago.  He was briefly hospitalized and then sent home to complete a course of linezolid.   Since he has been home he has been having increased purplish discoloration of the anterior leg near the ankle as well as increase welts.  He states that these have been progressively getting worse since he was hospitalized.  He has been seeing wound care who referred him to infectious disease.  I had ordered a CT scan at the last visit but he was unable to get this done.       ROS:  ROS    Objective:     Exam:  BP (!) 148/70 Comment: recheck  Pulse (!) 102   Temp 36.6 °C (97.9 °F) (Temporal)   Resp 16   Ht 1.88 m (6' 2\")   Wt (!) 145 kg (319 lb 10.7 oz)   SpO2 97%   BMI 41.04 kg/m²  Body mass index is 41.04 kg/m².    Physical Exam  Vitals reviewed.   Constitutional:       General: He is not in acute distress.     Appearance: He is not toxic-appearing.   HENT:      Head: Normocephalic and atraumatic.      Right Ear: External ear normal.      Left Ear: External ear normal.   Eyes:      General:         Right eye: No discharge.         Left eye: No discharge.      Extraocular Movements: Extraocular movements intact.      Conjunctiva/sclera: Conjunctivae normal.   Pulmonary:      Effort: Pulmonary effort is normal. No respiratory distress.   Skin:     General: Skin is warm and dry.      Comments: Discoloration of the left leg to the level of the knee, purpleish discoloration with open sores on the anterior ankle on the left   Neurological:      Mental Status: He is alert.   Psychiatric:         Mood and Affect: Mood normal.         Behavior: Behavior normal.         Thought Content: Thought content normal.         Judgment: Judgment normal.         Assessment & Plan:     62 y.o. male with the following -     1. Cellulitis of left leg  We discussed the importance of " getting the CT scan updated as to have skin changes and sores in the leg despite completing antibiotic treatment.  Continue to follow with wound care.  Established with infectious disease, referral sent in by wound care.  Refill meloxicam given.  - meloxicam (MOBIC) 15 MG tablet; Take 1 Tablet by mouth every day.  Dispense: 90 Tablet; Refill: 0    No follow-ups on file.    Please note that this dictation was created using voice recognition software. I have made every reasonable attempt to correct obvious errors, but I expect that there are errors of grammar and possibly content that I did not discover before finalizing the note.

## 2023-08-08 DIAGNOSIS — L03.116 CELLULITIS OF LEFT LEG: ICD-10-CM

## 2023-08-10 ENCOUNTER — APPOINTMENT (OUTPATIENT)
Dept: WOUND CARE | Facility: MEDICAL CENTER | Age: 63
End: 2023-08-10
Attending: NURSE PRACTITIONER
Payer: COMMERCIAL

## 2023-08-17 ENCOUNTER — OFFICE VISIT (OUTPATIENT)
Dept: INFECTIOUS DISEASES | Facility: MEDICAL CENTER | Age: 63
End: 2023-08-17
Attending: NURSE PRACTITIONER
Payer: COMMERCIAL

## 2023-08-17 ENCOUNTER — TELEPHONE (OUTPATIENT)
Dept: INFECTIOUS DISEASES | Facility: MEDICAL CENTER | Age: 63
End: 2023-08-17
Payer: COMMERCIAL

## 2023-08-17 ENCOUNTER — OFFICE VISIT (OUTPATIENT)
Dept: WOUND CARE | Facility: MEDICAL CENTER | Age: 63
End: 2023-08-17
Attending: NURSE PRACTITIONER
Payer: COMMERCIAL

## 2023-08-17 ENCOUNTER — HOSPITAL ENCOUNTER (OUTPATIENT)
Dept: LAB | Facility: MEDICAL CENTER | Age: 63
End: 2023-08-17
Attending: NURSE PRACTITIONER
Payer: COMMERCIAL

## 2023-08-17 VITALS
SYSTOLIC BLOOD PRESSURE: 122 MMHG | OXYGEN SATURATION: 95 % | HEART RATE: 94 BPM | DIASTOLIC BLOOD PRESSURE: 76 MMHG | TEMPERATURE: 98.3 F | WEIGHT: 315 LBS | BODY MASS INDEX: 42.66 KG/M2 | RESPIRATION RATE: 16 BRPM | HEIGHT: 72 IN

## 2023-08-17 VITALS
RESPIRATION RATE: 16 BRPM | DIASTOLIC BLOOD PRESSURE: 69 MMHG | SYSTOLIC BLOOD PRESSURE: 125 MMHG | TEMPERATURE: 97.5 F | OXYGEN SATURATION: 95 % | HEART RATE: 96 BPM

## 2023-08-17 DIAGNOSIS — L97.422 DIABETIC ULCER OF LEFT MIDFOOT ASSOCIATED WITH TYPE 2 DIABETES MELLITUS, WITH FAT LAYER EXPOSED (HCC): ICD-10-CM

## 2023-08-17 DIAGNOSIS — T14.8XXA WOUND INFECTION: ICD-10-CM

## 2023-08-17 DIAGNOSIS — L03.116 CELLULITIS OF LEFT LEG: ICD-10-CM

## 2023-08-17 DIAGNOSIS — S81.802D OPEN WOUND OF LEFT LOWER EXTREMITY, SUBSEQUENT ENCOUNTER: ICD-10-CM

## 2023-08-17 DIAGNOSIS — E11.621 DIABETIC ULCER OF LEFT MIDFOOT ASSOCIATED WITH TYPE 2 DIABETES MELLITUS, WITH FAT LAYER EXPOSED (HCC): ICD-10-CM

## 2023-08-17 DIAGNOSIS — E11.9 TYPE 2 DIABETES MELLITUS WITHOUT COMPLICATION, WITHOUT LONG-TERM CURRENT USE OF INSULIN (HCC): ICD-10-CM

## 2023-08-17 DIAGNOSIS — L97.521 DIABETIC ULCER OF TOE OF LEFT FOOT ASSOCIATED WITH TYPE 2 DIABETES MELLITUS, LIMITED TO BREAKDOWN OF SKIN (HCC): ICD-10-CM

## 2023-08-17 DIAGNOSIS — E11.621 DIABETIC ULCER OF TOE OF LEFT FOOT ASSOCIATED WITH TYPE 2 DIABETES MELLITUS, LIMITED TO BREAKDOWN OF SKIN (HCC): ICD-10-CM

## 2023-08-17 DIAGNOSIS — L08.9 WOUND INFECTION: ICD-10-CM

## 2023-08-17 LAB
ALBUMIN SERPL BCP-MCNC: 4 G/DL (ref 3.2–4.9)
ALBUMIN/GLOB SERPL: 1.3 G/DL
ALP SERPL-CCNC: 91 U/L (ref 30–99)
ALT SERPL-CCNC: 9 U/L (ref 2–50)
ANION GAP SERPL CALC-SCNC: 11 MMOL/L (ref 7–16)
AST SERPL-CCNC: 11 U/L (ref 12–45)
BASOPHILS # BLD AUTO: 0.6 % (ref 0–1.8)
BASOPHILS # BLD: 0.06 K/UL (ref 0–0.12)
BILIRUB SERPL-MCNC: 0.5 MG/DL (ref 0.1–1.5)
BUN SERPL-MCNC: 19 MG/DL (ref 8–22)
CALCIUM ALBUM COR SERPL-MCNC: 9.3 MG/DL (ref 8.5–10.5)
CALCIUM SERPL-MCNC: 9.3 MG/DL (ref 8.5–10.5)
CHLORIDE SERPL-SCNC: 100 MMOL/L (ref 96–112)
CO2 SERPL-SCNC: 24 MMOL/L (ref 20–33)
CREAT SERPL-MCNC: 1.62 MG/DL (ref 0.5–1.4)
EOSINOPHIL # BLD AUTO: 0.27 K/UL (ref 0–0.51)
EOSINOPHIL NFR BLD: 2.8 % (ref 0–6.9)
ERYTHROCYTE [DISTWIDTH] IN BLOOD BY AUTOMATED COUNT: 51.8 FL (ref 35.9–50)
GFR SERPLBLD CREATININE-BSD FMLA CKD-EPI: 47 ML/MIN/1.73 M 2
GLOBULIN SER CALC-MCNC: 3.1 G/DL (ref 1.9–3.5)
GLUCOSE SERPL-MCNC: 161 MG/DL (ref 65–99)
HCT VFR BLD AUTO: 39.5 % (ref 42–52)
HGB BLD-MCNC: 13.1 G/DL (ref 14–18)
IMM GRANULOCYTES # BLD AUTO: 0.03 K/UL (ref 0–0.11)
IMM GRANULOCYTES NFR BLD AUTO: 0.3 % (ref 0–0.9)
LYMPHOCYTES # BLD AUTO: 2.04 K/UL (ref 1–4.8)
LYMPHOCYTES NFR BLD: 20.9 % (ref 22–41)
MCH RBC QN AUTO: 32 PG (ref 27–33)
MCHC RBC AUTO-ENTMCNC: 33.2 G/DL (ref 32.3–36.5)
MCV RBC AUTO: 96.3 FL (ref 81.4–97.8)
MONOCYTES # BLD AUTO: 0.78 K/UL (ref 0–0.85)
MONOCYTES NFR BLD AUTO: 8 % (ref 0–13.4)
NEUTROPHILS # BLD AUTO: 6.57 K/UL (ref 1.82–7.42)
NEUTROPHILS NFR BLD: 67.4 % (ref 44–72)
NRBC # BLD AUTO: 0 K/UL
NRBC BLD-RTO: 0 /100 WBC (ref 0–0.2)
PLATELET # BLD AUTO: 335 K/UL (ref 164–446)
PMV BLD AUTO: 8.8 FL (ref 9–12.9)
POTASSIUM SERPL-SCNC: 4 MMOL/L (ref 3.6–5.5)
PROT SERPL-MCNC: 7.1 G/DL (ref 6–8.2)
RBC # BLD AUTO: 4.1 M/UL (ref 4.7–6.1)
SODIUM SERPL-SCNC: 135 MMOL/L (ref 135–145)
WBC # BLD AUTO: 9.8 K/UL (ref 4.8–10.8)

## 2023-08-17 PROCEDURE — 80053 COMPREHEN METABOLIC PANEL: CPT

## 2023-08-17 PROCEDURE — 3078F DIAST BP <80 MM HG: CPT | Performed by: NURSE PRACTITIONER

## 2023-08-17 PROCEDURE — 3074F SYST BP LT 130 MM HG: CPT | Performed by: NURSE PRACTITIONER

## 2023-08-17 PROCEDURE — 11042 DBRDMT SUBQ TIS 1ST 20SQCM/<: CPT | Performed by: NURSE PRACTITIONER

## 2023-08-17 PROCEDURE — 99212 OFFICE O/P EST SF 10 MIN: CPT | Performed by: NURSE PRACTITIONER

## 2023-08-17 PROCEDURE — 11042 DBRDMT SUBQ TIS 1ST 20SQCM/<: CPT

## 2023-08-17 PROCEDURE — 85025 COMPLETE CBC W/AUTO DIFF WBC: CPT

## 2023-08-17 PROCEDURE — 99213 OFFICE O/P EST LOW 20 MIN: CPT | Mod: 25 | Performed by: NURSE PRACTITIONER

## 2023-08-17 PROCEDURE — 99213 OFFICE O/P EST LOW 20 MIN: CPT | Performed by: NURSE PRACTITIONER

## 2023-08-17 PROCEDURE — 99213 OFFICE O/P EST LOW 20 MIN: CPT

## 2023-08-17 PROCEDURE — 36415 COLL VENOUS BLD VENIPUNCTURE: CPT

## 2023-08-17 RX ORDER — GABAPENTIN 600 MG/1
TABLET ORAL
COMMUNITY
End: 2023-08-17 | Stop reason: CLARIF

## 2023-08-17 ASSESSMENT — ENCOUNTER SYMPTOMS
NERVOUS/ANXIOUS: 0
DIZZINESS: 0
WEIGHT LOSS: 0
PALPITATIONS: 0
COUGH: 0
SPUTUM PRODUCTION: 0
MYALGIAS: 0
BLURRED VISION: 0
FOCAL WEAKNESS: 0
SHORTNESS OF BREATH: 0
DOUBLE VISION: 0
DIARRHEA: 0
VOMITING: 0
ABDOMINAL PAIN: 0
WHEEZING: 0
NAUSEA: 0
CHILLS: 0
HEADACHES: 0
BRUISES/BLEEDS EASILY: 0
FEVER: 0

## 2023-08-17 ASSESSMENT — FIBROSIS 4 INDEX: FIB4 SCORE: 0.89

## 2023-08-17 NOTE — PROGRESS NOTES
INFECTIOUS  DISEASE  OUTPATIENT CLINIC  NOTE   Subjective   Primary care provider: Janine Gomez M.D..     Reason for Follow Up:   Follow-up for   1. Cellulitis of left leg  CBC WITH DIFFERENTIAL    Comp Metabolic Panel      2. Open wound of left lower extremity, subsequent encounter        3. Type 2 diabetes mellitus without complication, without long-term current use of insulin (HCC)            HPI: Patient previously seen and treated by ID team as inpatient during hospital admission.   Patient with poorly controlled type 2 diabetes with recent improved control, hypertension BPH, chronic left leg wound followed by wound clinic, presented initially with a blister on left great toe that has since become infected along with a left leg cellulitis.  Previous  cultures were positive for Enterobacter cloacae for which he received ciprofloxacin.  He has since followed with wound care.  Due to the chronic wound and persistent edema, patient appears to have received multiple courses of antibiotics including Zyvox, minocycline due to positive superficial wound swabs.  His chronic leg wound was noted to have a very slow but steady improvement based on wound clinic notes.  He continued to have persistent edema and erythema of the left leg.  On 6/29 he was first noted to have new wound medial left great toe however the wound did not appear to be infected. A superficial swab from the great toe 7/6 grew MRSA and group A strep.  A superficial swab from February 2023 of the chronic leg wound had grown light growth of ESBL Klebsiella which is why he was started on meropenem. The chronic left leg wound and surrounding erythema do not appear infected on erythema, The foot does not exhibit signs of cellulitis.   Patient was previously treated on 7/13/2023 with p.o. linezolid for 14 days    08/17/23- Today Patient reports feeling well. Pt stating that the wound is slowly healing. Pt being followed by the [facility]. Wound  pictures reviewed in EPIC. Denies feeling generally ill, fevers/chills, general malaise, headache, n/v/d.  Patient recently completed 14-day course of linezolid.  He continues to follow with wound care who had concerns of his lateral foot wound near completion of linezolid.  Today he has no signs of active cellulitis.  Called and discussed with wound care PANCHO Salazar. Cellulitis appears generally to be resolving and the wound has had improvement since completion of linezolid.  Due to increase in antibiotic resistant bacteria recommend holding off on antibiotic therapy at this time as lateral foot wound is improving.  If wound worsens then wound care will obtain deep tissue sample with cultures to guide antibiotic therapy.  Patient in agreement with plan    current Antimicrobials: None  Previous Antimicrobials: Linezolid, ciprofloxacin, minocycline       Other Current Medications:  Home Medications    Medication Sig Taking? Last Dose Authorizing Provider   meloxicam (MOBIC) 15 MG tablet Take 1 Tablet by mouth every day. Yes PRN Janine Gomez M.D.   Continuous Blood Gluc Sensor (FREESTYLE VINI 2 SENSOR) Misc APPLY 1 SENSOR EVERY 2 WEEKS Yes Taking Physician Outpatient   cyclobenzaprine (FLEXERIL) 5 mg tablet Take 1 Tablet by mouth at bedtime. Yes Taking Idalia Tam M.D.   amLODIPine (NORVASC) 10 MG Tab Take 1 Tablet by mouth every day. Yes Taking Janine Gomez M.D.   finasteride (PROSCAR) 5 MG Tab Take 1 Tablet by mouth every day. Yes Taking Janine Gomez M.D.   levothyroxine (SYNTHROID) 112 MCG Tab Take 1 Tablet by mouth every day. Yes Taking Janine Gomez M.D.   meclizine (ANTIVERT) 12.5 MG Tab Take 1 Tablet by mouth 3 times a day as needed for Dizziness. Yes PRN Janine Gomez M.D.   omeprazole (PRILOSEC) 40 MG delayed-release capsule Take 1 Capsule by mouth every day. Yes Taking Janine Gomez M.D.   rosuvastatin (CRESTOR) 20 MG Tab Take 1 Tablet by mouth every day. Yes Taking  Janine Gomez M.D.   tamsulosin (FLOMAX) 0.4 MG capsule Take 1 Capsule by mouth every day. Yes Taking Janine Gomez M.D.   traZODone (DESYREL) 50 MG Tab Take 1 Tablet by mouth at bedtime. Yes Taking Janine Gomez M.D.   pregabalin (LYRICA) 50 MG capsule Take 1 Capsule by mouth 2 times a day for 90 days. Yes Taking Janine Gomez M.D.   glipiZIDE (GLUCOTROL) 5 MG Tab Take 1 Tablet by mouth every day. Yes Taking Janine Gomez M.D.   methylphenidate (CONCERTA) 36 MG CR tablet Take 1 Tablet by mouth every day for 30 days.  Patient not taking: Reported on 8/17/2023  Not Taking Janine Gomez M.D.        PMH:  History reviewed. No pertinent past medical history.  Past Surgical History:   Procedure Laterality Date    SKIN ABSCESS INCISION AND DRAINAGE Left 1/5/2023    Procedure: LEFT LEG INCISION AND DRAINAGE ABSCESS;  Surgeon: Cesar Odom M.D.;  Location: SURGERY [facility];  Service: General     Family History   Problem Relation Age of Onset    Heart Disease Father     Skin cancer Father     Prostate cancer Father     Ovarian Cancer Neg Hx     Tubal Cancer Neg Hx     Peritoneal Cancer Neg Hx     Colorectal Cancer Neg Hx     Breast Cancer Neg Hx      Social History     Socioeconomic History    Marital status:      Spouse name: Not on file    Number of children: Not on file    Years of education: Not on file    Highest education level: Some college, no degree   Occupational History    Not on file   Tobacco Use    Smoking status: Former     Types: Cigars    Smokeless tobacco: Never   Vaping Use    Vaping Use: Never used   Substance and Sexual Activity    Alcohol use: Yes     Comment: occ    Drug use: Never    Sexual activity: Not on file   Other Topics Concern    Not on file   Social History Narrative    Not on file     Social Determinants of Health     Financial Resource Strain: Low Risk  (1/4/2023)    Overall Financial Resource Strain (CARDIA)     Difficulty of Paying Living  Expenses: Not very hard   Food Insecurity: No Food Insecurity (1/4/2023)    Hunger Vital Sign     Worried About Running Out of Food in the Last Year: Never true     Ran Out of Food in the Last Year: Never true   Transportation Needs: No Transportation Needs (1/4/2023)    PRAPARE - Transportation     Lack of Transportation (Medical): No     Lack of Transportation (Non-Medical): No   Physical Activity: Insufficiently Active (1/4/2023)    Exercise Vital Sign     Days of Exercise per Week: 1 day     Minutes of Exercise per Session: 120 min   Stress: Stress Concern Present (1/4/2023)    Citizen of [place] of Occupational Health - Occupational Stress Questionnaire     Feeling of Stress : To some extent   Social Connections: Socially Isolated (1/4/2023)    Social Connection and Isolation Panel [NHANES]     Frequency of Communication with Friends and Family: Once a week     Frequency of Social Gatherings with Friends and Family: Once a week     Attends Voodoo Services: Never     Active Member of Clubs or Organizations: No     Attends Club or Organization Meetings: Not on file     Marital Status:    Intimate Partner Violence: Not on file   Housing Stability: High Risk (1/4/2023)    Housing Stability Vital Sign     Unable to Pay for Housing in the Last Year: Yes     Number of Places Lived in the Last Year: 1     Unstable Housing in the Last Year: No           Allergies/Intolerances:  Allergies   Allergen Reactions    Amoxicillin Hives    Oxycodone Hives    Percocet [Oxycodone-Acetaminophen] Hives    Codeine Nausea    Erythromycin Nausea       ROS:   Review of Systems   Constitutional:  Negative for chills, fever, malaise/fatigue and weight loss.   HENT:  Negative for congestion and hearing loss.    Eyes:  Negative for blurred vision and double vision.   Respiratory:  Negative for cough, sputum production, shortness of breath and wheezing.    Cardiovascular:  Negative for chest pain and palpitations.    Gastrointestinal:  Negative for abdominal pain, diarrhea, nausea and vomiting.   Genitourinary:  Negative for dysuria.   Musculoskeletal:  Negative for myalgias.   Skin:  Negative for itching and rash.        Chronic wounds of left leg slowly improving  Resolving redness of left leg     Neurological:  Negative for dizziness, focal weakness and headaches.   Endo/Heme/Allergies:  Does not bruise/bleed easily.   Psychiatric/Behavioral:  The patient is not nervous/anxious.       ROS was reviewed and were negative except as above.    Objective    Most Recent Vital Signs:  Blood Pressure 122/76 (BP Location: Left arm, Patient Position: Sitting, BP Cuff Size: Large adult)   Pulse 94   Temperature 36.8 °C (98.3 °F) (Temporal)   Respiration 16   Height 1.829 m (6')   Weight (Abnormal) 147 kg (323 lb 3.1 oz)   Oxygen Saturation 95%   Body Mass Index 43.83 kg/m²     Physical Exam:  Physical Exam  Vitals reviewed.   Constitutional:       Appearance: Normal appearance. He is obese.   HENT:      Head: Normocephalic and atraumatic.      Nose: Nose normal.      Mouth/Throat:      Mouth: Mucous membranes are moist.   Eyes:      Pupils: Pupils are equal, round, and reactive to light.   Cardiovascular:      Rate and Rhythm: Normal rate.   Pulmonary:      Effort: Pulmonary effort is normal.      Breath sounds: Normal breath sounds.   Abdominal:      Palpations: Abdomen is soft.   Musculoskeletal:         General: No tenderness.      Cervical back: Normal range of motion and neck supple.      Left lower leg: Edema present.   Skin:     General: Skin is warm and dry.      Coloration: Skin is not jaundiced.      Findings: No erythema or rash.      Comments: Multiple wounds on left leg and left foot.  See clinical photo attached  Wounds appear to be slowly healing and decreasing in size when compared to previous wound photos   Neurological:      Mental Status: He is alert and oriented to person, place, and time.      Motor: No  weakness.   Psychiatric:         Mood and Affect: Mood normal.         Behavior: Behavior normal.          Pertinent Lab/Imaging Results:  [unfilled]  @CMP@  WBC   Date/Time Value Ref Range Status   07/13/2023 02:57 AM 9.6 4.8 - 10.8 K/uL Final     RBC   Date/Time Value Ref Range Status   07/13/2023 02:57 AM 4.11 (L) 4.70 - 6.10 M/uL Final     Hemoglobin   Date/Time Value Ref Range Status   07/13/2023 02:57 AM 13.3 (L) 14.0 - 18.0 g/dL Final     Hematocrit   Date/Time Value Ref Range Status   07/13/2023 02:57 AM 38.5 (L) 42.0 - 52.0 % Final     MCV   Date/Time Value Ref Range Status   07/13/2023 02:57 AM 93.7 81.4 - 97.8 fL Final     MCH   Date/Time Value Ref Range Status   07/13/2023 02:57 AM 32.4 27.0 - 33.0 pg Final     MCHC   Date/Time Value Ref Range Status   07/13/2023 02:57 AM 34.5 32.3 - 36.5 g/dL Final     Comment:     Please note new reference range effective 05/22/2023.     MPV   Date/Time Value Ref Range Status   07/13/2023 02:57 AM 8.6 (L) 9.0 - 12.9 fL Final      Sodium   Date/Time Value Ref Range Status   07/13/2023 02:57  (L) 135 - 145 mmol/L Final     Potassium   Date/Time Value Ref Range Status   07/13/2023 02:57 AM 4.0 3.6 - 5.5 mmol/L Final     Chloride   Date/Time Value Ref Range Status   07/13/2023 02:57 AM 99 96 - 112 mmol/L Final     Co2   Date/Time Value Ref Range Status   07/13/2023 02:57 AM 23 20 - 33 mmol/L Final     Glucose   Date/Time Value Ref Range Status   07/13/2023 02:57  (H) 65 - 99 mg/dL Final     Bun   Date/Time Value Ref Range Status   07/13/2023 02:57 AM 16 8 - 22 mg/dL Final     Creatinine   Date/Time Value Ref Range Status   07/13/2023 02:57 AM 0.91 0.50 - 1.40 mg/dL Final     Alkaline Phosphatase   Date/Time Value Ref Range Status   07/09/2023 04:29  (H) 30 - 99 U/L Final     AST(SGOT)   Date/Time Value Ref Range Status   07/09/2023 04:29 PM 30 12 - 45 U/L Final     ALT(SGPT)   Date/Time Value Ref Range Status   07/09/2023 04:29 PM 39 2 - 50 U/L Final  "    Total Bilirubin   Date/Time Value Ref Range Status   07/09/2023 04:29 PM 1.4 0.1 - 1.5 mg/dL Final      No results found for: \"CPKTOTAL\"       No results found for: \"BLOODCULTU\", \"BLDCULT\", \"BCHOLD\"    No results found for: \"BLOODCULTU\", \"BLDCULT\", \"BCHOLD\"       CULTURE WOUND W/ GRAM STAIN  Order: [id number]  Status: Final result     Visible to patient: Yes (seen)     Next appt: 08/24/2023 at 03:00 PM in Wound Care (Leena Gregg R.N.)     Specimen Information: Toe; Wound   0 Result Notes      Component 1 mo ago   Significant Indicator POS Positive (POS)    Source WND    Site TOE    Culture Result - Abnormal     Gram Stain Result Rare WBCs.   No organisms seen.    Culture Result  Abnormal   Methicillin Resistant Staphylococcus aureus   Light growth     Culture Result  Abnormal   Streptococcus pyogenes (Group A)   Rare growth     Resulting Agency [facility]        Susceptibility     Methicillin resistant staphylococcus aureus     PEGGY     Ampicillin/sulbactam <=8/4 mcg/mL Resistant     Azithromycin <=2 mcg/mL Sensitive     Cefazolin <=8 mcg/mL Resistant     Cefepime 8 mcg/mL Resistant     Ceftaroline <=0.5 mcg/mL Sensitive     Clindamycin 0.5 mcg/mL Sensitive     Daptomycin <=0.5 mcg/mL Sensitive     Erythromycin 0.5 mcg/mL Sensitive     Oxacillin >2 mcg/mL Resistant     Tetracycline >8 mcg/mL Resistant     Trimeth/Sulfa <=0.5/9.5 m... Sensitive     Vancomycin 2 mcg/mL Sensitive                 Narrative  Performed by: XIOMARA Sharma  MS5 tel. [phone],   CALLED  MS5 tel. [phone] 07/13/2023, 12:27, RB PERF. RESULTS CALLED   TO:Mara Masterson RN   Contact   Collected By: MELANIA CHA   Left great toe   Contact      Specimen Collected: 07/11/23  2:15 PM Last Resulted: 07/14/23  9:22 AM              Impression/Assessment      1. Cellulitis of left leg  CBC WITH DIFFERENTIAL    Comp Metabolic Panel      2. Open wound of left lower extremity, subsequent encounter        3. Type 2 diabetes mellitus without " complication, without long-term current use of insulin (HCC)            Patient with poorly controlled type 2 diabetes with recent improved control, hypertension BPH, chronic left leg wound followed by wound clinic, presented initially with a blister on left great toe that has since become infected along with a left leg cellulitis.  Previous  cultures were positive for Enterobacter cloacae for which he received ciprofloxacin.  He has since followed with wound care.  Due to the chronic wound and persistent edema, patient appears to have received multiple courses of antibiotics including Zyvox, minocycline due to positive superficial wound swabs. Patient was previously treated on 7/13/2023 with p.o. linezolid for 14 days    08/17/23- Today Patient reports feeling well. Pt stating that the wound is slowly healing. Pt being followed by the [facility].  Patient recently completed 14-day course of linezolid.  He continues to follow with wound care who had concerns of his lateral foot wound near completion of linezolid. Today he has no signs of active cellulitis.  Called and discussed with wound care PANCHO Salazar. Cellulitis appears generally to be resolving and the wound has had improvement since completion of linezolid.  Due to increase in antibiotic resistant bacteria recommend holding off on antibiotic therapy at this time as lateral foot wound is improving.  If wound worsens then wound care will obtain deep tissue sample with cultures to guide antibiotic therapy.  Patient in agreement with plan    PLAN:   -Due to increase in antibiotic resistant bacteria recommend holding off on antibiotic therapy at this time as lateral foot wound is improving.  If wound worsens then wound care will obtain deep tissue sample with cultures to guide antibiotic therapy.    - Repeat Labs CBC/CMP today   - Medication education provided and S/S of side effects discussed   - Recommend routine follow up with wound care weekly  -  Continue wound care to left lower extremity  -Recommended keep blood sugar below 140 to allow for adequate wound healing.  Patient to check blood sugar 2-3 times daily.  DM managed by PCP  - Pending CT ordered by PCP pierce for possible underlying abscess  - Education provided on S/S of infection and when to report to ER/ call 911       Return visit: 2 weeks. Follow up with primary care physician for chronic medical problems      I have performed a physical exam,  updated ROS and plan today. I have reviewed previous images, labs, and provider notes.      ALDA Gardiner.    All Patients should seek medical re-evaluation or report to the ER for new, increasing or worsening symptoms. In some circumstances medical conditions can change from the initial evaluation and may require emergent medical re-evaluation. This includes but is not limited to chest pain, shortness of breath, atypical abdominal pain, atypical headache, ALOC, fever >101, low blood pressure, high respiratory rate (above 30), low oxygen saturation (below 90%), acute delirium, abnormal bleeding, inability to tolerate any intake, weakness on one side of the body, any worsened or concerning conditions.    Please note that this dictation was created using voice recognition software. I have worked with technical experts from [employer] to optimize the interface.  I have made every reasonable attempt to correct obvious errors, but there may be errors of grammar and possibly content that I did not discover before finalizing the note.

## 2023-08-17 NOTE — TELEPHONE ENCOUNTER
----- Message from GIOVANNY Gardiner sent at 8/17/2023  6:48 AM PDT -----  He can schedule an appointment with him either tomorrow or sometime next week.  Thanks!   ----- Message -----  From: Kaitlynn Nagy, Med Ass't  Sent: 8/16/2023  11:52 AM PDT  To: GIOVANNY Gardiner    Patient came in today with wife who had appt with us, he mentioned wound care told him that he is referred back to our office for appt. Can you take a look at notes and see if and when you would like to see him. PCP has ordered CT as well, thank you

## 2023-08-17 NOTE — PROGRESS NOTES
Provider Encounter- Full Thickness wound    HISTORY OF PRESENT ILLNESS  Wound History:   START OF CARE IN CLINIC: 7/20/2023 (return to clinic following hospitalization)   REFERRING PROVIDER:   Paddy DELEON  WOUND- Full Thickness Wound   LOCATION: Left lower extremity,            right anterior knee-resolved           left anterior knee-resolved            Left lateral/distal foot-first observed in clinic 8/3/2023             Left lateral/proximal foot-first observed in clinic 8/17/2023   HISTORY: Patient presented to [facility] on 12/4/2022 with left lower extremity, fever, malaise, erythema x1 week.  He was originally admitted to [facility] for acute kidney injury and sepsis but with worsening renal function was transferred to [facility].  Wound cultures grew Streptococcus pyogenes group A and MSSA.  Patient was initially treated with cefazolin.  He is seen by the wound care team who recommended follow-up as an outpatient in wound care clinic.  While inpatient nephrology saw the patient and started him on dialysis for acute kidney injury.  His renal function improved and dialysis was stopped.  Patient was discharged with follow-up in the wound care clinic.    Patient was following with wound clinic, however on clinic visit 1/3/2022 he was noticed to have worsening wounds and concern for infection. He had failed outpatient Abx with zyvox and was sent to ED for further evaluation. Patient was admitted to [facility] from 1/3-1/13/2023 for sepsis and left lower extremity cellulitis with abscess. He was taken to OR for I&D with Dr. Odom. Patient's wound was treated with wound VAC. Surgical cultures were positive for enterobacter cloacae and was treated with ciprofloxacin with end date of 1/21/2023. Patient was referred back to [facility] for further care.    Patient was again hospitalized from 7/9 till 7/13/2023 for wound infection.  Wound culture from the clinic grew strep and MRSA, infection continue to  "worsen.  He was treated in the hospital with IV meropenem and vancomycin.  Wound care and infectious diseases was consulted.  Lower extremity edema was treated with diuresis and leg elevation.  Doppler negative for DVT, arterial ultrasound was completed and showed adequate lower extremity perfusion.  He improved and was discharged home on p.o. linezolid, with referral back to [facility] to continue wound care.    New wound to left lateral/distal foot noticed in clinic on 8/3/2023 during nurse visit.  Another new wound was noted to left lateral/proximal left foot on 8/17, presented as a small punctate wound.      Pertinent Medical History: Hypertension, alcohol abuse, kidney disease, GERD, mitral valve prolapse, morbid obesity, obstructive sleep apnea, type 2 diabetes    TOBACCO USE: Former smoker denies use of smokeless tobacco    Patient's problem list, allergies, and current medications reviewed and updated in Epic    Interval History:  7/20/2023 : Clinic visit with PANCHO Medina, EL, MERI, RYLEE.   Kwesi states he is feeling well overall, though having a bit more pain today than he has had since discharge.  States he has been up since 6:00 and running errands or going to appointments all day long.  Other than today, he has been resting with leg elevated is much as possible.  He is tolerating linezolid without difficulty.  I did not ask about his blood sugar today.    7/27/2023 : Clinic visit with PANCOH Medina, EL, MERI, RYLEE.   Patient presents today with erythema and edema to his left lower leg, which has been chronic, however appears to be slightly worse today.  His foot and ankle in particular are more erythemic.  He states he is having pain but this is nothing new.  States that the skin to his thigh and upper calf itches, \"a lot\".  He just completed his Zyvox yesterday.  He does not have follow-up with ID.  He was seen by his PCP who ordered a CT scan, and told him to stop wearing Tubigrip.  He " has not yet scheduled his CT scan.     If no improvement next week, or if significant deterioration, will consider referral back to ID, or send him to ED.  He understands he is to go to the emergency room if he experiences any signs or symptoms of infection.    8/17/2023 : Clinic visit with PANCHO Medina, EL, MERI, RYLEE.   Patient states he is feeling well today.  His left lower extremity edema and erythema has improved significantly since last assessment.   He has an appointment to see ID later today.     Anterior leg wound remains stable, nearly resolved.  He has 2 new wounds to his left lateral foot, the first of which was first observed in clinic on 8/3, the second of which was first noticed today.  The distal wound has increased significantly in area over the past week.    REVIEW OF SYSTEMS:   Unchanged from previous clinic visit on 7/27/2023, except as documented in interval history above    PHYSICAL EXAMINATION:   /69 (BP Location: Right arm, Patient Position: Sitting)   Pulse 96   Temp 36.4 °C (97.5 °F) (Temporal)   Resp 16   SpO2 95%     Physical Exam  Constitutional:       Appearance: He is obese.   Cardiovascular:      Rate and Rhythm: Normal rate.   Pulmonary:      Effort: Pulmonary effort is normal.   Musculoskeletal:      Right lower leg: Edema present.      Left lower leg: Edema present.      Comments: 2-3+ edema bilateral lower extremities   Skin:     Findings: Erythema present.      Comments: Full-thickness wound to the left anterior lower extremity status post I&D -wound covered with stable, dry yellow tissue, no drainage, no periwound erythema or induration  Full-thickness wound to left lateral/distal foot-wound area is increased, slough to wound bed, moderate serosanguineous drainage, no evidence of infection  Full-thickness wound to left lateral/proximal foot-first observed in clinic today, wound bed with thick layer of slough, moderate serosanguineous drainage, no evidence  of infection       Neurological:      Mental Status: He is alert and oriented to person, place, and time.   Psychiatric:         Mood and Affect: Mood normal.     Monofilament testing with a 10 gram force: sensation intact: decreased bilaterally  Visual Inspection: Feet with maceration, ulcers, fissures.  Pedal pulses: intact bilaterally      WOUND ASSESSMENT  Wound 01/16/23 Incision Leg Anterior;Lower Left (Active)   Wound Image   08/17/23 1458   Site Assessment Mc[place]ole 08/17/23 1458   Periwound Assessment Maceration;Scar tissue 08/17/23 1458   Margins Undefined edges 08/17/23 1458   Drainage Amount Moderate 08/17/23 1458   Drainage Description Serous 08/17/23 1458   Treatments Cleansed;Site care 08/17/23 1458   Wound Cleansing Foam Cleanser/Washcloth 08/17/23 1458   Periwound Protectant Skin Protectant Wipes to Periwound;Barrier Paste;Skin Moisturizer 08/17/23 1458   Dressing Status Clean;Dry;Intact 03/22/23 1500   Dressing Changed New 08/03/23 1500   Dressing Cleansing/Solutions Not Applicable 08/17/23 1458   Dressing Options Hydrofiber Silver;Silicone Adhesive Foam;Other (Comments) 08/17/23 1458   Dressing Change/Treatment Frequency Twice Weekly 08/17/23 1458   Wound Team Following Weekly 08/17/23 1458   Non-staged Wound Description Partial thickness 08/17/23 1458   Wound Length (cm) 0.2 cm 08/03/23 1500   Wound Width (cm) 0.2 cm 08/03/23 1500   Wound Depth (cm) 0.1 cm 08/03/23 1500   Wound Surface Area (cm^2) 0.04 cm^2 08/03/23 1500   Wound Volume (cm^3) 0.004 cm^3 08/03/23 1500   Post-Procedure Length (cm) 1 cm 07/27/23 1445   Post-Procedure Width (cm) 0.7 cm 07/27/23 1445   Post-Procedure Depth (cm) 0.1 cm 07/27/23 1445   Post-Procedure Surface Area (cm^2) 0.7 cm^2 07/27/23 1445   Post-Procedure Volume (cm^3) 0.07 cm^3 07/27/23 1445   Wound Healing % 100 08/03/23 1500   Tunneling (cm) 0 cm 08/17/23 1458   Tunneling Clock Position of Wound 2 05/04/23 1445   Undermining (cm) 0 cm 08/17/23 1458   Undermining of  Wound, 1st Location From 1 o'clock;To 2 o'clock 04/05/23 1535   Undermining (cm) - 2nd location 0 cm 04/14/23 1415   Undermining of Wound, 2nd Location From 11 o'clock;To 12 o'clock 04/05/23 1535   Wound Odor None 08/17/23 1458   Exposed Structures None 08/17/23 1458   Number of days: 213       Wound 08/03/23 Full Thickness Wound Foot Lateral;Distal Left (Active)   Wound Image    08/17/23 1458   Site Assessment Red;Yellow 08/17/23 1458   Periwound Assessment Edema;Fragile 08/17/23 1458   Margins Epibole (rolled edges) 08/17/23 1458   Drainage Amount Moderate 08/17/23 1458   Drainage Description Serosanguineous 08/17/23 1458   Treatments Cleansed;Topical Lidocaine;Provider debridement;Compression 08/17/23 1458   Wound Cleansing Normal Saline Irrigation 08/17/23 1458   Periwound Protectant Skin Protectant Wipes to Periwound;Barrier Paste;Skin Moisturizer 08/17/23 1458   Dressing Changed New 08/03/23 1500   Dressing Cleansing/Solutions Not Applicable 08/17/23 1458   Dressing Options Hydrofiber Silver;Nonadhesive Foam;Hypafix Tape;Other (Comments) 08/17/23 1458   Dressing Change/Treatment Frequency Twice Weekly 08/17/23 1458   Wound Team Following Weekly 08/17/23 1458   Non-staged Wound Description Full thickness 08/17/23 1458   Wound Length (cm) 2 cm 08/17/23 1458   Wound Width (cm) 1 cm 08/17/23 1458   Wound Depth (cm) 0.2 cm 08/17/23 1458   Wound Surface Area (cm^2) 2 cm^2 08/17/23 1458   Wound Volume (cm^3) 0.4 cm^3 08/17/23 1458   Post-Procedure Length (cm) 2 cm 08/17/23 1458   Post-Procedure Width (cm) 1.1 cm 08/17/23 1458   Post-Procedure Depth (cm) 0.2 cm 08/17/23 1458   Post-Procedure Surface Area (cm^2) 2.2 cm^2 08/17/23 1458   Post-Procedure Volume (cm^3) 0.44 cm^3 08/17/23 1458   Wound Healing % -700 08/17/23 1458   Tunneling (cm) 0 cm 08/17/23 1458   Undermining (cm) 0 cm 08/17/23 1458   Wound Odor None 08/17/23 1458   Exposed Structures None 08/17/23 1458   Number of days: 14       Wound 08/17/23 Full  Thickness Wound Foot Proximal;Lateral Left (Active)   Wound Image   08/17/23 1458   Site Assessment Red 08/17/23 1458   Periwound Assessment Edema;Fragile 08/17/23 1458   Margins Epibole (rolled edges) 08/17/23 1458   Drainage Amount Scant 08/17/23 1458   Drainage Description Serosanguineous 08/17/23 1458   Treatments Cleansed;Provider debridement;Compression 08/17/23 1458   Wound Cleansing Normal Saline Irrigation 08/17/23 1458   Periwound Protectant Skin Protectant Wipes to Periwound;Barrier Paste;Skin Moisturizer 08/17/23 1458   Dressing Cleansing/Solutions Not Applicable 08/17/23 1458   Dressing Options Hydrofiber Silver;Nonadhesive Foam;Hypafix Tape;Other (Comments) 08/17/23 1458   Dressing Change/Treatment Frequency Twice Weekly 08/17/23 1458   Wound Team Following Weekly 08/17/23 1458   Non-staged Wound Description Full thickness 08/17/23 1458   Wound Length (cm) 0.4 cm 08/17/23 1458   Wound Width (cm) 0.7 cm 08/17/23 1458   Wound Depth (cm) 0.2 cm 08/17/23 1458   Wound Surface Area (cm^2) 0.28 cm^2 08/17/23 1458   Wound Volume (cm^3) 0.056 cm^3 08/17/23 1458   Post-Procedure Length (cm) 0.4 cm 08/17/23 1458   Post-Procedure Width (cm) 0.7 cm 08/17/23 1458   Post-Procedure Depth (cm) 0.2 cm 08/17/23 1458   Post-Procedure Surface Area (cm^2) 0.28 cm^2 08/17/23 1458   Post-Procedure Volume (cm^3) 0.056 cm^3 08/17/23 1458   Tunneling (cm) 0 cm 08/17/23 1458   Undermining (cm) 0 cm 08/17/23 1458   Wound Odor None 08/17/23 1458   Exposed Structures None 08/17/23 1458   Number of days: 0     PROCEDURE: Excisional debridement of left lateral foot wounds x2  -2% viscous lidocaine applied topically to wound beds for approximately 5 minutes prior to debridement  -Curette used to debride wound beds.  Excisional debridement was performed to remove devitalized tissue until healthy, bleeding tissue was visualized.   Entire surface of wounds, 2.48 cm² debrided.  Tissue debrided into the subcutaneous layer.    -Bleeding  controlled with manual pressure.    -Wound care completed by wound RN, refer to flowsheet  -Patient tolerated the procedure well, without c/o pain or discomfort.      Pertinent Labs and Diagnostics:    Labs:     A1c:   Lab Results   Component Value Date/Time    HBA1C 7.4 (H) 07/06/2023 08:01 AM            IMAGING: Dx to tibia and fibula dated 12/5/2022  IMPRESSION:     No radiographic evidence of acute bony abnormality     Skin thickening and edema with some soft tissue calcifications most likely indicates venous stasis. Cellulitis is a possibility and there is no evidence of soft tissue gas. Dermatomyositis can be seen with soft tissue calcifications but (are not as   extensive as that often entails      LAST  WOUND CULTURE:  DATE :   Lab Results   Component Value Date/Time    CULTRSULT - (A) 07/11/2023 02:15 PM    CULTRSULT (A) 07/11/2023 02:15 PM     Methicillin Resistant Staphylococcus aureus  Light growth      CULTRSULT Streptococcus pyogenes (Group A)  Rare growth   (A) 07/11/2023 02:15 PM         ASSESSMENT AND PLAN:     1. Open wound of left lower extremity, subsequent encounter    8/17/2023: Anterior left lower leg wound covered with stable dry yellow tissue, no appreciable drainage.  -No need for debridement of open wound to anterior leg today  -Patient to complete CT scan as ordered by PCP  -Patient to return to clinic weekly for assessment debridement if indicated  -Patient understands that he is to go to the emergency room immediately if he experiences fevers, chills, nausea, vomiting, or if he notices worsening of erythema, edema, and/or pain       Wound care: Hydrofiber silver, foam cover dressing, solaris ready wrap       2. Diabetic ulcer of left midfoot associated with type 2 diabetes mellitus, with fat layer exposed (HCC)    8/17/2023: Patient has 2 new wounds to the lateral aspect of his left foot.  The distal wound was first observed on 8/3, area has increased significantly over the past week.   The proximal wound was discovered today, presents as a punctate wound  -Excisional debridement of wound in clinic today, medically necessary to promote wound healing.  -Patient to return to clinic weekly for assessment and debridement  -Patient to change dressing 1-2 times per week in between clinic visits     Wound care: Hydrofiber silver, foam cover dressing, solaris ready wrap    3. Type 2 diabetes mellitus without complication, without long-term current use of insulin ([facility])    Comments: Patient is currently on glipizide only    8/17/2023: Patient now has a CGM.  His blood sugar before lunch was around 110, after lunch was 170.  -Patient has been instructed to keep tight control over fasting blood sugar, <140 for optimal wound healing; Implications of loss of protective sensation (LOPS) deviously discussed with patient, including increased risk for amputation.  Advised to check feet at least daily, moisturize feet, and to always wear protective foot wear, arrange meticulous regular foot care by podiatrist or [facility]. Pt with good understanding.    4. Wound infection    8/17/2023: Left lower extremity edema and erythema has improved significantly.  -Patient has an appoint with ID later today    PATIENT EDUCATION  - Importance of adequate nutrition for wound healing  -Advised to go to ER for any increased redness, swelling, drainage, or odor, or if patient develops fever, chills, nausea or vomiting.     My total time spent caring for the patient on the day of the encounter was 20 minutes, reviewing history, assessment, counseling and education, and coordination of care.  This does not include time spent on separately billable procedures/tests.    Please note that this note may have been created using voice recognition software. I have worked with technical experts from [employer] to optimize the interface.  I have made every reasonable attempt to correct obvious errors, but there may be errors of grammar and  possibly content that I did not discover before finalizing the note.    N

## 2023-08-17 NOTE — PROGRESS NOTES
Received telephone call from PANCHO Chavez with ID.   Because left lower extremity edema and erythema has improved, Eduar did not feel antibiotics warranted at this time.  However, he advised obtaining a tissue culture if wounds deteriorate, or patient presents with increased signs or symptoms of infection.

## 2023-08-17 NOTE — PATIENT INSTRUCTIONS
-Keep dressings clean and dry. Change dressings every 3-4 days, and if they become over saturated, soiled or fall off.     -If you need to change your dressings at home: Wash your wound with normal saline, wound cleanser, or unscented soap and water prior to applying your new dressings. Please avoid cleansing with hydrogen peroxide or rubbing alcohol.    -Avoid prolonged standing or sitting without elevating your legs.    -Remove your compression garments if you have severe pain, severe swelling, numbness, color change, or temperature change in your toes. If you need to remove your compression garments, do so by unrolling them. Do not cut the compression garments off, this is to prevent cutting yourself on accident.    -Should you experience any significant changes in your wound(s), such as signs of infection (increasing redness, swelling, localized heat, increased pain, fever > 101 F, chills) or have any questions regarding your home care instructions, please contact the wound center at [phone]. If after hours, contact your primary care physician or go to the hospital emergency room.     -If you are 5 or more minutes late for an appointment, we reserve the right to cancel and reschedule that appointment. Additionally, if you are habitually late or not showing (3 late cancellations and/or no shows), we reserve the right to cancel your remaining appointments and it will be your responsibility to obtain a new referral if services are still needed.

## 2023-08-17 NOTE — TELEPHONE ENCOUNTER
Left message asking to return my call, we do have appt available today after patients wound care appt

## 2023-08-18 ENCOUNTER — TELEPHONE (OUTPATIENT)
Dept: INFECTIOUS DISEASES | Facility: MEDICAL CENTER | Age: 63
End: 2023-08-18
Payer: COMMERCIAL

## 2023-08-18 DIAGNOSIS — N17.9 AKI (ACUTE KIDNEY INJURY) (HCC): ICD-10-CM

## 2023-08-18 NOTE — TELEPHONE ENCOUNTER
Called and discussed most recent lab results with patient.  Creatinine elevated 1.62, and GFR decreased 47.  Previous history of KENRICK in December 2022 but recovered after 2 rounds of dialysis per patient.  Today he denies any dysuria, flank pain, shortness of breath, increasing leg swelling.  Recommended to increase fluid intake to 2 L of water a day.  Monitor for signs and symptoms of worsening kidney function.  Avoid alcohol.  ER precautions discussed with patient.  Repeat labs  CMP in 5 days for monitoring.  If patient's kidney function continues to worsen will place referral to nephrology or send to emergency room for hospital admission and work-up depending on repeat lab values and if symptomatic.

## 2023-08-21 ENCOUNTER — APPOINTMENT (OUTPATIENT)
Dept: WOUND CARE | Facility: MEDICAL CENTER | Age: 63
End: 2023-08-21
Attending: NURSE PRACTITIONER
Payer: COMMERCIAL

## 2023-08-24 ENCOUNTER — NON-PROVIDER VISIT (OUTPATIENT)
Dept: WOUND CARE | Facility: MEDICAL CENTER | Age: 63
End: 2023-08-24
Attending: NURSE PRACTITIONER
Payer: COMMERCIAL

## 2023-08-24 PROCEDURE — 97602 WOUND(S) CARE NON-SELECTIVE: CPT

## 2023-08-24 NOTE — PROCEDURES
Non-selective debridement to wound and periwound using puracyn spray and gauze to remove nonviable tissue and biofilm from LLE wounds.

## 2023-08-24 NOTE — PATIENT INSTRUCTIONS
-Keep your wound dressing clean, dry, and intact.    -Change your dressing if it becomes soiled, soaked, or falls off.    -Should you experience any significant changes in your wound(s), such as infection (redness, swelling, localized heat, increased pain, fever > 101 F, chills) or have any questions regarding your home care instructions, please contact the wound center at [phone]. If after hours, contact your primary care physician or go to the hospital emergency room.

## 2023-08-28 ENCOUNTER — HOSPITAL ENCOUNTER (OUTPATIENT)
Dept: LAB | Facility: MEDICAL CENTER | Age: 63
End: 2023-08-28
Attending: NURSE PRACTITIONER
Payer: COMMERCIAL

## 2023-08-28 DIAGNOSIS — N17.9 AKI (ACUTE KIDNEY INJURY) (HCC): ICD-10-CM

## 2023-08-28 LAB
ALBUMIN SERPL BCP-MCNC: 3.9 G/DL (ref 3.2–4.9)
ALBUMIN/GLOB SERPL: 1.3 G/DL
ALP SERPL-CCNC: 96 U/L (ref 30–99)
ALT SERPL-CCNC: 11 U/L (ref 2–50)
ANION GAP SERPL CALC-SCNC: 12 MMOL/L (ref 7–16)
AST SERPL-CCNC: 17 U/L (ref 12–45)
BILIRUB SERPL-MCNC: 0.3 MG/DL (ref 0.1–1.5)
BUN SERPL-MCNC: 18 MG/DL (ref 8–22)
CALCIUM ALBUM COR SERPL-MCNC: 9.4 MG/DL (ref 8.5–10.5)
CALCIUM SERPL-MCNC: 9.3 MG/DL (ref 8.5–10.5)
CHLORIDE SERPL-SCNC: 98 MMOL/L (ref 96–112)
CO2 SERPL-SCNC: 24 MMOL/L (ref 20–33)
CREAT SERPL-MCNC: 1.65 MG/DL (ref 0.5–1.4)
GFR SERPLBLD CREATININE-BSD FMLA CKD-EPI: 46 ML/MIN/1.73 M 2
GLOBULIN SER CALC-MCNC: 3.1 G/DL (ref 1.9–3.5)
GLUCOSE SERPL-MCNC: 224 MG/DL (ref 65–99)
POTASSIUM SERPL-SCNC: 3.9 MMOL/L (ref 3.6–5.5)
PROT SERPL-MCNC: 7 G/DL (ref 6–8.2)
SODIUM SERPL-SCNC: 134 MMOL/L (ref 135–145)

## 2023-08-28 PROCEDURE — 80053 COMPREHEN METABOLIC PANEL: CPT

## 2023-08-28 PROCEDURE — 36415 COLL VENOUS BLD VENIPUNCTURE: CPT

## 2023-08-29 ENCOUNTER — TELEPHONE (OUTPATIENT)
Dept: INFECTIOUS DISEASES | Facility: MEDICAL CENTER | Age: 63
End: 2023-08-29
Payer: COMMERCIAL

## 2023-08-29 DIAGNOSIS — N17.9 AKI (ACUTE KIDNEY INJURY) (HCC): ICD-10-CM

## 2023-08-29 NOTE — TELEPHONE ENCOUNTER
Creatinine continues to slightly increase 1.65/GFR slightly decreased 46.  Patient currently asymptomatic.  Denies any nausea, vomiting, fever, chills, dysuria, flank pain, burning when urinating.  Denies any weight gain or swelling of feet.  Reports that he is trying to drink 1 to 2 L of water a day.  He denies any recent Lasix use.  Repeat labs in 3 days for continuous monitoring, based on lab results will determine if patient needs referral to outpatient nephrology or ER admission.  Repeat labs ordered

## 2023-08-29 NOTE — TELEPHONE ENCOUNTER
Message    Appointment canceled for Thomas Borja ([id number])   Visit Type: FOLLOW UP VISIT   Date        Time      Length    Provider                  Department   8/31/2023    1:30 PM  30 mins.  Nurse Practitioner GIOVANNY Gardiner [facility]      Reason for Cancellation: Other      Patient Comments: Need to postpone for new labs on 9/1.      Appointment Scheduled  (Newest Message First)  View All Conversations on this Encounter  Team, [facility]  Thomas Borja 5 minutes ago (11:11 AM)     YT  Appointment Information:      Visit Type: Follow Up Visit          Date: 8/31/2023                  Dept: [facility]                  Provider: BHARATHI BAPTISTE                  Time: 1:30 PM                  Length: 30 min     Appt Status: Canceled         Cancel Reason: Other       This [employer] message has not been read.     Thomas Juan Manuel Borja  P [facility]  Staff 5 minutes ago (11:11 AM)     MB  Appointment canceled for Thomas Borja ([id number])  Visit Type: FOLLOW UP VISIT  Date        Time      Length    Provider                  Department  8/31/2023    1:30 PM  30 mins.  Nurse Practitioner GIOVANNY Gardiner [facility]     Reason for Cancellation: Other     Patient Comments: Need to postpone for new labs on 9/1.        Batch, User  Thomas Borja [phone] 3 hours ago (7:47 AM)     Team, [facility]  Thomas Zambrano Borja 12 days ago     YT  Appointment Information:      Visit Type: Follow Up Visit          Date: 8/31/2023                  Dept: [facility]                  Provider: BHARATHI BAPTISTE                  Time: 1:30 PM                  Length: 30 min     Appt Status: Scheduled

## 2023-08-31 ENCOUNTER — OFFICE VISIT (OUTPATIENT)
Dept: WOUND CARE | Facility: MEDICAL CENTER | Age: 63
End: 2023-08-31
Attending: NURSE PRACTITIONER
Payer: COMMERCIAL

## 2023-08-31 VITALS
RESPIRATION RATE: 18 BRPM | HEART RATE: 99 BPM | OXYGEN SATURATION: 97 % | TEMPERATURE: 97.2 F | SYSTOLIC BLOOD PRESSURE: 116 MMHG | DIASTOLIC BLOOD PRESSURE: 62 MMHG

## 2023-08-31 DIAGNOSIS — E11.621 DIABETIC ULCER OF TOE OF LEFT FOOT ASSOCIATED WITH TYPE 2 DIABETES MELLITUS, LIMITED TO BREAKDOWN OF SKIN (HCC): ICD-10-CM

## 2023-08-31 DIAGNOSIS — E11.621 DIABETIC ULCER OF LEFT MIDFOOT ASSOCIATED WITH TYPE 2 DIABETES MELLITUS, WITH FAT LAYER EXPOSED (HCC): ICD-10-CM

## 2023-08-31 DIAGNOSIS — L08.9 WOUND INFECTION: ICD-10-CM

## 2023-08-31 DIAGNOSIS — L97.422 DIABETIC ULCER OF LEFT MIDFOOT ASSOCIATED WITH TYPE 2 DIABETES MELLITUS, WITH FAT LAYER EXPOSED (HCC): ICD-10-CM

## 2023-08-31 DIAGNOSIS — S81.802D OPEN WOUND OF LEFT LOWER EXTREMITY, SUBSEQUENT ENCOUNTER: ICD-10-CM

## 2023-08-31 DIAGNOSIS — T14.8XXA WOUND INFECTION: ICD-10-CM

## 2023-08-31 DIAGNOSIS — L97.521 DIABETIC ULCER OF TOE OF LEFT FOOT ASSOCIATED WITH TYPE 2 DIABETES MELLITUS, LIMITED TO BREAKDOWN OF SKIN (HCC): ICD-10-CM

## 2023-08-31 PROCEDURE — 11042 DBRDMT SUBQ TIS 1ST 20SQCM/<: CPT

## 2023-08-31 PROCEDURE — 3078F DIAST BP <80 MM HG: CPT | Performed by: NURSE PRACTITIONER

## 2023-08-31 PROCEDURE — 11042 DBRDMT SUBQ TIS 1ST 20SQCM/<: CPT | Performed by: NURSE PRACTITIONER

## 2023-08-31 PROCEDURE — 3074F SYST BP LT 130 MM HG: CPT | Performed by: NURSE PRACTITIONER

## 2023-08-31 NOTE — PATIENT INSTRUCTIONS
-Keep your wound dressing clean, dry, and intact.    -Change your dressing if it becomes soiled, soaked, or falls off.    -Should you experience any significant changes in your wound(s), such as infection (redness, swelling, localized heat, increased pain, fever > 101 F, chills) or have any questions regarding your home care instructions, please contact the wound center at [phone]. If after hours, contact your primary care physician or go to the hospital emergency room.

## 2023-08-31 NOTE — PROGRESS NOTES
Provider Encounter- Full Thickness wound    HISTORY OF PRESENT ILLNESS  Wound History:   START OF CARE IN CLINIC: 7/20/2023 (return to clinic following hospitalization)   REFERRING PROVIDER:   Paddy DELEON  WOUND- Full Thickness Wound   LOCATION: Left lower extremity,            right anterior knee-resolved           left anterior knee-resolved            Left lateral/distal foot-first observed in clinic 8/3/2023             Left lateral/proximal foot-first observed in clinic 8/17/2023   HISTORY: Patient presented to [facility] on 12/4/2022 with left lower extremity, fever, malaise, erythema x1 week.  He was originally admitted to [facility] for acute kidney injury and sepsis but with worsening renal function was transferred to [facility].  Wound cultures grew Streptococcus pyogenes group A and MSSA.  Patient was initially treated with cefazolin.  He is seen by the wound care team who recommended follow-up as an outpatient in wound care clinic.  While inpatient nephrology saw the patient and started him on dialysis for acute kidney injury.  His renal function improved and dialysis was stopped.  Patient was discharged with follow-up in the wound care clinic.    Patient was following with wound clinic, however on clinic visit 1/3/2022 he was noticed to have worsening wounds and concern for infection. He had failed outpatient Abx with zyvox and was sent to ED for further evaluation. Patient was admitted to [facility] from 1/3-1/13/2023 for sepsis and left lower extremity cellulitis with abscess. He was taken to OR for I&D with Dr. Odom. Patient's wound was treated with wound VAC. Surgical cultures were positive for enterobacter cloacae and was treated with ciprofloxacin with end date of 1/21/2023. Patient was referred back to [facility] for further care.    Patient was again hospitalized from 7/9 till 7/13/2023 for wound infection.  Wound culture from the clinic grew strep and MRSA, infection continue to  "worsen.  He was treated in the hospital with IV meropenem and vancomycin.  Wound care and infectious diseases was consulted.  Lower extremity edema was treated with diuresis and leg elevation.  Doppler negative for DVT, arterial ultrasound was completed and showed adequate lower extremity perfusion.  He improved and was discharged home on p.o. linezolid, with referral back to [facility] to continue wound care.    New wound to left lateral/distal foot noticed in clinic on 8/3/2023 during nurse visit.  Another new wound was noted to left lateral/proximal left foot on 8/17, presented as a small punctate wound.      Pertinent Medical History: Hypertension, alcohol abuse, kidney disease, GERD, mitral valve prolapse, morbid obesity, obstructive sleep apnea, type 2 diabetes    TOBACCO USE: Former smoker denies use of smokeless tobacco    Patient's problem list, allergies, and current medications reviewed and updated in Epic    Interval History:  7/20/2023 : Clinic visit with PANCHO Medina, EL, MERI, RYLEE.   Kwesi states he is feeling well overall, though having a bit more pain today than he has had since discharge.  States he has been up since 6:00 and running errands or going to appointments all day long.  Other than today, he has been resting with leg elevated is much as possible.  He is tolerating linezolid without difficulty.  I did not ask about his blood sugar today.    7/27/2023 : Clinic visit with PANCHO Medina, EL, MERI, RYLEE.   Patient presents today with erythema and edema to his left lower leg, which has been chronic, however appears to be slightly worse today.  His foot and ankle in particular are more erythemic.  He states he is having pain but this is nothing new.  States that the skin to his thigh and upper calf itches, \"a lot\".  He just completed his Zyvox yesterday.  He does not have follow-up with ID.  He was seen by his PCP who ordered a CT scan, and told him to stop wearing Tubigrip.  He " has not yet scheduled his CT scan.     If no improvement next week, or if significant deterioration, will consider referral back to ID, or send him to ED.  He understands he is to go to the emergency room if he experiences any signs or symptoms of infection.    8/17/2023 : Clinic visit with PANCHO Medina, EL, MERI, RYLEE.   Patient states he is feeling well today.  His left lower extremity edema and erythema has improved significantly since last assessment.   He has an appointment to see ID later today.     Anterior leg wound remains stable, nearly resolved.  He has 2 new wounds to his left lateral foot, the first of which was first observed in clinic on 8/3, the second of which was first noticed today.  The distal wound has increased significantly in area over the past week.    8/31/2023 : Clinic visit with PANCHO Medina, EL, MERI, RYLEE.   Kwesi states he is feeling well today.  He has not checked his blood sugar in several days because he is CGM stopped working and has not yet replaced it.  Reports his latest blood sugar was around 134.  His anterior leg wound is essentially resolved.  Left distal foot wound measures larger, however wound bed tissue has improved.  Erythema to his leg has dissipated significantly.    REVIEW OF SYSTEMS:   Unchanged from previous clinic visit on 8/17/2023, except as documented in interval history above    PHYSICAL EXAMINATION:   /62 (BP Location: Right arm, Patient Position: Sitting)   Pulse 99   Temp 36.2 °C (97.2 °F) (Temporal)   Resp 18   SpO2 97%     Physical Exam  Constitutional:       Appearance: He is obese.   Cardiovascular:      Rate and Rhythm: Normal rate.   Pulmonary:      Effort: Pulmonary effort is normal.   Musculoskeletal:      Right lower leg: Edema present.      Left lower leg: Edema present.      Comments: 2-3+ edema bilateral lower extremities   Skin:     Findings: Erythema present.      Comments: Full-thickness wound to the left  anterior lower extremity status post I&D -distal wound is covered with new epithelium, yellow crust to proximal wound, no drainage over the past week, no periwound erythema or induration.  Full-thickness wound to left lateral/distal foot-wound area has again increased, moderate serosanguineous drainage, no evidence of infection  Full-thickness wound to left lateral/proximal foot-resolved       Neurological:      Mental Status: He is alert and oriented to person, place, and time.   Psychiatric:         Mood and Affect: Mood normal.     Monofilament testing with a 10 gram force: sensation intact: decreased bilaterally  Visual Inspection: Feet with maceration, ulcers, fissures.  Pedal pulses: intact bilaterally      WOUND ASSESSMENT  Wound 01/16/23 Incision Leg Anterior;Lower Left (Active)   Wound Image   08/31/23 1500   Site Assessment Red;Pink;Pale;Fragile;Early/partial granulation;Epithelialization 08/31/23 1500   Periwound Assessment Scar tissue 08/31/23 1500   Margins Attached edges 08/31/23 1500   Drainage Amount Scant 08/31/23 1500   Drainage Description Serosanguineous 08/31/23 1500   Treatments Topical Lidocaine;Cleansed;Site care 08/31/23 1500   Wound Cleansing Foam Cleanser/Washcloth 08/31/23 1500   Periwound Protectant Skin Protectant Wipes to Periwound 08/31/23 1500   Dressing Changed New 08/03/23 1500   Dressing Cleansing/Solutions Not Applicable 08/31/23 1500   Dressing Options Dry Roll Gauze 08/31/23 1500   Dressing Change/Treatment Frequency Twice Weekly 08/31/23 1500   Wound Team Following Weekly 08/31/23 1500   Non-staged Wound Description Partial thickness 08/31/23 1500   Wound Length (cm) 0.4 cm 08/31/23 1500   Wound Width (cm) 0.4 cm 08/31/23 1500   Wound Depth (cm) 0.1 cm 08/31/23 1500   Wound Surface Area (cm^2) 0.16 cm^2 08/31/23 1500   Wound Volume (cm^3) 0.016 cm^3 08/31/23 1500   Post-Procedure Length (cm) 1 cm 08/24/23 1430   Post-Procedure Width (cm) 0.5 cm 08/24/23 1430   Post-Procedure  Depth (cm) 0.1 cm 07/27/23 1445   Post-Procedure Surface Area (cm^2) 0.5 cm^2 08/24/23 1430   Post-Procedure Volume (cm^3) 0.07 cm^3 07/27/23 1445   Wound Healing % 100 08/31/23 1500   Tunneling (cm) 0 cm 08/31/23 1500   Tunneling Clock Position of Wound 2 05/04/23 1445   Undermining (cm) 0 cm 08/31/23 1500   Undermining of Wound, 1st Location From 1 o'clock;To 2 o'clock 04/05/23 1535   Undermining (cm) - 2nd location 0 cm 04/14/23 1415   Undermining of Wound, 2nd Location From 11 o'clock;To 12 o'clock 04/05/23 1535   Wound Odor None 08/31/23 1500   Exposed Structures None 08/31/23 1500   Number of days: 227       Wound 08/03/23 Full Thickness Wound Foot Lateral;Distal Left (Active)   Wound Image   08/31/23 1500   Site Assessment Red;Early/partial granulation 08/31/23 1500   Periwound Assessment Edema;Maceration 08/31/23 1500   Margins Unattached edges 08/31/23 1500   Drainage Amount Moderate 08/31/23 1500   Drainage Description Serosanguineous 08/31/23 1500   Treatments Topical Lidocaine;Cleansed;Provider debridement;Site care 08/31/23 1500   Wound Cleansing Foam Cleanser/Washcloth 08/31/23 1500   Periwound Protectant Skin Protectant Wipes to Periwound 08/31/23 1500   Dressing Changed New 08/03/23 1500   Dressing Cleansing/Solutions Not Applicable 08/31/23 1500   Dressing Options Hydrofiber Silver;Dry Gauze 08/31/23 1500   Dressing Change/Treatment Frequency Twice Weekly 08/31/23 1500   Wound Team Following Weekly 08/31/23 1500   Non-staged Wound Description Full thickness 08/31/23 1500   Wound Length (cm) 1.6 cm 08/31/23 1500   Wound Width (cm) 1.5 cm 08/31/23 1500   Wound Depth (cm) 0.2 cm 08/31/23 1500   Wound Surface Area (cm^2) 2.4 cm^2 08/31/23 1500   Wound Volume (cm^3) 0.48 cm^3 08/31/23 1500   Post-Procedure Length (cm) 1.7 cm 08/31/23 1500   Post-Procedure Width (cm) 1.5 cm 08/31/23 1500   Post-Procedure Depth (cm) 0.2 cm 08/31/23 1500   Post-Procedure Surface Area (cm^2) 2.55 cm^2 08/31/23 1500    Post-Procedure Volume (cm^3) 0.51 cm^3 08/31/23 1500   Wound Healing % -860 08/31/23 1500   Tunneling (cm) 0 cm 08/31/23 1500   Undermining (cm) 0 cm 08/31/23 1500   Wound Odor None 08/31/23 1500   Exposed Structures None 08/31/23 1500   Number of days: 28     PROCEDURE: Excisional debridement of left lateral/distal foot wound  -2% viscous lidocaine applied topically to wound bed for approximately 5 minutes prior to debridement  -Curette used to debride wound bed.  Excisional debridement was performed to remove devitalized tissue until healthy, bleeding tissue was visualized.   Entire surface of wound, 2.55 cm² debrided.  Tissue debrided into the subcutaneous layer.    -Bleeding controlled with manual pressure.    -Wound care completed by wound RN, refer to flowsheet  -Patient tolerated the procedure well, without c/o pain or discomfort.      Pertinent Labs and Diagnostics:    Labs:     A1c:   Lab Results   Component Value Date/Time    HBA1C 7.4 (H) 07/06/2023 08:01 AM            IMAGING: Dx to tibia and fibula dated 12/5/2022  IMPRESSION:     No radiographic evidence of acute bony abnormality     Skin thickening and edema with some soft tissue calcifications most likely indicates venous stasis. Cellulitis is a possibility and there is no evidence of soft tissue gas. Dermatomyositis can be seen with soft tissue calcifications but (are not as   extensive as that often entails      LAST  WOUND CULTURE:  DATE :   Lab Results   Component Value Date/Time    CULTRSULT - (A) 07/11/2023 02:15 PM    CULTRSULT (A) 07/11/2023 02:15 PM     Methicillin Resistant Staphylococcus aureus  Light growth      CULTRSULT Streptococcus pyogenes (Group A)  Rare growth   (A) 07/11/2023 02:15 PM         ASSESSMENT AND PLAN:     1. Open wound of left lower extremity, subsequent encounter    8/31/2023: Distal aspect of wound is covered with new epithelium, proximal wound has daily yellow crust, no drainage.  His wound looks like it is well  on its way to healing.  There is some irritation to periwound, possibly epidermal stripping with dressing removal.  Alternative dressing applied today.  -No need for debridement of open wound to anterior leg today  -Patient to complete CT scan as ordered by PCP  -Patient to return to clinic weekly for assessment debridement if indicated  -Patient understands that he is to go to the emergency room immediately if he experiences fevers, chills, nausea, vomiting, or if he notices worsening of erythema, edema, and/or pain       Wound care: Optifoam dressing to protect new epithelialization wound and periwound,, solaris ready wrap       2. Diabetic ulcer of left midfoot associated with type 2 diabetes mellitus, with fat layer exposed ([facility])    8/31/2023: New wound noticed on the 17th has resolved.  Distal wound measures larger today, however tissue quality improving.  -Excisional debridement of wound in clinic today, medically necessary to promote wound healing.  -Patient to return to clinic weekly for assessment and debridement  -Patient to change dressing 1-2 times per week in between clinic visits     Wound care: Hydrofiber silver, foam cover dressing, solaris ready wrap    3. Type 2 diabetes mellitus without complication, without long-term current use of insulin ([facility])    Comments: Patient is currently on glipizide only    8/31/2023: Patient CGM has not been working for the past few days, he has not yet replaced it.  The last time he checked his blood sugar it was 134.  -Patient has been instructed to keep tight control over fasting blood sugar, <140 for optimal wound healing; Implications of loss of protective sensation (LOPS) deviously discussed with patient, including increased risk for amputation.  Advised to check feet at least daily, moisturize feet, and to always wear protective foot wear, arrange meticulous regular foot care by podiatrist or [facility]. Pt with good understanding.    4. Wound infection    8/31/2023:  Left lower extremity edema and erythema continues to improve.  -Patient to follow-up with ID as needed    PATIENT EDUCATION  - Importance of adequate nutrition for wound healing  -Advised to go to ER for any increased redness, swelling, drainage, or odor, or if patient develops fever, chills, nausea or vomiting.         Please note that this note may have been created using voice recognition software. I have worked with technical experts from [facility] [employer] to optimize the interface.  I have made every reasonable attempt to correct obvious errors, but there may be errors of grammar and possibly content that I did not discover before finalizing the note.    N

## 2023-09-07 ENCOUNTER — NON-PROVIDER VISIT (OUTPATIENT)
Dept: WOUND CARE | Facility: MEDICAL CENTER | Age: 63
End: 2023-09-07
Attending: NURSE PRACTITIONER
Payer: COMMERCIAL

## 2023-09-07 ENCOUNTER — OFFICE VISIT (OUTPATIENT)
Dept: MEDICAL GROUP | Facility: MEDICAL CENTER | Age: 63
End: 2023-09-07
Payer: COMMERCIAL

## 2023-09-07 VITALS
RESPIRATION RATE: 16 BRPM | HEIGHT: 72 IN | SYSTOLIC BLOOD PRESSURE: 144 MMHG | OXYGEN SATURATION: 98 % | HEART RATE: 90 BPM | DIASTOLIC BLOOD PRESSURE: 76 MMHG | TEMPERATURE: 97.9 F | WEIGHT: 315 LBS | BODY MASS INDEX: 42.66 KG/M2

## 2023-09-07 DIAGNOSIS — F90.2 ATTENTION DEFICIT HYPERACTIVITY DISORDER (ADHD), COMBINED TYPE: ICD-10-CM

## 2023-09-07 DIAGNOSIS — Z23 IMMUNIZATION DUE: ICD-10-CM

## 2023-09-07 DIAGNOSIS — R94.4 DECREASED GFR: ICD-10-CM

## 2023-09-07 PROCEDURE — 90732 PPSV23 VACC 2 YRS+ SUBQ/IM: CPT | Performed by: STUDENT IN AN ORGANIZED HEALTH CARE EDUCATION/TRAINING PROGRAM

## 2023-09-07 PROCEDURE — 3077F SYST BP >= 140 MM HG: CPT | Performed by: STUDENT IN AN ORGANIZED HEALTH CARE EDUCATION/TRAINING PROGRAM

## 2023-09-07 PROCEDURE — 90471 IMMUNIZATION ADMIN: CPT | Performed by: STUDENT IN AN ORGANIZED HEALTH CARE EDUCATION/TRAINING PROGRAM

## 2023-09-07 PROCEDURE — 99214 OFFICE O/P EST MOD 30 MIN: CPT | Mod: 25 | Performed by: STUDENT IN AN ORGANIZED HEALTH CARE EDUCATION/TRAINING PROGRAM

## 2023-09-07 PROCEDURE — 90632 HEPA VACCINE ADULT IM: CPT | Performed by: STUDENT IN AN ORGANIZED HEALTH CARE EDUCATION/TRAINING PROGRAM

## 2023-09-07 PROCEDURE — 90472 IMMUNIZATION ADMIN EACH ADD: CPT | Performed by: STUDENT IN AN ORGANIZED HEALTH CARE EDUCATION/TRAINING PROGRAM

## 2023-09-07 PROCEDURE — 3078F DIAST BP <80 MM HG: CPT | Performed by: STUDENT IN AN ORGANIZED HEALTH CARE EDUCATION/TRAINING PROGRAM

## 2023-09-07 RX ORDER — METHYLPHENIDATE HYDROCHLORIDE 36 MG/1
36 TABLET ORAL DAILY
Qty: 30 TABLET | Refills: 0 | Status: SHIPPED | OUTPATIENT
Start: 2023-09-07 | End: 2023-10-07

## 2023-09-07 ASSESSMENT — FIBROSIS 4 INDEX: FIB4 SCORE: 0.98

## 2023-09-07 NOTE — WOUND TEAM
Per PAR note, patient late canceled for today's appointment. Patient also stated that he will be out of town next week. Patient's next appt is 9/21.

## 2023-09-07 NOTE — PROGRESS NOTES
Subjective:     CC: Decreased GFR, ADHD    HPI:   Thomas presents today with    Problem   Decreased Gfr    This is a new condition.  GFR in the 40s over the last couple weeks.  Prior to that his GFR was normal about 1 month ago.  He is taking Mobic occasionally.  He did just come off of long course of antibiotics.     Attention Deficit Hyperactivity Disorder (Adhd), Combined Type    This is a chronic condition, previously well controlled with Concerta extended release 36 mg daily.          ROS:  ROS    Objective:     Exam:  BP (!) 144/76   Pulse 90   Temp 36.6 °C (97.9 °F) (Temporal)   Resp 16   Ht 1.829 m (6')   Wt (!) 144 kg (317 lb)   SpO2 98%   BMI 42.99 kg/m²  Body mass index is 42.99 kg/m².    Physical Exam  Vitals reviewed.   Constitutional:       General: He is not in acute distress.     Appearance: He is not toxic-appearing.      Comments: Exam through observation only   HENT:      Head: Normocephalic and atraumatic.      Right Ear: External ear normal.      Left Ear: External ear normal.   Eyes:      General:         Right eye: No discharge.         Left eye: No discharge.      Extraocular Movements: Extraocular movements intact.      Conjunctiva/sclera: Conjunctivae normal.   Pulmonary:      Effort: Pulmonary effort is normal. No respiratory distress.   Skin:     General: Skin is warm and dry.   Neurological:      Mental Status: He is alert.   Psychiatric:         Mood and Affect: Mood normal.         Behavior: Behavior normal.         Thought Content: Thought content normal.         Judgment: Judgment normal.         Assessment & Plan:     62 y.o. male with the following -     1. Decreased GFR  Discussed his new decrease in GFR.  Discussed to discontinue NSAIDs and stay hydrated.  Recheck labs and if still low I will send him to nephrology  - Basic Metabolic Panel; Future    2. Attention deficit hyperactivity disorder (ADHD), combined type  Chronic condition, PDMP reviewed, controlled substance  agreement on file, urine drug screen on file  - methylphenidate (CONCERTA) 36 MG CR tablet; Take 1 Tablet by mouth every day for 30 days.  Dispense: 30 Tablet; Refill: 0    3. Immunization due  - PneumoVax PPV23 =>3yo  - Hep A Adult 19+    No follow-ups on file.    Please note that this dictation was created using voice recognition software. I have made every reasonable attempt to correct obvious errors, but I expect that there are errors of grammar and possibly content that I did not discover before finalizing the note.

## 2023-09-08 ENCOUNTER — TELEPHONE (OUTPATIENT)
Dept: MEDICAL GROUP | Facility: MEDICAL CENTER | Age: 63
End: 2023-09-08
Payer: COMMERCIAL

## 2023-09-08 NOTE — TELEPHONE ENCOUNTER
DOCUMENTATION OF PAR STATUS:    1. Name of Medication & Dose: Methylphenidate HCl ER TBCR 36MG tablets     2. Date Prior Auth Submitted: 9/7/23    3. What information was given to obtain insurance decision? Patient and medication information    4. Prior Auth Status?Pending

## 2023-09-11 ENCOUNTER — TELEPHONE (OUTPATIENT)
Dept: MEDICAL GROUP | Facility: MEDICAL CENTER | Age: 63
End: 2023-09-11
Payer: COMMERCIAL

## 2023-09-11 NOTE — TELEPHONE ENCOUNTER
FINAL PRIOR AUTHORIZATION STATUS:    1.  Name of Medication & Dose:  Methylphenidate HCl ER TBCR 36MG tablets     2. Prior Auth Status:  Approved through 09/08/2024     3. Action Taken: Patient Notified: yes

## 2023-09-18 ENCOUNTER — RESEARCH ENCOUNTER (OUTPATIENT)
Dept: RESEARCH | Facility: WORKSITE | Age: 63
End: 2023-09-18
Payer: COMMERCIAL

## 2023-09-18 NOTE — RESEARCH NOTE
Confirmed with the participant which designated provider they would like study results shared with. Patient will have an opportunity to share the results with any providers of their choosing in the future by accessing their results from [employer].

## 2023-09-21 ENCOUNTER — NON-PROVIDER VISIT (OUTPATIENT)
Dept: WOUND CARE | Facility: MEDICAL CENTER | Age: 63
End: 2023-09-21
Attending: NURSE PRACTITIONER
Payer: COMMERCIAL

## 2023-09-21 ENCOUNTER — RESEARCH ENCOUNTER (OUTPATIENT)
Dept: RESEARCH | Facility: WORKSITE | Age: 63
End: 2023-09-21
Payer: COMMERCIAL

## 2023-09-21 DIAGNOSIS — Z00.6 RESEARCH STUDY PATIENT: ICD-10-CM

## 2023-09-21 PROCEDURE — 99211 OFF/OP EST MAY X REQ PHY/QHP: CPT

## 2023-09-21 NOTE — PATIENT INSTRUCTIONS
- Resolved wound be fragile for a few days, bathe and dry area gently, only ever regains a maximum of 80% of the tensile strength of the surrounding skin, remodeling of scar can continue for 6mo - a year. Contact PCP for a referral back her if any problems with area opening and draining again.    -Should you experience any significant changes in your wound(s), such as infection (redness, swelling, localized heat, increased pain, fever > 101 F, chills) or have any questions regarding your home care instructions, please contact the wound center at [phone]. If after hours, contact your primary care physician or go to the hospital emergency room.

## 2023-09-21 NOTE — PROGRESS NOTES
Advanced Wound Care   [facility]   [address]   [address]   [place] [address]   [phone] Fax: [phone]    Discharge Note      Referring Physician: Candi Johnson APRN  Wound location: LLE and L lateral foot  Date of Discharge: 9/21/2023    Assessment:  Discharge patient at this time secondary to wound resolution.     Patient counseled on scar tissue and maintaining skin integrity.     Thank you for the referral and the opportunity to treat your patient.

## 2023-09-21 NOTE — RESEARCH NOTE
[employer] [place] [employer] enrollment complete. Saliva sample collected onsite.  Confirmed with the participant which designated provider Janine Gomez M.D., they would like study results shared with. Patient will have an opportunity to share the results with any providers of their choosing in the future by accessing their results from [employer]

## 2023-09-28 ENCOUNTER — APPOINTMENT (OUTPATIENT)
Dept: WOUND CARE | Facility: MEDICAL CENTER | Age: 63
End: 2023-09-28
Attending: NURSE PRACTITIONER
Payer: COMMERCIAL

## 2023-10-18 ENCOUNTER — HOSPITAL ENCOUNTER (OUTPATIENT)
Dept: LAB | Facility: MEDICAL CENTER | Age: 63
End: 2023-10-18
Attending: STUDENT IN AN ORGANIZED HEALTH CARE EDUCATION/TRAINING PROGRAM
Payer: COMMERCIAL

## 2023-10-18 DIAGNOSIS — R94.4 DECREASED GFR: ICD-10-CM

## 2023-10-18 LAB
ANION GAP SERPL CALC-SCNC: 10 MMOL/L (ref 7–16)
BUN SERPL-MCNC: 16 MG/DL (ref 8–22)
CALCIUM SERPL-MCNC: 9.3 MG/DL (ref 8.5–10.5)
CHLORIDE SERPL-SCNC: 102 MMOL/L (ref 96–112)
CO2 SERPL-SCNC: 28 MMOL/L (ref 20–33)
CREAT SERPL-MCNC: 1.12 MG/DL (ref 0.5–1.4)
FASTING STATUS PATIENT QL REPORTED: NORMAL
GFR SERPLBLD CREATININE-BSD FMLA CKD-EPI: 73 ML/MIN/1.73 M 2
GLUCOSE SERPL-MCNC: 146 MG/DL (ref 65–99)
POTASSIUM SERPL-SCNC: 4.2 MMOL/L (ref 3.6–5.5)
SODIUM SERPL-SCNC: 140 MMOL/L (ref 135–145)

## 2023-10-18 PROCEDURE — 80048 BASIC METABOLIC PNL TOTAL CA: CPT

## 2023-10-18 PROCEDURE — 36415 COLL VENOUS BLD VENIPUNCTURE: CPT

## 2023-10-23 ENCOUNTER — NON-PROVIDER VISIT (OUTPATIENT)
Dept: MEDICAL GROUP | Facility: MEDICAL CENTER | Age: 63
End: 2023-10-23
Payer: COMMERCIAL

## 2023-10-23 DIAGNOSIS — Z23 IMMUNIZATION DUE: ICD-10-CM

## 2023-10-23 PROCEDURE — 90686 IIV4 VACC NO PRSV 0.5 ML IM: CPT | Performed by: STUDENT IN AN ORGANIZED HEALTH CARE EDUCATION/TRAINING PROGRAM

## 2023-10-23 PROCEDURE — 90471 IMMUNIZATION ADMIN: CPT | Performed by: STUDENT IN AN ORGANIZED HEALTH CARE EDUCATION/TRAINING PROGRAM

## 2023-10-23 NOTE — PROGRESS NOTES
Kwesi Borja is a 62 y.o. male here for a non-provider visit for:   FLU    Reason for immunization: Annual Flu Vaccine  Immunization records indicate need for vaccine: Yes, confirmed with Epic  Minimum interval has been met for this vaccine: Yes    Patient tolerated injection and no adverse effects were observed or reported: Yes    Pt scheduled for next dose in series: Not Indicated

## 2023-10-30 ENCOUNTER — APPOINTMENT (OUTPATIENT)
Dept: MEDICAL GROUP | Facility: MEDICAL CENTER | Age: 63
End: 2023-10-30
Payer: COMMERCIAL

## 2023-11-02 ENCOUNTER — OFFICE VISIT (OUTPATIENT)
Dept: MEDICAL GROUP | Facility: MEDICAL CENTER | Age: 63
End: 2023-11-02
Payer: COMMERCIAL

## 2023-11-02 VITALS
BODY MASS INDEX: 42.66 KG/M2 | DIASTOLIC BLOOD PRESSURE: 82 MMHG | WEIGHT: 315 LBS | HEIGHT: 72 IN | SYSTOLIC BLOOD PRESSURE: 150 MMHG | HEART RATE: 78 BPM | OXYGEN SATURATION: 98 % | TEMPERATURE: 97.3 F

## 2023-11-02 DIAGNOSIS — L03.116 CELLULITIS OF LEFT LEG: ICD-10-CM

## 2023-11-02 DIAGNOSIS — I10 ESSENTIAL HYPERTENSION: ICD-10-CM

## 2023-11-02 DIAGNOSIS — R94.4 DECREASED GFR: ICD-10-CM

## 2023-11-02 DIAGNOSIS — F90.2 ATTENTION DEFICIT HYPERACTIVITY DISORDER (ADHD), COMBINED TYPE: ICD-10-CM

## 2023-11-02 PROCEDURE — 3079F DIAST BP 80-89 MM HG: CPT | Performed by: STUDENT IN AN ORGANIZED HEALTH CARE EDUCATION/TRAINING PROGRAM

## 2023-11-02 PROCEDURE — 99214 OFFICE O/P EST MOD 30 MIN: CPT | Performed by: STUDENT IN AN ORGANIZED HEALTH CARE EDUCATION/TRAINING PROGRAM

## 2023-11-02 PROCEDURE — 3077F SYST BP >= 140 MM HG: CPT | Performed by: STUDENT IN AN ORGANIZED HEALTH CARE EDUCATION/TRAINING PROGRAM

## 2023-11-02 RX ORDER — METHYLPHENIDATE HYDROCHLORIDE 36 MG/1
36 TABLET ORAL EVERY MORNING
Qty: 30 TABLET | Refills: 0 | Status: SHIPPED | OUTPATIENT
Start: 2023-12-02 | End: 2024-01-01

## 2023-11-02 RX ORDER — METHYLPHENIDATE HYDROCHLORIDE 36 MG/1
36 TABLET ORAL EVERY MORNING
Qty: 30 TABLET | Refills: 0 | Status: SHIPPED | OUTPATIENT
Start: 2024-01-01 | End: 2024-01-31

## 2023-11-02 RX ORDER — METHYLPHENIDATE HYDROCHLORIDE 36 MG/1
36 TABLET ORAL EVERY MORNING
Qty: 30 TABLET | Refills: 0 | Status: SHIPPED | OUTPATIENT
Start: 2023-11-02 | End: 2023-12-02

## 2023-11-02 RX ORDER — LOSARTAN POTASSIUM 25 MG/1
25 TABLET ORAL DAILY
Qty: 90 TABLET | Refills: 0 | Status: SHIPPED | OUTPATIENT
Start: 2023-11-02 | End: 2024-01-22 | Stop reason: SDUPTHER

## 2023-11-02 ASSESSMENT — FIBROSIS 4 INDEX: FIB4 SCORE: 0.98

## 2023-11-02 NOTE — PROGRESS NOTES
Subjective:     CC: ADHD, hypertension    HPI:   Thomas presents today with    Problem   Attention Deficit Hyperactivity Disorder (Adhd), Combined Type    This is a chronic condition, previously well controlled with Concerta extended release 36 mg daily.  Controls this agreement on file, urine drug screen on file, PDMP reviewed     Essential Hypertension    This is a chronic condition, it has not been uncontrolled on 2 separate office visits that were 2 months apart.  Currently on amlodipine 10 mg daily.  Open to starting another medication.     Decreased Gfr (Resolved)   Cellulitis of Left Leg (Resolved)         ROS:  ROS    Objective:     Exam:  BP (!) 150/82 (BP Location: Left arm, Patient Position: Sitting, BP Cuff Size: Adult)   Pulse 78   Temp 36.3 °C (97.3 °F) (Temporal)   Ht 1.829 m (6')   Wt (!) 145 kg (319 lb)   SpO2 98%   BMI 43.26 kg/m²  Body mass index is 43.26 kg/m².    Physical Exam  Vitals reviewed.   Constitutional:       General: He is not in acute distress.     Appearance: He is not toxic-appearing.      Comments: Exam through observation only   HENT:      Head: Normocephalic and atraumatic.      Right Ear: External ear normal.      Left Ear: External ear normal.   Eyes:      General:         Right eye: No discharge.         Left eye: No discharge.      Extraocular Movements: Extraocular movements intact.      Conjunctiva/sclera: Conjunctivae normal.   Pulmonary:      Effort: Pulmonary effort is normal. No respiratory distress.   Skin:     General: Skin is warm and dry.   Neurological:      Mental Status: He is alert.   Psychiatric:         Mood and Affect: Mood normal.         Behavior: Behavior normal.         Thought Content: Thought content normal.         Judgment: Judgment normal.           Assessment & Plan:     62 y.o. male with the following -     1. Attention deficit hyperactivity disorder (ADHD), combined type  PDMP reviewed, controlled substance agreement on file, urine drug  screen reviewed, 3 months of refills given  - methylphenidate (CONCERTA) 36 MG CR tablet; Take 1 Tablet by mouth every morning for 30 days.  Dispense: 30 Tablet; Refill: 0  - methylphenidate (CONCERTA) 36 MG CR tablet; Take 1 Tablet by mouth every morning for 30 days.  Dispense: 30 Tablet; Refill: 0  - methylphenidate (CONCERTA) 36 MG CR tablet; Take 1 Tablet by mouth every morning for 30 days.  Dispense: 30 Tablet; Refill: 0    2. Decreased GFR  Resolved    3. Cellulitis of left leg  Resolved    4. Essential hypertension  Discussed adding losartan.  Per patient he has been on this before.  Continue amlodipine at current dosing.  Recheck at next visit.  - losartan (COZAAR) 25 MG Tab; Take 1 Tablet by mouth every day.  Dispense: 90 Tablet; Refill: 0    No follow-ups on file.    Please note that this dictation was created using voice recognition software. I have made every reasonable attempt to correct obvious errors, but I expect that there are errors of grammar and possibly content that I did not discover before finalizing the note.

## 2023-11-06 LAB
APOB+LDLR+PCSK9 GENE MUT ANL BLD/T: NOT DETECTED
BRCA1+BRCA2 DEL+DUP + FULL MUT ANL BLD/T: NOT DETECTED
MLH1+MSH2+MSH6+PMS2 GN DEL+DUP+FUL M: NOT DETECTED

## 2023-12-11 ENCOUNTER — HOSPITAL ENCOUNTER (OUTPATIENT)
Facility: MEDICAL CENTER | Age: 63
End: 2023-12-11
Attending: STUDENT IN AN ORGANIZED HEALTH CARE EDUCATION/TRAINING PROGRAM
Payer: COMMERCIAL

## 2023-12-11 ENCOUNTER — OFFICE VISIT (OUTPATIENT)
Dept: MEDICAL GROUP | Facility: MEDICAL CENTER | Age: 63
End: 2023-12-11
Payer: COMMERCIAL

## 2023-12-11 VITALS
SYSTOLIC BLOOD PRESSURE: 148 MMHG | WEIGHT: 315 LBS | OXYGEN SATURATION: 94 % | HEIGHT: 72 IN | TEMPERATURE: 97.2 F | DIASTOLIC BLOOD PRESSURE: 82 MMHG | BODY MASS INDEX: 42.66 KG/M2 | HEART RATE: 103 BPM

## 2023-12-11 DIAGNOSIS — E11.42 TYPE 2 DIABETES MELLITUS WITH DIABETIC POLYNEUROPATHY, WITHOUT LONG-TERM CURRENT USE OF INSULIN (HCC): ICD-10-CM

## 2023-12-11 DIAGNOSIS — Z11.4 ENCOUNTER FOR SCREENING FOR HIV: ICD-10-CM

## 2023-12-11 DIAGNOSIS — I10 ESSENTIAL HYPERTENSION: ICD-10-CM

## 2023-12-11 DIAGNOSIS — R00.2 PALPITATIONS: ICD-10-CM

## 2023-12-11 DIAGNOSIS — Z01.84 IMMUNITY STATUS TESTING: ICD-10-CM

## 2023-12-11 LAB
CREAT UR-MCNC: 144.58 MG/DL
MICROALBUMIN UR-MCNC: <1.2 MG/DL
MICROALBUMIN/CREAT UR: NORMAL MG/G (ref 0–30)

## 2023-12-11 PROCEDURE — 82570 ASSAY OF URINE CREATININE: CPT

## 2023-12-11 PROCEDURE — 99214 OFFICE O/P EST MOD 30 MIN: CPT | Performed by: STUDENT IN AN ORGANIZED HEALTH CARE EDUCATION/TRAINING PROGRAM

## 2023-12-11 PROCEDURE — 3077F SYST BP >= 140 MM HG: CPT | Performed by: STUDENT IN AN ORGANIZED HEALTH CARE EDUCATION/TRAINING PROGRAM

## 2023-12-11 PROCEDURE — 3079F DIAST BP 80-89 MM HG: CPT | Performed by: STUDENT IN AN ORGANIZED HEALTH CARE EDUCATION/TRAINING PROGRAM

## 2023-12-11 PROCEDURE — 82043 UR ALBUMIN QUANTITATIVE: CPT

## 2023-12-11 RX ORDER — LOSARTAN POTASSIUM 50 MG/1
50 TABLET ORAL DAILY
Qty: 30 TABLET | Refills: 0 | Status: CANCELLED | OUTPATIENT
Start: 2023-12-11

## 2023-12-11 ASSESSMENT — FIBROSIS 4 INDEX: FIB4 SCORE: 0.99

## 2023-12-11 NOTE — PROGRESS NOTES
Subjective:     CC: Palpitations, HTN, DM    HPI:   Thomas presents today with    Problem   Palpitations    This is a chronic condition, worsening.  Patient states that he has known PVCs but that recently he has been feeling a fluttering sensation in his chest and some slight shortness of breath.  He denies any chest pain or pressure.     Essential Hypertension    This is a chronic condition, it has not been uncontrolled on 2 separate office visits that were 2 months apart.  Currently on amlodipine 10 mg daily and losartan 25 mg daily.  He states that he takes these about 2-3 times a week due to forgetting.     Type 2 Diabetes Mellitus With Neurologic Complication, Without Long-Term Current Use of Insulin (Hcc)    This is a chronic condition, last A1c was 7.4, currently taking glipizide 5 mg daily only, due for repeat labs         ROS:  ROS    Objective:     Exam:  BP (!) 148/82 (BP Location: Left arm, Patient Position: Sitting, BP Cuff Size: Adult)   Pulse (!) 103   Temp 36.2 °C (97.2 °F) (Temporal)   Ht 1.829 m (6')   Wt (!) 144 kg (318 lb)   SpO2 94%   BMI 43.13 kg/m²  Body mass index is 43.13 kg/m².    Physical Exam  Vitals reviewed.   Constitutional:       General: He is not in acute distress.     Appearance: He is not toxic-appearing.   HENT:      Head: Normocephalic and atraumatic.      Right Ear: External ear normal.      Left Ear: External ear normal.   Eyes:      General:         Right eye: No discharge.         Left eye: No discharge.      Extraocular Movements: Extraocular movements intact.      Conjunctiva/sclera: Conjunctivae normal.   Cardiovascular:      Rate and Rhythm: Normal rate and regular rhythm.      Heart sounds: Normal heart sounds. No murmur heard.  Pulmonary:      Effort: Pulmonary effort is normal. No respiratory distress.      Breath sounds: Normal breath sounds. No wheezing or rales.   Skin:     General: Skin is warm and dry.   Neurological:      Mental Status: He is alert.    Psychiatric:         Mood and Affect: Mood normal.         Behavior: Behavior normal.         Thought Content: Thought content normal.         Judgment: Judgment normal.           Assessment & Plan:     63 y.o. male with the following -     1. Type 2 diabetes mellitus with diabetic polyneuropathy, without long-term current use of insulin ([facility])  Recheck labs, continue current medications for now  - MICROALBUMIN CREAT RATIO URINE; Future  - HEMOGLOBIN A1C; Future  - Lipid Profile; Future    2. Essential hypertension  Discussed the importance of adhering to medications.  Discussed that we will leave medications as is for now and I encouraged him to take them every single day.  Will recheck in several weeks to see if his blood pressure is changed and if not we will adjust medication    3. Palpitations  Discussed the workup below to check for causes of palpitations.  - RIH ZIO PATCH MONITOR; Future  - MAGNESIUM; Future  - TSH WITH REFLEX TO FT4; Future  - CBC WITH DIFFERENTIAL; Future  - Comp Metabolic Panel; Future    4. Immunity status testing  - HEP B SURFACE AB; Future    5. Encounter for screening for HIV  - HIV AG/AB COMBO ASSAY SCREENING; Future    No follow-ups on file.    Please note that this dictation was created using voice recognition software. I have made every reasonable attempt to correct obvious errors, but I expect that there are errors of grammar and possibly content that I did not discover before finalizing the note.

## 2023-12-27 ENCOUNTER — TELEPHONE (OUTPATIENT)
Dept: HEALTH INFORMATION MANAGEMENT | Facility: OTHER | Age: 63
End: 2023-12-27
Payer: COMMERCIAL

## 2023-12-28 ENCOUNTER — NON-PROVIDER VISIT (OUTPATIENT)
Dept: CARDIOLOGY | Facility: MEDICAL CENTER | Age: 63
End: 2023-12-28
Attending: STUDENT IN AN ORGANIZED HEALTH CARE EDUCATION/TRAINING PROGRAM
Payer: COMMERCIAL

## 2023-12-28 DIAGNOSIS — I47.10 SVT (SUPRAVENTRICULAR TACHYCARDIA) (HCC): ICD-10-CM

## 2023-12-28 DIAGNOSIS — I49.1 APC (ATRIAL PREMATURE CONTRACTIONS): ICD-10-CM

## 2023-12-28 DIAGNOSIS — I49.3 PVC'S (PREMATURE VENTRICULAR CONTRACTIONS): ICD-10-CM

## 2023-12-28 DIAGNOSIS — R00.2 PALPITATIONS: ICD-10-CM

## 2023-12-28 NOTE — PROGRESS NOTES
Home enrollment completed in the 14 day Zio XT Holter monitor per Janine Gomez MD.  Monitor will be shipped to patient via [employer].

## 2024-01-22 ENCOUNTER — TELEMEDICINE (OUTPATIENT)
Dept: MEDICAL GROUP | Facility: MEDICAL CENTER | Age: 64
End: 2024-01-22
Payer: COMMERCIAL

## 2024-01-22 VITALS — HEIGHT: 72 IN | BODY MASS INDEX: 42.66 KG/M2 | WEIGHT: 315 LBS | RESPIRATION RATE: 14 BRPM

## 2024-01-22 DIAGNOSIS — I10 ESSENTIAL HYPERTENSION: ICD-10-CM

## 2024-01-22 DIAGNOSIS — L03.116 CELLULITIS OF LEFT LOWER EXTREMITY: ICD-10-CM

## 2024-01-22 PROCEDURE — 99214 OFFICE O/P EST MOD 30 MIN: CPT | Mod: 95 | Performed by: STUDENT IN AN ORGANIZED HEALTH CARE EDUCATION/TRAINING PROGRAM

## 2024-01-22 RX ORDER — LOSARTAN POTASSIUM 100 MG/1
100 TABLET ORAL DAILY
Qty: 90 TABLET | Refills: 0 | Status: SHIPPED | OUTPATIENT
Start: 2024-01-22

## 2024-01-22 RX ORDER — DOXYCYCLINE HYCLATE 100 MG
100 TABLET ORAL 2 TIMES DAILY
Qty: 14 TABLET | Refills: 0 | Status: SHIPPED | OUTPATIENT
Start: 2024-01-22 | End: 2024-01-29

## 2024-01-22 RX ORDER — CLOTRIMAZOLE 1 %
1 CREAM (GRAM) TOPICAL 2 TIMES DAILY
Qty: 40 G | Refills: 0 | Status: SHIPPED | OUTPATIENT
Start: 2024-01-22

## 2024-01-22 RX ORDER — DOXYCYCLINE HYCLATE 100 MG
100 TABLET ORAL 2 TIMES DAILY
COMMUNITY
Start: 2024-01-16 | End: 2024-01-22 | Stop reason: SDUPTHER

## 2024-01-22 ASSESSMENT — FIBROSIS 4 INDEX: FIB4 SCORE: 0.99

## 2024-01-23 NOTE — PROGRESS NOTES
Virtual Visit: Established Patient   This visit was conducted via Zoom using secure and encrypted videoconferencing technology.   The patient was in their home in the [facility].    The patient's identity was confirmed and verbal consent was obtained for this virtual visit.    Subjective:   CC:   Chief Complaint   Patient presents with    Follow-Up     Cellulitis (L) leg      Thomas Borja is a 63 y.o. male presenting for evaluation and management of:    Problem   Cellulitis of Left Lower Extremity    This is a recurrent condition.  He had severe cellulitis last year required multiple courses of antibiotics and wound care.  Several days ago he had swelling and redness of the leg and was seen in the emergency department.  I have reviewed those records.  He is currently on doxycycline.     Essential Hypertension    This is a chronic condition, currently on amlodipine 10 mg daily and losartan 25 mg daily.  He is getting significant leg swelling         Current medicines (including changes today)  Current Outpatient Medications   Medication Sig Dispense Refill    doxycycline (VIBRAMYCIN) 100 MG Tab Take 1 Tablet by mouth 2 times a day for 7 days. 14 Tablet 0    clotrimazole (LOTRIMIN) 1 % Cream Apply 1 Application topically 2 times a day. 40 g 0    losartan (COZAAR) 100 MG Tab Take 1 Tablet by mouth every day. 90 Tablet 0    methylphenidate (CONCERTA) 36 MG CR tablet Take 1 Tablet by mouth every morning for 30 days. 30 Tablet 0    meclizine (ANTIVERT) 12.5 MG Tab TAKE 1 TABLET BY MOUTH THREE TIMES DAILY AS NEEDED FOR DIZZINESS 90 Tablet 0    levothyroxine (SYNTHROID) 112 MCG Tab Take 1 tablet by mouth once daily 90 Tablet 3    cyclobenzaprine (FLEXERIL) 5 mg tablet TAKE 1 TABLET BY MOUTH AT BEDTIME 30 Tablet 0    Continuous Blood Gluc Sensor (FREESTYLE VINI 2 SENSOR) Misc APPLY 1 SENSOR EVERY 2 WEEKS      finasteride (PROSCAR) 5 MG Tab Take 1 Tablet by mouth every day. 90 Tablet 3    omeprazole (PRILOSEC)  40 MG delayed-release capsule Take 1 Capsule by mouth every day. 90 Capsule 3    rosuvastatin (CRESTOR) 20 MG Tab Take 1 Tablet by mouth every day. 90 Tablet 3    tamsulosin (FLOMAX) 0.4 MG capsule Take 1 Capsule by mouth every day. 90 Capsule 3    traZODone (DESYREL) 50 MG Tab Take 1 Tablet by mouth at bedtime. 90 Tablet 3    glipiZIDE (GLUCOTROL) 5 MG Tab Take 1 Tablet by mouth every day. 90 Tablet 3     No current facility-administered medications for this visit.       Patient Active Problem List    Diagnosis Date Noted    Palpitations 12/11/2023    Attention deficit hyperactivity disorder (ADHD), combined type 06/22/2023    Acquired hypothyroidism 06/22/2023    Diabetic neuropathy (HCC) 06/22/2023    Benign prostatic hyperplasia without lower urinary tract symptoms 06/22/2023    Primary insomnia 06/22/2023    Skin lesion of hand 02/13/2023    Skin lesions 02/13/2023    Hyponatremia 12/04/2022    Alcohol abuse 12/04/2022    Cellulitis of left lower extremity 12/04/2022    Obstructive sleep apnea 01/07/2022    Morbid obesity due to excess calories ([facility]) 01/07/2022    Mitral valve prolapse 01/07/2022    Mild intermittent asthma, uncomplicated 01/07/2022    Gallstones 01/07/2022    Degenerative arthritis of knee, bilateral 01/07/2022    Chronic midline low back pain without sciatica 01/07/2022    Essential hypertension 01/07/2022    Type 2 diabetes mellitus with neurologic complication, without long-term current use of insulin ([facility]) 01/07/2022    Iron deficiency anemia 09/21/2018    GERD without esophagitis 06/09/2017    S/P laparoscopic sleeve gastrectomy 02/10/2017    Postoperative intestinal malabsorption 02/10/2017    Esophageal dysphagia 02/10/2017        Objective:   Resp 14   Ht 1.829 m (6')   Wt (!) 144 kg (318 lb)   BMI 43.13 kg/m²     Physical Exam:  Constitutional: Alert, no distress, well-groomed.  Skin: No rashes in visible areas.  Eye: Round. Conjunctiva clear, lids normal. No icterus.   ENMT: Lips  pink without lesions, good dentition, moist mucous membranes. Phonation normal.  Neck: No masses, no thyromegaly. Moves freely without pain.  Respiratory: Unlabored respiratory effort, no cough or audible wheeze  Psych: Alert and oriented x3, normal affect and mood.     Assessment and Plan:   The following treatment plan was discussed:     1. Cellulitis of left lower extremity  Extend the course of antibiotics as he has had complications with cellulitis in the past, start clotrimazole on any open areas  - doxycycline (VIBRAMYCIN) 100 MG Tab; Take 1 Tablet by mouth 2 times a day for 7 days.  Dispense: 14 Tablet; Refill: 0  - clotrimazole (LOTRIMIN) 1 % Cream; Apply 1 Application topically 2 times a day.  Dispense: 40 g; Refill: 0    2. Essential hypertension  Chronic condition, discontinue amlodipine due to lower extremity edema, increase losartan to 100 mg daily  - losartan (COZAAR) 100 MG Tab; Take 1 Tablet by mouth every day.  Dispense: 90 Tablet; Refill: 0    Please note that this dictation was created using voice recognition software. I have made every reasonable attempt to correct obvious errors, but I expect that there are errors of grammar and possibly content that I did not discover before finalizing the note.      Follow-up: No follow-ups on file.

## 2024-01-24 ENCOUNTER — PATIENT MESSAGE (OUTPATIENT)
Dept: MEDICAL GROUP | Facility: MEDICAL CENTER | Age: 64
End: 2024-01-24
Payer: COMMERCIAL

## 2024-01-24 DIAGNOSIS — R60.9 EDEMA, UNSPECIFIED TYPE: ICD-10-CM

## 2024-01-25 ENCOUNTER — PATIENT MESSAGE (OUTPATIENT)
Dept: MEDICAL GROUP | Facility: MEDICAL CENTER | Age: 64
End: 2024-01-25
Payer: COMMERCIAL

## 2024-01-25 RX ORDER — FUROSEMIDE 20 MG/1
20 TABLET ORAL 2 TIMES DAILY
Qty: 60 TABLET | Refills: 0 | Status: SHIPPED | OUTPATIENT
Start: 2024-01-25

## 2024-01-29 ENCOUNTER — TELEPHONE (OUTPATIENT)
Dept: CARDIOLOGY | Facility: MEDICAL CENTER | Age: 64
End: 2024-01-29
Payer: COMMERCIAL

## 2024-01-30 PROCEDURE — 93248 EXT ECG>7D<15D REV&INTERPJ: CPT | Performed by: INTERNAL MEDICINE

## 2024-02-09 NOTE — PROGRESS NOTES
CHIEF COMPLAINT:  Chief Complaint   Patient presents with    Follow-up     4 month med check  Issue with finger on right hand  Neck and back pain  Would like to discuss labs being together with Cardio labs       HISTORY OF PRESENT ILLNESS:    Hypertension:  Blood pressures usually run  110-140 / 50-80, pulse 60-80 . The patient complains of no cardiac symptoms   Patient denies chest pain , shortness of breath, palpitations, peripheral edema. Hehas been taking his medicationregularly. The patient is tolerating the medication. Exercise: {EXERCISE:649201}  He exercises {frequency:406063}      Diabetes:  His blood sugars have been well controlled. Blood sugars range from 90 to 120. Blood sugars are  mainly checked in the morning . He is on no insulin. Hehas been taking his medicationregularly. The patient rarely experience low blood sugars. The patient consistently watches his carbohydrate intake. Hyperlipidemia: Currently on Atorvastatin (Lipitor),20 mg. Hehas been taking his medicationregularly. The patient is tolerating the medication. He is not having muscle aches. The patient consistently watches his diet. Right PIP joint on the 3rd finger gets swollen and painful. Stem cell treatment in November 2020 was helpful. Has seen 3 different hand doctor and no one had an answer. Will stiffen and hard to flex, cannot fully extend. Has tried heat and cold. Ice helps with the swelling. Will take a few days to calm down. History of pseudogout. Diagnosed in [facility] in 11/2020. Past problems with ankles. No ankle pain. Left knee replacement. Less flexion. 1.5 years ago for left knee replacement. Some soreness in the neck on occasion. History of DDD in the neck. Radiates down to the upper back muscles. No arm pains. Low back discs also cause trouble. Pain in spine and to the left lower back. No sciatica. Using some ice or heat. No medicines for it. Some waking up from the back.   No Provider Encounter- Full Thickness wound    HISTORY OF PRESENT ILLNESS  Wound History:   START OF CARE IN CLINIC: 1/16/2023 (return to clinic following hospitalization)   REFERRING PROVIDER:   Dr. Paige  WOUND- Full Thickness Wound   LOCATION: Left lower extremity, right anterior knee, left anterior knee   HISTORY: Patient presented to [facility] on 12/4/2022 with left lower extremity, fever, malaise, erythema x1 week.  He was originally admitted to [facility] for acute kidney injury and sepsis but with worsening renal function was transferred to [facility].  Wound cultures grew Streptococcus pyogenes group A and MSSA.  Patient was initially treated with cefazolin.  He is seen by the wound care team who recommended follow-up as an outpatient in wound care clinic.  While inpatient nephrology saw the patient and started him on dialysis for acute kidney injury.  His renal function improved and dialysis was stopped.  Patient was discharged with follow-up in the wound care clinic.    Patient was following with wound clinic, however on clinic visit 1/3/2022 he was noticed to have worsening wounds and concern for infection. He had failed outpatient Abx with zyvox and was sent to ED for further evaluation. Patient was admitted to [facility] from 1/3-1/13/2023 for sepsis and left lower extremity cellulitis with abscess. He was taken to OR for I&D with Dr. Odom. Patient's wound was treated with wound VAC. Surgical cultures were positive for enterobacter cloacae and was treated with ciprofloxacin with end date of 1/21/2023. Patient was referred back to [facility] for further care.    Pertinent Medical History: Hypertension, alcohol abuse, kidney disease, GERD, mitral valve prolapse, morbid obesity, obstructive sleep apnea, type 2 diabetes    TOBACCO USE: Former smoker denies use of smokeless tobacco    Patient's problem list, allergies, and current medications reviewed and updated in Epic    Interval  History:  2023: Clinic visit with Travis Bonilla MD. Patient reports feeling well since hospitalization. He presented with wound VAC. He will be traveling to [place] for  and will be back on Monday. Wound VAC will be held while out of town and will plan to reapply next week. He was instructed on performing wound care until returns to clinic. Patient is tolerating Abx, continues to have some periwound erythema without warmth or pain. He has two new areas of fluctuance, concerning for superficial abscess, discussed performing I&D, patient agreeable.    2023 : Clinic visit with PANCHO Medina, EL, MERI, RYLEE.   Patient states that overall he is feeling well blood sugars have been in the low 100s.  No significant improvement in his wounds.  He does mention that he has raised bumps to his arms, this has been going on for several weeks, he noticed a new bump today on his left forearm.  They are slightly tender with palpation, no fluctuance.  He also has a diffuse rash to his arms and hands.  He completed his antibiotics 3 or 4 days ago.  States he may go into the emergency room in [place] after he leaves the clinic.  Denies any fevers, chills, nausea, vomiting.    2023 : Clinic visit with PANCHO Medina, EL, MERI, RYLEE.   Kwesi states that overall he is feeling well, though a little bit stressed.  His stepdaughter is in hospital.  He did  his Zyvox prescription, prescribed on Monday for positive MRSA wound culture, light growth.  He is tolerating without any difficulty.  The smaller wounds to his leg present today with boggy tissue, at least 1 of these communicates with the larger wound.  He has an appointment to follow-up with his surgeon, Dr. Odom, on .  His blood sugar today was 126.  The bumps on his forearms are about the same, he did not go to the emergency room last week.    2/10/2023 : Clinic visit with PANCHO Medina, EL, MERI, RYLEE.   Patient states  known worsening factors or events. Doing some exercises at the senior center. Light weights, NuStep, kettle bells, treadmill. 1 hour most days. Chronic tinnitus. Patient Active Problem List   Diagnosis    Type 2 diabetes mellitus with stage 4 chronic kidney disease, without long-term current use of insulin (CMD)    Pure hypercholesterolemia    Essential hypertension, benign    Primary localized osteoarthrosis of right hand    Tinnitus    Lumbosacral spondylosis without myelopathy    CKD (chronic kidney disease), stage IV (CMS/HCC)    Vitamin D deficiency    Obesity (BMI 30-39. 9)    Brittle nails    Seasonal allergic rhinitis    Benign prostatic hyperplasia with nocturia    Radial styloid tenosynovitis    Bladder outlet obstruction    Spondyloarthropathy    Osteoarthritis of finger of right hand    Acute monoarthritis of left knee    Pseudogout of left knee    Primary osteoarthritis of left knee    Anemia due to stage 4 chronic kidney disease (CMD)       MEDICATIONS:  Current Outpatient Medications   Medication Sig Dispense Refill    Lancets (OneTouch Delica Plus [id number]) Misc TEST ONCE DAILY 100 each 3    atorvastatin (LIPITOR) 20 MG tablet TAKE 1 TABLET BY MOUTH EVERY NIGHT 90 tablet 2    blood glucose (OneTouch Ultra) test strip TEST ONCE DAILY 100 strip 3    glyBURIDE (DIABETA) 5 MG tablet Take 0.5 tablets by mouth in the morning and 0.5 tablets in the evening. Take with meals. Indications: Type 2 Diabetes. 90 tablet 3    lisinopril (ZESTRIL) 20 MG tablet Take 1 tablet by mouth in the morning and 1 tablet in the evening. 180 tablet 3    hydroCHLOROthiazide (HYDRODIURIL) 25 MG tablet Take 1 tablet by mouth daily. 90 tablet 3    amLODIPine (NORVASC) 5 MG tablet Take 1 tablet by mouth daily. 90 tablet 3    magnesium oxide (MAG-OX) 400 MG tablet Take 400 mg by mouth in the morning and 400 mg in the evening. Indications: Disorder with Low Magnesium Levels.       calcitRIOL (ROCALTROL) 0.25 MCG "capsule Take 1 capsule by mouth 3 days a week. Take 1 capsule on Monday's, Wednesday's, and Friday's 39 capsule 3    MAGNESIUM CITRATE PO Take 250 mg by mouth in the morning and 250 mg in the evening. Multiple Vitamins-Minerals (MULTIVITAMIN ADULTS 50+ PO) Take 1 tablet by mouth daily. CHOLECALCIFEROL PO Take 2,000 Units by mouth daily. DISPENSE Please supply patient with One Touch Ultra 2 lancets. 100 each 11     No current facility-administered medications for this visit.        ALLERGIES:  Allergies as of 02/09/2024 - Reviewed 02/09/2024   Allergen Reaction Noted    Oxycodone Nausea & Vomiting 05/04/2012    Vicodin [hydrocodone-acetaminophen] Nausea & Vomiting 05/04/2012    Allopurinol Other (See Comments) 12/05/2022    Hydromet Other (See Comments) 04/28/2014    Hydromorphone CONSTIPATION 08/29/2022    Losartan Other (See Comments) 07/17/2018    Sglt2 inhibitors PRURITUS 08/03/2023    Tramadol Other (See Comments) 02/24/2023        SOCIAL HISTORY  Social History     Tobacco Use   Smoking Status Never   Smokeless Tobacco Never     Health Maintenance   Topic Date Due    COVID-19 Vaccine (1) Never done    Shingles Vaccine (1 of 2) Never done    Influenza Vaccine (1) 09/01/2023    Diabetes Eye Exam  11/15/2023    Medicare Advantage- Medicare Wellness Visit  01/01/2024    Depression Screening  06/08/2024    Diabetes Foot Exam  06/08/2024    DM/CKD Microalbumin  07/27/2024    Diabetes A1C  08/05/2024    DM/CKD GFR  02/05/2025    DTaP/Tdap/Td Vaccine (3 - Td or Tdap) 05/29/2032    Hepatitis C Screening  Completed    Pneumococcal Vaccine 65+  Completed    Meningococcal Vaccine  Aged Out    Hepatitis B Vaccine (For Physician/APC Discussion)  Aged Out    HPV Vaccine  Aged Out    Colorectal Cancer Screen-  Discontinued       VITALS:  Visit Vitals  /76   Pulse (!) 55   Temp 97.5 Â°F (36.4 Â°C)   Resp 16   Ht 5' 8"" (1.727 m)   Wt 104.3 kg (230 lb)   SpO2 99%   BMI 34.97 kg/mÂ²       PHYSICAL " that overall he is feeling well.  His blood sugars have consistently been running between 120 and 140.  As discussed last visit, the tissue between the lateral wound and a small anterior wound, overlying wound tract, was excised in clinic today using local anesthesia.  Wound VAC continued.  He does still have quite a bit of undermining all around the wound.  He sees his surgeon next Friday.   He will also be undergoing biopsies of the lumps on his arms and hands on Monday by his PCP.     2/17/2023 : Clinic visit with PANCHO Medina, FNP-BC, CWOCN, CFCN.   Kwesi continues to feel well.  He was seen by a surgeon earlier today, who was overall satisfied with wound progress, did not feel skin over undermining needed to be excised.  Per patient, surgeon is going to speak with one of his colleagues about possible skin grafting at some point.  Surgeon did feel patient would benefit from higher levels of compression as his leg is quite edematous still.  Patient was prescribed minocycline and Zyvox earlier this week due to positive wound culture.  He is taking these as prescribed, and states he is tolerating without difficulty.   He did not have the biopsy of lumps in his arms as expected earlier this week.  Lumps have dissipated.  However raised lesion to left dorsal thenar hand has grown larger, suspicious for malignancy.  He has an appointment with dermatology next Wednesday.    2/27/2023: Clinic visit with PANCHO Teixeira.  Patient reports that he completed his oral antibiotics last Saturday patient reports he had mild diarrhea from the antibiotics however it is improving since he has completed his antibiotics Saturday.  Patient's wound beds are improving decreased depth to the 2 superior left anterior wounds.  Granulation tissue throughout the largest left anterior lower extremity wound and the posterior left lower extremity wound.   Does have 1 raised lump along the previous track pad area no signs or  EXAM:  Constitutional:  Well developed, well nourished, no acute distress, non-toxic appearance   Eyes:  Pupils equal, round, reactive to light, conjunctivae normal   HENT:  Atraumatic, external ears normal, nose normal, oropharynx moist. Neck- normal range of motion, no tenderness, supple, no thyroid enlargement   Respiratory:  Normal respiratory rate and effort, normal breath sounds, no rales, no wheezing   Cardiovascular:  Normal rate, normal rhythm, no murmurs, no gallops, no rubs   Gastrointestinal:  Soft, nondistended, normal bowel sounds, nontender, no organomegaly. Musculoskeletal:  No edema  Integument:  Well hydrated, no rashes   Lymphatic:  No lymphadenopathy noted   Neurologic:  Alert & oriented x 3  Psychiatric:  Speech and behavior appropriate    LABS:  Lab Services on 02/05/2024   Component Date Value    Fasting Status 02/05/2024 12     Sodium 02/05/2024 140     Potassium 02/05/2024 4.7     Chloride 02/05/2024 103     Carbon Dioxide 02/05/2024 25     Anion Gap 02/05/2024 17     Glucose 02/05/2024 104 (H)     BUN 02/05/2024 32 (H)     Creatinine 02/05/2024 2.40 (H)     Glomerular Filtration Ra* 02/05/2024 27 (L)     BUN/Cr 02/05/2024 13     Calcium 02/05/2024 8.8     HGB 02/05/2024 11.4 (L)     HCT 02/05/2024 34.9 (L)     Hemoglobin A1C 02/05/2024 6.5 (H)          ASSESSMENT AND PLAN:    There are no diagnoses linked to this encounter. There are no Patient Instructions on file for this visit. No follow-ups on file. symptoms of infection however, patient reports that these lumps are how the other wounds started.  No additional antibiotics we will continue to monitor.    3/8/2023: Clinic visit with Travis Bonilla MD. Patient reports doing ok. Denies any problems with his wound VAC. Patient's wounds are slowly improving. Less undermining to medial aspect of wound, continues to have undermining 2 o'clock. His posterior wound appears to be improving. 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    3/15/2023 : Clinic visit with PANCHO Medina, FNFERMÍN-BC, CWAMAN, CFVALE.   Kwesi states he is feeling well overall.  He is currently not on any antibiotics.  Erythema to his left lower leg has been persistent, no changes however.  VAC has been on hold t since 3/13 due to periwound issues, which have since resolved..  Patient is agreeable to restarting VAC today.  He is anxious to get this wound healed as quickly as possible.   He was seen by his surgeon earlier this week, who was satisfied with current progress of this wound.   Patient's blood pressure elevated today, 175/99.  He denies any symptoms of CVA or MI-reviewed with him in clinic today.  He states he did not take his blood pressure medication today, will take it when he gets home.    4/5/2023: Clinic visit with Travis Bonilla MD. Patient reports doing well. Denies any issues with wound VAC. He is tolerating compression without issue. Wound is measuring smaller and undermining has improved. Patient's BP remains slightly elevated, improved from previous and recommend he follow up with PCP for BP control. Reports glucose has been well controlled, mostly under 150.    4/12/2023: Clinic visit with PANCHO Teixeira.  Patient reports that he is doing well denies any fever or chills.  Patient's wound has beefy red granulation tissue forming throughout the wound bed there is  Encouraged an active lifestyle.      Type 2 diabetes mellitus with stage 4 chronic kidney disease, without long-term current use of insulin (CMD)  Patient's diabetes well controlled. Hemoglobin A1c at goal. Will continue current medications and/or lifestyle measures. Patient encouraged to be active and exercise. Control carbohydrate intake. We'll followup with hemoglobin A1c and appointment in 6 months.      Hypercholesteremia  Patient's hypercholesterolemia well controlled with current medications and/or lifestyle measures. Patient tolerating current treatment. We'll continue current treatment. Encouraged to watch the fat and cholesterol in their diet.      CKD (chronic kidney disease), stage IV (CMD)  Patient followed by Nephrology.  Recently seen in August 2023.  Kidney function is stable.  Will continue good blood pressure and diabetes control.    Anemia due to stage 4 chronic kidney disease (CMD)  Patient with anemia due to his chronic kidney disease.  This is followed by Nephrology.  Stable at this time.    Malaise and fatigue  Patient has some tiredness fatigue.  Encourage good activity and exercise.  Good nutrition, good sleep.  Will consider further evaluation if he does not improve.  - Uric Acid; Future  - Uric Acid    Chronic neck pain  Chronic neck pain.  Muscular and arthritic likely.  No evidence of radiculopathy.  Encouraged Tylenol, topical treatments, gentle stretches.  Avoid bending lifting twisting    Chronic left-sided low back pain without sciatica  Patient with chronic low back pain.  No sciatica.  Encouraged Tylenol, topical treatments, heat or ice.  If it worsens will consider physical therapy or pain specialist.  - Uric Acid; Future  - Uric Acid    Pain in finger of right hand  Patient with pain in his finger, unusually swollen.  Will check uric acid, consider further treatment depending on the results.  May consider hand specialist referral if the pain worsens.  - Uric Acid; Future  - Uric  a slight amount of epithelial tissue at the periphery to the eye inferior lateral aspect.    4/27/2023: Clinic visit with PANCHO Teixeira.  Patient's wound is progressing at this point we will hold the wound VAC.  If the wound is progressing we will have the patient return to the wound VAC on Monday.  I will quickly follow-up on Monday to assess the wound and determine whether we will continue it or send it back to [facility]  Patient's insurance is requesting either a peer to peer or sending the wound VAC back.  The wound is appropriate at this time for discharge of the wound VAC however if it digresses significantly over the weekend we will reapply and I will contact the patient's insurance to perform a peer to peer consultation.    5/4/2023 : Clinic visit with PANCHO Medina, FNFERMÍN-BC, CWAMAN, CFCN.   Patient's wound continues to progress, area has decreased since last assessment.  VAC discontinued.  Patient to send pump back to .  He states that he is feeling well, his blood sugar today was 132.      REVIEW OF SYSTEMS:   Unchanged from previous clinic visit on 4/27/2023, except as documented in interval history above    PHYSICAL EXAMINATION:   /77 (BP Location: Right arm, Patient Position: Sitting)   Pulse 76   Temp 36 °C (96.8 °F) (Temporal)   Resp 18   SpO2 96%     Physical Exam  Constitutional:       Appearance: He is obese.   Cardiovascular:      Rate and Rhythm: Normal rate.      Pulses: Normal pulses.   Pulmonary:      Effort: Pulmonary effort is normal. No respiratory distress.      Breath sounds: No wheezing.   Musculoskeletal:      Left lower leg: Edema present.      Comments: 1+ edema of left lower extremity   Skin:     Comments: Full-thickness wound to the left anterior lower extremity status post I&D -wound area has decreased since last assessment, edges are rolled, thin layer of slough to wound bed, moderate serosanguineous drainage      Refer to wound flowsheet and photos      Neurological:      Mental Status: He is alert and oriented to person, place, and time.       WOUND ASSESSMENT  Wound 01/16/23 Incision Leg Anterior;Lower Left (Active)   Wound Image    05/04/23 1445   Site Assessment Pink;Yellow 05/04/23 1445   Periwound Assessment Blanchable erythema;Edema 05/04/23 1445   Margins Attached edges 05/04/23 1445   Closure Secondary intention 01/16/23 1700   Drainage Amount Moderate 05/04/23 1445   Drainage Description Serosanguineous;Other (Comment) 05/04/23 1445   Treatments Cleansed;Topical Lidocaine;Provider debridement 05/04/23 1445   Wound Cleansing Puracyn [place] 05/04/23 1445   Periwound Protectant Skin Moisturizer;Barrier Paste 05/04/23 1445   Dressing Cleansing/Solutions Normal Saline 05/04/23 1445   Dressing Options Collagen Dressing;Hydrofiber Silver;Hydrofiber;Unna Boot;Coban 05/04/23 1445   Dressing Changed Changed 05/04/23 1445   Dressing Status Clean;Dry;Intact 03/22/23 1500   Dressing Change/Treatment Frequency Other (Comments) 04/21/23 1400   Non-staged Wound Description Full thickness 05/04/23 1445   Wound Length (cm) 7.5 cm 05/04/23 1445   Wound Width (cm) 1.9 cm 05/04/23 1445   Wound Depth (cm) 0.3 cm 05/04/23 1445   Wound Surface Area (cm^2) 14.25 cm^2 05/04/23 1445   Wound Volume (cm^3) 4.275 cm^3 05/04/23 1445   Post-Procedure Length (cm) 7.5 cm 05/04/23 1445   Post-Procedure Width (cm) 2 cm 05/04/23 1445   Post-Procedure Depth (cm) 0.3 cm 05/04/23 1445   Post-Procedure Surface Area (cm^2) 15 cm^2 05/04/23 1445   Post-Procedure Volume (cm^3) 4.5 cm^3 05/04/23 1445   Wound Healing % 96 05/04/23 1445   Wound Bed Granulation (%) 100 % 03/06/23 1438   Tunneling (cm) 0.2 cm 05/04/23 1445   Tunneling Clock Position of Wound 2 05/04/23 1445   Tunneling - 2nd Location (cm) 1.6 cm 02/08/23 1500   Tunneling Clock Position of Wound - 2nd Location 7 02/08/23 1500   Tunneling - 3rd Location (cm) 4.2 cm 02/01/23 1530   Tunneling Clock Position of Wound - 3rd Location 11  Acid      Patient Instructions   Back pain or neck:    Use heat to relax the muscles.  Ice is helpful to decrease inflammation, especially after activity or right after injury.  Rest the back, find a good position with good support for the back.  Consider sleeping with a pillow under your knees or between them.  Topical sports creams such as Hoang Amos, Icy Hot, or Biofreeze can help.  Over the counter Lidocaine containing creams are also available and can provide some relief.  Lidocaine patches can help the back    Tylenol (acetaminophen) for mild to moderate pain.  Instructions on the bottle are the maximum dose.    GENTLE massage to tight areas.  GENTLE stretches for lower back and thighs. (hamstrings)  Slowly work on core strength with pelvic tilts, butt lifts, and abdominal crunches exercises.  Avoid lifting while bending at the waist, lift with your legs.  Also avoid twisting while lifting.      Neck step is physical therapy.    Call if having sciatica        Finger    Consider using some voltaren gel, up to twice a day  for the finger.  When it is bad.    Consider seeing hand surgery        To improve blood sugars:  Diet:  Keep total carbohydrates at 45-60 grams per meal.  More fiber, less sugars/sweets.  Exercise DAILY.  Aerobic exercise 4-5 times per week.  Lose weight.  Eat 3 meals a day.    If you are very active have a carb snack.    Feeling low - checking sugar  Use sugar - glucose tablets          Return if symptoms worsen or fail to improve, for already has f/u, in june, fasting labs before next appt.       02/01/23 1530   Undermining (cm) 0 cm 05/04/23 1445   Undermining of Wound, 1st Location From 1 o'clock;To 2 o'clock 04/05/23 1535   Undermining (cm) - 2nd location 0 cm 04/14/23 1415   Undermining of Wound, 2nd Location From 11 o'clock;To 12 o'clock 04/05/23 1535   Undermining (cm) - 3rd location 2 cm 02/08/23 1500   Undermining of Wound, 3rd Location From 5 o'clock;To 5 o'clock 02/08/23 1500   Wound Odor None 05/04/23 1445   Pulses Left;1+;DP 02/08/23 1500   Exposed Structures None 05/04/23 1445   Number of days: 108       PROCEDURE:   -Excisional debridement performed to remove thin layer of slough from the wound bed and to stimulate granulation tissue formation.  -2% viscous lidocaine applied topically to wound bed for approximately 5 minutes prior to debridement  -Curette used to debride wound bed.  Excisional debridement was performed to remove devitalized tissue until healthy, bleeding tissue was visualized.   Entire surface of wound, 15.0 cm² debrided.  Tissue debrided into the subcutaneous layer.    -Bleeding controlled with manual pressure.    -Wound care completed by wound RN, refer to flowsheet  -Patient tolerated the procedure well, without c/o pain or discomfort.        Pertinent Labs and Diagnostics:    Labs:     A1c:   Lab Results   Component Value Date/Time    HBA1C 8.5 (H) 12/04/2022 09:27 PM            IMAGING: Dx to tibia and fibula dated 12/5/2022  IMPRESSION:     No radiographic evidence of acute bony abnormality     Skin thickening and edema with some soft tissue calcifications most likely indicates venous stasis. Cellulitis is a possibility and there is no evidence of soft tissue gas. Dermatomyositis can be seen with soft tissue calcifications but (are not as   extensive as that often entails      LAST  WOUND CULTURE:  DATE :   Lab Results   Component Value Date/Time    CULTRSULT - (A) 02/08/2023 03:00 PM    CULTRSULT (A) 02/08/2023 03:00 PM     Klebsiella pneumoniae ESBL  Light  growth  Extended Spectrum Beta-lactamase (ESBL) isolated.  ESBL's may be clinically resistant to therapy with  Penicillins,Cephalosporins or Aztreonam despite  apparent in vitro susceptibility to some of these agents.  The patient requires contact isolation.  Please contact pharmacy or an Infectious Disease Specialist  if you have any questions about appropriate therapy.      RUEL (A) 02/08/2023 03:00 PM     Methicillin Resistant Staphylococcus aureus  Rare growth           ASSESSMENT AND PLAN:   1. Open wound of left lower extremity, subsequent encounter    5/4/2023: Wound is currently progressing, area has decreased since last assessment, no evidence of infection  -Excisional debridement of wound in clinic today, medically necessary to promote wound healing.  -Patient to return to clinic 2 times weekly for assessment and debridement  -Patient to keep compression wrap dry in between clinic visits  -Patient to send VAC back to        Wound care: Dressing, Hydrofiber silver, Hydrofiber, sore abdominal pad, Unna boot, Coban    2. Type 2 diabetes mellitus without complication, without long-term current use of insulin ([facility])  Comments: Patient is currently on glipizide only    5/4/2023: Patient reports his blood sugar today was 137.  Patient has been instructed to keep tight control over FBS, <140 for optimal wound healing;  - Implications of loss of protective sensation (LOPS) previously discussed with patient, including increased risk for amputation.  Advised to check feet at least daily, moisturize feet, and to always wear protective foot wear, arrange meticulous regular foot care by podiatrist or [facility]. Pt with good understanding.    -Consider referral to endocrinology    3. Wound infection    5/4/2023: There is no evidence of wound infection today..  -Monitor for signs and symptoms of infection each clinic visit        PATIENT EDUCATION  - Importance of adequate nutrition for wound healing  -Advised to go to  ER for any increased redness, swelling, drainage, or odor, or if patient develops fever, chills, nausea or vomiting.           Please note that this note may have been created using voice recognition software. I have worked with technical experts from [facility] to optimize the interface.  I have made every reasonable attempt to correct obvious errors, but there may be errors of grammar and possibly content that I did not discover before finalizing the note.    N

## 2024-02-22 ENCOUNTER — APPOINTMENT (OUTPATIENT)
Dept: MEDICAL GROUP | Facility: MEDICAL CENTER | Age: 64
End: 2024-02-22
Payer: COMMERCIAL

## 2024-02-23 ENCOUNTER — OFFICE VISIT (OUTPATIENT)
Dept: MEDICAL GROUP | Facility: MEDICAL CENTER | Age: 64
End: 2024-02-23
Payer: COMMERCIAL

## 2024-02-23 ENCOUNTER — HOSPITAL ENCOUNTER (OUTPATIENT)
Dept: RADIOLOGY | Facility: MEDICAL CENTER | Age: 64
End: 2024-02-23
Attending: STUDENT IN AN ORGANIZED HEALTH CARE EDUCATION/TRAINING PROGRAM
Payer: COMMERCIAL

## 2024-02-23 VITALS
BODY MASS INDEX: 42.66 KG/M2 | SYSTOLIC BLOOD PRESSURE: 146 MMHG | WEIGHT: 315 LBS | DIASTOLIC BLOOD PRESSURE: 80 MMHG | RESPIRATION RATE: 20 BRPM | HEART RATE: 106 BPM | TEMPERATURE: 97.7 F | HEIGHT: 72 IN | OXYGEN SATURATION: 97 %

## 2024-02-23 DIAGNOSIS — E11.42 DIABETIC POLYNEUROPATHY ASSOCIATED WITH TYPE 2 DIABETES MELLITUS (HCC): ICD-10-CM

## 2024-02-23 DIAGNOSIS — E11.42 TYPE 2 DIABETES MELLITUS WITH DIABETIC POLYNEUROPATHY, WITHOUT LONG-TERM CURRENT USE OF INSULIN (HCC): ICD-10-CM

## 2024-02-23 DIAGNOSIS — L03.116 LEFT LEG CELLULITIS: ICD-10-CM

## 2024-02-23 DIAGNOSIS — I10 ESSENTIAL HYPERTENSION: ICD-10-CM

## 2024-02-23 LAB
HBA1C MFR BLD: 8.3 % (ref ?–5.8)
POCT INT CON NEG: NEGATIVE
POCT INT CON POS: POSITIVE

## 2024-02-23 PROCEDURE — 83036 HEMOGLOBIN GLYCOSYLATED A1C: CPT | Performed by: STUDENT IN AN ORGANIZED HEALTH CARE EDUCATION/TRAINING PROGRAM

## 2024-02-23 PROCEDURE — 700117 HCHG RX CONTRAST REV CODE 255: Performed by: STUDENT IN AN ORGANIZED HEALTH CARE EDUCATION/TRAINING PROGRAM

## 2024-02-23 PROCEDURE — 3077F SYST BP >= 140 MM HG: CPT | Performed by: STUDENT IN AN ORGANIZED HEALTH CARE EDUCATION/TRAINING PROGRAM

## 2024-02-23 PROCEDURE — 3079F DIAST BP 80-89 MM HG: CPT | Performed by: STUDENT IN AN ORGANIZED HEALTH CARE EDUCATION/TRAINING PROGRAM

## 2024-02-23 PROCEDURE — 73701 CT LOWER EXTREMITY W/DYE: CPT | Mod: LT

## 2024-02-23 PROCEDURE — 99214 OFFICE O/P EST MOD 30 MIN: CPT | Performed by: STUDENT IN AN ORGANIZED HEALTH CARE EDUCATION/TRAINING PROGRAM

## 2024-02-23 RX ORDER — SULFAMETHOXAZOLE AND TRIMETHOPRIM 800; 160 MG/1; MG/1
1 TABLET ORAL 2 TIMES DAILY
Qty: 20 TABLET | Refills: 0 | Status: SHIPPED | OUTPATIENT
Start: 2024-02-23

## 2024-02-23 RX ORDER — HYDROCHLOROTHIAZIDE 12.5 MG/1
12.5 TABLET ORAL DAILY
Qty: 90 TABLET | Refills: 0 | Status: SHIPPED | OUTPATIENT
Start: 2024-02-23

## 2024-02-23 RX ADMIN — IOHEXOL 75 ML: 350 INJECTION, SOLUTION INTRAVENOUS at 12:14

## 2024-02-23 ASSESSMENT — PATIENT HEALTH QUESTIONNAIRE - PHQ9
5. POOR APPETITE OR OVEREATING: 1 - SEVERAL DAYS
SUM OF ALL RESPONSES TO PHQ QUESTIONS 1-9: 8
CLINICAL INTERPRETATION OF PHQ2 SCORE: 2

## 2024-02-23 ASSESSMENT — FIBROSIS 4 INDEX: FIB4 SCORE: 0.99

## 2024-02-23 NOTE — PROGRESS NOTES
Subjective:     CC: Left leg cellulitis, diabetes, hypertension    HPI:   Thomas presents today with    Problem   Diabetic Neuropathy (Hcc)    This is a chronic condition, currently takes Lyrica 50 mg twice daily with good relief      Left Leg Cellulitis    This is a recurrent condition.  He had severe cellulitis last year required multiple courses of antibiotics and wound care.  He needed a extended course of linezolid during his first infection.  Since then he has been seen in the emergency department about a month ago for recurrent cellulitis and was put on doxycycline.  Pain has improved but he continues to have swelling especially on the top of the foot and the heel.  He has multiple blistering pustules on the top of his left foot.  He does not have any open wounds at this time.  In the past he has tested positive for MRSA     Essential Hypertension    This is a chronic condition, currently taking losartan 100 mg daily.  Blood pressure is still elevated.     Type 2 Diabetes Mellitus With Neurologic Complication, Without Long-Term Current Use of Insulin (Hcc)    This is a chronic condition, A1c is 8.3.  Currently on glipizide 5 mg daily       ROS:  ROS    Objective:     Exam:  BP (!) 146/80   Pulse (!) 106   Temp 36.5 °C (97.7 °F) (Temporal)   Resp 20   Ht 1.829 m (6')   Wt (!) 144 kg (316 lb 9.6 oz)   SpO2 97%   BMI 42.94 kg/m²  Body mass index is 42.94 kg/m².    Physical Exam  Vitals reviewed.   Constitutional:       General: He is not in acute distress.     Appearance: He is not toxic-appearing.   HENT:      Head: Normocephalic and atraumatic.      Right Ear: External ear normal.      Left Ear: External ear normal.   Eyes:      General:         Right eye: No discharge.         Left eye: No discharge.      Extraocular Movements: Extraocular movements intact.      Conjunctiva/sclera: Conjunctivae normal.   Pulmonary:      Effort: Pulmonary effort is normal. No respiratory distress.   Musculoskeletal:       Comments: Swelling on the dorsum and heel of the left leg.  Red/purple discoloration of the entire foot and moving up the calf.  On the dorsum of the foot there are are about 10 blistering of pustules.  There is no discharge at this time.  There are no obvious skin breaks.   Skin:     General: Skin is warm and dry.   Neurological:      Mental Status: He is alert.   Psychiatric:         Mood and Affect: Mood normal.         Behavior: Behavior normal.         Thought Content: Thought content normal.         Judgment: Judgment normal.         Assessment & Plan:     63 y.o. male with the following -     1. Left leg cellulitis  Stat CT ordered to make sure there is no gas or abscess.  To start Bactrim.  Referral to infectious disease sent as he is having recurrent cellulitis and has needed multiple rounds of different antibiotics without resolution.  Add Bactroban ointment  - sulfamethoxazole-trimethoprim (BACTRIM DS) 800-160 MG tablet; Take 1 Tablet by mouth 2 times a day.  Dispense: 20 Tablet; Refill: 0  - CT-EXTREMITY, LOWER WITH & W/O LEFT; Future  - Referral to Infectious Disease  - mupirocin (BACTROBAN) 2 % Ointment; Apply 1 Application topically 2 times a day.  Dispense: 22 g; Refill: 0    2. Type 2 diabetes mellitus with diabetic polyneuropathy, without long-term current use of insulin (HCC)  Uncontrolled, likely secondary to recurrent infections.  After this infection is cleared we will adjust his medications  - POCT A1C    3. Diabetic polyneuropathy associated with type 2 diabetes mellitus (HCC)  Stable    4. Essential hypertension  Add hydrochlorothiazide to get blood pressure in the normal range  - hydroCHLOROthiazide 12.5 MG tablet; Take 1 Tablet by mouth every day.  Dispense: 90 Tablet; Refill: 0      No follow-ups on file.    Please note that this dictation was created using voice recognition software. I have made every reasonable attempt to correct obvious errors, but I expect that there are errors of  grammar and possibly content that I did not discover before finalizing the note.

## 2024-02-29 ENCOUNTER — OFFICE VISIT (OUTPATIENT)
Dept: INFECTIOUS DISEASES | Facility: MEDICAL CENTER | Age: 64
End: 2024-02-29
Attending: NURSE PRACTITIONER
Payer: COMMERCIAL

## 2024-02-29 VITALS
HEIGHT: 72 IN | DIASTOLIC BLOOD PRESSURE: 90 MMHG | RESPIRATION RATE: 18 BRPM | TEMPERATURE: 98.1 F | OXYGEN SATURATION: 94 % | SYSTOLIC BLOOD PRESSURE: 128 MMHG | HEART RATE: 110 BPM | WEIGHT: 315 LBS | BODY MASS INDEX: 42.66 KG/M2

## 2024-02-29 DIAGNOSIS — L03.116 LEFT LEG CELLULITIS: ICD-10-CM

## 2024-02-29 DIAGNOSIS — B35.3 TINEA PEDIS OF LEFT FOOT: ICD-10-CM

## 2024-02-29 DIAGNOSIS — E11.9 TYPE 2 DIABETES MELLITUS WITHOUT COMPLICATION, WITHOUT LONG-TERM CURRENT USE OF INSULIN (HCC): ICD-10-CM

## 2024-02-29 DIAGNOSIS — L03.119 RECURRENT CELLULITIS OF LOWER EXTREMITY: ICD-10-CM

## 2024-02-29 PROCEDURE — 3074F SYST BP LT 130 MM HG: CPT | Performed by: NURSE PRACTITIONER

## 2024-02-29 PROCEDURE — 99212 OFFICE O/P EST SF 10 MIN: CPT | Performed by: NURSE PRACTITIONER

## 2024-02-29 PROCEDURE — 3080F DIAST BP >= 90 MM HG: CPT | Performed by: NURSE PRACTITIONER

## 2024-02-29 PROCEDURE — 99213 OFFICE O/P EST LOW 20 MIN: CPT | Performed by: NURSE PRACTITIONER

## 2024-02-29 RX ORDER — CHLORHEXIDINE GLUCONATE 4 G/100ML
1 SOLUTION TOPICAL DAILY
Qty: 946 ML | Refills: 0 | Status: SHIPPED | OUTPATIENT
Start: 2024-02-29 | End: 2024-03-05

## 2024-02-29 ASSESSMENT — ENCOUNTER SYMPTOMS
FEVER: 0
VOMITING: 0
BLURRED VISION: 0
HEADACHES: 0
FOCAL WEAKNESS: 0
DOUBLE VISION: 0
WHEEZING: 0
NAUSEA: 0
NERVOUS/ANXIOUS: 0
BRUISES/BLEEDS EASILY: 0
PALPITATIONS: 0
WEIGHT LOSS: 0
COUGH: 0
CHILLS: 0
SPUTUM PRODUCTION: 0
DIARRHEA: 0
MYALGIAS: 0
ABDOMINAL PAIN: 0
DIZZINESS: 0
SHORTNESS OF BREATH: 0

## 2024-02-29 ASSESSMENT — FIBROSIS 4 INDEX: FIB4 SCORE: 0.99

## 2024-02-29 NOTE — PROGRESS NOTES
INFECTIOUS  DISEASE  OUTPATIENT CLINIC  NOTE   Subjective   Primary care provider: Janine Gomez M.D..     Reason for Follow Up:   Follow-up for   1. Recurrent cellulitis of lower extremity  mupirocin (BACTROBAN) 2 % Ointment      2. Left leg cellulitis  chlorhexidine (HIBICLENS) 4 % liquid    CBC WITH DIFFERENTIAL    Comp Metabolic Panel    Referral to Podiatry      3. Tinea pedis of left foot        4. Type 2 diabetes mellitus without complication, without long-term current use of insulin (HCC)            HPI: Patient Know to ID team: Referred back for recurrent cellulitis.  Patient with poorly controlled type 2 diabetes with recent improved control, hypertension BPH, chronic left leg wound followed by wound clinic, presented initially with a blister on left great toe that has since become infected along with a left leg cellulitis.  Previous  cultures were positive for Enterobacter cloacae for which he received ciprofloxacin.  He has since followed with wound care.  Due to the chronic wound and persistent edema, patient appears to have received multiple courses of antibiotics including Zyvox, minocycline due to positive superficial wound swabs.  His chronic leg wound was noted to have a very slow but steady improvement based on wound clinic notes.  He continued to have persistent edema and erythema of the left leg.  On 6/29 he was first noted to have new wound medial left great toe however the wound did not appear to be infected. A superficial swab from the great toe 7/6 grew MRSA and group A strep.  A superficial swab from February 2023 of the chronic leg wound had grown light growth of ESBL Klebsiella which is why he was started on meropenem. The chronic left leg wound and surrounding erythema do not appear infected on erythema, The foot does not exhibit signs of cellulitis. Patient was previously treated on 7/13/2023 with p.o. linezolid for 14 days    2/29/24-patient referred back to ID clinic due to  reoccurrence of cellulitis.  After further review of chart he has undergone multiple antibiotic courses along with wound care which cellulitis waxes and wanes.  Previously prescribed by his PCP on 1/22/2024 doxycycline and Lotrimin cream.  CT scanning of the left lower leg was negative for gas or abscesses.  He had some improvement on doxycycline so was transitioned to p.o. Bactrim  mg 1 tab twice daily for 10 days and referred to ID clinic.  Multiple cultures of left leg have grown MRSA as well as group A strep. tinea pedis of left foot. Most likely source in combination of poor foot care, DM, and obesity with poor hygiene.Continue p.o. Bactrim  mg 1 tab twice daily for 10 days. Repeat Labs CBC/CMP today.  Recommended keep blood sugar below 140 to allow for adequate wound healing.  Patient to check blood sugar 2-3 times daily.  DM managed by PCP. A1C 8.3   The 5-day decolonization regimen included hygiene education, twice daily application of 2% intranasal mupirocin, and daily body washes with 4% chlorhexidine. Recommend adopt enhanced hygiene practices, including regular bathing and frequent hand washing with soap and water or alcohol-based hand sanitizers. Patients and their contacts should avoid sharing personal hygiene items (e.g., towels, deodorant, cosmetics, brushes, razors, toothbrushes, or other items that come into contact with the skin). Additional measures that may reduce transmission and infection risk include using pump or pour lotions (rather than those in jars), keeping fingernails clean and trimmed short, avoiding loofas in the bath or shower, and changing underwear, sleepwear, towels, and washcloths daily  Dilute bleach water baths have traditionally been recommended by dermatologists to treat eczema, presumably by suppressing S. aureus growth. Add ¼ - ½ cup of common 5% household bleach to a bathtub full of water (40 gallons). Soak your torso or just the affected part of your skin for  about 10 minutes. Body wash gel preparation of sodium hypochlorite ( [employer]     current Antimicrobials: bactrim  mg 1 tab twice daily, 10 day course   Previous Antimicrobials: Linezolid, ciprofloxacin, minocycline       Other Current Medications:  Home Medications    Medication Sig Taking? Last Dose Authorizing Provider   mupirocin (BACTROBAN) 2 % Ointment Apply 1 Application to affected nostril(s) 2 times a day. Yes  Hakeem Escalante, A.P.R.NGhada   chlorhexidine (HIBICLENS) 4 % liquid Apply 1 Bottle topically every day for 5 days. Yes  Hakeem Escalante, A.P.R.NGhada   sulfamethoxazole-trimethoprim (BACTRIM DS) 800-160 MG tablet Take 1 Tablet by mouth 2 times a day. Yes Taking Janine Gomez M.D.   hydroCHLOROthiazide 12.5 MG tablet Take 1 Tablet by mouth every day. Yes Taking Janine Gomez M.D.   mupirocin (BACTROBAN) 2 % Ointment Apply 1 Application topically 2 times a day. Yes Taking Janine Gomez M.D.   furosemide (LASIX) 20 MG Tab Take 1 Tablet by mouth 2 times a day. Yes Taking Janine Gomez M.D.   clotrimazole (LOTRIMIN) 1 % Cream Apply 1 Application topically 2 times a day. Yes Taking Janine Gomez M.D.   losartan (COZAAR) 100 MG Tab Take 1 Tablet by mouth every day. Yes Taking Janine Gomez M.D.   meclizine (ANTIVERT) 12.5 MG Tab TAKE 1 TABLET BY MOUTH THREE TIMES DAILY AS NEEDED FOR DIZZINESS Yes Taking Janine Gomez M.D.   levothyroxine (SYNTHROID) 112 MCG Tab Take 1 tablet by mouth once daily Yes Taking Janine Gomez M.D.   cyclobenzaprine (FLEXERIL) 5 mg tablet TAKE 1 TABLET BY MOUTH AT BEDTIME Yes Taking Idalia Tam M.D.   Continuous Blood Gluc Sensor (FREESTYLE VINI 2 SENSOR) Misc APPLY 1 SENSOR EVERY 2 WEEKS Yes Taking [facility]   finasteride (PROSCAR) 5 MG Tab Take 1 Tablet by mouth every day. Yes Taking Janine Gomez M.D.   omeprazole (PRILOSEC) 40 MG delayed-release capsule Take 1 Capsule by mouth every day. Yes  Taking Janine Gomez M.D.   rosuvastatin (CRESTOR) 20 MG Tab Take 1 Tablet by mouth every day. Yes Taking Janine Gomez M.D.   tamsulosin (FLOMAX) 0.4 MG capsule Take 1 Capsule by mouth every day. Yes Taking Janine Gomez M.D.   traZODone (DESYREL) 50 MG Tab Take 1 Tablet by mouth at bedtime. Yes Taking Janine Gomez M.D.   glipiZIDE (GLUCOTROL) 5 MG Tab Take 1 Tablet by mouth every day. Yes Taking Janine Gomez M.D.        PMH:  History reviewed. No pertinent past medical history.  Past Surgical History:   Procedure Laterality Date    SKIN ABSCESS INCISION AND DRAINAGE Left 1/5/2023    Procedure: LEFT LEG INCISION AND DRAINAGE ABSCESS;  Surgeon: Cesar Odom M.D.;  Location: SURGERY [facility];  Service: General     Family History   Problem Relation Age of Onset    Heart Disease Father     Skin cancer Father     Prostate cancer Father     Ovarian Cancer Neg Hx     Tubal Cancer Neg Hx     Peritoneal Cancer Neg Hx     Colorectal Cancer Neg Hx     Breast Cancer Neg Hx      Social History     Socioeconomic History    Marital status:      Spouse name: Not on file    Number of children: Not on file    Years of education: Not on file    Highest education level: Some college, no degree   Occupational History    Not on file   Tobacco Use    Smoking status: Former     Types: Cigars    Smokeless tobacco: Never   Vaping Use    Vaping Use: Never used   Substance and Sexual Activity    Alcohol use: Yes     Comment: occ    Drug use: Never    Sexual activity: Not on file   Other Topics Concern    Not on file   Social History Narrative    Not on file     Social Determinants of Health     Financial Resource Strain: Low Risk  (1/4/2023)    Overall Financial Resource Strain (CARDIA)     Difficulty of Paying Living Expenses: Not very hard   Food Insecurity: No Food Insecurity (1/4/2023)    Hunger Vital Sign     Worried About Running Out of Food in the Last Year: Never true     Ran Out of Food in the  Last Year: Never true   Transportation Needs: No Transportation Needs (1/4/2023)    PRAPARE - Transportation     Lack of Transportation (Medical): No     Lack of Transportation (Non-Medical): No   Physical Activity: Insufficiently Active (1/4/2023)    Exercise Vital Sign     Days of Exercise per Week: 1 day     Minutes of Exercise per Session: 120 min   Stress: Stress Concern Present (1/4/2023)    [employer] [employer] - Occupational Stress Questionnaire     Feeling of Stress : To some extent   Social Connections: Socially Isolated (1/4/2023)    Social Connection and Isolation Panel [NHANES]     Frequency of Communication with Friends and Family: Once a week     Frequency of Social Gatherings with Friends and Family: Once a week     Attends Jainism Services: Never     Active Member of Clubs or Organizations: No     Attends Club or Organization Meetings: Not on file     Marital Status:    Intimate Partner Violence: Not on file   Housing Stability: High Risk (1/4/2023)    Housing Stability Vital Sign     Unable to Pay for Housing in the Last Year: Yes     Number of Places Lived in the Last Year: 1     Unstable Housing in the Last Year: No           Allergies/Intolerances:  Allergies   Allergen Reactions    Amoxicillin Hives    Oxycodone Hives    Percocet [Oxycodone-Acetaminophen] Hives    Codeine Nausea    Erythromycin Nausea       ROS:   Review of Systems   Constitutional:  Negative for chills, fever, malaise/fatigue and weight loss.   HENT:  Negative for congestion and hearing loss.    Eyes:  Negative for blurred vision and double vision.   Respiratory:  Negative for cough, sputum production, shortness of breath and wheezing.    Cardiovascular:  Negative for chest pain and palpitations.   Gastrointestinal:  Negative for abdominal pain, diarrhea, nausea and vomiting.   Genitourinary:  Negative for dysuria.   Musculoskeletal:  Negative for myalgias.   Skin:  Positive for itching and rash.         Chronic wounds of left leg slowly improving  Mild improvement in redness of left leg since starting Bactrim      Neurological:  Negative for dizziness, focal weakness and headaches.   Endo/Heme/Allergies:  Does not bruise/bleed easily.   Psychiatric/Behavioral:  The patient is not nervous/anxious.       ROS was reviewed and were negative except as above.    Objective    Most Recent Vital Signs:  BP (!) 128/90 (BP Location: Left arm, Patient Position: Sitting, BP Cuff Size: Adult)   Pulse (!) 110   Temp 36.7 °C (98.1 °F) (Temporal)   Resp 18   Ht 1.829 m (6')   Wt (!) 145 kg (320 lb)   SpO2 94%   BMI 43.40 kg/m²     Physical Exam:  Physical Exam  Vitals reviewed.   Constitutional:       Appearance: Normal appearance. He is obese.   HENT:      Head: Normocephalic and atraumatic.      Nose: Nose normal.      Mouth/Throat:      Mouth: Mucous membranes are moist.   Eyes:      Pupils: Pupils are equal, round, and reactive to light.   Cardiovascular:      Rate and Rhythm: Normal rate.   Pulmonary:      Effort: Pulmonary effort is normal.      Breath sounds: Normal breath sounds.   Abdominal:      Palpations: Abdomen is soft.   Musculoskeletal:         General: No tenderness.      Cervical back: Normal range of motion and neck supple.      Left lower leg: Edema present.   Skin:     General: Skin is warm and dry.      Coloration: Skin is not jaundiced.      Findings: No erythema or rash.      Comments: Cellulitis of left lower extremity with mild erythema, swelling.  Healing wounds, no drainage. Dry callused skin of foot.    Neurological:      Mental Status: He is alert and oriented to person, place, and time.      Motor: No weakness.   Psychiatric:         Mood and Affect: Mood normal.         Behavior: Behavior normal.          Pertinent Lab/Imaging Results:  [unfilled]  @CMP@  WBC   Date/Time Value Ref Range Status   08/17/2023 03:53 PM 9.8 4.8 - 10.8 K/uL Final     RBC   Date/Time Value Ref Range Status  "  08/17/2023 03:53 PM 4.10 (L) 4.70 - 6.10 M/uL Final     Hemoglobin   Date/Time Value Ref Range Status   08/17/2023 03:53 PM 13.1 (L) 14.0 - 18.0 g/dL Final     Hematocrit   Date/Time Value Ref Range Status   08/17/2023 03:53 PM 39.5 (L) 42.0 - 52.0 % Final     MCV   Date/Time Value Ref Range Status   08/17/2023 03:53 PM 96.3 81.4 - 97.8 fL Final     MCH   Date/Time Value Ref Range Status   08/17/2023 03:53 PM 32.0 27.0 - 33.0 pg Final     MCHC   Date/Time Value Ref Range Status   08/17/2023 03:53 PM 33.2 32.3 - 36.5 g/dL Final     Comment:     Please note new reference range effective 05/22/2023.     MPV   Date/Time Value Ref Range Status   08/17/2023 03:53 PM 8.8 (L) 9.0 - 12.9 fL Final      Sodium   Date/Time Value Ref Range Status   10/18/2023 09:31  135 - 145 mmol/L Final     Potassium   Date/Time Value Ref Range Status   10/18/2023 09:31 AM 4.2 3.6 - 5.5 mmol/L Final     Chloride   Date/Time Value Ref Range Status   10/18/2023 09:31  96 - 112 mmol/L Final     Co2   Date/Time Value Ref Range Status   10/18/2023 09:31 AM 28 20 - 33 mmol/L Final     Glucose   Date/Time Value Ref Range Status   10/18/2023 09:31  (H) 65 - 99 mg/dL Final     Bun   Date/Time Value Ref Range Status   10/18/2023 09:31 AM 16 8 - 22 mg/dL Final     Creatinine   Date/Time Value Ref Range Status   10/18/2023 09:31 AM 1.12 0.50 - 1.40 mg/dL Final     Alkaline Phosphatase   Date/Time Value Ref Range Status   08/28/2023 04:09 PM 96 30 - 99 U/L Final     AST(SGOT)   Date/Time Value Ref Range Status   08/28/2023 04:09 PM 17 12 - 45 U/L Final     Comment:     ULTRA CENTRIFUDGE     ALT(SGPT)   Date/Time Value Ref Range Status   08/28/2023 04:09 PM 11 2 - 50 U/L Final     Comment:     ULTRA CENTRIFUDGE     Total Bilirubin   Date/Time Value Ref Range Status   08/28/2023 04:09 PM 0.3 0.1 - 1.5 mg/dL Final      No results found for: \"CPKTOTAL\"       No results found for: \"BLOODCULTU\", \"BLDCULT\", \"BCHOLD\"    No results found for: " "\"BLOODCULTU\", \"BLDCULT\", \"BCHOLD\"       CULTURE WOUND W/ GRAM STAIN  Order: [id number]  Status: Final result     Visible to patient: Yes (seen)     Next appt: 08/24/2023 at 03:00 PM in Wound Care (Leena Gregg R.N.)     Specimen Information: Toe; Wound   0 Result Notes      Component 1 mo ago   Significant Indicator POS Positive (POS)    Source WND    Site TOE    Culture Result - Abnormal     Gram Stain Result Rare WBCs.   No organisms seen.    Culture Result  Abnormal   Methicillin Resistant Staphylococcus aureus   Light growth     Culture Result  Abnormal   Streptococcus pyogenes (Group A)   Rare growth     Resulting Agency M        Susceptibility     Methicillin resistant staphylococcus aureus     PEGGY     Ampicillin/sulbactam <=8/4 mcg/mL Resistant     Azithromycin <=2 mcg/mL Sensitive     Cefazolin <=8 mcg/mL Resistant     Cefepime 8 mcg/mL Resistant     Ceftaroline <=0.5 mcg/mL Sensitive     Clindamycin 0.5 mcg/mL Sensitive     Daptomycin <=0.5 mcg/mL Sensitive     Erythromycin 0.5 mcg/mL Sensitive     Oxacillin >2 mcg/mL Resistant     Tetracycline >8 mcg/mL Resistant     Trimeth/Sulfa <=0.5/9.5 m... Sensitive     Vancomycin 2 mcg/mL Sensitive                 Narrative  Performed by: M  CALL  Sharma  MS5 tel. [phone],   CALLED  MS5 tel. [phone] 07/13/2023, 12:27, RB PERF. RESULTS CALLED   TO:Mara Masterson RN   Contact   Collected By: MELANIA CHA   Left great toe   Contact      Specimen Collected: 07/11/23  2:15 PM Last Resulted: 07/14/23  9:22 AM            CULTURE WOUND W/ GRAM STAIN  Order: [id number]  Status: Final result       Visible to patient: Yes (seen)       Next appt: Today at 03:30 PM in Infectious Diseases (MADDISON Gardiner.P.R.N.)    Specimen Information: Toe; Wound   0 Result Notes      Component 7 mo ago   Significant Indicator POS Positive (POS)   Source WND   Site TOE   Culture Result - Abnormal    Gram Stain Result Rare WBCs.  No organisms seen.   Culture Result  Abnormal "   Methicillin Resistant Staphylococcus aureus  Light growth    Culture Result  Abnormal   Streptococcus pyogenes (Group A)  Rare growth    Resulting Agency M        Susceptibility     Methicillin resistant staphylococcus aureus     PEGGY     Ampicillin/sulbactam <=8/4 mcg/mL Resistant     Azithromycin <=2 mcg/mL Sensitive     Cefazolin <=8 mcg/mL Resistant     Cefepime 8 mcg/mL Resistant     Ceftaroline <=0.5 mcg/mL Sensitive     Clindamycin 0.5 mcg/mL Sensitive     Daptomycin <=0.5 mcg/mL Sensitive     Erythromycin 0.5 mcg/mL Sensitive     Oxacillin >2 mcg/mL Resistant     Tetracycline >8 mcg/mL Resistant     Trimeth/Sulfa <=0.5/9.5 m... Sensitive     Vancomycin 2 mcg/mL Sensitive                  Narrative  Performed by: M  CALL  Sharma  MS5 tel. [phone],  CALLED  MS5 tel. [phone] 07/13/2023, 12:27, RB PERF. RESULTS CALLED  TO:Mara Masterson RN  Contact  Collected By: MELANIA CHA  Left great toe  Contact      Specimen Collected: 07/11/23  2:15 PM Last Resulted: 07/14/23  9:22 AM           Impression/Assessment      1. Recurrent cellulitis of lower extremity  mupirocin (BACTROBAN) 2 % Ointment      2. Left leg cellulitis  chlorhexidine (HIBICLENS) 4 % liquid    CBC WITH DIFFERENTIAL    Comp Metabolic Panel    Referral to Podiatry      3. Tinea pedis of left foot        4. Type 2 diabetes mellitus without complication, without long-term current use of insulin ([facility])        Patient with poorly controlled type 2 diabetes with recent improved control, hypertension BPH, chronic left leg wound followed by wound clinic, presented initially with a blister on left foot has since become infected along with a left leg cellulitis.  Due to the chronic wound and persistent edema, patient appears to have received multiple courses of antibiotics including Zyvox, minocycline due to positive superficial wound swabs. Patient was previously treated on 7/13/2023 with p.o. linezolid for 14 days. Multiple cultures of left leg  have grown MRSA as well as group A strep.    2/29/24-patient referred back to ID clinic due to reoccurrence of cellulitis.  After further review of chart he has undergone multiple antibiotic courses along with wound care which cellulitis waxes and wanes.  Previously prescribed by his PCP on 1/22/2024 doxycycline and Lotrimin cream.  CT scanning of the left lower leg was negative for gas or abscesses.  He had some improvement on doxycycline so was transitioned to p.o. Bactrim  mg 1 tab twice daily for 10 days and referred to ID clinic.  Multiple cultures of left leg have grown MRSA as well as group A strep. tinea pedis of left foot. Most likely source in combination of poor foot care, DM, and obesity with poor hygiene.Continue p.o. Bactrim  mg 1 tab twice daily for 10 days. Repeat Labs CBC/CMP today   Recommended keep blood sugar below 140 to allow for adequate wound healing.  Patient to check blood sugar 2-3 times daily.  DM managed by PCP. A1C 8.3   The 5-day decolonization regimen    - Many trials to date have prescribed a brief decolonization regimen although reduced SSTI incidence was demonstrated in the months immediately following decolonization, many participants experienced recurrent infection over longer intervals. These findings likely reflect ongoing exposure to colonized individuals and environmental reservoirs, and suggest that, especially for patients experiencing multiple infection recurrences, a periodic approach to decolonization may provide more effective, sustained protection  PLAN:   - Continue 10-day course of Bactrim  mg twice daily  - Recommended close monitoring of blood sugars.  Recommend to check blood sugar 3-4 times daily keep below 140 recurrence of infection. DM managed by PCP. A1C 8.3   - Repeat Labs CBC/CMP today   - Medication education provided and S/S of side effects discussed   - Referral placed to podiatrist for ongoing foot care as patient is at high risk for  reoccurrence of infection due to poor hygiene  - Recommend adopt enhanced hygiene practices, including regular bathing and frequent hand washing with soap and water or alcohol-based hand sanitizers. Patients and their contacts should avoid sharing personal hygiene items (e.g., towels, deodorant, cosmetics, brushes, razors, toothbrushes, or other items that come into contact with the skin). Additional measures that may reduce transmission and infection risk include using pump or pour lotions (rather than those in jars), keeping fingernails clean and trimmed short, avoiding loofas in the bath or shower, and changing underwear, sleepwear, towels, and washcloths daily   - Dilute bleach water baths have traditionally been recommended by dermatologists to treat eczema, presumably by suppressing S. aureus growth. Add ¼ - ½ cup of common 5% household bleach to a bathtub full of water (40 gallons). Soak your torso or just the affected part of your skin for about 10 minutes.  - Daily Body wash gel preparation of sodium hypochlorite ( [employer])   - Recommend OTC lotrimin for tinea pedis     Return visit: 2 weeks. Follow up with primary care physician for chronic medical problems      I have performed a physical exam,  updated ROS and plan today. I have reviewed previous images, labs, and provider notes.      ALDA Gardiner.    All Patients should seek medical re-evaluation or report to the ER for new, increasing or worsening symptoms. In some circumstances medical conditions can change from the initial evaluation and may require emergent medical re-evaluation. This includes but is not limited to chest pain, shortness of breath, atypical abdominal pain, atypical headache, ALOC, fever >101, low blood pressure, high respiratory rate (above 30), low oxygen saturation (below 90%), acute delirium, abnormal bleeding, inability to tolerate any intake, weakness on one side of the body, any worsened or  concerning conditions.    Please note that this dictation was created using voice recognition software. I have worked with technical experts from [facility] to optimize the interface.  I have made every reasonable attempt to correct obvious errors, but there may be errors of grammar and possibly content that I did not discover before finalizing the note.

## 2024-02-29 NOTE — PATIENT INSTRUCTIONS
- Continue Bactrim 10 day course   - Repeat Labs CBC/CMP today   - Medication education provided and S/S of side effects discussed   - Continue wound care to left lower extremity  - Recommended keep blood sugar below 140 to allow for adequate wound healing.  Patient to check blood sugar 2-3 times daily.    -  The 5-day decolonization regimen included hygiene education, twice daily application of 2% intranasal mupirocin, and daily body washes with 4% chlorhexidine.  - Recommend adopt enhanced hygiene practices, including regular bathing and frequent hand washing with soap and water or alcohol-based hand sanitizers. Patients and their contacts should avoid sharing personal hygiene items (e.g., towels, deodorant, cosmetics, brushes, razors, toothbrushes, or other items that come into contact with the skin). Additional measures that may reduce transmission and infection risk include using pump or pour lotions (rather than those in jars), keeping fingernails clean and trimmed short, avoiding loofas in the bath or shower, and changing underwear, sleepwear, towels, and washcloths daily   - Dilute bleach water baths have traditionally been recommended by dermatologists to treat eczema, presumably by suppressing S. aureus growth. Add ¼ - ½ cup of common 5% household bleach to a bathtub full of water (40 gallons). Soak your torso or just the affected part of your skin for about 10 minutes.  - Body wash gel preparation of sodium hypochlorite ( [employer], [employer])   - Start OTC Lotrimin on bilateral feet twice daily   - Referral sent to podiatrist

## 2024-03-12 ENCOUNTER — TELEPHONE (OUTPATIENT)
Dept: INFECTIOUS DISEASES | Facility: MEDICAL CENTER | Age: 64
End: 2024-03-12
Payer: COMMERCIAL

## 2024-03-12 NOTE — TELEPHONE ENCOUNTER
Message    Appointment canceled for Thomas Borja ([id number])   Visit Type: FOLLOW UP VISIT   Date        Time      Length    Provider                  Department   3/14/2024   10:30 AM  30 mins.  Nurse Practitioner GIOVANNY Gardiner ID [facility]      Reason for Cancellation: Other     Appointment Scheduled  (Newest Message First)  View All Conversations on this Encounter  You  Thomas BorjaJust now (9:29 AM)       Please call the office to get appointment rescheduled, thank you [phone]    This [place] message has not been read.     [facility]  Thomas Borja13 hours ago (8:04 PM)     YT  Appointment Information:      Visit Type: Follow Up Visit          Date: 3/14/2024                  [facility]                  Provider: BHARATHI BAPTISTE                  Time: 10:30 AM                  Length: 30 min     Appt Status: Canceled         Cancel Reason: Other          Thomas Borja  P [facility]  Staff13 hours ago (8:04 PM)     MB  Appointment canceled for Thomas Borja ([id number])  Visit Type: FOLLOW UP VISIT  Date        Time      Length    Provider                  Department  3/14/2024   10:30 AM  30 mins.  Nurse Practitioner GIOVANNY Gardiner ID [facility]     Reason for Cancellation: Other       [facility]  Thomas Borja12 days ago     YT  Appointment Information:      Visit Type: Follow Up Visit          Date: 3/14/2024                  [facility]                  Provider: BHARATHI BAPTISTE                  Time: 10:30 AM                  Length: 30 min     Appt Status: Scheduled

## 2024-03-14 ENCOUNTER — APPOINTMENT (OUTPATIENT)
Dept: INFECTIOUS DISEASES | Facility: MEDICAL CENTER | Age: 64
End: 2024-03-14
Attending: NURSE PRACTITIONER
Payer: COMMERCIAL

## 2024-04-09 ENCOUNTER — APPOINTMENT (OUTPATIENT)
Dept: MEDICAL GROUP | Facility: MEDICAL CENTER | Age: 64
End: 2024-04-09
Payer: COMMERCIAL

## 2024-04-09 VITALS
HEART RATE: 96 BPM | TEMPERATURE: 97.7 F | BODY MASS INDEX: 42.66 KG/M2 | HEIGHT: 72 IN | OXYGEN SATURATION: 96 % | RESPIRATION RATE: 16 BRPM | WEIGHT: 315 LBS

## 2024-04-09 DIAGNOSIS — F90.2 ATTENTION DEFICIT HYPERACTIVITY DISORDER (ADHD), COMBINED TYPE: ICD-10-CM

## 2024-04-09 DIAGNOSIS — Z23 IMMUNIZATION DUE: ICD-10-CM

## 2024-04-09 DIAGNOSIS — E11.42 DIABETIC POLYNEUROPATHY ASSOCIATED WITH TYPE 2 DIABETES MELLITUS (HCC): ICD-10-CM

## 2024-04-09 PROCEDURE — 90632 HEPA VACCINE ADULT IM: CPT | Performed by: STUDENT IN AN ORGANIZED HEALTH CARE EDUCATION/TRAINING PROGRAM

## 2024-04-09 PROCEDURE — 99214 OFFICE O/P EST MOD 30 MIN: CPT | Mod: 25 | Performed by: STUDENT IN AN ORGANIZED HEALTH CARE EDUCATION/TRAINING PROGRAM

## 2024-04-09 PROCEDURE — 90471 IMMUNIZATION ADMIN: CPT | Performed by: STUDENT IN AN ORGANIZED HEALTH CARE EDUCATION/TRAINING PROGRAM

## 2024-04-09 RX ORDER — METHYLPHENIDATE HYDROCHLORIDE 36 MG/1
36 TABLET ORAL EVERY MORNING
Qty: 30 TABLET | Refills: 0 | Status: SHIPPED | OUTPATIENT
Start: 2024-05-09 | End: 2024-06-08

## 2024-04-09 RX ORDER — PREGABALIN 50 MG/1
50 CAPSULE ORAL 2 TIMES DAILY
Qty: 60 CAPSULE | Refills: 2 | Status: SHIPPED | OUTPATIENT
Start: 2024-04-09 | End: 2024-07-08

## 2024-04-09 RX ORDER — METHYLPHENIDATE HYDROCHLORIDE 36 MG/1
36 TABLET ORAL EVERY MORNING
Qty: 30 TABLET | Refills: 0 | Status: SHIPPED | OUTPATIENT
Start: 2024-06-08 | End: 2024-07-08

## 2024-04-09 RX ORDER — METHYLPHENIDATE HYDROCHLORIDE 36 MG/1
36 TABLET ORAL EVERY MORNING
Qty: 30 TABLET | Refills: 0 | Status: SHIPPED | OUTPATIENT
Start: 2024-04-09 | End: 2024-05-09

## 2024-04-09 ASSESSMENT — FIBROSIS 4 INDEX: FIB4 SCORE: 0.99

## 2024-04-09 NOTE — PROGRESS NOTES
Subjective:     CC: Medication refills    HPI:   Thomas presents today with    Problem   Attention Deficit Hyperactivity Disorder (Adhd), Combined Type    This is a chronic condition, previously well controlled with Concerta extended release 36 mg daily.  Controls this agreement on file, urine drug screen on file, PDMP reviewed      Diabetic Neuropathy (Hcc)    This is a chronic condition, currently takes Lyrica 50 mg twice daily with good relief         ROS:  ROS    Objective:     Exam:  Pulse 96   Temp 36.5 °C (97.7 °F)   Resp 16   Ht 1.829 m (6')   Wt (!) 147 kg (325 lb)   SpO2 96%   BMI 44.08 kg/m²  Body mass index is 44.08 kg/m².    Physical Exam  Vitals reviewed.   Constitutional:       General: He is not in acute distress.     Appearance: He is not toxic-appearing.   HENT:      Head: Normocephalic and atraumatic.      Right Ear: External ear normal.      Left Ear: External ear normal.   Eyes:      General:         Right eye: No discharge.         Left eye: No discharge.      Extraocular Movements: Extraocular movements intact.      Conjunctiva/sclera: Conjunctivae normal.   Pulmonary:      Effort: Pulmonary effort is normal. No respiratory distress.   Skin:     General: Skin is warm and dry.   Neurological:      Mental Status: He is alert.   Psychiatric:         Mood and Affect: Mood normal.         Behavior: Behavior normal.         Thought Content: Thought content normal.         Judgment: Judgment normal.           Assessment & Plan:     63 y.o. male with the following -     1. Attention deficit hyperactivity disorder (ADHD), combined type  Symptoms are well-controlled, PDMP reviewed, controlled subs agreement on file, urine drug screen on file  - methylphenidate (CONCERTA) 36 MG CR tablet; Take 1 Tablet by mouth every morning for 30 days. Indications: Attention Deficit Hyperactivity Disorder  Dispense: 30 Tablet; Refill: 0  - methylphenidate (CONCERTA) 36 MG CR tablet; Take 1 Tablet by mouth every  morning for 30 days. Indications: Attention Deficit Hyperactivity Disorder  Dispense: 30 Tablet; Refill: 0  - methylphenidate (CONCERTA) 36 MG CR tablet; Take 1 Tablet by mouth every morning for 30 days. Indications: Attention Deficit Hyperactivity Disorder  Dispense: 30 Tablet; Refill: 0    2. Diabetic polyneuropathy associated with type 2 diabetes mellitus (HCC)  Symptoms are well-controlled, refill given  - pregabalin (LYRICA) 50 MG capsule; Take 1 Capsule by mouth 2 times a day for 90 days. Indications: Diabetes with Nerve Disease  Dispense: 60 Capsule; Refill: 2    3. Immunization due  - Hep A Adult 19+    Patient needs updated sleep study to get new CPAP.  STOP-BANG:    STOPBANG - Sleep Apnea Screening      Flowsheet Row Most Recent Value   S - Have you been told that you SNORE? Yes   T - Are you often TIRED during the day? Yes   O - Do you know if you stop breathing or has anyone witnessed you stop breathing while you were asleep? (OBSTRUCTION) Yes   P - Do you have high blood PRESSURE or on medication to control high blood pressure? Yes   B - Is your Body Mass Index greater than 35? (BMI) Yes   A - Are you 50 years old or older? (AGE) Yes   N - Are you a male with a NECK circumference greater than 17 inches, or a female with a neck circumference greater than 16 inches? No   G - Are you male? (GENDER) Yes   STOPBANG Total Score 7   ZHANNA Risk High Risk              No follow-ups on file.    Please note that this dictation was created using voice recognition software. I have made every reasonable attempt to correct obvious errors, but I expect that there are errors of grammar and possibly content that I did not discover before finalizing the note.

## 2024-04-10 ENCOUNTER — PATIENT MESSAGE (OUTPATIENT)
Dept: MEDICAL GROUP | Facility: MEDICAL CENTER | Age: 64
End: 2024-04-10
Payer: COMMERCIAL

## 2024-04-11 ENCOUNTER — PATIENT MESSAGE (OUTPATIENT)
Dept: MEDICAL GROUP | Facility: MEDICAL CENTER | Age: 64
End: 2024-04-11
Payer: COMMERCIAL

## 2024-04-11 DIAGNOSIS — G47.33 OBSTRUCTIVE SLEEP APNEA: ICD-10-CM

## 2024-04-16 DIAGNOSIS — I10 ESSENTIAL HYPERTENSION: ICD-10-CM

## 2024-04-16 RX ORDER — LOSARTAN POTASSIUM 100 MG/1
100 TABLET ORAL DAILY
Qty: 90 TABLET | Refills: 3 | Status: SHIPPED | OUTPATIENT
Start: 2024-04-16

## 2024-05-16 ENCOUNTER — TELEPHONE (OUTPATIENT)
Dept: HEALTH INFORMATION MANAGEMENT | Facility: OTHER | Age: 64
End: 2024-05-16
Payer: COMMERCIAL

## 2024-05-17 ENCOUNTER — TELEPHONE (OUTPATIENT)
Dept: MEDICAL GROUP | Facility: MEDICAL CENTER | Age: 64
End: 2024-05-17
Payer: COMMERCIAL

## 2024-05-22 DIAGNOSIS — I10 ESSENTIAL HYPERTENSION: ICD-10-CM

## 2024-05-22 RX ORDER — HYDROCHLOROTHIAZIDE 12.5 MG/1
12.5 TABLET ORAL DAILY
Qty: 90 TABLET | Refills: 3 | Status: SHIPPED | OUTPATIENT
Start: 2024-05-22

## 2024-05-24 ENCOUNTER — SLEEP STUDY (OUTPATIENT)
Dept: SLEEP MEDICINE | Facility: MEDICAL CENTER | Age: 64
End: 2024-05-24
Attending: STUDENT IN AN ORGANIZED HEALTH CARE EDUCATION/TRAINING PROGRAM
Payer: COMMERCIAL

## 2024-05-24 DIAGNOSIS — G47.33 OBSTRUCTIVE SLEEP APNEA: ICD-10-CM

## 2024-05-24 PROCEDURE — 95811 POLYSOM 6/>YRS CPAP 4/> PARM: CPT | Performed by: PREVENTIVE MEDICINE

## 2024-05-28 NOTE — PROCEDURES
Physician:   Patient: STEPHANE JAMISON  ID: [id number] Date: 5/24/2024 Exam No.:   MONTAGE: Standard  STUDY TYPE: Split Night  RECORDING TECHNIQUE:   After the scalp was prepared, gold plated electrodes were applied to the scalp according to the International 10-20 System. EEG (electroencephalogram) was continuously monitored from the O1-M2, O2-M1, C3-M2, C4-M1, F3-M2, and F4-M1. EOGs (electrooculograms) were monitored by electrodes placed at the left and right outer canthi. Chin EMG (electromyogram) was monitored by electrodes placed on the mentalis and sub-mentalis muscles. Nasal and oral airflow were monitored using a triple port thermocouple as well as oronasal pressure transducer. Respiratory effort was measured by inductive plethysmography technology employing abdominal and thoracic belts. Blood oxygen saturation and pulse were monitored by pulse oximetry. Heart rhythm was monitored by surface electrocardiogram. Leg EMG was studied using surface electrodes placed on left and right anterior tibialis. A microphone was used to monitor tracheal sounds and snoring. Body position was monitored and documented by technician observation.   SCORING CRITERIA:   A modification of the AASM manual for scoring of sleep and associated events was used. Obstructive apneas were scored by cessation of airflow for at least 10 seconds with continuing respiratory effort. Central apneas were scored by cessation of airflow for at least 10 seconds with no respiratory effort. Hypopneas were scored by a 30% or more reduction in airflow for at least 10 seconds accompanied by arterial oxygen desaturation of 3% or an arousal. For CMS (Medicare) patients, per AASM rule 1B, hypopneas are scored by 30% with mild reduction in airflow for at least 10 seconds accompanied by arterial saturation decreased at 4%.  DIAGNOSTIC  Study start time was 08:50:27 PM. Diagnostic recording time was 351 minutes with a total sleep time of 310 minutes resulting in a  sleep efficiency of 88.19%%. Sleep latency from the start of the study was 11 minutes and the latency from sleep to REM was 76 minutes. In total,52 arousals were scored for an arousal index of 10.1.  Respiratory:  There were a total of 15 apneas consisting of 15 obstructive apneas, 0 mixed apneas, and 0 central apneas. A total of 36 hypopneas were scored. The apnea index was 2.90 per hour and the hypopnea index was 6.97 per hour resulting in an overall AHI of 9.87. AHI during REM was 12.4 and AHI while supine was 9.12.  Oximetry:  There was a mean oxygen saturation of 88.0%. The minimum oxygen saturation during NREM sleep was 83.0% and in REM was 76.0%. Time spent during sleep with oxygen saturations <88% was 187.3 minutes.   Cardiac:  The highest heart rate seen while awake was 106 BPM while the highest heart rate during sleep was 93 BPM with an average sleeping heart rate of 81 BPM.  Limb Movements:  There were a total of 28 PLMs during sleep, which resulted in a PLM index of 5.4. There were 16 PLMs associated with arousals which resulted in a PLMS arousal index of 3.1.  TREATMENT:  Treatment recording time was 3h 21.0m (201 minutes) with a total sleep time of 3h 2.5m (182 minutes) resulting in a sleep efficiency of 90.8%. Sleep latency from the start of treatment was 03 minutes and REM latency from sleep onset was 0h 59.0m. The patient had 35 arousals in total for an arousal index of 11.5.  Respiratory:   There were 0 apneas in total consisting of 0 obstructive apneas, 0 central apneas, and 0 mixed apneas for an apnea index of 0.00. The patient had 3 hypopneas in total, which resulted in a hypopnea index of 0.99. The overall AHI was 0.99, with a REM AHI of 2.98, and a supine AHI of 0.99.   Oximetry:  The mean SaO2 during treatment was 93.0%. The minimum oxygen saturation in NREM was 90.0 % and in REM was 89.0%. Patient spent 0.0 minutes of TST with SaO2 <88%.  Cardiac:  The highest heart rate during sleep was 89  BPM with an average sleeping heart rate of 75BPM.  Limb Movements:  There were a total of 0 PLMS during titration sleep time that resulted in an index of 0.0. There were 4 PLMS associated with arousals. This resulted in a PLM arousal index of 1.3.  Titration: CPAP was tried at 17cm H2O  This was a fully attended sleep study. This test was technically adequate. This patient was titrated on CPAP starting at *** cm of water pressure. Patient was titrated up to *** cm of water pressure. Patient did best at *** cm of water pressure. Patient spent *** minutes at that pressure and the AHI was *** which is considered *** obstructive sleep apnea.   Assessment:   DIAGNOSTIC: ***Obstructive Sleep Apnea Hypopnea - AHI*** ***Nocturnal desaturation - alfonso saturation ***% - saturations <88% below for *** minutes of TST.  TREATMENT: ***Obstructive Sleep Apnea Hypopnea - AHI*** ***Nocturnal desaturation - alfonso saturation ***% - saturations <88% below for *** minutes of TST.  Recommendation:

## 2024-06-03 ENCOUNTER — PATIENT MESSAGE (OUTPATIENT)
Dept: MEDICAL GROUP | Facility: MEDICAL CENTER | Age: 64
End: 2024-06-03
Payer: COMMERCIAL

## 2024-06-18 ENCOUNTER — PATIENT MESSAGE (OUTPATIENT)
Dept: MEDICAL GROUP | Facility: MEDICAL CENTER | Age: 64
End: 2024-06-18
Payer: COMMERCIAL

## 2024-06-18 DIAGNOSIS — G47.33 OBSTRUCTIVE SLEEP APNEA: ICD-10-CM

## 2024-06-25 DIAGNOSIS — K21.9 GERD WITHOUT ESOPHAGITIS: ICD-10-CM

## 2024-06-25 RX ORDER — OMEPRAZOLE 40 MG/1
40 CAPSULE, DELAYED RELEASE ORAL DAILY
Qty: 90 CAPSULE | Refills: 0 | Status: SHIPPED | OUTPATIENT
Start: 2024-06-25

## 2024-08-12 DIAGNOSIS — E11.42 TYPE 2 DIABETES MELLITUS WITH DIABETIC POLYNEUROPATHY, WITHOUT LONG-TERM CURRENT USE OF INSULIN (HCC): ICD-10-CM

## 2024-08-12 DIAGNOSIS — N40.0 BENIGN PROSTATIC HYPERPLASIA WITHOUT LOWER URINARY TRACT SYMPTOMS: ICD-10-CM

## 2024-08-12 RX ORDER — MECLIZINE HCL 12.5 MG 12.5 MG/1
TABLET ORAL
Qty: 90 TABLET | Refills: 0 | Status: SHIPPED | OUTPATIENT
Start: 2024-08-12

## 2024-08-12 RX ORDER — ROSUVASTATIN CALCIUM 20 MG/1
20 TABLET, COATED ORAL DAILY
Qty: 90 TABLET | Refills: 3 | Status: SHIPPED | OUTPATIENT
Start: 2024-08-12

## 2024-08-12 RX ORDER — FINASTERIDE 5 MG/1
5 TABLET, FILM COATED ORAL DAILY
Qty: 90 TABLET | Refills: 3 | Status: SHIPPED | OUTPATIENT
Start: 2024-08-12

## 2024-08-12 RX ORDER — TAMSULOSIN HYDROCHLORIDE 0.4 MG/1
0.4 CAPSULE ORAL DAILY
Qty: 90 CAPSULE | Refills: 3 | Status: SHIPPED | OUTPATIENT
Start: 2024-08-12 | End: 2024-08-29 | Stop reason: SDUPTHER

## 2024-08-15 ENCOUNTER — SLEEP STUDY (OUTPATIENT)
Dept: SLEEP MEDICINE | Facility: MEDICAL CENTER | Age: 64
End: 2024-08-15
Attending: STUDENT IN AN ORGANIZED HEALTH CARE EDUCATION/TRAINING PROGRAM
Payer: COMMERCIAL

## 2024-08-15 DIAGNOSIS — G47.33 OBSTRUCTIVE SLEEP APNEA: ICD-10-CM

## 2024-08-15 PROCEDURE — 95811 POLYSOM 6/>YRS CPAP 4/> PARM: CPT | Performed by: PREVENTIVE MEDICINE

## 2024-08-16 ENCOUNTER — PATIENT MESSAGE (OUTPATIENT)
Dept: MEDICAL GROUP | Facility: MEDICAL CENTER | Age: 64
End: 2024-08-16
Payer: COMMERCIAL

## 2024-08-16 NOTE — PROCEDURES
Patient: STEPHANE JAMISON  ID: [id number] Date: 8/15/2024 Exam No.:   MONTAGE: Standard  STUDY TYPE: Treatment  RECORDING TECHNIQUE:   After the scalp was prepared, gold plated electrodes were applied to the scalp according to the International 10-20 System. EEG (electroencephalogram) was continuously monitored from the O1-M2, O2-M1, C3-M2, C4-M1, F3-M2, and F4-M1. EOGs (electrooculograms) were monitored by electrodes placed at the left and right outer canthi. Chin EMG (electromyogram) was monitored by electrodes placed on the mentalis and sub-mentalis muscles. Nasal and oral airflow were monitored using a triple port thermocouple as well as oronasal pressure transducer. Respiratory effort was measured by inductive plethysmography technology employing abdominal and thoracic belts. Blood oxygen saturation and pulse were monitored by pulse oximetry. Heart rhythm was monitored by surface electrocardiogram. Leg EMG was studied using surface electrodes placed on left and right anterior tibialis. A microphone was used to monitor tracheal sounds and snoring. Body position was monitored and documented by technician observation.   SCORING CRITERIA:   A modification of the AASM manual for scoring of sleep and associated events was used. Obstructive apneas were scored by cessation of airflow for at least 10 seconds with continuing respiratory effort. Central apneas were scored by cessation of airflow for at least 10 seconds with no respiratory effort. Hypopneas were scored by a 30% or more reduction in airflow for at least 10 seconds accompanied by arterial oxygen desaturation of 3% or an arousal. For CMS (Medicare) patients, per AASM rule 1B, hypopneas are scored by 30% with mild reduction in airflow for at least 10 seconds accompanied by arterial saturation decreased at 4%.  Study start time was 08:21:46 PM. Diagnostic recording time was 9h 35.0m with a total sleep time of 8h 8.0m resulting in a sleep efficiency of 84.87%%.  Sleep latency from the start of the study was 14 minutes and the latency from sleep to REM was 74 minutes. In total,67 arousals were scored for an arousal index of 8.2.  Respiratory:  There were a total of 8 apneas consisting of 1 obstructive apneas, 0 mixed apneas, and 7 central apneas. A total of 25 hypopneas were scored. The apnea index was 0.98 per hour and the hypopnea index was 3.07 per hour resulting in an overall AHI of 4.06. AHI during REM was 3.5 and AHI while supine was 9.13.  Oximetry:  There was a mean oxygen saturation of 92.0%. The minimum oxygen saturation in NREM was 84.0 % and in REM was 84.0%. The patient spent 17.7 minutes of TST with SaO2 <88%.  Cardiac:  The highest heart rate seen while awake was 83 BPM while the highest heart rate during sleep was 82 BPM with an average sleeping heart rate of 71 BPM.  Limb Movements:  There were a total of 313 PLMs during sleep which resulted in a PLMS index of 38.5. Of these, 35 were associated with arousals which resulted in a PLMS arousal index of 4.3.  Titration:   BiPAP was tried from 8/4 to 14/8 CWP.   This was a fully attended sleep study. This test was technically adequate. Assessment:   Resolved Obstructive Sleep Apnea Hypopnea (except mild in the supine position with an AHI of 9.13)  otherwise the overall AHI = 4.06.   Mild nocturnal desaturation - alfonso saturation 84% - saturations <88% below for 17.7 minutes of TST.  PLMS:  index of 38.5  Recommendation: A [employer]Med auto BiPAP machine will be ordered  .  Starting pressures will be 14/8  cmH2O with a pressure support of 4 cm water pressure.  Also a careful mask fit as well as heated humidification is required.  Additionally his data should be available online for all [facility] providers. with data available online.  On these pressures the patient did experience supine REM and on these pressures his oxygen was normalized at or above 88% oxygen saturation.  PLMS:  index of 38.5Clinical correlation  required.

## 2024-08-29 DIAGNOSIS — N40.0 BENIGN PROSTATIC HYPERPLASIA WITHOUT LOWER URINARY TRACT SYMPTOMS: ICD-10-CM

## 2024-08-30 RX ORDER — TAMSULOSIN HYDROCHLORIDE 0.4 MG/1
0.4 CAPSULE ORAL DAILY
Qty: 90 CAPSULE | Refills: 0 | Status: SHIPPED | OUTPATIENT
Start: 2024-08-30

## 2024-08-30 NOTE — TELEPHONE ENCOUNTER
Received request via: Pharmacy    Was the patient seen in the last year in this department? Yes    Does the patient have an active prescription (recently filled or refills available) for medication(s) requested? No    Pharmacy Name: [email or web address]    Does the patient have snf Plus and need 100-day supply? (This applies to ALL medications) Patient does not have SCP

## 2024-09-11 ENCOUNTER — HOSPITAL ENCOUNTER (OUTPATIENT)
Facility: MEDICAL CENTER | Age: 64
End: 2024-09-11
Attending: NURSE PRACTITIONER
Payer: COMMERCIAL

## 2024-09-11 ENCOUNTER — OFFICE VISIT (OUTPATIENT)
Dept: MEDICAL GROUP | Facility: MEDICAL CENTER | Age: 64
End: 2024-09-11
Payer: COMMERCIAL

## 2024-09-11 VITALS
DIASTOLIC BLOOD PRESSURE: 88 MMHG | WEIGHT: 145 LBS | HEART RATE: 76 BPM | OXYGEN SATURATION: 96 % | SYSTOLIC BLOOD PRESSURE: 132 MMHG | HEIGHT: 72 IN | RESPIRATION RATE: 20 BRPM | BODY MASS INDEX: 19.64 KG/M2 | TEMPERATURE: 97.2 F

## 2024-09-11 DIAGNOSIS — G47.30 SLEEP APNEA, UNSPECIFIED TYPE: ICD-10-CM

## 2024-09-11 DIAGNOSIS — F90.2 ATTENTION DEFICIT HYPERACTIVITY DISORDER (ADHD), COMBINED TYPE: ICD-10-CM

## 2024-09-11 DIAGNOSIS — Z79.899 CONTROLLED SUBSTANCE AGREEMENT SIGNED: ICD-10-CM

## 2024-09-11 DIAGNOSIS — I10 ESSENTIAL HYPERTENSION: ICD-10-CM

## 2024-09-11 PROCEDURE — 99214 OFFICE O/P EST MOD 30 MIN: CPT | Performed by: NURSE PRACTITIONER

## 2024-09-11 PROCEDURE — 80307 DRUG TEST PRSMV CHEM ANLYZR: CPT

## 2024-09-11 PROCEDURE — 3075F SYST BP GE 130 - 139MM HG: CPT | Performed by: NURSE PRACTITIONER

## 2024-09-11 PROCEDURE — 3079F DIAST BP 80-89 MM HG: CPT | Performed by: NURSE PRACTITIONER

## 2024-09-11 RX ORDER — METHYLPHENIDATE HYDROCHLORIDE 36 MG/1
36 TABLET ORAL EVERY MORNING
Qty: 30 TABLET | Refills: 0 | Status: SHIPPED | OUTPATIENT
Start: 2024-09-11 | End: 2024-10-11

## 2024-09-11 RX ORDER — METHYLPHENIDATE HYDROCHLORIDE 36 MG/1
36 TABLET ORAL EVERY MORNING
Qty: 30 TABLET | Refills: 0 | Status: SHIPPED | OUTPATIENT
Start: 2024-10-10 | End: 2024-11-09

## 2024-09-11 ASSESSMENT — FIBROSIS 4 INDEX: FIB4 SCORE: 0.99

## 2024-09-11 ASSESSMENT — ENCOUNTER SYMPTOMS
FEVER: 0
CHILLS: 0
PALPITATIONS: 0

## 2024-09-11 NOTE — PROGRESS NOTES
Patient agreed to use of RIN: yes  Chief Complaint   Patient presents with    Referral Needed       HISTORY OF PRESENT ILLNESS: Patient is a pleasant 63 y.o. male, established patient who presents today to discuss medical problems as listed below:    Assessment/Plan:    Kwesi was seen today for referral needed.    Diagnoses and all orders for this visit:    Sleep apnea, unspecified type  -     Referral to Pulmonary and Sleep Medicine  -     DME BiPAP    Controlled substance agreement signed  -     Cancel: URINE DRUG SCREEN W/CONF (SEND OUT); Future  -     Controlled Substance Treatment Agreement  -     methylphenidate (CONCERTA) 36 MG CR tablet; Take 1 Tablet by mouth every morning for 30 days.  -     methylphenidate (CONCERTA) 36 MG CR tablet; Take 1 Tablet by mouth every morning for 30 days.  -     URINE DRUG SCREEN W/CONF (SEND OUT); Future    Attention deficit hyperactivity disorder (ADHD), combined type  -     Controlled Substance Treatment Agreement  -     methylphenidate (CONCERTA) 36 MG CR tablet; Take 1 Tablet by mouth every morning for 30 days.  -     methylphenidate (CONCERTA) 36 MG CR tablet; Take 1 Tablet by mouth every morning for 30 days.    Essential hypertension              Assessment & Plan  1. Sleep Apnea.  This is a chronic and stable problem. Diagnosed 30 years ago. He requires a BiPAP machine as recommended by his recent sleep study in late August. A DME order for BiPAP has been placed, and he has been referred to pulmonology for further management. Mikey is a potential provider for the BiPAP machine.    2. ADHD.  This is a clinically stable problem. He has been taking Concerta 36 mg, which he tolerates well with no changes in symptoms. A controlled substance agreement has been signed, and a urine drug screen will be obtained today. A prescription for Concerta 36 mg for 30 days has been sent to [facility] in [facility]. He will follow up in 2 months for a virtual visit if needed.    3. Medication  Management.  He requested a refill for pregabalin (Lyrica), which is managed by Dr. Harrell. No changes to this medication were made today. No refills needed today.     4. HTN  Chronic and stable problem.  Well-controlled on losartan, continue      History of Present Illness  The patient presents for evaluation of multiple medical concerns.    He has a history of sleep apnea, initially diagnosed in 1987. Despite having sleep apnea for 30 years, he has never consulted a pulmonologist. He has undergone several sleep studies, with the most recent one conducted in late 08/2024. He previously used a CPAP machine, which is approximately 10 years old, but it was recommended that he switch to a BiPAP machine. He is seeking an order for a BiPAP machine. He has previously used [facility].    He is currently on Concerta 36 mg, which is refilled by Dr. Harrell. He undergoes annual urine drug screens, with the last one conducted around 04/2024 or 05/2024. He has been without medication for some time and believes his urine will be clear. He previously took Adderall but finds Concerta more effective. He last refilled his prescription on 04/20/2024 and has not done so since due to other commitments. He needs to resume his medication regimen. He also takes pregabalin, prescribed by Dr. Harrell.    Health Maintenance:  COMPLETED     Allergies, past medical history, past surgical history, family history, social history reviewed and updated.    Review of Systems   Constitutional:  Negative for chills, fever and malaise/fatigue.   Cardiovascular:  Negative for chest pain, palpitations and leg swelling.        Exam:    /88   Pulse 76   Temp 36.2 °C (97.2 °F) (Temporal)   Resp 20   Ht 1.829 m (6')   Wt 65.8 kg (145 lb)   SpO2 96%   BMI 19.67 kg/m²  Body mass index is 19.67 kg/m².    Physical Exam  Constitutional:       General: He is not in acute distress.     Appearance: He is not ill-appearing.   HENT:      Head:  Normocephalic and atraumatic.      Nose: Nose normal.   Eyes:      General:         Right eye: No discharge.         Left eye: No discharge.   Cardiovascular:      Rate and Rhythm: Normal rate.      Pulses: Normal pulses.      Heart sounds: Normal heart sounds. No murmur heard.  Pulmonary:      Effort: Pulmonary effort is normal. No respiratory distress.      Breath sounds: Normal breath sounds. No stridor. No wheezing or rales.   Skin:     Findings: No rash.   Neurological:      General: No focal deficit present.      Mental Status: He is alert. Mental status is at baseline.   Psychiatric:         Mood and Affect: Mood normal.         Behavior: Behavior normal.          Results       Discussed with patient possible alternative diagnoses, patient is to take all medications as prescribed.      If symptoms persist FU w/PCP, if symptoms worsen go to emergency room.      If experiencing any side effects from prescribed medications report to the office immediately or go to emergency room.     Reviewed indication, dosage, usage and potential adverse effects of prescribed medications.      Reviewed risks and benefits of treatment plan. Patient verbalizes understanding of all instruction and verbally agrees to plan.     Discussed plan with the patient, and patient agrees to the above.      I personally reviewed prior external notes and test results pertinent to today's visit.      No follow-ups on file.

## 2024-09-13 LAB
AMPHET CTO UR CFM-MCNC: NEGATIVE NG/ML
BARBITURATES CTO UR CFM-MCNC: NEGATIVE NG/ML
BENZODIAZ CTO UR CFM-MCNC: NEGATIVE NG/ML
CANNABINOIDS CTO UR CFM-MCNC: NEGATIVE NG/ML
COCAINE CTO UR CFM-MCNC: NEGATIVE NG/ML
CREAT UR-MCNC: 127.1 MG/DL (ref 20–400)
DRUG COMMENT 753798: NORMAL
METHADONE CTO UR CFM-MCNC: NEGATIVE NG/ML
OPIATES CTO UR CFM-MCNC: NEGATIVE NG/ML
PCP CTO UR CFM-MCNC: NEGATIVE NG/ML
PROPOXYPH CTO UR CFM-MCNC: NEGATIVE NG/ML

## 2024-09-19 DIAGNOSIS — K21.9 GERD WITHOUT ESOPHAGITIS: ICD-10-CM

## 2024-09-19 RX ORDER — OMEPRAZOLE 40 MG/1
40 CAPSULE, DELAYED RELEASE ORAL DAILY
Qty: 90 CAPSULE | Refills: 0 | Status: SHIPPED | OUTPATIENT
Start: 2024-09-19

## 2024-09-19 NOTE — TELEPHONE ENCOUNTER
Received request via: Pharmacy    Was the patient seen in the last year in this department? Yes    Does the patient have an active prescription (recently filled or refills available) for medication(s) requested? No    [facility]    Does the patient have assisted Plus and need 100-day supply? (This applies to ALL medications) Patient does not have SCP

## 2024-10-07 ENCOUNTER — TELEPHONE (OUTPATIENT)
Dept: MEDICAL GROUP | Facility: MEDICAL CENTER | Age: 64
End: 2024-10-07
Payer: COMMERCIAL

## 2024-11-07 DIAGNOSIS — G47.33 OBSTRUCTIVE SLEEP APNEA: ICD-10-CM

## 2024-12-09 ENCOUNTER — PATIENT MESSAGE (OUTPATIENT)
Dept: MEDICAL GROUP | Facility: MEDICAL CENTER | Age: 64
End: 2024-12-09
Payer: COMMERCIAL

## 2024-12-09 DIAGNOSIS — G47.33 OBSTRUCTIVE SLEEP APNEA: ICD-10-CM

## 2024-12-10 ENCOUNTER — NON-PROVIDER VISIT (OUTPATIENT)
Dept: URGENT CARE | Facility: PHYSICIAN GROUP | Age: 64
End: 2024-12-10
Payer: COMMERCIAL

## 2024-12-10 ENCOUNTER — OFFICE VISIT (OUTPATIENT)
Dept: URGENT CARE | Facility: PHYSICIAN GROUP | Age: 64
End: 2024-12-10
Payer: COMMERCIAL

## 2024-12-10 ENCOUNTER — HOSPITAL ENCOUNTER (OUTPATIENT)
Dept: LAB | Facility: MEDICAL CENTER | Age: 64
End: 2024-12-10
Attending: NURSE PRACTITIONER
Payer: COMMERCIAL

## 2024-12-10 ENCOUNTER — APPOINTMENT (OUTPATIENT)
Dept: RADIOLOGY | Facility: IMAGING CENTER | Age: 64
End: 2024-12-10
Attending: NURSE PRACTITIONER
Payer: COMMERCIAL

## 2024-12-10 VITALS
OXYGEN SATURATION: 96 % | HEART RATE: 80 BPM | TEMPERATURE: 98.4 F | SYSTOLIC BLOOD PRESSURE: 128 MMHG | RESPIRATION RATE: 16 BRPM | DIASTOLIC BLOOD PRESSURE: 80 MMHG

## 2024-12-10 DIAGNOSIS — L97.511 DIABETIC ULCER OF TOE OF RIGHT FOOT ASSOCIATED WITH TYPE 2 DIABETES MELLITUS, LIMITED TO BREAKDOWN OF SKIN (HCC): ICD-10-CM

## 2024-12-10 DIAGNOSIS — E11.621 DIABETIC ULCER OF TOE OF RIGHT FOOT ASSOCIATED WITH TYPE 2 DIABETES MELLITUS, LIMITED TO BREAKDOWN OF SKIN (HCC): ICD-10-CM

## 2024-12-10 DIAGNOSIS — E11.42 TYPE 2 DIABETES MELLITUS WITH DIABETIC POLYNEUROPATHY, WITHOUT LONG-TERM CURRENT USE OF INSULIN (HCC): ICD-10-CM

## 2024-12-10 DIAGNOSIS — L08.9 DIABETIC FOOT INFECTION (HCC): ICD-10-CM

## 2024-12-10 DIAGNOSIS — E11.628 DIABETIC FOOT INFECTION (HCC): ICD-10-CM

## 2024-12-10 DIAGNOSIS — R11.2 NAUSEA VOMITING AND DIARRHEA: ICD-10-CM

## 2024-12-10 DIAGNOSIS — R19.7 NAUSEA VOMITING AND DIARRHEA: ICD-10-CM

## 2024-12-10 PROBLEM — L97.512 DIABETIC ULCER OF TOE OF RIGHT FOOT ASSOCIATED WITH TYPE 2 DIABETES MELLITUS, WITH FAT LAYER EXPOSED (HCC): Status: ACTIVE | Noted: 2024-12-10

## 2024-12-10 LAB
ERYTHROCYTE [DISTWIDTH] IN BLOOD BY AUTOMATED COUNT: 49.7 FL (ref 35.9–50)
GLUCOSE BLD-MCNC: 206 MG/DL (ref 65–99)
HCT VFR BLD AUTO: 45.3 % (ref 42–52)
HGB BLD-MCNC: 16.1 G/DL (ref 14–18)
MCH RBC QN AUTO: 35 PG (ref 27–33)
MCHC RBC AUTO-ENTMCNC: 35.5 G/DL (ref 32.3–36.5)
MCV RBC AUTO: 98.5 FL (ref 81.4–97.8)
PLATELET # BLD AUTO: 264 K/UL (ref 164–446)
PMV BLD AUTO: 8.6 FL (ref 9–12.9)
RBC # BLD AUTO: 4.6 M/UL (ref 4.7–6.1)
WBC # BLD AUTO: 6.7 K/UL (ref 4.8–10.8)

## 2024-12-10 PROCEDURE — 82962 GLUCOSE BLOOD TEST: CPT | Performed by: NURSE PRACTITIONER

## 2024-12-10 PROCEDURE — 80048 BASIC METABOLIC PNL TOTAL CA: CPT

## 2024-12-10 PROCEDURE — 73630 X-RAY EXAM OF FOOT: CPT | Mod: TC,FY,RT | Performed by: RADIOLOGY

## 2024-12-10 PROCEDURE — 3074F SYST BP LT 130 MM HG: CPT | Performed by: NURSE PRACTITIONER

## 2024-12-10 PROCEDURE — 85027 COMPLETE CBC AUTOMATED: CPT

## 2024-12-10 PROCEDURE — 36415 COLL VENOUS BLD VENIPUNCTURE: CPT

## 2024-12-10 PROCEDURE — 99214 OFFICE O/P EST MOD 30 MIN: CPT | Performed by: NURSE PRACTITIONER

## 2024-12-10 PROCEDURE — 3079F DIAST BP 80-89 MM HG: CPT | Performed by: NURSE PRACTITIONER

## 2024-12-10 RX ORDER — SULFAMETHOXAZOLE AND TRIMETHOPRIM 800; 160 MG/1; MG/1
2 TABLET ORAL 2 TIMES DAILY
Qty: 28 TABLET | Refills: 0 | Status: SHIPPED | OUTPATIENT
Start: 2024-12-10 | End: 2024-12-17

## 2024-12-10 RX ORDER — ONDANSETRON 4 MG/1
4 TABLET, FILM COATED ORAL EVERY 4 HOURS PRN
Qty: 20 TABLET | Refills: 0 | Status: SHIPPED | OUTPATIENT
Start: 2024-12-10 | End: 2024-12-15

## 2024-12-10 RX ORDER — CEPHALEXIN 500 MG/1
500 CAPSULE ORAL 4 TIMES DAILY
Qty: 28 CAPSULE | Refills: 0 | Status: SHIPPED | OUTPATIENT
Start: 2024-12-10 | End: 2024-12-17

## 2024-12-10 NOTE — PROGRESS NOTES
Subjective:   Thomas Borja is a 64 y.o. male who presents for Toe Pain (X1 month R foot, Big toe sore, not healing, diabetic )    1) wound right great toe: This is an acute condition.  Patient reports approximately 1 month ago a crack under his big toe split open and it has progressively become more tender to the touch and erythematous over the past 2 days.  Patient also has a small pressure ulcer on the pad of his right great toe.  Patient does have long history of diabetic foot ulcers associated with type 2 diabetes as well as peripheral vascular disease.  The patient is behind on his primary care visits and labs.  He states he last saw his podiatrist earlier this summer.  Patient does report history of MRSA in the past.    2) nausea, vomiting, diarrhea: Patient reports 2-week history of what started out to be nausea, vomiting, and diarrhea which has improved.  Patient states he does continue to get intermittent nausea however has not had any reoccurring vomiting in several days.  He does continue to have 2-3 diarrhea episodes per day however they are also improving.  Patient states he did initially have fevers and chills however those also resolved.  He has been able to tolerate fluids and is drinking juice to stay hydrated.  Patient reports decreased appetite and finds it difficult to eat.  Patient does state that multiple family members within the home have been sick with some GI bug.      Medications, Allergies, and current problem list reviewed today in Epic.     Objective:     /80   Pulse 80   Temp 36.9 °C (98.4 °F) (Temporal)   Resp 16   SpO2 96%     Physical Exam  Vitals reviewed.   Constitutional:       General: He is not in acute distress.     Appearance: Normal appearance. He is obese. He is not ill-appearing or toxic-appearing.   HENT:      Head: Normocephalic.      Nose: Nose normal.      Mouth/Throat:      Mouth: Mucous membranes are moist.   Eyes:      Extraocular Movements:  Extraocular movements intact.      Conjunctiva/sclera: Conjunctivae normal.      Pupils: Pupils are equal, round, and reactive to light.   Cardiovascular:      Rate and Rhythm: Normal rate.   Pulmonary:      Effort: Pulmonary effort is normal.   Abdominal:      General: Abdomen is flat. Bowel sounds are normal. There is no distension.      Palpations: Abdomen is soft. There is no mass.      Tenderness: There is no abdominal tenderness. There is no guarding or rebound.   Musculoskeletal:         General: Normal range of motion.      Cervical back: Normal range of motion and neck supple.   Feet:      Comments: Left great toe: 1 cm x 1/2 cm triangular-shaped diabetic foot ulcer on pad of great toe.  Negative for any discharge or drainage.  Mild amount of swelling and erythema to great toe  3 cm open crack just below great toe, patient denies any pain.  Mild amount of surrounding erythema and swelling to great toe.  Small amount of dried bloody fluid.  No active bleeding.  No purulent discharge.  Skin:     General: Skin is warm and dry.   Neurological:      General: No focal deficit present.      Mental Status: He is alert and oriented to person, place, and time.   Psychiatric:         Mood and Affect: Mood normal.         Behavior: Behavior normal.             Results for orders placed or performed in visit on 12/10/24   POCT Glucose    Collection Time: 12/10/24  1:11 PM   Result Value Ref Range    Glucose - Accu-Ck 206 (A) 65 - 99 mg/dL     DX-FOOT-COMPLETE 3+ RIGHT  Order: [id number]   Status: Final result       Visible to patient: Yes (seen)       Next appt: 02/04/2025 at 11:00 [facility] (Janine Gomez M.D.)       Dx: Diabetic foot infection (HCC); Diabet...    0 Result Notes  Details    Reading Physician Reading Date Result Priority   Attila Hale M.D.  [phone] 12/10/2024      Narrative & Impression     12/10/2024 2:51 PM     HISTORY/REASON FOR EXAM:  Right foot pain        TECHNIQUE/EXAM  DESCRIPTION AND NUMBER OF VIEWS:  3 nonweightbearing views of the RIGHT foot.     COMPARISON:  No comparison available     FINDINGS:  Bone mineralization is normal.  There is no evidence of fracture or dislocation.  Soft tissue swelling is noted in the first digit.  No focal bone erosion is identified.     IMPRESSION:     No evidence of fracture or dislocation.  Soft tissue swelling is noted.       Assessment/Plan:     Diagnosis and associated orders:     1. Diabetic ulcer of toe of right foot associated with type 2 diabetes mellitus, limited to breakdown of skin (HCC)  DX-FOOT-COMPLETE 3+ RIGHT    cephALEXin (KEFLEX) 500 MG Cap    sulfamethoxazole-trimethoprim (BACTRIM DS) 800-160 MG tablet    Referral to Wound Clinic    POCT Glucose    Basic Metabolic Panel    CBC WITHOUT DIFFERENTIAL    Referral back to PCP    CANCELED: DX-TOE(S) 2+ RIGHT      2. Type 2 diabetes mellitus with diabetic polyneuropathy, without long-term current use of insulin ([facility])  POCT Glucose    Basic Metabolic Panel    Referral back to PCP      3. Diabetic foot infection (HCC)  DX-FOOT-COMPLETE 3+ RIGHT    cephALEXin (KEFLEX) 500 MG Cap    sulfamethoxazole-trimethoprim (BACTRIM DS) 800-160 MG tablet    Referral to Wound Clinic    Referral back to PCP      4. Nausea vomiting and diarrhea  ondansetron (ZOFRAN) 4 MG Tab tablet    Referral back to PCP         Comments/MDM:     1. Diabetic ulcer of toe of right foot associated with type 2 diabetes mellitus, limited to breakdown of skin (HCC)  This is an acute condition.  POCT glucose in clinic 206.  Patient presents clinic today with diabetic foot ulcer to right great toe along with open wound under great toe increase.  Patient does have surrounding erythema and swelling noted.  Findings are consistent with cellulitis secondary to foot ulcer and open wound.  Unfortunately patient has outdated labs.  Stat lab orders placed today for BMP and CBC.  Will go ahead and start on Bactrim and Keflex as he  states he is tolerating his well before.  Educated patient about appropriate wound care.  Patient is agreeable to go to [place] today to obtain foot x-ray.  1300-foot x-ray does not show any signs of osteomyelitis, soft tissue swelling was noted.  Did call patient left voicemail as well as sent [employer] message.  12/11/2024-CBC does not show elevated WBC count, mild chronic anemia noted.  BMP shows normal renal function with hyperglycemia.  This is consistent with elevated blood sugar performed in clinic yesterday.  Patient was educated about strict diabetes management including lifestyle modifications, weight loss, exercise, and to continue on glipizide.  Referral placed back to primary care for stat in office appointment.  Patient was encouraged to get outstanding labs completed for primary care.  Wound care referral was placed.  Patient was instructed after x-ray to come back into clinic for dressing to be applied however he did not present back in clinic.  Patient was educated on signs symptoms to monitor for and when to follow up with the emergency department.  Recommended 1 week recheck either with wound care, primary care, or back in urgent care.  - DX-FOOT-COMPLETE 3+ RIGHT; Future  - cephALEXin (KEFLEX) 500 MG Cap; Take 1 Capsule by mouth 4 times a day for 7 days.  Dispense: 28 Capsule; Refill: 0  - sulfamethoxazole-trimethoprim (BACTRIM DS) 800-160 MG tablet; Take 2 Tablets by mouth 2 times a day for 7 days.  Dispense: 28 Tablet; Refill: 0  - Referral to Wound Clinic  - POCT Glucose  - Basic Metabolic Panel; Future  - CBC WITHOUT DIFFERENTIAL; Future    2. Type 2 diabetes mellitus with diabetic polyneuropathy, without long-term current use of insulin (HCC)  Chronic and controlled.  Discussed diabetes education with patient.  Patient continue on glipizide dose at this time.  Stat referral placed back to primary care.  - POCT Glucose  - Basic Metabolic Panel; Future    3. Diabetic foot infection (HCC)  See  above  - DX-FOOT-COMPLETE 3+ RIGHT; Future  - cephALEXin (KEFLEX) 500 MG Cap; Take 1 Capsule by mouth 4 times a day for 7 days.  Dispense: 28 Capsule; Refill: 0  - sulfamethoxazole-trimethoprim (BACTRIM DS) 800-160 MG tablet; Take 2 Tablets by mouth 2 times a day for 7 days.  Dispense: 28 Tablet; Refill: 0  - Referral to Wound Clinic    4. Nausea vomiting and diarrhea  This is an acute condition.  Patient reports 2 weeks of nausea, vomiting, and diarrhea.  He states his symptoms are improving and he is tolerating fluids well.  He also indicates that his whole family has been sick with some stomach bug.  There is a known GI virus going around town at this time.  Patient does not appear dehydrated in clinic.  Vital signs are stable.  BMP shows normal renal function.  Will go ahead and prescribe Zofran.  Did discuss with patient the nausea and vomiting could be due to uncontrolled type 2 diabetes.  Recommended patient have tight glycemic control.  ER precautions were discussed.  - ondansetron (ZOFRAN) 4 MG Tab tablet; Take 1 Tablet by mouth every four hours as needed for Nausea/Vomiting for up to 5 days.  Dispense: 20 Tablet; Refill: 0      Patient was involved with shared decision-making throughout the exam today and verbalizes understanding regards to plan of care, discharge instructions, and follow-up         Differential diagnosis, natural history, supportive care, and indications for immediate follow-up discussed.    Advised the patient to follow-up with the primary care physician for recheck, reevaluation, and consideration of further management.    I personally reviewed prior external notes and test results pertinent to today's visit as well as additional imaging and testing completed in clinic today.     Please note that this dictation was created using voice recognition software. I have made a reasonable attempt to correct obvious errors, but I expect that there are errors of grammar and possibly content that  I did not discover before finalizing the note.

## 2024-12-11 LAB
ANION GAP SERPL CALC-SCNC: 11 MMOL/L (ref 7–16)
BUN SERPL-MCNC: 13 MG/DL (ref 8–22)
CALCIUM SERPL-MCNC: 9.2 MG/DL (ref 8.5–10.5)
CHLORIDE SERPL-SCNC: 97 MMOL/L (ref 96–112)
CO2 SERPL-SCNC: 28 MMOL/L (ref 20–33)
CREAT SERPL-MCNC: 1.11 MG/DL (ref 0.5–1.4)
GFR SERPLBLD CREATININE-BSD FMLA CKD-EPI: 73 ML/MIN/1.73 M 2
GLUCOSE SERPL-MCNC: 195 MG/DL (ref 65–99)
POTASSIUM SERPL-SCNC: 3.9 MMOL/L (ref 3.6–5.5)
SODIUM SERPL-SCNC: 136 MMOL/L (ref 135–145)

## 2024-12-17 ENCOUNTER — OFFICE VISIT (OUTPATIENT)
Dept: WOUND CARE | Facility: MEDICAL CENTER | Age: 64
End: 2024-12-17
Attending: NURSE PRACTITIONER
Payer: MEDICARE

## 2024-12-17 VITALS
RESPIRATION RATE: 18 BRPM | TEMPERATURE: 97.2 F | OXYGEN SATURATION: 98 % | HEART RATE: 112 BPM | SYSTOLIC BLOOD PRESSURE: 160 MMHG | DIASTOLIC BLOOD PRESSURE: 86 MMHG

## 2024-12-17 DIAGNOSIS — L97.512 DIABETIC ULCER OF TOE OF RIGHT FOOT ASSOCIATED WITH TYPE 2 DIABETES MELLITUS, WITH FAT LAYER EXPOSED (HCC): ICD-10-CM

## 2024-12-17 DIAGNOSIS — E11.42 TYPE 2 DIABETES MELLITUS WITH DIABETIC POLYNEUROPATHY, WITHOUT LONG-TERM CURRENT USE OF INSULIN (HCC): ICD-10-CM

## 2024-12-17 DIAGNOSIS — R23.4 FISSURE IN SKIN OF RIGHT FOOT: ICD-10-CM

## 2024-12-17 DIAGNOSIS — E11.621 DIABETIC ULCER OF TOE OF RIGHT FOOT ASSOCIATED WITH TYPE 2 DIABETES MELLITUS, WITH FAT LAYER EXPOSED (HCC): ICD-10-CM

## 2024-12-17 PROCEDURE — 99214 OFFICE O/P EST MOD 30 MIN: CPT

## 2024-12-17 PROCEDURE — 11042 DBRDMT SUBQ TIS 1ST 20SQCM/<: CPT

## 2024-12-17 PROCEDURE — 3077F SYST BP >= 140 MM HG: CPT | Performed by: STUDENT IN AN ORGANIZED HEALTH CARE EDUCATION/TRAINING PROGRAM

## 2024-12-17 PROCEDURE — 99214 OFFICE O/P EST MOD 30 MIN: CPT | Mod: 25 | Performed by: STUDENT IN AN ORGANIZED HEALTH CARE EDUCATION/TRAINING PROGRAM

## 2024-12-17 PROCEDURE — 3079F DIAST BP 80-89 MM HG: CPT | Performed by: STUDENT IN AN ORGANIZED HEALTH CARE EDUCATION/TRAINING PROGRAM

## 2024-12-17 PROCEDURE — 11042 DBRDMT SUBQ TIS 1ST 20SQCM/<: CPT | Performed by: STUDENT IN AN ORGANIZED HEALTH CARE EDUCATION/TRAINING PROGRAM

## 2024-12-17 ASSESSMENT — ENCOUNTER SYMPTOMS
CLAUDICATION: 0
CHILLS: 0
FEVER: 0

## 2024-12-17 NOTE — PATIENT INSTRUCTIONS
-Keep your wound dressing clean, dry, and intact. Only change dressing if it's over saturated, soiled or falls off.     -Change your dressing if it becomes soiled, soaked, or falls off.    -Should you experience any significant changes in your wound(s), such as infection (redness, swelling, localized heat, increased pain, fever > 101 F, chills) or have any questions regarding your home care instructions, please contact the wound center at [phone]. If after hours, contact your primary care physician or go to the hospital emergency room.     If you are admitted to any hospital, you will need a new referral to come back to the wound clinic and any scheduled appointments that you already have, may be cancelled.

## 2024-12-17 NOTE — PROGRESS NOTES
Provider Encounter- Diabetic Foot Ulcer      HISTORY OF PRESENT ILLNESS  Wound History:    START OF CARE IN CLINIC: 12/17/2024    REFERRING PROVIDER: Jacqueline Diaz      WOUND- Diabetic foot ulcer   LOCATION: Right plantar hallux   HISTORY:  64M with PMHx of DM2, Diabetic neuropathy, venous insufficiency / lymphedema, HTN, kidney disease, mitral valve prolapse. Patient well known to clinic for previous venous ulcer, lymphedema, and previous diabetic foot ulcers. Patient reports that he noted drainage from right hallux around Halloween. He reports increased drainage and was seen at urgent care 12/10, concern for infection and was started on Keflex and Bactrim. Patient was referred to [facility] for wound care.    Pertinent Medical History: See above      TOBACCO USE:  Former    Patient's problem list, allergies, and current medications reviewed and updated in Epic    Interval History:  12/17/2024: Clinic visit with Travis Bonilla MD. Patient reports doing well. He has completed Abx prescribed by urgent care. Denies any fevers or chills. Previously noted fissure to plantar hallux has resolved. Hallux DFU is superficial. Patient is wearing diabetic shoes and custom moulded insoles, has new insoles that he will transition into use. Recommend he wear custom insoles whenever ambulating.    REVIEW OF SYSTEMS:   Review of Systems   Constitutional:  Negative for chills, fever and malaise/fatigue.   Cardiovascular:  Positive for leg swelling. Negative for claudication.       PHYSICAL EXAMINATION:   BP (!) 160/86   Pulse (!) 112   Temp 36.2 °C (97.2 °F) (Temporal)   Resp 18   SpO2 98%     Physical Exam  Constitutional:       Appearance: He is obese.   Cardiovascular:      Rate and Rhythm: Tachycardia present.      Comments: Pedal pulses easily palpable  Pulmonary:      Effort: Pulmonary effort is normal. No respiratory distress.   Musculoskeletal:      Right lower leg: Edema present.      Left lower leg: Edema present.    Skin:     Comments: Right plantar hallux DFU: Wound is superficial, previously noted fissure appears resolved. Mild periwound callus. No evidence of gross infection.   Neurological:      Mental Status: He is alert.         Monofilament testing with a 10 gram force: sensation intact: decreased bilaterally  Visual Inspection: Feet with maceration, ulcers, fissures.  Pedal pulses: intact bilaterally     WOUND ASSESSMENT  Wound 12/17/24 Diabetic Ulcer Toe, Hallux Plantar Right (Active)   Wound Image    12/17/24 1300   Site Assessment [place] 12/17/24 1300   Periwound Assessment Callused 12/17/24 1300   Margins Attached edges 12/17/24 1300   Drainage Amount Scant 12/17/24 1300   Drainage Description Serosanguineous 12/17/24 1300   Treatments Cleansed;Provider debridement;Site care 12/17/24 1300   Offloading/DME Offloading Shoe 12/17/24 1300   Wound Cleansing Hypochlorus Acid 12/17/24 1300   Periwound Protectant Skin Protectant Wipes to Periwound 12/17/24 1300   Dressing Status Old drainage 12/17/24 1300   Dressing Changed New 12/17/24 1300   Dressing Cleansing/Solutions Not Applicable 12/17/24 1300   Dressing Options Hydrofera Blue Ready;Hypafix Tape 12/17/24 1300   Dressing Change/Treatment Frequency Every 72 hrs, and As Needed 12/17/24 1300   Wound Team Following Weekly 12/17/24 1300   Non-staged Wound Description Full thickness 12/17/24 1300   Wound Length (cm) 0.6 cm 12/17/24 1300   Wound Width (cm) 0.4 cm 12/17/24 1300   Wound Depth (cm) 0.2 cm 12/17/24 1300   Wound Surface Area (cm^2) 0.24 cm^2 12/17/24 1300   Wound Volume (cm^3) 0.048 cm^3 12/17/24 1300   Post-Procedure Length (cm) 0.7 cm 12/17/24 1300   Post-Procedure Width (cm) 0.4 cm 12/17/24 1300   Post-Procedure Depth (cm) 0.3 cm 12/17/24 1300   Post-Procedure Surface Area (cm^2) 0.28 cm^2 12/17/24 1300   Post-Procedure Volume (cm^3) 0.084 cm^3 12/17/24 1300   Wound Odor None 12/17/24 1300   Pulses Right;1+;DP;PT 12/17/24 1300   Right Foot Monofilament  10-point exam (Sensate) 5/10 12/17/24 1300   Left Foot Monofilament 10-point exam (Sensate) 5/10 12/17/24 1300   Exposed Structures None 12/17/24 1300   Number of days: 0       Wound 12/17/24 Diabetic Ulcer Toe, Hallux Plantar;Distal Right (Active)   Wound Image    12/17/24 1300   Site Assessment Pink;Crusted 12/17/24 1300   Periwound Assessment Crusted 12/17/24 1300   Margins Attached edges 12/17/24 1300   Drainage Amount Scant 12/17/24 1300   Drainage Description Serosanguineous 12/17/24 1300   Treatments Cleansed;Provider debridement;Site care 12/17/24 1300   Wound Cleansing Hypochlorus Acid 12/17/24 1300   Periwound Protectant Skin Protectant Wipes to Periwound 12/17/24 1300   Dressing Status Old drainage 12/17/24 1300   Dressing Changed New 12/17/24 1300   Dressing Cleansing/Solutions Not Applicable 12/17/24 1300   Dressing Options Open to Air 12/17/24 1300   Wound Team Following Weekly 12/17/24 1300   Non-staged Wound Description Full thickness 12/17/24 1300   Wound Length (cm) 0.2 cm 12/17/24 1300   Wound Width (cm) 2 cm 12/17/24 1300   Wound Depth (cm) 0.2 cm 12/17/24 1300   Wound Surface Area (cm^2) 0.4 cm^2 12/17/24 1300   Wound Volume (cm^3) 0.08 cm^3 12/17/24 1300   Tunneling (cm) 0 cm 12/17/24 1300   Undermining (cm) 0 cm 12/17/24 1300   Wound Odor None 12/17/24 1300   Pulses Right;2+;DP;PT 12/17/24 1300   Right Foot Monofilament 10-point exam (Sensate) 5/10 12/17/24 1300   Left Foot Monofilament 10-point exam (Sensate) 5/10 12/17/24 1300   Exposed Structures None 12/17/24 1300   Number of days: 0         PROCEDURE:   -2% viscous lidocaine applied topically to wound bed for approximately 5 minutes prior to debridement  -Curette used to debride wound bed and periwound callus.  Excisional debridement was performed to remove devitalized tissue until healthy, bleeding tissue was visualized.   Entire surface of wound, 0.28 cm2 debrided.  Tissue debrided into the subcutaneous layer.  Periwound callus, ~ 1  cm2, debrided to skin level, excising hyperkeratinized tissue.   -Bleeding controlled with manual pressure.    -Wound care completed by wound RN, refer to flowsheet  -Patient tolerated the procedure well, without c/o pain or discomfort.       Pertinent Labs and Diagnostics:         Labs:     A1c:   Lab Results   Component Value Date/Time    HBA1C 8.3 (A) 02/23/2024 10:21 AM          IMAGING: Foot/Toes Imaging, Past Year DX-FOOT-COMPLETE 3+ RIGHT    Result Date: 12/10/2024  Narrative 12/10/2024 2:51 PM HISTORY/REASON FOR EXAM:  Right foot pain TECHNIQUE/EXAM DESCRIPTION AND NUMBER OF VIEWS: 3 nonweightbearing views of the RIGHT foot. COMPARISON:  No comparison available FINDINGS:  Bone mineralization is normal.  There is no evidence of fracture or dislocation.  Soft tissue swelling is noted in the first digit.  No focal bone erosion is identified.     Impression No evidence of fracture or dislocation. Soft tissue swelling is noted.      VASCULAR STUDIES: No results found.    LAST  WOUND CULTURE:   Lab Results   Component Value Date/Time    CULTRSULT - (A) 07/11/2023 02:15 PM    CULTRSULT (A) 07/11/2023 02:15 PM     Methicillin Resistant Staphylococcus aureus  Light growth      CULTRSULT Streptococcus pyogenes (Group A)  Rare growth   (A) 07/11/2023 02:15 PM           ASSESSMENT AND PLAN:     1. Diabetic ulcer of toe of right foot associated with type 2 diabetes mellitus, with fat layer exposed (HCC)    12/17/2024  - Wound is fairly superficial  - Excisional debridement was performed in clinic, medically necessary to promote wound healing.  - Recommend wearing custom diabetic insoles to help offload. If not improving may need formal offloading footwear  - No evidence of infection  - Low suspicion for OM, defer imaging.   Wound Care: HFBR, tape    2. Fissure in skin of right foot    12/17/2024  - Previously noted fissure while seen in urgent care has closed  - Recommend moisturizing dry skin on foot  - Recommend  wearing custom moulded insoles when ambulating  - Monitor area closely    3. Type 2 diabetes mellitus with diabetic polyneuropathy, without long-term current use of insulin ([facility])    12/17/2024  - Patient's DM is not at goal, most recent A1c 8.3  - Reports glucose 220 while at urgent care  - Reports being out of test strips and CGM, working on obtaining refills  - Patient counseled on the importance of glucose control on wound healing and preventing infection  - Implications of loss of protective sensation (LOPS) discussed with patient- including increased risk for amputation.  Advised to check feet at least daily, moisturize feet, and to always wear protective foot wear.   - Diabetic shoes and insoles < 1 year old. Recommend replacing insole as has not been replaced for last 6 months, he has new set to start using.      PATIENT EDUCATION  - Importance of tight glucose control for wound healing   - Implications of loss of protective sensation (LOPS) discussed with patient- including increased risk for amputation.  - Advised to check feet at least daily, moisturize feet, and to always wear protective foot wear.   -  Importance of offloading foot to assist with wound healing  - Advised pt not to trim nails or calluses, seek foot/nail care from podiatrist or certified foot/nail nurse  - Importance of adequate nutrition for wound healing    My total time spent caring for the patient on the day of the encounter was 30 minutes, reviewing history, assessment, counseling and education, and coordination of care.  This does not include time spent on separately billable procedures/tests.    Please note that this note may have been created using voice recognition software. I have worked with technical experts from [employer] to optimize the interface.  I have made every reasonable attempt to correct obvious errors, but there may be errors of grammar and possibly content that I did not discover before finalizing the  note.    N

## 2024-12-26 ENCOUNTER — NON-PROVIDER VISIT (OUTPATIENT)
Dept: WOUND CARE | Facility: MEDICAL CENTER | Age: 64
End: 2024-12-26
Attending: NURSE PRACTITIONER
Payer: MEDICARE

## 2024-12-26 PROCEDURE — 97597 DBRDMT OPN WND 1ST 20 CM/<: CPT

## 2024-12-26 NOTE — CERTIFICATION
Advanced [facility]   [facility]   [address]   [address]   [place] [address]   [phone] Fax: [phone]    Discharge Note      Referring Physician: GIOVANNY Rogers  Wound Etiology: DFU  Wound location: Right plantar hallux and right plantar hallux proximal   Date of Discharge: 12/26/2024    Assessment:  Discharge patient at this time secondary to wound resolution. Pt instr dc today, keep area clean, it will be fragile for a few days, bathe and dry area gently, as scar tissue only ever regains a maximum of 80% of the tensile strength of the surrounding skin, remodeling of scar can continue for 6mo - a year. Contact PCP for a referral back here if any problems with area opening and draining again. Pt understands.    CSWD with scalpel to remove <5 cm2 callus. Wounds are resolved. Patient tolerated well.

## 2024-12-30 ENCOUNTER — APPOINTMENT (OUTPATIENT)
Dept: WOUND CARE | Facility: MEDICAL CENTER | Age: 64
End: 2024-12-30
Attending: NURSE PRACTITIONER
Payer: COMMERCIAL

## 2025-01-01 RX ORDER — MECLIZINE HCL 12.5 MG 12.5 MG/1
TABLET ORAL
Qty: 90 TABLET | Refills: 0 | Status: SHIPPED | OUTPATIENT
Start: 2025-01-01

## 2025-01-07 ENCOUNTER — APPOINTMENT (OUTPATIENT)
Dept: WOUND CARE | Facility: MEDICAL CENTER | Age: 65
End: 2025-01-07
Attending: NURSE PRACTITIONER
Payer: MEDICARE

## 2025-01-07 ENCOUNTER — APPOINTMENT (OUTPATIENT)
Dept: MEDICAL GROUP | Facility: MEDICAL CENTER | Age: 65
End: 2025-01-07
Payer: COMMERCIAL

## 2025-01-07 DIAGNOSIS — I10 ESSENTIAL HYPERTENSION: ICD-10-CM

## 2025-01-07 DIAGNOSIS — E11.42 TYPE 2 DIABETES MELLITUS WITH DIABETIC POLYNEUROPATHY, WITHOUT LONG-TERM CURRENT USE OF INSULIN (HCC): ICD-10-CM

## 2025-01-07 DIAGNOSIS — Z12.5 ENCOUNTER FOR SCREENING FOR MALIGNANT NEOPLASM OF PROSTATE: ICD-10-CM

## 2025-01-07 DIAGNOSIS — Z13.29 SCREENING FOR THYROID DISORDER: ICD-10-CM

## 2025-01-08 NOTE — CARE PLAN
LUIS Lyon      Notes: appt: 1/31   The patient is Stable - Low risk of patient condition declining or worsening    Shift Goals  Clinical Goals: abx , VSS , replace k , lasix , hd tomorrow  Patient Goals: comfort , decreased pain  Family Goals: SANJUANA    Progress made toward(s) clinical / shift goals:  VSS , no c/o pain , lytes replaced     Patient is not progressing towards the following goals:

## 2025-01-09 ENCOUNTER — APPOINTMENT (OUTPATIENT)
Dept: WOUND CARE | Facility: MEDICAL CENTER | Age: 65
End: 2025-01-09
Attending: NURSE PRACTITIONER
Payer: MEDICARE

## 2025-01-23 ENCOUNTER — OFFICE VISIT (OUTPATIENT)
Dept: MEDICAL GROUP | Facility: MEDICAL CENTER | Age: 65
End: 2025-01-23
Payer: COMMERCIAL

## 2025-01-23 ENCOUNTER — HOSPITAL ENCOUNTER (OUTPATIENT)
Facility: MEDICAL CENTER | Age: 65
End: 2025-01-23
Attending: STUDENT IN AN ORGANIZED HEALTH CARE EDUCATION/TRAINING PROGRAM
Payer: COMMERCIAL

## 2025-01-23 VITALS
DIASTOLIC BLOOD PRESSURE: 76 MMHG | BODY MASS INDEX: 42.5 KG/M2 | HEART RATE: 97 BPM | SYSTOLIC BLOOD PRESSURE: 134 MMHG | WEIGHT: 313.8 LBS | HEIGHT: 72 IN | TEMPERATURE: 97.9 F | OXYGEN SATURATION: 97 %

## 2025-01-23 DIAGNOSIS — E11.42 TYPE 2 DIABETES MELLITUS WITH DIABETIC POLYNEUROPATHY, WITHOUT LONG-TERM CURRENT USE OF INSULIN (HCC): ICD-10-CM

## 2025-01-23 DIAGNOSIS — Z23 IMMUNIZATION DUE: ICD-10-CM

## 2025-01-23 DIAGNOSIS — Z79.899 HIGH RISK MEDICATION USE: ICD-10-CM

## 2025-01-23 DIAGNOSIS — F90.2 ATTENTION DEFICIT HYPERACTIVITY DISORDER (ADHD), COMBINED TYPE: ICD-10-CM

## 2025-01-23 DIAGNOSIS — G47.33 OBSTRUCTIVE SLEEP APNEA: ICD-10-CM

## 2025-01-23 DIAGNOSIS — E11.42 DIABETIC POLYNEUROPATHY ASSOCIATED WITH TYPE 2 DIABETES MELLITUS (HCC): ICD-10-CM

## 2025-01-23 PROCEDURE — 99214 OFFICE O/P EST MOD 30 MIN: CPT | Mod: 25 | Performed by: STUDENT IN AN ORGANIZED HEALTH CARE EDUCATION/TRAINING PROGRAM

## 2025-01-23 PROCEDURE — 3078F DIAST BP <80 MM HG: CPT | Performed by: STUDENT IN AN ORGANIZED HEALTH CARE EDUCATION/TRAINING PROGRAM

## 2025-01-23 PROCEDURE — 90662 IIV NO PRSV INCREASED AG IM: CPT

## 2025-01-23 PROCEDURE — 90471 IMMUNIZATION ADMIN: CPT

## 2025-01-23 PROCEDURE — 3075F SYST BP GE 130 - 139MM HG: CPT | Performed by: STUDENT IN AN ORGANIZED HEALTH CARE EDUCATION/TRAINING PROGRAM

## 2025-01-23 PROCEDURE — 80307 DRUG TEST PRSMV CHEM ANLYZR: CPT

## 2025-01-23 RX ORDER — METHYLPHENIDATE HYDROCHLORIDE 36 MG/1
36 TABLET ORAL EVERY MORNING
Qty: 30 TABLET | Refills: 0 | Status: SHIPPED | OUTPATIENT
Start: 2025-02-22 | End: 2025-03-24

## 2025-01-23 RX ORDER — METHYLPHENIDATE HYDROCHLORIDE 36 MG/1
36 TABLET ORAL EVERY MORNING
Qty: 30 TABLET | Refills: 0 | Status: SHIPPED | OUTPATIENT
Start: 2025-01-23 | End: 2025-02-22

## 2025-01-23 RX ORDER — METHYLPHENIDATE HYDROCHLORIDE 36 MG/1
36 TABLET ORAL EVERY MORNING
Qty: 30 TABLET | Refills: 0 | Status: SHIPPED | OUTPATIENT
Start: 2025-03-24 | End: 2025-04-23

## 2025-01-23 RX ORDER — PREGABALIN 50 MG/1
50 CAPSULE ORAL 2 TIMES DAILY
Qty: 60 CAPSULE | Refills: 2 | Status: SHIPPED | OUTPATIENT
Start: 2025-01-23 | End: 2025-04-23

## 2025-01-23 ASSESSMENT — FIBROSIS 4 INDEX: FIB4 SCORE: 1.281423214455495396

## 2025-01-23 NOTE — PROGRESS NOTES
Subjective:     CC: ADHD, diabetic neuropathy, sleep apnea    HPI:   Thomas presents today with    Problem   Attention Deficit Hyperactivity Disorder (Adhd), Combined Type    This is a chronic condition, previously well controlled with Concerta extended release 36 mg daily.  Controls this agreement on file, urine drug screen on file, PDMP reviewed       Diabetic Neuropathy (Hcc)    This is a chronic condition, currently takes Lyrica 50 mg twice daily with good relief        Obstructive Sleep Apnea    This is a chronic condition.  Severe.  He needs a BiPAP.  He last had a sleep study titration done in 8/2024.         ROS:  ROS    Objective:     Exam:  /76 (BP Location: Left arm, Patient Position: Sitting, BP Cuff Size: Adult)   Pulse 97   Temp 36.6 °C (97.9 °F) (Temporal)   Ht 1.829 m (6')   Wt (!) 142 kg (313 lb 12.8 oz)   SpO2 97%   BMI 42.56 kg/m²  Body mass index is 42.56 kg/m².    Physical Exam  Vitals reviewed.   Constitutional:       General: He is not in acute distress.     Appearance: He is not toxic-appearing.   HENT:      Head: Normocephalic and atraumatic.      Right Ear: External ear normal.      Left Ear: External ear normal.   Eyes:      General:         Right eye: No discharge.         Left eye: No discharge.      Extraocular Movements: Extraocular movements intact.      Conjunctiva/sclera: Conjunctivae normal.   Pulmonary:      Effort: Pulmonary effort is normal. No respiratory distress.   Skin:     General: Skin is warm and dry.   Neurological:      Mental Status: He is alert.   Psychiatric:         Mood and Affect: Mood normal.         Behavior: Behavior normal.         Thought Content: Thought content normal.         Judgment: Judgment normal.           Assessment & Plan:     64 y.o. male with the following -     1. Obstructive sleep apnea  Chronic condition, severe, needs BiPAP, reviewed last sleep study and DME order placed  - DME BiPAP    2. Diabetic polyneuropathy associated with  type 2 diabetes mellitus (HCC)  - pregabalin (LYRICA) 50 MG capsule; Take 1 Capsule by mouth 2 times a day for 90 days.  Dispense: 60 Capsule; Refill: 2    3. Attention deficit hyperactivity disorder (ADHD), combined type  - methylphenidate (CONCERTA) 36 MG CR tablet; Take 1 Tablet by mouth every morning for 30 days.  Dispense: 30 Tablet; Refill: 0  - methylphenidate (CONCERTA) 36 MG CR tablet; Take 1 Tablet by mouth every morning for 30 days.  Dispense: 30 Tablet; Refill: 0  - methylphenidate (CONCERTA) 36 MG CR tablet; Take 1 Tablet by mouth every morning for 30 days.  Dispense: 30 Tablet; Refill: 0    4. Immunization due  - Influenza Vaccine, High Dose (65+ Only)          No follow-ups on file.    Please note that this dictation was created using voice recognition software. I have made every reasonable attempt to correct obvious errors, but I expect that there are errors of grammar and possibly content that I did not discover before finalizing the note.

## 2025-01-25 LAB
AMPHET CTO UR CFM-MCNC: NEGATIVE NG/ML
BARBITURATES CTO UR CFM-MCNC: NEGATIVE NG/ML
BENZODIAZ CTO UR CFM-MCNC: NEGATIVE NG/ML
CANNABINOIDS CTO UR CFM-MCNC: NEGATIVE NG/ML
COCAINE CTO UR CFM-MCNC: NEGATIVE NG/ML
CREAT UR-MCNC: 208.5 MG/DL (ref 20–400)
DRUG COMMENT 753798: NORMAL
METHADONE CTO UR CFM-MCNC: NEGATIVE NG/ML
OPIATES CTO UR CFM-MCNC: NEGATIVE NG/ML
PCP CTO UR CFM-MCNC: NEGATIVE NG/ML
PROPOXYPH CTO UR CFM-MCNC: NEGATIVE NG/ML

## 2025-01-27 ENCOUNTER — HOSPITAL ENCOUNTER (OUTPATIENT)
Dept: LAB | Facility: MEDICAL CENTER | Age: 65
End: 2025-01-27
Attending: STUDENT IN AN ORGANIZED HEALTH CARE EDUCATION/TRAINING PROGRAM
Payer: COMMERCIAL

## 2025-01-27 DIAGNOSIS — I10 ESSENTIAL HYPERTENSION: ICD-10-CM

## 2025-01-27 DIAGNOSIS — E11.42 TYPE 2 DIABETES MELLITUS WITH DIABETIC POLYNEUROPATHY, WITHOUT LONG-TERM CURRENT USE OF INSULIN (HCC): ICD-10-CM

## 2025-01-27 DIAGNOSIS — Z13.29 SCREENING FOR THYROID DISORDER: ICD-10-CM

## 2025-01-27 DIAGNOSIS — Z12.5 ENCOUNTER FOR SCREENING FOR MALIGNANT NEOPLASM OF PROSTATE: ICD-10-CM

## 2025-01-27 LAB
ALBUMIN SERPL BCP-MCNC: 4.2 G/DL (ref 3.2–4.9)
ALBUMIN/GLOB SERPL: 1.4 G/DL
ALP SERPL-CCNC: 78 U/L (ref 30–99)
ALT SERPL-CCNC: 15 U/L (ref 2–50)
ANION GAP SERPL CALC-SCNC: 13 MMOL/L (ref 7–16)
AST SERPL-CCNC: 16 U/L (ref 12–45)
BILIRUB SERPL-MCNC: 0.7 MG/DL (ref 0.1–1.5)
BUN SERPL-MCNC: 13 MG/DL (ref 8–22)
CALCIUM ALBUM COR SERPL-MCNC: 9 MG/DL (ref 8.5–10.5)
CALCIUM SERPL-MCNC: 9.2 MG/DL (ref 8.5–10.5)
CHLORIDE SERPL-SCNC: 98 MMOL/L (ref 96–112)
CHOLEST SERPL-MCNC: 103 MG/DL (ref 100–199)
CO2 SERPL-SCNC: 24 MMOL/L (ref 20–33)
CREAT SERPL-MCNC: 1.07 MG/DL (ref 0.5–1.4)
EST. AVERAGE GLUCOSE BLD GHB EST-MCNC: 163 MG/DL
FASTING STATUS PATIENT QL REPORTED: NORMAL
GFR SERPLBLD CREATININE-BSD FMLA CKD-EPI: 76 ML/MIN/1.73 M 2
GLOBULIN SER CALC-MCNC: 2.9 G/DL (ref 1.9–3.5)
GLUCOSE SERPL-MCNC: 178 MG/DL (ref 65–99)
HBA1C MFR BLD: 7.3 % (ref 4–5.6)
HDLC SERPL-MCNC: 43 MG/DL
LDLC SERPL CALC-MCNC: 33 MG/DL
POTASSIUM SERPL-SCNC: 3.5 MMOL/L (ref 3.6–5.5)
PROT SERPL-MCNC: 7.1 G/DL (ref 6–8.2)
PSA SERPL DL<=0.01 NG/ML-MCNC: 1.01 NG/ML (ref 0–4)
SODIUM SERPL-SCNC: 135 MMOL/L (ref 135–145)
T4 FREE SERPL-MCNC: 1.22 NG/DL (ref 0.93–1.7)
TRIGL SERPL-MCNC: 134 MG/DL (ref 0–149)
TSH SERPL DL<=0.005 MIU/L-ACNC: 8.38 UIU/ML (ref 0.38–5.33)

## 2025-01-27 PROCEDURE — 80053 COMPREHEN METABOLIC PANEL: CPT

## 2025-01-27 PROCEDURE — 82043 UR ALBUMIN QUANTITATIVE: CPT

## 2025-01-27 PROCEDURE — 80061 LIPID PANEL: CPT

## 2025-01-27 PROCEDURE — 84439 ASSAY OF FREE THYROXINE: CPT

## 2025-01-27 PROCEDURE — 83036 HEMOGLOBIN GLYCOSYLATED A1C: CPT | Mod: GA

## 2025-01-27 PROCEDURE — 82570 ASSAY OF URINE CREATININE: CPT

## 2025-01-27 PROCEDURE — 84153 ASSAY OF PSA TOTAL: CPT | Mod: GA

## 2025-01-27 PROCEDURE — 36415 COLL VENOUS BLD VENIPUNCTURE: CPT | Mod: GA

## 2025-01-27 PROCEDURE — 84443 ASSAY THYROID STIM HORMONE: CPT

## 2025-01-28 LAB
CREAT UR-MCNC: 119 MG/DL
MICROALBUMIN UR-MCNC: <1.2 MG/DL
MICROALBUMIN/CREAT UR: NORMAL MG/G (ref 0–30)

## 2025-02-01 DIAGNOSIS — K21.9 GERD WITHOUT ESOPHAGITIS: ICD-10-CM

## 2025-02-01 SDOH — ECONOMIC STABILITY: FOOD INSECURITY: WITHIN THE PAST 12 MONTHS, YOU WORRIED THAT YOUR FOOD WOULD RUN OUT BEFORE YOU GOT MONEY TO BUY MORE.: NEVER TRUE

## 2025-02-01 SDOH — HEALTH STABILITY: PHYSICAL HEALTH: ON AVERAGE, HOW MANY MINUTES DO YOU ENGAGE IN EXERCISE AT THIS LEVEL?: 0 MIN

## 2025-02-01 SDOH — ECONOMIC STABILITY: TRANSPORTATION INSECURITY
IN THE PAST 12 MONTHS, HAS LACK OF TRANSPORTATION KEPT YOU FROM MEETINGS, WORK, OR FROM GETTING THINGS NEEDED FOR DAILY LIVING?: NO

## 2025-02-01 SDOH — ECONOMIC STABILITY: INCOME INSECURITY: HOW HARD IS IT FOR YOU TO PAY FOR THE VERY BASICS LIKE FOOD, HOUSING, MEDICAL CARE, AND HEATING?: NOT HARD AT ALL

## 2025-02-01 SDOH — ECONOMIC STABILITY: TRANSPORTATION INSECURITY
IN THE PAST 12 MONTHS, HAS THE LACK OF TRANSPORTATION KEPT YOU FROM MEDICAL APPOINTMENTS OR FROM GETTING MEDICATIONS?: NO

## 2025-02-01 SDOH — ECONOMIC STABILITY: INCOME INSECURITY: IN THE LAST 12 MONTHS, WAS THERE A TIME WHEN YOU WERE NOT ABLE TO PAY THE MORTGAGE OR RENT ON TIME?: NO

## 2025-02-01 SDOH — HEALTH STABILITY: PHYSICAL HEALTH: ON AVERAGE, HOW MANY DAYS PER WEEK DO YOU ENGAGE IN MODERATE TO STRENUOUS EXERCISE (LIKE A BRISK WALK)?: 0 DAYS

## 2025-02-01 SDOH — ECONOMIC STABILITY: HOUSING INSECURITY
IN THE LAST 12 MONTHS, WAS THERE A TIME WHEN YOU DID NOT HAVE A STEADY PLACE TO SLEEP OR SLEPT IN A SHELTER (INCLUDING NOW)?: NO

## 2025-02-01 SDOH — ECONOMIC STABILITY: TRANSPORTATION INSECURITY
IN THE PAST 12 MONTHS, HAS LACK OF RELIABLE TRANSPORTATION KEPT YOU FROM MEDICAL APPOINTMENTS, MEETINGS, WORK OR FROM GETTING THINGS NEEDED FOR DAILY LIVING?: NO

## 2025-02-01 SDOH — ECONOMIC STABILITY: FOOD INSECURITY: WITHIN THE PAST 12 MONTHS, THE FOOD YOU BOUGHT JUST DIDN'T LAST AND YOU DIDN'T HAVE MONEY TO GET MORE.: NEVER TRUE

## 2025-02-01 SDOH — HEALTH STABILITY: MENTAL HEALTH
STRESS IS WHEN SOMEONE FEELS TENSE, NERVOUS, ANXIOUS, OR CAN'T SLEEP AT NIGHT BECAUSE THEIR MIND IS TROUBLED. HOW STRESSED ARE YOU?: TO SOME EXTENT

## 2025-02-01 ASSESSMENT — SOCIAL DETERMINANTS OF HEALTH (SDOH)
HOW HARD IS IT FOR YOU TO PAY FOR THE VERY BASICS LIKE FOOD, HOUSING, MEDICAL CARE, AND HEATING?: NOT HARD AT ALL
HOW OFTEN DO YOU ATTEND CHURCH OR RELIGIOUS SERVICES?: NEVER
IN A TYPICAL WEEK, HOW MANY TIMES DO YOU TALK ON THE PHONE WITH FAMILY, FRIENDS, OR NEIGHBORS?: TWICE A WEEK
DO YOU BELONG TO ANY CLUBS OR ORGANIZATIONS SUCH AS CHURCH GROUPS UNIONS, FRATERNAL OR ATHLETIC GROUPS, OR SCHOOL GROUPS?: NO
HOW OFTEN DO YOU HAVE SIX OR MORE DRINKS ON ONE OCCASION: NEVER
DO YOU BELONG TO ANY CLUBS OR ORGANIZATIONS SUCH AS CHURCH GROUPS UNIONS, FRATERNAL OR ATHLETIC GROUPS, OR SCHOOL GROUPS?: NO
HOW OFTEN DO YOU HAVE A DRINK CONTAINING ALCOHOL: 2-3 TIMES A WEEK
HOW OFTEN DO YOU ATTEND CHURCH OR RELIGIOUS SERVICES?: NEVER
HOW OFTEN DO YOU ATTENT MEETINGS OF THE CLUB OR ORGANIZATION YOU BELONG TO?: NEVER
HOW OFTEN DO YOU ATTENT MEETINGS OF THE CLUB OR ORGANIZATION YOU BELONG TO?: NEVER
WITHIN THE PAST 12 MONTHS, YOU WORRIED THAT YOUR FOOD WOULD RUN OUT BEFORE YOU GOT THE MONEY TO BUY MORE: NEVER TRUE
HOW OFTEN DO YOU GET TOGETHER WITH FRIENDS OR RELATIVES?: TWICE A WEEK
IN THE PAST 12 MONTHS, HAS THE ELECTRIC, GAS, OIL, OR WATER COMPANY THREATENED TO SHUT OFF SERVICE IN YOUR HOME?: NO
IN A TYPICAL WEEK, HOW MANY TIMES DO YOU TALK ON THE PHONE WITH FAMILY, FRIENDS, OR NEIGHBORS?: TWICE A WEEK
HOW OFTEN DO YOU GET TOGETHER WITH FRIENDS OR RELATIVES?: TWICE A WEEK
HOW MANY DRINKS CONTAINING ALCOHOL DO YOU HAVE ON A TYPICAL DAY WHEN YOU ARE DRINKING: 1 OR 2

## 2025-02-01 ASSESSMENT — LIFESTYLE VARIABLES
SKIP TO QUESTIONS 9-10: 1
AUDIT-C TOTAL SCORE: 3
HOW OFTEN DO YOU HAVE SIX OR MORE DRINKS ON ONE OCCASION: NEVER
HOW MANY STANDARD DRINKS CONTAINING ALCOHOL DO YOU HAVE ON A TYPICAL DAY: 1 OR 2
HOW OFTEN DO YOU HAVE A DRINK CONTAINING ALCOHOL: 2-3 TIMES A WEEK

## 2025-02-04 ENCOUNTER — APPOINTMENT (OUTPATIENT)
Dept: MEDICAL GROUP | Facility: MEDICAL CENTER | Age: 65
End: 2025-02-04
Payer: COMMERCIAL

## 2025-02-04 VITALS
DIASTOLIC BLOOD PRESSURE: 76 MMHG | TEMPERATURE: 97.8 F | HEART RATE: 84 BPM | HEIGHT: 72 IN | WEIGHT: 307.6 LBS | BODY MASS INDEX: 41.66 KG/M2 | SYSTOLIC BLOOD PRESSURE: 116 MMHG | OXYGEN SATURATION: 95 %

## 2025-02-04 DIAGNOSIS — E03.9 ACQUIRED HYPOTHYROIDISM: ICD-10-CM

## 2025-02-04 DIAGNOSIS — Z00.00 ANNUAL PHYSICAL EXAM: ICD-10-CM

## 2025-02-04 PROCEDURE — 3078F DIAST BP <80 MM HG: CPT | Performed by: STUDENT IN AN ORGANIZED HEALTH CARE EDUCATION/TRAINING PROGRAM

## 2025-02-04 PROCEDURE — 3074F SYST BP LT 130 MM HG: CPT | Performed by: STUDENT IN AN ORGANIZED HEALTH CARE EDUCATION/TRAINING PROGRAM

## 2025-02-04 PROCEDURE — 99397 PER PM REEVAL EST PAT 65+ YR: CPT | Performed by: STUDENT IN AN ORGANIZED HEALTH CARE EDUCATION/TRAINING PROGRAM

## 2025-02-04 RX ORDER — OMEPRAZOLE 40 MG/1
40 CAPSULE, DELAYED RELEASE ORAL DAILY
Qty: 90 CAPSULE | Refills: 3 | Status: SHIPPED | OUTPATIENT
Start: 2025-02-04

## 2025-02-04 ASSESSMENT — PATIENT HEALTH QUESTIONNAIRE - PHQ9
5. POOR APPETITE OR OVEREATING: 2 - MORE THAN HALF THE DAYS
CLINICAL INTERPRETATION OF PHQ2 SCORE: 2
SUM OF ALL RESPONSES TO PHQ QUESTIONS 1-9: 7

## 2025-02-04 ASSESSMENT — FIBROSIS 4 INDEX: FIB4 SCORE: 1.03

## 2025-02-04 NOTE — PROGRESS NOTES
Subjective:     CC:   Chief Complaint   Patient presents with    Annual Exam       HPI:   Thomas Borja is a 64 y.o. male who presents for an annual exam. He is feeling well and has no complaints.    Health Maintenance  Cholesterol Screening: Up to date   Diabetes Screening: Up to date   AAA Screening: Up to date     Cancer screening  Colorectal Cancer Screening: Up to date    Lung Cancer Screening: Na    Prostate Cancer Screening/PSA: Up to date     Infectious disease screening/Immunizations  Immunizations are up-to-date apart from updated COVID-vaccine which she can get at the pharmacy    He  has no past medical history on file.  He  has a past surgical history that includes skin abscess incision and drainage (Left, 1/5/2023).  Family History   Problem Relation Age of Onset    Heart Disease Father     Skin cancer Father     Prostate cancer Father     Ovarian Cancer Neg Hx     Tubal Cancer Neg Hx     Peritoneal Cancer Neg Hx     Colorectal Cancer Neg Hx     Breast Cancer Neg Hx      Social History     Tobacco Use    Smoking status: Former     Types: Cigars    Smokeless tobacco: Never   Vaping Use    Vaping status: Never Used   Substance Use Topics    Alcohol use: Yes     Alcohol/week: 2.4 oz     Types: 4 Standard drinks or equivalent per week    Drug use: Never       Patient Active Problem List    Diagnosis Date Noted    Diabetic ulcer of toe of right foot associated with type 2 diabetes mellitus, with fat layer exposed (HCC) 12/10/2024    Sleep apnea 09/11/2024    Palpitations 12/11/2023    Attention deficit hyperactivity disorder (ADHD), combined type 06/22/2023    Acquired hypothyroidism 06/22/2023    Diabetic neuropathy (HCC) 06/22/2023    Benign prostatic hyperplasia without lower urinary tract symptoms 06/22/2023    Primary insomnia 06/22/2023    Skin lesion of hand 02/13/2023    Skin lesions 02/13/2023    Hyponatremia 12/04/2022    Alcohol abuse 12/04/2022    Left leg cellulitis 12/04/2022     Obstructive sleep apnea 01/07/2022    Morbid obesity due to excess calories ([facility]) 01/07/2022    Mitral valve prolapse 01/07/2022    Mild intermittent asthma, uncomplicated 01/07/2022    Gallstones 01/07/2022    Degenerative arthritis of knee, bilateral 01/07/2022    Chronic midline low back pain without sciatica 01/07/2022    Essential hypertension 01/07/2022    Type 2 diabetes mellitus with neurologic complication, without long-term current use of insulin ([facility]) 01/07/2022    Iron deficiency anemia 09/21/2018    GERD without esophagitis 06/09/2017    S/P laparoscopic sleeve gastrectomy 02/10/2017    Postoperative intestinal malabsorption 02/10/2017    Esophageal dysphagia 02/10/2017       Current Outpatient Medications   Medication Sig Dispense Refill    omeprazole (PRILOSEC) 40 MG delayed-release capsule Take 1 capsule by mouth once daily 90 Capsule 3    methylphenidate (CONCERTA) 36 MG CR tablet Take 1 Tablet by mouth every morning for 30 days. 30 Tablet 0    [START ON 2/22/2025] methylphenidate (CONCERTA) 36 MG CR tablet Take 1 Tablet by mouth every morning for 30 days. 30 Tablet 0    [START ON 3/24/2025] methylphenidate (CONCERTA) 36 MG CR tablet Take 1 Tablet by mouth every morning for 30 days. 30 Tablet 0    pregabalin (LYRICA) 50 MG capsule Take 1 Capsule by mouth 2 times a day for 90 days. 60 Capsule 2    meclizine (ANTIVERT) 12.5 MG Tab TAKE 1 TABLET BY MOUTH THREE TIMES DAILY AS NEEDED FOR DIZZINESS 90 Tablet 0    tamsulosin (FLOMAX) 0.4 MG capsule Take 1 Capsule by mouth every day. 90 Capsule 0    finasteride (PROSCAR) 5 MG Tab Take 1 tablet by mouth once daily 90 Tablet 3    rosuvastatin (CRESTOR) 20 MG Tab Take 1 tablet by mouth once daily 90 Tablet 3    hydroCHLOROthiazide 12.5 MG tablet Take 1 tablet by mouth once daily 90 Tablet 3    losartan (COZAAR) 100 MG Tab Take 1 tablet by mouth once daily 90 Tablet 3    mupirocin (BACTROBAN) 2 % Ointment Apply 1 Application to affected nostril(s) 2 times a  day. 22 g 0    mupirocin (BACTROBAN) 2 % Ointment Apply 1 Application topically 2 times a day. 22 g 0    furosemide (LASIX) 20 MG Tab Take 1 Tablet by mouth 2 times a day. 60 Tablet 0    clotrimazole (LOTRIMIN) 1 % Cream Apply 1 Application topically 2 times a day. 40 g 0    levothyroxine (SYNTHROID) 112 MCG Tab Take 1 tablet by mouth once daily 90 Tablet 3    cyclobenzaprine (FLEXERIL) 5 mg tablet TAKE 1 TABLET BY MOUTH AT BEDTIME 30 Tablet 0    Continuous Blood Gluc Sensor (FREESTYLE VINI 2 SENSOR) Misc APPLY 1 SENSOR EVERY 2 WEEKS      traZODone (DESYREL) 50 MG Tab Take 1 Tablet by mouth at bedtime. 90 Tablet 3    glipiZIDE (GLUCOTROL) 5 MG Tab Take 1 Tablet by mouth every day. 90 Tablet 3     No current facility-administered medications for this visit.    (including changes today)  Allergies: Amoxicillin, Oxycodone, Percocet [oxycodone-acetaminophen], Codeine, and Erythromycin      Objective:     /76 (BP Location: Left arm, Patient Position: Sitting, BP Cuff Size: Adult)   Pulse 84   Temp 36.6 °C (97.8 °F) (Temporal)   Ht 1.829 m (6')   Wt (!) 140 kg (307 lb 9.6 oz)   SpO2 95%   BMI 41.72 kg/m²   Body mass index is 41.72 kg/m².  Wt Readings from Last 4 Encounters:   02/04/25 (!) 140 kg (307 lb 9.6 oz)   01/23/25 (!) 142 kg (313 lb 12.8 oz)   09/11/24 65.8 kg (145 lb)   04/09/24 (!) 147 kg (325 lb)       Physical Exam:  Constitutional: Well-developed and well-nourished. Not diaphoretic. No distress.   Skin: Skin is warm and dry. No rash noted.  Head: Atraumatic without lesions.  Eyes: Conjunctivae and extraocular motions are normal. Pupils are equal, round, and reactive to light. No scleral icterus.   Ears:  External ears unremarkable. Tympanic membranes clear and intact.  Nose: Nares patent. Septum midline. Turbinates without erythema nor edema. No discharge.   Mouth/Throat: Dentition is normal. Tongue normal. Oropharynx is clear and moist. Posterior pharynx without erythema or exudates.  Neck:  Supple, trachea midline. Normal range of motion. No thyromegaly present. No lymphadenopathy--cervical or supraclavicular.  Cardiovascular: Regular rate and rhythm, S1 and S2 without murmur, rubs, or gallops.    Lungs: Effort normal. Clear to auscultation throughout. No adventitious sounds. No CVA tenderness.  Abdomen: Soft, non tender, and without distention. Active bowel sounds in all four quadrants. No rebound, guarding, masses or HSM.  Extremities: No cyanosis, clubbing, erythema, nor edema.   Musculoskeletal: All major joints AROM full in all directions without pain.  Psychiatric:  Behavior, mood, and affect are appropriate.      Assessment and Plan:     1. Annual physical exam        2. Acquired hypothyroidism  TSH    FREE THYROXINE        Discussed routine screenings and vaccinations, healthy diet and exercise, helmet use, seatbelt use and sunscreen use.  Return for annual exam in 1 year    Please note that this dictation was created using voice recognition software. I have made every reasonable attempt to correct obvious errors, but I expect that there are errors of grammar and possibly content that I did not discover before finalizing the note.      Follow-up: No follow-ups on file.

## 2025-02-07 ENCOUNTER — APPOINTMENT (OUTPATIENT)
Dept: URGENT CARE | Facility: PHYSICIAN GROUP | Age: 65
End: 2025-02-07
Payer: COMMERCIAL

## 2025-02-07 ENCOUNTER — OFFICE VISIT (OUTPATIENT)
Dept: URGENT CARE | Facility: PHYSICIAN GROUP | Age: 65
End: 2025-02-07
Payer: COMMERCIAL

## 2025-02-07 VITALS
SYSTOLIC BLOOD PRESSURE: 130 MMHG | HEART RATE: 100 BPM | RESPIRATION RATE: 18 BRPM | HEIGHT: 72 IN | OXYGEN SATURATION: 95 % | WEIGHT: 307 LBS | BODY MASS INDEX: 41.58 KG/M2 | DIASTOLIC BLOOD PRESSURE: 90 MMHG | TEMPERATURE: 98.3 F

## 2025-02-07 DIAGNOSIS — J10.1 INFLUENZA A: ICD-10-CM

## 2025-02-07 DIAGNOSIS — I10 ESSENTIAL HYPERTENSION: ICD-10-CM

## 2025-02-07 DIAGNOSIS — R68.89 FLU-LIKE SYMPTOMS: ICD-10-CM

## 2025-02-07 LAB
FLUAV RNA SPEC QL NAA+PROBE: POSITIVE
FLUBV RNA SPEC QL NAA+PROBE: NEGATIVE
RSV RNA SPEC QL NAA+PROBE: NEGATIVE
SARS-COV-2 RNA RESP QL NAA+PROBE: NEGATIVE

## 2025-02-07 PROCEDURE — 99213 OFFICE O/P EST LOW 20 MIN: CPT

## 2025-02-07 PROCEDURE — 0241U POCT CEPHEID COV-2, FLU A/B, RSV - PCR: CPT

## 2025-02-07 PROCEDURE — 3075F SYST BP GE 130 - 139MM HG: CPT

## 2025-02-07 PROCEDURE — 3080F DIAST BP >= 90 MM HG: CPT

## 2025-02-07 RX ORDER — OSELTAMIVIR PHOSPHATE 75 MG/1
75 CAPSULE ORAL 2 TIMES DAILY
Qty: 10 CAPSULE | Refills: 0 | Status: SHIPPED | OUTPATIENT
Start: 2025-02-07 | End: 2025-02-12

## 2025-02-07 RX ORDER — GUAIFENESIN 600 MG/1
600 TABLET, EXTENDED RELEASE ORAL EVERY 12 HOURS
Qty: 28 TABLET | Refills: 0 | Status: SHIPPED | OUTPATIENT
Start: 2025-02-07 | End: 2025-02-21

## 2025-02-07 ASSESSMENT — ENCOUNTER SYMPTOMS
DIARRHEA: 1
SHORTNESS OF BREATH: 0
HEMOPTYSIS: 0
FEVER: 0
NAUSEA: 0
SPUTUM PRODUCTION: 0
EYE REDNESS: 0
VOMITING: 0
HEADACHES: 0
WHEEZING: 0
SINUS PAIN: 0
SORE THROAT: 1
WEAKNESS: 1
COUGH: 1
CHILLS: 0

## 2025-02-07 ASSESSMENT — COPD QUESTIONNAIRES: COPD: 0

## 2025-02-07 ASSESSMENT — FIBROSIS 4 INDEX: FIB4 SCORE: 1.03

## 2025-02-07 NOTE — ASSESSMENT & PLAN NOTE
This is a chronic condition.  Patient reports compliance with his home antihypertensive medication.  Denies symptoms.  Patient to follow-up with PCP for management of hypertension.  ER for severe symptoms.    Please note that this dictation was created using voice recognition software. I have made every reasonable attempt to correct obvious errors, but I expect that there are errors of grammar and possibly content that I did not discover before finalizing the note.

## 2025-02-07 NOTE — PROGRESS NOTES
Subjective     Kwesi Borja is a 64 y.o. male who presents with Cough (Cough, diarrhea, chest congestion. Body aches. Started Wednesday. )            Cough  This is a new problem. The current episode started in the past 7 days. The problem has been gradually worsening. The cough is Productive of brown sputum. Associated symptoms include ear pain, postnasal drip and a sore throat. Pertinent negatives include no chest pain, chills, ear congestion, eye redness, fever, headaches, hemoptysis, nasal congestion, rash, shortness of breath or wheezing. Associated symptoms comments: Diarrhea. Treatments tried: OTC cold/flu medication. The treatment provided mild relief. His past medical history is significant for asthma and pneumonia. There is no history of COPD or emphysema.       Review of Systems   Constitutional:  Negative for chills and fever.   HENT:  Positive for congestion, ear pain, postnasal drip and sore throat. Negative for ear discharge and sinus pain.    Eyes:  Negative for redness.   Respiratory:  Positive for cough. Negative for hemoptysis, sputum production, shortness of breath and wheezing.    Cardiovascular:  Negative for chest pain.   Gastrointestinal:  Positive for diarrhea. Negative for nausea and vomiting.   Skin:  Negative for rash.   Neurological:  Positive for weakness. Negative for headaches.              Objective     BP (!) 130/90   Pulse 100   Temp 36.8 °C (98.3 °F) (Temporal)   Resp 18   Ht 1.829 m (6')   Wt (!) 139 kg (307 lb)   SpO2 95%   BMI 41.64 kg/m²      Physical Exam  Constitutional:       General: He is not in acute distress.     Appearance: Normal appearance. He is normal weight. He is not ill-appearing.   HENT:      Head: Normocephalic and atraumatic.      Right Ear: Tympanic membrane, ear canal and external ear normal.      Left Ear: Tympanic membrane, ear canal and external ear normal.      Nose: Congestion and rhinorrhea present.      Mouth/Throat:      Mouth: Mucous  membranes are moist.      Pharynx: Oropharynx is clear. Uvula midline. No pharyngeal swelling, oropharyngeal exudate or posterior oropharyngeal erythema.      Tonsils: No tonsillar exudate or tonsillar abscesses. 1+ on the right. 1+ on the left.   Eyes:      Extraocular Movements: Extraocular movements intact.      Pupils: Pupils are equal, round, and reactive to light.   Cardiovascular:      Rate and Rhythm: Normal rate and regular rhythm.   Pulmonary:      Effort: Pulmonary effort is normal. No respiratory distress.      Breath sounds: Normal breath sounds. No stridor. No wheezing or rhonchi.   Musculoskeletal:      Cervical back: Normal range of motion and neck supple. No rigidity.   Lymphadenopathy:      Cervical: No cervical adenopathy.   Skin:     General: Skin is warm and dry.   Neurological:      General: No focal deficit present.      Mental Status: He is alert and oriented to person, place, and time. Mental status is at baseline.             Assessment & Plan        Assessment & Plan  Influenza A    Orders:    oseltamivir (TAMIFLU) 75 MG Cap; Take 1 Capsule by mouth 2 times a day for 5 days.    Results for orders placed or performed in visit on 02/07/25   POCT CEPHEID COV-2, FLU A/B, RSV - PCR    Collection Time: 02/07/25 11:21 AM   Result Value Ref Range    SARS-CoV-2 by PCR Negative Negative, Invalid    Influenza virus A RNA Positive (A) Negative, Invalid    Influenza virus B, PCR Negative Negative, Invalid    RSV, PCR Negative Negative, Invalid     *Note: Due to a large number of results and/or encounters for the requested time period, some results have not been displayed. A complete set of results can be found in Results Review.        Flu-like symptoms    Orders:    POCT CEPHEID COV-2, FLU A/B, RSV - PCR    guaiFENesin ER (MUCINEX) 600 MG TABLET SR 12 HR; Take 1 Tablet by mouth every 12 hours for 14 days.  It was explained today that due to the viral nature of the patient's illness, we will treat  symptomatically today. Discussed that symptoms do not warrant antibiotics.  Patient within treatment window for antiviral medication, and is requesting.  Tamiflu for 5 days.  Guaifenesin twice daily for 14 days.  Encouraged to continue OTC supportive meds PRN. Humidification, increased fluids intake, adding electrolytes to diet, gargling salt water, drinking warm liquids with honey, OTC throat spray, and rest also discussed.   Discussed side effects and interactions of OTC meds and any prescribed.  Patient instructed to return to clinic if symptoms do not improve or worsen within 5-7 days.  Also informed that if chest pain or increased shortness of breath develop to immediately follow up for evaluation in the ED. Advised of risks of not doing so.    The patient demonstrated a good understanding and agreed with the treatment plan. Denies further questions.     Essential hypertension       This is a chronic condition.  Patient reports compliance with his home antihypertensive medication.  Denies symptoms.  Patient to follow-up with PCP for management of hypertension.  ER for severe symptoms.    Please note that this dictation was created using voice recognition software. I have made every reasonable attempt to correct obvious errors, but I expect that there are errors of grammar and possibly content that I did not discover before finalizing the note.

## 2025-03-29 DIAGNOSIS — E03.9 ACQUIRED HYPOTHYROIDISM: ICD-10-CM

## 2025-03-30 RX ORDER — LEVOTHYROXINE SODIUM 112 UG/1
112 TABLET ORAL DAILY
Qty: 90 TABLET | Refills: 3 | Status: SHIPPED | OUTPATIENT
Start: 2025-03-30

## 2025-04-09 ENCOUNTER — PATIENT MESSAGE (OUTPATIENT)
Dept: MEDICAL GROUP | Facility: MEDICAL CENTER | Age: 65
End: 2025-04-09
Payer: MEDICARE

## 2025-04-09 DIAGNOSIS — L03.119 CELLULITIS OF LOWER EXTREMITY, UNSPECIFIED LATERALITY: ICD-10-CM

## 2025-04-10 RX ORDER — CEPHALEXIN 500 MG/1
500 CAPSULE ORAL 4 TIMES DAILY
Qty: 28 CAPSULE | Refills: 0 | Status: SHIPPED | OUTPATIENT
Start: 2025-04-10

## 2025-04-11 ENCOUNTER — OFFICE VISIT (OUTPATIENT)
Dept: MEDICAL GROUP | Facility: MEDICAL CENTER | Age: 65
End: 2025-04-11
Payer: COMMERCIAL

## 2025-04-11 VITALS
OXYGEN SATURATION: 95 % | SYSTOLIC BLOOD PRESSURE: 124 MMHG | DIASTOLIC BLOOD PRESSURE: 82 MMHG | HEIGHT: 72 IN | HEART RATE: 122 BPM | BODY MASS INDEX: 40.88 KG/M2 | TEMPERATURE: 95.6 F | WEIGHT: 301.8 LBS

## 2025-04-11 DIAGNOSIS — L03.116 LEFT LEG CELLULITIS: ICD-10-CM

## 2025-04-11 DIAGNOSIS — E11.42 TYPE 2 DIABETES MELLITUS WITH DIABETIC POLYNEUROPATHY, WITHOUT LONG-TERM CURRENT USE OF INSULIN (HCC): ICD-10-CM

## 2025-04-11 PROCEDURE — 3074F SYST BP LT 130 MM HG: CPT | Performed by: STUDENT IN AN ORGANIZED HEALTH CARE EDUCATION/TRAINING PROGRAM

## 2025-04-11 PROCEDURE — 99214 OFFICE O/P EST MOD 30 MIN: CPT | Performed by: STUDENT IN AN ORGANIZED HEALTH CARE EDUCATION/TRAINING PROGRAM

## 2025-04-11 PROCEDURE — 3079F DIAST BP 80-89 MM HG: CPT | Performed by: STUDENT IN AN ORGANIZED HEALTH CARE EDUCATION/TRAINING PROGRAM

## 2025-04-11 ASSESSMENT — FIBROSIS 4 INDEX: FIB4 SCORE: 1.03

## 2025-04-11 NOTE — PROGRESS NOTES
Subjective:     CC: Diabetes, cellulitis    HPI:   Thomas presents today with left lower extremity cellulitis.  This is a recurrent condition.  Over the last several days he has had increased swelling and redness in the left lower extremity.  He messaged me about this yesterday and I did send in a prescription for Keflex.  He has taken 2 doses of Keflex and has noticed already improvement in redness and swelling.  Because of his multiple episodes of cellulitis in this leg he does have impaired pain sensation and does not know if there is pain or not.    He also is a diabetic and due for retina view.    ROS:  ROS    Objective:     Exam:  /82   Pulse (!) 122   Temp (!) 35.3 °C (95.6 °F) (Temporal)   Ht 1.829 m (6')   Wt (!) 137 kg (301 lb 12.8 oz)   SpO2 95%   BMI 40.93 kg/m²  Body mass index is 40.93 kg/m².    Physical Exam  Vitals reviewed.   Constitutional:       General: He is not in acute distress.     Appearance: He is not toxic-appearing.   HENT:      Head: Normocephalic and atraumatic.      Right Ear: External ear normal.      Left Ear: External ear normal.   Eyes:      General:         Right eye: No discharge.         Left eye: No discharge.      Extraocular Movements: Extraocular movements intact.      Conjunctiva/sclera: Conjunctivae normal.   Pulmonary:      Effort: Pulmonary effort is normal. No respiratory distress.   Musculoskeletal:      Comments: Significant pitting edema and purple/pink discoloration up to the knee on the left.  No open sores or lesions seen.   Skin:     General: Skin is warm and dry.   Neurological:      Mental Status: He is alert.   Psychiatric:         Mood and Affect: Mood normal.         Behavior: Behavior normal.         Thought Content: Thought content normal.         Judgment: Judgment normal.           Assessment & Plan:     64 y.o. male with the following -     1. Left leg cellulitis  I am glad that the Keflex has been helping with symptoms.  On exam today  there is still no obvious infection but hopefully will improve over the next week.  Patient will message me if symptoms fail to resolve after 7 days of antibiotics and we can extend the course.    2. Type 2 diabetes mellitus with diabetic polyneuropathy, without long-term current use of insulin (HCC)  Retina view completed today  - POCT Retinal Eye Exam      No follow-ups on file.    Please note that this dictation was created using voice recognition software. I have made every reasonable attempt to correct obvious errors, but I expect that there are errors of grammar and possibly content that I did not discover before finalizing the note.

## 2025-04-29 ENCOUNTER — APPOINTMENT (OUTPATIENT)
Dept: MEDICAL GROUP | Facility: MEDICAL CENTER | Age: 65
End: 2025-04-29
Payer: MEDICARE

## 2025-05-08 DIAGNOSIS — E11.42 TYPE 2 DIABETES MELLITUS WITH DIABETIC POLYNEUROPATHY, WITHOUT LONG-TERM CURRENT USE OF INSULIN (HCC): ICD-10-CM

## 2025-05-08 RX ORDER — GLIPIZIDE 5 MG/1
5 TABLET ORAL DAILY
Qty: 90 TABLET | Refills: 3 | Status: SHIPPED | OUTPATIENT
Start: 2025-05-08

## 2025-05-20 ENCOUNTER — HOSPITAL ENCOUNTER (OUTPATIENT)
Dept: LAB | Facility: MEDICAL CENTER | Age: 65
End: 2025-05-20
Attending: STUDENT IN AN ORGANIZED HEALTH CARE EDUCATION/TRAINING PROGRAM
Payer: COMMERCIAL

## 2025-05-20 DIAGNOSIS — E03.9 ACQUIRED HYPOTHYROIDISM: ICD-10-CM

## 2025-05-20 LAB
T4 FREE SERPL-MCNC: 1.21 NG/DL (ref 0.93–1.7)
TSH SERPL-ACNC: 5.4 UIU/ML (ref 0.38–5.33)

## 2025-05-20 PROCEDURE — 36415 COLL VENOUS BLD VENIPUNCTURE: CPT

## 2025-05-20 PROCEDURE — 84439 ASSAY OF FREE THYROXINE: CPT

## 2025-05-20 PROCEDURE — 84443 ASSAY THYROID STIM HORMONE: CPT

## 2025-05-21 ENCOUNTER — RESULTS FOLLOW-UP (OUTPATIENT)
Dept: MEDICAL GROUP | Facility: MEDICAL CENTER | Age: 65
End: 2025-05-21
Payer: MEDICARE

## 2025-05-22 ENCOUNTER — RESULTS FOLLOW-UP (OUTPATIENT)
Dept: MEDICAL GROUP | Facility: MEDICAL CENTER | Age: 65
End: 2025-05-22
Payer: MEDICARE

## 2025-06-27 ENCOUNTER — OFFICE VISIT (OUTPATIENT)
Dept: MEDICAL GROUP | Facility: MEDICAL CENTER | Age: 65
End: 2025-06-27
Payer: COMMERCIAL

## 2025-06-27 VITALS
SYSTOLIC BLOOD PRESSURE: 150 MMHG | HEIGHT: 72 IN | DIASTOLIC BLOOD PRESSURE: 70 MMHG | OXYGEN SATURATION: 96 % | HEART RATE: 89 BPM | TEMPERATURE: 98.1 F | WEIGHT: 299.1 LBS | BODY MASS INDEX: 40.51 KG/M2

## 2025-06-27 DIAGNOSIS — F90.2 ATTENTION DEFICIT HYPERACTIVITY DISORDER (ADHD), COMBINED TYPE: Primary | ICD-10-CM

## 2025-06-27 DIAGNOSIS — R06.02 SHORTNESS OF BREATH: ICD-10-CM

## 2025-06-27 PROBLEM — L03.116 LEFT LEG CELLULITIS: Status: RESOLVED | Noted: 2022-12-04 | Resolved: 2025-06-27

## 2025-06-27 PROCEDURE — 3077F SYST BP >= 140 MM HG: CPT | Performed by: PHYSICIAN ASSISTANT

## 2025-06-27 PROCEDURE — 99213 OFFICE O/P EST LOW 20 MIN: CPT | Performed by: PHYSICIAN ASSISTANT

## 2025-06-27 PROCEDURE — 3078F DIAST BP <80 MM HG: CPT | Performed by: PHYSICIAN ASSISTANT

## 2025-06-27 RX ORDER — METHYLPHENIDATE HYDROCHLORIDE 36 MG/1
36 TABLET ORAL EVERY MORNING
Qty: 30 TABLET | Refills: 0 | Status: SHIPPED | OUTPATIENT
Start: 2025-07-27 | End: 2025-08-26

## 2025-06-27 RX ORDER — METHYLPHENIDATE HYDROCHLORIDE 36 MG/1
36 TABLET ORAL EVERY MORNING
Qty: 30 TABLET | Refills: 0 | Status: SHIPPED | OUTPATIENT
Start: 2025-06-27 | End: 2025-07-27

## 2025-06-27 RX ORDER — METHYLPHENIDATE HYDROCHLORIDE 36 MG/1
36 TABLET ORAL EVERY MORNING
Qty: 30 TABLET | Refills: 0 | Status: SHIPPED | OUTPATIENT
Start: 2025-08-26 | End: 2025-09-25

## 2025-06-27 ASSESSMENT — FIBROSIS 4 INDEX: FIB4 SCORE: 1.03

## 2025-06-27 NOTE — PROGRESS NOTES
Subjective:     History of Present Illness  The patient presents for medication refill and respiratory symptoms.    He has been diagnosed with ADD and is seeking a refill of his Concerta prescription, which he takes daily. However, he has been without this medication for approximately a month due to an inability to schedule an appointment with Dr. Gomez, who was on vacation. His wife has been having some medical issues. He reports that his ADD symptoms are well-managed when he is on the medication. He has a scheduled appointment with Dr. Gomez in 08/2025 for other reasons. He is currently on a 36 mg dose of Concerta.    He also takes Lyrica but does not require a refill at this time as he recently had it refilled.    Over the past few days, he has been experiencing symptoms suggestive of a head cold, including a productive cough and difficulty breathing. He is uncertain if these symptoms are due to allergies or exposure to a pathogen during a hospital visit. His symptoms have largely resolved today, except for the sensation of being unable to breathe adequately. He reports no fever but feels hot. He does not feel congested but continues to experience shortness of breath. He has been monitoring his temperature at home and reports no fevers.      Current medicines (including changes today)  Current Medications[1]  He  has no past medical history on file.    ROS   No chest pain, no abdominal pain  Positive ROS as per HPI.  All other systems reviewed and are negative.     Objective:     BP (!) 150/70 (BP Location: Right arm, Patient Position: Sitting, BP Cuff Size: Adult)   Pulse 89   Temp 36.7 °C (98.1 °F) (Temporal)   Ht 1.829 m (6')   Wt (!) 136 kg (299 lb 1.6 oz)   SpO2 96%  Body mass index is 40.57 kg/m².   Physical Exam  Respiratory: Clear to auscultation, no wheezing, rales or rhonchi  Constitutional: Alert, no distress.  Skin: Warm, dry, good turgor, no rashes in visible areas.  Eye: Equal, round  and reactive, conjunctiva clear, lids normal.  ENMT: Lips without lesions, good dentition, oropharynx clear.  Neck: Trachea midline, no masses, no thyromegaly.   Respiratory: Unlabored respiratory effort, lungs clear to auscultation, no wheezes, no ronchi.  Cardiovascular: Normal S1, S2, no murmur, no edema.  Psych: Alert and oriented x3, normal affect and mood.      Results          Assessment and Plan:   The following treatment plan was discussed    Assessment & Plan  1. Attention Deficit Disorder (ADD).  Chronic condition.  - Symptoms are well-managed with Concerta.  - Prescription for Concerta 36 mg provided for the next 3 months.  - Advised to consult with Dr. Gomez in 08/2025 regarding continuation of medication.  - Medication sent to pharmacy.  - PDMP reviewed.  Urine drug screen and agreement are up-to-date.    2. Medication Management.  - Takes Lyrica but does not need a refill at this time.  - Recently refilled Lyrica prescription.  - Discussed wife's Lyrica prescription issues; advised to contact her PCP via [employer].  - Renewed wife's Lyrica prescription for one month.    3.  Acute shortness of breath.  - Reports phlegmy cough and head cold symptoms, mostly cleared up.  - Currently experiencing difficulty breathing, no fever, oxygen levels normal.  - Lungs clear upon examination, no wheezing or rhonchi.  - Advised to monitor symptoms and seek further evaluation if they persist or worsen.  - Vitals are good today.  - Contact me through [employer] with any concerns.      ORDERS:  1. Attention deficit hyperactivity disorder (ADHD), combined type (Primary)    - methylphenidate (CONCERTA) 36 MG CR tablet; Take 1 Tablet by mouth every morning for 30 days.  Dispense: 30 Tablet; Refill: 0  - methylphenidate (CONCERTA) 36 MG CR tablet; Take 1 Tablet by mouth every morning for 30 days.  Dispense: 30 Tablet; Refill: 0  - methylphenidate (CONCERTA) 36 MG CR tablet; Take 1 Tablet by mouth every morning for 30 days.   Dispense: 30 Tablet; Refill: 0    2. Shortness of breath    () Today's E/M visit is associated with medical care services that serve as the continuing focal point for all needed health care services and/or with medical care services that are part of ongoing care related to a patient's single, serious condition or a complex condition: This includes furnishing services to patients on an ongoing basis that result in care that is personalized to the patient. The services result in a comprehensive, longitudinal, and continuous relationship with the patient and involve delivery of team-based care that is accessible, coordinated with other practitioners and providers, and integrated with the broader health care landscape.        Please note that this dictation was created using voice recognition software. I have made every reasonable attempt to correct obvious errors, but I expect that there are errors of grammar and possibly content that I did not discover before finalizing the note.      Attestation      Verbal consent was acquired by the patient to use [employer] ambient listening note generation during this visit Yes                  [1]   Current Outpatient Medications   Medication Sig Dispense Refill    [START ON 8/26/2025] methylphenidate (CONCERTA) 36 MG CR tablet Take 1 Tablet by mouth every morning for 30 days. 30 Tablet 0    [START ON 7/27/2025] methylphenidate (CONCERTA) 36 MG CR tablet Take 1 Tablet by mouth every morning for 30 days. 30 Tablet 0    methylphenidate (CONCERTA) 36 MG CR tablet Take 1 Tablet by mouth every morning for 30 days. 30 Tablet 0    glipiZIDE (GLUCOTROL) 5 MG Tab Take 1 Tablet by mouth every day. 90 Tablet 3    levothyroxine (SYNTHROID) 112 MCG Tab Take 1 Tablet by mouth every day. 90 Tablet 3    methocarbamol (ROBAXIN) 750 MG Tab Take 1 Tablet by mouth 4 times a day. 120 Tablet 0    omeprazole (PRILOSEC) 40 MG delayed-release capsule Take 1 capsule by mouth once daily 90 Capsule 3     meclizine (ANTIVERT) 12.5 MG Tab TAKE 1 TABLET BY MOUTH THREE TIMES DAILY AS NEEDED FOR DIZZINESS 90 Tablet 0    tamsulosin (FLOMAX) 0.4 MG capsule Take 1 Capsule by mouth every day. 90 Capsule 0    finasteride (PROSCAR) 5 MG Tab Take 1 tablet by mouth once daily 90 Tablet 3    rosuvastatin (CRESTOR) 20 MG Tab Take 1 tablet by mouth once daily 90 Tablet 3    hydroCHLOROthiazide 12.5 MG tablet Take 1 tablet by mouth once daily 90 Tablet 3    losartan (COZAAR) 100 MG Tab Take 1 tablet by mouth once daily 90 Tablet 3    furosemide (LASIX) 20 MG Tab Take 1 Tablet by mouth 2 times a day. 60 Tablet 0    cyclobenzaprine (FLEXERIL) 5 mg tablet TAKE 1 TABLET BY MOUTH AT BEDTIME 30 Tablet 0    Continuous Blood Gluc Sensor (FREESTYLE VINI 2 SENSOR) Misc APPLY 1 SENSOR EVERY 2 WEEKS      traZODone (DESYREL) 50 MG Tab Take 1 Tablet by mouth at bedtime. 90 Tablet 3     No current facility-administered medications for this visit.

## 2025-07-09 DIAGNOSIS — E11.42 DIABETIC POLYNEUROPATHY ASSOCIATED WITH TYPE 2 DIABETES MELLITUS (HCC): ICD-10-CM

## 2025-07-10 RX ORDER — MECLIZINE HCL 12.5 MG 12.5 MG/1
12.5 TABLET ORAL 3 TIMES DAILY PRN
Qty: 90 TABLET | Refills: 0 | Status: SHIPPED | OUTPATIENT
Start: 2025-07-10

## 2025-07-10 RX ORDER — PREGABALIN 50 MG/1
50 CAPSULE ORAL 2 TIMES DAILY
Qty: 180 CAPSULE | Refills: 0 | Status: SHIPPED | OUTPATIENT
Start: 2025-07-10 | End: 2025-10-08

## 2025-07-17 ENCOUNTER — APPOINTMENT (OUTPATIENT)
Dept: MEDICAL GROUP | Facility: MEDICAL CENTER | Age: 65
End: 2025-07-17
Payer: MEDICARE

## 2025-08-09 DIAGNOSIS — I10 ESSENTIAL HYPERTENSION: ICD-10-CM

## 2025-08-11 RX ORDER — HYDROCHLOROTHIAZIDE 12.5 MG/1
12.5 TABLET ORAL DAILY
Qty: 90 TABLET | Refills: 0 | Status: SHIPPED | OUTPATIENT
Start: 2025-08-11

## 2025-08-12 ENCOUNTER — APPOINTMENT (OUTPATIENT)
Dept: MEDICAL GROUP | Facility: MEDICAL CENTER | Age: 65
End: 2025-08-12
Payer: MEDICARE

## 2025-09-15 ENCOUNTER — APPOINTMENT (OUTPATIENT)
Dept: MEDICAL GROUP | Facility: MEDICAL CENTER | Age: 65
End: 2025-09-15
Payer: MEDICARE

## (undated) DEVICE — COVER LIGHT HANDLE ALC PLUS DISP (18EA/BX)

## (undated) DEVICE — GOWN WARMING STANDARD FLEX - (30/CA)

## (undated) DEVICE — LACTATED RINGERS INJ 1000 ML - (14EA/CA 60CA/PF)

## (undated) DEVICE — TOWELS CLOTH SURGICAL - (4/PK 20PK/CA)

## (undated) DEVICE — CANISTER SUCTION 3000ML MECHANICAL FILTER AUTO SHUTOFF MEDI-VAC NONSTERILE LF DISP  (40EA/CA)

## (undated) DEVICE — DRAPE LAPAROTOMY T SHEET - (12EA/CA)

## (undated) DEVICE — SENSOR OXIMETER ADULT SPO2 RD SET (20EA/BX)

## (undated) DEVICE — SUCTION INSTRUMENT YANKAUER BULBOUS TIP W/O VENT (50EA/CA)

## (undated) DEVICE — TUBING CLEARLINK DUO-VENT - C-FLO (48EA/CA)

## (undated) DEVICE — TOWEL STOP TIMEOUT SAFETY FLAG (40EA/CA)

## (undated) DEVICE — PACK MINOR BASIN - (2EA/CA)

## (undated) DEVICE — SODIUM CHL IRRIGATION 0.9% 1000ML (12EA/CA)

## (undated) DEVICE — HANDPIECE 10FT INTPLS SCT PLS IRRIGATION HAND CONTROL SET (6/PK)

## (undated) DEVICE — ELECTRODE DUAL RETURN W/ CORD - (50/PK)

## (undated) DEVICE — DRAPE LARGE 3 QUARTER - (20/CA)

## (undated) DEVICE — CHLORAPREP 26 ML APPLICATOR - ORANGE TINT(25/CA)

## (undated) DEVICE — SET EXTENSION WITH 2 PORTS (48EA/CA) ***PART #2C8610 IS A SUBSTITUTE*****